# Patient Record
Sex: MALE | Race: WHITE | NOT HISPANIC OR LATINO | Employment: UNEMPLOYED | URBAN - METROPOLITAN AREA
[De-identification: names, ages, dates, MRNs, and addresses within clinical notes are randomized per-mention and may not be internally consistent; named-entity substitution may affect disease eponyms.]

---

## 2017-01-09 DIAGNOSIS — F10.220 ACUTE ALCOHOLIC INTOXICATION IN ALCOHOLISM, UNCOMPLICATED (H): Primary | ICD-10-CM

## 2017-01-09 NOTE — TELEPHONE ENCOUNTER
wellbutrin       Last Written Prescription Date: 11/29/16  Last Fill Quantity: 30; # refills: 1  Last Office Visit with [facility], [facility] or [facility] prescribing provider:  12/2/16        Last PHQ-9 score on record=   PHQ-9 SCORE 10/18/2016   Total Score 12   Some encounter information is confidential and restricted. Go to Review Flowsheets activity to see all data.       AST       41   11/30/2016  ALT       58   11/30/2016    Labs showing if normal/abnormal  Lab Results   Component Value Date    AST 41 11/30/2016    ALT 58 11/30/2016

## 2017-01-11 NOTE — TELEPHONE ENCOUNTER
Routing refill request to provider for review/approval because:  Most recent phq9 outside SO parameters.    Please advise, thanks.

## 2017-01-12 NOTE — TELEPHONE ENCOUNTER
Notes from Dr Maher visit says he is seeing psych on the 16 th of this month and they will do prescribing of psych medication   He was given prescription 11/29 with one refill so this should get him to end of January and psych can refill medication

## 2017-01-14 NOTE — TELEPHONE ENCOUNTER
Pt called and reached, the pt stated he had failed to show up a different time so the appt on the 16th was cancelled as the pt was dropped    He is asking for a referral and asking if you can refill again giving him time to find a new MD.    Moisés ANDRE RN

## 2017-01-17 RX ORDER — BUPROPION HYDROCHLORIDE 150 MG/1
150 TABLET ORAL EVERY MORNING
Qty: 30 TABLET | Refills: 1 | Status: SHIPPED | OUTPATIENT
Start: 2017-01-17 | End: 2017-03-09

## 2017-01-17 NOTE — TELEPHONE ENCOUNTER
Referral to ?  This is a list of mental health options   I like the highlighted one     Refer to resource list of out-patient mental health clinics.      [facility], [address], [place]. The phone number is [phone].    Other [facility] locations:     [place], [place], [facility], [place], [place], [place], [place], [place], [place], [place], [place], and [facility].      [facility]), [address]. The phone number is [phone].      [facility], [address] [place] [address]. The phone number is [phone].     Other [facility] locations:     [facility], [facility], [place], and [place].       [facility], [address]. The phone number is [phone].       [facility], [facility], [address], [facility] [address]. The phone number is [phone].    Other [facility] locations:     [place], [place], and [facility].

## 2017-01-25 ENCOUNTER — TELEPHONE (OUTPATIENT)
Dept: INTERNAL MEDICINE | Facility: CLINIC | Age: 27
End: 2017-01-25

## 2017-01-25 DIAGNOSIS — G47.00 INSOMNIA, UNSPECIFIED TYPE: ICD-10-CM

## 2017-01-25 DIAGNOSIS — F10.14 ALCOHOL ABUSE WITH ALCOHOL-INDUCED MOOD DISORDER (H): ICD-10-CM

## 2017-01-25 DIAGNOSIS — F90.9 ATTENTION DEFICIT HYPERACTIVITY DISORDER (ADHD), UNSPECIFIED ADHD TYPE: Primary | ICD-10-CM

## 2017-01-25 DIAGNOSIS — F10.21 ALCOHOL DEPENDENCE IN REMISSION (H): ICD-10-CM

## 2017-01-25 DIAGNOSIS — F41.9 ANXIETY: ICD-10-CM

## 2017-01-25 DIAGNOSIS — F43.23 ADJUSTMENT DISORDER WITH MIXED ANXIETY AND DEPRESSED MOOD: ICD-10-CM

## 2017-01-25 RX ORDER — LISDEXAMFETAMINE DIMESYLATE 70 MG/1
70 CAPSULE ORAL EVERY MORNING
Qty: 30 CAPSULE | Refills: 0 | Status: SHIPPED | OUTPATIENT
Start: 2017-01-26 | End: 2017-02-20

## 2017-01-25 NOTE — TELEPHONE ENCOUNTER
Per pt note below pt requesting psych referral. Discussed with Nicolette Frye NP. Ordered Mental Health referral per Nicolette verbal orders.    Please share referral contact info with pt when notifying of rx status.

## 2017-01-25 NOTE — TELEPHONE ENCOUNTER
Reason for Call:  Medication or medication refill:    Do you use a [facility]?  Name of the pharmacy and phone number for the current request:  [facility]     Name of the medication requested: vyvanse     Other request: needs referrals for psych     Can we leave a detailed message on this number? YES    Phone number patient can be reached at: Home number on file [phone] (home)    Best Time: asap    Call taken on 1/25/2017 at 3:26 PM by Monika Sullivan

## 2017-01-25 NOTE — TELEPHONE ENCOUNTER
Vyvance       Last Written Prescription Date:  12/29/16  Last Fill Quantity:30 ,   # refills: 0  Last Office Visit with [facility] or [facility] prescribing provider: 12/29/16  Future Office visit:       Routing refill request to provider for review/approval because:  Drug not on the [facility], [facility] or [facility] refill protocol or controlled substance    Will need to US Mail to pharmacy  Med pended  Pharmacy listed  Please advise  Moisés ANDRE RN

## 2017-01-25 NOTE — TELEPHONE ENCOUNTER
RX will be mailed to pharmacy  Called pt and left message stating RX will be mail. And the the Mental health referral ws placed, to call back for details.    Moisés ANDRE RN

## 2017-01-27 NOTE — TELEPHONE ENCOUNTER
Pt calls, asking where rx was mailed. [facility] was attached to rx. Spoke with DORI Curiel who states she spoke to pt on 01/25 and was instructed to mail to [facility] and did so. Relay this information to pt. Instruct him to f/u with pharm to check on when rx received.

## 2017-02-07 DIAGNOSIS — F43.23 ADJUSTMENT DISORDER WITH MIXED ANXIETY AND DEPRESSED MOOD: Primary | ICD-10-CM

## 2017-02-07 DIAGNOSIS — F10.220 ACUTE ALCOHOLIC INTOXICATION IN ALCOHOLISM, UNCOMPLICATED (H): ICD-10-CM

## 2017-02-07 DIAGNOSIS — F10.21 ALCOHOL DEPENDENCE IN REMISSION (H): ICD-10-CM

## 2017-02-07 DIAGNOSIS — F41.9 ANXIETY: ICD-10-CM

## 2017-02-07 DIAGNOSIS — E66.01 MORBID OBESITY DUE TO EXCESS CALORIES (H): ICD-10-CM

## 2017-02-07 RX ORDER — OMEPRAZOLE 40 MG/1
40 CAPSULE, DELAYED RELEASE ORAL DAILY
Qty: 30 CAPSULE | Refills: 10 | Status: ON HOLD | OUTPATIENT
Start: 2017-02-07 | End: 2017-07-03

## 2017-02-07 NOTE — TELEPHONE ENCOUNTER
omeprazole      Last Written Prescription Date: 12/29/16  Last Fill Quantity: 30,  # refills: 0   [facility] Visit with [facility] prescribing provider: 12/29/16  Prescription approved per [facility] Refill Protocol.                                                 neurontin      Last Written Prescription Date: 12/29/16  Last Fill Quantity: 240,   # refills: 0  Last Office Visit with [facility] prescribing provider: 12/29/16  Future Office visit:       Routing refill request to provider for review/approval because:  Drug not on the [facility] or  [facility] refill protocol or controlled substance  Marimar Grayson RN

## 2017-02-08 RX ORDER — GABAPENTIN 400 MG/1
800 CAPSULE ORAL 4 TIMES DAILY
Qty: 240 CAPSULE | Refills: 0 | Status: SHIPPED | OUTPATIENT
Start: 2017-02-08 | End: 2017-02-21

## 2017-02-20 DIAGNOSIS — F41.9 ANXIETY: Primary | ICD-10-CM

## 2017-02-20 DIAGNOSIS — F90.9 ATTENTION DEFICIT HYPERACTIVITY DISORDER (ADHD), UNSPECIFIED ADHD TYPE: ICD-10-CM

## 2017-02-20 DIAGNOSIS — F19.20 CHEMICAL DEPENDENCY (H): ICD-10-CM

## 2017-02-20 DIAGNOSIS — F10.14 ALCOHOL ABUSE WITH ALCOHOL-INDUCED MOOD DISORDER (H): ICD-10-CM

## 2017-02-20 DIAGNOSIS — F43.23 ADJUSTMENT DISORDER WITH MIXED ANXIETY AND DEPRESSED MOOD: ICD-10-CM

## 2017-02-20 NOTE — TELEPHONE ENCOUNTER
Pt calls to request dosing change for gabapentin from 800mg QID to 600mg QID. He states that he has discussed this with Nicolette and in the past and does want try to wean down a little.     Pt also requesting refill for Vyvanse.   He would like this filled at [facility].   He is going to be seeing psychiatry soon and they will take over Vyvanse prescription-his therapist told him last week someone would be calling him soon to get appt with psychiatry.    Vyvanse 70mg      Last Written Prescription Date:  1/26/17  Last Fill Quantity: 30,   # refills: 0  Last Office Visit with [facility] or [facility] prescribing provider: 12/29/16  Future Office visit:    Next 5 appointments (look out 90 days)     Mar 14, 2017  9:20 AM CDT   SHORT with ROXI Santiago [facility] ([facility])    [address]  [facility] [address]   [phone]                 Does not have CSA on file-pt pt Nicolette is ordering this until he gets established with a new psychiatrist.     Routing refill request to provider for review/approval because:  Drug not on the [facility] or [facility] refill protocol or controlled substance

## 2017-02-21 DIAGNOSIS — F43.23 ADJUSTMENT DISORDER WITH MIXED ANXIETY AND DEPRESSED MOOD: ICD-10-CM

## 2017-02-21 DIAGNOSIS — F19.20 CHEMICAL DEPENDENCY (H): ICD-10-CM

## 2017-02-21 DIAGNOSIS — F10.14 ALCOHOL ABUSE WITH ALCOHOL-INDUCED MOOD DISORDER (H): ICD-10-CM

## 2017-02-21 DIAGNOSIS — F41.9 ANXIETY: ICD-10-CM

## 2017-02-21 RX ORDER — GABAPENTIN 600 MG/1
600 TABLET ORAL 4 TIMES DAILY
Qty: 120 TABLET | Refills: 0 | Status: SHIPPED | OUTPATIENT
Start: 2017-02-21 | End: 2017-02-21

## 2017-02-21 RX ORDER — LISDEXAMFETAMINE DIMESYLATE 70 MG/1
70 CAPSULE ORAL EVERY MORNING
Qty: 30 CAPSULE | Refills: 0 | Status: SHIPPED | OUTPATIENT
Start: 2017-02-21 | End: 2017-06-28

## 2017-02-21 NOTE — TELEPHONE ENCOUNTER
Called pt, relay provider message below. States he started seeing Dr. Moscoso at [facility]. Then when she left the clinic saw Dr. Walton who pt reports concerns about being consistently late to appts 20-30 minutes and then spending about 5 minutes with him.    Indicates he has been seeing a therapist t/ [facility] [place] [facility]. Pt states therapist indicated he was not able to get psychiatrist appt until working with her for 3 weeks. It has now been 4 weeks and so pt is in the process of getting a new psychiatrist.    [facility]. [facility] to [facility].     Routed to provider as update.

## 2017-02-21 NOTE — TELEPHONE ENCOUNTER
My last note said he was seeing psych at [facility] January 6 th   Why are they not prescribing these medications?    I will fill for a month

## 2017-02-21 NOTE — TELEPHONE ENCOUNTER
This medication needs prior authorization. Please call [phone]    Thank you,  [facility]  [phone]

## 2017-02-23 RX ORDER — GABAPENTIN 600 MG/1
600 TABLET ORAL 4 TIMES DAILY
Qty: 120 TABLET | Refills: 0 | Status: SHIPPED | OUTPATIENT
Start: 2017-02-23 | End: 2017-03-09

## 2017-02-24 ENCOUNTER — HOSPITAL ENCOUNTER (EMERGENCY)
Facility: CLINIC | Age: 27
Discharge: HOME | End: 2017-02-25
Attending: FAMILY MEDICINE | Admitting: FAMILY MEDICINE
Payer: MEDICAID

## 2017-02-24 DIAGNOSIS — F10.229 ALCOHOL DEPENDENCE WITH INTOXICATION WITH COMPLICATION (H): ICD-10-CM

## 2017-02-24 LAB — ALCOHOL BREATH TEST: 0.35 (ref 0–0.01)

## 2017-02-24 PROCEDURE — 96372 THER/PROPH/DIAG INJ SC/IM: CPT

## 2017-02-24 PROCEDURE — 99285 EMERGENCY DEPT VISIT HI MDM: CPT | Mod: 25

## 2017-02-24 PROCEDURE — 99284 EMERGENCY DEPT VISIT MOD MDM: CPT | Mod: Z6 | Performed by: FAMILY MEDICINE

## 2017-02-24 PROCEDURE — 25000128 H RX IP 250 OP 636: Performed by: FAMILY MEDICINE

## 2017-02-24 PROCEDURE — 82075 ASSAY OF BREATH ETHANOL: CPT

## 2017-02-24 RX ORDER — TRAZODONE HYDROCHLORIDE 100 MG/1
200 TABLET ORAL
Qty: 8 TABLET | Refills: 0 | Status: SHIPPED | OUTPATIENT
Start: 2017-02-24 | End: 2017-06-28

## 2017-02-24 RX ORDER — CLONIDINE HYDROCHLORIDE 0.1 MG/1
0.1 TABLET ORAL 2 TIMES DAILY
Qty: 8 TABLET | Refills: 0 | Status: SHIPPED | OUTPATIENT
Start: 2017-02-24 | End: 2017-06-28

## 2017-02-24 RX ORDER — AMLODIPINE BESYLATE 10 MG/1
10 TABLET ORAL DAILY
Qty: 4 TABLET | Refills: 0 | Status: SHIPPED | OUTPATIENT
Start: 2017-02-24 | End: 2017-06-28

## 2017-02-24 RX ORDER — KETOROLAC TROMETHAMINE 30 MG/ML
30 INJECTION, SOLUTION INTRAMUSCULAR; INTRAVENOUS ONCE
Status: COMPLETED | OUTPATIENT
Start: 2017-02-24 | End: 2017-02-24

## 2017-02-24 RX ORDER — SERTRALINE HYDROCHLORIDE 100 MG/1
100 TABLET, FILM COATED ORAL DAILY
Qty: 4 TABLET | Refills: 0 | Status: SHIPPED | OUTPATIENT
Start: 2017-02-24 | End: 2017-06-28

## 2017-02-24 RX ORDER — BUPROPION HYDROCHLORIDE 150 MG/1
TABLET, EXTENDED RELEASE ORAL
Qty: 4 TABLET | Refills: 0 | Status: SHIPPED | OUTPATIENT
Start: 2017-02-24 | End: 2017-06-28

## 2017-02-24 RX ORDER — LISDEXAMFETAMINE DIMESYLATE 70 MG/1
70 CAPSULE ORAL DAILY
Qty: 4 CAPSULE | Refills: 0 | Status: SHIPPED | OUTPATIENT
Start: 2017-02-24 | End: 2017-02-28

## 2017-02-24 RX ADMIN — KETOROLAC TROMETHAMINE 30 MG: 30 INJECTION, SOLUTION INTRAMUSCULAR at 22:36

## 2017-02-24 NOTE — ED NOTES
Bed: ED12  Expected date: 2/24/17  Expected time: 4:55 PM  Means of arrival:   Comments:  [facility] 25 yo anxiety etoh

## 2017-02-24 NOTE — SUMMARY OF CARE
Patient search completed by ; pt wearing scrub top, pt wanded with metal detector and belongings given to security to be stored. Explained to patient that they would be evaluated by a nurse here in the ER and then would go over to the [facility] when a bed is available where they will meet with a doctor and an accessor; plan will be determined from there.

## 2017-02-25 VITALS
SYSTOLIC BLOOD PRESSURE: 148 MMHG | HEART RATE: 110 BPM | RESPIRATION RATE: 16 BRPM | DIASTOLIC BLOOD PRESSURE: 91 MMHG | OXYGEN SATURATION: 96 % | TEMPERATURE: 98 F

## 2017-02-25 NOTE — DISCHARGE INSTRUCTIONS
Discharge to [address] [place] [address] for detox and follow-up for treatment.  Patient will be on a transport hold.

## 2017-03-07 ASSESSMENT — ENCOUNTER SYMPTOMS
SHORTNESS OF BREATH: 0
AGITATION: 1
ABDOMINAL PAIN: 0
FEVER: 0

## 2017-03-07 NOTE — ED PROVIDER NOTES
History     Chief Complaint   Patient presents with     Alcohol Intoxication     HPI  Nickolas Lang is a 26 year old male who presents to emergency room acutely intoxicated at this time at risk for withdrawal and requiring detox.  Patient denies any suicidal ideation denies any intent to harm himself is open to the idea of treatment but will require detox.  Patient has been drinking close to a liter a day and is acutely intoxicated on arrival was brought here by EMS.    I have reviewed the Medications, Allergies, Past Medical and Surgical History, and Social History in the Epic system.    Review of Systems   Constitutional: Negative for fever.   Respiratory: Negative for shortness of breath.    Cardiovascular: Negative for chest pain.   Gastrointestinal: Negative for abdominal pain.   Psychiatric/Behavioral: Positive for agitation. Negative for suicidal ideas.   All other systems reviewed and are negative.      Physical Exam   BP: (!) 129/93  Pulse: 110  Heart Rate: 96  Temp: 98  F (36.7  C)  Resp: 16  SpO2: 98 %  Physical Exam   Constitutional: He is oriented to person, place, and time. No distress.   HENT:   Head: Atraumatic.   Mouth/Throat: Oropharynx is clear and moist. No oropharyngeal exudate.   Eyes: Pupils are equal, round, and reactive to light. No scleral icterus.   Cardiovascular: Normal heart sounds and intact distal pulses.    Pulmonary/Chest: Breath sounds normal. No respiratory distress.   Abdominal: Soft. Bowel sounds are normal. There is no tenderness.   Musculoskeletal: He exhibits no edema or tenderness.   Neurological: He is alert and oriented to person, place, and time. No cranial nerve deficit. Coordination normal.   Skin: Skin is warm. No rash noted. He is not diaphoretic.   Psychiatric: His mood appears anxious. He is agitated. He expresses impulsivity. He expresses no suicidal ideation.       ED Course     ED Course     Procedures    Critical Care time:  none    Labs Ordered and  Resulted from Time of ED Arrival Up to the Time of Departure from the ED   ALCOHOL BREATH TEST POCT - Abnormal; Notable for the following:        Result Value    Alcohol Breath Test 0.352 (*)     All other components within normal limits       Assessments & Plan (with Medical Decision Making)       I have reviewed the nursing notes.    I have reviewed the findings, diagnosis, plan and need for follow up with the patient.  Patient with acute alcohol intoxication at this time there are no detox beds available here he will require supervised detox and arrangements were made for patient to go to 1800, though he will be on a transport hold I also wrote prescriptions for the next 3-4 days of his baseline medications as well.    Discharge Medication List as of 2/25/2017  1:00 AM      START taking these medications    Details   !! amLODIPine (NORVASC) 10 MG tablet Take 1 tablet (10 mg) by mouth daily, Disp-4 tablet, R-0, Local Print      buPROPion (WELLBUTRIN SR) 150 MG 12 hr tablet Take 1 tablet once daily and increase to 1 tablet twice daily after 4 to 7 days, Disp-4 tablet, R-0, Local Print      !! cloNIDine (CATAPRES) 0.1 MG tablet Take 1 tablet (0.1 mg) by mouth 2 times daily, Disp-8 tablet, R-0, Local Print      !! lisdexamfetamine (VYVANSE) 70 MG capsule Take 1 capsule (70 mg) by mouth daily for 4 days, Disp-4 capsule, R-0, Local Print      !! sertraline (ZOLOFT) 100 MG tablet Take 1 tablet (100 mg) by mouth daily, Disp-4 tablet, R-0, Local Print      !! traZODone (DESYREL) 100 MG tablet Take 2 tablets (200 mg) by mouth nightly as needed for sleep, Disp-8 tablet, R-0, Local Print       !! - Potential duplicate medications found. Please discuss with provider.          Final diagnoses:   Alcohol dependence with intoxication with complication (H)       2/24/2017   [facility], [place], EMERGENCY DEPARTMENT     Jorge Baker MD  03/07/17 4308

## 2017-03-09 ENCOUNTER — TELEPHONE (OUTPATIENT)
Dept: INTERNAL MEDICINE | Facility: CLINIC | Age: 27
End: 2017-03-09

## 2017-03-09 DIAGNOSIS — F43.23 ADJUSTMENT DISORDER WITH MIXED ANXIETY AND DEPRESSED MOOD: ICD-10-CM

## 2017-03-09 DIAGNOSIS — F19.20 CHEMICAL DEPENDENCY (H): ICD-10-CM

## 2017-03-09 DIAGNOSIS — F10.14 ALCOHOL ABUSE WITH ALCOHOL-INDUCED MOOD DISORDER (H): ICD-10-CM

## 2017-03-09 DIAGNOSIS — F41.9 ANXIETY: Primary | ICD-10-CM

## 2017-03-09 DIAGNOSIS — F10.220 ACUTE ALCOHOLIC INTOXICATION IN ALCOHOLISM, UNCOMPLICATED (H): ICD-10-CM

## 2017-03-09 DIAGNOSIS — F41.9 ANXIETY: ICD-10-CM

## 2017-03-09 RX ORDER — GABAPENTIN 600 MG/1
600 TABLET ORAL 4 TIMES DAILY
Qty: 120 TABLET | Refills: 0 | Status: SHIPPED | OUTPATIENT
Start: 2017-03-09 | End: 2017-03-16

## 2017-03-09 NOTE — TELEPHONE ENCOUNTER
Letter/fax recieved from [employer] (in formulary folder/basket) states gabapentin is not covered under patient's insurance.      Can medication be changed or should a PA be initiated?    (Provider may request we ask patient to check their formulary book or call their insurance company to find out what medications are on their formulary. Ask patient to call back within 2-3 days.  If they are not able to call back in this time frame this encounter will be closed.  Open new encounter when/if patient calls back.)    IF PA call [facility] at [phone]  No pt id provided

## 2017-03-09 NOTE — TELEPHONE ENCOUNTER
Bupropion       Last Written Prescription Date: 2/24/17  Last Fill Quantity: 4; # refills: 0  Last Office Visit with [facility], [facility] or  [facility] prescribing provider:  12/2/16   Next 5 appointments (look out 90 days)     Mar 14, 2017  9:20 AM CDT   SHORT with ROXI Santiago CNP   [facility]    [address]  [facility] [address]   [phone]                   Last PHQ-9 score on record=   PHQ-9 SCORE 10/18/2016   Total Score 12   Some encounter information is confidential and restricted. Go to Review Flowsheets activity to see all data.       Lab Results   Component Value Date    AST 41 11/30/2016     Lab Results   Component Value Date    ALT 58 11/30/2016       Labs showing if normal/abnormal  Lab Results   Component Value Date    AST 41 11/30/2016    ALT 58 11/30/2016

## 2017-03-09 NOTE — TELEPHONE ENCOUNTER
Gabapentin 400mg--epic has 600mg on file      Last Written Prescription Date:  2/23/17  Last Fill Quantity: 120,   # refills: 0  Last Office Visit with [facility], [facility] or [facility] prescribing provider: 12/29/16  Future Office visit:    Next 5 appointments (look out 90 days)     Mar 14, 2017  9:20 AM CDT   SHORT with ROXI Santiago CNP   [facility]    [address]  [facility] [id number] [phone]                   Routing refill request to provider for review/approval because:  Drug not on the [facility], [facility] or [facility] refill protocol or controlled substance

## 2017-03-10 NOTE — TELEPHONE ENCOUNTER
Representative from [employer] calls for [facility], left vm stating PA could be completed on [employer]/PAportal. If questions for representative call: [phone].

## 2017-03-13 RX ORDER — BUPROPION HYDROCHLORIDE 150 MG/1
150 TABLET ORAL EVERY MORNING
Qty: 30 TABLET | Refills: 0 | Status: ON HOLD | OUTPATIENT
Start: 2017-03-13 | End: 2017-07-03

## 2017-03-13 NOTE — TELEPHONE ENCOUNTER
Per 12/2 dictation-Follow up in 4-6 weeks for fasting labs and exam    Patient scheduled for appt tomorrow, but has not had labs done

## 2017-03-16 RX ORDER — GABAPENTIN 300 MG/1
600 CAPSULE ORAL 4 TIMES DAILY
Qty: 240 CAPSULE | Refills: 3 | Status: SHIPPED | OUTPATIENT
Start: 2017-03-16 | End: 2017-06-28

## 2017-03-16 NOTE — TELEPHONE ENCOUNTER
[facility] call from [facility] [address] in [place], only the 600 mg strength of Gabapentin is not covered.  Asking if PCP would order 300 mg caps taking 2 of them four times daily.  FREDERICK Beneidct R.N.

## 2017-03-20 NOTE — TELEPHONE ENCOUNTER
Pt had transfer prescriptions to [facility]- called and confirmed that prescription with new dosage went through just fine however pt cannot yoanna until after tomorrow.

## 2017-05-17 ENCOUNTER — HOSPITAL ENCOUNTER (EMERGENCY)
Facility: CLINIC | Age: 27
Discharge: HOME | End: 2017-05-17
Attending: EMERGENCY MEDICINE | Admitting: EMERGENCY MEDICINE
Payer: MEDICAID

## 2017-05-17 VITALS
BODY MASS INDEX: 40.48 KG/M2 | OXYGEN SATURATION: 98 % | SYSTOLIC BLOOD PRESSURE: 125 MMHG | TEMPERATURE: 98 F | HEIGHT: 72 IN | HEART RATE: 118 BPM | DIASTOLIC BLOOD PRESSURE: 98 MMHG | RESPIRATION RATE: 18 BRPM | WEIGHT: 298.9 LBS

## 2017-05-17 DIAGNOSIS — F10.220 ALCOHOL DEPENDENCE WITH UNCOMPLICATED INTOXICATION (H): ICD-10-CM

## 2017-05-17 LAB — ALCOHOL BREATH TEST: 0.27 (ref 0–0.01)

## 2017-05-17 PROCEDURE — 99283 EMERGENCY DEPT VISIT LOW MDM: CPT | Performed by: EMERGENCY MEDICINE

## 2017-05-17 PROCEDURE — 99284 EMERGENCY DEPT VISIT MOD MDM: CPT | Mod: Z6 | Performed by: EMERGENCY MEDICINE

## 2017-05-17 PROCEDURE — 82075 ASSAY OF BREATH ETHANOL: CPT | Performed by: EMERGENCY MEDICINE

## 2017-05-17 ASSESSMENT — ENCOUNTER SYMPTOMS
SHORTNESS OF BREATH: 0
ARTHRALGIAS: 0
FEVER: 0
EYE REDNESS: 0
CONFUSION: 0
COLOR CHANGE: 0
HEADACHES: 0
ABDOMINAL PAIN: 0
NAUSEA: 1
DIFFICULTY URINATING: 0
NECK STIFFNESS: 0

## 2017-05-17 NOTE — ED AVS SNAPSHOT
[facility] [place], Emergency Department    [address]    [facility] [address]    Phone:  [phone]    Fax:  [phone]                                       Nickolas Lang   MRN: [id number]    Department:  [facility], Emergency Department   Date of Visit:  5/17/2017           After Visit Summary Signature Page     I have received my discharge instructions, and my questions have been answered. I have discussed any challenges I see with this plan with the nurse or doctor.    ..........................................................................................................................................  Patient/Patient Representative Signature      ..........................................................................................................................................  Patient Representative Print Name and Relationship to Patient    ..................................................               ................................................  Date                                            Time    ..........................................................................................................................................  Reviewed by Signature/Title    ...................................................              ..............................................  Date                                                            Time

## 2017-05-17 NOTE — ED AVS SNAPSHOT
[facility], Emergency Department    [address]    [place] [address]    Phone:  [phone]    Fax:  [phone]                                       Nickolas Lang   MRN: [id number]    Department:  [facility], Emergency Department   Date of Visit:  5/17/2017           Patient Information     Date Of Birth          1990        Your diagnoses for this visit were:     Alcohol dependence with uncomplicated intoxication (H)        You were seen by Nj Navarro MD.        Discharge Instructions       Call Mental Health Intake in the morning to check on Detox bed availability:  [phone].        Discharge References/Attachments     ALCOHOL ABUSE (ENGLISH)      24 Hour Appointment Hotline       To make an appointment at any [facility] clinic, call [phone] ([phone]). If you don't have a family doctor or clinic, we will help you find one. [facility] clinics are conveniently located to serve the needs of you and your family.             Review of your medicines      Our records show that you are taking the medicines listed below. If these are incorrect, please call your family doctor or clinic.        Dose / Directions Last dose taken    * amLODIPine 10 MG tablet   Commonly known as:  NORVASC   Dose:  10 mg   Quantity:  30 tablet        Take 1 tablet (10 mg) by mouth daily   Refills:  1        * amLODIPine 10 MG tablet   Commonly known as:  NORVASC   Dose:  10 mg   Quantity:  4 tablet        Take 1 tablet (10 mg) by mouth daily   Refills:  0        * buPROPion 150 MG 12 hr tablet   Commonly known as:  WELLBUTRIN SR   Quantity:  4 tablet        Take 1 tablet once daily and increase to 1 tablet twice daily after 4 to 7 days   Refills:  0        * buPROPion 150 MG 24 hr tablet   Commonly known as:  WELLBUTRIN XL   Dose:  150 mg   Quantity:  30 tablet        Take 1 tablet (150 mg) by mouth every morning   Refills:  0        busPIRone 10 MG tablet   Commonly known as:  BUSPAR   Dose:  20  mg   Quantity:  120 tablet        Take 2 tablets (20 mg) by mouth 3 times daily   Refills:  1        * cloNIDine 0.1 MG tablet   Commonly known as:  CATAPRES   Dose:  0.1 mg   Quantity:  60 tablet        Take 1 tablet (0.1 mg) by mouth 2 times daily   Refills:  1        * cloNIDine 0.1 MG tablet   Commonly known as:  CATAPRES   Dose:  0.1 mg   Quantity:  8 tablet        Take 1 tablet (0.1 mg) by mouth 2 times daily   Refills:  0        disulfiram 250 MG tablet   Commonly known as:  ANTABUSE   Dose:  250 mg   Quantity:  30 tablet        Take 1 tablet (250 mg) by mouth daily   Refills:  1        gabapentin 300 MG capsule   Commonly known as:  NEURONTIN   Dose:  600 mg   Quantity:  240 capsule        Take 2 capsules (600 mg) by mouth 4 times daily   Refills:  3        hydrOXYzine 25 MG tablet   Commonly known as:  ATARAX   Dose:  50 mg   Quantity:  120 tablet        Take 2 tablets (50 mg) by mouth every 6 hours as needed for anxiety   Refills:  1        lisdexamfetamine 70 MG capsule   Commonly known as:  VYVANSE   Dose:  70 mg   Quantity:  30 capsule        Take 1 capsule (70 mg) by mouth every morning   Refills:  0        methocarbamol 500 MG tablet   Commonly known as:  ROBAXIN   Dose:  500 mg   Quantity:  90 tablet        Take 1 tablet (500 mg) by mouth 4 times daily as needed for muscle spasms   Refills:  1        multivitamin, therapeutic with minerals Tabs tablet   Dose:  1 tablet   Quantity:  30 each        Take 1 tablet by mouth daily   Refills:  3        omeprazole 40 MG capsule   Commonly known as:  priLOSEC   Dose:  40 mg   Quantity:  30 capsule        Take 1 capsule (40 mg) by mouth daily   Refills:  10        * sertraline 100 MG tablet   Commonly known as:  ZOLOFT   Dose:  200 mg   Quantity:  60 tablet        Take 2 tablets (200 mg) by mouth daily   Refills:  1        * sertraline 100 MG tablet   Commonly known as:  ZOLOFT   Dose:  100 mg   Quantity:  4 tablet        Take 1 tablet (100 mg) by mouth daily    Refills:  0        thiamine 100 MG tablet   Dose:  100 mg   Quantity:  30 tablet        Take 1 tablet (100 mg) by mouth daily   Refills:  0        * traZODone 100 MG tablet   Commonly known as:  DESYREL   Dose:  200 mg   Quantity:  60 tablet        Take 2 tablets (200 mg) by mouth nightly as needed for sleep   Refills:  0        * traZODone 100 MG tablet   Commonly known as:  DESYREL   Dose:  200 mg   Quantity:  8 tablet        Take 2 tablets (200 mg) by mouth nightly as needed for sleep   Refills:  0        * Notice:  This list has 10 medication(s) that are the same as other medications prescribed for you. Read the directions carefully, and ask your doctor or other care provider to review them with you.            Procedures and tests performed during your visit     Alcohol breath test POCT      Orders Needing Specimen Collection     Ordered          05/17/17 6712  Drug abuse screen 6 urine (tox) - STAT, [facility]: STAT, Needs to be Collected     Scheduled Task Status   05/17/17 1659 Collect Drug abuse screen 6 urine (tox) Open   Order Class:  PCU Collect                  Pending Results     No orders found from 5/15/2017 to 5/18/2017.            Pending Culture Results     No orders found from 5/15/2017 to 5/18/2017.            Pending Results Instructions     If you had any lab results that were not finalized at the time of your Discharge, you can call the ED Lab Result RN at [phone]. You will be contacted by this team for any positive Lab results or changes in treatment. The nurses are available 7 days a week from 10A to 6:30P.  You can leave a message 24 hours per day and they will return your call.        Thank you for choosing [facility]       Thank you for choosing [facility] for your care. Our goal is always to provide you with excellent care. Hearing back from our patients is one way we can continue to improve our services. Please take a few minutes to complete the written survey that you may receive in the  "mail after you visit with us. Thank you!        [employer] Information     [facility] [employer] [facility] lets you send messages to your doctor, view your test results, renew your prescriptions, schedule appointments and more. To sign up, go to [email or web address] . Click on \"Log in\" on the left side of the screen, which will take you to the Welcome page. Then click on \"Sign up Now\" on the right side of the page.     You will be asked to enter the access code listed below, as well as some personal information. Please follow the directions to create your username and password.     Your access code is: [email or web address]  Expires: 8/15/2017  5:29 PM     Your access code will  in 90 days. If you need help or a new code, please call your [facility] clinic or [phone].        Care EveryWhere ID     This is your Care EveryWhere ID. This could be used by other organizations to access your [facility] medical records  [id number]        After Visit Summary       This is your record. Keep this with you and show to your community pharmacist(s) and doctor(s) at your next visit.                  "

## 2017-05-17 NOTE — ED PROVIDER NOTES
History     Chief Complaint   Patient presents with     Addiction Problem     alcohol     HPI  Nickolas Lang is a 26 year old male who presents to the emergency Department seeking detox from alcohol.  Patient reports daily alcohol consumption for the last week.  He states that he is trying to stop drinking in the past week but developed tremulousness noticed with attempt at alcohol cessation.  Patient reports a history of a cultural the past.  He denies a history of alcohol withdrawal seizures or DTs.  Patient reports a history of depression for which she takes Zoloft.  He denies any suicide ideation.  Patient denies any recent fall or injuries.  He denies any recent illness.  He does report some mild nausea but denies vomiting.  He denies any dark or black stools.  He denies any chest pain or dyspnea.    I have reviewed the Medications, Allergies, Past Medical and Surgical History, and Social History in the Epic system.    Review of Systems   Constitutional: Negative for fever.   HENT: Negative for congestion.    Eyes: Negative for redness.   Respiratory: Negative for shortness of breath.    Cardiovascular: Negative for chest pain.   Gastrointestinal: Positive for nausea. Negative for abdominal pain.   Genitourinary: Negative for difficulty urinating.   Musculoskeletal: Negative for arthralgias and neck stiffness.   Skin: Negative for color change.   Neurological: Negative for headaches.   Psychiatric/Behavioral: Negative for confusion.   All other systems reviewed and are negative.      Physical Exam   BP: (!) 164/113  Pulse: 118  Temp: 98.1  F (36.7  C)  Resp: 20  Height: 182.9 cm (6')  Weight: 135.6 kg (298 lb 14.4 oz)  SpO2: 93 %  Physical Exam   Constitutional: He appears well-developed and well-nourished. No distress.   HENT:   Head: Normocephalic and atraumatic.   Mouth/Throat: Oropharynx is clear and moist. No oropharyngeal exudate.   Eyes: Pupils are equal, round, and reactive to light. No scleral  icterus.   Neck: Normal range of motion.   Cardiovascular: Normal heart sounds and intact distal pulses.  Tachycardia present.    Pulmonary/Chest: Effort normal and breath sounds normal. No respiratory distress.   Abdominal: Soft. Bowel sounds are normal. There is no tenderness.   Musculoskeletal: Normal range of motion. He exhibits no edema or tenderness.   Neurological: He is alert. He has normal strength. No cranial nerve deficit. Coordination and gait normal.   Skin: Skin is warm and dry. No rash noted. He is not diaphoretic.   Psychiatric: He has a normal mood and affect. His behavior is normal.   Nursing note and vitals reviewed.      ED Course     ED Course     Procedures            Critical Care time:    Results for orders placed or performed during the hospital encounter of 05/17/17 (from the past 24 hour(s))   Alcohol breath test POCT   Result Value Ref Range    Alcohol Breath Test 0.272 (A) 0.00 - 0.01        Here with sober girlfriend.  No detox beds available.  Declines IV fluids and lab evaluation.           Assessments & Plan (with Medical Decision Making)   26 year old male with history of alcohol abuse and dependence here seeking detox.  There are no detox beds available.  The patient declines detox at alternative setting such as [facility].  The patient has had some nausea but denies any vomiting or melena.  He denies any other medical concerns.  He is mildly tachycardic with a heart rate of 104 on exam.  He declines IV fluids and laboratory evaluation at this time.  He is here with his sober girlfriend.  The patient is ambulatory with a normal gait pattern.  He will be discharged home.  He, hopefully, will call in the morning to check on detox bed availability.  He was provided the number for mental health intake.    I have reviewed the nursing notes.    I have reviewed the findings, diagnosis, plan and need for follow up with the patient.    New Prescriptions    No medications on file        Final diagnoses:   Alcohol dependence with uncomplicated intoxication (H)       5/17/2017   [facility], [place], EMERGENCY DEPARTMENT     Nj Navarro MD  05/17/17 4250

## 2017-06-03 ENCOUNTER — HOSPITAL ENCOUNTER (EMERGENCY)
Facility: CLINIC | Age: 27
Discharge: HOME | End: 2017-06-03
Attending: EMERGENCY MEDICINE | Admitting: EMERGENCY MEDICINE
Payer: MEDICAID

## 2017-06-03 VITALS
HEART RATE: 127 BPM | RESPIRATION RATE: 20 BRPM | TEMPERATURE: 98 F | DIASTOLIC BLOOD PRESSURE: 103 MMHG | SYSTOLIC BLOOD PRESSURE: 168 MMHG | OXYGEN SATURATION: 96 %

## 2017-06-03 DIAGNOSIS — F10.10 ALCOHOL ABUSE: ICD-10-CM

## 2017-06-03 LAB
ALBUMIN UR-MCNC: ABNORMAL MG/DL
APPEARANCE UR: ABNORMAL
BACTERIA #/AREA URNS HPF: ABNORMAL /HPF
BILIRUB UR QL STRIP: NEGATIVE
COLOR UR AUTO: YELLOW
GLUCOSE BLDC GLUCOMTR-MCNC: 116 MG/DL (ref 70–99)
GLUCOSE UR STRIP-MCNC: NEGATIVE MG/DL
HGB UR QL STRIP: NEGATIVE
HYALINE CASTS #/AREA URNS LPF: 7 /LPF (ref 0–2)
KETONES UR STRIP-MCNC: NEGATIVE MG/DL
LEUKOCYTE ESTERASE UR QL STRIP: NEGATIVE
MUCOUS THREADS #/AREA URNS LPF: PRESENT /LPF
NITRATE UR QL: NEGATIVE
PH UR STRIP: 5 PH (ref 5–7)
RBC #/AREA URNS AUTO: 2 /HPF (ref 0–2)
SP GR UR STRIP: 1.02 (ref 1–1.03)
SQUAMOUS #/AREA URNS AUTO: 1 /HPF (ref 0–1)
URN SPEC COLLECT METH UR: ABNORMAL
UROBILINOGEN UR STRIP-MCNC: 0 MG/DL (ref 0–2)
WBC #/AREA URNS AUTO: 7 /HPF (ref 0–2)

## 2017-06-03 PROCEDURE — 00000146 ZZHCL STATISTIC GLUCOSE BY METER IP

## 2017-06-03 PROCEDURE — 25000128 H RX IP 250 OP 636: Performed by: EMERGENCY MEDICINE

## 2017-06-03 PROCEDURE — 96361 HYDRATE IV INFUSION ADD-ON: CPT

## 2017-06-03 PROCEDURE — 25000132 ZZH RX MED GY IP 250 OP 250 PS 637: Performed by: EMERGENCY MEDICINE

## 2017-06-03 PROCEDURE — 81001 URINALYSIS AUTO W/SCOPE: CPT | Performed by: EMERGENCY MEDICINE

## 2017-06-03 PROCEDURE — 99285 EMERGENCY DEPT VISIT HI MDM: CPT | Mod: 25

## 2017-06-03 PROCEDURE — 96374 THER/PROPH/DIAG INJ IV PUSH: CPT

## 2017-06-03 RX ORDER — CHLORDIAZEPOXIDE HYDROCHLORIDE 25 MG/1
25 CAPSULE, GELATIN COATED ORAL 4 TIMES DAILY PRN
Qty: 20 CAPSULE | Refills: 0 | Status: SHIPPED | OUTPATIENT
Start: 2017-06-03 | End: 2017-06-08

## 2017-06-03 RX ORDER — CHLORDIAZEPOXIDE HYDROCHLORIDE 5 MG/1
25 CAPSULE, GELATIN COATED ORAL ONCE
Status: COMPLETED | OUTPATIENT
Start: 2017-06-03 | End: 2017-06-03

## 2017-06-03 RX ORDER — LORAZEPAM 2 MG/ML
1 INJECTION INTRAMUSCULAR ONCE
Status: COMPLETED | OUTPATIENT
Start: 2017-06-03 | End: 2017-06-03

## 2017-06-03 RX ORDER — ONDANSETRON 4 MG/1
8 TABLET, ORALLY DISINTEGRATING ORAL ONCE
Status: DISCONTINUED | OUTPATIENT
Start: 2017-06-03 | End: 2017-06-03 | Stop reason: HOSPADM

## 2017-06-03 RX ORDER — LORAZEPAM 1 MG/1
1 TABLET ORAL ONCE
Status: DISCONTINUED | OUTPATIENT
Start: 2017-06-03 | End: 2017-06-03 | Stop reason: HOSPADM

## 2017-06-03 RX ADMIN — SODIUM CHLORIDE 1000 ML: 9 INJECTION, SOLUTION INTRAVENOUS at 12:56

## 2017-06-03 RX ADMIN — LORAZEPAM 1 MG: 2 INJECTION INTRAMUSCULAR; INTRAVENOUS at 13:02

## 2017-06-03 RX ADMIN — CHLORDIAZEPOXIDE HYDROCHLORIDE 25 MG: 5 CAPSULE ORAL at 15:23

## 2017-06-03 ASSESSMENT — ENCOUNTER SYMPTOMS
SHORTNESS OF BREATH: 0
DIARRHEA: 0
VOMITING: 0
NAUSEA: 0
FEVER: 0
CHILLS: 0

## 2017-06-03 NOTE — ED PROVIDER NOTES
History   Chief Complaint:  Alcohol Intoxication    HPI     HPI limited due to alcohol intoxication.   Nickolas Lang is a 26 year old male with a history of alcohol abuse and hypertension who presents via ambulance due to alcohol intoxication. The EMS states that a neighbor was cutting the lawn when he found the patient passed out in the bushes with a few bottles of vodka around him.    Allergies:  No known drug allergies    Medications:    gabapentin (NEURONTIN) 300 MG capsule  buPROPion (WELLBUTRIN XL) 150 MG 24 hr tablet  amLODIPine (NORVASC) 10 MG tablet  buPROPion (WELLBUTRIN SR) 150 MG 12 hr tablet  cloNIDine (CATAPRES) 0.1 MG tablet  sertraline (ZOLOFT) 100 MG tablet  traZODone (DESYREL) 100 MG tablet  lisdexamfetamine (VYVANSE) 70 MG capsule  omeprazole (PRILOSEC) 40 MG capsule  traZODone (DESYREL) 100 MG tablet  thiamine 100 MG tablet  busPIRone (BUSPAR) 10 MG tablet  hydrOXYzine (ATARAX) 25 MG tablet  sertraline (ZOLOFT) 100 MG tablet  cloNIDine (CATAPRES) 0.1 MG tablet  amLODIPine (NORVASC) 10 MG tablet  methocarbamol (ROBAXIN) 500 MG tablet  multivitamin, therapeutic with minerals (THERA-VIT-M) TABS  disulfiram (ANTABUSE) 250 MG tablet    Past Medical History:    ADD  Alcohol abuse  Anxiety  GERD  Hepatic steatosis  Hypertension  Obesity  Pyloric stenosis    Past Surgical History:    Deviated septum repair  Pyloromyotomy surgery  Shoulder surgery    Family History:    Neurologic Disorder  Depression  anxiety disorder  Alcohol/drug  Substance abuse    Social History:  Marital Status:  Single [1]  Smoking status: former - quit 11/1/2015  Alcohol use: yes    Review of Systems   Constitutional: Negative for chills and fever.   Respiratory: Negative for shortness of breath.    Gastrointestinal: Negative for diarrhea, nausea and vomiting.   Neurological: Positive for syncope.   All other systems reviewed and are negative.  ROS limited due to alcohol intoxication.    Physical Exam   Patient Vitals for  the past 24 hrs:   BP Temp Temp src Pulse Heart Rate Resp SpO2   06/03/17 1249 (!) 180/94 98  F (36.7  C) Oral 127 127 20 98 %     Physical Exam  Constitutional: Patient is well appearing. Emotional and crying.  Head: Atraumatic.  Mouth/Throat: Oropharynx is clear and moist. No oropharyngeal exudate.  Eyes: Conjunctivae and EOM are normal. No scleral icterus.  Neck: Normal range of motion. Neck supple.   Cardiovascular: Normal rate, regular rhythm, normal heart sounds and intact distal pulses.   Pulmonary/Chest: Breath sounds normal. No respiratory distress.  Abdominal: Soft. Bowel sounds are normal. No distension. No tenderness. No rebound or guarding.   Musculoskeletal: Normal range of motion. No edema or tenderness.   Neurological: Alert and orientated to person, place, and time. No observable focal neuro deficit  Skin: Warm and dry. No rash noted. Not diaphoretic.     Emergency Department Course   Laboratory:  Glucose meter: 116 (H)  UA: protein albumin >499 no fat seen (A), bacteria few(A), mucous present(A), hyaline casts 7(H) o/w WNL    Interventions:  1256 NS 1L IV Bolus  1302 Ativan 1 mg IV    Emergency Department Course:  Past medical records, nursing notes, and vitals reviewed.  I performed an exam of the patient and obtained history, as documented above.  1420: I rechecked the patient.  Findings and plan explained to the Patient. Patient discharged home with instructions regarding supportive care, medications, and reasons to return. The importance of close follow-up was reviewed.     Impression & Plan      Medical Decision Making:  Hx and recurrent alcohol abuse  Found passed out.  Fed and ambulatory and clinically sober.  No signs of rhabdo on urine and IVF hydrated until urinating well.    No signs of trauma.      All questions and concerns were answered. The patient was discharged home and recommended to follow up with his primary physician and return with any new or worsening symptoms.        Diagnosis:    ICD-10-CM   1. Alcohol abuse F10.10     Disposition:  Discharged to home    Shelley Rivero  6/3/2017   [facility] EMERGENCY DEPARTMENT    I, Shelley Rivero, am serving as a scribe at 12:47 PM on 6/3/2017 to document services personally performed by Juju Andersen MD based on my observations and the provider's statements to me.        Andrew Van MD  06/03/17 1427

## 2017-06-03 NOTE — ED NOTES
Bed: ED28  Expected date: 6/3/17  Expected time: 12:31 PM  Means of arrival: Ambulance  Comments:  BV 3 - 27 yo male

## 2017-06-03 NOTE — DISCHARGE INSTRUCTIONS
"  Alcohol Abuse  Alcoholic drinks are harmful when you have too many of them. There is no set number of drinks that defines too much. Drinking that disrupts your life or your health is called alcohol abuse. Alcohol abuse can hurt your relationships with others. You may lose friends, a spouse, or even your job. You may be abusing alcohol if any of the following are true for you:    Duties at home or with  suffer because of drinking.    Duties at work or in school suffer because of drinking.    You have missed work or school because of drinking.    You use alcohol while driving or operating machinery.    You have legal problems such as arrests due to drinking.    You keep drinking even though it causes serious problems in your life.  Health effects  Alcohol abuse causes health problems. Sometimes this can happen after only drinking a  little.\" There is no set number of drinks or amount of alcohol that defines too much. The more you drink at one time, and the more often you drink determine both the short-term and long-term health effects. It affects all parts of your body and your health, including your:    Brain. Alcohol is a central nervous system depressant. It can damage parts of the brain that affect your balance, memory, thinking, and emotions. It can cause memory loss, blackouts, depression, agitation, sleep cycle changes, and seizures. These changes may or may not be reversible.    Heart and vascular system. Alcohol affects multiple areas. It can damage heart muscle causing cardiomyopathy, which is a weakening and stretching of the heart muscle. This can lead to trouble breathing, an irregular heartbeat, atrial fibrillation, leg swelling, and heart failure. Alcohol use makes the blood vessels stiffen causing hypertension (high blood pressure). All of these problems increase your risk of having heart attacks or strokes.    Liver. Alcohol causes fat to build up in the liver, affecting its normal " function. This increases the risk for hepatitis, leading to abdominal pain, appetite loss, jaundice, bleeding problems, liver fibrosis, and cirrhosis. This, in turn, can affect your ability to fight off infections, and can cause diabetes. The liver changes prevent it from removing toxins in your blood that can cause encephalopathy which may show with confusion, altered level of consciousness, personality changes, memory loss, seizures, coma, and death.    Pancreas. Alcohol can cause inflammation of the pancreas, or pancreatitis. This can cause abdominal pain, fever, and diabetes.    Immune system. Alcohol weakens your immune system in a number of ways. It suppresses your immune system making it harder to fight infections and colds. It also increases the chance of getting pneumonia and tuberculosis.    Cancer. Alcohol is a risk factor for developing cancer of the mouth, esophagus, pharynx, larynx, liver, and breast.    Sexual function. Alcohol can lead to sexual problems.  Home care  The following guidelines will help you deal with alcohol abuse:    Admit you have a problem with alcohol.    Ask for help from your health care provider and trusted family members or close friends.    Get help from people trained in dealing with alcohol abuse. This may be individual counseling or group therapy, or it may be a supervised alcohol treatment program.    Join a self-help group for alcohol abuse such as Alcoholics Anonymous (AA).    Avoid people who abuse alcohol or tempt you to drink.  Follow-up care  Follow up as advised by the doctor or our staff. Contact these groups to get help:    Alcoholics Anonymous (AA): Go to www.aa.org or check the phone book for meetings near you.    National Alcohol and Substance Abuse Information Center (NASAIC): 430.383.1776 www.addictioncareoptions.com    National [place] on Alcoholism and Drug Dependence (NCADD): 877-WUX-NUOX (258-1391) www.ncadd.org    Al-Anon: 671-3TG-LEQJ (560-5433)  www.al-anon.org  Call 911  Call 911 if any of these occur:    Trouble breathing or slow irregular breathing    Chest pain    Sudden weakness on one side of your body or sudden trouble speaking    Heavy bleeding or vomiting blood    Very drowsy or trouble awakening    Fainting or loss of consciousness    Rapid heart rate    Seizure  When to seek medical care  Get prompt medical attention if you have:    Confusion    Hallucinations (seeing, hearing, or feeling things that aren t there)    Pain in your upper abdomen that gets worse    Repeated vomiting or black or tarry stools    Severe shakiness    [id number] [employer]. [address] [place] [address]. All rights reserved. This information is not intended as a substitute for professional medical care. Always follow your healthcare professional's instructions.

## 2017-06-03 NOTE — ED AVS SNAPSHOT
" [facility]    [address]    [facility] [address]    Phone:  [phone]    Fax:  [phone]                                       Nickolas Lang   MRN: [id number]    Department:  [facility]   Date of Visit:  6/3/2017           Patient Information     Date Of Birth          1990        Your diagnoses for this visit were:     Alcohol abuse        You were seen by Juju Andersen MD and Andrew Van MD.      Follow-up Information     Follow up with Nicolette Frye APRN CNP. Schedule an appointment as soon as possible for a visit in 1 day.    Specialty:  Nurse Practitioner - Family    Contact information:    [facility]  [address]  [facility] [address]  [phone]          Discharge Instructions         Alcohol Abuse  Alcoholic drinks are harmful when you have too many of them. There is no set number of drinks that defines too much. Drinking that disrupts your life or your health is called alcohol abuse. Alcohol abuse can hurt your relationships with others. You may lose friends, a spouse, or even your job. You may be abusing alcohol if any of the following are true for you:    Duties at home or with  suffer because of drinking.    Duties at work or in school suffer because of drinking.    You have missed work or school because of drinking.    You use alcohol while driving or operating machinery.    You have legal problems such as arrests due to drinking.    You keep drinking even though it causes serious problems in your life.  Health effects  Alcohol abuse causes health problems. Sometimes this can happen after only drinking a  little.\" There is no set number of drinks or amount of alcohol that defines too much. The more you drink at one time, and the more often you drink determine both the short-term and long-term health effects. It affects all parts of your body and your " health, including your:    Brain. Alcohol is a central nervous system depressant. It can damage parts of the brain that affect your balance, memory, thinking, and emotions. It can cause memory loss, blackouts, depression, agitation, sleep cycle changes, and seizures. These changes may or may not be reversible.    Heart and vascular system. Alcohol affects multiple areas. It can damage heart muscle causing cardiomyopathy, which is a weakening and stretching of the heart muscle. This can lead to trouble breathing, an irregular heartbeat, atrial fibrillation, leg swelling, and heart failure. Alcohol use makes the blood vessels stiffen causing hypertension (high blood pressure). All of these problems increase your risk of having heart attacks or strokes.    Liver. Alcohol causes fat to build up in the liver, affecting its normal function. This increases the risk for hepatitis, leading to abdominal pain, appetite loss, jaundice, bleeding problems, liver fibrosis, and cirrhosis. This, in turn, can affect your ability to fight off infections, and can cause diabetes. The liver changes prevent it from removing toxins in your blood that can cause encephalopathy which may show with confusion, altered level of consciousness, personality changes, memory loss, seizures, coma, and death.    Pancreas. Alcohol can cause inflammation of the pancreas, or pancreatitis. This can cause abdominal pain, fever, and diabetes.    Immune system. Alcohol weakens your immune system in a number of ways. It suppresses your immune system making it harder to fight infections and colds. It also increases the chance of getting pneumonia and tuberculosis.    Cancer. Alcohol is a risk factor for developing cancer of the mouth, esophagus, pharynx, larynx, liver, and breast.    Sexual function. Alcohol can lead to sexual problems.  Home care  The following guidelines will help you deal with alcohol abuse:    Admit you have a problem with alcohol.    Ask  for help from your health care provider and trusted family members or close friends.    Get help from people trained in dealing with alcohol abuse. This may be individual counseling or group therapy, or it may be a supervised alcohol treatment program.    Join a self-help group for alcohol abuse such as Alcoholics Anonymous (AA).    Avoid people who abuse alcohol or tempt you to drink.  Follow-up care  Follow up as advised by the doctor or our staff. Contact these groups to get help:    Alcoholics Anonymous (AA): Go to www.aa.org or check the phone book for meetings near you.    National Alcohol and Substance Abuse Information Center (NASAI): 907.220.4304 www.Solaiemes    National [place] on Alcoholism and Drug Dependence (NCADD): 667-XCC-ZORJ (653-8791) www.ncadd.org    Al-Anon: 157-6ZX-ZZPS (112-3873) www.al-anon.org  Call 911  Call 911 if any of these occur:    Trouble breathing or slow irregular breathing    Chest pain    Sudden weakness on one side of your body or sudden trouble speaking    Heavy bleeding or vomiting blood    Very drowsy or trouble awakening    Fainting or loss of consciousness    Rapid heart rate    Seizure  When to seek medical care  Get prompt medical attention if you have:    Confusion    Hallucinations (seeing, hearing, or feeling things that aren t there)    Pain in your upper abdomen that gets worse    Repeated vomiting or black or tarry stools    Severe shakiness    8408-6912 [employer]. [address] [place] [address]. All rights reserved. This information is not intended as a substitute for professional medical care. Always follow your healthcare professional's instructions.          24 Hour Appointment Hotline       To make an appointment at any [facility], call [phone] ([phone]). If you don't have a family doctor or clinic, we will help you find one. [facility] are conveniently located to serve the needs of you and  your family.             Review of your medicines      Our records show that you are taking the medicines listed below. If these are incorrect, please call your family doctor or clinic.        Dose / Directions Last dose taken    * amLODIPine 10 MG tablet   Commonly known as:  NORVASC   Dose:  10 mg   Quantity:  30 tablet        Take 1 tablet (10 mg) by mouth daily   Refills:  1        * amLODIPine 10 MG tablet   Commonly known as:  NORVASC   Dose:  10 mg   Quantity:  4 tablet        Take 1 tablet (10 mg) by mouth daily   Refills:  0        * buPROPion 150 MG 12 hr tablet   Commonly known as:  WELLBUTRIN SR   Quantity:  4 tablet        Take 1 tablet once daily and increase to 1 tablet twice daily after 4 to 7 days   Refills:  0        * buPROPion 150 MG 24 hr tablet   Commonly known as:  WELLBUTRIN XL   Dose:  150 mg   Quantity:  30 tablet        Take 1 tablet (150 mg) by mouth every morning   Refills:  0        busPIRone 10 MG tablet   Commonly known as:  BUSPAR   Dose:  20 mg   Quantity:  120 tablet        Take 2 tablets (20 mg) by mouth 3 times daily   Refills:  1        * cloNIDine 0.1 MG tablet   Commonly known as:  CATAPRES   Dose:  0.1 mg   Quantity:  60 tablet        Take 1 tablet (0.1 mg) by mouth 2 times daily   Refills:  1        * cloNIDine 0.1 MG tablet   Commonly known as:  CATAPRES   Dose:  0.1 mg   Quantity:  8 tablet        Take 1 tablet (0.1 mg) by mouth 2 times daily   Refills:  0        disulfiram 250 MG tablet   Commonly known as:  ANTABUSE   Dose:  250 mg   Quantity:  30 tablet        Take 1 tablet (250 mg) by mouth daily   Refills:  1        gabapentin 300 MG capsule   Commonly known as:  NEURONTIN   Dose:  600 mg   Quantity:  240 capsule        Take 2 capsules (600 mg) by mouth 4 times daily   Refills:  3        hydrOXYzine 25 MG tablet   Commonly known as:  ATARAX   Dose:  50 mg   Quantity:  120 tablet        Take 2 tablets (50 mg) by mouth every 6 hours as needed for anxiety   Refills:  1         lisdexamfetamine 70 MG capsule   Commonly known as:  VYVANSE   Dose:  70 mg   Quantity:  30 capsule        Take 1 capsule (70 mg) by mouth every morning   Refills:  0        methocarbamol 500 MG tablet   Commonly known as:  ROBAXIN   Dose:  500 mg   Quantity:  90 tablet        Take 1 tablet (500 mg) by mouth 4 times daily as needed for muscle spasms   Refills:  1        multivitamin, therapeutic with minerals Tabs tablet   Dose:  1 tablet   Quantity:  30 each        Take 1 tablet by mouth daily   Refills:  3        omeprazole 40 MG capsule   Commonly known as:  priLOSEC   Dose:  40 mg   Quantity:  30 capsule        Take 1 capsule (40 mg) by mouth daily   Refills:  10        * sertraline 100 MG tablet   Commonly known as:  ZOLOFT   Dose:  200 mg   Quantity:  60 tablet        Take 2 tablets (200 mg) by mouth daily   Refills:  1        * sertraline 100 MG tablet   Commonly known as:  ZOLOFT   Dose:  100 mg   Quantity:  4 tablet        Take 1 tablet (100 mg) by mouth daily   Refills:  0        thiamine 100 MG tablet   Dose:  100 mg   Quantity:  30 tablet        Take 1 tablet (100 mg) by mouth daily   Refills:  0        * traZODone 100 MG tablet   Commonly known as:  DESYREL   Dose:  200 mg   Quantity:  60 tablet        Take 2 tablets (200 mg) by mouth nightly as needed for sleep   Refills:  0        * traZODone 100 MG tablet   Commonly known as:  DESYREL   Dose:  200 mg   Quantity:  8 tablet        Take 2 tablets (200 mg) by mouth nightly as needed for sleep   Refills:  0        * Notice:  This list has 10 medication(s) that are the same as other medications prescribed for you. Read the directions carefully, and ask your doctor or other care provider to review them with you.            Procedures and tests performed during your visit     Glucose by meter    UA with Microscopic      Orders Needing Specimen Collection     None      Pending Results     No orders found from 6/1/2017 to 6/4/2017.            Pending  Culture Results     No orders found from 6/1/2017 to 6/4/2017.            Pending Results Instructions     If you had any lab results that were not finalized at the time of your Discharge, you can call the ED Lab Result RN at [phone]. You will be contacted by this team for any positive Lab results or changes in treatment. The nurses are available 7 days a week from 10A to 6:30P.  You can leave a message 24 hours per day and they will return your call.        Test Results From Your Hospital Stay        6/3/2017  2:09 PM      Component Results     Component Value Ref Range & Units Status    Color Urine Yellow  Final    Appearance Urine Slightly Cloudy  Final    Glucose Urine Negative NEG mg/dL Final    Bilirubin Urine Negative NEG Final    Ketones Urine Negative NEG mg/dL Final    Specific Gravity Urine 1.025 1.003 - 1.035 Final    Blood Urine Negative NEG Final    pH Urine 5.0 5.0 - 7.0 pH Final    Protein Albumin Urine >499  NO FAT SEEN   (A) NEG mg/dL Final    Urobilinogen mg/dL 0.0 0.0 - 2.0 mg/dL Final    Nitrite Urine Negative NEG Final    Leukocyte Esterase Urine Negative NEG Final    Source Midstream Urine  Final    WBC Urine 7 (H) 0 - 2 /HPF Final    RBC Urine 2 0 - 2 /HPF Final    Bacteria Urine Few (A) NEG /HPF Final    Squamous Epithelial /HPF Urine 1 0 - 1 /HPF Final    Mucous Urine Present (A) NEG /LPF Final    Hyaline Casts 7 (H) 0 - 2 /LPF Final         6/3/2017  1:00 PM      Component Results     Component Value Ref Range & Units Status    Glucose 116 (H) 70 - 99 mg/dL Final                Clinical Quality Measure: Blood Pressure Screening     Your blood pressure was checked while you were in the emergency department today. The last reading we obtained was  BP: 151/77 . Please read the guidelines below about what these numbers mean and what you should do about them.  If your systolic blood pressure (the top number) is less than 120 and your diastolic blood pressure (the bottom number) is less  "than 80, then your blood pressure is normal. There is nothing more that you need to do about it.  If your systolic blood pressure (the top number) is 120-139 or your diastolic blood pressure (the bottom number) is 80-89, your blood pressure may be higher than it should be. You should have your blood pressure rechecked within a year by a primary care provider.  If your systolic blood pressure (the top number) is 140 or greater or your diastolic blood pressure (the bottom number) is 90 or greater, you may have high blood pressure. High blood pressure is treatable, but if left untreated over time it can put you at risk for heart attack, stroke, or kidney failure. You should have your blood pressure rechecked by a primary care provider within the next 4 weeks.  If your provider in the emergency department today gave you specific instructions to follow-up with your doctor or provider even sooner than that, you should follow that instruction and not wait for up to 4 weeks for your follow-up visit.        Thank you for choosing [facility]       Thank you for choosing [facility] for your care. Our goal is always to provide you with excellent care. Hearing back from our patients is one way we can continue to improve our services. Please take a few minutes to complete the written survey that you may receive in the mail after you visit with us. Thank you!        Jellynote Information     Jellynote lets you send messages to your doctor, view your test results, renew your prescriptions, schedule appointments and more. To sign up, go to [email or web address] . Click on \"Log in\" on the left side of the screen, which will take you to the Welcome page. Then click on \"Sign up Now\" on the right side of the page.     You will be asked to enter the access code listed below, as well as some personal information. Please follow the directions to create your username and password.     Your access code is: [email or web address]  Expires: 8/15/2017  5:29 " PM     Your access code will  in 90 days. If you need help or a new code, please call your [facility] clinic or [phone].        Care EveryWhere ID     This is your Care EveryWhere ID. This could be used by other organizations to access your [facility] medical records  [id number]        After Visit Summary       This is your record. Keep this with you and show to your community pharmacist(s) and doctor(s) at your next visit.

## 2017-06-03 NOTE — ED AVS SNAPSHOT
[facility]    [address]    [facility] [address]    Phone:  [phone]    Fax:  [phone]                                       Nickolas Lang   MRN: [id number]    Department:  [facility]   Date of Visit:  6/3/2017           After Visit Summary Signature Page     I have received my discharge instructions, and my questions have been answered. I have discussed any challenges I see with this plan with the nurse or doctor.    ..........................................................................................................................................  Patient/Patient Representative Signature      ..........................................................................................................................................  Patient Representative Print Name and Relationship to Patient    ..................................................               ................................................  Date                                            Time    ..........................................................................................................................................  Reviewed by Signature/Title    ...................................................              ..............................................  Date                                                            Time

## 2017-06-03 NOTE — ED NOTES
pt found laying down on ground by some bushes intoxicated, pt had 1 empty liter of vodka next to him and 1 bottle of vodka 1/2 empty. pt unable to stand or walk. 18 rt ac, NS running.

## 2017-06-12 ENCOUNTER — HOSPITAL ENCOUNTER (EMERGENCY)
Facility: CLINIC | Age: 27
Discharge: HOME | End: 2017-06-12
Admitting: FAMILY MEDICINE
Payer: MEDICAID

## 2017-06-12 DIAGNOSIS — F10.14 ALCOHOL ABUSE WITH ALCOHOL-INDUCED MOOD DISORDER (H): ICD-10-CM

## 2017-06-12 PROCEDURE — 80307 DRUG TEST PRSMV CHEM ANLYZR: CPT | Performed by: FAMILY MEDICINE

## 2017-06-12 PROCEDURE — 80307 DRUG TEST PRSMV CHEM ANLYZR: CPT | Mod: 59 | Performed by: FAMILY MEDICINE

## 2017-06-12 PROCEDURE — 99284 EMERGENCY DEPT VISIT MOD MDM: CPT | Mod: Z6 | Performed by: FAMILY MEDICINE

## 2017-06-12 PROCEDURE — 80320 DRUG SCREEN QUANTALCOHOLS: CPT | Mod: 59 | Performed by: FAMILY MEDICINE

## 2017-06-12 PROCEDURE — 99285 EMERGENCY DEPT VISIT HI MDM: CPT | Performed by: FAMILY MEDICINE

## 2017-06-12 NOTE — ED PROVIDER NOTES
History   No chief complaint on file.    BEAR Lang is a Patient with a history of alcoholism, depression and anxiety.  He arrives via EMS after he sent some phone messages to his mother suggesting that he was considering harming himself.  The patient admits to a long history of alcoholism.  He states he has been through detox and treatment.  He recently had 88 days sobriety but relapsed around 2 weeks ago.  He states he is drinking intermittently for 2 weeks, and when he does drink, he drinks to the point of significant intoxication.  He occasionally uses marijuana.  He was found passed out in the bushes and was seen at a hospital on 06/03/2017.  He states after that he started working towards getting into a relapse program.  He is interested only in a program which meets on the weekends because he recently got a new job.  He does not want to lose his employment.  He has set up treatment on the weekends at  [facility] .  States he had a rule 25, but this was done at [facility], and now has to go through his  [facility] to enter the treatment program with funding.  This caused him a lot of frustration.  Today, he was drinking, feeling frustrated and hopeless.  He states he made comments that he knows  were inappropriate .  He states even though he made comments which suggested he was considering suicide, he would never do that.  He states he fears the physical consequences of suicide, and also understands that were he to do something like that he would only hurt the people that he loves more so than himself.  He admits to one episode one to 2 years ago when he had been drinking to the point of blackout where he allegedly tried to take some pills as an overdose.  Denies any other suicide attempts.  He is meeting with a therapist and a psychiatrist and is taking sertraline.  He feels he has a good relationship with his therapist.  He does have a history of physically symptomatic  alcohol withdrawal, primarily tremor, has never had a withdrawal seizure or delirium tremens.  He is adamant that he is not suicidal.  He has no psychotic symptoms.  He states that he has sober friends here who are supportive, and plans to go to AA meetings to maintain sobriety.      I have reviewed the Medications, Allergies, Past Medical and Surgical History, and Social History in the Epic system.    Review of Systems  All other systems reviewed and were negative    Physical Exam      Physical Exam   Constitutional: He is oriented to person, place, and time. He appears well-developed and well-nourished.   HENT:   Head: Normocephalic and atraumatic.   Mouth/Throat: Oropharynx is clear and moist.   Eyes: EOM are normal. Pupils are equal, round, and reactive to light.   Neck: Normal range of motion. Neck supple. No tracheal deviation present. No thyromegaly present.   Cardiovascular: Normal rate, regular rhythm, normal heart sounds and intact distal pulses.  Exam reveals no gallop and no friction rub.    No murmur heard.  Pulmonary/Chest: Effort normal and breath sounds normal. He exhibits no tenderness.   Abdominal: Soft. Bowel sounds are normal. He exhibits no distension and no mass. There is no tenderness.   Musculoskeletal: He exhibits no edema or tenderness.   Neurological: He is alert and oriented to person, place, and time. No cranial nerve deficit. Coordination normal.   Skin: Skin is warm and dry. No rash noted.   Psychiatric: He has a normal mood and affect. His speech is normal and behavior is normal. Thought content is not paranoid and not delusional. Cognition and memory are normal. He expresses no homicidal and no suicidal ideation.   Nursing note and vitals reviewed.      ED Course     ED Course     Procedures             Critical Care time:  none               Labs Ordered and Resulted from Time of ED Arrival Up to the Time of Departure from the ED - No data to display         Assessments & Plan (with  Medical Decision Making)   Patient with a history of chronic alcohol dependence, depression, anxiety.  He recently relapsed using alcohol, and today while intoxicated made some statements threatening himself to his mother.  He is now remorseful, states he would never commit suicide, states he is easily able to contract for safety.  He outlines a plan for safety and sobriety to me.  His blood alcohol level is elevated but he is very clinically sober.  He is prescribed Vyvanse, which explains the urine tox screen positive for amphetamine.  He has sober friends and family here, and is requesting to be discharged into their care.  He already has outpatient psychiatric services, therapy, and a plan to enter an outpatient relapse program.  It does not appear the patient would benefit from admission, he declines detox services, he declines any new mental health referrals.  He does promise to return if there is any worsening of his symptoms or if he ever feels he is at risk to harm himself or others.    I have reviewed the nursing notes.    I have reviewed the findings, diagnosis, plan and need for follow up with the patient.    Discharge Medication List as of 6/12/2017  3:39 PM          Final diagnoses:   Alcohol abuse with alcohol-induced mood disorder (H)       6/12/2017   [facility], [place], EMERGENCY DEPARTMENT     Miquel Medina MD  06/12/17 [id number]

## 2017-06-13 LAB
AMPHETAMINES UR QL SCN: ABNORMAL
BARBITURATES UR QL: ABNORMAL
BENZODIAZ UR QL: ABNORMAL
CANNABINOIDS UR QL SCN: ABNORMAL
COCAINE UR QL: ABNORMAL
ETHANOL UR QL SCN: ABNORMAL
OPIATES UR QL SCN: ABNORMAL

## 2017-06-13 NOTE — DOWNTIME EVENT NOTE
The EMR was down for 7 hours on 6/13/2017.    KRAIG Akers was responsible for completing the paper charting during this time period.     The following information was re-entered into the system by Sonja Akers: Allergies, Home meds and MAR    The following information will remain in the paper chart: Remainder of chart    Sonja Akers  6/13/2017

## 2017-06-21 ENCOUNTER — HOSPITAL ENCOUNTER (EMERGENCY)
Facility: CLINIC | Age: 27
Discharge: OTHER INSTITUTION | End: 2017-06-21
Attending: EMERGENCY MEDICINE | Admitting: EMERGENCY MEDICINE
Payer: MEDICAID

## 2017-06-21 VITALS
WEIGHT: 290 LBS | HEART RATE: 98 BPM | RESPIRATION RATE: 18 BRPM | DIASTOLIC BLOOD PRESSURE: 99 MMHG | BODY MASS INDEX: 38.43 KG/M2 | TEMPERATURE: 98.1 F | OXYGEN SATURATION: 96 % | SYSTOLIC BLOOD PRESSURE: 153 MMHG | HEIGHT: 73 IN

## 2017-06-21 DIAGNOSIS — K70.10 ALCOHOLIC HEPATITIS WITHOUT ASCITES (H): ICD-10-CM

## 2017-06-21 DIAGNOSIS — F10.920 ALCOHOL INTOXICATION, UNCOMPLICATED (H): ICD-10-CM

## 2017-06-21 LAB
ALBUMIN SERPL-MCNC: 3.6 G/DL (ref 3.4–5)
ALP SERPL-CCNC: 98 U/L (ref 40–150)
ALT SERPL W P-5'-P-CCNC: 137 U/L (ref 0–70)
ANION GAP SERPL CALCULATED.3IONS-SCNC: 9 MMOL/L (ref 3–14)
AST SERPL W P-5'-P-CCNC: 110 U/L (ref 0–45)
BILIRUB SERPL-MCNC: 0.5 MG/DL (ref 0.2–1.3)
BUN SERPL-MCNC: 6 MG/DL (ref 7–30)
CALCIUM SERPL-MCNC: 8.6 MG/DL (ref 8.5–10.1)
CHLORIDE SERPL-SCNC: 104 MMOL/L (ref 94–109)
CO2 SERPL-SCNC: 31 MMOL/L (ref 20–32)
CREAT SERPL-MCNC: 0.53 MG/DL (ref 0.66–1.25)
ETHANOL SERPL-MCNC: 0.31 G/DL
GFR SERPL CREATININE-BSD FRML MDRD: ABNORMAL ML/MIN/1.7M2
GLUCOSE SERPL-MCNC: 81 MG/DL (ref 70–99)
KETONES BLD-SCNC: 0.1 MMOL/L (ref 0–0.6)
LIPASE SERPL-CCNC: 182 U/L (ref 73–393)
POTASSIUM SERPL-SCNC: 3.5 MMOL/L (ref 3.4–5.3)
PROT SERPL-MCNC: 7.1 G/DL (ref 6.8–8.8)
SODIUM SERPL-SCNC: 144 MMOL/L (ref 133–144)

## 2017-06-21 PROCEDURE — 80320 DRUG SCREEN QUANTALCOHOLS: CPT | Performed by: EMERGENCY MEDICINE

## 2017-06-21 PROCEDURE — 96361 HYDRATE IV INFUSION ADD-ON: CPT

## 2017-06-21 PROCEDURE — 82010 KETONE BODYS QUAN: CPT | Performed by: EMERGENCY MEDICINE

## 2017-06-21 PROCEDURE — 25000128 H RX IP 250 OP 636: Performed by: EMERGENCY MEDICINE

## 2017-06-21 PROCEDURE — 80053 COMPREHEN METABOLIC PANEL: CPT | Performed by: EMERGENCY MEDICINE

## 2017-06-21 PROCEDURE — 96360 HYDRATION IV INFUSION INIT: CPT

## 2017-06-21 PROCEDURE — 25000132 ZZH RX MED GY IP 250 OP 250 PS 637: Performed by: EMERGENCY MEDICINE

## 2017-06-21 PROCEDURE — 99283 EMERGENCY DEPT VISIT LOW MDM: CPT | Mod: 25

## 2017-06-21 PROCEDURE — 83690 ASSAY OF LIPASE: CPT | Performed by: EMERGENCY MEDICINE

## 2017-06-21 RX ORDER — FOLIC ACID 1 MG/1
1 TABLET ORAL ONCE
Status: COMPLETED | OUTPATIENT
Start: 2017-06-21 | End: 2017-06-21

## 2017-06-21 RX ORDER — ONDANSETRON 4 MG/1
4 TABLET, ORALLY DISINTEGRATING ORAL EVERY 8 HOURS PRN
Qty: 10 TABLET | Refills: 0 | Status: SHIPPED | OUTPATIENT
Start: 2017-06-21 | End: 2017-06-24

## 2017-06-21 RX ORDER — CHLORDIAZEPOXIDE HYDROCHLORIDE 25 MG/1
25 CAPSULE, GELATIN COATED ORAL 4 TIMES DAILY PRN
Qty: 12 CAPSULE | Refills: 0 | Status: SHIPPED | OUTPATIENT
Start: 2017-06-21 | End: 2017-06-24

## 2017-06-21 RX ORDER — LANOLIN ALCOHOL/MO/W.PET/CERES
100 CREAM (GRAM) TOPICAL ONCE
Status: COMPLETED | OUTPATIENT
Start: 2017-06-21 | End: 2017-06-21

## 2017-06-21 RX ADMIN — SODIUM CHLORIDE, POTASSIUM CHLORIDE, SODIUM LACTATE AND CALCIUM CHLORIDE 1000 ML: 600; 310; 30; 20 INJECTION, SOLUTION INTRAVENOUS at 17:24

## 2017-06-21 RX ADMIN — Medication 100 MG: at 18:43

## 2017-06-21 RX ADMIN — FOLIC ACID 1 MG: 1 TABLET ORAL at 17:07

## 2017-06-21 ASSESSMENT — ENCOUNTER SYMPTOMS
DIZZINESS: 1
ABDOMINAL PAIN: 0
LIGHT-HEADEDNESS: 1

## 2017-06-21 NOTE — ED NOTES
Pt educated that he is on an BRI, pt being changed into behavioral scrubs. Pts family left. Per Dr. Diaz if pts girlfriend comes after 7pm and can take pt home and stay with him he can go home, but if not will be transferred to detox. Pt pulled out a 1.75 liter of apple vodka that was empty and a small bottle of fireball that was also empty, both bottles threw away.

## 2017-06-21 NOTE — ED NOTES
"Assuming care of pt, pt educated on LR. Pt reported \"can I AMA?\" This nurse informed MD of pts wishes, pts aunt and niece at bedside.   "

## 2017-06-21 NOTE — DISCHARGE INSTRUCTIONS
"Please make an appointment to follow up with your primary care provider in 4-5 days if not improving.    Continue to try and get into [facility] and check yourself in.    Return to the emergency room if you feel worsening anxiety and tremors sweating hallucinations or any other concerns    Using the Librium as prescribed to help with withdrawal        Alcohol Abuse  Alcoholic drinks are harmful when you have too many of them. There is no set number of drinks that defines too much. Drinking that disrupts your life or your health is called alcohol abuse. Alcohol abuse can hurt your relationships with others. You may lose friends, a spouse, or even your job. You may be abusing alcohol if any of the following are true for you:    Duties at home or with  suffer because of drinking.    Duties at work or in school suffer because of drinking.    You have missed work or school because of drinking.    You use alcohol while driving or operating machinery.    You have legal problems such as arrests due to drinking.    You keep drinking even though it causes serious problems in your life.  Health effects  Alcohol abuse causes health problems. Sometimes this can happen after only drinking a  little.\" There is no set number of drinks or amount of alcohol that defines too much. The more you drink at one time, and the more often you drink determine both the short-term and long-term health effects. It affects all parts of your body and your health, including your:    Brain. Alcohol is a central nervous system depressant. It can damage parts of the brain that affect your balance, memory, thinking, and emotions. It can cause memory loss, blackouts, depression, agitation, sleep cycle changes, and seizures. These changes may or may not be reversible.    Heart and vascular system. Alcohol affects multiple areas. It can damage heart muscle causing cardiomyopathy, which is a weakening and stretching of the heart " muscle. This can lead to trouble breathing, an irregular heartbeat, atrial fibrillation, leg swelling, and heart failure. Alcohol use makes the blood vessels stiffen causing high blood pressure. All of these problems increase your risk of having heart attacks or strokes.    Liver. Alcohol causes fat to build up in the liver, affecting its normal function. This increases the risk for hepatitis, leading to abdominal pain, appetite loss, jaundice, bleeding problems, liver fibrosis, and cirrhosis. This, in turn, can affect your ability to fight off infections, and can cause diabetes. The liver changes prevent it from removing toxins in your blood that can cause encephalopathy, which may show with confusion, altered level of consciousness, personality changes, memory loss, seizures, coma, and death.    Pancreas. Alcohol can cause inflammation of the pancreas, or pancreatitis. This can cause abdominal pain, fever, and diabetes.    Immune system. Alcohol weakens your immune system in a number of ways. It suppresses your immune system making it harder to fight infections and colds. It also increases the chance of getting pneumonia and tuberculosis.    Cancer. Alcohol is a risk factor for developing cancer of the mouth, esophagus, pharynx, larynx, liver, and breast.    Sexual function. Alcohol can lead to sexual problems.  Home care  The following guidelines will help you deal with alcohol abuse:    Admit you have a problem with alcohol.    Ask for help from your healthcare provider and trusted family members or close friends.    Get help from people trained in dealing with alcohol abuse. This may be individual counseling or group therapy, or it may be a supervised alcohol treatment program.    Join a self-help group for alcohol abuse such as Alcoholics Anonymous (AA).    Avoid people who abuse alcohol or tempt you to drink.  Follow-up care  Follow up as advised by the healthcare provider, or as advised. Contact these groups  to get help:    Alcoholics Anonymous (AA): Go to www.aa.org or check the phone book for meetings near you.    National Alcohol and Substance Abuse Information Center (NASAIC): 817.740.6734 www.addictioncareAceris [employer].[place]    National [place] on Alcoholism and Drug Dependence (NCADD): 595-WKM-IYTG (884-1468) www.ncadd.org  Call 911  Call 911 if any of these occur:    Trouble breathing or slow irregular breathing    Chest pain    Sudden weakness on one side of your body or sudden trouble speaking    Heavy bleeding or vomiting blood    Very drowsy or trouble awakening    Fainting or loss of consciousness    Rapid heart rate    Seizure  When to seek medical care  Call your healthcare provider right away if any of these occur:     Confusion    Hallucinations (seeing, hearing, or feeling things that aren t there)    Pain in your upper abdomen that gets worse    Repeated vomiting or black or tarry stools    Severe shakiness  Date Last Reviewed: 6/1/2016 2000-2017 [employer]. [address] [place] [address], [place] [address]. All rights reserved. This information is not intended as a substitute for professional medical care. Always follow your healthcare professional's instructions.

## 2017-06-21 NOTE — ED PROVIDER NOTES
"  History     Chief Complaint:  Alcohol problem    HPI   Nickolas Lang is a 26 year old male who presents via EMS with an alcohol problem. The patient states that he is having withdrawal symptoms and he has had them in the past. Over the course of the weekend, he states that he drank four 1.75 L bottles of vodka. This morning, he took 3 of his grandmother's Ativan. He states that these were \"wearing off\" and began drinking again at 1000. He admits to drinking 1.75 L of vodka today. His last drink was 2 hours prior to arrival. Upon presentation, the patient states he is feeling shaky, dizzy, and light-headed. He denies thoughts of suicide and does not have any abdominal pain.     Allergies:  No known drug allergies.      Medications:    Gabapentin  Wellbutrin XL  Amlodipine  Clonidine  Zoloft  Trazodone  Vyvanse  Prilosec  Librium     Past Medical History:    ADD  ADHD  Alcohol Abuse  Anxiety  GERD  Hepatic steatosis  Hypertension  Obesity  Pyloric stenosis  Suicidal ideation while intoxicated    Past Surgical History:    Deviated septum repair  Pyloromyotomy  Shoulder surgery, 2016    Family History:    MS--Mother  Depression--Mother  Anxiety disorder--Mother  Alcohol/drug abuse--Father    Social History:  Marital Status: Single  Presents to the ED via EMS  Tobacco Use: Current daily smoker, for 4 years  Alcohol Use: Yes  PCP: Nicolette Frye      Review of Systems   Gastrointestinal: Negative for abdominal pain.   Neurological: Positive for dizziness and light-headedness.   Psychiatric/Behavioral: Negative for suicidal ideas.   All other systems reviewed and are negative.    Physical Exam   First Vitals:  BP: (!) 155/113  Heart Rate: 105  Resp: 18  Height: 185.4 cm (6' 1\")  Weight: 131.5 kg (290 lb)  SpO2: 95 %    Physical Exam   HENT:   Right Ear: External ear normal.   Left Ear: External ear normal.   Nose: Nose normal.   Eyes: Conjunctivae and lids are normal.   Neck: Neck supple. No tracheal deviation " present.   Cardiovascular: Regular rhythm and intact distal pulses.    tachycardic   Pulmonary/Chest: Breath sounds normal. No respiratory distress.   Abdominal: Soft. There is no tenderness. There is no rebound and no guarding.   Musculoskeletal:   No peripheral edema   Neurological:   Intoxicated but alert oriented and able to converse , able to voice concerns state historical facts plan for the future MAEE, no gross focal motor or sensory deficit   Skin: Skin is warm and dry. He is not diaphoretic.   Psychiatric: He has a normal mood and affect.   Not suicidal or homicidal no active hallucinations   Nursing note and vitals reviewed.        Emergency Department Course     Laboratory:  Ethanol: 0.31 (HH)  Qualitative Ketone: 0.1  Lipase: 182  CMP: BUN 6 (L), Creatinine 0.53 (L),  (H),  (H), otherwise WNL     Interventions:  (1707) Folic acid, 1 mg, PO  (1724) Lactated ringers, 1 liter, IV bolus   (1843) Thiamine, 100 mg, PO     Emergency Department Course:  Nursing notes and vitals reviewed.  I performed an exam of the patient as documented above.  A peripheral IV was established. Blood was drawn from the patient. This was sent for laboratory testing, findings above.      Disposition pending contact of his girlfriend for sober ride or detox -- girlfriend and unable to come pick him up and sent to detox      Impression & Plan      Medical Decision Making:  Nickolas Lang is a 26 year old male intoxicated in the setting of chronic alcoholism. Labs showing significant elevated alcohol level, although he is able to converse and has insight into his abuse. Has transaminase elevation consistent with alcoholic hepatitis. He will attempt to call his girlfriend who is a sober ride. If she is able to chaperone him and keep an eye on him this evening, we will send him home. The other alternative plan will be if she cannot do this that he will go to detox on a BRI hold.       Girlfriend unable to come pick  up the patient we will send him to detox on an BRI hold    Diagnosis:    ICD-10-CM    1. Alcohol intoxication, uncomplicated (H) F10.120    2. Alcoholism /alcohol abuse (H) F10.20    3. Alcoholic hepatitis without ascites K70.10        Disposition:  Detox      Discharge Medications:  New Prescriptions    CHLORDIAZEPOXIDE (LIBRIUM) 25 MG CAPSULE    Take 1 capsule (25 mg) by mouth 4 times daily as needed for anxiety or withdrawal    ONDANSETRON (ZOFRAN ODT) 4 MG ODT TAB    Take 1 tablet (4 mg) by mouth every 8 hours as needed       Valerio VELAZQUEZ, gaudencio serving as a scribe on 6/21/2017 at 4:20 PM to personally document services performed by Dr. Diaz based on my observations and the provider's statements to me.    6/21/2017   [facility] EMERGENCY DEPARTMENT       Claus Diaz MD  06/21/17 1908       Claus Diaz MD  06/21/17 0705

## 2017-06-21 NOTE — ED NOTES
Bed: ED18  Expected date: 6/21/17  Expected time: 2:56 PM  Means of arrival: Ambulance  Comments:  [facility] [place]

## 2017-06-21 NOTE — ED NOTES
"Arrives via EMS from work.  States current alcoholic, today at work states was feeling like having withdrawal symptoms.  Had drank 4 bottles of 1.75L vodka this weekend. Took 3 ativan of grandma's this morning, felt was \"wearing off\" around 1000, when started to drink again today.  Admits to 1.75L vodka today. Was feeling shaky, dizzy, light-headed.  Diaphoretic upon EMS arrival, not currently. Last drink 2 hours ago. Denies thoughts of suicide at this time, last year had thoughts, but no plan.  States would never do that to loved ones.  Rcvd 4mg zofran en route relieving nausea. JW=600.  No inj or trauma today. ABCD's intact; A/o x 4.   "

## 2017-06-22 NOTE — ED NOTES
"Pts noted to have a water bottle while EMS was here to transport, water bottle inspected and noted to smell heavily of apple vodka. Bottle was confiscated by security and disposed. Pt did not want to be transported as EMS arrived stating \"I don't want to go to [facility] have you ever been there they don't help you.\" This nurse informed pt he does not have that right to make that decision. Pt willingly got up to EMS cart.   "

## 2017-06-22 NOTE — ED PROVIDER NOTES
Sign Out Note    Transfer of Care From: Dr. Diaz. For full H&P, please refer to separate provider note. In brief, the patient presented with acute alcohol intoxication.  Alcohol level 0.31.  The patient's family is present with the patient.  The patient appears clinically sober.  Awaiting sober ride from the patient's girlfriend who will take the patient home and care for him this evening.  If she does not arrive in the emergency department, the patient will be transferred to detox.    ED Course Under My Care: The patient's girlfriend did not come to the emergency department to take the patient home.  Therefore, the patient was discharged to detox.  He was stable/improved at the time of discharge to detox.    MD Ady Mustafa Amanda K, MD  06/21/17 3567

## 2017-06-22 NOTE — ED NOTES
"Pt talking on phone in room, pt seemed worked up, this nurse went and spoke with pt who states \"I am do ok.\" Pt denies needs.   "

## 2017-06-28 ENCOUNTER — APPOINTMENT (OUTPATIENT)
Dept: GENERAL RADIOLOGY | Facility: CLINIC | Age: 27
DRG: 897 | End: 2017-06-28
Attending: FAMILY MEDICINE
Payer: MEDICAID

## 2017-06-28 ENCOUNTER — HOSPITAL ENCOUNTER (INPATIENT)
Facility: CLINIC | Age: 27
LOS: 7 days | Discharge: HOME | DRG: 897 | End: 2017-07-05
Attending: FAMILY MEDICINE | Admitting: PSYCHIATRY & NEUROLOGY
Payer: MEDICAID

## 2017-06-28 DIAGNOSIS — I10 ESSENTIAL HYPERTENSION: ICD-10-CM

## 2017-06-28 DIAGNOSIS — F10.239 ALCOHOL DEPENDENCE WITH WITHDRAWAL WITH COMPLICATION (H): ICD-10-CM

## 2017-06-28 DIAGNOSIS — F41.9 ANXIETY: Chronic | ICD-10-CM

## 2017-06-28 DIAGNOSIS — F10.14 ALCOHOL ABUSE WITH ALCOHOL-INDUCED MOOD DISORDER (H): ICD-10-CM

## 2017-06-28 DIAGNOSIS — I10 ESSENTIAL HYPERTENSION WITH GOAL BLOOD PRESSURE LESS THAN 140/90: ICD-10-CM

## 2017-06-28 DIAGNOSIS — F10.229 ALCOHOL DEPENDENCE WITH INTOXICATION WITH COMPLICATION (H): ICD-10-CM

## 2017-06-28 DIAGNOSIS — F10.20 ALCOHOL USE DISORDER, SEVERE, DEPENDENCE (H): Primary | Chronic | ICD-10-CM

## 2017-06-28 DIAGNOSIS — F43.23 ADJUSTMENT DISORDER WITH MIXED ANXIETY AND DEPRESSED MOOD: ICD-10-CM

## 2017-06-28 DIAGNOSIS — F10.220 ALCOHOL DEPENDENCE WITH UNCOMPLICATED INTOXICATION (H): ICD-10-CM

## 2017-06-28 DIAGNOSIS — F10.220 ACUTE ALCOHOLIC INTOXICATION IN ALCOHOLISM, UNCOMPLICATED (H): ICD-10-CM

## 2017-06-28 LAB
ALBUMIN SERPL-MCNC: 3.9 G/DL (ref 3.4–5)
ALCOHOL BREATH TEST: 0.13 (ref 0–0.01)
ALP SERPL-CCNC: 138 U/L (ref 40–150)
ALT SERPL W P-5'-P-CCNC: 151 U/L (ref 0–70)
AMPHETAMINES UR QL SCN: ABNORMAL
ANION GAP SERPL CALCULATED.3IONS-SCNC: 24 MMOL/L (ref 3–14)
AST SERPL W P-5'-P-CCNC: ABNORMAL U/L (ref 0–45)
BARBITURATES UR QL: ABNORMAL
BASOPHILS # BLD AUTO: 0 10E9/L (ref 0–0.2)
BASOPHILS NFR BLD AUTO: 0.3 %
BENZODIAZ UR QL: ABNORMAL
BILIRUB SERPL-MCNC: 0.8 MG/DL (ref 0.2–1.3)
BUN SERPL-MCNC: 16 MG/DL (ref 7–30)
CALCIUM SERPL-MCNC: 8.2 MG/DL (ref 8.5–10.1)
CANNABINOIDS UR QL SCN: ABNORMAL
CHLORIDE SERPL-SCNC: 97 MMOL/L (ref 94–109)
CO2 SERPL-SCNC: 12 MMOL/L (ref 20–32)
COCAINE UR QL: ABNORMAL
CREAT SERPL-MCNC: 0.64 MG/DL (ref 0.66–1.25)
DIFFERENTIAL METHOD BLD: NORMAL
EOSINOPHIL # BLD AUTO: 0 10E9/L (ref 0–0.7)
EOSINOPHIL NFR BLD AUTO: 0.1 %
ERYTHROCYTE [DISTWIDTH] IN BLOOD BY AUTOMATED COUNT: 14.8 % (ref 10–15)
ETHANOL SERPL-MCNC: 0.19 G/DL
ETHANOL UR QL SCN: ABNORMAL
GFR SERPL CREATININE-BSD FRML MDRD: ABNORMAL ML/MIN/1.7M2
GGT SERPL-CCNC: NORMAL U/L (ref 0–75)
GLUCOSE SERPL-MCNC: 118 MG/DL (ref 70–99)
HCT VFR BLD AUTO: 49.7 % (ref 40–53)
HGB BLD-MCNC: 17.1 G/DL (ref 13.3–17.7)
IMM GRANULOCYTES # BLD: 0 10E9/L (ref 0–0.4)
IMM GRANULOCYTES NFR BLD: 0.3 %
LIPASE SERPL-CCNC: 168 U/L (ref 73–393)
LYMPHOCYTES # BLD AUTO: 1.9 10E9/L (ref 0.8–5.3)
LYMPHOCYTES NFR BLD AUTO: 27.4 %
MCH RBC QN AUTO: 30.3 PG (ref 26.5–33)
MCHC RBC AUTO-ENTMCNC: 34.4 G/DL (ref 31.5–36.5)
MCV RBC AUTO: 88 FL (ref 78–100)
MONOCYTES # BLD AUTO: 0.7 10E9/L (ref 0–1.3)
MONOCYTES NFR BLD AUTO: 9.7 %
NEUTROPHILS # BLD AUTO: 4.3 10E9/L (ref 1.6–8.3)
NEUTROPHILS NFR BLD AUTO: 62.2 %
NRBC # BLD AUTO: 0 10*3/UL
NRBC BLD AUTO-RTO: 0 /100
OPIATES UR QL SCN: ABNORMAL
PLATELET # BLD AUTO: 192 10E9/L (ref 150–450)
POTASSIUM SERPL-SCNC: 4.7 MMOL/L (ref 3.4–5.3)
PROT SERPL-MCNC: 8 G/DL (ref 6.8–8.8)
RBC # BLD AUTO: 5.64 10E12/L (ref 4.4–5.9)
SODIUM SERPL-SCNC: 133 MMOL/L (ref 133–144)
TROPONIN I SERPL-MCNC: NORMAL UG/L (ref 0–0.04)
WBC # BLD AUTO: 6.9 10E9/L (ref 4–11)

## 2017-06-28 PROCEDURE — 25000125 ZZHC RX 250: Performed by: PSYCHIATRY & NEUROLOGY

## 2017-06-28 PROCEDURE — 83690 ASSAY OF LIPASE: CPT | Performed by: FAMILY MEDICINE

## 2017-06-28 PROCEDURE — 99285 EMERGENCY DEPT VISIT HI MDM: CPT | Mod: 25 | Performed by: FAMILY MEDICINE

## 2017-06-28 PROCEDURE — 96376 TX/PRO/DX INJ SAME DRUG ADON: CPT | Performed by: FAMILY MEDICINE

## 2017-06-28 PROCEDURE — 12800008 ZZH R&B CD ADULT

## 2017-06-28 PROCEDURE — 25000132 ZZH RX MED GY IP 250 OP 250 PS 637: Performed by: PSYCHIATRY & NEUROLOGY

## 2017-06-28 PROCEDURE — 93010 ELECTROCARDIOGRAM REPORT: CPT | Mod: Z6 | Performed by: FAMILY MEDICINE

## 2017-06-28 PROCEDURE — 80320 DRUG SCREEN QUANTALCOHOLS: CPT | Performed by: FAMILY MEDICINE

## 2017-06-28 PROCEDURE — 25000125 ZZHC RX 250: Performed by: FAMILY MEDICINE

## 2017-06-28 PROCEDURE — 25800025 ZZH RX 258: Performed by: FAMILY MEDICINE

## 2017-06-28 PROCEDURE — 25000128 H RX IP 250 OP 636: Performed by: FAMILY MEDICINE

## 2017-06-28 PROCEDURE — 25000132 ZZH RX MED GY IP 250 OP 250 PS 637: Performed by: FAMILY MEDICINE

## 2017-06-28 PROCEDURE — 96374 THER/PROPH/DIAG INJ IV PUSH: CPT | Performed by: FAMILY MEDICINE

## 2017-06-28 PROCEDURE — 96375 TX/PRO/DX INJ NEW DRUG ADDON: CPT | Performed by: FAMILY MEDICINE

## 2017-06-28 PROCEDURE — 84484 ASSAY OF TROPONIN QUANT: CPT | Performed by: FAMILY MEDICINE

## 2017-06-28 PROCEDURE — 80053 COMPREHEN METABOLIC PANEL: CPT | Performed by: FAMILY MEDICINE

## 2017-06-28 PROCEDURE — 93005 ELECTROCARDIOGRAM TRACING: CPT | Performed by: FAMILY MEDICINE

## 2017-06-28 PROCEDURE — S5010 5% DEXTROSE AND 0.45% SALINE: HCPCS | Performed by: FAMILY MEDICINE

## 2017-06-28 PROCEDURE — HZ2ZZZZ DETOXIFICATION SERVICES FOR SUBSTANCE ABUSE TREATMENT: ICD-10-PCS | Performed by: PSYCHIATRY & NEUROLOGY

## 2017-06-28 PROCEDURE — 80307 DRUG TEST PRSMV CHEM ANLYZR: CPT | Performed by: FAMILY MEDICINE

## 2017-06-28 PROCEDURE — 85025 COMPLETE CBC W/AUTO DIFF WBC: CPT | Performed by: FAMILY MEDICINE

## 2017-06-28 PROCEDURE — 71020 XR CHEST 2 VW: CPT

## 2017-06-28 PROCEDURE — 96361 HYDRATE IV INFUSION ADD-ON: CPT | Performed by: FAMILY MEDICINE

## 2017-06-28 RX ORDER — LANOLIN ALCOHOL/MO/W.PET/CERES
100 CREAM (GRAM) TOPICAL DAILY
Status: COMPLETED | OUTPATIENT
Start: 2017-06-29 | End: 2017-07-01

## 2017-06-28 RX ORDER — DIAZEPAM 10 MG/2ML
5 INJECTION, SOLUTION INTRAMUSCULAR; INTRAVENOUS ONCE
Status: DISCONTINUED | OUTPATIENT
Start: 2017-06-28 | End: 2017-06-28

## 2017-06-28 RX ORDER — DIAZEPAM 5 MG
5-20 TABLET ORAL EVERY 30 MIN PRN
Status: DISCONTINUED | OUTPATIENT
Start: 2017-06-28 | End: 2017-06-29 | Stop reason: ALTCHOICE

## 2017-06-28 RX ORDER — DIAZEPAM 10 MG/2ML
10 INJECTION, SOLUTION INTRAMUSCULAR; INTRAVENOUS ONCE
Status: COMPLETED | OUTPATIENT
Start: 2017-06-28 | End: 2017-06-28

## 2017-06-28 RX ORDER — LORAZEPAM 2 MG/ML
1 INJECTION INTRAMUSCULAR
Status: COMPLETED | OUTPATIENT
Start: 2017-06-28 | End: 2017-06-28

## 2017-06-28 RX ORDER — ONDANSETRON 4 MG/1
4 TABLET, ORALLY DISINTEGRATING ORAL EVERY 6 HOURS PRN
Status: DISCONTINUED | OUTPATIENT
Start: 2017-06-28 | End: 2017-07-05 | Stop reason: HOSPADM

## 2017-06-28 RX ORDER — LOPERAMIDE HCL 2 MG
4 CAPSULE ORAL ONCE
Status: COMPLETED | OUTPATIENT
Start: 2017-06-28 | End: 2017-06-28

## 2017-06-28 RX ORDER — ALUMINA, MAGNESIA, AND SIMETHICONE 2400; 2400; 240 MG/30ML; MG/30ML; MG/30ML
30 SUSPENSION ORAL EVERY 4 HOURS PRN
Status: DISCONTINUED | OUTPATIENT
Start: 2017-06-28 | End: 2017-07-05 | Stop reason: HOSPADM

## 2017-06-28 RX ORDER — LOPERAMIDE HCL 2 MG
2 CAPSULE ORAL 4 TIMES DAILY PRN
Status: DISCONTINUED | OUTPATIENT
Start: 2017-06-28 | End: 2017-07-05 | Stop reason: HOSPADM

## 2017-06-28 RX ORDER — LORAZEPAM 2 MG/ML
1 INJECTION INTRAMUSCULAR ONCE
Status: COMPLETED | OUTPATIENT
Start: 2017-06-28 | End: 2017-06-28

## 2017-06-28 RX ORDER — CLONIDINE HYDROCHLORIDE 0.1 MG/1
0.1 TABLET ORAL ONCE
Status: COMPLETED | OUTPATIENT
Start: 2017-06-28 | End: 2017-06-28

## 2017-06-28 RX ORDER — HYDROXYZINE HYDROCHLORIDE 25 MG/1
25-50 TABLET, FILM COATED ORAL EVERY 4 HOURS PRN
Status: DISCONTINUED | OUTPATIENT
Start: 2017-06-28 | End: 2017-06-29

## 2017-06-28 RX ORDER — DIAZEPAM 10 MG
10 TABLET ORAL ONCE
Status: COMPLETED | OUTPATIENT
Start: 2017-06-28 | End: 2017-06-28

## 2017-06-28 RX ORDER — TRAZODONE HYDROCHLORIDE 50 MG/1
50 TABLET, FILM COATED ORAL
Status: DISCONTINUED | OUTPATIENT
Start: 2017-06-28 | End: 2017-06-29

## 2017-06-28 RX ORDER — LORAZEPAM 1 MG/1
1-4 TABLET ORAL EVERY 30 MIN PRN
Status: DISCONTINUED | OUTPATIENT
Start: 2017-06-28 | End: 2017-06-29

## 2017-06-28 RX ORDER — DIAZEPAM 10 MG/2ML
5 INJECTION, SOLUTION INTRAMUSCULAR; INTRAVENOUS ONCE
Status: COMPLETED | OUTPATIENT
Start: 2017-06-28 | End: 2017-06-28

## 2017-06-28 RX ORDER — FOLIC ACID 1 MG/1
1 TABLET ORAL DAILY
Status: DISCONTINUED | OUTPATIENT
Start: 2017-06-29 | End: 2017-07-05 | Stop reason: HOSPADM

## 2017-06-28 RX ORDER — ACETAMINOPHEN 325 MG/1
650 TABLET ORAL EVERY 4 HOURS PRN
Status: DISCONTINUED | OUTPATIENT
Start: 2017-06-28 | End: 2017-07-05 | Stop reason: HOSPADM

## 2017-06-28 RX ORDER — MULTIPLE VITAMINS W/ MINERALS TAB 9MG-400MCG
1 TAB ORAL DAILY
Status: DISCONTINUED | OUTPATIENT
Start: 2017-06-29 | End: 2017-07-05 | Stop reason: HOSPADM

## 2017-06-28 RX ORDER — LORAZEPAM 2 MG/ML
2 INJECTION INTRAMUSCULAR ONCE
Status: DISCONTINUED | OUTPATIENT
Start: 2017-06-28 | End: 2017-06-28

## 2017-06-28 RX ORDER — ATENOLOL 50 MG/1
50 TABLET ORAL ONCE
Status: COMPLETED | OUTPATIENT
Start: 2017-06-28 | End: 2017-06-28

## 2017-06-28 RX ORDER — AMLODIPINE BESYLATE 10 MG/1
10 TABLET ORAL DAILY
Status: DISCONTINUED | OUTPATIENT
Start: 2017-06-28 | End: 2017-07-05 | Stop reason: HOSPADM

## 2017-06-28 RX ADMIN — SODIUM CHLORIDE 1000 ML: 9 INJECTION, SOLUTION INTRAVENOUS at 19:17

## 2017-06-28 RX ADMIN — DIAZEPAM 10 MG: 5 INJECTION, SOLUTION INTRAMUSCULAR; INTRAVENOUS at 16:39

## 2017-06-28 RX ADMIN — AMLODIPINE BESYLATE 10 MG: 10 TABLET ORAL at 18:22

## 2017-06-28 RX ADMIN — TRAZODONE HYDROCHLORIDE 50 MG: 50 TABLET ORAL at 22:23

## 2017-06-28 RX ADMIN — TRAZODONE HYDROCHLORIDE 50 MG: 50 TABLET ORAL at 22:21

## 2017-06-28 RX ADMIN — DIAZEPAM 5 MG: 5 INJECTION, SOLUTION INTRAMUSCULAR; INTRAVENOUS at 16:15

## 2017-06-28 RX ADMIN — ACETAMINOPHEN 650 MG: 325 TABLET, FILM COATED ORAL at 22:17

## 2017-06-28 RX ADMIN — FOLIC ACID: 5 INJECTION, SOLUTION INTRAMUSCULAR; INTRAVENOUS; SUBCUTANEOUS at 18:11

## 2017-06-28 RX ADMIN — LORAZEPAM 1 MG: 2 INJECTION INTRAMUSCULAR; INTRAVENOUS at 19:45

## 2017-06-28 RX ADMIN — DIAZEPAM 10 MG: 5 TABLET ORAL at 18:22

## 2017-06-28 RX ADMIN — DIAZEPAM 10 MG: 10 TABLET ORAL at 17:35

## 2017-06-28 RX ADMIN — CLONIDINE HYDROCHLORIDE 0.1 MG: 0.1 TABLET ORAL at 16:23

## 2017-06-28 RX ADMIN — LORAZEPAM 1 MG: 2 INJECTION INTRAMUSCULAR; INTRAVENOUS at 20:31

## 2017-06-28 RX ADMIN — LORAZEPAM 2 MG: 1 TABLET ORAL at 22:19

## 2017-06-28 RX ADMIN — ONDANSETRON 4 MG: 4 TABLET, ORALLY DISINTEGRATING ORAL at 22:17

## 2017-06-28 RX ADMIN — LORAZEPAM 1 MG: 2 INJECTION INTRAMUSCULAR; INTRAVENOUS at 19:17

## 2017-06-28 RX ADMIN — LIDOCAINE HYDROCHLORIDE 30 ML: 20 SOLUTION ORAL; TOPICAL at 17:37

## 2017-06-28 RX ADMIN — OMEPRAZOLE 20 MG: 20 CAPSULE, DELAYED RELEASE ORAL at 22:23

## 2017-06-28 RX ADMIN — DIAZEPAM 5 MG: 5 INJECTION, SOLUTION INTRAMUSCULAR; INTRAVENOUS at 15:47

## 2017-06-28 RX ADMIN — LOPERAMIDE HYDROCHLORIDE 4 MG: 2 CAPSULE ORAL at 22:19

## 2017-06-28 RX ADMIN — ATENOLOL 50 MG: 50 TABLET ORAL at 16:39

## 2017-06-28 RX ADMIN — SODIUM CHLORIDE 1000 ML: 9 INJECTION, SOLUTION INTRAVENOUS at 16:23

## 2017-06-28 ASSESSMENT — ACTIVITIES OF DAILY LIVING (ADL)
DRESS: INDEPENDENT
ORAL_HYGIENE: INDEPENDENT
GROOMING: INDEPENDENT

## 2017-06-28 ASSESSMENT — ENCOUNTER SYMPTOMS
ABDOMINAL PAIN: 1
FEVER: 0
SHORTNESS OF BREATH: 0

## 2017-06-28 NOTE — PHARMACY-ADMISSION MEDICATION HISTORY
Admission medication history interview status for the 6/28/2017 admission is complete. See Epic admission navigator for allergy information, pharmacy, prior to admission medications and immunization status.     Medication history interview sources:  patient, [facility] in [place]    Changes made to PTA medication list (reason)  Added: Adderall XR (verified with pt and [facility])  Deleted: Vyvanse  Changed: gabapentin dose (verified with pt and [facility])    Additional medication history information (including reliability of information, actions taken by pharmacist):  -Patient was a decent historian. He has not taken some of his medications for more than a month (amlodipine, Wellbutrin XL, Antabuse, Robaxin PRN). Otherwise it has been roughly a week since he last took his other medications.  -I verified the patient's medications with [facility]. They had record of filling all of them except for the multivitamin and the thiamine.     -Last fill dates:  February 2017: omeprazole  March 2017: amlodipine, Robaxin prn, trazodone prn  April 2017: buspirone (90 days supply)  May 2017: clonidine, Antabuse, hydroxyzine prn  June 2017: Wellbutrin XL, gabapentin, Zoloft, Adderall XR--->June 1st    Prior to Admission medications    Medication Sig Last Dose Taking? Auth Provider   Amphetamine-Dextroamphetamine (ADDERALL XR PO) Take 30 mg by mouth 2 times daily Past Week at Unknown time Yes Unknown, Entered By History   GABAPENTIN PO Take 800 mg by mouth 4 times daily Past Week at Unknown time Yes Unknown, Entered By History   omeprazole (PRILOSEC) 40 MG capsule Take 1 capsule (40 mg) by mouth daily Past Week at Unknown time Yes Nicolette Frye APRN CNP   traZODone (DESYREL) 100 MG tablet Take 2 tablets (200 mg) by mouth nightly as needed for sleep Past Week at Unknown time Yes Vazquez Maher MD   thiamine 100 MG tablet Take 1 tablet (100 mg) by mouth daily Past Week at Unknown time Yes Vazquez Maher MD   busPIRone  (BUSPAR) 10 MG tablet Take 2 tablets (20 mg) by mouth 3 times daily Past Week at Unknown time Yes Filippo Brasher MD   hydrOXYzine (ATARAX) 25 MG tablet Take 2 tablets (50 mg) by mouth every 6 hours as needed for anxiety Past Week at Unknown time Yes Filippo Brasher MD   sertraline (ZOLOFT) 100 MG tablet Take 2 tablets (200 mg) by mouth daily Past Week at Unknown time Yes Filippo Brasher MD   cloNIDine (CATAPRES) 0.1 MG tablet Take 1 tablet (0.1 mg) by mouth 2 times daily Past Week at Unknown time Yes Filippo Brasher MD   multivitamin, therapeutic with minerals (THERA-VIT-M) TABS Take 1 tablet by mouth daily Past Week at Unknown time Yes Filippo Brasher MD   buPROPion (WELLBUTRIN XL) 150 MG 24 hr tablet Take 1 tablet (150 mg) by mouth every morning More than a month at Unknown time  Nicolette Frye APRN CNP   amLODIPine (NORVASC) 10 MG tablet Take 1 tablet (10 mg) by mouth daily More than a month at Unknown time  Filippo Brasher MD   methocarbamol (ROBAXIN) 500 MG tablet Take 1 tablet (500 mg) by mouth 4 times daily as needed for muscle spasms More than a month at Unknown time  Filippo Brasher MD   disulfiram (ANTABUSE) 250 MG tablet Take 1 tablet (250 mg) by mouth daily More than a month at Unknown time  Filippo Brasher MD     Medication history completed by:   Abby Montano, Pharm.D.

## 2017-06-28 NOTE — ED PROVIDER NOTES
History     Chief Complaint   Patient presents with     Addiction Problem     States he is seeking detox for alcohol use.  has been binge drinking for the past week.  Drinks a .175 liter of vodka par day.  Last drink 6 hours ago.  Inake holding a bed.      Chest Pain     Pain in left upper chest.  Unsure when this began.  States he normally gets this when he withdraws.      BEAR Lang is a 26 year old male with a history of alcohol abuse and substance dependency who presents to the Emergency Department today for an addiction problem. The patient arrived with a AMANDA of .13 and his last drink was 7-8 hours ago. Patient states that his last period of sobriety was a month and a half ago when he went through treatment but relapsed a week after. He has been currently binging for the last month and a half and states that he has been drinking a 1.75 liter of vodka a day for that past 2 weeks. The problem first presented itself about 2 years ago when the patient was 24 and has been in and out of treatment since. The patient has seeked treatment at [facility] 3 separate times and a few other locations. Patient also describes having chest pain and a tender abdomen which is common when he goes through withdrawal. It has been about 6 days since he last ate. The patient has been sleeping in the woods as his drinking has impacted his relationship with his mother and no longer has a place to live.    Past Medical History:   Diagnosis Date     ADD (attention deficit disorder)      Alcohol abuse      Anxiety     gabapentin helps the most      GERD (gastroesophageal reflux disease)      Hepatic steatosis      Hypertension      Obesity      Pyloric stenosis     s/p pyloromyotomy as an infant       Past Surgical History:   Procedure Laterality Date     Deviated septum repair       PYLOROMYOTOMY SURGERY  as an infant      SHOULDER SURGERY Left 07/29/2016    Removal of cartilage       Family History   Problem Relation  Age of Onset     Neurologic Disorder Mother      Multiple Sclerosis     Depression Mother      Anxiety Disorder Mother      Alcohol/Drug Father      Substance Abuse Father      Alcohol/Drug Paternal Grandfather      Depression Maternal Grandmother      Anxiety Disorder Maternal Grandmother      Substance Abuse Maternal Grandfather      Hypertension Maternal Grandmother        Social History   Substance Use Topics     Smoking status: Current Some Day Smoker     Packs/day: 0.00     Years: 4.00     Types: Cigarettes     Last attempt to quit: 11/1/2015     Smokeless tobacco: Never Used     Alcohol use 0.0 oz/week     0 Standard drinks or equivalent per week      Comment: 1.75L vodka daily as of 6/21/2017       Current Facility-Administered Medications   Medication     0.9% sodium chloride BOLUS     diazepam (VALIUM) tablet 5-20 mg     amLODIPine (NORVASC) tablet 10 mg     Current Outpatient Prescriptions   Medication     gabapentin (NEURONTIN) 300 MG capsule     buPROPion (WELLBUTRIN XL) 150 MG 24 hr tablet     amLODIPine (NORVASC) 10 MG tablet     buPROPion (WELLBUTRIN SR) 150 MG 12 hr tablet     cloNIDine (CATAPRES) 0.1 MG tablet     sertraline (ZOLOFT) 100 MG tablet     traZODone (DESYREL) 100 MG tablet     lisdexamfetamine (VYVANSE) 70 MG capsule     omeprazole (PRILOSEC) 40 MG capsule     traZODone (DESYREL) 100 MG tablet     thiamine 100 MG tablet     busPIRone (BUSPAR) 10 MG tablet     hydrOXYzine (ATARAX) 25 MG tablet     sertraline (ZOLOFT) 100 MG tablet     cloNIDine (CATAPRES) 0.1 MG tablet     amLODIPine (NORVASC) 10 MG tablet     methocarbamol (ROBAXIN) 500 MG tablet     multivitamin, therapeutic with minerals (THERA-VIT-M) TABS     disulfiram (ANTABUSE) 250 MG tablet      No Known Allergies    I have reviewed the Medications, Allergies, Past Medical and Surgical History, and Social History in the Epic system.    Review of Systems   Constitutional: Negative for fever.   Respiratory: Negative for shortness  of breath.    Cardiovascular: Positive for chest pain.   Gastrointestinal: Positive for abdominal pain.   All other systems reviewed and are negative.      Physical Exam   BP: (!) 122/97  Pulse: 122  Temp: 96.6  F (35.9  C)  Resp: 20  Weight: 134.9 kg (297 lb 5 oz)  SpO2: 94 %  Physical Exam   Constitutional: He is oriented to person, place, and time. No distress.   HENT:   Head: Atraumatic.   Mouth/Throat: Oropharynx is clear and moist. No oropharyngeal exudate.   Eyes: Pupils are equal, round, and reactive to light. No scleral icterus.   Cardiovascular: Normal heart sounds and intact distal pulses.    Pulmonary/Chest: Breath sounds normal. No respiratory distress.   Abdominal: Soft. Bowel sounds are normal. There is no tenderness.   Musculoskeletal: He exhibits no edema or tenderness.   Neurological: He is alert and oriented to person, place, and time. He has normal reflexes. No cranial nerve deficit. He exhibits normal muscle tone. Coordination normal.   Skin: Skin is warm. No rash noted. He is not diaphoretic.   Psychiatric: His mood appears anxious. He exhibits a depressed mood. He expresses no suicidal ideation.       ED Course   2:55 PM  The patient was seen and examined by Dr. Baker in Room 9.     ED Course     Procedures         EKG Interpretation:      Interpreted by Jorge Baker  Time reviewed: 1453  Symptoms at time of EKG: Alcohol withdrawalsinus tachycardia   Rhythm: normal sinus   Rate: 110-120  Axis: Normal  Ectopy: none  Conduction: normal  ST Segments/ T Waves: No ST-T wave changes  Q Waves: none  Comparison to prior: No old EKG available    Clinical Impression: sinus tachycardia                Critical Care time:  none      Labs Ordered and Resulted from Time of ED Arrival Up to the Time of Departure from the ED   COMPREHENSIVE METABOLIC PANEL - Abnormal; Notable for the following:        Result Value    Carbon Dioxide 12 (*)     Anion Gap 24 (*)     Glucose 118 (*)      Creatinine 0.64 (*)     Calcium 8.2 (*)      (*)     All other components within normal limits   ALCOHOL ETHYL - Abnormal; Notable for the following:     Ethanol g/dL 0.19 (*)     All other components within normal limits   ALCOHOL BREATH TEST POCT - Abnormal; Notable for the following:     Alcohol Breath Test 0.133 (*)     All other components within normal limits   CBC WITH PLATELETS DIFFERENTIAL   TROPONIN I   LIPASE   DRUG ABUSE SCREEN 6 CHEM DEP URINE ([facility])   MSSA SCORE AND VS            Assessments & Plan (with Medical Decision Making)       I have reviewed the nursing notes.    I have reviewed the findings, diagnosis, plan and need for follow up with the patient.  Patient with alcohol abuse and alcohol dependence intoxication as well as essential hypertension at this time patient receiving IV fluids and IV Valium and oral Valium and antihypertensive medications prior to detox on station 3A the patient will require medically supervised detox on station 3A.    New Prescriptions    No medications on file       Final diagnoses:   Alcohol abuse with alcohol-induced mood disorder (H)   Alcohol dependence with intoxication with complication (H)   Essential hypertension   I, Chidi Mcgovern, am serving as a trained medical scribe to document services personally performed by Jorge Baker MD, based on the provider's statements to me.   Jorge VELAZQUEZ MD, was physically present and have reviewed and verified the accuracy of this note documented by Chidi Mcgovern.      6/28/2017   [facility], EMERGENCY DEPARTMENT     Jorge Baker MD  06/28/17 9316

## 2017-06-28 NOTE — IP AVS SNAPSHOT
[facility]    [address] [place] [address]    Phone:  [phone]                                       After Visit Summary   6/28/2017    Nickolas Lang    MRN: [id number]           After Visit Summary Signature Page     I have received my discharge instructions, and my questions have been answered. I have discussed any challenges I see with this plan with the nurse or doctor.    ..........................................................................................................................................  Patient/Patient Representative Signature      ..........................................................................................................................................  Patient Representative Print Name and Relationship to Patient    ..................................................               ................................................  Date                                            Time    ..........................................................................................................................................  Reviewed by Signature/Title    ...................................................              ..............................................  Date                                                            Time

## 2017-06-28 NOTE — IP AVS SNAPSHOT
MRN:[id number]                      After Visit Summary   6/28/2017    Nickolas Lang    MRN: [id number]           Thank you!     Thank you for choosing [facility] for your care. Our goal is always to provide you with excellent care.        Patient Information     Date Of Birth          1990        Designated Caregiver       Most Recent Value    Caregiver    Will someone help with your care after discharge? no      About your hospital stay     You were admitted on:  June 28, 2017 You last received care in the:  [facility]    You were discharged on:  July 5, 2017       Who to Call     For medical emergencies, please call 911.  For non-urgent questions about your medical care, please call your primary care provider or clinic, [phone]          Attending Provider     Provider Specialty    oJrge Baker MD Emergency Medicine    Johann, Mindi Beckford MD Pediatrics       Primary Care Provider Office Phone # Fax #    Nicolette Mallory Melva APRFLEX [facility] [phone]      Your next 10 appointments already scheduled     Jul 05, 2017  2:00 PM CDT   Treatment with LP/DOP ADMISSIONS   [facility] ([facility])    [address] [place] [id number] [phone]              Further instructions from your care team       Behavioral Discharge Planning and Instructions  THANK YOU FOR CHOOSING THE [address]  [phone]    Summary: You were admitted to [facility] on 6/28/2017 for detoxification from Alcohol.  A medical exam was performed that included lab work. You have met with a  and opted to continue your treatment in [facility].  Your funding is through [facility] Rule 25. Please take care and make your recovery a daily priority, Nickolas!     Main Diagnosis:  Per Dr. Brasher:  1. Alcohol Dependence  2. Major Depressive  Disorder    Major Treatments, Procedures and Findings:  You were treated for Alcohol detoxification using Valium administered based on the [facility] protocol. You have had a chemical dependency assessment.  You have had labs drawn and those results have been reviewed with you. Please take a copy of your lab work with you to your next primary care physician appointment.    Symptoms to Report:  If you experience more anxiety, confusion, sleeplessness, deep sadness or thoughts of suicide, notify your treatment team or notify your primary care physician. IF ANY OF THE SYMPTOMS YOU ARE EXPERIENCING ARE A MEDICAL EMERGENCY CALL 911 IMMEDIATELY.     Lifestyle Adjustment: Adjust your lifestyle to get enough sleep, relaxation, exercise and excellent nutrition. Continue to develop healthy coping skills to decrease stress and promote a sober living environment. Do not use alcohol, illegal drugs or addictive medications other than what is currently prescribed. AAGUNNER, and a sponsor are excellent resources for support.     Primary Provider:  Nicolette Frye  [facility]  [address]  [facility] [address]  [phone]   Fax: [phone]  July 18th 2017 at 11:30am    Psychiatry Follow-up:    Dr. Lovell  [facility]  7/21/2017 1:30pm  [phone]     Therapist Follow-up:  Amanda Ness  [address] [address]  7/7/17 at 11:00am  [phone]    Resources:   http://www.aastpaul.org/?topic=8  http://a[place].org/meetings/  http://www.nam[place].org/index.php/meeting-list-pdf  http://www.harmreduction.org  [facility] 329-811-2002  Support Group:  AA/NA and Sponsor/support  Crisis Intervention: 969.424.9179 or 568-402-2127 (TTY: 652.523.5731).  Call anytime for help.  National [place] on Mental Illness (www.[place].chaya.org): 340.484.8744 or 328-953-2481.  Alcoholics Anonymous  (www.alcoholics-anonymous.org): Check your phone book for your local chapter.  Suicide Awareness Voices of Education (SAVE) (www.save.org): 323-742-AXVH (5647)  National Suicide Prevention Line (www.mentalhealthmn.org): 839-611-ULKI (1539)  Mental Health Consumer/Survivor Network of [place] (www.mhcsn.net): 670.893.5173 or 819-545-1178  Mental Health Association of [place] (www.mentalhealth.org): 730.337.9552 or 804-808-9555   Substance Abuse and Mental Health Services (www.samhsa.gov)    [place] Recovery Connection ([facility])  [facility] connects people seeking recovery to resources that help foster and sustain long-term recovery.  Whether you are seeking resources for treatment, transportation, housing, job training, education, health care or other pathways to recovery, [facility] is a great place to start.  797.123.5348.  www.[facility].org    General Medication Instructions:   See your medication sheet(s) for instructions.   Take all medicines as directed.  Make no changes unless your doctor suggests them.   Go to all your doctor visits.  Be sure to have all your required lab tests. This way, your medicines can be refilled on time.  AA/NA and Sponsors are excellent resources for support and you can find one at any support group meeting.  Do not use any forms of alcohol.    Please Note:  If you have any questions at anytime after you are discharged please call the [facility] [place] detox unit 3AW unit at [phone].  [facility], Behavioral Intake [phone]  Please take this discharge folder with you to all your follow up appointments, it contains your lab results, diagnosis, medication list and discharge recommendations.      THANK YOU FOR CHOOSING THE [facility]               Pending Results     No orders found from 6/26/2017 to 6/29/2017.            Admission Information     Date & Time Provider Department Dept. Phone    6/28/2017 Mindi Wills  "MD [facility] [phone]      Your Vitals Were     Blood Pressure Pulse Temperature Respirations Height Weight    127/81 77 96.7  F (35.9  C) (Oral) 16 1.854 m (6' 1\") 134.9 kg (297 lb 5 oz)    Pulse Oximetry BMI (Body Mass Index)                94% 39.23 kg/m2          [employer] Information     [employer] lets you send messages to your doctor, view your test results, renew your prescriptions, schedule appointments and more. To sign up, go to [email or web address] . Click on \"Log in\" on the left side of the screen, which will take you to the Welcome page. Then click on \"Sign up Now\" on the right side of the page.     You will be asked to enter the access code listed below, as well as some personal information. Please follow the directions to create your username and password.     Your access code is: [email or web address]  Expires: 8/15/2017  5:29 PM     Your access code will  in 90 days. If you need help or a new code, please call your [facility] clinic or [phone].        Care EveryWhere ID     This is your Care EveryWhere ID. This could be used by other organizations to access your [facility] medical records  [id number]        Equal Access to Services     ERMELINDA LOZADA AH: Hadii breezy grimeso Soblanca, waaxda luqadaha, qaybta kaalmada adeegyada, shawna espinoza . So [facility] [phone].    ATENCIÓN: Si habla español, tiene a miller disposición servicios gratuitos de asistencia lingüística. Llame al [phone].    We comply with applicable federal civil rights laws and [place] laws. We do not discriminate on the basis of race, color, national origin, age, disability sex, sexual orientation or gender identity.               Review of your medicines      START taking        Dose / Directions    acetaminophen 325 MG tablet   Commonly known as:  TYLENOL        Dose:  650 mg   Take 2 tablets (650 mg) by mouth every 6 hours as needed for mild pain   Quantity:  100 " tablet   Refills:  0       gabapentin 800 MG tablet   Commonly known as:  NEURONTIN   Used for:  Anxiety   Replaces:  GABAPENTIN PO        Dose:  800 mg   Take 1 tablet (800 mg) by mouth 4 times daily   Quantity:  120 tablet   Refills:  0         CONTINUE these medicines which may have CHANGED, or have new prescriptions. If we are uncertain of the size of tablets/capsules you have at home, strength may be listed as something that might have changed.        Dose / Directions    traZODone 100 MG tablet   Commonly known as:  DESYREL   This may have changed:  how much to take   Used for:  Anxiety        Dose:  100-200 mg   Take 1-2 tablets (100-200 mg) by mouth nightly as needed for sleep   Quantity:  60 tablet   Refills:  0         CONTINUE these medicines which have NOT CHANGED        Dose / Directions    amLODIPine 10 MG tablet   Commonly known as:  NORVASC   Used for:  Essential hypertension with goal blood pressure less than 140/90        Dose:  10 mg   Take 1 tablet (10 mg) by mouth daily   Quantity:  30 tablet   Refills:  0       busPIRone 10 MG tablet   Commonly known as:  BUSPAR   Used for:  Anxiety        Dose:  20 mg   Take 2 tablets (20 mg) by mouth 3 times daily   Quantity:  180 tablet   Refills:  0       cloNIDine 0.1 MG tablet   Commonly known as:  CATAPRES   Used for:  Essential hypertension with goal blood pressure less than 140/90        Dose:  0.1 mg   Take 1 tablet (0.1 mg) by mouth 2 times daily   Quantity:  60 tablet   Refills:  0       hydrOXYzine 25 MG tablet   Commonly known as:  ATARAX   Used for:  Alcohol dependence with uncomplicated intoxication (H), Anxiety        Dose:  50 mg   Take 2 tablets (50 mg) by mouth every 6 hours as needed for anxiety   Quantity:  120 tablet   Refills:  0       multivitamin, therapeutic with minerals Tabs tablet   Used for:  Essential hypertension        Dose:  1 tablet   Take 1 tablet by mouth daily   Quantity:  30 each   Refills:  3       omeprazole 40 MG  capsule   Commonly known as:  priLOSEC   Used for:  Acute alcoholic intoxication in alcoholism, uncomplicated (H)        Dose:  40 mg   Take 1 capsule (40 mg) by mouth daily   Quantity:  30 capsule   Refills:  0       sertraline 100 MG tablet   Commonly known as:  ZOLOFT   Used for:  Anxiety        Dose:  200 mg   Take 2 tablets (200 mg) by mouth daily   Quantity:  60 tablet   Refills:  0         STOP taking     ADDERALL XR PO           buPROPion 150 MG 24 hr tablet   Commonly known as:  WELLBUTRIN XL           disulfiram 250 MG tablet   Commonly known as:  ANTABUSE           GABAPENTIN PO   Replaced by:  gabapentin 800 MG tablet           methocarbamol 500 MG tablet   Commonly known as:  ROBAXIN           thiamine 100 MG tablet                Where to get your medicines      These medications were sent to [facility] [place], [place] - [address]  [address] [address]     Phone:  [phone]     acetaminophen 325 MG tablet    amLODIPine 10 MG tablet    busPIRone 10 MG tablet    cloNIDine 0.1 MG tablet    gabapentin 800 MG tablet    hydrOXYzine 25 MG tablet    multivitamin, therapeutic with minerals Tabs tablet    omeprazole 40 MG capsule    sertraline 100 MG tablet    traZODone 100 MG tablet                Protect others around you: Learn how to safely use, store and throw away your medicines at www.disposemymeds.org.             Medication List: This is a list of all your medications and when to take them. Check marks below indicate your daily home schedule. Keep this list as a reference.      Medications           Morning Afternoon Evening Bedtime As Needed    acetaminophen 325 MG tablet   Commonly known as:  TYLENOL   Take 2 tablets (650 mg) by mouth every 6 hours as needed for mild pain   Last time this was given:  650 mg on 7/5/2017  8:45 AM                                amLODIPine 10 MG tablet   Commonly known as:  NORVASC   Take 1 tablet (10 mg) by mouth daily    Last time this was given:  10 mg on 7/5/2017  8:35 AM                                busPIRone 10 MG tablet   Commonly known as:  BUSPAR   Take 2 tablets (20 mg) by mouth 3 times daily   Last time this was given:  20 mg on 7/5/2017  8:34 AM                                cloNIDine 0.1 MG tablet   Commonly known as:  CATAPRES   Take 1 tablet (0.1 mg) by mouth 2 times daily   Last time this was given:  0.2 mg on 7/5/2017  8:35 AM                                gabapentin 800 MG tablet   Commonly known as:  NEURONTIN   Take 1 tablet (800 mg) by mouth 4 times daily   Last time this was given:  800 mg on 7/5/2017  8:34 AM                                hydrOXYzine 25 MG tablet   Commonly known as:  ATARAX   Take 2 tablets (50 mg) by mouth every 6 hours as needed for anxiety   Last time this was given:  50 mg on 7/5/2017 11:10 AM                                multivitamin, therapeutic with minerals Tabs tablet   Take 1 tablet by mouth daily   Last time this was given:  1 tablet on 7/5/2017  8:35 AM                                omeprazole 40 MG capsule   Commonly known as:  priLOSEC   Take 1 capsule (40 mg) by mouth daily   Last time this was given:  40 mg on 7/5/2017  8:35 AM                                sertraline 100 MG tablet   Commonly known as:  ZOLOFT   Take 2 tablets (200 mg) by mouth daily   Last time this was given:  200 mg on 7/5/2017  8:35 AM                                traZODone 100 MG tablet   Commonly known as:  DESYREL   Take 1-2 tablets (100-200 mg) by mouth nightly as needed for sleep   Last time this was given:  200 mg on 7/4/2017 11:05 PM

## 2017-06-28 NOTE — IP AVS SNAPSHOT
"    [facility]: [phone]                                              INTERAGENCY TRANSFER FORM - LAB / IMAGING / EKG / EMG RESULTS   2017                    Hospital Admission Date: 2017  PARTH HARPER   : 1990  Sex: Male        Attending Provider: Mindi Wills MD     Allergies:  No Known Allergies    Infection:  None   Service:  CHEMICAL DEP    Ht:  1.854 m (6' 1\")   Wt:  134.9 kg (297 lb 5 oz)   Admission Wt:  134.9 kg (297 lb 5 oz)    BMI:  39.23 kg/m 2   BSA:  2.64 m 2            Patient PCP Information     Provider PCP Type    ROXI Santiago CNP General      Unresulted Labs     None      Encounter-Level Documents:     There are no encounter-level documents.      Order-Level Documents:     There are no order-level documents.      "

## 2017-06-29 LAB
ALBUMIN SERPL-MCNC: 3.2 G/DL (ref 3.4–5)
ALP SERPL-CCNC: 104 U/L (ref 40–150)
ALT SERPL W P-5'-P-CCNC: 100 U/L (ref 0–70)
ANION GAP SERPL CALCULATED.3IONS-SCNC: 14 MMOL/L (ref 3–14)
AST SERPL W P-5'-P-CCNC: 86 U/L (ref 0–45)
BILIRUB SERPL-MCNC: 1.5 MG/DL (ref 0.2–1.3)
BUN SERPL-MCNC: 10 MG/DL (ref 7–30)
CALCIUM SERPL-MCNC: 7.9 MG/DL (ref 8.5–10.1)
CHLORIDE SERPL-SCNC: 100 MMOL/L (ref 94–109)
CO2 SERPL-SCNC: 24 MMOL/L (ref 20–32)
CREAT SERPL-MCNC: 0.58 MG/DL (ref 0.66–1.25)
GFR SERPL CREATININE-BSD FRML MDRD: ABNORMAL ML/MIN/1.7M2
GGT SERPL-CCNC: 1467 U/L (ref 0–75)
GLUCOSE SERPL-MCNC: 110 MG/DL (ref 70–99)
POTASSIUM SERPL-SCNC: 3.4 MMOL/L (ref 3.4–5.3)
PROT SERPL-MCNC: 6.2 G/DL (ref 6.8–8.8)
SODIUM SERPL-SCNC: 138 MMOL/L (ref 133–144)

## 2017-06-29 PROCEDURE — 99207 ZZC DOWN CODE DUE TO INITIAL EXAM: CPT | Performed by: PSYCHIATRY & NEUROLOGY

## 2017-06-29 PROCEDURE — 99207 ZZC CONSULT E&M CHANGED TO INITIAL LEVEL: CPT | Performed by: PHYSICIAN ASSISTANT

## 2017-06-29 PROCEDURE — 99222 1ST HOSP IP/OBS MODERATE 55: CPT | Performed by: PHYSICIAN ASSISTANT

## 2017-06-29 PROCEDURE — 80053 COMPREHEN METABOLIC PANEL: CPT | Performed by: PSYCHIATRY & NEUROLOGY

## 2017-06-29 PROCEDURE — 25000132 ZZH RX MED GY IP 250 OP 250 PS 637: Performed by: PSYCHIATRY & NEUROLOGY

## 2017-06-29 PROCEDURE — 12800008 ZZH R&B CD ADULT

## 2017-06-29 PROCEDURE — 36415 COLL VENOUS BLD VENIPUNCTURE: CPT | Performed by: PSYCHIATRY & NEUROLOGY

## 2017-06-29 PROCEDURE — 25000132 ZZH RX MED GY IP 250 OP 250 PS 637: Performed by: FAMILY MEDICINE

## 2017-06-29 PROCEDURE — 82977 ASSAY OF GGT: CPT | Performed by: PSYCHIATRY & NEUROLOGY

## 2017-06-29 PROCEDURE — 99221 1ST HOSP IP/OBS SF/LOW 40: CPT | Mod: AI | Performed by: PSYCHIATRY & NEUROLOGY

## 2017-06-29 RX ORDER — MULTIPLE VITAMINS W/ MINERALS TAB 9MG-400MCG
1 TAB ORAL DAILY
Status: DISCONTINUED | OUTPATIENT
Start: 2017-06-29 | End: 2017-06-29

## 2017-06-29 RX ORDER — METHOCARBAMOL 500 MG/1
500 TABLET, FILM COATED ORAL 4 TIMES DAILY PRN
Status: DISCONTINUED | OUTPATIENT
Start: 2017-06-29 | End: 2017-07-05 | Stop reason: HOSPADM

## 2017-06-29 RX ORDER — CLONIDINE HYDROCHLORIDE 0.1 MG/1
0.1 TABLET ORAL 2 TIMES DAILY PRN
Status: DISCONTINUED | OUTPATIENT
Start: 2017-06-29 | End: 2017-07-05 | Stop reason: HOSPADM

## 2017-06-29 RX ORDER — CLONIDINE HYDROCHLORIDE 0.1 MG/1
0.1 TABLET ORAL 2 TIMES DAILY
Status: DISCONTINUED | OUTPATIENT
Start: 2017-06-29 | End: 2017-07-01

## 2017-06-29 RX ORDER — LANOLIN ALCOHOL/MO/W.PET/CERES
100 CREAM (GRAM) TOPICAL DAILY
Status: DISCONTINUED | OUTPATIENT
Start: 2017-06-29 | End: 2017-06-29

## 2017-06-29 RX ORDER — FOLIC ACID 1 MG/1
1 TABLET ORAL DAILY
Status: DISCONTINUED | OUTPATIENT
Start: 2017-06-29 | End: 2017-06-29

## 2017-06-29 RX ORDER — HYDROXYZINE HYDROCHLORIDE 50 MG/1
50 TABLET, FILM COATED ORAL EVERY 6 HOURS PRN
Status: DISCONTINUED | OUTPATIENT
Start: 2017-06-29 | End: 2017-07-05 | Stop reason: HOSPADM

## 2017-06-29 RX ORDER — AMLODIPINE BESYLATE 10 MG/1
10 TABLET ORAL DAILY
Status: DISCONTINUED | OUTPATIENT
Start: 2017-06-29 | End: 2017-06-29

## 2017-06-29 RX ORDER — GABAPENTIN 800 MG/1
800 TABLET ORAL 4 TIMES DAILY
Status: DISCONTINUED | OUTPATIENT
Start: 2017-06-29 | End: 2017-07-05 | Stop reason: HOSPADM

## 2017-06-29 RX ORDER — SERTRALINE HYDROCHLORIDE 100 MG/1
200 TABLET, FILM COATED ORAL DAILY
Status: DISCONTINUED | OUTPATIENT
Start: 2017-06-29 | End: 2017-07-05 | Stop reason: HOSPADM

## 2017-06-29 RX ORDER — DIAZEPAM 5 MG
5-20 TABLET ORAL EVERY 30 MIN PRN
Status: DISCONTINUED | OUTPATIENT
Start: 2017-06-29 | End: 2017-07-03

## 2017-06-29 RX ORDER — TRAZODONE HYDROCHLORIDE 100 MG/1
200 TABLET ORAL
Status: DISCONTINUED | OUTPATIENT
Start: 2017-06-29 | End: 2017-07-05 | Stop reason: HOSPADM

## 2017-06-29 RX ORDER — GABAPENTIN 800 MG/1
800 TABLET ORAL 4 TIMES DAILY
Status: DISCONTINUED | OUTPATIENT
Start: 2017-06-29 | End: 2017-06-29

## 2017-06-29 RX ADMIN — DIAZEPAM 10 MG: 5 TABLET ORAL at 16:18

## 2017-06-29 RX ADMIN — SERTRALINE HYDROCHLORIDE 200 MG: 100 TABLET ORAL at 12:11

## 2017-06-29 RX ADMIN — LORAZEPAM 2 MG: 1 TABLET ORAL at 04:49

## 2017-06-29 RX ADMIN — HYDROXYZINE HYDROCHLORIDE 50 MG: 25 TABLET ORAL at 04:49

## 2017-06-29 RX ADMIN — GABAPENTIN 800 MG: 800 TABLET, FILM COATED ORAL at 12:14

## 2017-06-29 RX ADMIN — OMEPRAZOLE 20 MG: 20 CAPSULE, DELAYED RELEASE ORAL at 08:14

## 2017-06-29 RX ADMIN — ACETAMINOPHEN 650 MG: 325 TABLET, FILM COATED ORAL at 18:47

## 2017-06-29 RX ADMIN — CLONIDINE HYDROCHLORIDE 0.1 MG: 0.1 TABLET ORAL at 20:06

## 2017-06-29 RX ADMIN — LORAZEPAM 2 MG: 1 TABLET ORAL at 08:14

## 2017-06-29 RX ADMIN — LOPERAMIDE HYDROCHLORIDE 2 MG: 2 CAPSULE ORAL at 08:14

## 2017-06-29 RX ADMIN — BUSPIRONE HYDROCHLORIDE 20 MG: 5 TABLET ORAL at 20:22

## 2017-06-29 RX ADMIN — FOLIC ACID 1 MG: 1 TABLET ORAL at 08:14

## 2017-06-29 RX ADMIN — Medication 100 MG: at 08:14

## 2017-06-29 RX ADMIN — GABAPENTIN 800 MG: 800 TABLET, FILM COATED ORAL at 16:18

## 2017-06-29 RX ADMIN — DIAZEPAM 10 MG: 5 TABLET ORAL at 12:11

## 2017-06-29 RX ADMIN — LORAZEPAM 2 MG: 1 TABLET ORAL at 00:38

## 2017-06-29 RX ADMIN — CLONIDINE HYDROCHLORIDE 0.1 MG: 0.1 TABLET ORAL at 12:11

## 2017-06-29 RX ADMIN — GABAPENTIN 800 MG: 800 TABLET, FILM COATED ORAL at 20:07

## 2017-06-29 RX ADMIN — TRAZODONE HYDROCHLORIDE 200 MG: 100 TABLET ORAL at 22:41

## 2017-06-29 RX ADMIN — HYDROXYZINE HYDROCHLORIDE 50 MG: 50 TABLET, FILM COATED ORAL at 18:47

## 2017-06-29 RX ADMIN — AMLODIPINE BESYLATE 10 MG: 10 TABLET ORAL at 08:14

## 2017-06-29 RX ADMIN — BUSPIRONE HYDROCHLORIDE 20 MG: 5 TABLET ORAL at 12:14

## 2017-06-29 ASSESSMENT — ACTIVITIES OF DAILY LIVING (ADL)
DRESS: INDEPENDENT
ORAL_HYGIENE: INDEPENDENT
GROOMING: INDEPENDENT
GROOMING: INDEPENDENT
ORAL_HYGIENE: INDEPENDENT
ORAL_HYGIENE: INDEPENDENT
GROOMING: INDEPENDENT

## 2017-06-29 NOTE — PROGRESS NOTES
06/28/17 2124   Patient Belongings   Did you bring any home meds/supplements to the hospital?  No   Patient Belongings other (see comments)   Disposition of Belongings storage   Belongings Search Yes   Clothing Search Yes   Second Staff Epdro Pablo       STORAGE BIN:   duffle bag, toiletries, shoes, restricted clothes, forrest, clippers, keys, belt, cologne, padlock   Pack of camel cigarettes and a lighter brought in on 7/2/17 by visitor    SECURITY:   [place], 2 student id, ss, EBT, visa, MC, AmEx, walmrt, bus, ins.  $40 brought in by visitor on 7/2/17 (envelope #[phone])      ADMISSION:  I am responsible for any personal items that are not sent to the safe or pharmacy. [facility] is not responsible for loss, theft or damage of any property in my possession.  Patient Signature _____________________ Date/Time _____________________  Staff Signature _______________________ Date/Time _____________________  2nd Staff person, if patient is unable/unwilling to sign  ___________________________________ Date/Time _____________________  DISCHARGE:  All personal items have been returned to me.  Patient Signature _____________________ Date/Time _____________________  Staff Signature _______________________ Date/Time _____________________

## 2017-06-29 NOTE — PLAN OF CARE
"Problem: General Plan of Care (Inpatient Behavioral)  Goal: Individualization/Patient Specific Goal (IP Behavioral)  The patient and/or their representative will achieve their patient-specific goals related to the plan of care.    The patient-specific goals include:     Patient will identify reason(s) for hospitalization from their perspective.  1. Alcohol Detoc  2. Withdrawal  3. Rule 25 update  4. Hypertension     Patient will identify a goal for discharge.  1. Upstairs for inpatient treatment   2. Develop strong support community  3. Regain a sense of myself.     Patient will identify triggers that may increase their risk for relapse.  Patient will identify coping skills they can do to stay well.   Patient will identify their support system to demonstrate readiness for discharge.    Illnesses Management & Recovery  Patient identified \"Finances, work, family, boredom, depression\" as trigger for relapse.  Patient identified \"Taking medications daily, eating healthy and regular meals, going to all of my health care appointments, practice relaxing, getting 8 hours of sleep each night, see supportive friends or family one or more times a week, going for a walk at least 3 times a week, going to support group meetings, talking to my , staying busy, continuing work\" as a general wellness strategy.  Patient identified \"Feeling tense or nervous, eating less or more, sleeping too much or too little, feeling depressed or sad, feeling like not being around other people, feeling cranky, stopping treatment/meetings, trouble focusing, thinking that people are against you\" as a warning sign that they are in danger of relapse.  Patient identified \"Bear M, Frederick S, Josias , Mom, grandma, AA treatment, Leighann M\" as someone they cont on to get feedback from.  Patient identified \"Reaching out to others\" as a way to take action when in danger of relapse.  Patient identified \"looking for new work, talking to my therapist, going on " "walks, therapy and psychiatry\" as a way to cope with stress or other symptoms.       "

## 2017-06-29 NOTE — CONSULTS
Internal Medicine Initial Visit    Nickolas Lang MRN# [id number]   Age: 26 year old YOB: 1990   Date of Admission: 6/28/2017     Reason for consult: Transaminitis, HTN, GERD       Requesting physician Dr. Darnell Jensen      SUBJECTIVE   HPI:   Nickolas Lang is a 26 year old male with history of HTN, hepatic steatosis, GERD, ADD, and alcohol abuse admitted to station 3A for acute alcohol withdrawal. Medicine was consulted to co-manage patient's transaminitis, HTN, and GERD.    Patient presented to the ED 6/28 voluntarily seeking detoxification from alcohol. Reports drinking a 1.75 liter of vodka daily over the past month. Last hospitalization for detox 11/2016. Denies history of withdrawal seizures. Believes he had one episode of withdrawal hallucinations in the past, although it lasted very briefly and has not recurred.    Currently, patient is feeling fatigued. Reports withdrawal symptoms including shaking, intermittent chills/sweats, anxiety, and nausea. Had not eaten anything for ~5 days leading up to admission, although tolerated small amounts of food today. Had one episode of diarrhea last night, no episodes today. Complains of some generalized abdominal tenderness. No HA, dizziness, chest pain, SOB, cough, vomiting, dysuria, urinary frequency, constipation, or peripheral edema.     Past Medical History:     Past Medical History:   Diagnosis Date     ADD (attention deficit disorder)      Alcohol abuse      Anxiety     gabapentin helps the most      GERD (gastroesophageal reflux disease)      Hepatic steatosis      Hypertension      Obesity      Pyloric stenosis     s/p pyloromyotomy as an infant      Reviewed & updated in [employer].     Past Surgical History:      Past Surgical History:   Procedure Laterality Date     Deviated septum repair       PYLOROMYOTOMY SURGERY  as an infant      SHOULDER SURGERY Left 07/29/2016    Removal of cartilage      Reviewed & updated in Epic.      Medications prior to admission:     Prior to Admission Medications   Prescriptions Last Dose Informant Patient Reported? Taking?   Amphetamine-Dextroamphetamine (ADDERALL XR PO) Past Week at Unknown time  Yes Yes   Sig: Take 30 mg by mouth 2 times daily   GABAPENTIN PO Past Week at Unknown time  Yes Yes   Sig: Take 800 mg by mouth 4 times daily   amLODIPine (NORVASC) 10 MG tablet More than a month at Unknown time  No No   Sig: Take 1 tablet (10 mg) by mouth daily   buPROPion (WELLBUTRIN XL) 150 MG 24 hr tablet More than a month at Unknown time  No No   Sig: Take 1 tablet (150 mg) by mouth every morning   busPIRone (BUSPAR) 10 MG tablet Past Week at Unknown time  No Yes   Sig: Take 2 tablets (20 mg) by mouth 3 times daily   cloNIDine (CATAPRES) 0.1 MG tablet Past Week at Unknown time  No Yes   Sig: Take 1 tablet (0.1 mg) by mouth 2 times daily   disulfiram (ANTABUSE) 250 MG tablet More than a month at Unknown time  No No   Sig: Take 1 tablet (250 mg) by mouth daily   hydrOXYzine (ATARAX) 25 MG tablet Past Week at Unknown time  No Yes   Sig: Take 2 tablets (50 mg) by mouth every 6 hours as needed for anxiety   methocarbamol (ROBAXIN) 500 MG tablet More than a month at Unknown time  No No   Sig: Take 1 tablet (500 mg) by mouth 4 times daily as needed for muscle spasms   multivitamin, therapeutic with minerals (THERA-VIT-M) TABS Past Week at Unknown time  No Yes   Sig: Take 1 tablet by mouth daily   omeprazole (PRILOSEC) 40 MG capsule Past Week at Unknown time  No Yes   Sig: Take 1 capsule (40 mg) by mouth daily   sertraline (ZOLOFT) 100 MG tablet Past Week at Unknown time  No Yes   Sig: Take 2 tablets (200 mg) by mouth daily   thiamine 100 MG tablet Past Week at Unknown time  No Yes   Sig: Take 1 tablet (100 mg) by mouth daily   traZODone (DESYREL) 100 MG tablet Past Week at Unknown time  No Yes   Sig: Take 2 tablets (200 mg) by mouth nightly as needed for sleep      Facility-Administered Medications: None          "Allergies:   No Known Allergies      Social History:   Tobacco Use: Never smoker  Alcohol Use: Reports drinking 1.75 liter of hard alcohol daily over the past month  Illicit Drug Use: Smoked marijuana once while \"blackout\", otherwise no illicit drug use. Denies IVDU.  STI Testing: Declines at this time     Family History:     Family History   Problem Relation Age of Onset     Neurologic Disorder Mother      Multiple Sclerosis     Depression Mother      Anxiety Disorder Mother      Alcohol/Drug Father      Substance Abuse Father      Alcohol/Drug Paternal Grandfather      Depression Maternal Grandmother      Anxiety Disorder Maternal Grandmother      Substance Abuse Maternal Grandfather      Hypertension Maternal Grandmother         Reviewed & updated in Epic.     Review of Systems:   Ten point review of systems negative except as stated above in History of Present Illness.    OBJECTIVE   Physical Exam:   Blood pressure (!) 166/117, pulse 91, temperature 97.5  F (36.4  C), temperature source Oral, resp. rate 16, height 1.854 m (6' 1\"), weight 134.9 kg (297 lb 5 oz), SpO2 98 %.  General: Well-appearing  male laying comfortably in bed, NAD.  HEENT: NC/AT. Anicteric sclera. Mucous membranes moist.  Neck: Supple  Cardiovascular: RRR. S1,S2. No murmurs appreciated.  Lungs: Normal respiratory effort on RA. Lungs CTAB without wheezes, rales, or rhonchi.  Abdomen: Soft, non-distended. Diffuse generalized tenderness, L>R. Bowel sounds normoactive.  Extremities: Warm & well perfused. No peripheral edema.  Skin: No rashes, jaundice, or lesions on exposed areas of skin.  Neurologic: A&O X 3. Answers questions appropriately. Moves all extremities symmetrically.     Laboratory Data:   CMP    Recent Labs  Lab 06/29/17  0748 06/28/17  1526    133   POTASSIUM 3.4 4.7   CHLORIDE 100 97   CO2 24 12*   ANIONGAP 14 24*   * 118*   BUN 10 16   CR 0.58* 0.64*   GFRESTIMATED >90Non  GFR Calc >90Non "  GFR Calc   GFRESTBLACK >90African American GFR Calc >90African American GFR Calc   AZAM 7.9* 8.2*   PROTTOTAL 6.2* 8.0   ALBUMIN 3.2* 3.9   BILITOTAL 1.5* 0.8   ALKPHOS 104 138   AST 86* Unsatisfactory specimen - hemolyzedNOTIFIED LAURA ZHONG RN @ 0091 6.28.17 MG   * 151*       CBC  Recent Labs  Lab 06/28/17  1526   WBC 6.9   RBC 5.64   HGB 17.1   HCT 49.7   MCV 88   MCH 30.3   MCHC 34.4   RDW 14.8           Assessment and Plan/Recommendations:   Nickolas Lang is a 26 year old male with history of HTN, hepatic steatosis, GERD, ADD, and alcohol abuse admitted to station 3A for acute alcohol withdrawal. Medicine was consulted to co-manage patient's transaminitis, HTN, and GERD.    Alcohol Abuse with Acute Withdrawal. Reports drinking 1.75 liter of alcohol daily over the past month. AMANDA 0.133 in ED. U tox + for ethanol and benzodiazepines. On MSSA protocol.  - Management per Psychiatry    Transaminitis, Hx of Hepatic Steatosis. Liver enzymes on admission revealed AST 86 (110),  (151), T bili 1.5 (0.8), GGT 1467; alk phos WNL. Hx of intermittent transaminitis in past. Abdominal US 10/2015 with mild hepatomegaly and diffuse hepatic steatosis. Hepatitis B/C serologies negative 5/2016. Likely related to acute on chronic alcohol abuse. Mild generalized tenderness on exam.  - Trend CMP, GGT in AM    HTN. Maintained on amlodipine and clonidine PTA. BP's have remained volatile since admission, ranging 120-160's/'s. Suspect significant contribution from acute alcohol withdrawal, although possible element of baseline uncontrolled HTN. Current /118. Patient denies headache, chest pain.  - Continue PTA clonidine 0.1 mg BID, amlodipine 10 mg QD  - Will add clonidine 0.1 mg BID PRN for SBP >180, DBP >110    GERD. Maintained on omeprazole PTA. Appears to have been started on 20 mg QD on admission, although home records report dosing of 40 mg daily. No acute concerns.  -  Increase omeprazole to 40 mg QD per home dosing    Medicine will follow for repeat labs and elevated BP. Please page the Internal Medicine job code pager for any intercurrent medical issues which arise. Thank you for the opportunity to be a part of this patient's care.    Pushpa Kuhn PA-C  Hospitalist Service  [phone]

## 2017-06-29 NOTE — PROGRESS NOTES
Met with Pt to initiate discharge planning.  Pt is requesting LP.  He has MA and will need [facility] funding for treatment.  Coached Pt to complete paperwork for assessment.  Pt acknowledged.

## 2017-06-29 NOTE — H&P
"    Id ;  25 y/o wm , works as a due diligence officer  Single no kids, live with mom    Ksenia spangler  This is one of several detox for aleena who is known to me from previous admits. Chronic alcoholism .  He has been currently binging for the last month and a half and states that he has been drinking a 1.75 liter of vodka a day for that past 2 weeks. The problem first presented itself about 2 years ago when the patient was 24 and has been in and out of treatment since.  He has been drinking alcohol.  He has tolerance, withdrawal, progressive use with loss of control, used despite having negative consequences, health, relationships with family.    He does have depression and takes Zoloft, but he feels even when he is sober his biggest problem is that he does have no motivation and interest.      Alcohol is his drug of choice.  He does have chronic depression even when he is sober.  When he gets depressed, he is sad.  He struggles to do simple things and sleeps and his mood goes down, energy goes down, motivation goes down.  He does not have any suicidal or homicidal ideation, denied auditory or visual hallucinations, denied any PTSD.       PAST PSYCHIATRIC HISTORY:  Never psychiatrically hospitalized.  He was in7 previous cd trts including  [facility] ,[facility].       REVIEW OF SYSTEMS:  A 10-point review of systems reviewed and is negative.       PAST MEDICAL HISTORY:  He has pyloric stenosis, deviated nasal septum and hypertension. Blood pressure (!) 166/117, pulse 91, temperature 97.5  F (36.4  C), temperature source Oral, resp. rate 16, height 1.854 m (6' 1\"), weight 134.9 kg (297 lb 5 oz), SpO2 98 %.      No current facility-administered medications on file prior to encounter.   Current Outpatient Prescriptions on File Prior to Encounter:  omeprazole (PRILOSEC) 40 MG capsule Take 1 capsule (40 mg) by mouth daily   traZODone (DESYREL) 100 MG tablet Take 2 tablets (200 mg) by mouth nightly as needed " for sleep   thiamine 100 MG tablet Take 1 tablet (100 mg) by mouth daily   busPIRone (BUSPAR) 10 MG tablet Take 2 tablets (20 mg) by mouth 3 times daily   hydrOXYzine (ATARAX) 25 MG tablet Take 2 tablets (50 mg) by mouth every 6 hours as needed for anxiety   sertraline (ZOLOFT) 100 MG tablet Take 2 tablets (200 mg) by mouth daily   cloNIDine (CATAPRES) 0.1 MG tablet Take 1 tablet (0.1 mg) by mouth 2 times daily   multivitamin, therapeutic with minerals (THERA-VIT-M) TABS Take 1 tablet by mouth daily   buPROPion (WELLBUTRIN XL) 150 MG 24 hr tablet Take 1 tablet (150 mg) by mouth every morning   amLODIPine (NORVASC) 10 MG tablet Take 1 tablet (10 mg) by mouth daily   methocarbamol (ROBAXIN) 500 MG tablet Take 1 tablet (500 mg) by mouth 4 times daily as needed for muscle spasms   disulfiram (ANTABUSE) 250 MG tablet Take 1 tablet (250 mg) by mouth daily   FAMILY HISTORY:  Maternal grandmother has depression.  Mother has depression.  Father has alcoholism.  Parents are .  Good childhood, no abuse.       VITAL SIGNS:  Temperature of 96.8, pulse of 96, heart rate of 97, respiratory rate 16, blood pressure 134/100.       MENTAL STATUS EXAMINATION:  The patient appears stated age.  She has adequate grooming, adequate hygiene, maintains good eye contact, cooperative.  No psychomotor abnormalities.  No gait problems.  Mood is euthymic.  Affect is congruent.  Speech is spontaneous, normal rate.  Does not have any suicidal or homicidal ideation, plan or intent.  Insight and judgment are fair.  Alert and oriented x3.  Recent and remote memory, language, fund of knowledge are all adequate.       DIAGNOSES:   Axis I:  Alcohol dependence. Alcohol use dx severe  Major depressive disorder:         PLAN:  The patient will be detoxed off alcohol using [facility] protocol on Valium.  The patient will be started on Zoloft and BuSpar.  The patient will be seen by case management and Internal Medicine.  The patient will start on  Wellbutrin once detox is completed

## 2017-06-30 ENCOUNTER — TELEPHONE (OUTPATIENT)
Dept: BEHAVIORAL HEALTH | Facility: CLINIC | Age: 27
End: 2017-06-30

## 2017-06-30 LAB
ALBUMIN SERPL-MCNC: 2.9 G/DL (ref 3.4–5)
ALP SERPL-CCNC: 109 U/L (ref 40–150)
ALT SERPL W P-5'-P-CCNC: 138 U/L (ref 0–70)
ANION GAP SERPL CALCULATED.3IONS-SCNC: 11 MMOL/L (ref 3–14)
AST SERPL W P-5'-P-CCNC: 183 U/L (ref 0–45)
BILIRUB SERPL-MCNC: 1 MG/DL (ref 0.2–1.3)
BUN SERPL-MCNC: 13 MG/DL (ref 7–30)
CALCIUM SERPL-MCNC: 8.3 MG/DL (ref 8.5–10.1)
CHLORIDE SERPL-SCNC: 105 MMOL/L (ref 94–109)
CO2 SERPL-SCNC: 26 MMOL/L (ref 20–32)
CREAT SERPL-MCNC: 0.61 MG/DL (ref 0.66–1.25)
GFR SERPL CREATININE-BSD FRML MDRD: ABNORMAL ML/MIN/1.7M2
GGT SERPL-CCNC: 1655 U/L (ref 0–75)
GLUCOSE SERPL-MCNC: 100 MG/DL (ref 70–99)
INTERPRETATION ECG - MUSE: NORMAL
POTASSIUM SERPL-SCNC: 3.5 MMOL/L (ref 3.4–5.3)
PROT SERPL-MCNC: 6 G/DL (ref 6.8–8.8)
SODIUM SERPL-SCNC: 142 MMOL/L (ref 133–144)

## 2017-06-30 PROCEDURE — 12800008 ZZH R&B CD ADULT

## 2017-06-30 PROCEDURE — 82977 ASSAY OF GGT: CPT | Performed by: PHYSICIAN ASSISTANT

## 2017-06-30 PROCEDURE — 25000132 ZZH RX MED GY IP 250 OP 250 PS 637: Performed by: PHYSICIAN ASSISTANT

## 2017-06-30 PROCEDURE — 25000132 ZZH RX MED GY IP 250 OP 250 PS 637: Performed by: FAMILY MEDICINE

## 2017-06-30 PROCEDURE — 36415 COLL VENOUS BLD VENIPUNCTURE: CPT | Performed by: PHYSICIAN ASSISTANT

## 2017-06-30 PROCEDURE — 25000132 ZZH RX MED GY IP 250 OP 250 PS 637: Performed by: PSYCHIATRY & NEUROLOGY

## 2017-06-30 PROCEDURE — H0001 ALCOHOL AND/OR DRUG ASSESS: HCPCS

## 2017-06-30 PROCEDURE — 99232 SBSQ HOSP IP/OBS MODERATE 35: CPT | Performed by: PSYCHIATRY & NEUROLOGY

## 2017-06-30 PROCEDURE — 80053 COMPREHEN METABOLIC PANEL: CPT | Performed by: PHYSICIAN ASSISTANT

## 2017-06-30 PROCEDURE — 99207 ZZC CDG-MDM COMPONENT: MEETS MODERATE - UP CODED: CPT | Performed by: PSYCHIATRY & NEUROLOGY

## 2017-06-30 RX ORDER — DIAPER,BRIEF,INFANT-TODD,DISP
EACH MISCELLANEOUS 2 TIMES DAILY PRN
Status: DISCONTINUED | OUTPATIENT
Start: 2017-06-30 | End: 2017-07-05 | Stop reason: HOSPADM

## 2017-06-30 RX ADMIN — ACETAMINOPHEN 650 MG: 325 TABLET, FILM COATED ORAL at 00:40

## 2017-06-30 RX ADMIN — TRAZODONE HYDROCHLORIDE 200 MG: 100 TABLET ORAL at 21:51

## 2017-06-30 RX ADMIN — MULTIPLE VITAMINS W/ MINERALS TAB 1 TABLET: TAB at 08:16

## 2017-06-30 RX ADMIN — HYDROXYZINE HYDROCHLORIDE 50 MG: 50 TABLET, FILM COATED ORAL at 11:24

## 2017-06-30 RX ADMIN — HYDROXYZINE HYDROCHLORIDE 50 MG: 50 TABLET, FILM COATED ORAL at 21:51

## 2017-06-30 RX ADMIN — GABAPENTIN 800 MG: 800 TABLET, FILM COATED ORAL at 11:24

## 2017-06-30 RX ADMIN — GABAPENTIN 800 MG: 800 TABLET, FILM COATED ORAL at 16:31

## 2017-06-30 RX ADMIN — BUSPIRONE HYDROCHLORIDE 20 MG: 5 TABLET ORAL at 13:43

## 2017-06-30 RX ADMIN — ALUMINUM HYDROXIDE, MAGNESIUM HYDROXIDE, AND DIMETHICONE 30 ML: 400; 400; 40 SUSPENSION ORAL at 19:42

## 2017-06-30 RX ADMIN — GABAPENTIN 800 MG: 800 TABLET, FILM COATED ORAL at 08:17

## 2017-06-30 RX ADMIN — OMEPRAZOLE 40 MG: 20 CAPSULE, DELAYED RELEASE ORAL at 08:17

## 2017-06-30 RX ADMIN — CLONIDINE HYDROCHLORIDE 0.1 MG: 0.1 TABLET ORAL at 08:17

## 2017-06-30 RX ADMIN — AMLODIPINE BESYLATE 10 MG: 10 TABLET ORAL at 08:16

## 2017-06-30 RX ADMIN — DIAZEPAM 10 MG: 5 TABLET ORAL at 20:16

## 2017-06-30 RX ADMIN — HYDROXYZINE HYDROCHLORIDE 50 MG: 50 TABLET, FILM COATED ORAL at 16:31

## 2017-06-30 RX ADMIN — HYDROCORTISONE: 5 CREAM TOPICAL at 19:42

## 2017-06-30 RX ADMIN — SERTRALINE HYDROCHLORIDE 200 MG: 100 TABLET ORAL at 08:22

## 2017-06-30 RX ADMIN — BUSPIRONE HYDROCHLORIDE 20 MG: 5 TABLET ORAL at 08:17

## 2017-06-30 RX ADMIN — CLONIDINE HYDROCHLORIDE 0.1 MG: 0.1 TABLET ORAL at 19:42

## 2017-06-30 RX ADMIN — BUSPIRONE HYDROCHLORIDE 20 MG: 5 TABLET ORAL at 19:42

## 2017-06-30 RX ADMIN — Medication 100 MG: at 08:17

## 2017-06-30 RX ADMIN — GABAPENTIN 800 MG: 800 TABLET, FILM COATED ORAL at 19:42

## 2017-06-30 RX ADMIN — FOLIC ACID 1 MG: 1 TABLET ORAL at 08:16

## 2017-06-30 RX ADMIN — DIAZEPAM 10 MG: 5 TABLET ORAL at 00:40

## 2017-06-30 RX ADMIN — HYDROXYZINE HYDROCHLORIDE 50 MG: 50 TABLET, FILM COATED ORAL at 00:40

## 2017-06-30 RX ADMIN — ACETAMINOPHEN 650 MG: 325 TABLET, FILM COATED ORAL at 09:58

## 2017-06-30 RX ADMIN — ACETAMINOPHEN 650 MG: 325 TABLET, FILM COATED ORAL at 21:51

## 2017-06-30 RX ADMIN — ACETAMINOPHEN 650 MG: 325 TABLET, FILM COATED ORAL at 16:31

## 2017-06-30 RX ADMIN — DIAZEPAM 10 MG: 5 TABLET ORAL at 11:24

## 2017-06-30 ASSESSMENT — ACTIVITIES OF DAILY LIVING (ADL)
GROOMING: INDEPENDENT
GROOMING: INDEPENDENT
ORAL_HYGIENE: INDEPENDENT
DRESS: INDEPENDENT
LAUNDRY: WITH SUPERVISION

## 2017-06-30 NOTE — PROGRESS NOTES
Brief Medicine Note    Medicine following for elevated BP's and liver enzymes.    BP's have remained intermittently elevated over past 24 hours. Has ranged 120-150's/'s today. Patient is on clonidine 0.1 mg BID per PTA regimen as well as PRN clonidine for elevated BP's. Plan to continue with this.    Repeat CMP today with slightly increased liver enzymes,  (86),  (100), T bili resolved at 1.0 (1.5) and alk phos WNL. GGT also remains elevated at 1655 (1467). Elevation is consistent with acute alcohol abuse as AST:ALT ratio ~2:1. Would recommend patient follows up with PCP within 1 week of discharge for repeat CMP.    Medicine will continue to follow for elevated BP's. Please contact the internal medicine job code pager with any intercurrent medical concerns that arise.    Pushpa Kuhn PA-C  Hospitalist Service  [phone]

## 2017-06-30 NOTE — TELEPHONE ENCOUNTER
"SBAR    Name:   Nickolas Lang YOB: 1990 Age:  26 year old Gender:  male   Insurance:   [employer] Rule 25/ limited MA   Precipitating Event:   Treatment due to own awareness of need for help, Treatment due to pressure from family members to get help, Treatment to avoid loss of relationship(s) and Treatment to avoid loss of living situation   DOC:   Alcohol  Additional abused substances:   Nicotine   Medical:   Hypertension   Mental Health:   Depression, Anxiety and ADHD   Previous Treatments:  7 and 10 prior IP detoxification admission(s).  4 prior CD treatment(s).   Psychosocial:  Single, in a serious relationship  No children  Stable housing and no concerns and Homeless  Minimal support network, Tendency to isolate from others, Relationship conflict with family members or friends due to substance abuse, Current legal issues, Current court probation or parole, Unemployed, Decreased performance at work or school in part due to substance abuse and Significant debt   Suicide Risk Status:    Emergent? No  Urgent / Non-Emergent?  No  Present / Non- Urgent? No  No Current Risk? Yes, Evaluation Counselors - Document in Epic / SBAR to counselor \"No identified risk\" and Treatment Counselors - Assess weekly in progress notes under Dim[facility] and summarize in Discharge / Treatment summary under Dim[facility].     Additional Info as needed: NA         "

## 2017-06-30 NOTE — PROGRESS NOTES
"[facility], [place]   Psychiatric Progress Note    Interim history   This is a 26 year old male with alcohol use dx , MDD.Pt seen in rounds. Patient's mood is hopeful  Energy Level:low  Sleep:off and on  Appetite:poor   motivation interest low  deneid any Suicidal/homicidal ideation/plan intent  deneid any .psychosis  One  prior suicde attempts--overdose in a blackout one year ago   No access to gun  Pt is in detox  Pt mssa score are monitered  Tolerating meds and has no side effects.              Medications:     Current Facility-Administered Medications   Medication     diazepam (VALIUM) tablet 5-20 mg     cloNIDine (CATAPRES) tablet 0.1 mg     busPIRone (BUSPAR) tablet 20 mg     gabapentin (NEURONTIN) tablet 800 mg     hydrOXYzine (ATARAX) tablet 50 mg     methocarbamol (ROBAXIN) tablet 500 mg     sertraline (ZOLOFT) tablet 200 mg     traZODone (DESYREL) tablet 200 mg     omeprazole (priLOSEC) CR capsule 40 mg     cloNIDine (CATAPRES) tablet 0.1 mg     amLODIPine (NORVASC) tablet 10 mg     thiamine tablet 100 mg     folic acid (FOLVITE) tablet 1 mg     multivitamin, therapeutic with minerals (THERA-VIT-M) tablet 1 tablet     acetaminophen (TYLENOL) tablet 650 mg     alum & mag hydroxide-simethicone (MYLANTA ES/MAALOX  ES) suspension 30 mL     magnesium hydroxide (MILK OF MAGNESIA) suspension 30 mL     loperamide (IMODIUM) capsule 2 mg     ondansetron (ZOFRAN-ODT) ODT tab 4 mg     naphazoline-pheniramine (NAPHCON-A, VISINE-A) ophthalmic solution 1 drop             Allergies:   No Known Allergies         Psychiatric Examination:   Blood pressure (!) 146/109, pulse 78, temperature 97.5  F (36.4  C), temperature source Tympanic, resp. rate 16, height 1.854 m (6' 1\"), weight 134.9 kg (297 lb 5 oz), SpO2 98 %.  Weight is 297 lbs 5 oz  Body mass index is 39.23 kg/(m^2).    Appearance:  awake, alert and adequately groomed  Attitude:  cooperative  Eye Contact:  good  Mood:  hopeful  Affect:  " "appropriate and in normal range and mood congruent  Speech:  clear, coherent rate /rhythm are good  Psychomotor Behavior:  no evidence of tardive dyskinesia, dystonia, or tics and intact station, gait and muscle tone  Throught Process:  logical  Associations:  no loose associations  Thought Content:  no evidence of suicidal ideation or homicidal ideation, no evidence of psychotic thought, no auditory hallucinations present and no visual hallucinations present  Insight:  good  Judgement:  intact  Oriented to:  time, person, and place  Attention Span and Concentration:  intact  Recent and Remote Memory:  intact  Language fund of knowledge are adequate         Labs:     Lab Results   Component Value Date    NTBNPI 151 11/29/2015     Lab Results   Component Value Date    WBC 6.9 06/28/2017    HGB 17.1 06/28/2017    HCT 49.7 06/28/2017    MCV 88 06/28/2017     06/28/2017     Lab Results   Component Value Date    TSH 6.12 (H) 11/28/2016          Dx alcohol use dx , MDD.  Plan   will detox off alcohol on mssa on valium  Continue zoloft 200 mg po qd, buspar 20 mg po tid   Laboratory/Imaging: reviewed with patient   Consults: internal medicine consult reviewed  Patient will be treated in therapeutic milieu with appropriate individual and group therapies as described.      Medical diagnoses to be addressed this admission:    Plan:  \"Transaminitis, Hx of Hepatic Steatosis. Liver enzymes on admission revealed AST 86 (110),  (151), T bili 1.5 (0.8), GGT 1467; alk phos WNL. Hx of intermittent transaminitis in past. Abdominal US 10/2015 with mild hepatomegaly and diffuse hepatic steatosis. Hepatitis B/C serologies negative 5/2016. Likely related to acute on chronic alcohol abuse. Mild generalized tenderness on exam.  - Trend CMP, GGT in AM     HTN. Maintained on amlodipine and clonidine PTA. BP's have remained volatile since admission, ranging 120-160's/'s. Suspect significant contribution from acute alcohol " "withdrawal, although possible element of baseline uncontrolled HTN. Current /118. Patient denies headache, chest pain.  - Continue PTA clonidine 0.1 mg BID, amlodipine 10 mg QD  - Will add clonidine 0.1 mg BID PRN for SBP >180, DBP >110     GERD. Maintained on omeprazole PTA. Appears to have been started on 20 mg QD on admission, although home records report dosing of 40 mg daily. No acute concerns.  - Increase omeprazole to 40 mg QD per home dosing\"  Relevant psychosocial stressors: job, housing     Legal Status: voluntary    Safety Assessment:   Checks:  15 min  Precautions: withdrawal precautions  Pt has not required locked seclusion or restraints in the past 24 hours to maintain safety, please refer to RN documentation for further details.    Able to give informed consent:  YES   Discussed Risks/Benefits/Side Effects/Alternatives: YES    After discussion of the indications, risks, benefits, alternatives and consequences of no treatment, the patient elects to complete detox and do trt.    Pt will be transferred to DR Reid TODAY AT 5pm   "
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
## 2017-06-30 NOTE — PROGRESS NOTES
Mssa's 10 and 9 and given valium x 2. Did eat all of supper after saying he did not eat lunch. BP improved by 2000. Made several phone calls and did rest in bed at times. Affect anxious. Worked with patient on relaxation techniques.

## 2017-06-30 NOTE — PROGRESS NOTES
Met with Pt and completed assessment.  Faxed [facility] for funding.  Pt may transfer when funding is approved and medically stable.

## 2017-06-30 NOTE — DISCHARGE INSTRUCTIONS
Behavioral Discharge Planning and Instructions  THANK YOU FOR CHOOSING THE [address]  [phone]    Summary: You were admitted to [facility] on 6/28/2017 for detoxification from Alcohol.  A medical exam was performed that included lab work. You have met with a  and opted to continue your treatment in [facility].  Your funding is through [facility] Rule 25. Please take care and make your recovery a daily priority, Nickolas!     Main Diagnosis:  Per Dr. Brasher:  1. Alcohol Dependence  2. Major Depressive Disorder    Major Treatments, Procedures and Findings:  You were treated for Alcohol detoxification using Valium administered based on the [facility] protocol. You have had a chemical dependency assessment.  You have had labs drawn and those results have been reviewed with you. Please take a copy of your lab work with you to your next primary care physician appointment.    Symptoms to Report:  If you experience more anxiety, confusion, sleeplessness, deep sadness or thoughts of suicide, notify your treatment team or notify your primary care physician. IF ANY OF THE SYMPTOMS YOU ARE EXPERIENCING ARE A MEDICAL EMERGENCY CALL 911 IMMEDIATELY.     Lifestyle Adjustment: Adjust your lifestyle to get enough sleep, relaxation, exercise and excellent nutrition. Continue to develop healthy coping skills to decrease stress and promote a sober living environment. Do not use alcohol, illegal drugs or addictive medications other than what is currently prescribed. AA, NA, and a sponsor are excellent resources for support.     Primary Provider:  Nicolette Frye  [place] [address] [address] [facility].  [facility] [address]  [phone]   Fax: [phone]  July 18th 2017 at 11:30am    Psychiatry Follow-up:    Dr. Lovell  [facility]  7/21/2017 1:30pm  [phone]     Therapist Follow-up:  Amanda Ness  [facility]  [address] [address]  7/7/17 at 11:00am  [phone]    Resources:   http://www.aastpaul.org/?topic=8  http://a[place].org/meetings/  http://www.nam[place].org/index.php/meeting-list-pdf  http://www.harmreduction.org  [facility] [phone]  Support Group:  AA/NA and Sponsor/support  Crisis Intervention: 122.260.6022 or 002-367-1044 (TTY: 218.561.1989).  Call anytime for help.  [place] on Mental Illness (www.[place].chaya.org): 445.819.6421 or 044-576-8106.  Alcoholics Anonymous (www.alcoholics-anonymous.org): Check your phone book for your local chapter.  Suicide Awareness Voices of Education (SAVE) (www.save.org): 419-002-SKII (9563)  National Suicide Prevention Line (www.mentalhealthmn.org): 900-059-AEMW (7285)  Mental Health Consumer/Survivor Network of [place] (www.mhcsn.net): 586.812.8982 or 092-574-1652  Mental Health Association of [place] (www.mentalhealth.org): 807.702.6599 or 587-450-9020   Substance Abuse and Mental Health Services (www.samhsa.gov)    [facility] ([facility])  [facility] connects people seeking recovery to resources that help foster and sustain long-term recovery.  Whether you are seeking resources for treatment, transportation, housing, job training, education, health care or other pathways to recovery, [facility] is a great place to start.  719.382.6359.  www.[place].org    General Medication Instructions:   See your medication sheet(s) for instructions.   Take all medicines as directed.  Make no changes unless your doctor suggests them.   Go to all your doctor visits.  Be sure to have all your required lab tests. This way, your medicines can be refilled on time.  AA/NA and Sponsors are excellent resources for support and you can find one at any support group meeting.  Do not use any forms of alcohol.    Please Note:  If you have any questions at anytime after you are discharged please call the [facility]  [facility] detox unit 3AW unit at [phone].  [facility], Behavioral Intake [phone]  Please take this discharge folder with you to all your follow up appointments, it contains your lab results, diagnosis, medication list and discharge recommendations.      THANK YOU FOR CHOOSING THE [facility]

## 2017-06-30 NOTE — PROGRESS NOTES
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 is your current living situation?     At home with mother and grandmother.  Recently kicked out due to use.    8B. What is your long term plan for where you will be living?     Return there when treatment is completed    8C. Tell me about your living environment/neighborhood? Ask enough follow up questions to determine safety, criminal activity, availability of alcohol and drugs, supportive or antagonistic to the person making changes.      No concerns    9. Criminal justice history: Gather current/recent history and any significant history related to substance use--Arrests? Convictions? Circumstances? Alcohol or drug involvement? Sentences? Still on probation or parole? Expectations of the court? Current court order? Any sex offenses - lifetime? What level? (DSM)    DUI, currently on probation in [facility].  PO is Yoana Quiles.    10. What obstacles exist to participating in treatment? (Time off work, childcare, funding, transportation, pending [facility] time, living situation)     None    Dimension VI Ratings   Recovery environment - The placing authority must use the criteria in Dimension VI to determine a client s recovery environment.   RISK DESCRIPTIONS -  Severity ratin Client has (A) Chronically antagonistic significant other, living environment, family, peer group or long-term criminal justice involvement that is harmful to recovery or treatment progress, or (B) Client has an actively antagonistic significant other, family, work, or living environment with immediate threat to the client s safety and well-being.    REASONS SEVERITY WAS ASSIGNED - (What support does the person have for making changes? What structure/stability does the person have in his or her daily life that will increase the likelihood that changes can be sustained? What problems exist in the person s environment that will jeopardize getting/staying clean and sober?)     Patient reports that his current living situation is unsupportive towards his recovery. Pt reports that he is currently living with his mother and grandmother but they kicked him out due to his use and he cannot return until he completes treatment.  Pt reports that he quit his new job in order to enter treatment. Pt lacks a sober support network. Pt reports that he has some legal involvement for DUI and is on probation for it.           Client Choice/Exceptions   Would you like services specific to language, age, gender, culture, Sikh preference, race, ethnicity, sexual orientation or disability?  No    What particular treatment choices and options would you like to have? Residential or IOP with B&L    Do you have a preference for a particular treatment program? [facility]    Criteria for Diagnosis     Criteria for Diagnosis  DSM-5 Criteria for Substance Use Disorder  Instructions: Determine whether the client currently meets the criteria for Substance Use Disorder using the diagnostic criteria in the DSM-V [id number].[phone]. Current means during the most recent 12 months outside a facility that controls access to substances    Category of Substance Severity (ICD-10 Code / DSM 5 Code)     Alcohol Use Disorder Severe   (10.20) (099.90)   Cannabis Use Disorder NA   Hallucinogen Use Disorder NA   Inhalant Use Disorder NA   Opioid Use Disorder NA   Sedative, Hypnotic, or Anxiolytic Use Disorder NA   Stimulant Related Disorder NA   Tobacco Use Disorder NA   Other (or unknown) Substance Use Disorder NA       Collateral Contact Summary   Number of contacts made: 1    Contact with referring person:  NA.    If court related records were reviewed, summarize here: NA    Information from collateral contacts supported/largely agreed with information from the client and associated risk ratings.      Rule 25 Assessment Summary and Plan   's Recommendation    Complete [facility]  Follow the recommendations of your program  Support group participation with a male sponsor  Keep appointments with providers  Take medications as prescribed  Individual therapy  Comply with probation requirements      Collateral Contacts     Name:    [facility]   Relationship:       Phone Number:     Releases:         Filippo Brasher MD Physician Signed Mental Health H&P   Date of Service: 6/29/2017  9:00 AM Creation Time: 6/29/2017 10:26 AM                 Id ;27 y/o wm , works as a due diligence officer  Single no kids     Cc relapse  [place]  This is pne of several detox for aleena who is known to me from previous admits. Chronic alcoholism .  He has been currently binging for the last month and a half and states that he has been drinking a 1.75 liter of vodka a day for that past 2 weeks. The problem first presented itself about 2 years ago when the patient was 24 and has been in and out of treatment since.  He has been drinking alcohol.  He has tolerance, withdrawal, progressive use with loss of control, used despite having negative consequences, health, relationships with family.  He does have depression and takes Zoloft, but he feels even when he is sober his biggest problem is that he does have no motivation and interest.  Alcohol is his drug of choice.  " He does have chronic depression even when he is sober.  When he gets depressed, he is sad.  He struggles to do simple things and sleeps and his mood goes down, energy goes down, motivation goes down.  He does not have any suicidal or homicidal ideation, denied auditory or visual hallucinations, denied any PTSD.       PAST PSYCHIATRIC HISTORY:  Never psychiatrically hospitalized.  He was in [facility].       REVIEW OF SYSTEMS:  A 10-point review of systems reviewed and is negative.       PAST MEDICAL HISTORY:  He has pyloric stenosis, deviated nasal septum and hypertension. Blood pressure (!) 166/117, pulse 91, temperature 97.5  F (36.4  C), temperature source Oral, resp. rate 16, height 1.854 m (6' 1\"), weight 134.9 kg (297 lb 5 oz), SpO2 98 %.        No current facility-administered medications on file prior to encounter.   Current Outpatient Prescriptions on File Prior to Encounter:  omeprazole (PRILOSEC) 40 MG capsule Take 1 capsule (40 mg) by mouth daily   traZODone (DESYREL) 100 MG tablet Take 2 tablets (200 mg) by mouth nightly as needed for sleep   thiamine 100 MG tablet Take 1 tablet (100 mg) by mouth daily   busPIRone (BUSPAR) 10 MG tablet Take 2 tablets (20 mg) by mouth 3 times daily   hydrOXYzine (ATARAX) 25 MG tablet Take 2 tablets (50 mg) by mouth every 6 hours as needed for anxiety   sertraline (ZOLOFT) 100 MG tablet Take 2 tablets (200 mg) by mouth daily   cloNIDine (CATAPRES) 0.1 MG tablet Take 1 tablet (0.1 mg) by mouth 2 times daily   multivitamin, therapeutic with minerals (THERA-VIT-M) TABS Take 1 tablet by mouth daily   buPROPion (WELLBUTRIN XL) 150 MG 24 hr tablet Take 1 tablet (150 mg) by mouth every morning   amLODIPine (NORVASC) 10 MG tablet Take 1 tablet (10 mg) by mouth daily   methocarbamol (ROBAXIN) 500 MG tablet Take 1 tablet (500 mg) by mouth 4 times daily as needed for muscle spasms   disulfiram (ANTABUSE) 250 MG tablet Take 1 tablet (250 mg) by mouth daily   FAMILY " HISTORY:  Maternal grandmother has depression.  Mother has depression.  Father has alcoholism.  Parents are .  Good childhood, no abuse.       VITAL SIGNS:  Temperature of 96.8, pulse of 96, heart rate of 97, respiratory rate 16, blood pressure 134/100.       MENTAL STATUS EXAMINATION:  The patient appears stated age.  She has adequate grooming, adequate hygiene, maintains good eye contact, cooperative.  No psychomotor abnormalities.  No gait problems.  Mood is euthymic.  Affect is congruent.  Speech is spontaneous, normal rate.  Does not have any suicidal or homicidal ideation, plan or intent.  Insight and judgment are fair.  Alert and oriented x3.  Recent and remote memory, language, fund of knowledge are all adequate.       DIAGNOSES:   Axis I:  Alcohol dependence. Alcohol use dx severe  Major depressive disorder:         PLAN:  The patient will be detoxed off alcohol using MSSA protocol on Valium.  The patient will be started on Zoloft and BuSpar.  The patient will be seen by case management and Internal Medicine.  The patient will start on Wellbutrin once detox is completed                                                  Collateral Contacts     Name:       Relationship:       Phone Number:       Releases:             krystyna Contacts      A problematic pattern of alcohol/drug use leading to clinically significant impairment or distress, as manifested by at least two of the following, occurring within a 12-month period:    Alcohol/drug is often taken in larger amounts or over a longer period than was intended.  There is a persistent desire or unsuccessful efforts to cut down or control alcohol/drug use  A great deal of time is spent in activities necessary to obtain alcohol, use alcohol, or recover from its effects.  Craving, or a strong desire or urge to use alcohol/drug  Recurrent alcohol/drug use resulting in a failure to fulfill major role obligations at work, school or home.  Continued alcohol  use despite having persistent or recurrent social or interpersonal problems caused or exacerbated by the effects of alcohol/drug.  Important social, occupational, or recreational activities are given up or reduced because of alcohol/drug use.  Recurrent alcohol/drug use in situations in which it is physically hazardous.  Alcohol/drug use is continued despite knowledge of having a persistent or recurrent physical or psychological problem that is likely to have been caused or exacerbated by alcohol.  Tolerance, as defined by either of the following: A need for markedly increased amounts of alcohol/drug to achieve intoxication or desired effect. and A markedly diminished effect with continued use of the same amount of alcohol/drug.  Withdrawal, as manifested by either of the following: The characteristic withdrawal syndrome for alcohol/drug (refer to Criteria A and B of the criteria set for alcohol/drug withdrawal). and Alcohol/drug (or a closely related substance, such as a benzodiazepine) is taken to relieve or avoid withdrawal symptoms.      Specify if: In early remission:  After full criteria for alcohol/drug use disorder were previously met, none of the criteria for alcohol/drug use disorder have been met for at least 3 months but for less than 12 months (with the exception that Criterion A4,  Craving or a strong desire or urge to use alcohol/drug  may be met).     In sustained remission:   After full criteria for alcohol use disorder were previously met, non of the criteria for alcohol/drug use disorder have been met at any time during a period of 12 months or longer (with the exception that Criterion A4,  Craving or strong desire or urge to use alcohol/drug  may be met).   Specify if:   This additional specifier is used if the individual is in an environment where access to alcohol is restricted.    Mild: Presence of 2-3 symptoms    Moderate: Presence of 4-5 symptoms    Severe: Presence of 6 or more symptoms

## 2017-07-01 PROCEDURE — 25000132 ZZH RX MED GY IP 250 OP 250 PS 637: Performed by: PSYCHIATRY & NEUROLOGY

## 2017-07-01 PROCEDURE — 25000125 ZZHC RX 250: Performed by: PSYCHIATRY & NEUROLOGY

## 2017-07-01 PROCEDURE — 25000132 ZZH RX MED GY IP 250 OP 250 PS 637: Performed by: PHYSICIAN ASSISTANT

## 2017-07-01 PROCEDURE — 12800012 ZZH R&B CD MH INTERMEDIATE ADULT

## 2017-07-01 PROCEDURE — 25000132 ZZH RX MED GY IP 250 OP 250 PS 637: Performed by: FAMILY MEDICINE

## 2017-07-01 RX ORDER — CLONIDINE HYDROCHLORIDE 0.2 MG/1
0.2 TABLET ORAL 2 TIMES DAILY
Status: DISCONTINUED | OUTPATIENT
Start: 2017-07-01 | End: 2017-07-05 | Stop reason: HOSPADM

## 2017-07-01 RX ORDER — NICOTINE 21 MG/24HR
1 PATCH, TRANSDERMAL 24 HOURS TRANSDERMAL DAILY
Status: DISCONTINUED | OUTPATIENT
Start: 2017-07-01 | End: 2017-07-03 | Stop reason: DRUGHIGH

## 2017-07-01 RX ADMIN — BUSPIRONE HYDROCHLORIDE 20 MG: 5 TABLET ORAL at 19:58

## 2017-07-01 RX ADMIN — HYDROCORTISONE: 5 CREAM TOPICAL at 22:50

## 2017-07-01 RX ADMIN — ONDANSETRON 4 MG: 4 TABLET, ORALLY DISINTEGRATING ORAL at 12:08

## 2017-07-01 RX ADMIN — TRAZODONE HYDROCHLORIDE 200 MG: 100 TABLET ORAL at 22:49

## 2017-07-01 RX ADMIN — HYDROXYZINE HYDROCHLORIDE 50 MG: 50 TABLET, FILM COATED ORAL at 22:49

## 2017-07-01 RX ADMIN — MULTIPLE VITAMINS W/ MINERALS TAB 1 TABLET: TAB at 08:36

## 2017-07-01 RX ADMIN — FOLIC ACID 1 MG: 1 TABLET ORAL at 08:36

## 2017-07-01 RX ADMIN — DIAZEPAM 10 MG: 5 TABLET ORAL at 16:06

## 2017-07-01 RX ADMIN — ACETAMINOPHEN 650 MG: 325 TABLET, FILM COATED ORAL at 19:59

## 2017-07-01 RX ADMIN — GABAPENTIN 800 MG: 800 TABLET, FILM COATED ORAL at 19:58

## 2017-07-01 RX ADMIN — NICOTINE 1 PATCH: 21 PATCH, EXTENDED RELEASE TRANSDERMAL at 09:21

## 2017-07-01 RX ADMIN — GABAPENTIN 800 MG: 800 TABLET, FILM COATED ORAL at 08:36

## 2017-07-01 RX ADMIN — BUSPIRONE HYDROCHLORIDE 20 MG: 5 TABLET ORAL at 08:36

## 2017-07-01 RX ADMIN — GABAPENTIN 800 MG: 800 TABLET, FILM COATED ORAL at 16:06

## 2017-07-01 RX ADMIN — CLONIDINE HYDROCHLORIDE 0.2 MG: 0.2 TABLET ORAL at 19:58

## 2017-07-01 RX ADMIN — HYDROCORTISONE: 5 CREAM TOPICAL at 12:09

## 2017-07-01 RX ADMIN — GABAPENTIN 800 MG: 800 TABLET, FILM COATED ORAL at 12:08

## 2017-07-01 RX ADMIN — SERTRALINE HYDROCHLORIDE 200 MG: 100 TABLET ORAL at 08:36

## 2017-07-01 RX ADMIN — DIAZEPAM 10 MG: 5 TABLET ORAL at 12:08

## 2017-07-01 RX ADMIN — CLONIDINE HYDROCHLORIDE 0.1 MG: 0.1 TABLET ORAL at 08:39

## 2017-07-01 RX ADMIN — AMLODIPINE BESYLATE 10 MG: 10 TABLET ORAL at 08:36

## 2017-07-01 RX ADMIN — OMEPRAZOLE 40 MG: 20 CAPSULE, DELAYED RELEASE ORAL at 08:34

## 2017-07-01 RX ADMIN — Medication 100 MG: at 08:36

## 2017-07-01 RX ADMIN — HYDROXYZINE HYDROCHLORIDE 50 MG: 50 TABLET, FILM COATED ORAL at 09:58

## 2017-07-01 RX ADMIN — DIAZEPAM 10 MG: 5 TABLET ORAL at 19:58

## 2017-07-01 RX ADMIN — CLONIDINE HYDROCHLORIDE 0.1 MG: 0.1 TABLET ORAL at 08:35

## 2017-07-01 RX ADMIN — DIAZEPAM 10 MG: 5 TABLET ORAL at 08:36

## 2017-07-01 RX ADMIN — BUSPIRONE HYDROCHLORIDE 20 MG: 5 TABLET ORAL at 14:10

## 2017-07-01 ASSESSMENT — ACTIVITIES OF DAILY LIVING (ADL)
DRESS: INDEPENDENT
LAUNDRY: WITH SUPERVISION
GROOMING: INDEPENDENT
ORAL_HYGIENE: INDEPENDENT

## 2017-07-01 NOTE — PROGRESS NOTES
Brief Medicine Note    Medicine following for elevated BP's.    BP's have remained volatile over past 24 hours, ranging 130-190's/'s. BP was significantly elevated at 195/114 this AM and patient received scheduled amlodipine, clonidine, and PRN dosing of clonidine. Subsequent BP improved to 137/92. Will increase scheduled clonidine to 0.2 mg BID as patient's BP's have remained poorly controlled on current regimen.    Medicine will continue to follow for BP management.    Pushpa Kuhn PA-C  Hospitalist Service  [phone]

## 2017-07-01 NOTE — PLAN OF CARE
Problem: Substance Withdrawal  Goal: Substance Withdrawal  Signs and symptoms of listed problems will be absent or manageable.     1) Patient will achieve medical stabilization of acute withdrawal sx.  2) Patient will remain safe and free from injury  3) Patient will demonstrate improvement of ADLs (appetite, hygiene)  4) Verbalize reduction of fear or anxiety to a manageable level.  5) Verbalize knowledge of substance abuse as a disease  6) Verbalize risks and negative effects related to drug ingestion  7) Demonstrate participation in unit programming and attends specific substance use group therapy (i.e AA meetings)  8) Accept referral to substance abuse treatment  9) Express sense of regaining some control of situation/life (possible by verbalizing alternative coping mechanisms as alternatives to substance use in response to stress)  Outcome: No Change     Patient up and visible in the milieu. Pt is attending/participating in all unit programming. Pt pleasant and cooperative. Pt endorses anxiety 7/10. Pt requested/recieved hydroxyzine 50mg x 1 (See MAR). Pt reports this is minimally helpful in alleviating anxiety. Pt given other tips and deep breathing exercises. Pt denies SI/SIB/HI. Pt denies auditory/visual hallucinations. Pt denies any current medication side effects. Pt remains hopeful for [facility] after detox.     Update:    Pt MSSA at noon assessment = 8, patient medicated with 10mg valium x 1 per unit protocol. Pt reported nausea, and was given zofran 4mg x 1 (see MAR).

## 2017-07-01 NOTE — PROVIDER NOTIFICATION
07/01/17 0936   Vital Signs   Pulse 108   Pulse/Heart Rate Source Monitor   BP (!) 137/92   BP Location Right arm     Patient's vital signs re-checked, Internal medicine paged and notified of results.

## 2017-07-01 NOTE — PROVIDER NOTIFICATION
07/01/17 0814   Vital Signs   Temp 96.5  F (35.8  C)   Temp src Oral   Resp 16   Pulse 76   Pulse/Heart Rate Source Monitor   BP (!) 195/114     Patient hypertensive this am. Pt medicated with clonidine 0.1mg PRN x 1 per order (see MAR). Pt took scheduled AM medications willingly. Dr. Jacques, on call, paged and notified. Internal medicine paged and notified. Will continue to monitor and treat as indicated. Patient MSSA = 8, patient medicated with 10mg valium per unit protocol.

## 2017-07-02 PROCEDURE — 25000132 ZZH RX MED GY IP 250 OP 250 PS 637: Performed by: PSYCHIATRY & NEUROLOGY

## 2017-07-02 PROCEDURE — 25000132 ZZH RX MED GY IP 250 OP 250 PS 637: Performed by: PHYSICIAN ASSISTANT

## 2017-07-02 PROCEDURE — 25000125 ZZHC RX 250: Performed by: PSYCHIATRY & NEUROLOGY

## 2017-07-02 PROCEDURE — 25000132 ZZH RX MED GY IP 250 OP 250 PS 637: Performed by: FAMILY MEDICINE

## 2017-07-02 PROCEDURE — 12800012 ZZH R&B CD MH INTERMEDIATE ADULT

## 2017-07-02 RX ADMIN — DIAZEPAM 10 MG: 5 TABLET ORAL at 11:41

## 2017-07-02 RX ADMIN — ACETAMINOPHEN 650 MG: 325 TABLET, FILM COATED ORAL at 13:30

## 2017-07-02 RX ADMIN — NICOTINE 1 PATCH: 21 PATCH, EXTENDED RELEASE TRANSDERMAL at 16:15

## 2017-07-02 RX ADMIN — SERTRALINE HYDROCHLORIDE 200 MG: 100 TABLET ORAL at 08:51

## 2017-07-02 RX ADMIN — DIAZEPAM 10 MG: 5 TABLET ORAL at 16:13

## 2017-07-02 RX ADMIN — ONDANSETRON 4 MG: 4 TABLET, ORALLY DISINTEGRATING ORAL at 20:27

## 2017-07-02 RX ADMIN — GABAPENTIN 800 MG: 800 TABLET, FILM COATED ORAL at 16:13

## 2017-07-02 RX ADMIN — GABAPENTIN 800 MG: 800 TABLET, FILM COATED ORAL at 08:51

## 2017-07-02 RX ADMIN — CLONIDINE HYDROCHLORIDE 0.2 MG: 0.2 TABLET ORAL at 08:51

## 2017-07-02 RX ADMIN — MULTIPLE VITAMINS W/ MINERALS TAB 1 TABLET: TAB at 08:50

## 2017-07-02 RX ADMIN — DIAZEPAM 10 MG: 5 TABLET ORAL at 08:51

## 2017-07-02 RX ADMIN — TRAZODONE HYDROCHLORIDE 200 MG: 100 TABLET ORAL at 22:58

## 2017-07-02 RX ADMIN — HYDROXYZINE HYDROCHLORIDE 50 MG: 50 TABLET, FILM COATED ORAL at 08:51

## 2017-07-02 RX ADMIN — GABAPENTIN 800 MG: 800 TABLET, FILM COATED ORAL at 20:25

## 2017-07-02 RX ADMIN — BUSPIRONE HYDROCHLORIDE 20 MG: 5 TABLET ORAL at 14:00

## 2017-07-02 RX ADMIN — AMLODIPINE BESYLATE 10 MG: 10 TABLET ORAL at 08:51

## 2017-07-02 RX ADMIN — FOLIC ACID 1 MG: 1 TABLET ORAL at 08:51

## 2017-07-02 RX ADMIN — ACETAMINOPHEN 650 MG: 325 TABLET, FILM COATED ORAL at 20:26

## 2017-07-02 RX ADMIN — HYDROXYZINE HYDROCHLORIDE 50 MG: 50 TABLET, FILM COATED ORAL at 22:59

## 2017-07-02 RX ADMIN — HYDROXYZINE HYDROCHLORIDE 50 MG: 50 TABLET, FILM COATED ORAL at 14:57

## 2017-07-02 RX ADMIN — BUSPIRONE HYDROCHLORIDE 20 MG: 5 TABLET ORAL at 08:51

## 2017-07-02 RX ADMIN — GABAPENTIN 800 MG: 800 TABLET, FILM COATED ORAL at 11:41

## 2017-07-02 RX ADMIN — BUSPIRONE HYDROCHLORIDE 20 MG: 5 TABLET ORAL at 20:26

## 2017-07-02 RX ADMIN — OMEPRAZOLE 40 MG: 20 CAPSULE, DELAYED RELEASE ORAL at 08:51

## 2017-07-02 RX ADMIN — HYDROCORTISONE: 5 CREAM TOPICAL at 11:42

## 2017-07-02 RX ADMIN — NICOTINE 1 PATCH: 21 PATCH, EXTENDED RELEASE TRANSDERMAL at 08:49

## 2017-07-02 RX ADMIN — CLONIDINE HYDROCHLORIDE 0.2 MG: 0.2 TABLET ORAL at 20:25

## 2017-07-02 RX ADMIN — ONDANSETRON 4 MG: 4 TABLET, ORALLY DISINTEGRATING ORAL at 08:50

## 2017-07-02 ASSESSMENT — ACTIVITIES OF DAILY LIVING (ADL)
GROOMING: INDEPENDENT
ORAL_HYGIENE: INDEPENDENT
DRESS: INDEPENDENT
LAUNDRY: WITH SUPERVISION

## 2017-07-02 NOTE — PLAN OF CARE
"Problem: Substance Withdrawal  Goal: Substance Withdrawal  Signs and symptoms of listed problems will be absent or manageable.     1) Patient will achieve medical stabilization of acute withdrawal sx.  2) Patient will remain safe and free from injury  3) Patient will demonstrate improvement of ADLs (appetite, hygiene)  4) Verbalize reduction of fear or anxiety to a manageable level.  5) Verbalize knowledge of substance abuse as a disease  6) Verbalize risks and negative effects related to drug ingestion  7) Demonstrate participation in unit programming and attends specific substance use group therapy (i.e AA meetings)  8) Accept referral to substance abuse treatment  9) Express sense of regaining some control of situation/life (possible by verbalizing alternative coping mechanisms as alternatives to substance use in response to stress)   Outcome: No Change     Pt reports this am he feels \"awful.\" MSSA = 8, patient medicated with 10mg valium per unit protocol. Pt rates anxiety high. Pt requested/recieved hydroxyzine 50mg x 1 (see MAR). This RN also administered 4mg Zofran for pt's complaint of nausea this AM (see MAR). Pt is up and visible in the milieu at times. Patient encouraged to attend/participate in unit programming. Pt denies SI/SIB/HI. Pt denies auditory/visual hallucinations. Pt denies any current medication side effects.      Blood pressure (!) 149/104, pulse 77, temperature 97.2  F (36.2  C), resp. rate 16, height 1.854 m (6' 1\"), weight 134.9 kg (297 lb 5 oz), SpO2 94 %.            "

## 2017-07-03 PROBLEM — F10.20 ALCOHOL USE DISORDER, SEVERE, DEPENDENCE (H): Chronic | Status: ACTIVE | Noted: 2017-06-28

## 2017-07-03 PROCEDURE — 25000132 ZZH RX MED GY IP 250 OP 250 PS 637: Performed by: FAMILY MEDICINE

## 2017-07-03 PROCEDURE — 25000132 ZZH RX MED GY IP 250 OP 250 PS 637: Performed by: PSYCHIATRY & NEUROLOGY

## 2017-07-03 PROCEDURE — 99232 SBSQ HOSP IP/OBS MODERATE 35: CPT | Performed by: PSYCHIATRY & NEUROLOGY

## 2017-07-03 PROCEDURE — 12800012 ZZH R&B CD MH INTERMEDIATE ADULT

## 2017-07-03 PROCEDURE — 25000132 ZZH RX MED GY IP 250 OP 250 PS 637: Performed by: PHYSICIAN ASSISTANT

## 2017-07-03 RX ORDER — CLONIDINE HYDROCHLORIDE 0.1 MG/1
0.1 TABLET ORAL 2 TIMES DAILY
Qty: 60 TABLET | Refills: 0 | Status: SHIPPED | OUTPATIENT
Start: 2017-07-03 | End: 2017-08-01

## 2017-07-03 RX ORDER — BUSPIRONE HYDROCHLORIDE 10 MG/1
20 TABLET ORAL 3 TIMES DAILY
Qty: 180 TABLET | Refills: 0 | Status: SHIPPED | OUTPATIENT
Start: 2017-07-03 | End: 2017-08-01

## 2017-07-03 RX ORDER — GABAPENTIN 800 MG/1
800 TABLET ORAL 4 TIMES DAILY
Qty: 120 TABLET | Refills: 0 | Status: SHIPPED | OUTPATIENT
Start: 2017-07-03 | End: 2017-08-01

## 2017-07-03 RX ORDER — ACETAMINOPHEN 325 MG/1
650 TABLET ORAL EVERY 6 HOURS PRN
Qty: 100 TABLET | Refills: 0 | Status: SHIPPED | OUTPATIENT
Start: 2017-07-03 | End: 2017-10-11

## 2017-07-03 RX ORDER — TRAZODONE HYDROCHLORIDE 100 MG/1
100-200 TABLET ORAL
Qty: 60 TABLET | Refills: 0 | Status: SHIPPED | OUTPATIENT
Start: 2017-07-03 | End: 2017-08-01

## 2017-07-03 RX ORDER — NICOTINE 21 MG/24HR
1 PATCH, TRANSDERMAL 24 HOURS TRANSDERMAL DAILY
Status: DISCONTINUED | OUTPATIENT
Start: 2017-07-03 | End: 2017-07-05 | Stop reason: HOSPADM

## 2017-07-03 RX ORDER — MULTIPLE VITAMINS W/ MINERALS TAB 9MG-400MCG
1 TAB ORAL DAILY
Qty: 30 EACH | Refills: 3 | Status: ON HOLD | OUTPATIENT
Start: 2017-07-03 | End: 2017-11-24

## 2017-07-03 RX ORDER — HYDROXYZINE HYDROCHLORIDE 25 MG/1
50 TABLET, FILM COATED ORAL EVERY 6 HOURS PRN
Qty: 120 TABLET | Refills: 0 | Status: SHIPPED | OUTPATIENT
Start: 2017-07-03 | End: 2017-07-24

## 2017-07-03 RX ORDER — OMEPRAZOLE 40 MG/1
40 CAPSULE, DELAYED RELEASE ORAL DAILY
Qty: 30 CAPSULE | Refills: 0 | Status: SHIPPED | OUTPATIENT
Start: 2017-07-03 | End: 2017-08-01

## 2017-07-03 RX ORDER — AMLODIPINE BESYLATE 10 MG/1
10 TABLET ORAL DAILY
Qty: 30 TABLET | Refills: 0 | Status: SHIPPED | OUTPATIENT
Start: 2017-07-03 | End: 2017-08-01

## 2017-07-03 RX ORDER — SERTRALINE HYDROCHLORIDE 100 MG/1
200 TABLET, FILM COATED ORAL DAILY
Qty: 60 TABLET | Refills: 0 | Status: SHIPPED | OUTPATIENT
Start: 2017-07-03 | End: 2017-08-01

## 2017-07-03 RX ADMIN — ACETAMINOPHEN 650 MG: 325 TABLET, FILM COATED ORAL at 14:09

## 2017-07-03 RX ADMIN — SERTRALINE HYDROCHLORIDE 200 MG: 100 TABLET ORAL at 08:31

## 2017-07-03 RX ADMIN — HYDROXYZINE HYDROCHLORIDE 50 MG: 50 TABLET, FILM COATED ORAL at 22:47

## 2017-07-03 RX ADMIN — TRAZODONE HYDROCHLORIDE 200 MG: 100 TABLET ORAL at 22:47

## 2017-07-03 RX ADMIN — GABAPENTIN 800 MG: 800 TABLET, FILM COATED ORAL at 19:37

## 2017-07-03 RX ADMIN — HYDROCORTISONE: 5 CREAM TOPICAL at 10:42

## 2017-07-03 RX ADMIN — GABAPENTIN 800 MG: 800 TABLET, FILM COATED ORAL at 12:38

## 2017-07-03 RX ADMIN — GABAPENTIN 800 MG: 800 TABLET, FILM COATED ORAL at 16:17

## 2017-07-03 RX ADMIN — GABAPENTIN 800 MG: 800 TABLET, FILM COATED ORAL at 08:31

## 2017-07-03 RX ADMIN — HYDROXYZINE HYDROCHLORIDE 50 MG: 50 TABLET, FILM COATED ORAL at 08:35

## 2017-07-03 RX ADMIN — BUSPIRONE HYDROCHLORIDE 20 MG: 5 TABLET ORAL at 19:35

## 2017-07-03 RX ADMIN — OMEPRAZOLE 40 MG: 20 CAPSULE, DELAYED RELEASE ORAL at 08:32

## 2017-07-03 RX ADMIN — ACETAMINOPHEN 650 MG: 325 TABLET, FILM COATED ORAL at 18:16

## 2017-07-03 RX ADMIN — AMLODIPINE BESYLATE 10 MG: 10 TABLET ORAL at 08:31

## 2017-07-03 RX ADMIN — CLONIDINE HYDROCHLORIDE 0.2 MG: 0.2 TABLET ORAL at 08:31

## 2017-07-03 RX ADMIN — FOLIC ACID 1 MG: 1 TABLET ORAL at 08:31

## 2017-07-03 RX ADMIN — HYDROCORTISONE: 5 CREAM TOPICAL at 12:39

## 2017-07-03 RX ADMIN — MULTIPLE VITAMINS W/ MINERALS TAB 1 TABLET: TAB at 08:31

## 2017-07-03 RX ADMIN — BUSPIRONE HYDROCHLORIDE 20 MG: 5 TABLET ORAL at 14:09

## 2017-07-03 RX ADMIN — HYDROXYZINE HYDROCHLORIDE 50 MG: 50 TABLET, FILM COATED ORAL at 14:09

## 2017-07-03 RX ADMIN — CLONIDINE HYDROCHLORIDE 0.2 MG: 0.2 TABLET ORAL at 19:37

## 2017-07-03 RX ADMIN — BUSPIRONE HYDROCHLORIDE 20 MG: 5 TABLET ORAL at 08:31

## 2017-07-03 RX ADMIN — NICOTINE 1 PATCH: 14 PATCH, EXTENDED RELEASE TRANSDERMAL at 12:37

## 2017-07-03 RX ADMIN — ACETAMINOPHEN 650 MG: 325 TABLET, FILM COATED ORAL at 09:38

## 2017-07-03 RX ADMIN — NICOTINE 1 PATCH: 21 PATCH, EXTENDED RELEASE TRANSDERMAL at 08:31

## 2017-07-03 ASSESSMENT — ACTIVITIES OF DAILY LIVING (ADL)
ORAL_HYGIENE: INDEPENDENT
GROOMING: INDEPENDENT
DRESS: INDEPENDENT
LAUNDRY: WITH SUPERVISION

## 2017-07-03 NOTE — PROGRESS NOTES
Pt MSSA score at 1600 check = 3. Pt VS: T: 97.5, RR:16, P: 72 BP: 139/98. Pt was taken out of detox.

## 2017-07-03 NOTE — PROGRESS NOTES
"Pt MSSA at noon check = 5. Pt exhibiting/reporting minimal to no withdrawal symptoms at this time. Valium order has been discontinued per Dr. Wills. Patient will be able to come out of detox at 1600. Blood pressure 140/88, pulse 83, temperature 98.2  F (36.8  C), resp. rate 16, height 1.854 m (6' 1\"), weight 134.9 kg (297 lb 5 oz), SpO2 94 %.      "

## 2017-07-03 NOTE — PROGRESS NOTES
Funding for [facility] was approved by [facility].  Pt is awaiting placement in [facility].

## 2017-07-03 NOTE — PLAN OF CARE
Problem: Substance Withdrawal  Goal: Substance Withdrawal  Signs and symptoms of listed problems will be absent or manageable.     1) Patient will achieve medical stabilization of acute withdrawal sx.  2) Patient will remain safe and free from injury  3) Patient will demonstrate improvement of ADLs (appetite, hygiene)  4) Verbalize reduction of fear or anxiety to a manageable level.  5) Verbalize knowledge of substance abuse as a disease  6) Verbalize risks and negative effects related to drug ingestion  7) Demonstrate participation in unit programming and attends specific substance use group therapy (i.e AA meetings)  8) Accept referral to substance abuse treatment  9) Express sense of regaining some control of situation/life (possible by verbalizing alternative coping mechanisms as alternatives to substance use in response to stress)   Outcome: Improving     Pt reports an improvement in withdrawal symptoms. MSSA = 4 upon first morning check. Pt continues to report anxiety, relieved by hydroxyzine 50mg x 1 this am (see MAR). Pt reported a mild headache this am, patient requested/received Tylenol 650mg x 1 (see MAR). Patient progressing towards goals. Pt denies SI/SIB/HI. Pt denies auditory/visual hallucinations. Pt hopeful for the future and looking forward to [facility] when a bed is available.

## 2017-07-03 NOTE — PROGRESS NOTES
"Addiction Medicine Progress Note          Assessment and Plan:   Assessment:   Principal Problem:    Alcohol dependence with withdrawal (H), expect to come OOD today  Active Problems:    Essential hypertension, with asymptomatic elevations here    Anxiety    Alcohol use disorder, severe, dependence (H)        Plan:   Continue MSSA this morning but anticipate D/C MSSA and diazepam this afternoon, with OOD some time today as deemed appropriate by RN assessment  IM consult in place prior to patient transfer of care from Dr. Brasher; will leave in place and defer to them for BP management until he discharges to [facility]  RN (Shayan) obtaining manual BP today, and am reducing nicotine patch to 14 mg  Dispo planning ongoing; see CM notes for details, but patient cleared to discharge to [facility] when bed available  I will be on unit tomorrow morning to finalize Med Rec before discharge/transfer         Interval History:   No acute events. By how own report, as well as staff, had been doing really well, then had bout of anxiety, got scored enough on MSSA to receive diazepam. \"it helped, but I don;t think I needed it. I don't want any more if that stuff. I want to go upstairs [[facility]] and move on. No current withdrawal complaints, just anxious tension. No end organ symptoms of elevated BP.            Review of Systems:   Complete ROS performed and is negative except as noted in interval history.           Medications:     Current Facility-Administered Medications   Medication     nicotine Patch in Place     nicotine patch REMOVAL     nicotine (NICODERM CQ) 21 MG/24HR 24 hr patch 1 patch     cloNIDine (CATAPRES) tablet 0.2 mg     hydrocortisone 0.5 % cream     diazepam (VALIUM) tablet 5-20 mg     busPIRone (BUSPAR) tablet 20 mg     gabapentin (NEURONTIN) tablet 800 mg     hydrOXYzine (ATARAX) tablet 50 mg     methocarbamol (ROBAXIN) tablet 500 mg     sertraline (ZOLOFT) tablet 200 mg     traZODone (DESYREL) tablet 200 mg     " omeprazole (priLOSEC) CR capsule 40 mg     cloNIDine (CATAPRES) tablet 0.1 mg     amLODIPine (NORVASC) tablet 10 mg     folic acid (FOLVITE) tablet 1 mg     multivitamin, therapeutic with minerals (THERA-VIT-M) tablet 1 tablet     acetaminophen (TYLENOL) tablet 650 mg     alum & mag hydroxide-simethicone (MYLANTA ES/MAALOX  ES) suspension 30 mL     magnesium hydroxide (MILK OF MAGNESIA) suspension 30 mL     loperamide (IMODIUM) capsule 2 mg     ondansetron (ZOFRAN-ODT) ODT tab 4 mg     naphazoline-pheniramine (NAPHCON-A, VISINE-A) ophthalmic solution 1 drop          Physical Exam:     Vitals were reviewed  Patient Vitals for the past 12 hrs:   BP Temp Temp src Pulse Resp   07/03/17 0400 99/67 96.2  F (35.7  C) Tympanic 64 16   07/03/17 0000 114/72 96.6  F (35.9  C) Tympanic 77 16   07/02/17 2011 (!) 163/105 99.5  F (37.5  C) Tympanic 93 16     Constitutional:   Alert, non-toxic, no physical distress     Eyes:   Anicteric, no injection, PERRL, no nystagmus     ENT:   No mucosal bleeding, mmm     Lungs:   no increased work of breathing and clear to auscultation     Cardiovascular:   Well-perfused, no murmur, no edema     Neurologic:   No tremor, normal tone, gait and coordination intact     Skin:   Clear, no flushing, no jaundice     MENTAL STATUS EXAM  Appearance: shirtless, in scrubs, ungroomed  Attitude: engageable, cooperative  Behavior: no agitation or slowing  Eye Contact: normal  Speech: coherent, no pressuring  Orientation: oriented to person , place, time and situation  Mood:  admits sadness and anxiety most of time  Affect: mildly anxious, otherwise bright, full range  Thought Process: linear, goal-directed, no FOI or DIANNA  Suicidal Ideation: denies thought/intent/plan  Hallucination: denies  Insight: partial  Judgment: adequate for safety          Data:   All laboratory data reviewed, no new interval data.    Attestation:  I have reviewed today's vital signs, notes, medications, and labs/studies pertinent to  this encounter.     Dick Wills MD  Pediatrics/Addiction Medicine

## 2017-07-04 PROCEDURE — 25000132 ZZH RX MED GY IP 250 OP 250 PS 637: Performed by: FAMILY MEDICINE

## 2017-07-04 PROCEDURE — 99231 SBSQ HOSP IP/OBS SF/LOW 25: CPT | Performed by: PSYCHIATRY & NEUROLOGY

## 2017-07-04 PROCEDURE — 25000132 ZZH RX MED GY IP 250 OP 250 PS 637: Performed by: PSYCHIATRY & NEUROLOGY

## 2017-07-04 PROCEDURE — 25000132 ZZH RX MED GY IP 250 OP 250 PS 637: Performed by: PHYSICIAN ASSISTANT

## 2017-07-04 PROCEDURE — 12800012 ZZH R&B CD MH INTERMEDIATE ADULT

## 2017-07-04 RX ADMIN — AMLODIPINE BESYLATE 10 MG: 10 TABLET ORAL at 08:04

## 2017-07-04 RX ADMIN — GABAPENTIN 800 MG: 800 TABLET, FILM COATED ORAL at 16:27

## 2017-07-04 RX ADMIN — NICOTINE 1 PATCH: 14 PATCH, EXTENDED RELEASE TRANSDERMAL at 08:05

## 2017-07-04 RX ADMIN — CLONIDINE HYDROCHLORIDE 0.2 MG: 0.2 TABLET ORAL at 08:04

## 2017-07-04 RX ADMIN — GABAPENTIN 800 MG: 800 TABLET, FILM COATED ORAL at 20:43

## 2017-07-04 RX ADMIN — HYDROCORTISONE: 5 CREAM TOPICAL at 18:16

## 2017-07-04 RX ADMIN — HYDROXYZINE HYDROCHLORIDE 50 MG: 50 TABLET, FILM COATED ORAL at 18:17

## 2017-07-04 RX ADMIN — BUSPIRONE HYDROCHLORIDE 20 MG: 5 TABLET ORAL at 20:43

## 2017-07-04 RX ADMIN — FOLIC ACID 1 MG: 1 TABLET ORAL at 08:04

## 2017-07-04 RX ADMIN — BUSPIRONE HYDROCHLORIDE 20 MG: 5 TABLET ORAL at 08:04

## 2017-07-04 RX ADMIN — CLONIDINE HYDROCHLORIDE 0.2 MG: 0.2 TABLET ORAL at 20:44

## 2017-07-04 RX ADMIN — ACETAMINOPHEN 650 MG: 325 TABLET, FILM COATED ORAL at 06:00

## 2017-07-04 RX ADMIN — HYDROXYZINE HYDROCHLORIDE 50 MG: 50 TABLET, FILM COATED ORAL at 12:13

## 2017-07-04 RX ADMIN — ACETAMINOPHEN 650 MG: 325 TABLET, FILM COATED ORAL at 17:06

## 2017-07-04 RX ADMIN — MULTIPLE VITAMINS W/ MINERALS TAB 1 TABLET: TAB at 08:04

## 2017-07-04 RX ADMIN — OMEPRAZOLE 40 MG: 20 CAPSULE, DELAYED RELEASE ORAL at 08:04

## 2017-07-04 RX ADMIN — HYDROXYZINE HYDROCHLORIDE 50 MG: 50 TABLET, FILM COATED ORAL at 06:00

## 2017-07-04 RX ADMIN — METHOCARBAMOL 500 MG: 500 TABLET ORAL at 23:05

## 2017-07-04 RX ADMIN — METHOCARBAMOL 500 MG: 500 TABLET ORAL at 08:05

## 2017-07-04 RX ADMIN — BUSPIRONE HYDROCHLORIDE 20 MG: 5 TABLET ORAL at 14:33

## 2017-07-04 RX ADMIN — SERTRALINE HYDROCHLORIDE 200 MG: 100 TABLET ORAL at 08:05

## 2017-07-04 RX ADMIN — HYDROXYZINE HYDROCHLORIDE 50 MG: 50 TABLET, FILM COATED ORAL at 10:59

## 2017-07-04 RX ADMIN — GABAPENTIN 800 MG: 800 TABLET, FILM COATED ORAL at 11:09

## 2017-07-04 RX ADMIN — TRAZODONE HYDROCHLORIDE 200 MG: 100 TABLET ORAL at 23:05

## 2017-07-04 RX ADMIN — GABAPENTIN 800 MG: 800 TABLET, FILM COATED ORAL at 08:04

## 2017-07-04 ASSESSMENT — ACTIVITIES OF DAILY LIVING (ADL)
DRESS: STREET CLOTHES
GROOMING: INDEPENDENT
ORAL_HYGIENE: INDEPENDENT
LAUNDRY: WITH SUPERVISION

## 2017-07-04 NOTE — DISCHARGE SUMMARY
Addiction Medicine Discharge Summary    Nickolas Lang MRN# [id number]   Age: 26 year old YOB: 1990     Date of Admission:  6/28/2017  Date of Discharge::  7/5/2017  2:04 PM  Admitting Physician:  Filippo Brasher MD  Discharge Physician:  Dick Wills MD ([place])     Home clinic: Nicolette Frye MD; [facility] [place]          Admission Diagnoses:   Principal Problem:    Alcohol dependence with withdrawal (H)  Active Problems:    Essential hypertension    Anxiety    Alcohol use disorder, severe, dependence (H)          Discharge Diagnosis:   Principal Problem:    Alcohol dependence with withdrawal (H), resolved without complication  Active Problems:    Essential hypertension, stabilized    Anxiety,some improvement, needs continued Psychiatry follow-up as outpatient    Alcohol use disorder, severe, dependence (H)         Procedures:   All laboratory data reviewed  Results for orders placed or performed during the hospital encounter of 06/28/17   Chest XR,  PA & LAT    Narrative    CHEST TWO VIEWS  6/28/2017 5:27 PM     HISTORY: Chest pain and shortness of breath.    COMPARISON: 8/26/2016      Impression    IMPRESSION: Normal. No change.    ALEXIS METZ MD   Drug abuse screen 6 urine (tox)   Result Value Ref Range    Amphetamine Qual Urine  NEG     Negative   Cutoff for a negative amphetamine is 500 ng/mL or less.      Barbiturates Qual Urine  NEG     Negative   Cutoff for a negative barbiturate is 200 ng/mL or less.      Benzodiazepine Qual Urine (A) NEG     Positive   Cutoff for a positive benzodiazepine is greater than 200 ng/mL. This is an   unconfirmed screening result to be used for medical purposes only.      Cannabinoids Qual Urine  NEG     Negative   Cutoff for a negative cannabinoid is 50 ng/mL or less.      Cocaine Qual Urine  NEG     Negative   Cutoff for a negative cocaine is 300 ng/mL or less.      Ethanol Qual Urine (A) NEG     Positive   Cutoff for a positive urine ethanol is  greater than 0.05 g/dL.  This is an   unconfirmed screening result to be used for medical purposes only.      Opiates Qualitative Urine  NEG     Negative   Cutoff for a negative opiate is 300 ng/mL or less.     CBC with platelets differential   Result Value Ref Range    WBC 6.9 4.0 - 11.0 10e9/L    RBC Count 5.64 4.4 - 5.9 10e12/L    Hemoglobin 17.1 13.3 - 17.7 g/dL    Hematocrit 49.7 40.0 - 53.0 %    MCV 88 78 - 100 fl    MCH 30.3 26.5 - 33.0 pg    MCHC 34.4 31.5 - 36.5 g/dL    RDW 14.8 10.0 - 15.0 %    Platelet Count 192 150 - 450 10e9/L    Diff Method Automated Method     % Neutrophils 62.2 %    % Lymphocytes 27.4 %    % Monocytes 9.7 %    % Eosinophils 0.1 %    % Basophils 0.3 %    % Immature Granulocytes 0.3 %    Nucleated RBCs 0 0 /100    Absolute Neutrophil 4.3 1.6 - 8.3 10e9/L    Absolute Lymphocytes 1.9 0.8 - 5.3 10e9/L    Absolute Monocytes 0.7 0.0 - 1.3 10e9/L    Absolute Eosinophils 0.0 0.0 - 0.7 10e9/L    Absolute Basophils 0.0 0.0 - 0.2 10e9/L    Abs Immature Granulocytes 0.0 0 - 0.4 10e9/L    Absolute Nucleated RBC 0.0    Comprehensive metabolic panel   Result Value Ref Range    Sodium 133 133 - 144 mmol/L    Potassium 4.7 3.4 - 5.3 mmol/L    Chloride 97 94 - 109 mmol/L    Carbon Dioxide 12 (L) 20 - 32 mmol/L    Anion Gap 24 (H) 3 - 14 mmol/L    Glucose 118 (H) 70 - 99 mg/dL    Urea Nitrogen 16 7 - 30 mg/dL    Creatinine 0.64 (L) 0.66 - 1.25 mg/dL    GFR Estimate >90  Non  GFR Calc   >60 mL/min/1.7m2    GFR Estimate If Black >90   GFR Calc   >60 mL/min/1.7m2    Calcium 8.2 (L) 8.5 - 10.1 mg/dL    Bilirubin Total 0.8 0.2 - 1.3 mg/dL    Albumin 3.9 3.4 - 5.0 g/dL    Protein Total 8.0 6.8 - 8.8 g/dL    Alkaline Phosphatase 138 40 - 150 U/L     (H) 0 - 70 U/L    AST  0 - 45 U/L     Unsatisfactory specimen - hemolyzed  NOTIFIED LAURA ZHONG RN @ 3727 6.28.17 MG     Troponin I   Result Value Ref Range    Troponin I ES  0.000 - 0.045 ug/L     <0.015  The 99th  percentile for upper reference range is 0.045 ug/L.  Troponin values in   the range of 0.045 - 0.120 ug/L may be associated with risks of adverse   clinical events.     Lipase   Result Value Ref Range    Lipase 168 73 - 393 U/L   Alcohol level blood   Result Value Ref Range    Ethanol g/dL 0.19 (H) <0.01 g/dL   GGT   Result Value Ref Range    GGT  0 - 75 U/L     Quantity not sufficient  CALLED TO MAGDA GARZA [id number], LY     GGT   Result Value Ref Range    GGT 1467 (H) 0 - 75 U/L   Comprehensive metabolic panel   Result Value Ref Range    Sodium 138 133 - 144 mmol/L    Potassium 3.4 3.4 - 5.3 mmol/L    Chloride 100 94 - 109 mmol/L    Carbon Dioxide 24 20 - 32 mmol/L    Anion Gap 14 3 - 14 mmol/L    Glucose 110 (H) 70 - 99 mg/dL    Urea Nitrogen 10 7 - 30 mg/dL    Creatinine 0.58 (L) 0.66 - 1.25 mg/dL    GFR Estimate >90  Non  GFR Calc   >60 mL/min/1.7m2    GFR Estimate If Black >90   GFR Calc   >60 mL/min/1.7m2    Calcium 7.9 (L) 8.5 - 10.1 mg/dL    Bilirubin Total 1.5 (H) 0.2 - 1.3 mg/dL    Albumin 3.2 (L) 3.4 - 5.0 g/dL    Protein Total 6.2 (L) 6.8 - 8.8 g/dL    Alkaline Phosphatase 104 40 - 150 U/L     (H) 0 - 70 U/L    AST 86 (H) 0 - 45 U/L   Comprehensive metabolic panel   Result Value Ref Range    Sodium 142 133 - 144 mmol/L    Potassium 3.5 3.4 - 5.3 mmol/L    Chloride 105 94 - 109 mmol/L    Carbon Dioxide 26 20 - 32 mmol/L    Anion Gap 11 3 - 14 mmol/L    Glucose 100 (H) 70 - 99 mg/dL    Urea Nitrogen 13 7 - 30 mg/dL    Creatinine 0.61 (L) 0.66 - 1.25 mg/dL    GFR Estimate >90  Non  GFR Calc   >60 mL/min/1.7m2    GFR Estimate If Black >90   GFR Calc   >60 mL/min/1.7m2    Calcium 8.3 (L) 8.5 - 10.1 mg/dL    Bilirubin Total 1.0 0.2 - 1.3 mg/dL    Albumin 2.9 (L) 3.4 - 5.0 g/dL    Protein Total 6.0 (L) 6.8 - 8.8 g/dL    Alkaline Phosphatase 109 40 - 150 U/L     (H) 0 - 70 U/L     (H) 0 - 45 U/L   GGT   Result Value Ref  Range    GGT 1655 (H) 0 - 75 U/L   EKG 12 lead   Result Value Ref Range    Interpretation ECG Click View Image link to view waveform and result    Internal Medicine Detox Adult IP Consult - [facility] - [place]: Patient to be seen: Routine within 24 hrs; Call back #: [phone]; chronic alcohol abue and dependence; Consultant may enter orders: Yes    Narrative    Pushpa Galicia PA-C     6/29/2017  1:24 PM    Internal Medicine Initial Visit    Nickolas Lang MRN# [id number]   Age: 26 year old YOB: 1990   Date of Admission: 6/28/2017     Reason for consult: Transaminitis, HTN, GERD       Requesting physician Dr. Darnell Jensen      SUBJECTIVE   HPI:   Nickolas Lang is a 26 year old male with history of HTN,   hepatic steatosis, GERD, ADD, and alcohol abuse admitted to   [facility] for acute alcohol withdrawal. Medicine was consulted   to co-manage patient's transaminitis, HTN, and GERD.    Patient presented to the ED 6/28 voluntarily seeking   detoxification from alcohol. Reports drinking a 1.75 liter of   vodka daily over the past month. Last hospitalization for detox   11/2016. Denies history of withdrawal seizures. Believes he had   one episode of withdrawal hallucinations in the past, although it   lasted very briefly and has not recurred.    Currently, patient is feeling fatigued. Reports withdrawal   symptoms including shaking, intermittent chills/sweats, anxiety,   and nausea. Had not eaten anything for ~5 days leading up to   admission, although tolerated small amounts of food today. Had   one episode of diarrhea last night, no episodes today. Complains   of some generalized abdominal tenderness. No HA, dizziness, chest   pain, SOB, cough, vomiting, dysuria, urinary frequency,   constipation, or peripheral edema.     Past Medical History:     Past Medical History:   Diagnosis Date     ADD (attention deficit disorder)      Alcohol abuse      Anxiety     gabapentin helps the most       GERD (gastroesophageal reflux disease)      Hepatic steatosis      Hypertension      Obesity      Pyloric stenosis     s/p pyloromyotomy as an infant      Reviewed & updated in [place].     Past Surgical History:      Past Surgical History:   Procedure Laterality Date     Deviated septum repair       PYLOROMYOTOMY SURGERY  as an infant      SHOULDER SURGERY Left 07/29/2016    Removal of cartilage      Reviewed & updated in Epic.     Medications prior to admission:     Prior to Admission Medications   Prescriptions Last Dose Informant Patient Reported? Taking?   Amphetamine-Dextroamphetamine (ADDERALL XR PO) Past Week at   Unknown time  Yes Yes   Sig: Take 30 mg by mouth 2 times daily   GABAPENTIN PO Past Week at Unknown time  Yes Yes   Sig: Take 800 mg by mouth 4 times daily   amLODIPine (NORVASC) 10 MG tablet More than a month at Unknown   time  No No   Sig: Take 1 tablet (10 mg) by mouth daily   buPROPion (WELLBUTRIN XL) 150 MG 24 hr tablet More than a month   at Unknown time  No No   Sig: Take 1 tablet (150 mg) by mouth every morning   busPIRone (BUSPAR) 10 MG tablet Past Week at Unknown time  No Yes     Sig: Take 2 tablets (20 mg) by mouth 3 times daily   cloNIDine (CATAPRES) 0.1 MG tablet Past Week at Unknown time  No   Yes   Sig: Take 1 tablet (0.1 mg) by mouth 2 times daily   disulfiram (ANTABUSE) 250 MG tablet More than a month at Unknown   time  No No   Sig: Take 1 tablet (250 mg) by mouth daily   hydrOXYzine (ATARAX) 25 MG tablet Past Week at Unknown time  No   Yes   Sig: Take 2 tablets (50 mg) by mouth every 6 hours as needed for   anxiety   methocarbamol (ROBAXIN) 500 MG tablet More than a month at   Unknown time  No No   Sig: Take 1 tablet (500 mg) by mouth 4 times daily as needed for   muscle spasms   multivitamin, therapeutic with minerals (THERA-VIT-M) TABS Past   Week at Unknown time  No Yes   Sig: Take 1 tablet by mouth daily   omeprazole (PRILOSEC) 40 MG capsule Past Week at Unknown time  No  "  Yes   Sig: Take 1 capsule (40 mg) by mouth daily   sertraline (ZOLOFT) 100 MG tablet Past Week at Unknown time  No   Yes   Sig: Take 2 tablets (200 mg) by mouth daily   thiamine 100 MG tablet Past Week at Unknown time  No Yes   Sig: Take 1 tablet (100 mg) by mouth daily   traZODone (DESYREL) 100 MG tablet Past Week at Unknown time  No   Yes   Sig: Take 2 tablets (200 mg) by mouth nightly as needed for sleep        Facility-Administered Medications: None         Allergies:   No Known Allergies      Social History:   Tobacco Use: Never smoker  Alcohol Use: Reports drinking 1.75 liter of hard alcohol daily   over the past month  Illicit Drug Use: Smoked marijuana once while \"blackout\",   otherwise no illicit drug use. Denies IVDU.  STI Testing: Declines at this time     Family History:     Family History   Problem Relation Age of Onset     Neurologic Disorder Mother      Multiple Sclerosis     Depression Mother      Anxiety Disorder Mother      Alcohol/Drug Father      Substance Abuse Father      Alcohol/Drug Paternal Grandfather      Depression Maternal Grandmother      Anxiety Disorder Maternal Grandmother      Substance Abuse Maternal Grandfather      Hypertension Maternal Grandmother         Reviewed & updated in Epic.     Review of Systems:   Ten point review of systems negative except as stated above in   History of Present Illness.    OBJECTIVE   Physical Exam:   Blood pressure (!) 166/117, pulse 91, temperature 97.5  F (36.4    C), temperature source Oral, resp. rate 16, height 1.854 m (6'   1\"), weight 134.9 kg (297 lb 5 oz), SpO2 98 %.  General: Well-appearing  male laying comfortably in bed,   NAD.  HEENT: NC/AT. Anicteric sclera. Mucous membranes moist.  Neck: Supple  Cardiovascular: RRR. S1,S2. No murmurs appreciated.  Lungs: Normal respiratory effort on RA. Lungs CTAB without   wheezes, rales, or rhonchi.  Abdomen: Soft, non-distended. Diffuse generalized tenderness,   L>R. Bowel sounds " normoactive.  Extremities: Warm & well perfused. No peripheral edema.  Skin: No rashes, jaundice, or lesions on exposed areas of skin.  Neurologic: A&O X 3. Answers questions appropriately. Moves all   extremities symmetrically.     Laboratory Data:   CMP    Recent Labs  Lab 06/29/17  0748 06/28/17  1526    133   POTASSIUM 3.4 4.7   CHLORIDE 100 97   CO2 24 12*   ANIONGAP 14 24*   * 118*   BUN 10 16   CR 0.58* 0.64*   GFRESTIMATED >90Non  GFR Calc >90Non    American GFR Calc   GFRESTBLACK >90African American GFR Calc >90African American GFR   Calc   AZAM 7.9* 8.2*   PROTTOTAL 6.2* 8.0   ALBUMIN 3.2* 3.9   BILITOTAL 1.5* 0.8   ALKPHOS 104 138   AST 86* Unsatisfactory specimen - hemolyzedNOTIFIED LAURA ZHONG   RN @ 1619 6.28.17 MG   * 151*       CBC  Recent Labs  Lab 06/28/17  1526   WBC 6.9   RBC 5.64   HGB 17.1   HCT 49.7   MCV 88   MCH 30.3   MCHC 34.4   RDW 14.8           Assessment and Plan/Recommendations:   Nickolas Lang is a 26 year old male with history of HTN,   hepatic steatosis, GERD, ADD, and alcohol abuse admitted to   [facility] for acute alcohol withdrawal. Medicine was consulted   to co-manage patient's transaminitis, HTN, and GERD.    Alcohol Abuse with Acute Withdrawal. Reports drinking 1.75 liter   of alcohol daily over the past month. AMANDA 0.133 in ED. U tox +   for ethanol and benzodiazepines. On MSSA protocol.  - Management per Psychiatry    Transaminitis, Hx of Hepatic Steatosis. Liver enzymes on   admission revealed AST 86 (110),  (151), T bili 1.5 (0.8),   GGT 1467; alk phos WNL. Hx of intermittent transaminitis in past.   Abdominal US 10/2015 with mild hepatomegaly and diffuse hepatic   steatosis. Hepatitis B/C serologies negative 5/2016. Likely   related to acute on chronic alcohol abuse. Mild generalized   tenderness on exam.  - Trend CMP, GGT in AM    HTN. Maintained on amlodipine and clonidine PTA. BP's have   remained  volatile since admission, ranging 120-160's/'s.   Suspect significant contribution from acute alcohol withdrawal,   although possible element of baseline uncontrolled HTN. Current   /118. Patient denies headache, chest pain.  - Continue PTA clonidine 0.1 mg BID, amlodipine 10 mg QD  - Will add clonidine 0.1 mg BID PRN for SBP >180, DBP >110    GERD. Maintained on omeprazole PTA. Appears to have been started   on 20 mg QD on admission, although home records report dosing of   40 mg daily. No acute concerns.  - Increase omeprazole to 40 mg QD per home dosing    Medicine will follow for repeat labs and elevated BP. Please page   the Internal Medicine job code pager for any intercurrent medical   issues which arise. Thank you for the opportunity to be a part of   this patient's care.    Pushpa Kuhn PA-C  Hospitalist Service  [phone]           Alcohol breath test POCT   Result Value Ref Range    Alcohol Breath Test 0.133 (A) 0.00 - 0.01          Medications Prior to Admission:   Prior to Admission Medications   Prescriptions Last Dose Informant Patient Reported? Taking?   Amphetamine-Dextroamphetamine (ADDERALL XR PO) Past Week at   Unknown time  Yes Yes   Sig: Take 30 mg by mouth 2 times daily   GABAPENTIN PO Past Week at Unknown time  Yes Yes   Sig: Take 800 mg by mouth 4 times daily   amLODIPine (NORVASC) 10 MG tablet More than a month at Unknown   time  No No   Sig: Take 1 tablet (10 mg) by mouth daily   buPROPion (WELLBUTRIN XL) 150 MG 24 hr tablet More than a month   at Unknown time  No No   Sig: Take 1 tablet (150 mg) by mouth every morning   busPIRone (BUSPAR) 10 MG tablet Past Week at Unknown time  No Yes     Sig: Take 2 tablets (20 mg) by mouth 3 times daily   cloNIDine (CATAPRES) 0.1 MG tablet Past Week at Unknown time  No   Yes   Sig: Take 1 tablet (0.1 mg) by mouth 2 times daily   disulfiram (ANTABUSE) 250 MG tablet More than a month at Unknown   time  No No   Sig: Take 1 tablet  (250 mg) by mouth daily   hydrOXYzine (ATARAX) 25 MG tablet Past Week at Unknown time  No   Yes   Sig: Take 2 tablets (50 mg) by mouth every 6 hours as needed for   anxiety   methocarbamol (ROBAXIN) 500 MG tablet More than a month at   Unknown time  No No   Sig: Take 1 tablet (500 mg) by mouth 4 times daily as needed for   muscle spasms   multivitamin, therapeutic with minerals (THERA-VIT-M) TABS Past   Week at Unknown time  No Yes   Sig: Take 1 tablet by mouth daily   omeprazole (PRILOSEC) 40 MG capsule Past Week at Unknown time  No   Yes   Sig: Take 1 capsule (40 mg) by mouth daily   sertraline (ZOLOFT) 100 MG tablet Past Week at Unknown time  No   Yes   Sig: Take 2 tablets (200 mg) by mouth daily   thiamine 100 MG tablet Past Week at Unknown time  No Yes   Sig: Take 1 tablet (100 mg) by mouth daily   traZODone (DESYREL) 100 MG tablet Past Week at Unknown time  No   Yes   Sig: Take 2 tablets (200 mg) by mouth nightly as needed for sleep           Discharge Medications:     Discharge Medication List as of 7/5/2017 12:11 PM      START taking these medications    Details   acetaminophen (TYLENOL) 325 MG tablet Take 2 tablets (650 mg) by mouth every 6 hours as needed for mild pain, Disp-100 tablet, R-0, E-Prescribe         CONTINUE these medications which have CHANGED    Details   busPIRone (BUSPAR) 10 MG tablet Take 2 tablets (20 mg) by mouth 3 times daily, Disp-180 tablet, R-0, E-Prescribe      hydrOXYzine (ATARAX) 25 MG tablet Take 2 tablets (50 mg) by mouth every 6 hours as needed for anxiety, Disp-120 tablet, R-0, E-Prescribe      gabapentin (NEURONTIN) 800 MG tablet Take 1 tablet (800 mg) by mouth 4 times daily, Disp-120 tablet, R-0, E-Prescribe      sertraline (ZOLOFT) 100 MG tablet Take 2 tablets (200 mg) by mouth daily, Disp-60 tablet, R-0, E-Prescribe      traZODone (DESYREL) 100 MG tablet Take 1-2 tablets (100-200 mg) by mouth nightly as needed for sleep, Disp-60 tablet, R-0, E-Prescribe      cloNIDine  "(CATAPRES) 0.1 MG tablet Take 1 tablet (0.1 mg) by mouth 2 times daily, Disp-60 tablet, R-0, E-Prescribe      amLODIPine (NORVASC) 10 MG tablet Take 1 tablet (10 mg) by mouth daily, Disp-30 tablet, R-0, E-Prescribe      multivitamin, therapeutic with minerals (THERA-VIT-M) TABS tablet Take 1 tablet by mouth daily, Disp-30 each, R-3, E-Prescribe      omeprazole (PRILOSEC) 40 MG capsule Take 1 capsule (40 mg) by mouth daily, Disp-30 capsule, R-0, E-Prescribe         STOP taking these medications       Amphetamine-Dextroamphetamine (ADDERALL XR PO) Comments:   Reason for Stopping:         buPROPion (WELLBUTRIN XL) 150 MG 24 hr tablet Comments:   Reason for Stopping:         thiamine 100 MG tablet Comments:   Reason for Stopping:         methocarbamol (ROBAXIN) 500 MG tablet Comments:   Reason for Stopping:         disulfiram (ANTABUSE) 250 MG tablet Comments:   Reason for Stopping:                     Consultations:   Consultation during this admission received from Internal Medicine, as Dr. Brasher was the admitting physician          Brief History of Illness:   Per H&P by Dr. Brasher on 6/29/2017:  \" This is one of several detox for Nickolas who is known to me from previous admits. Chronic alcoholism .  He has been currently binging for the last month and a half and states that he has been drinking a 1.75 liter of vodka a day for that past 2 weeks. The problem first presented itself about 2 years ago when the patient was 24 and has been in and out of treatment since.  He has been drinking alcohol.  He has tolerance, withdrawal, progressive use with loss of control, used despite having negative consequences, health, relationships with family.    He does have depression and takes Zoloft, but he feels even when he is sober his biggest problem is that he does have no motivation and interest.    Alcohol is his drug of choice.  He does have chronic depression even when he is sober.  When he gets depressed, he is sad.  He " "struggles to do simple things and sleeps and his mood goes down, energy goes down, motivation goes down.  He does not have any suicidal or homicidal ideation, denied auditory or visual hallucinations, denied any PTSD.\"          Hospital Course:   Alcohol withdrawal was monitored and managed via MSSA with diazepam. Detoxification course was completed without complication. Had nonmalignant, asymptomatic BP elevations, for which IM consultant made medication adjustments. We further increased gabapentin and decreased nicotine patch dose. BPs reduced, and clonidine dose reduced with discharge medications. He continued to struggle with anxiety, and is very susceptible to reinforcing effects of pill-taking. He wanted to continue taking psychostimulant while on LP, but patiently went through proper channels with Dr. Crews's final review. Dr. Crews spoke with community psychiatrist who did not think stimulant was essential medication, and so it was not approved for use while on LP. Nickolas was okay with this.          Discharge Instructions and Follow-Up:   Discharge diet: Regular   Discharge activity: Activity as tolerated   Discharge follow-up: See AVS           Discharge Disposition:   Discharged, with direct escort to [facility]      Attestation:  I have reviewed today's vital signs, notes, medications, and labs/studies pertinent to this admission.    Dick Wills MD  Pediatrics/Addiction Medicine     "

## 2017-07-04 NOTE — PROGRESS NOTES
"Pt reports the rash he got to his legs/arms from \"sleeping in the woods in the dirt\" for 2 days prior to admission is improving with the use of hydrocortisone cream, states will apply some later today after he showers.  "

## 2017-07-04 NOTE — PROGRESS NOTES
"Addiction Medicine Progress Note          Assessment and Plan:   Assessment:   Principal Problem:    Alcohol dependence with withdrawal (H), out of detoxification status  Active Problems:    Essential hypertension, better controlled with recent changes    Anxiety    Alcohol use disorder, severe, dependence (H)        Plan:   Continue current care  Per discussion with Dr. Crews, he was in contact with patient's psychiatrist and at this time stimulant medication not medically necessary and not approved for time on [facility]  Dispo planning ongoing; see CM notes for details but at this time awaiting bed on [facility]         Interval History:   No acute events. \"I don't know what it is, but now I feel great\". Still with anxiety, but manageable. He appropriately sought out review of stimulant use on [facility], and was fine with the answer he received. \"I just want to make sure I'm doing everything I'm supposed to in taking care of myself.\" No withdrawal concerns. He tolerated reduction of nicotine patch dose without change in mood or cravings. He patiently awaits treatment.            Review of Systems:   Complete ROS performed and is negative except as noted in interval history, and elsewher in this note.           Medications:     Current Facility-Administered Medications   Medication     nicotine Patch in Place     nicotine patch REMOVAL     nicotine (NICODERM CQ) 14 MG/24HR 24 hr patch 1 patch     cloNIDine (CATAPRES) tablet 0.2 mg     hydrocortisone 0.5 % cream     busPIRone (BUSPAR) tablet 20 mg     gabapentin (NEURONTIN) tablet 800 mg     hydrOXYzine (ATARAX) tablet 50 mg     methocarbamol (ROBAXIN) tablet 500 mg     sertraline (ZOLOFT) tablet 200 mg     traZODone (DESYREL) tablet 200 mg     omeprazole (priLOSEC) CR capsule 40 mg     cloNIDine (CATAPRES) tablet 0.1 mg     amLODIPine (NORVASC) tablet 10 mg     folic acid (FOLVITE) tablet 1 mg     multivitamin, therapeutic with minerals (THERA-VIT-M) tablet 1 tablet     " "acetaminophen (TYLENOL) tablet 650 mg     alum & mag hydroxide-simethicone (MYLANTA ES/MAALOX  ES) suspension 30 mL     magnesium hydroxide (MILK OF MAGNESIA) suspension 30 mL     loperamide (IMODIUM) capsule 2 mg     ondansetron (ZOFRAN-ODT) ODT tab 4 mg     naphazoline-pheniramine (NAPHCON-A, VISINE-A) ophthalmic solution 1 drop             Physical Exam:     Vitals were reviewed  Patient Vitals for the past 12 hrs:   BP Temp Temp src Pulse Resp   07/04/17 0737 123/86 97.3  F (36.3  C) Oral 73 16     Constitutional:   Alert, no distress     Eyes:   Anicteric, no injection     ENT:   mmm     Lungs:   no increased work of breathing and clear to auscultation     Cardiovascular:   Well-perfused, no murmur     Neurologic:   No tremor, normal tone, gait and coordination intact     Skin:   No flushing, no jaundice, multiple healing papules on erythematous base, some excoriated, no exudate or other signs superimposed infection     MENTAL STATUS EXAM  Appearance: casual appropriate attire, fair grooming  Attitude: engageable, cooperative  Behavior: no agitation or slowing  Eye Contact: focused on examiner  Speech: coherent, no pressuring  Orientation: oriented to person , place, time and situation  Mood:  \"good\"  Affect: anxious, otherwise bright, full range  Thought Process: goal-directed, no FOI or DIANNA  Suicidal Ideation: denies thought/intent/plan  Hallucination: denies  Insight: partial, capable of continued improvement  Judgment: adequate for safety          Data:   All laboratory data reviewed, no ne interval data    Attestation:  I have reviewed today's vital signs, notes, medications, and labs/studies pertinent to this encounter.    Dick Wills MD  Pediatrics/Addiction Medicine  "

## 2017-07-05 ENCOUNTER — HOSPITAL ENCOUNTER (OUTPATIENT)
Dept: BEHAVIORAL HEALTH | Facility: CLINIC | Age: 27
End: 2017-07-05
Attending: SOCIAL WORKER
Payer: MEDICAID

## 2017-07-05 VITALS
DIASTOLIC BLOOD PRESSURE: 81 MMHG | SYSTOLIC BLOOD PRESSURE: 127 MMHG | HEIGHT: 73 IN | TEMPERATURE: 96.7 F | WEIGHT: 297.31 LBS | HEART RATE: 77 BPM | BODY MASS INDEX: 39.4 KG/M2 | OXYGEN SATURATION: 94 % | RESPIRATION RATE: 16 BRPM

## 2017-07-05 PROCEDURE — 25000132 ZZH RX MED GY IP 250 OP 250 PS 637: Performed by: PHYSICIAN ASSISTANT

## 2017-07-05 PROCEDURE — 25000132 ZZH RX MED GY IP 250 OP 250 PS 637: Performed by: PSYCHIATRY & NEUROLOGY

## 2017-07-05 PROCEDURE — 25000132 ZZH RX MED GY IP 250 OP 250 PS 637: Performed by: FAMILY MEDICINE

## 2017-07-05 PROCEDURE — H2035 A/D TX PROGRAM, PER HOUR: HCPCS

## 2017-07-05 PROCEDURE — 10020000 ZZH LODGING PLUS FACILITY CHARGE ADULT

## 2017-07-05 RX ORDER — LORATADINE 10 MG/1
10 TABLET ORAL DAILY PRN
COMMUNITY
End: 2017-07-29

## 2017-07-05 RX ORDER — AMOXICILLIN 250 MG
2 CAPSULE ORAL DAILY PRN
COMMUNITY
End: 2017-07-29

## 2017-07-05 RX ORDER — MAGNESIUM HYDROXIDE/ALUMINUM HYDROXICE/SIMETHICONE 120; 1200; 1200 MG/30ML; MG/30ML; MG/30ML
30 SUSPENSION ORAL EVERY 6 HOURS PRN
COMMUNITY
End: 2017-07-29

## 2017-07-05 RX ADMIN — MULTIPLE VITAMINS W/ MINERALS TAB 1 TABLET: TAB at 08:35

## 2017-07-05 RX ADMIN — GABAPENTIN 800 MG: 800 TABLET, FILM COATED ORAL at 08:34

## 2017-07-05 RX ADMIN — AMLODIPINE BESYLATE 10 MG: 10 TABLET ORAL at 08:35

## 2017-07-05 RX ADMIN — SERTRALINE HYDROCHLORIDE 200 MG: 100 TABLET ORAL at 08:35

## 2017-07-05 RX ADMIN — BUSPIRONE HYDROCHLORIDE 20 MG: 5 TABLET ORAL at 08:34

## 2017-07-05 RX ADMIN — FOLIC ACID 1 MG: 1 TABLET ORAL at 08:36

## 2017-07-05 RX ADMIN — GABAPENTIN 800 MG: 800 TABLET, FILM COATED ORAL at 12:39

## 2017-07-05 RX ADMIN — METHOCARBAMOL 500 MG: 500 TABLET ORAL at 08:45

## 2017-07-05 RX ADMIN — CLONIDINE HYDROCHLORIDE 0.2 MG: 0.2 TABLET ORAL at 08:35

## 2017-07-05 RX ADMIN — HYDROXYZINE HYDROCHLORIDE 50 MG: 50 TABLET, FILM COATED ORAL at 11:10

## 2017-07-05 RX ADMIN — BUSPIRONE HYDROCHLORIDE 20 MG: 5 TABLET ORAL at 13:47

## 2017-07-05 RX ADMIN — METHOCARBAMOL 500 MG: 500 TABLET ORAL at 12:39

## 2017-07-05 RX ADMIN — OMEPRAZOLE 40 MG: 20 CAPSULE, DELAYED RELEASE ORAL at 08:35

## 2017-07-05 RX ADMIN — NICOTINE 1 PATCH: 14 PATCH, EXTENDED RELEASE TRANSDERMAL at 08:35

## 2017-07-05 RX ADMIN — ACETAMINOPHEN 650 MG: 325 TABLET, FILM COATED ORAL at 08:45

## 2017-07-05 ASSESSMENT — ACTIVITIES OF DAILY LIVING (ADL)
DRESS: SCRUBS (BEHAVIORAL HEALTH)
GROOMING: INDEPENDENT
ORAL_HYGIENE: INDEPENDENT
LAUNDRY: WITH SUPERVISION

## 2017-07-05 NOTE — PROGRESS NOTES
Initial Services Plan        Before your first treatment group, please do the following    Immediate health & safety concerns: Look for sober housing and a supportive social network.  Obtain personal items (glasses, hearing aides, medicines, diabetes supplies, clothing, etc.).  Look for a support network (such as AA, NA, DBT group, a Congregational group, etc.)    Suggestions for client during the time between intake & completion of treatment plan:  Tour your treatment center (unit or outpatient clinic).  Introduce yourself to the treatment group.  Spend time getting to know your peers.  Complete your psycho-social paperwork.  Complete the problem list for your treatment plan.  Start drug and alcohol use history.  Review your patient or client handbook.  Identify concerns about whom to ask for family week    Client issues to be addressed in the first treatment sessions:  Identify motivations(s) for coming to treatment, i.e. legal, family, job, self  Identify concerns about coming into treatment, i.e. fear of failing again, sharing a room in treatment      Abby Soriano RN  7/5/2017  1:21 PM

## 2017-07-05 NOTE — PROGRESS NOTES
Pt given copy of his discharge instructions and medication administration instructions. All discharge plans and labs were discussed with patient. Pt reports no questions at this time regarding discharge plans, labs or medications. Pt denies any suicidal ideation, plans or intent at this time. Patient is slated for a 1400 arrival time in [facility].

## 2017-07-05 NOTE — PROGRESS NOTES
[facility] Nursing Health Assessment    Patient Name:  Nickolas Lang  Date of review:  7/5/2017  Vital signs: Per [facility]    Transfer from [facility]    Counselor: Siobhan  Drug of Choice: Alcohol  Last use: 6/28/2017  [facility] clinic/MD: [facility]   Psychiatrist/therapist: Therapist, Amanda Ness at [facility] in [place], Psychiatrist; Leopoldo Doshi    Medical history/current conditions: Hypertension, GERD; shoulder surgery on L side in August 2016 w/ associated pain    Mental Health diagnosis: ADD, anxiety, & depression  Medication compliant?: Yes  Recent sucidal thoughts? None    When? N/A  Current thought of self-harm? None    Plan? N/A  Pt. Self rating of impulsiveness? (1-10 scale): 8    Pain assessment:   Pt. Experiencing pain at this time? Yes  Rating on 0-10 scale: (1-10 scale): 3  Location:  Chronic  Result of: old injury/surgery  L P pain management strategy: OTC medication  On-going nursing intervention required?   No

## 2017-07-05 NOTE — PROGRESS NOTES
Diet & BMI Assessment     Are you on a special diet?    No   Do you have any concerns regarding your nutritional status?   No   Have you had any appetite changes in the last 3 months?       Yes, If yes explain: no appetite while drinking   Have you had any weight loss or weight gain in the last 3 months?    If yes, how much weight gain or loss: lost 10 pounds    If weight patient gains or loses is more than 10 pounds, refer to primary care provider for assessment.        Yes, If yes explain: not eating while drinking   Was the patient informed of BMI?    Above,  General nutrition education   Yes

## 2017-07-05 NOTE — PROGRESS NOTES
This [facility] patient, or other Residential/[facility] Treatment patient is a categorical Vulnerable Adult according to [place] Statute 626.5572 subdivision 21.    Susceptibility to abuse by others     1.  Have you ever been emotionally abused by anyone?          No    2.  Have you ever been bullied, or physically assaulted by anyone?        No    3.  Have you ever been sexually taken advantage of or sexually assaulted?        No    4.  Have you ever been financially taken advantage of?        No    5.  Have you ever hurt yourself intentionally such as burns or cuts?       No    Risk of abusing other vulnerable adults     1.  Have you ever bullied, berated or emotionally degraded someone else?       No    2.  Have you ever financially taken advantage of someone else?       No    3.  Have you ever sexually exploited or assaulted another person?       No    4.  Have you ever gotten into fights, verbal arguments or physically assaulted someone?          No    Based on the above information:    This [facility] patient, or other Residential/[facility]odging CD Treatment patient is a categorical Vulnerable Adult according to [facility] Statue 626.5572 subdivision 21.

## 2017-07-05 NOTE — PROGRESS NOTES
Patient discharge assisted via staff member Omero AGUILAR directly to [facility]. Patient is discharged at this time.

## 2017-07-06 ENCOUNTER — HOSPITAL ENCOUNTER (OUTPATIENT)
Dept: BEHAVIORAL HEALTH | Facility: CLINIC | Age: 27
End: 2017-07-06
Attending: PSYCHIATRY & NEUROLOGY
Payer: MEDICAID

## 2017-07-06 PROCEDURE — 10020000 ZZH LODGING PLUS FACILITY CHARGE ADULT

## 2017-07-06 PROCEDURE — H2035 A/D TX PROGRAM, PER HOUR: HCPCS | Mod: HQ

## 2017-07-06 RX ORDER — DISULFIRAM 250 MG/1
250 TABLET ORAL DAILY
Status: ON HOLD | COMMUNITY
End: 2017-10-13

## 2017-07-06 NOTE — PROGRESS NOTES
Name: Nickolas Lang  Date: 7/5/2017  Medical Record: [id number]    Envelope Number: [id number]    List of Contents (List each item separately in new row):   1 bottle of eye drops    Admission:  I am responsible for any personal items that are not sent to the safe or pharmacy.  [facility] is not responsible for loss, theft or damage of any property in my possession.      Patient Signature:  ___________________________________________       Date/Time:__________________________    Staff Signature: __________________________________       Date/Time:__________________________    2nd Staff person, if patient is unable/unwilling to sign:      __________________________________________________________       Date/Time: __________________________      Discharge:  [facility] has returned all of my personal belongings:    Patient Signature: ________________________________________     Date/Time: ____________________________________    Staff Signature: ______________________________________     Date/Time:_____________________________________

## 2017-07-07 ENCOUNTER — HOSPITAL ENCOUNTER (OUTPATIENT)
Dept: BEHAVIORAL HEALTH | Facility: CLINIC | Age: 27
End: 2017-07-07
Attending: SOCIAL WORKER
Payer: MEDICAID

## 2017-07-07 PROCEDURE — H2035 A/D TX PROGRAM, PER HOUR: HCPCS | Mod: HQ

## 2017-07-07 PROCEDURE — 10020000 ZZH LODGING PLUS FACILITY CHARGE ADULT

## 2017-07-07 NOTE — PROGRESS NOTES
Name: Nickolas Lang  Date: 7/7/2017  Medical Record: [id number]    Envelope Number: [id number]    List of Contents (List each item separately in new row):   Calcium Carbonate Tums Antacid(Assorted Berries)  10 Capsules of Loperamide HCL 2 MG.    Admission:  I am responsible for any personal items that are not sent to the safe or pharmacy.  [facility] is not responsible for loss, theft or damage of any property in my possession.      Patient Signature:  ___________________________________________       Date/Time:__________________________    Staff Signature: __________________________________       Date/Time:__________________________    [facility] Staff person, if patient is unable/unwilling to sign:      __________________________________________________________       Date/Time: __________________________      Discharge:  [facility] has returned all of my personal belongings:    Patient Signature: ________________________________________     Date/Time: ____________________________________    Staff Signature: ______________________________________     Date/Time:_____________________________________

## 2017-07-07 NOTE — PROGRESS NOTES
Comprehensive Assessment Summary     Based on client interview, review of previous assessments and   comprehensive assessment interview the following diagnosis and recommendations are:     Patient: Nickolas Lang  MRN; [id number]   : 1990  Age: 26 year old Sex: male       Client meets criteria for:  303.90/F10.20 Alcohol Use Disorder, Severe;  305.10/F17.20 Tobacco Use Disorder, Severe.    Dimension One: Acute Intoxication/Withdrawal Potential     Ratin  (Consider the client's ability to cope with withdrawal symptoms and current state of intoxication)     Pt's JONY date of alcohol: 17.    Dimension Two: Biomedical Condition and Complications    Ratin  (Consider the degree to which any physical disorder would interfere with treatment for substance abuse, and the client's ability to tolerate any related discomfort; determine the impact of continued chemical use on the unborn child if the client is pregnant)     Pt has a medical diagnosis of hypertension, GERD, and ongoing pain related to a shoulder surgery in .     Dimension Three: Emotional/Behavioral/Cognitive Conditions & Complications  Ratin  (Determine the degree to which any condition or complications are likely to interfere with treatment for substance abuse or with functioning in significant life areas and the likelihood of risk of harm to self or others)     Pt reports a diagnosis of depression, anxiety, and ADHD. Pt reports he is on medications for his mental health and sees a therapist, Amanda Ness and a psychiatrist, Leopoldo Lovell, both through [facility]. Pt participated in a suicide risk screening during his CD assessment and his risk rating is low/no current risk. Pt denies any SI/SA/HI/HA. Pt will continue to by monitored throughout his treatment for changes in this risk level. Pt will also complete a Safety Plan Template and return it to staff. Pt reports that he was raised by his mother and  was an only child. Pt denies a hx of abuse. Pt reports that his father has substance use problems. Pt lacks impulse control.     Dimension Four: Treatment Acceptance/Resistance     Ratin  (Consider the amount of support and encouragement necessary to keep the client involved in treatment)     Pt reports that he wants to be in treatment and requested to come back to the LP program, but he also has external motivators such as legal issues and being homeless. Pt has a hx of not following through on his tx recommendations and has not remained sober. Pt reports that people in his life are concerned about him and have verbalized this to him. Pt reports that he believes his depression, anxiety, and ADHD are the problem for him and lacks insight into his addiction and the effects of alcohol on his mental health. Pt reports that he hasn't always worked a recovery program and that this has contributed to not being successful in remaining sober.     Dimension Five: Continued Use/Relaspe Prevention     Ratin  (Consider the degree to which the client's recognizes relapse issues and has the skills to prevent relapse of either substance use or mental health problems)     Pt reports he has a hx of being in treatment eight previous times, but that he has only completed four of those programs. Pt reports he has been in detox seventeen previous times. Pt reports that he has had some attendance at AA, but has not been attending recently. Pt reports that he is willing to attend AA again and that he has found meetings to be helpful in the past. Pt reports having minimal sober time and has tried to quit drinking in the past, but relapsed. Pt lacks insight into the effects his use has had on his physical and mental health. Pt lacks insight into his personal relapse process along with his relapse warning signs and triggers.     Dimension Six: Recovery Environment     Ratin  (Consider the degree to which key areas of the  client's life are supportive of or antagonistic to treatment participation and recovery)     Pt reports that he is recently unemployed. Pt reported that he was working at [employer], and that he lost his job/had to resign because of his alcoholism and health issues related to his alcoholism. Pt reports that when working he would work, but still drink during the day while working and would drink after work. Now that pt is unemployed, he lacks meaningful activity and structure in his day. Pt reports that he is in a significant/romantic relationship and has been with her for the last five months. Pt reports that he had been living with his mother and grandmother, and that he is not sure if he should return there following tx. Pt denies having any children. Pt is currently on probation with [facility] for a DUI. Pt lacks a sober support network.     I have reviewed the information on the assessment, psychosocial and medical history and checklist and the following changes have been made: The diagnosis of 305.10/F17.20 Tobacco Use Disorder, Severe, has been added to pt's diagnoses.

## 2017-07-08 ENCOUNTER — HOSPITAL ENCOUNTER (OUTPATIENT)
Dept: BEHAVIORAL HEALTH | Facility: CLINIC | Age: 27
End: 2017-07-08
Attending: SOCIAL WORKER
Payer: MEDICAID

## 2017-07-08 PROCEDURE — 10020000 ZZH LODGING PLUS FACILITY CHARGE ADULT

## 2017-07-08 PROCEDURE — H2035 A/D TX PROGRAM, PER HOUR: HCPCS | Mod: HQ

## 2017-07-08 PROCEDURE — H2035 A/D TX PROGRAM, PER HOUR: HCPCS

## 2017-07-08 NOTE — PROGRESS NOTES
Acknowledgement of Current Treatment Plan       I have reviewed my treatment plan with my therapist / counselor on 7/8/17. I agree with the plan as it is written in the electronic health record.    Name Signature   Nickolas Lang    Name of Therapist / Counselor    ARLENE Green

## 2017-07-09 ENCOUNTER — HOSPITAL ENCOUNTER (OUTPATIENT)
Dept: BEHAVIORAL HEALTH | Facility: CLINIC | Age: 27
End: 2017-07-09
Attending: SOCIAL WORKER
Payer: MEDICAID

## 2017-07-09 PROCEDURE — H2035 A/D TX PROGRAM, PER HOUR: HCPCS | Mod: HQ

## 2017-07-09 PROCEDURE — 10020000 ZZH LODGING PLUS FACILITY CHARGE ADULT

## 2017-07-09 NOTE — PROGRESS NOTES
7/9/2017  Patient attended and actively participated in Addiction in Pregnancy/ STDs lecture/workshop today facilitated by Stephania Swenson RN. Lecture/ workshop supervised by this writer.  ARLENE Love

## 2017-07-09 NOTE — PROGRESS NOTES
Name: Nickolas Lang  Date: 7/9/2017  Medical Record: [id number]    Envelope Number: [id number]    List of Contents (List each item separately in new row):   1 bottle of Sertraline HCL 100MG Tabs, 1 bottle of Gapabentin 800MG Tabs,  1 bottle of Trazodone HCL 100MG Tabs, 1 bottle of Omeprazole 40MG CPDR.    Admission:  I am responsible for any personal items that are not sent to the safe or pharmacy.  [facility] is not responsible for loss, theft or damage of any property in my possession.      Patient Signature:  ___________________________________________       Date/Time:__________________________    Staff Signature: __________________________________       Date/Time:__________________________    [facility] Staff person, if patient is unable/unwilling to sign:      __________________________________________________________       Date/Time: __________________________      Discharge:  [facility] has returned all of my personal belongings:    Patient Signature: ________________________________________     Date/Time: ____________________________________    Staff Signature: ______________________________________     Date/Time:_____________________________________

## 2017-07-10 ENCOUNTER — HOSPITAL ENCOUNTER (OUTPATIENT)
Dept: BEHAVIORAL HEALTH | Facility: CLINIC | Age: 27
End: 2017-07-10
Attending: SOCIAL WORKER
Payer: MEDICAID

## 2017-07-10 PROCEDURE — 10020000 ZZH LODGING PLUS FACILITY CHARGE ADULT

## 2017-07-10 PROCEDURE — H2035 A/D TX PROGRAM, PER HOUR: HCPCS | Mod: HQ

## 2017-07-11 ENCOUNTER — HOSPITAL ENCOUNTER (OUTPATIENT)
Dept: BEHAVIORAL HEALTH | Facility: CLINIC | Age: 27
End: 2017-07-11
Attending: SOCIAL WORKER
Payer: MEDICAID

## 2017-07-11 PROCEDURE — H2035 A/D TX PROGRAM, PER HOUR: HCPCS | Mod: HQ

## 2017-07-11 PROCEDURE — 10020000 ZZH LODGING PLUS FACILITY CHARGE ADULT

## 2017-07-12 ENCOUNTER — OFFICE VISIT (OUTPATIENT)
Dept: ADDICTION MEDICINE | Facility: CLINIC | Age: 27
End: 2017-07-12
Payer: MEDICAID

## 2017-07-12 ENCOUNTER — HOSPITAL ENCOUNTER (OUTPATIENT)
Dept: BEHAVIORAL HEALTH | Facility: CLINIC | Age: 27
End: 2017-07-12
Attending: SOCIAL WORKER
Payer: MEDICAID

## 2017-07-12 VITALS
DIASTOLIC BLOOD PRESSURE: 96 MMHG | SYSTOLIC BLOOD PRESSURE: 138 MMHG | RESPIRATION RATE: 14 BRPM | TEMPERATURE: 97.2 F | WEIGHT: 315 LBS | OXYGEN SATURATION: 96 % | HEART RATE: 75 BPM | BODY MASS INDEX: 42.09 KG/M2

## 2017-07-12 DIAGNOSIS — R74.8 ELEVATED LIVER ENZYMES: ICD-10-CM

## 2017-07-12 DIAGNOSIS — F41.9 ANXIETY: Chronic | ICD-10-CM

## 2017-07-12 DIAGNOSIS — F10.20 ALCOHOL USE DISORDER, SEVERE, DEPENDENCE (H): Primary | Chronic | ICD-10-CM

## 2017-07-12 DIAGNOSIS — F17.200 NICOTINE USE DISORDER: ICD-10-CM

## 2017-07-12 LAB
AMPHETAMINES UR QL: ABNORMAL NG/ML
BARBITURATES UR QL SCN: ABNORMAL NG/ML
BENZODIAZ UR QL SCN: ABNORMAL NG/ML
BUPRENORPHINE UR QL: ABNORMAL NG/ML
CANNABINOIDS UR QL: ABNORMAL NG/ML
COCAINE UR QL SCN: ABNORMAL NG/ML
D-METHAMPHET UR QL: ABNORMAL NG/ML
METHADONE UR QL SCN: ABNORMAL NG/ML
OPIATES UR QL SCN: ABNORMAL NG/ML
OXYCODONE UR QL SCN: ABNORMAL NG/ML
PCP UR QL SCN: ABNORMAL NG/ML
PROPOXYPH UR QL: ABNORMAL NG/ML
TRICYCLICS UR QL SCN: ABNORMAL NG/ML

## 2017-07-12 PROCEDURE — 10020000 ZZH LODGING PLUS FACILITY CHARGE ADULT

## 2017-07-12 PROCEDURE — H2035 A/D TX PROGRAM, PER HOUR: HCPCS | Mod: HQ

## 2017-07-12 PROCEDURE — 99205 OFFICE O/P NEW HI 60 MIN: CPT | Performed by: PEDIATRICS

## 2017-07-12 PROCEDURE — 80306 DRUG TEST PRSMV INSTRMNT: CPT | Performed by: PEDIATRICS

## 2017-07-12 NOTE — PROGRESS NOTES
"        Adult CD Progress Note and Treatment Plan Review     Attendance  Please refer to [facility] CD Adult Attendance Record Documentation Flowsheet    Support group attended this week: yes    Reporting sobriety:  yes    Treatment Plan     Treatment Plan Review competed on: 7/12/2017      Client preferred learning style: Hands on  Verbal    Staff Members contributing: Ezekiel Evans, Lead Counselor, [facility]; Amish Murillo, [facility] Kacey Haynes [facility]                        Received Supervision: No    Client: contributed to goals and plan.    Client received copy of plan/revised plan: N/A    Client agrees with plan/revised plan: Yes    Changes to Treatment Plan: No    New Goals added since last review: None      Goals worked on since last review: See below Dimensions/risk ratings.    Strategies effective: yes    Strategies need these changes: None    ASAM Risk Rating:    Dimension 1 0 No changes    Dimension 2 1 No changes    Dimension 3 1 Pt.is staying med compliant.Pt.reported no suicidal ideation this week.Pt.completed and shared in group his \"Anxiety and Recovery From Chemical Dependency assignment.Pt.continues to work on \"Forgiveness\"assignment.    Dimension 4 2 Pt.needs to increase his internal motivation to change his life style to maintain sobriety.Pt.completed his first step assignment and shared it in group.    Dimension 5 4 Pt.attended the relapse prevention workshop.He was asked to identify his relapse warning signs and triggers in the areas of people,places,activities and feelings.Pt.will continue to work in this area.Pt.lacks insight into his high risk using situations.Pt.continues to work on \"Quitting Alcohol\"workbook.      Dimension 6 4 Pt.is attending 5 12 step meetings per week while in treatment.Pt.needs to obtain a sponsor.Pt.invited his family to participate in the family program.    Any changes in Vulnerable Adult Status?  No  If yes, add to treatment plan and individual abuse prevention " plan.    Family Involvement:   ALFONSO signed    Data:   offered feedback, good insight, client did actively participate  Patient continues to be active in group therapy.      Intervention:   Counselor feedback  Group feedback    Assessment:   Stages of Change Model  Action    Appears/Sounds:  Cooperative  Motivated  Engaged      Plan:  Continue group therapy.

## 2017-07-12 NOTE — PROGRESS NOTES
SUBJECTIVE:                                                        Nickolas Lang is a 26 year old male who presents for  initial visit for addiction consultation and management referred by [facility].     HPI:  Patient is here today to consider Vivitrol.   He has been struggling with heavy alcohol use over past 2 yr.    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  Sober around 90 days.  Celebrated having new job which quickly led to  2 mo of heaving drinking up to current detox.  Kicked out of mother's house due to drinking , slept outside for several days still drinking and finally walked home.    3 A 6/28 to 7/5 Treated with Valium.  No hx of seizures.     Currently on Lodging plus.  Still having alcohol craving.       CD History:     DRUG OF CHOICE - Alcohol       Started age 16.  Some binge during college.  Use escalated over period of about 2 mo.  Lost a friend in car accident, mother dx with MS, broke up with girlfriend and lost job.  Then use intensified to point of 1.75 L daily.     Last 2 yr use increased to point of unmanageable and trying to get help.  Withdrawal if not drinking.   Detox 18 + times and some ED visits.       LONGEST PERIOD OF SOBRIETY-90 days.     PREVIOUS DETOX/TREATMENT PROGRAMS-Four at [facility].  [facility]. [facility] and [facility]     HISTORY OF OVERDOSE-None    PREVIOUS MEDICATION ASSISTED TREATMENT:  rx antabuse in past but didn't take.       Other Substances:    ALCOHOL-see above  MARIJUANA-used and sold early 20's -recent started in [place]  COCAINE/CRACK-none  rx Adderall-no hx of misuse.    METH/AMPHETAMINES-none  OPIATES-none  BENZODIAZEPINES-only for withdrawal treatment    NICOTINE-smoke in treatment    Desire to quit wants to        Previous attempts to quit   Will try.          Hx of medication for smoking cessation   none      PAST PSYCHIATRIC HISTORY   Will be talking psychiatry about ADHD.         Patient Active Problem List    Diagnosis  "Date Noted     Alcohol use disorder, severe, dependence (H) 06/28/2017     Priority: Medium     Attention deficit hyperactivity disorder (ADHD), unspecified ADHD type 12/29/2016     Priority: Medium     Alcohol abuse with alcohol-induced mood disorder (H) 08/19/2016     Priority: Medium     Insomnia, unspecified type 08/05/2016     Priority: Medium     Anxiety 08/05/2016     Priority: Medium     Gastroesophageal reflux disease without esophagitis 08/05/2016     Priority: Medium     Obesity      Priority: Medium     Alcohol abuse 02/11/2016     Priority: Medium     Hepatic steatosis 10/15/2015     Priority: Medium     Elevated liver enzymes 10/07/2015     Priority: Medium     Essential hypertension 09/23/2015     Priority: Medium     Suicidal ideation 09/23/2015     Priority: Medium     This occurred while intoxicated.  Pt has had Psych eval and has been \"cleared\" for discharge.  He contracts for safety.       Chemical dependency (H) 05/10/2015     Priority: Medium     Alcohol dependence with withdrawal (H) 04/14/2015     Priority: Medium     Morbid obesity (H) 07/24/2012     Priority: Medium       Problem list and histories reviewed & adjusted, as indicated.  Additional history: as documented     Past Medical History:   Diagnosis Date     ADD (attention deficit disorder)      Alcohol abuse      Anxiety     gabapentin helps the most      GERD (gastroesophageal reflux disease)      Hepatic steatosis      Hypertension      Obesity      Pyloric stenosis     s/p pyloromyotomy as an infant       Past Surgical History:   Procedure Laterality Date     Deviated septum repair       PYLOROMYOTOMY SURGERY  as an infant      SHOULDER SURGERY Left 07/29/2016    Removal of cartilage         Family History   Problem Relation Age of Onset     Neurologic Disorder Mother      Multiple Sclerosis     Depression Mother      Anxiety Disorder Mother      Alcohol/Drug Father      Substance Abuse Father      Alcohol/Drug Paternal Grandfather "      Depression Maternal Grandmother      Anxiety Disorder Maternal Grandmother      Substance Abuse Maternal Grandfather      Hypertension Maternal Grandmother          Social History     Social History Narrative    Lives at home until kicked out.  Family supportive.           Current Outpatient Prescriptions   Medication Sig Dispense Refill     disulfiram (ANTABUSE) 250 MG tablet Take 250 mg by mouth daily       IBUPROFEN PO Take 200-400 mg by mouth every 6 hours as needed for moderate pain       alum & mag hydroxide-simethicone (MYLANTA/MAALOX) 200-200-20 MG/5ML SUSP suspension Take 30 mLs by mouth every 6 hours as needed for indigestion       guaiFENesin (ROBITUSSIN) 20 mg/mL SOLN solution Take 10 mLs by mouth every 4 hours as needed for cough       phenol-menthol (CEPASTAT) 14.5 MG lozenge Place 1 lozenge inside cheek every 2 hours as needed for moderate pain       loratadine (CLARITIN) 10 MG tablet Take 10 mg by mouth daily as needed for allergies       senna-docusate (SENOKOT-S;PERICOLACE) 8.6-50 MG per tablet Take 2 tablets by mouth daily as needed for constipation       MELATONIN PO Take 3 mg by mouth nightly as needed       acetaminophen (TYLENOL) 325 MG tablet Take 2 tablets (650 mg) by mouth every 6 hours as needed for mild pain 100 tablet 0     busPIRone (BUSPAR) 10 MG tablet Take 2 tablets (20 mg) by mouth 3 times daily 180 tablet 0     hydrOXYzine (ATARAX) 25 MG tablet Take 2 tablets (50 mg) by mouth every 6 hours as needed for anxiety 120 tablet 0     gabapentin (NEURONTIN) 800 MG tablet Take 1 tablet (800 mg) by mouth 4 times daily 120 tablet 0     sertraline (ZOLOFT) 100 MG tablet Take 2 tablets (200 mg) by mouth daily 60 tablet 0     traZODone (DESYREL) 100 MG tablet Take 1-2 tablets (100-200 mg) by mouth nightly as needed for sleep 60 tablet 0     cloNIDine (CATAPRES) 0.1 MG tablet Take 1 tablet (0.1 mg) by mouth 2 times daily 60 tablet 0     amLODIPine (NORVASC) 10 MG tablet Take 1 tablet (10  mg) by mouth daily 30 tablet 0     multivitamin, therapeutic with minerals (THERA-VIT-M) TABS tablet Take 1 tablet by mouth daily 30 each 3     omeprazole (PRILOSEC) 40 MG capsule Take 1 capsule (40 mg) by mouth daily 30 capsule 0         No Known Allergies      [place] Board of Pharmacy Data Base Reviewed:    YES; no concerns.       REVIEW OF SYSTEMS:    General: No acute withdrawal symptoms.  No recent infections or fever  Eyes: Negative for vision changes or eye problems  ENT: No problems with ears, nose or throat.  No difficulty swallowing.  Resp: No coughing, wheezing or shortness of breath  CV: No chest pains or palpitations  GI: No nausea, vomiting, abdominal pain, diarrhea, constipation  : No urinary frequency or dysuria,     Musculoskeletal: No significant muscle or joint pains, No edema  Neurologic: No numbness, tingling, weakness, problems with balance or coordination  Psychiatric: some trouble with focus/inattention.   Skin: No rashes        OBJECTIVE:                                                      EXAM    Blood pressure (!) 138/96, pulse 75, temperature 97.2  F (36.2  C), temperature source Oral, resp. rate 14, weight (!) 319 lb (144.7 kg), SpO2 96 %.    GENERAL APPEARANCE: healthy, alert and no distress  EYES: Eyes grossly normal to inspection, PERRL and no nystagmus   HENT: ear canals and TM's normal, nose and mouth without ulcers or lesions and normal cephalic/atraumatic  NECK: no adenopathy, thyromegaly or masses  RESP: lungs clear to auscultation - no rales, rhonchi or wheezes and no resp distress  CV: regular rates and rhythm, normal S1 S2, no S3 or S4 and no murmur, click or rub  ABDOMEN: soft, nontender, without hepatosplenomegaly or masses  MS: extremities normal- no gross deformities noted, gait normal, peripheral pulses normal and no edema  SKIN: no rashes, no jaundice, no obvious masses.   NEURO: Normal strength and tone, sensory exam grossly normal, no tremor  MENTAL STATUS  EXAM:  Appearance/Behavior: No apparent distress  Speech: Normal  Mood/Affect: anxiety  Insight: Adequate                   Liver Function Studies -   Recent Labs   Lab Test  06/30/17   0734   PROTTOTAL  6.0*   ALBUMIN  2.9*   BILITOTAL  1.0   ALKPHOS  109   AST  183*   ALT  138*         ASSESSMENT/PLAN:  (F10.20) Alcohol use disorder, severe, dependence (H)  (primary encounter diagnosis)  Comment: Interested in vivitrol.  Plan: Urine Drugs of Abuse Screen Panel 13        Medications for alcohol dependence reviewed.  Disulfiram, Acamprosate and Naltrexone/Vivitrol all reviewed.  Risk, benefit, side effects and intended purposes of each reviewed.   Effect of Naltrexont/Vivitrol with opioid use reviewed.   Patient is interested in vivitrol.  Will need to monitor LFT with recent elevation (< 5 x nl)     Encourage continued treatment /sober living.   Encourage meeting attendance and sponsorship      (R74.8) Elevated liver enzymes  Plan: will need follow up with starting Vivitrol.  C/w Etoh use.     (F41.9) Anxiety/HX of ADHD  Plan: follow up with psychiatry Lodging plus    (F17.200) Nicotine use disorder  Plan: Encouraged Abstinence.  Increase risk of relapse with other substances with return to nicotine use discussed.  Risk of Ecig/Vaping also reviewed.    Chantix offered and discussed/ patient will continue.               ENCOUNTER FOR LONG TERM USE OF HIGH RISK MEDICATION   High Risk Drug Monitoring?  YES   Drug being monitored: vivitrol   Reason for drug: Alcohol Use Disorder   What is being monitored?: Dosage, Cravings, Triggers and side effects         Delilah Luz MD  [facility]

## 2017-07-12 NOTE — MR AVS SNAPSHOT
After Visit Summary   7/12/2017    Nickolas Lang    MRN: [id number]           Patient Information     Date Of Birth          1990        Visit Information        Provider Department      7/12/2017 3:00 PM Delilah Luz MD [facility]        Today's Diagnoses     Alcohol use disorder, severe, dependence (H)    -  1    Elevated liver enzymes        Anxiety        Nicotine use disorder           Follow-ups after your visit        Your next 10 appointments already scheduled     Jul 14, 2017  7:15 AM CDT   Treatment with ADULT LODGING PLUS B   [facility] ([facility])    [address] [place] [address]   [phone]            Jul 15, 2017  7:15 AM CDT   Treatment with ADULT LODGING PLUS B   [facility] ([facility])    [address] [place] [address]   [phone]            Jul 16, 2017  7:15 AM CDT   Treatment with ADULT LODGING PLUS B   [facility] ([facility])    [address] [place] [address]   [phone]            Jul 17, 2017  7:15 AM CDT   Treatment with ADULT LODGING PLUS B   [facility] ([facility])    [address] [place] [address]   [phone]            Jul 17, 2017  8:30 AM CDT   Level 1 with UR CH 04   [facility] ([facility])    [address] [place] [address] S.  [address]  [facility] [address]   [phone]            Jul 18, 2017  7:15 AM CDT   Treatment with ADULT LODGING PLUS B   [facility] ([facility])    [address] [place] [address]   [phone]            Jul 19, 2017  7:15 AM  CDT   Treatment with ADULT LODGING PLUS B   [facility] ([facility])    [facility] [address] [address]   [phone]            Jul 20, 2017  7:15 AM CDT   Treatment with ADULT LODGING PLUS B   [facility] ([facility])    [address] [address]   [phone]            Jul 21, 2017  7:15 AM CDT   Treatment with ADULT LODGING PLUS B   [facility] ([facility])    [facility] [address] [address]   [phone]            Jul 22, 2017  7:15 AM CDT   Treatment with ADULT LODGING PLUS B   [facility] ([facility])    [address] [place] [address]   [phone]              Future tests that were ordered for you today     Open Standing Orders        Priority Remaining Interval Expires Ordered    Urine Drugs of Abuse Screen Panel 13 Routine 11/12 7/12/2018 7/12/2017            Who to contact     If you have questions or need follow up information about today's clinic visit or your schedule please contact [facility] directly at [phone].  Normal or non-critical lab and imaging results will be communicated to you by Wanovahart, letter or phone within 4 business days after the clinic has received the results. If you do not hear from us within 7 days, please contact the clinic through Wanovahart or phone. If you have a critical or abnormal lab result, we will notify you by phone as soon as possible.  Submit refill requests through [employer]uFocus or call your pharmacy and they will forward the refill request to us. Please allow 3 business days for your refill to be completed.          Additional Information About Your Visit        AcuFocus Information     [employer]Focus lets you send  "messages to your doctor, view your test results, renew your prescriptions, schedule appointments and more. To sign up, go to [email or web address] . Click on \"Log in\" on the left side of the screen, which will take you to the Welcome page. Then click on \"Sign up Now\" on the right side of the page.     You will be asked to enter the access code listed below, as well as some personal information. Please follow the directions to create your username and password.     Your access code is: [email or web address]  Expires: 8/15/2017  5:29 PM     Your access code will  in 90 days. If you need help or a new code, please call your [facility] clinic or [phone].        Care EveryWhere ID     This is your [facility] EveryWhere ID. This could be used by other organizations to access your [facility] medical records  [id number]        Your Vitals Were     Pulse Temperature Respirations Pulse Oximetry BMI (Body Mass Index)       75 97.2  F (36.2  C) (Oral) 14 96% 42.09 kg/m2        Blood Pressure from Last 3 Encounters:   17 (!) 138/96   17 127/81   17 (!) 153/99    Weight from Last 3 Encounters:   17 (!) 319 lb (144.7 kg)   17 297 lb 5 oz (134.9 kg)   17 290 lb (131.5 kg)              We Performed the Following     Urine Drugs of Abuse Screen Panel 13        Primary Care Provider Office Phone # Fax #    ROXI Santiago [phone]       [facility] [address] [place] [address]        Equal Access to Services     CATHY LOZADA AH: Hadii breezy Petersen, waaxda luqadaha, qaybta kaalmada sukhwinderyada, shawna barfield. So [facility] [phone].    ATENCIÓN: Si habla español, tiene a miller disposición servicios gratuitos de asistencia lingüística. Llame al [phone].    We comply with applicable federal civil rights laws and [place] laws. We do not discriminate on the basis of race, color, national origin, age, disability sex, " sexual orientation or gender identity.            Thank you!     Thank you for choosing [facility]  for your care. Our goal is always to provide you with excellent care. Hearing back from our patients is one way we can continue to improve our services. Please take a few minutes to complete the written survey that you may receive in the mail after your visit with us. Thank you!             Your Updated Medication List - Protect others around you: Learn how to safely use, store and throw away your medicines at www.disposemymeds.org.          This list is accurate as of: 7/12/17 11:59 PM.  Always use your most recent med list.                   Brand Name Dispense Instructions for use Diagnosis    acetaminophen 325 MG tablet    TYLENOL    100 tablet    Take 2 tablets (650 mg) by mouth every 6 hours as needed for mild pain    Alcohol use disorder, severe, dependence (H)       alum & mag hydroxide-simethicone 200-200-20 MG/5ML Susp suspension    MYLANTA/MAALOX     Take 30 mLs by mouth every 6 hours as needed for indigestion        amLODIPine 10 MG tablet    NORVASC    30 tablet    Take 1 tablet (10 mg) by mouth daily    Essential hypertension with goal blood pressure less than 140/90       busPIRone 10 MG tablet    BUSPAR    180 tablet    Take 2 tablets (20 mg) by mouth 3 times daily    Anxiety       cloNIDine 0.1 MG tablet    CATAPRES    60 tablet    Take 1 tablet (0.1 mg) by mouth 2 times daily    Essential hypertension with goal blood pressure less than 140/90       disulfiram 250 MG tablet    ANTABUSE     Take 250 mg by mouth daily        gabapentin 800 MG tablet    NEURONTIN    120 tablet    Take 1 tablet (800 mg) by mouth 4 times daily    Anxiety, Alcohol use disorder, severe, dependence (H)       guaiFENesin 20 mg/mL Soln solution    ROBITUSSIN     Take 10 mLs by mouth every 4 hours as needed for cough        hydrOXYzine 25 MG tablet    ATARAX    120 tablet    Take 2 tablets (50 mg)  by mouth every 6 hours as needed for anxiety    Alcohol dependence with uncomplicated intoxication (H), Anxiety       IBUPROFEN PO      Take 200-400 mg by mouth every 6 hours as needed for moderate pain        loratadine 10 MG tablet    CLARITIN     Take 10 mg by mouth daily as needed for allergies        MELATONIN PO      Take 3 mg by mouth nightly as needed        multivitamin, therapeutic with minerals Tabs tablet     30 each    Take 1 tablet by mouth daily    Essential hypertension, Alcohol use disorder, severe, dependence (H)       omeprazole 40 MG capsule    priLOSEC    30 capsule    Take 1 capsule (40 mg) by mouth daily    Acute alcoholic intoxication in alcoholism, uncomplicated (H), Alcohol use disorder, severe, dependence (H)       phenol-menthol 14.5 MG lozenge      Place 1 lozenge inside cheek every 2 hours as needed for moderate pain        senna-docusate 8.6-50 MG per tablet    SENOKOT-S;PERICOLACE     Take 2 tablets by mouth daily as needed for constipation        sertraline 100 MG tablet    ZOLOFT    60 tablet    Take 2 tablets (200 mg) by mouth daily    Anxiety       traZODone 100 MG tablet    DESYREL    60 tablet    Take 1-2 tablets (100-200 mg) by mouth nightly as needed for sleep    Alcohol use disorder, severe, dependence (H), Anxiety

## 2017-07-13 ENCOUNTER — HOSPITAL ENCOUNTER (OUTPATIENT)
Dept: BEHAVIORAL HEALTH | Facility: CLINIC | Age: 27
End: 2017-07-13
Attending: SOCIAL WORKER
Payer: MEDICAID

## 2017-07-13 PROCEDURE — H2035 A/D TX PROGRAM, PER HOUR: HCPCS | Mod: HQ

## 2017-07-13 PROCEDURE — 10020000 ZZH LODGING PLUS FACILITY CHARGE ADULT

## 2017-07-14 ENCOUNTER — HOSPITAL ENCOUNTER (OUTPATIENT)
Dept: BEHAVIORAL HEALTH | Facility: CLINIC | Age: 27
End: 2017-07-14
Attending: SOCIAL WORKER
Payer: MEDICAID

## 2017-07-14 PROCEDURE — H2035 A/D TX PROGRAM, PER HOUR: HCPCS | Mod: HQ

## 2017-07-14 PROCEDURE — 10020000 ZZH LODGING PLUS FACILITY CHARGE ADULT

## 2017-07-15 ENCOUNTER — HOSPITAL ENCOUNTER (OUTPATIENT)
Dept: BEHAVIORAL HEALTH | Facility: CLINIC | Age: 27
End: 2017-07-15
Attending: SOCIAL WORKER
Payer: MEDICAID

## 2017-07-15 PROCEDURE — H2035 A/D TX PROGRAM, PER HOUR: HCPCS | Mod: HQ

## 2017-07-15 PROCEDURE — 10020000 ZZH LODGING PLUS FACILITY CHARGE ADULT

## 2017-07-16 ENCOUNTER — HOSPITAL ENCOUNTER (OUTPATIENT)
Dept: BEHAVIORAL HEALTH | Facility: CLINIC | Age: 27
End: 2017-07-16
Attending: SOCIAL WORKER
Payer: MEDICAID

## 2017-07-16 PROCEDURE — 10020000 ZZH LODGING PLUS FACILITY CHARGE ADULT

## 2017-07-16 PROCEDURE — H2035 A/D TX PROGRAM, PER HOUR: HCPCS | Mod: HQ

## 2017-07-17 ENCOUNTER — INFUSION THERAPY VISIT (OUTPATIENT)
Dept: INFUSION THERAPY | Facility: CLINIC | Age: 27
End: 2017-07-17
Attending: PEDIATRICS
Payer: MEDICAID

## 2017-07-17 ENCOUNTER — HOSPITAL ENCOUNTER (OUTPATIENT)
Dept: BEHAVIORAL HEALTH | Facility: CLINIC | Age: 27
End: 2017-07-17
Attending: SOCIAL WORKER
Payer: MEDICAID

## 2017-07-17 ENCOUNTER — TELEPHONE (OUTPATIENT)
Dept: INFUSION THERAPY | Facility: CLINIC | Age: 27
End: 2017-07-17

## 2017-07-17 VITALS
DIASTOLIC BLOOD PRESSURE: 78 MMHG | HEART RATE: 86 BPM | OXYGEN SATURATION: 98 % | TEMPERATURE: 97.9 F | RESPIRATION RATE: 20 BRPM | SYSTOLIC BLOOD PRESSURE: 113 MMHG

## 2017-07-17 DIAGNOSIS — F10.20 ALCOHOL USE DISORDER, SEVERE, DEPENDENCE (H): Primary | ICD-10-CM

## 2017-07-17 PROCEDURE — 10020000 ZZH LODGING PLUS FACILITY CHARGE ADULT

## 2017-07-17 PROCEDURE — 25000128 H RX IP 250 OP 636: Performed by: PEDIATRICS

## 2017-07-17 PROCEDURE — H2035 A/D TX PROGRAM, PER HOUR: HCPCS | Mod: HQ

## 2017-07-17 PROCEDURE — 96372 THER/PROPH/DIAG INJ SC/IM: CPT

## 2017-07-17 RX ADMIN — NALTREXONE 380 MG: KIT at 09:39

## 2017-07-17 ASSESSMENT — PAIN SCALES - GENERAL: PAINLEVEL: SEVERE PAIN (6)

## 2017-07-17 NOTE — MR AVS SNAPSHOT
After Visit Summary   7/17/2017    Nickolas Lang    MRN: [id number]           Patient Information     Date Of Birth          1990        Visit Information        Provider Department      7/17/2017 8:30 AM UR  [facility], [place], Infusion Services        Today's Diagnoses     Alcohol use disorder, severe, dependence (H)    -  1       Follow-ups after your visit        Your next 10 appointments already scheduled     Jul 18, 2017  7:15 AM CDT   Treatment with ADULT LODGING PLUS B   [facility] ([facility])    [address] [place] [address]   [phone]            Jul 19, 2017  7:15 AM CDT   Treatment with ADULT LODGING PLUS B   [facility] ([facility])    [address] [place] [address]   [phone]            Jul 20, 2017  7:15 AM CDT   Treatment with ADULT LODGING PLUS B   [facility] ([facility])    [address] [place] [address]   [phone]            Jul 21, 2017  7:15 AM CDT   Treatment with ADULT LODGING PLUS B   [facility] ([facility])    [address] [place] [address]   [phone]            Jul 22, 2017  7:15 AM CDT   Treatment with ADULT LODGING PLUS B   [facility] ([facility])    [address] [place] [address]   [phone]            Jul 23, 2017  7:15 AM CDT   Treatment with ADULT LODGING PLUS B   [facility] ([facility])    [address] [place] [phone]   [phone]            Jul 24, 2017  7:15 AM CDT   Treatment with ADULT LODGING PLUS B   [facility] ([place]  "[facility])    [address] [address]   [phone]            Jul 25, 2017  7:15 AM CDT   Treatment with ADULT LODGING PLUS B   [facility] ([facility])    [facility] [address] [address]   [phone]            Jul 26, 2017  7:15 AM CDT   Treatment with ADULT LODGING PLUS B   [facility] ([facility])    [facility] [address] [address]   [phone]            Jul 27, 2017  7:15 AM CDT   Treatment with ADULT LODGING PLUS B   [facility] ([facility])    [facility] [address] [address]   [phone]              Who to contact     If you have questions or need follow up information about today's clinic visit or your schedule please contact [facility] SERVICES directly at [phone].  Normal or non-critical lab and imaging results will be communicated to you by Cigitalhart, letter or phone within 4 business days after the clinic has received the results. If you do not hear from us within 7 days, please contact the clinic through [employer] or phone. If you have a critical or abnormal lab result, we will notify you by phone as soon as possible.  Submit refill requests through Dayforce or call your pharmacy and they will forward the refill request to us. Please allow 3 business days for your refill to be completed.          Additional Information About Your Visit        CigitalharJirafe Information     Dayforce lets you send messages to your doctor, view your test results, renew your prescriptions, schedule appointments and more. To sign up, go to [email or web address] . Click on \"Log in\" on the left side of the screen, which will take you to the Welcome page. Then click on \"Sign up Now\" on the right side of the page.     You " will be asked to enter the access code listed below, as well as some personal information. Please follow the directions to create your username and password.     Your access code is: [email or web address]  Expires: 8/15/2017  5:29 PM     Your access code will  in 90 days. If you need help or a new code, please call your [facility] or [phone].        Care EveryWhere ID     This is your Care EveryWhere ID. This could be used by other organizations to access your [facility] medical records  [id number]        Your Vitals Were     Pulse Temperature Respirations Pulse Oximetry          86 97.9  F (36.6  C) 20 98%         Blood Pressure from Last 3 Encounters:   17 113/78   17 (!) 138/96   17 127/81    Weight from Last 3 Encounters:   17 (!) 144.7 kg (319 lb)   17 134.9 kg (297 lb 5 oz)   17 131.5 kg (290 lb)              Today, you had the following     No orders found for display       Primary Care Provider Office Phone # Fax #    ROXI [facility] [phone]       [facility] [address] [place] [address]        Equal Access to Services     ERMELINDA LOZADA : Hadii aad ku hadasho Soomaali, waaxda luqadaha, qaybta kaalmada adeegyada, waxay idiin haynoni espinoza . So [facility] [phone].    ATENCIÓN: Si habla español, tiene a miller disposición servicios gratuitos de asistencia lingüística. Llame al [phone].    We comply with applicable federal civil rights laws and [place] laws. We do not discriminate on the basis of race, color, national origin, age, disability sex, sexual orientation or gender identity.            Thank you!     Thank you for choosing [facility]  for your care. Our goal is always to provide you with excellent care. Hearing back from our patients is one way we can continue to improve our services. Please take a few minutes to complete the written survey that you may receive in the  mail after your visit with us. Thank you!             Your Updated Medication List - Protect others around you: Learn how to safely use, store and throw away your medicines at www.disposemymeds.org.          This list is accurate as of: 7/17/17 11:34 AM.  Always use your most recent med list.                   Brand Name Dispense Instructions for use Diagnosis    acetaminophen 325 MG tablet    TYLENOL    100 tablet    Take 2 tablets (650 mg) by mouth every 6 hours as needed for mild pain    Alcohol use disorder, severe, dependence (H)       alum & mag hydroxide-simethicone 200-200-20 MG/5ML Susp suspension    MYLANTA/MAALOX     Take 30 mLs by mouth every 6 hours as needed for indigestion        amLODIPine 10 MG tablet    NORVASC    30 tablet    Take 1 tablet (10 mg) by mouth daily    Essential hypertension with goal blood pressure less than 140/90       busPIRone 10 MG tablet    BUSPAR    180 tablet    Take 2 tablets (20 mg) by mouth 3 times daily    Anxiety       cloNIDine 0.1 MG tablet    CATAPRES    60 tablet    Take 1 tablet (0.1 mg) by mouth 2 times daily    Essential hypertension with goal blood pressure less than 140/90       disulfiram 250 MG tablet    ANTABUSE     Take 250 mg by mouth daily        gabapentin 800 MG tablet    NEURONTIN    120 tablet    Take 1 tablet (800 mg) by mouth 4 times daily    Anxiety, Alcohol use disorder, severe, dependence (H)       guaiFENesin 20 mg/mL Soln solution    ROBITUSSIN     Take 10 mLs by mouth every 4 hours as needed for cough        hydrOXYzine 25 MG tablet    ATARAX    120 tablet    Take 2 tablets (50 mg) by mouth every 6 hours as needed for anxiety    Alcohol dependence with uncomplicated intoxication (H), Anxiety       IBUPROFEN PO      Take 200-400 mg by mouth every 6 hours as needed for moderate pain        loratadine 10 MG tablet    CLARITIN     Take 10 mg by mouth daily as needed for allergies        MELATONIN PO      Take 3 mg by mouth nightly as needed         multivitamin, therapeutic with minerals Tabs tablet     30 each    Take 1 tablet by mouth daily    Essential hypertension, Alcohol use disorder, severe, dependence (H)       omeprazole 40 MG capsule    priLOSEC    30 capsule    Take 1 capsule (40 mg) by mouth daily    Acute alcoholic intoxication in alcoholism, uncomplicated (H), Alcohol use disorder, severe, dependence (H)       phenol-menthol 14.5 MG lozenge      Place 1 lozenge inside cheek every 2 hours as needed for moderate pain        senna-docusate 8.6-50 MG per tablet    SENOKOT-S;PERICOLACE     Take 2 tablets by mouth daily as needed for constipation        sertraline 100 MG tablet    ZOLOFT    60 tablet    Take 2 tablets (200 mg) by mouth daily    Anxiety       traZODone 100 MG tablet    DESYREL    60 tablet    Take 1-2 tablets (100-200 mg) by mouth nightly as needed for sleep    Alcohol use disorder, severe, dependence (H), Anxiety

## 2017-07-17 NOTE — PROGRESS NOTES
"        Adult CD Progress Note and Treatment Plan Review     Attendance  Please refer to OP BEH CD Adult Attendance Record Documentation Flowsheet    Support group attended this week: yes    Reporting sobriety:  yes    Treatment Plan     Treatment Plan Review competed on: 7/17/2017      Client preferred learning style: Hands on  Verbal    Staff Members contributing: Ezekiel Evans, Lead Counselor, [facility]; Amish Murillo, [facility]; Kacey Herndon [facility]                        Received Supervision: No    Client: contributed to goals and plan.    Client received copy of plan/revised plan: N/A    Client agrees with plan/revised plan: Yes    Changes to Treatment Plan: No    New Goals added since last review: None      Goals worked on since last review: See below Dimensions/risk ratings.    Strategies effective: yes    Strategies need these changes: None    ASAM Risk Rating:    Dimension 1 0 No changes    Dimension 2 1 No changes    Dimension 3 1 Pt.reported no suicidal ideation this week.Pt.is staying med compliant.Pt.is working on gaining insight into the compounding affects his addiction has on his mental health.Pt.will present His \"Anxiety and Chemical Dependency\"assignment this week.    Dimension 4 2 Pt.has been active participant of the group process.Pt.completed his first assignment and shared in group.Pt.recieved positive feedback from his group members.Pt.is reading \"Surrender To Win\"assignment.    Dimension 5 4 Pt.continues to work on identifying his relapse warning signs and triggers.Pt.needs to gain insight into his relapse process.Pt.is working on \"Putting It All Together\"assignment.Pt.nees to work on identifying his \"Feelings Na Defenses\"assignment.      Dimension 6 4 Pt.is attending 5 12 step meetings per week while in treatment.Pt.needs to obtain a sponsor.Pt.invited his family to the family program.Pt.id looking into sober housing.    Any changes in Vulnerable Adult Status?  No  If yes, add to treatment plan " and individual abuse prevention plan.    Family Involvement:   ALFONSO signed    Data:   offered feedback, good insight, client did actively participate  Patient continues to be active in group therapy.      Intervention:   Counselor feedback  Group feedback    Assessment:   Stages of Change Model  Action    Appears/Sounds:  Cooperative  Motivated  Engaged      Plan:  Continue group therapy.

## 2017-07-17 NOTE — PROGRESS NOTES
"Infusion Nursing Note:  Nickolas Lang presents today for vivitrol.    Patient seen by provider today: No   present during visit today: Not Applicable.    Note: Patient currently in [facility].  Reviewed vivitrol with patient including side effects.  Dog tag and wrist band given to patient.    Intravenous Access:  No Intravenous access/labs at this visit.    Post Infusion Assessment:  Patient tolerated injection without incident.  Vivitrol injection given in left glut with 2\" needle without difficulty.    Discharge Plan:   Patient discharged in stable condition accompanied by: self.  Departure Mode: Ambulatory.  Patient will return to clinic in 4 weeks.  Radha Sinha RN                        "

## 2017-07-18 ENCOUNTER — HOSPITAL ENCOUNTER (OUTPATIENT)
Dept: BEHAVIORAL HEALTH | Facility: CLINIC | Age: 27
End: 2017-07-18
Attending: SOCIAL WORKER
Payer: MEDICAID

## 2017-07-18 ENCOUNTER — TELEPHONE (OUTPATIENT)
Dept: BEHAVIORAL HEALTH | Facility: CLINIC | Age: 27
End: 2017-07-18

## 2017-07-18 PROCEDURE — H2035 A/D TX PROGRAM, PER HOUR: HCPCS | Mod: HQ

## 2017-07-18 PROCEDURE — 10020000 ZZH LODGING PLUS FACILITY CHARGE ADULT

## 2017-07-18 NOTE — TELEPHONE ENCOUNTER
A phone call was made as follow-up to the Family Program mailing for the week of 7/24/17. Message left or spoke in person to individuals on ALFONSO.

## 2017-07-19 ENCOUNTER — HOSPITAL ENCOUNTER (OUTPATIENT)
Dept: BEHAVIORAL HEALTH | Facility: CLINIC | Age: 27
End: 2017-07-19
Attending: SOCIAL WORKER
Payer: MEDICAID

## 2017-07-19 PROCEDURE — H2035 A/D TX PROGRAM, PER HOUR: HCPCS | Mod: HQ

## 2017-07-19 PROCEDURE — 10020000 ZZH LODGING PLUS FACILITY CHARGE ADULT

## 2017-07-20 ENCOUNTER — HOSPITAL ENCOUNTER (OUTPATIENT)
Dept: BEHAVIORAL HEALTH | Facility: CLINIC | Age: 27
End: 2017-07-20
Attending: SOCIAL WORKER
Payer: MEDICAID

## 2017-07-20 PROCEDURE — H2035 A/D TX PROGRAM, PER HOUR: HCPCS | Mod: HQ

## 2017-07-20 PROCEDURE — 99213 OFFICE O/P EST LOW 20 MIN: CPT | Performed by: PSYCHIATRY & NEUROLOGY

## 2017-07-20 PROCEDURE — 10020000 ZZH LODGING PLUS FACILITY CHARGE ADULT

## 2017-07-20 NOTE — PROGRESS NOTES
Interim history   This is a 26 year old male with  mdd  recurent without psychsois alcohol dependence.Pt seen . Patient's mood is hopeful   Energy Level:LOW  Sleep:No concerns, sleeps well through night  Appetite:normal motivation interest are good  Denied any Suicidal/homicidal ideation/plan intent. Denied psychosis    Tolerating meds and has no side effects.         No prior suicide attempts      Past Medical History:   Diagnosis Date     ADD (attention deficit disorder)      Alcohol abuse      Anxiety     gabapentin helps the most      GERD (gastroesophageal reflux disease)      Hepatic steatosis      Hypertension      Obesity      Pyloric stenosis     s/p pyloromyotomy as an infant       10 point ROS is negative   constitutional    HEENT - no symptoms   RESPIRATORY-no symptoms   CVS-no symptoms   GI-no symptoms  MUSCKULOSKELETAL -no symptoms  NEUROLOGICAL-no symptoms  ENDOCRINE-no symptoms  HEMATAOLOGY-no symptoms  SKIN-no symptoms  Family History   Problem Relation Age of Onset     Neurologic Disorder Mother      Multiple Sclerosis     Depression Mother      Anxiety Disorder Mother      Alcohol/Drug Father      Substance Abuse Father      Alcohol/Drug Paternal Grandfather      Depression Maternal Grandmother      Anxiety Disorder Maternal Grandmother      Substance Abuse Maternal Grandfather      Hypertension Maternal Grandmother      Social History     Social History     Marital status: Single     Spouse name: N/A     Number of children: N/A     Years of education: N/A     Occupational History     Not on file.     Social History Main Topics     Smoking status: Current Some Day Smoker     Packs/day: 0.00     Years: 4.00     Types: Cigarettes     Last attempt to quit: 11/1/2015     Smokeless tobacco: Never Used     Alcohol use 0.0 oz/week     0 Standard drinks or equivalent per week      Comment: 1.75L vodka daily as of 6/21/2017     Drug use: Yes     Special: Marijuana      Comment: usually  does not use, but marijuana 3 wks ago     Sexual activity: Not Currently     Other Topics Concern     Not on file     Social History Narrative    Lives at home until kicked out.  Family supportive.          FAMILY HISTORY:  Maternal grandmother has depression.  Mother has depression.  Father has alcoholism.  Parents are .  Good childhood, no abuse.            Medications:     Current Outpatient Prescriptions   Medication Sig     disulfiram (ANTABUSE) 250 MG tablet Take 250 mg by mouth daily     IBUPROFEN PO Take 200-400 mg by mouth every 6 hours as needed for moderate pain     alum & mag hydroxide-simethicone (MYLANTA/MAALOX) 200-200-20 MG/5ML SUSP suspension Take 30 mLs by mouth every 6 hours as needed for indigestion     guaiFENesin (ROBITUSSIN) 20 mg/mL SOLN solution Take 10 mLs by mouth every 4 hours as needed for cough     phenol-menthol (CEPASTAT) 14.5 MG lozenge Place 1 lozenge inside cheek every 2 hours as needed for moderate pain     loratadine (CLARITIN) 10 MG tablet Take 10 mg by mouth daily as needed for allergies     senna-docusate (SENOKOT-S;PERICOLACE) 8.6-50 MG per tablet Take 2 tablets by mouth daily as needed for constipation     MELATONIN PO Take 3 mg by mouth nightly as needed     acetaminophen (TYLENOL) 325 MG tablet Take 2 tablets (650 mg) by mouth every 6 hours as needed for mild pain     busPIRone (BUSPAR) 10 MG tablet Take 2 tablets (20 mg) by mouth 3 times daily     hydrOXYzine (ATARAX) 25 MG tablet Take 2 tablets (50 mg) by mouth every 6 hours as needed for anxiety     gabapentin (NEURONTIN) 800 MG tablet Take 1 tablet (800 mg) by mouth 4 times daily     sertraline (ZOLOFT) 100 MG tablet Take 2 tablets (200 mg) by mouth daily     traZODone (DESYREL) 100 MG tablet Take 1-2 tablets (100-200 mg) by mouth nightly as needed for sleep     cloNIDine (CATAPRES) 0.1 MG tablet Take 1 tablet (0.1 mg) by mouth 2 times daily     amLODIPine (NORVASC) 10 MG tablet Take 1 tablet (10 mg) by mouth  daily     multivitamin, therapeutic with minerals (THERA-VIT-M) TABS tablet Take 1 tablet by mouth daily     omeprazole (PRILOSEC) 40 MG capsule Take 1 capsule (40 mg) by mouth daily     No current facility-administered medications for this encounter.      Facility-Administered Medications Ordered in Other Encounters   Medication     Self Administer Medications: Behavioral Services        Current Outpatient Prescriptions on File Prior to Encounter:  disulfiram (ANTABUSE) 250 MG tablet Take 250 mg by mouth daily   IBUPROFEN PO Take 200-400 mg by mouth every 6 hours as needed for moderate pain   alum & mag hydroxide-simethicone (MYLANTA/MAALOX) 200-200-20 MG/5ML SUSP suspension Take 30 mLs by mouth every 6 hours as needed for indigestion   guaiFENesin (ROBITUSSIN) 20 mg/mL SOLN solution Take 10 mLs by mouth every 4 hours as needed for cough   phenol-menthol (CEPASTAT) 14.5 MG lozenge Place 1 lozenge inside cheek every 2 hours as needed for moderate pain   loratadine (CLARITIN) 10 MG tablet Take 10 mg by mouth daily as needed for allergies   senna-docusate (SENOKOT-S;PERICOLACE) 8.6-50 MG per tablet Take 2 tablets by mouth daily as needed for constipation   MELATONIN PO Take 3 mg by mouth nightly as needed   acetaminophen (TYLENOL) 325 MG tablet Take 2 tablets (650 mg) by mouth every 6 hours as needed for mild pain   busPIRone (BUSPAR) 10 MG tablet Take 2 tablets (20 mg) by mouth 3 times daily   hydrOXYzine (ATARAX) 25 MG tablet Take 2 tablets (50 mg) by mouth every 6 hours as needed for anxiety   gabapentin (NEURONTIN) 800 MG tablet Take 1 tablet (800 mg) by mouth 4 times daily   sertraline (ZOLOFT) 100 MG tablet Take 2 tablets (200 mg) by mouth daily   traZODone (DESYREL) 100 MG tablet Take 1-2 tablets (100-200 mg) by mouth nightly as needed for sleep   cloNIDine (CATAPRES) 0.1 MG tablet Take 1 tablet (0.1 mg) by mouth 2 times daily   amLODIPine (NORVASC) 10 MG tablet Take 1 tablet (10 mg) by mouth daily    multivitamin, therapeutic with minerals (THERA-VIT-M) TABS tablet Take 1 tablet by mouth daily   omeprazole (PRILOSEC) 40 MG capsule Take 1 capsule (40 mg) by mouth daily     Current Facility-Administered Medications on File Prior to Encounter:  Self Administer Medications: Behavioral Services          Allergies:   No Known Allergies         Psychiatric Examination:     Weight is 0 lbs 0 oz  There is no height or weight on file to calculate BMI.    Appearance:  awake, alert and adequately groomed  Attitude:  cooperative  Eye Contact:  good  Mood:  better  Affect:  appropriate and in normal range and mood congruent  Speech:  clear, coherent rate /rhythm are good  Psychomotor Behavior:  no evidence of tardive dyskinesia, dystonia, or tics and intact station, gait and muscle tone  Throught Process:  logical  Associations:  no loose associations  Thought Content:  no evidence of suicidal ideation or homicidal ideation, no evidence of psychotic thought, no auditory hallucinations present and no visual hallucinations present  Insight:  good  Judgement:  intact  Oriented to:  time, person, and place  Attention Span and Concentration:  intact  Recent and Remote Memory:  intact  Language fund of knowledge are adequate               Labs:     Lab Results   Component Value Date    NTBNPI 151 11/29/2015     Lab Results   Component Value Date    WBC 6.9 06/28/2017    HGB 17.1 06/28/2017    HCT 49.7 06/28/2017    MCV 88 06/28/2017     06/28/2017     Lab Results   Component Value Date    TSH 6.12 (H) 11/28/2016           DIagnoses:   mdd  recurent without psychsois   alcohol dependence  nicho       Plan:   Continue present meds   phq 9 17   Will add abilify 2mg po qd   F/u as per counselours    Able to give informed consent:  YES       Discussed Risks/Benefits/Side Effects/Alternatives: YES      Discussed with patient many issues of addiction,triggers/ relapse, and establishing a solid recovery  program.

## 2017-07-21 ENCOUNTER — HOSPITAL ENCOUNTER (OUTPATIENT)
Dept: BEHAVIORAL HEALTH | Facility: CLINIC | Age: 27
End: 2017-07-21
Attending: SOCIAL WORKER
Payer: MEDICAID

## 2017-07-21 PROCEDURE — 10020000 ZZH LODGING PLUS FACILITY CHARGE ADULT

## 2017-07-21 PROCEDURE — H2035 A/D TX PROGRAM, PER HOUR: HCPCS | Mod: HQ

## 2017-07-22 ENCOUNTER — HOSPITAL ENCOUNTER (OUTPATIENT)
Dept: BEHAVIORAL HEALTH | Facility: CLINIC | Age: 27
End: 2017-07-22
Attending: SOCIAL WORKER
Payer: MEDICAID

## 2017-07-22 PROCEDURE — 10020000 ZZH LODGING PLUS FACILITY CHARGE ADULT

## 2017-07-22 PROCEDURE — H2035 A/D TX PROGRAM, PER HOUR: HCPCS | Mod: HQ

## 2017-07-23 ENCOUNTER — HOSPITAL ENCOUNTER (OUTPATIENT)
Dept: BEHAVIORAL HEALTH | Facility: CLINIC | Age: 27
End: 2017-07-23
Attending: SOCIAL WORKER
Payer: MEDICAID

## 2017-07-23 PROCEDURE — H2035 A/D TX PROGRAM, PER HOUR: HCPCS | Mod: HQ

## 2017-07-23 PROCEDURE — 10020000 ZZH LODGING PLUS FACILITY CHARGE ADULT

## 2017-07-23 NOTE — PROGRESS NOTES
"        Adult CD Progress Note and Treatment Plan Review     Attendance  Please refer to OP BEH CD Adult Attendance Record Documentation Flowsheet    Support group attended this week: yes    Reporting sobriety:  yes    Treatment Plan     Treatment Plan Review competed on: 7/23/17      Client preferred learning style: Hands on  Verbal    Staff Members contributing: Ezekiel Evans, Lead Counselor, [facility]; Amish Murillo [facility]; Kacey Herndon [facility]                        Received Supervision: No    Client: contributed to goals and plan.    Client received copy of plan/revised plan: N/A    Client agrees with plan/revised plan: Yes    Changes to Treatment Plan: No    New Goals added since last review: None      Goals worked on since last review: See below Dimensions/risk ratings.    Strategies effective: yes    Strategies need these changes: None    ASAM Risk Rating:    Dimension 1 0 Patient reports that there are no withdrawal symptomology at this time.    Dimension 2 1 Patient reports feeling chronic pain in his shoulders as there is no cartilage left and is bone on bone as revealed by an MRI. Patient reports that he needs surgery and this is the result of football. Patient reports that his pain rates a continuous 7, 1-(low)-10-(high) and that it affects his ability to sleep at night.    Dimension 3 1 Patient reports that he feels depressed when he thinks of the lost opportunities caused by his abuse. Patient reports suffering from guilt due to what he put his mother through during his addiction. Patient reports that he is staying med compliant. Patient completed his \"Anxiety and Chemical Dependency\"assignment and presented in group. Patient is working on his forgiveness assignment and will present it group when completed. Patient reports no suicidal ideation at this time.    Dimension 4 2 Patient reports that his motivation for change rates a 10, 1-(low)-10-(high). Patient states, \"Being an alcoholic is not my aries " "and I'm sick of existing instead of living.\" \"My birthday is coming up soon, I don't want to turn 28 and still be doing this.\" Patient completed his Drug Use History, and Consequences assignment and presented in group. Patient also completed his First Step assignment and presented in group.    Dimension 5 4 Patient reports that his cravings rate a 3,1-(low)-10-(high).  Patient reports that he's nervous about leaving treatment and being out of a controlled environment. Patient completed his \"Quitting Alcohol\" assignment and presented in group. Patient completed his Feelings and Defenses assignment and presented in group. Patient is working on his \"Emotional Maturity\" and \"Aaron Baby\" assignments and will present in group when complete. Patient continues to work on identifying his relapse warning signs and triggers and his relapse problem potential.    Dimension 6 4 Patient reports that he attended his  5 12 step meetings per his treatment plan. Patient reports that after treatment he plans to go home to live with his mother and grandmother, go back to work, and attend the Phase 2 program at [facility]. Patient reports that he and his mother will be doing a one on one conference with [facility] psychotherapist Elizabeth Vega. Patient lacks a sober support network and should work to obtain a sponsor and develop a meetings list prior to completing treatment at .    Any changes in Vulnerable Adult Status?  No  If yes, add to treatment plan and individual abuse prevention plan.    Family Involvement:   ALFONSO signed    Data:   offered feedback, good insight, client did actively participate  Patient continues to be active in group therapy.      Intervention:   Counselor feedback  Group feedback    Assessment:   Stages of Change Model  Action    Appears/Sounds:  Cooperative  Motivated  Engaged      Plan:  Continue group therapy.   Continue with treatment and treatment plan objectives.     ARLENE Gr         "

## 2017-07-24 ENCOUNTER — HOSPITAL ENCOUNTER (OUTPATIENT)
Dept: BEHAVIORAL HEALTH | Facility: CLINIC | Age: 27
End: 2017-07-24
Attending: SOCIAL WORKER
Payer: MEDICAID

## 2017-07-24 PROCEDURE — H2035 A/D TX PROGRAM, PER HOUR: HCPCS | Mod: HQ

## 2017-07-24 PROCEDURE — 10020000 ZZH LODGING PLUS FACILITY CHARGE ADULT

## 2017-07-25 ENCOUNTER — HOSPITAL ENCOUNTER (OUTPATIENT)
Dept: BEHAVIORAL HEALTH | Facility: CLINIC | Age: 27
End: 2017-07-25
Attending: SOCIAL WORKER
Payer: MEDICAID

## 2017-07-25 ENCOUNTER — TELEPHONE (OUTPATIENT)
Dept: BEHAVIORAL HEALTH | Facility: CLINIC | Age: 27
End: 2017-07-25

## 2017-07-25 PROCEDURE — 10020000 ZZH LODGING PLUS FACILITY CHARGE ADULT

## 2017-07-25 PROCEDURE — H2035 A/D TX PROGRAM, PER HOUR: HCPCS | Mod: HQ

## 2017-07-26 ENCOUNTER — HOSPITAL ENCOUNTER (OUTPATIENT)
Dept: BEHAVIORAL HEALTH | Facility: CLINIC | Age: 27
End: 2017-07-26
Attending: SOCIAL WORKER
Payer: MEDICAID

## 2017-07-26 PROCEDURE — 10020000 ZZH LODGING PLUS FACILITY CHARGE ADULT

## 2017-07-26 PROCEDURE — H2035 A/D TX PROGRAM, PER HOUR: HCPCS | Mod: HQ

## 2017-07-27 ENCOUNTER — HOSPITAL ENCOUNTER (OUTPATIENT)
Dept: BEHAVIORAL HEALTH | Facility: CLINIC | Age: 27
End: 2017-07-27
Attending: SOCIAL WORKER
Payer: MEDICAID

## 2017-07-27 ENCOUNTER — HOSPITAL ENCOUNTER (EMERGENCY)
Facility: CLINIC | Age: 27
Discharge: HOME | End: 2017-07-27
Attending: EMERGENCY MEDICINE | Admitting: EMERGENCY MEDICINE
Payer: MEDICAID

## 2017-07-27 VITALS
HEART RATE: 88 BPM | OXYGEN SATURATION: 96 % | TEMPERATURE: 97.9 F | WEIGHT: 315 LBS | RESPIRATION RATE: 16 BRPM | BODY MASS INDEX: 41.6 KG/M2 | DIASTOLIC BLOOD PRESSURE: 87 MMHG | SYSTOLIC BLOOD PRESSURE: 128 MMHG

## 2017-07-27 DIAGNOSIS — G89.29 OTHER CHRONIC BACK PAIN: ICD-10-CM

## 2017-07-27 DIAGNOSIS — R51.9 SINUS HEADACHE: ICD-10-CM

## 2017-07-27 DIAGNOSIS — M54.89 OTHER CHRONIC BACK PAIN: ICD-10-CM

## 2017-07-27 PROCEDURE — H2035 A/D TX PROGRAM, PER HOUR: HCPCS | Mod: HQ

## 2017-07-27 PROCEDURE — 99284 EMERGENCY DEPT VISIT MOD MDM: CPT | Mod: Z6 | Performed by: EMERGENCY MEDICINE

## 2017-07-27 PROCEDURE — 10020000 ZZH LODGING PLUS FACILITY CHARGE ADULT

## 2017-07-27 PROCEDURE — 99282 EMERGENCY DEPT VISIT SF MDM: CPT

## 2017-07-27 RX ORDER — CYCLOBENZAPRINE HCL 5 MG
5 TABLET ORAL 3 TIMES DAILY PRN
Qty: 10 TABLET | Refills: 0 | Status: SHIPPED | OUTPATIENT
Start: 2017-07-27 | End: 2017-08-10

## 2017-07-27 RX ORDER — AZITHROMYCIN 250 MG/1
TABLET, FILM COATED ORAL
Qty: 6 TABLET | Refills: 0 | Status: SHIPPED | OUTPATIENT
Start: 2017-07-27 | End: 2017-08-01

## 2017-07-27 ASSESSMENT — ENCOUNTER SYMPTOMS
HEADACHES: 1
VOMITING: 0
SHORTNESS OF BREATH: 0
BACK PAIN: 1
FEVER: 0
SINUS PRESSURE: 1
ABDOMINAL PAIN: 0

## 2017-07-27 NOTE — PROGRESS NOTES
Pt requesting to go to Urgent Care for ongoing sinus issues.  (pt has hx of sinusitis) Urgen Care is not open now.  [facility] has no openings today.  Pt to ED.  Counselors notified

## 2017-07-27 NOTE — ED AVS SNAPSHOT
[facility], [place], Emergency Department    [address]    [facility] [address]    Phone:  [phone]    Fax:  [phone]                                       Nickolas Lang   MRN: [id number]    Department:  [facility], Emergency Department   Date of Visit:  7/27/2017           After Visit Summary Signature Page     I have received my discharge instructions, and my questions have been answered. I have discussed any challenges I see with this plan with the nurse or doctor.    ..........................................................................................................................................  Patient/Patient Representative Signature      ..........................................................................................................................................  Patient Representative Print Name and Relationship to Patient    ..................................................               ................................................  Date                                            Time    ..........................................................................................................................................  Reviewed by Signature/Title    ...................................................              ..............................................  Date                                                            Time

## 2017-07-27 NOTE — ED PROVIDER NOTES
"  History     Chief Complaint   Patient presents with     Nasal Congestion     pt has had nasal congestion, and \"sinus pressure\" and a \"sinus headache\" x1 week, pt states he has had sinus issues in the past      HPI  Nickolas Lang is a 26 year old male with a history of alcohol dependence, HTN, anxiety, GERD, and morbid obesity who presents to the Emergency Department for evaluation of nasal congestion. The patient reports he has experienced nasal congestion, sinus pressure, and sinus headaches for the past two weeks. He states he has a history of sinus infections and his last episode was about a year ago. He denies any vomiting. He states he currently lives in [facility].   In addition, the patient reports he has gained 90 pounds since he started drinking heavily and is now experiencing back pain. He says he was prescribed muscle relaxants by PCP in the past, but is interested in starting again. He also states concern about a rash on his right lower extremity he noticed after walking in the woods. He has been applying topical cream and taking Benadryl    Past Medical History:   Diagnosis Date     ADD (attention deficit disorder)      Alcohol abuse      Anxiety     gabapentin helps the most      GERD (gastroesophageal reflux disease)      Hepatic steatosis      Hypertension      Obesity      Pyloric stenosis     s/p pyloromyotomy as an infant       Past Surgical History:   Procedure Laterality Date     Deviated septum repair       PYLOROMYOTOMY SURGERY  as an infant      SHOULDER SURGERY Left 07/29/2016    Removal of cartilage       Family History   Problem Relation Age of Onset     Neurologic Disorder Mother      Multiple Sclerosis     Depression Mother      Anxiety Disorder Mother      Alcohol/Drug Father      Substance Abuse Father      Depression Maternal Grandmother      Anxiety Disorder Maternal Grandmother      Hypertension Maternal Grandmother      Substance Abuse Maternal Grandfather      " Alcohol/Drug Paternal Grandfather        Social History   Substance Use Topics     Smoking status: Current Some Day Smoker     Packs/day: 0.00     Years: 4.00     Types: Cigarettes     Last attempt to quit: 11/1/2015     Smokeless tobacco: Never Used     Alcohol use No      Comment: sober since 6/28/17       No current facility-administered medications for this encounter.      Current Outpatient Prescriptions   Medication     cyclobenzaprine (FLEXERIL) 5 MG tablet     ARIPiprazole (ABILIFY) 2 MG tablet     busPIRone (BUSPAR) 10 MG tablet     sertraline (ZOLOFT) 100 MG tablet     traZODone (DESYREL) 100 MG tablet     cloNIDine (CATAPRES) 0.1 MG tablet     amLODIPine (NORVASC) 10 MG tablet     omeprazole (PRILOSEC) 40 MG capsule     gabapentin (NEURONTIN) 800 MG tablet     hydrOXYzine (ATARAX) 25 MG tablet     disulfiram (ANTABUSE) 250 MG tablet     acetaminophen (TYLENOL) 325 MG tablet     multivitamin, therapeutic with minerals (THERA-VIT-M) TABS tablet     Facility-Administered Medications Ordered in Other Encounters   Medication     Self Administer Medications: Behavioral Services      No Known Allergies      I have reviewed the Medications, Allergies, Past Medical and Surgical History, and Social History in the Epic system.    Review of Systems   Constitutional: Negative for fever.   HENT: Positive for congestion and sinus pressure.    Respiratory: Negative for shortness of breath.    Cardiovascular: Negative for chest pain.   Gastrointestinal: Negative for abdominal pain and vomiting.   Musculoskeletal: Positive for back pain.   Skin: Positive for rash (right lower extremity).   Neurological: Positive for headaches.   All other systems reviewed and are negative.      Physical Exam   BP: 137/90  Pulse: 88  Temp: 97.9  F (36.6  C)  Resp: 16  Weight: (!) 143 kg (315 lb 4.8 oz)  SpO2: 96 %  Physical Exam  Exam:  Constitutional: healthy, alert and no distress  Head: Normocephalic. No masses, lesions, tenderness or  abnormalities  Neck: Neck supple. No adenopathy. Thyroid symmetric, normal size,, Carotids without bruits.  ENT: ENT exam normal x facial tenderness, no neck nodes or sinus tenderness  Cardiovascular: negative, PMI normal. No lifts, heaves, or thrills. RRR. No murmurs, clicks gallops or rub  Respiratory: negative, Percussion normal. Good diaphragmatic excursion. Lungs clear  Gastrointestinal: Abdomen soft, non-tender. BS normal. No masses, organomegaly  : Deferred  Musculoskeletal: extremities normal- no gross deformities noted, gait normal and normal muscle tone  Skin: no suspicious lesions or rashes  Neurologic: Gait normal. Reflexes normal and symmetric. Sensation grossly WNL.  Psychiatric: mentation appears normal and affect normal/bright  Hematologic/Lymphatic/Immunologic: Normal cervical lymph nodes    ED Course     ED Course     Procedures                 Labs Ordered and Resulted from Time of ED Arrival Up to the Time of Departure from the ED - No data to display         Assessments & Plan (with Medical Decision Making)   This is a 26-year-old male who is here with congestion type of symptoms for the last few days.  Patient actually states that he has had these symptoms for the last 2 weeks.  He does not feeling any better.  Facial pain has increased but no fevers or chills or nausea or vomiting.  Physical exam does reveal a percussive tenderness over his face bilaterally.  Patient will be treated with azithromycin for congestion and possible infection.  Patient to return if any worsening symptoms.    Evaluation of the back did not reveal any evidence of any acute fracture or dislocation.  The patient will be given Flexeril to take for pain control and relief and to return if any worsening symptoms.    I have reviewed the nursing notes.    I have reviewed the findings, diagnosis, plan and need for follow up with the patient.    Discharge Medication List as of 7/27/2017  1:54 PM      START taking these  medications    Details   azithromycin (ZITHROMAX Z-MAHAMED) 250 MG tablet Two tablets on the first day, then one tablet daily for the next 4 days, Disp-6 tablet, R-0, Local Print      cyclobenzaprine (FLEXERIL) 5 MG tablet Take 1 tablet (5 mg) by mouth 3 times daily as needed for muscle spasms, Disp-10 tablet, R-0, Local Print             Final diagnoses:   Sinus headache   Other chronic back pain   I, Osito Medina, am serving as a trained medical scribe to document services personally performed by Nolberto Rowe MD, based on the provider's statements to me.      INolberto MD, was physically present and have reviewed and verified the accuracy of this note documented by Osito Medina.       7/27/2017   [facility], [place], EMERGENCY DEPARTMENT     Nolberto Rowe MD  08/06/17 2019

## 2017-07-27 NOTE — PROGRESS NOTES
FAMILY SESSION SUMMARY    D) Met with client, mother and grandmother on 7/27/17 from 10 AM-11AM.     Client informed family of his recovery/ aftercare plan. Client noted that he would like to go back to his previous job and needs to call them to see if it is possible. Client and mother agreed upon him moving back home as long as he follows through with treatment recommendations. Clinician brought up discussion on helping behaviors versus enabling behaviors. Mother spoke about concerns of how she may have enabled client in the past but 'can't do it anymore' as she is physically and mentally tired'.  Client spoke about his concerns with his families health and financial situation, specifically noting that he wants to 'provide' for his mother and grandmother. Family spent time discussing previous communication and ineffectiveness. Clinician suggested various healthy communication strategies and encouraged family to focus on the feelings in addition to being honest.       I) Family session with client. Provided client and family with verbal interventions including: validation, nurturing, support, redirection, and healthy boundary setting. Spent time in session discussing healthy communication strategies and using feelings.   A) Client appears to have experienced early attachment disruption, resulting in lack of trust and loss of safety Client appears emotionally constricted and to lack skills for emotional regulation and stress management.  Client appears to have internal motivation but needs continuing reinforcement. Client tends towards self-defeating, self-sabotaging patterns.     P) Next session is scheduled for 8/1/17 at 8:30 AM  Mireya Bliss, Student Intern  7/27/2017

## 2017-07-27 NOTE — ED AVS SNAPSHOT
[facility], Emergency Department    [address]    [facility] [place] [address]    Phone:  [phone]    Fax:  [phone]                                       Nickolas Lang   MRN: [id number]    Department:  [facility], Emergency Department   Date of Visit:  7/27/2017           Patient Information     Date Of Birth          1990        Your diagnoses for this visit were:     Sinus headache     Other chronic back pain        You were seen by Nolberto Rowe MD.        Discharge Instructions           Please make an appointment to follow up with Orthopedics (phone: [phone]) or  Sports Medicine (phone: [phone])     Back Care Tips    Caring for your back  These are things you can do to prevent a recurrence of acute back pain and to reduce symptoms from chronic back pain:    Maintain a healthy weight. If you are overweight, losing weight will help most types of back pain.    Exercise is an important part of recovery from most types of back pain. The muscles behind and in front of the spine support the back. This means strengthening both the back muscles and the abdominal muscles will provide better support for your spine.     Swimming and brisk walking are good overall exercises to improve your fitness level.    Practice safe lifting methods (below).    Practice good posture when sitting, standing and walking. Avoid prolonged sitting. This puts more stress on the lower back than standing or walking.    Wear quality shoes with sufficient arch support. Foot and ankle alignment can affect back symptoms. Women should avoid wearing high heels.    Therapeutic massage can help relax the back muscles without stretching them.    During the first 24 to 72 hours after an acute injury or flare-up of chronic back pain, apply an ice pack to the painful area for 20 minutes and then remove it for 20 minutes, over a period of 60 to 90 minutes, or several times a day. As a safety precaution, do not use  a heating pad at bedtime. Sleeping on a heating pad can lead to skin burns or tissue damage.    You can alternate ice and heat therapies.  Medications  Talk to your healthcare provider before using medicines, especially if you have other medical problems or are taking other medicines.    You may use acetaminophen or ibuprofen to control pain, unless your healthcare provider prescribed other pain medicine. If you have chronic conditions like diabetes, liver or kidney disease, stomach ulcers, or gastrointestinal bleeding, or are taking blood thinners, talk with your healthcare provider before taking any medicines.    Be careful if you are given prescription pain medicines, narcotics, or medicine for muscle spasm. They can cause drowsiness, affect your coordination, reflexes, and judgment. Do not drive or operate heavy machinery while taking these types of medicines. Take prescription pain medicine only as prescribed by your healthcare provider.  Lumbar stretch  Here is a simple stretching exercise that will help relax muscle spasm and keep your back more limber. If exercise makes your back pain worse, don t do it.    Lie on your back with your knees bent and both feet on the ground.    Slowly raise your left knee to your chest as you flatten your lower back against the floor. Hold for 5 seconds.    Relax and repeat the exercise with your right knee.    Do 10 of these exercises for each leg.  Safe lifting method    Don t bend over at the waist to lift an object off the floor.  Instead, bend your knees and hips in a squat.     Keep your back and head upright    Hold the object close to your body, directly in front of you.    Straighten your legs to lift the object.     Lower the object to the floor in the reverse fashion.    If you must slide something across the floor, push it.  Posture tips  Sitting  Sit in chairs with straight backs or low-back support. Keep your knees lower than your hips, with your feet flat on the  floor.  When driving, sit up straight. Adjust the seat forward so you are not leaning toward the steering wheel.  A small pillow or rolled towel behind your lower back may help if you are driving long distances.   Standing  When standing for long periods, shift most of your weight to one leg at a time. Alternate legs every few minutes.   Sleeping  The best way to sleep is on your side with your knees bent. Put a low pillow under your head to support your neck in a neutral spine position. Avoid thick pillows that bend your neck to one side. Put a pillow between your legs to further relax your lower back. If you sleep on your back, put pillows under your knees to support your legs in a slightly flexed position. Use a firm mattress. If your mattress sags, replace it, or use a 1/2-inch plywood board under the mattress to add support.  Follow-up care  Follow up with your healthcare provider, or as advised.  If X-rays, a CT scan or an MRI scan were taken, they will be reviewed by a radiologist. You will be notified of any new findings that may affect your care.  Call 911  Seek emergency medical care if any of the following occur:    Trouble breathing    Confusion    Very drowsy    Fainting or loss of consciousness    Rapid or very slow heart rate    Loss of  bowel or bladder control  When to seek medical care  Call your healthcare provider if any of the following occur:    Pain becomes worse or spreads to your arms or legs    Weakness or numbness in one or both arms or legs    Numbness in the groin area  Date Last Reviewed: 6/1/2016 2000-2017 The [employer]. [address] [place], [place] [address]. All rights reserved. This information is not intended as a substitute for professional medical care. Always follow your healthcare professional's instructions.          Sinus Headaches     When using nasal spray, keep your chin down and angle the spray away from center.     Sinus headaches can cause a gnawing  pain behind the nose and eyes. The pain most often gets worse in the afternoon and evening. You may also run a fever. Sinus headaches are caused by colds or allergies that make the nasal passages inflamed or infected.  To help prevent sinus headaches:    Treat colds promptly to keep mucus from backing up.    Avoid things that trigger sinus problems, such as pollens, dust, smoke, fumes, and strong odors.    Take allergy medicines as directed by your healthcare provider.  To relieve the pain:    Keep your sinuses open and free of mucus. Try over-the-counter sinus rinse products.    Use a nasal decongestant as directed to reduce the inflammation.    Drink fluids to keep the mucus thinner. This helps it drain more easily. You can also use a humidifier.    Apply hot packs to the area around your sinuses. Use a hot water bottle.    See your healthcare provider if your sinus headache lasts more than 2 weeks. You may need medicine for a sinus infection or an exam to check for other headache conditions, like migraines.  Date Last Reviewed: 10/1/2016    0192-4692 The "[employer]". [address] [place] [address]. All rights reserved. This information is not intended as a substitute for professional medical care. Always follow your healthcare professional's instructions.          Future Appointments        Provider Department Dept Phone Center    7/28/2017 7:15 AM [facility]ULT [facility] [phone] [place]    7/29/2017 7:15 AM [facility] [phone] [place]    7/30/2017 7:15 AM [facility] [phone] [place]    7/31/2017 7:15 AM [facility] [phone] [place]    8/1/2017 7:15 AM [facility]ULT [facility] [phone] [place]    8/9/2017 4:00 PM Delilah Luz MD [facility]  Primary Care [phone] [facility]    8/15/2017 12:00 PM UR CHAIR 02 [facility] [place] In[facility] [phone] [place]      24 Hour Appointment Hotline       To make an appointment at any [facility], call [phone] ([phone]). If you don't have a family doctor or clinic, we will help you find one. [place] clinics are conveniently located to serve the needs of you and your family.             Review of your medicines      START taking        Dose / Directions Last dose taken    azithromycin 250 MG tablet   Commonly known as:  ZITHROMAX Z-MAHAMED   Quantity:  6 tablet        Two tablets on the first day, then one tablet daily for the next 4 days   Refills:  0        cyclobenzaprine 5 MG tablet   Commonly known as:  FLEXERIL   Dose:  5 mg   Quantity:  10 tablet        Take 1 tablet (5 mg) by mouth 3 times daily as needed for muscle spasms   Refills:  0          Our records show that you are taking the medicines listed below. If these are incorrect, please call your family doctor or clinic.        Dose / Directions Last dose taken    acetaminophen 325 MG tablet   Commonly known as:  TYLENOL   Dose:  650 mg   Quantity:  100 tablet        Take 2 tablets (650 mg) by mouth every 6 hours as needed for mild pain   Refills:  0        alum & mag hydroxide-simethicone 200-200-20 MG/5ML Susp suspension   Commonly known as:  MYLANTA/MAALOX   Dose:  30 mL        Take 30 mLs by mouth every 6 hours as needed for indigestion   Refills:  0        amLODIPine 10 MG tablet   Commonly known as:  NORVASC   Dose:  10 mg   Quantity:  30 tablet        Take 1 tablet (10 mg) by mouth daily   Refills:  0        ARIPiprazole 2 MG tablet   Commonly known as:  ABILIFY   Dose:  2 mg   Quantity:  30 tablet        Take 1 tablet (2 mg) by mouth At Bedtime   Refills:  0        busPIRone 10 MG tablet   Commonly known as:  BUSPAR   Dose:  20 mg   Quantity:  180 tablet        Take 2 tablets (20 mg) by mouth 3 times daily   Refills:  0         cloNIDine 0.1 MG tablet   Commonly known as:  CATAPRES   Dose:  0.1 mg   Quantity:  60 tablet        Take 1 tablet (0.1 mg) by mouth 2 times daily   Refills:  0        disulfiram 250 MG tablet   Commonly known as:  ANTABUSE   Dose:  250 mg   Indication:  Take 1 Tablet  Daily as Directed        Take 250 mg by mouth daily   Refills:  0        gabapentin 800 MG tablet   Commonly known as:  NEURONTIN   Dose:  800 mg   Quantity:  120 tablet        Take 1 tablet (800 mg) by mouth 4 times daily   Refills:  0        guaiFENesin 20 mg/mL Soln solution   Commonly known as:  ROBITUSSIN   Dose:  10 mL        Take 10 mLs by mouth every 4 hours as needed for cough   Refills:  0        hydrOXYzine 25 MG tablet   Commonly known as:  ATARAX   Dose:  50 mg   Quantity:  120 tablet        Take 2 tablets (50 mg) by mouth every 6 hours as needed for anxiety   Refills:  1        IBUPROFEN PO   Dose:  200-400 mg        Take 200-400 mg by mouth every 6 hours as needed for moderate pain   Refills:  0        loratadine 10 MG tablet   Commonly known as:  CLARITIN   Dose:  10 mg        Take 10 mg by mouth daily as needed for allergies   Refills:  0        MELATONIN PO   Dose:  3 mg        Take 3 mg by mouth nightly as needed   Refills:  0        multivitamin, therapeutic with minerals Tabs tablet   Dose:  1 tablet   Quantity:  30 each        Take 1 tablet by mouth daily   Refills:  3        omeprazole 40 MG capsule   Commonly known as:  priLOSEC   Dose:  40 mg   Quantity:  30 capsule        Take 1 capsule (40 mg) by mouth daily   Refills:  0        phenol-menthol 14.5 MG lozenge   Dose:  1 lozenge        Place 1 lozenge inside cheek every 2 hours as needed for moderate pain   Refills:  0        senna-docusate 8.6-50 MG per tablet   Commonly known as:  SENOKOT-S;PERICOLACE   Dose:  2 tablet        Take 2 tablets by mouth daily as needed for constipation   Refills:  0        sertraline 100 MG tablet   Commonly known as:  ZOLOFT   Dose:  200 mg  "  Quantity:  60 tablet        Take 2 tablets (200 mg) by mouth daily   Refills:  0        traZODone 100 MG tablet   Commonly known as:  DESYREL   Dose:  100-200 mg   Quantity:  60 tablet        Take 1-2 tablets (100-200 mg) by mouth nightly as needed for sleep   Refills:  0                Prescriptions were sent or printed at these locations (2 Prescriptions)                   Other Prescriptions                Printed at Department/Unit printer (2 of 2)         azithromycin (ZITHROMAX Z-MAHAMED) 250 MG tablet               cyclobenzaprine (FLEXERIL) 5 MG tablet                Orders Needing Specimen Collection     None      Pending Results     No orders found from 7/25/2017 to 7/28/2017.            Pending Culture Results     No orders found from 7/25/2017 to 7/28/2017.            Pending Results Instructions     If you had any lab results that were not finalized at the time of your Discharge, you can call the ED Lab Result RN at [phone]. You will be contacted by this team for any positive Lab results or changes in treatment. The nurses are available 7 days a week from 10A to 6:30P.  You can leave a message 24 hours per day and they will return your call.        Thank you for choosing [facility]       Thank you for choosing [facility] for your care. Our goal is always to provide you with excellent care. Hearing back from our patients is one way we can continue to improve our services. Please take a few minutes to complete the written survey that you may receive in the mail after you visit with us. Thank you!        [employer] Information     [employer] lets you send messages to your doctor, view your test results, renew your prescriptions, schedule appointments and more. To sign up, go to [email or web address] . Click on \"Log in\" on the left side of the screen, which will take you to the Welcome page. Then click on \"Sign up Now\" on the right side of the page.     You will be asked to enter the access code listed below, " as well as some personal information. Please follow the directions to create your username and password.     Your access code is: [email or web address]  Expires: 8/15/2017  5:29 PM     Your access code will  in 90 days. If you need help or a new code, please call your [facility] clinic or [phone].        Care EveryWhere ID     This is your Care EveryWhere ID. This could be used by other organizations to access your [facility] medical records  [id number]        Equal Access to Services     ERMELINDA LOZADA : Rishabh Petersen, wadebby patterson, juliana kaaldeana concepcion, shawna espinoza . So [facility] [phone].    ATENCIÓN: Si habla español, tiene a miller disposición servicios gratuitos de asistencia lingüística. Llame al [phone].    We comply with applicable federal civil rights laws and [place] laws. We do not discriminate on the basis of race, color, national origin, age, disability sex, sexual orientation or gender identity.            After Visit Summary       This is your record. Keep this with you and show to your community pharmacist(s) and doctor(s) at your next visit.

## 2017-07-27 NOTE — DISCHARGE INSTRUCTIONS
Please make an appointment to follow up with Orthopedics (phone: [phone]) or  Sports Medicine (phone: [phone])     Back Care Tips    Caring for your back  These are things you can do to prevent a recurrence of acute back pain and to reduce symptoms from chronic back pain:    Maintain a healthy weight. If you are overweight, losing weight will help most types of back pain.    Exercise is an important part of recovery from most types of back pain. The muscles behind and in front of the spine support the back. This means strengthening both the back muscles and the abdominal muscles will provide better support for your spine.     Swimming and brisk walking are good overall exercises to improve your fitness level.    Practice safe lifting methods (below).    Practice good posture when sitting, standing and walking. Avoid prolonged sitting. This puts more stress on the lower back than standing or walking.    Wear quality shoes with sufficient arch support. Foot and ankle alignment can affect back symptoms. Women should avoid wearing high heels.    Therapeutic massage can help relax the back muscles without stretching them.    During the first 24 to 72 hours after an acute injury or flare-up of chronic back pain, apply an ice pack to the painful area for 20 minutes and then remove it for 20 minutes, over a period of 60 to 90 minutes, or several times a day. As a safety precaution, do not use a heating pad at bedtime. Sleeping on a heating pad can lead to skin burns or tissue damage.    You can alternate ice and heat therapies.  Medications  Talk to your healthcare provider before using medicines, especially if you have other medical problems or are taking other medicines.    You may use acetaminophen or ibuprofen to control pain, unless your healthcare provider prescribed other pain medicine. If you have chronic conditions like diabetes, liver or kidney disease, stomach ulcers, or gastrointestinal bleeding,  or are taking blood thinners, talk with your healthcare provider before taking any medicines.    Be careful if you are given prescription pain medicines, narcotics, or medicine for muscle spasm. They can cause drowsiness, affect your coordination, reflexes, and judgment. Do not drive or operate heavy machinery while taking these types of medicines. Take prescription pain medicine only as prescribed by your healthcare provider.  Lumbar stretch  Here is a simple stretching exercise that will help relax muscle spasm and keep your back more limber. If exercise makes your back pain worse, don t do it.    Lie on your back with your knees bent and both feet on the ground.    Slowly raise your left knee to your chest as you flatten your lower back against the floor. Hold for 5 seconds.    Relax and repeat the exercise with your right knee.    Do 10 of these exercises for each leg.  Safe lifting method    Don t bend over at the waist to lift an object off the floor.  Instead, bend your knees and hips in a squat.     Keep your back and head upright    Hold the object close to your body, directly in front of you.    Straighten your legs to lift the object.     Lower the object to the floor in the reverse fashion.    If you must slide something across the floor, push it.  Posture tips  Sitting  Sit in chairs with straight backs or low-back support. Keep your knees lower than your hips, with your feet flat on the floor.  When driving, sit up straight. Adjust the seat forward so you are not leaning toward the steering wheel.  A small pillow or rolled towel behind your lower back may help if you are driving long distances.   Standing  When standing for long periods, shift most of your weight to one leg at a time. Alternate legs every few minutes.   Sleeping  The best way to sleep is on your side with your knees bent. Put a low pillow under your head to support your neck in a neutral spine position. Avoid thick pillows that bend  your neck to one side. Put a pillow between your legs to further relax your lower back. If you sleep on your back, put pillows under your knees to support your legs in a slightly flexed position. Use a firm mattress. If your mattress sags, replace it, or use a 1/2-inch plywood board under the mattress to add support.  Follow-up care  Follow up with your healthcare provider, or as advised.  If X-rays, a CT scan or an MRI scan were taken, they will be reviewed by a radiologist. You will be notified of any new findings that may affect your care.  Call 911  Seek emergency medical care if any of the following occur:    Trouble breathing    Confusion    Very drowsy    Fainting or loss of consciousness    Rapid or very slow heart rate    Loss of  bowel or bladder control  When to seek medical care  Call your healthcare provider if any of the following occur:    Pain becomes worse or spreads to your arms or legs    Weakness or numbness in one or both arms or legs    Numbness in the groin area  Date Last Reviewed: 6/1/2016 2000-2017 [employer]. [address] [place] [address]. All rights reserved. This information is not intended as a substitute for professional medical care. Always follow your healthcare professional's instructions.          Sinus Headaches     When using nasal spray, keep your chin down and angle the spray away from center.     Sinus headaches can cause a gnawing pain behind the nose and eyes. The pain most often gets worse in the afternoon and evening. You may also run a fever. Sinus headaches are caused by colds or allergies that make the nasal passages inflamed or infected.  To help prevent sinus headaches:    Treat colds promptly to keep mucus from backing up.    Avoid things that trigger sinus problems, such as pollens, dust, smoke, fumes, and strong odors.    Take allergy medicines as directed by your healthcare provider.  To relieve the pain:    Keep your sinuses open and  free of mucus. Try over-the-counter sinus rinse products.    Use a nasal decongestant as directed to reduce the inflammation.    Drink fluids to keep the mucus thinner. This helps it drain more easily. You can also use a humidifier.    Apply hot packs to the area around your sinuses. Use a hot water bottle.    See your healthcare provider if your sinus headache lasts more than 2 weeks. You may need medicine for a sinus infection or an exam to check for other headache conditions, like migraines.  Date Last Reviewed: 10/1/2016    8313-2696 [employer]. [address] [place] [address]. All rights reserved. This information is not intended as a substitute for professional medical care. Always follow your healthcare professional's instructions.

## 2017-07-28 ENCOUNTER — HOSPITAL ENCOUNTER (OUTPATIENT)
Dept: BEHAVIORAL HEALTH | Facility: CLINIC | Age: 27
End: 2017-07-28
Attending: SOCIAL WORKER
Payer: MEDICAID

## 2017-07-28 PROCEDURE — H2035 A/D TX PROGRAM, PER HOUR: HCPCS | Mod: HQ

## 2017-07-28 PROCEDURE — 10020000 ZZH LODGING PLUS FACILITY CHARGE ADULT

## 2017-07-29 ENCOUNTER — HOSPITAL ENCOUNTER (OUTPATIENT)
Dept: BEHAVIORAL HEALTH | Facility: CLINIC | Age: 27
End: 2017-07-29
Attending: SOCIAL WORKER
Payer: MEDICAID

## 2017-07-29 PROCEDURE — H2035 A/D TX PROGRAM, PER HOUR: HCPCS | Mod: HQ

## 2017-07-29 PROCEDURE — 10020000 ZZH LODGING PLUS FACILITY CHARGE ADULT

## 2017-07-29 NOTE — PROGRESS NOTES
Nursing Discharge Planning Meeting    Writer completed discharge planning meeting with patient. Discharge is planned for August 1st.    Discussed appropriate follow up care to manage hypertension, depression, pain, skin abrasions and to obtain medication refills. Patient given a copy of their current medications for reference. Questions answered and patient verbalized understanding of post-discharge follow up plan. He is currently on a waitlist to see his psychiatrist as his current appointment is not until October.   Patient to schedule an appointment with his primary care provider for medication refills. Patient will be requesting medication refills from Dr. Brasher prior to discharge. Patient aware of process for continuing vivitrol injections after discharge.    Continue to support patient in discharge planning as needed to assure appropriate continuity of care.     Essential Oil Therapy  Patient used essential oil therapy during treatment program: No

## 2017-07-30 ENCOUNTER — HOSPITAL ENCOUNTER (OUTPATIENT)
Dept: BEHAVIORAL HEALTH | Facility: CLINIC | Age: 27
End: 2017-07-30
Attending: SOCIAL WORKER
Payer: MEDICAID

## 2017-07-30 PROCEDURE — H2035 A/D TX PROGRAM, PER HOUR: HCPCS | Mod: HQ

## 2017-07-30 PROCEDURE — 10020000 ZZH LODGING PLUS FACILITY CHARGE ADULT

## 2017-07-31 ENCOUNTER — HOSPITAL ENCOUNTER (OUTPATIENT)
Dept: BEHAVIORAL HEALTH | Facility: CLINIC | Age: 27
End: 2017-07-31
Attending: SOCIAL WORKER
Payer: MEDICAID

## 2017-07-31 PROCEDURE — 10020000 ZZH LODGING PLUS FACILITY CHARGE ADULT

## 2017-07-31 PROCEDURE — H2035 A/D TX PROGRAM, PER HOUR: HCPCS | Mod: HQ

## 2017-07-31 NOTE — PROGRESS NOTES
Adult CD Progress Note and Treatment Plan Review     Attendance  Please refer to OP BEH CD Adult Attendance Record Documentation Flowsheet    Support group attended this week: yes    Reporting sobriety:  yes    Treatment Plan     Treatment Plan Review competed on: 7/31/17      Client preferred learning style: Hands on  Verbal    Staff Members contributing: Ezekiel Evans, Lead Counselor, [facility]; Amish Murillo [facility]; Kacey Herndon [facility]                        Received Supervision: No    Client: contributed to goals and plan.    Client received copy of plan/revised plan: N/A    Client agrees with plan/revised plan: Yes    Changes to Treatment Plan: No    New Goals added since last review: None      Goals worked on since last review: Change, Motivation, Mental and Physical Health, Relapse Prevention, Recovery Environment    Strategies effective: yes     Strategies need these changes: Non    ASAM Risk Rating:    Dimension 1 0 Patient reports that he is taking antabuse and it is making him feel more confident about staying sober. Patient also reports that he is  receiving Vivitrol shots and that is helping him as well. Patient reports that his next shot is scheduled for 8/15/17. Patient reports no withdrawal symptomology at this time.      Dimension 2 1 Patient reports that he is continuing to experience pain in his shoulders, however, this week it's been better. Patient reports that taking Tylenol and Ibuprofen has helped him deal with the discomfort.      Dimension 3 1 Patient reports that his depression is better and that being prescribed Abilify on 7/20/17 has really helped with his mood. Patient reports that he is feeling anxiety because he knows that his discharge date is coming this week on 8/2/17 and signifies the end of the first phase of his treatment at [facility]. Patient also reported that his consumption of coffee is causing him to feel anxiety as well. Patient reports no suicidal ideation at  "this time.      Dimension 4 2 Patient reports that his motivation for change rates a 9, 1-(low)-10-(high). Patient states, \"I'm inspired this time, and doing things differently.\" \"Last time, I just went through the motions like I did in my previous treatment experiences.\" \"I'm sick of the life of misery that my addiction caused.\" Patient completed his \"W[place] Way\" and \"Letting Go\" assignments and presented them in group.      Dimension 5 4 Patient reports that his cravings rate a 2,1-(low)-10-(high).  Patient reports that he had an experience last Saturday, 8/29/17 when he saw a glass of soda pop out in the smoking area. Patient reports that it reminded him of a rum and coke, and talking about it with his peers helped him to get over his feelings of euphoric recall. Patient states, \" I feel that going through Phase 2, attending AA meetings, and meeting with my sponsor will all help me to stay sober once I'm discharged from [facility] on 8/2/17. Patient completed his \"Putting It All Together\" and \"12 Things I Did\" assignments and presented them in group.                                                                                                                                                                                                                                                              Dimension 6 4 Patient reports that he attended his  5 12 step meetings per his treatment plan. Patient reports that family week was a very good experience for he and his mother and grandmother. Patient continues to report that after treatment he plans to go home to live with his mother and grandmother, go back to work, and attend the Phase 2 program at [facility]. Patient reports that he has a sponsor with short term sobriety. It was suggested by writer that patient should also obtain a sponsor with longer term sobriety to better help with his recovery efforts    Any changes in Vulnerable Adult Status?  No  If " yes, add to treatment plan and individual abuse prevention plan.    Family Involvement:   ALFONSO signed    Data:   offered feedback, good insight, client did actively participate  Patient continues to be active in group therapy.      Intervention:   Counselor feedback  Group feedback    Assessment:   Stages of Change Model  Action    Appears/Sounds:  Cooperative  Motivated  Engaged      Plan:  Continue group therapy.   Continue with treatment and treatment plan objectives.     ARLENE Gr

## 2017-08-01 ENCOUNTER — HOSPITAL ENCOUNTER (OUTPATIENT)
Dept: BEHAVIORAL HEALTH | Facility: CLINIC | Age: 27
End: 2017-08-01
Attending: SOCIAL WORKER
Payer: MEDICAID

## 2017-08-01 PROCEDURE — 99214 OFFICE O/P EST MOD 30 MIN: CPT | Performed by: PSYCHIATRY & NEUROLOGY

## 2017-08-01 PROCEDURE — H2035 A/D TX PROGRAM, PER HOUR: HCPCS | Mod: HQ

## 2017-08-01 PROCEDURE — 10020000 ZZH LODGING PLUS FACILITY CHARGE ADULT

## 2017-08-01 NOTE — PROGRESS NOTES
FAMILY SESSION SUMMARY    D) Met with client and clients mother on 8/1/17 from 8:30-10:30 am.     Client and mother took  time in session to discuss boundaries in regards to client moving back home. Clinician encouraged client and mother to work on open, direct and honest communication with one another in order to support boundaries for both the client and mother in addition to repairing their relationship. Clinician lead discussion on appreciation exercises and self-care skills for client and mother. Client used time explaining recovery goals and future aspirations outside of treatment. Mother of client discussed medical diagnosis and the role it plays in her and clients relationship and potential effects on clients recovery.     I) Family session with client and mother. Provided family with verbal interventions including: validation, nurturing, support, redirection, and healthy boundary setting. Spent time in session practicing appreciation exercises.   A)Client appears emotionally constricted and to lack skills for emotional regulation and stress management. Client appears to lack meaningful activities for free time. Client appears to lack a daily routine and a sense of purpose. Client appears to have internal motivation but needs continuing reinforcement. Client tends towards self-defeating, self-sabotaging patterns.     P) No future sessions  Mireya Bliss, Student Intern  8/1/2017

## 2017-08-01 NOTE — PROGRESS NOTES
MICD Discharge Summary/Instructions    Patient:  Nickolas Lang    MRN: [id number]  : 1990 Age: 26 year old Sex: male   -   Focus of Treatment / Discharge Recommendations   Personal Safety/ Management of Symptoms   * Follow your safety plan. Report increased symptoms to your care team and /or go to the nearest Emergency Department.   * Call crisis lines as needed   [facility] [place] [phone] [facility] [phone]   [facility] [phone] Crisis Connection [phone]   [facility] [phone] [facility] [phone]   [facility] [phone] National Suicide Prevention [phone]   [facility] [phone] Suicide Prevention [phone]   [facility] [phone]   Abstinence/Relapse Prevention   * Take all medicines as directed. Carry a current list of medicines with you.   * Use coping skills: Use peer support group,continue to attend 12 step meetings.Obtain and work with sponsor.   * Do not use illicit (street) drugs, controlled substances (narcotics) or alcohol.   Develop/Improve Independent Living/Socialization Skills: Continue to build relationship with family and sober support network.  Community Resources/Supports and Discharge Planning: Attend three 12 step meetings per week.Obtain sponsor.Attend Phase 2 on Thurs at 5:30pm   Follow up with psychiatrist / main caregiver: LAWNADA Next visit: TBD   Follow up with your therapist: N/A Next visit: N/A   Go to group therapy and / or support groups at: Phase 2 and 12 step meetings by your home.   See your medical doctor about: N/A   Other:   Client Signature:_______________________ Date / Time:___________   Staff Signature:________________________ Date / Time:___________

## 2017-08-01 NOTE — PROGRESS NOTES
Interim history   This is a 26 year old male with  mdd  recurent without psychsois alcohol dependence.Pt seen . Patient's mood is up  Has a sinus infection recently   Energy Level:LOW-worried about sleep apnea  Sleep:No concerns, sleeps well through night  Appetite:normal motivation interest are good  Denied any Suicidal/homicidal ideation/plan intent. Denied psychosis    Tolerating meds and has no side effects.         No prior suicide attempts      Past Medical History:   Diagnosis Date     ADD (attention deficit disorder)      Alcohol abuse      Anxiety     gabapentin helps the most      GERD (gastroesophageal reflux disease)      Hepatic steatosis      Hypertension      Obesity      Pyloric stenosis     s/p pyloromyotomy as an infant       10 point ROS is negative   constitutional    HEENT - no symptoms   RESPIRATORY-no symptoms   CVS-no symptoms   GI-no symptoms  MUSCKULOSKELETAL -no symptoms  NEUROLOGICAL-no symptoms  ENDOCRINE-no symptoms  HEMATAOLOGY-no symptoms  SKIN-no symptoms  Family History   Problem Relation Age of Onset     Neurologic Disorder Mother      Multiple Sclerosis     Depression Mother      Anxiety Disorder Mother      Alcohol/Drug Father      Substance Abuse Father      Depression Maternal Grandmother      Anxiety Disorder Maternal Grandmother      Hypertension Maternal Grandmother      Substance Abuse Maternal Grandfather      Alcohol/Drug Paternal Grandfather      Social History     Social History     Marital status: Single     Spouse name: N/A     Number of children: N/A     Years of education: N/A     Occupational History     Not on file.     Social History Main Topics     Smoking status: Current Some Day Smoker     Packs/day: 0.00     Years: 4.00     Types: Cigarettes     Last attempt to quit: 11/1/2015     Smokeless tobacco: Never Used     Alcohol use No      Comment: sober since 6/28/17     Drug use: No      Comment: usually does not use, but marijuana 3 wks ago      Sexual activity: Not Currently     Other Topics Concern     Not on file     Social History Narrative    Lives at home until kicked out.  Family supportive.          FAMILY HISTORY:  Maternal grandmother has depression.  Mother has depression.  Father has alcoholism.  Parents are .  Good childhood, no abuse.            Medications:     Current Outpatient Prescriptions   Medication Sig     azithromycin (ZITHROMAX Z-MAHAMED) 250 MG tablet Two tablets on the first day, then one tablet daily for the next 4 days     cyclobenzaprine (FLEXERIL) 5 MG tablet Take 1 tablet (5 mg) by mouth 3 times daily as needed for muscle spasms     hydrOXYzine (ATARAX) 25 MG tablet Take 2 tablets (50 mg) by mouth every 6 hours as needed for anxiety     ARIPiprazole (ABILIFY) 2 MG tablet Take 1 tablet (2 mg) by mouth At Bedtime     disulfiram (ANTABUSE) 250 MG tablet Take 250 mg by mouth daily     acetaminophen (TYLENOL) 325 MG tablet Take 2 tablets (650 mg) by mouth every 6 hours as needed for mild pain     busPIRone (BUSPAR) 10 MG tablet Take 2 tablets (20 mg) by mouth 3 times daily     gabapentin (NEURONTIN) 800 MG tablet Take 1 tablet (800 mg) by mouth 4 times daily     sertraline (ZOLOFT) 100 MG tablet Take 2 tablets (200 mg) by mouth daily     traZODone (DESYREL) 100 MG tablet Take 1-2 tablets (100-200 mg) by mouth nightly as needed for sleep     cloNIDine (CATAPRES) 0.1 MG tablet Take 1 tablet (0.1 mg) by mouth 2 times daily     amLODIPine (NORVASC) 10 MG tablet Take 1 tablet (10 mg) by mouth daily     multivitamin, therapeutic with minerals (THERA-VIT-M) TABS tablet Take 1 tablet by mouth daily     omeprazole (PRILOSEC) 40 MG capsule Take 1 capsule (40 mg) by mouth daily     No current facility-administered medications for this encounter.      Facility-Administered Medications Ordered in Other Encounters   Medication     Self Administer Medications: Behavioral Services          Current Outpatient Prescriptions on File Prior to  Encounter:  azithromycin (ZITHROMAX Z-MAHAMED) 250 MG tablet Two tablets on the first day, then one tablet daily for the next 4 days   cyclobenzaprine (FLEXERIL) 5 MG tablet Take 1 tablet (5 mg) by mouth 3 times daily as needed for muscle spasms   hydrOXYzine (ATARAX) 25 MG tablet Take 2 tablets (50 mg) by mouth every 6 hours as needed for anxiety   ARIPiprazole (ABILIFY) 2 MG tablet Take 1 tablet (2 mg) by mouth At Bedtime   disulfiram (ANTABUSE) 250 MG tablet Take 250 mg by mouth daily   acetaminophen (TYLENOL) 325 MG tablet Take 2 tablets (650 mg) by mouth every 6 hours as needed for mild pain   busPIRone (BUSPAR) 10 MG tablet Take 2 tablets (20 mg) by mouth 3 times daily   gabapentin (NEURONTIN) 800 MG tablet Take 1 tablet (800 mg) by mouth 4 times daily   sertraline (ZOLOFT) 100 MG tablet Take 2 tablets (200 mg) by mouth daily   traZODone (DESYREL) 100 MG tablet Take 1-2 tablets (100-200 mg) by mouth nightly as needed for sleep   cloNIDine (CATAPRES) 0.1 MG tablet Take 1 tablet (0.1 mg) by mouth 2 times daily   amLODIPine (NORVASC) 10 MG tablet Take 1 tablet (10 mg) by mouth daily   multivitamin, therapeutic with minerals (THERA-VIT-M) TABS tablet Take 1 tablet by mouth daily   omeprazole (PRILOSEC) 40 MG capsule Take 1 capsule (40 mg) by mouth daily     Current Facility-Administered Medications on File Prior to Encounter:  Self Administer Medications: Behavioral Services          Allergies:   No Known Allergies         Psychiatric Examination:     Weight is 0 lbs 0 oz  There is no height or weight on file to calculate BMI.    Appearance:  awake, alert and adequately groomed  Attitude:  cooperative  Eye Contact:  good  Mood:  better  Affect:  appropriate and in normal range and mood congruent  Speech:  clear, coherent rate /rhythm are good  Psychomotor Behavior:  no evidence of tardive dyskinesia, dystonia, or tics and intact station, gait and muscle tone  Throught Process:  logical  Associations:  no loose  associations  Thought Content:  no evidence of suicidal ideation or homicidal ideation, no evidence of psychotic thought, no auditory hallucinations present and no visual hallucinations present  Insight:  good  Judgement:  intact  Oriented to:  time, person, and place  Attention Span and Concentration:  intact  Recent and Remote Memory:  intact  Language fund of knowledge are adequate               Labs:     Lab Results   Component Value Date    NTBNPI 151 11/29/2015     Lab Results   Component Value Date    WBC 6.9 06/28/2017    HGB 17.1 06/28/2017    HCT 49.7 06/28/2017    MCV 88 06/28/2017     06/28/2017     Lab Results   Component Value Date    TSH 6.12 (H) 11/28/2016           DIagnoses:   mdd  recurent without psychsois   alcohol dependence  nicho       Plan:   Continue present meds   phq 9 ;14   Will increase abilify 4mg po qd    10/11 with DR BASILIO  F/U DR COTTRELL 8/15/17  PCP IN 3 WEEKS  F/u as per counselours    Able to give informed consent:  YES       Discussed Risks/Benefits/Side Effects/Alternatives: YES      Discussed with patient many issues of addiction,triggers/ relapse, and establishing a solid recovery program.

## 2017-08-01 NOTE — PROGRESS NOTES
TREATMENT COMPLETION/AFTERCARE PLANNING:    D) Patient and counselor met to review treatment achievements and finish planning aftercare. Client has completed all treatment plan interventions and reached goals necessary for approved discharge.  I) Counselor and patient. 1:1  A) Patient seems to accept the challenge of recovery and has insight and skills to be successful.  P) Patient successfully completed [facility]. Pt will discharge [facility] on 8/2/2017. Client will transition to community and Phase II aftercare.       ARLENE Castorena

## 2017-08-04 NOTE — PROGRESS NOTES
EVALUATION COUNSELOR:  KYUNG Ha.  TREATMENT COUNSELORS:  KYUNG Duff, [facility], and KYUNG Green, [facility].  REFERRAL SOURCE:  Self.    PROGRAM:  [facility].     ADMISSION DIAGNOSIS:  Alcohol use disorder, severe, F10.20  DISCHARGE DIAGNOSIS:  Alcohol use disorder, severe, F10.20.  DISCHARGE STATUS:  The patient successfully completed program with staff approval.    LAST USE DATE: as client reported June 28, 2017.  DAYS OF TREATMENT COMPLETED:  [facility] 28 days.      PRESENTING INFORMATION:  The patient was admitted to [address] [place] [address] at [facility].  The patient is a 26-year-old male with a history of alcohol abuse and substance dependency.  The patient presented himself to the [facility] Emergency Department seeking treatment for his alcoholism.  The patient stated that his last period of sobriety was about a month and a half ago when he went through treatment but relapsed a week after completion.  The patient stated that he is currently binging and has been for the last month and a half and states that he has been drinking a 1.75 liter of vodka a day for the last 2 weeks.  Chemical dependency evaluation and assessment was completed.  Patient met the criteria for the [facility] Dependency Treatment Program and once medically stable he was transferred to that program.  The patient was admitted on 07/05/2017.    SERVICES PROVIDED:  Services include assessment, treatment planning, education regarding chemical dependency, individual, group and family therapies, spiritual care counseling, and workshops dealing with the issues of depression, anxiety, relapse prevention and relationships.      ISSUES ADDRESSED IN TREATMENT:  Dimension 1:  Acute intoxication and withdrawal.  Admission risk factor 0.  Discharge risk factor 0.   Patient had no issues in this area.    Dimension  2:  Biomedical.  Admission risk factor 1.  Discharge risk factor 1.  The patient has a history of high blood pressure.  The patient needs to stay med compliant.  The patient needs to follow through on all recommendations by his medical doctor.      Dimension 3:  Emotional behavioral.  Admission risk factor 1.  Discharge risk factor 0.  Upon entering treatment the patient reported diagnoses of depression and anxiety.  The patient saw the staff psychiatrist.  The patient stayed med compliant throughout the treatment program.  The patient was given the assignment to read Anxiety and Recovery From Chemical Dependency.  The patient wrote a 2 page reflection paper on how this related to him.  The patient shared this in group and received positive feedback from his peers.  Upon entering treatment the patient reported that he had done things in his addictive lifestyle that he struggles to forgive himself for.  The patient was given the assignment to read Forgiveness.  The patient wrote 2 pages on the things that he needed to forgive himself for and shared these in group.  This allowed the patient to start the healing process.  As part of the chemical dependency evaluation and assessment, the patient participated in a suicide risk screening and was rated as being a low or no risk for suicide.  At no time during the treatment program did the patient have any suicidal ideation.    Dimension 4:  Readiness to change.  Admission risk factor 2.  Discharge risk factor 0.  Upon entering treatment the patient lacked internal motivation to change his lifestyle.  The patient was asked to prepare and present his drug use history, consequences of his use, and the values that he has violated.  This assignment allowed the patient to increase his internal motivation to change his lifestyle to maintain long-term sobriety.  Upon entering treatment the patient had a history of relapse which indicated a lack of awareness and insight into the  first step.  The patient was given the assignment to read pages 58 and 59 in the Big Book and chapter relating to the step 1.  The patient was able to accomplish this assignment and presented his first step assignment in group.  The patient received very positive feedback from his peers.  The patient admits that he struggles with surrender to his addiction which would lead him into long-term recovery.  The patient was given the assignment to read Surrender To Win and The [place] Way.  The patient completed these assignments and presented them in group.    Dimension 5:  Relapse, continued use potential.  Admission risk factor 4.  Discharge risk factor 3.   Upon entering treatment the patient lacked knowledge of his main triggers and warning signs.  The patient was given the assignment of putting it all together.  The patient shared this in group.  Patient was able to identify his main triggers and warning signs and coping skills he needed to implement to combat these triggers.  The patient has a history of completing 4 previous treatments and not remaining sober long-term.  The patient was given the book 12 Stupid Things That Mess Up My Recovery.  Patient shared a 2 page reflection paper.  This allowed the patient to gain knowledge and awareness of his negative self talk and the pitfalls that he has in the past that allow him to resume drinking.  The patient has a history of relapse and lacks relapse prevention skills.  The patient was given the work booklet Quitting Alcohol.  The patient completed this assignment.  The patient reported that he lacks insight into his relapse process.  The patient was given the assignment to complete the packet of feelings and defenses, emotional maturity and tiffanie baby.  The patient completed these assignments.  The patient gained valuable awareness of the insight into his personal relapse process.     Dimension 6:  Recovery environment.  Admission risk factor 4.  Discharge risk  factor 2.  Upon entering treatment the patient lacked a sober support network.  Patient attended five 12-step meetings per week while in treatment.  The patient obtained a temporary sponsor through the [facility] alumni office.  The patient read The Big Book on a daily basis while in treatment.  The patient was kicked out of his mother's and grandmother's house prior to treatment.  The patient's mother and grandmother participated in the family week program.  The patient will be returning home upon the completion of treatment.  The patient is on probation in [facility] for a DUI.  Patient contacted his .  His  was very thankful that he was in treatment.  The patient was in contact and will be in contact the day after he graduates.  He had set up a meeting with his .      STRENGTHS:  The patient exhibited a positive and open attitude throughout the treatment process.  Patient demonstrated consistent support toward his peers.  The patient gained valuable insight into chemical dependency.    PROGNOSIS:  Favorable.    LIVING ARRANGEMENTS AT DISCHARGE:  Patient returning home to live with his mother and grandmother.    CONTINUING CARE RECOMMENDATIONS AND REFERRALS:    1.  Patient needs to abstain from all mood altering chemicals.    2.  Patient needs to attend a minimum of three 12-step meetings per week.    3.  The patient needs to build an open honest relationship with his sponsor.   4.  Patient must complete phase 2 of the aftercare component here at [facility].          KYUNG Echeverria, [facility]    D:  08/02/2017 08:26 T:  08/02/2017 19:36  Document:  [id number]

## 2017-08-07 ENCOUNTER — TELEPHONE (OUTPATIENT)
Dept: ADDICTION MEDICINE | Facility: CLINIC | Age: 27
End: 2017-08-07

## 2017-08-07 DIAGNOSIS — F10.20 ALCOHOL USE DISORDER, SEVERE, DEPENDENCE (H): Primary | Chronic | ICD-10-CM

## 2017-08-07 NOTE — TELEPHONE ENCOUNTER
Pt cancel his appt for  8/9, he's requestign a call back from Dr. Luz to discuss Naltrexone tablets.  Pt contact  Info:  [phone]        Sandro Sanford

## 2017-08-10 ENCOUNTER — HOSPITAL ENCOUNTER (OUTPATIENT)
Dept: BEHAVIORAL HEALTH | Facility: CLINIC | Age: 27
End: 2017-08-10
Attending: SOCIAL WORKER
Payer: MEDICAID

## 2017-08-10 ENCOUNTER — OFFICE VISIT (OUTPATIENT)
Dept: INTERNAL MEDICINE | Facility: CLINIC | Age: 27
End: 2017-08-10
Payer: MEDICAID

## 2017-08-10 VITALS
WEIGHT: 315 LBS | SYSTOLIC BLOOD PRESSURE: 108 MMHG | DIASTOLIC BLOOD PRESSURE: 76 MMHG | OXYGEN SATURATION: 96 % | BODY MASS INDEX: 41.75 KG/M2 | TEMPERATURE: 98.3 F | HEART RATE: 95 BPM | HEIGHT: 73 IN

## 2017-08-10 DIAGNOSIS — Z13.6 CARDIOVASCULAR SCREENING; LDL GOAL LESS THAN 130: ICD-10-CM

## 2017-08-10 DIAGNOSIS — M54.5 CHRONIC LOW BACK PAIN, UNSPECIFIED BACK PAIN LATERALITY, WITH SCIATICA PRESENCE UNSPECIFIED: ICD-10-CM

## 2017-08-10 DIAGNOSIS — E66.9 OBESITY, UNSPECIFIED OBESITY SEVERITY, UNSPECIFIED OBESITY TYPE: ICD-10-CM

## 2017-08-10 DIAGNOSIS — F10.14 ALCOHOL ABUSE WITH ALCOHOL-INDUCED MOOD DISORDER (H): ICD-10-CM

## 2017-08-10 DIAGNOSIS — F33.1 MAJOR DEPRESSIVE DISORDER, RECURRENT EPISODE, MODERATE (H): ICD-10-CM

## 2017-08-10 DIAGNOSIS — F10.20 ALCOHOL USE DISORDER, SEVERE, DEPENDENCE (H): Primary | Chronic | ICD-10-CM

## 2017-08-10 DIAGNOSIS — F10.10 ALCOHOL ABUSE: ICD-10-CM

## 2017-08-10 DIAGNOSIS — F19.20 CHEMICAL DEPENDENCY (H): ICD-10-CM

## 2017-08-10 DIAGNOSIS — F41.9 ANXIETY: Chronic | ICD-10-CM

## 2017-08-10 DIAGNOSIS — R74.8 ELEVATED LIVER ENZYMES: ICD-10-CM

## 2017-08-10 DIAGNOSIS — F10.220 ACUTE ALCOHOLIC INTOXICATION IN ALCOHOLISM, UNCOMPLICATED (H): ICD-10-CM

## 2017-08-10 DIAGNOSIS — F98.8 ATTENTION DEFICIT DISORDER, UNSPECIFIED HYPERACTIVITY PRESENCE: ICD-10-CM

## 2017-08-10 DIAGNOSIS — I10 ESSENTIAL HYPERTENSION WITH GOAL BLOOD PRESSURE LESS THAN 140/90: ICD-10-CM

## 2017-08-10 DIAGNOSIS — F90.9 ATTENTION DEFICIT HYPERACTIVITY DISORDER (ADHD), UNSPECIFIED ADHD TYPE: ICD-10-CM

## 2017-08-10 DIAGNOSIS — G89.29 CHRONIC LOW BACK PAIN, UNSPECIFIED BACK PAIN LATERALITY, WITH SCIATICA PRESENCE UNSPECIFIED: ICD-10-CM

## 2017-08-10 DIAGNOSIS — K21.9 GASTROESOPHAGEAL REFLUX DISEASE WITHOUT ESOPHAGITIS: ICD-10-CM

## 2017-08-10 PROBLEM — Z79.899 CONTROLLED SUBSTANCE AGREEMENT SIGNED: Status: ACTIVE | Noted: 2017-08-10

## 2017-08-10 PROCEDURE — H2035 A/D TX PROGRAM, PER HOUR: HCPCS | Mod: HQ

## 2017-08-10 PROCEDURE — 99215 OFFICE O/P EST HI 40 MIN: CPT | Performed by: NURSE PRACTITIONER

## 2017-08-10 RX ORDER — DEXTROAMPHETAMINE SACCHARATE, AMPHETAMINE ASPARTATE MONOHYDRATE, DEXTROAMPHETAMINE SULFATE AND AMPHETAMINE SULFATE 7.5; 7.5; 7.5; 7.5 MG/1; MG/1; MG/1; MG/1
30 CAPSULE, EXTENDED RELEASE ORAL 2 TIMES DAILY
Qty: 60 CAPSULE | Refills: 0 | Status: SHIPPED | OUTPATIENT
Start: 2017-08-10 | End: 2017-09-06

## 2017-08-10 RX ORDER — BUSPIRONE HYDROCHLORIDE 10 MG/1
20 TABLET ORAL 3 TIMES DAILY
Qty: 180 TABLET | Refills: 1 | Status: SHIPPED | OUTPATIENT
Start: 2017-08-10 | End: 2017-09-06

## 2017-08-10 RX ORDER — OMEPRAZOLE 40 MG/1
40 CAPSULE, DELAYED RELEASE ORAL DAILY
Qty: 30 CAPSULE | Refills: 1 | Status: SHIPPED | OUTPATIENT
Start: 2017-08-10 | End: 2017-09-06

## 2017-08-10 RX ORDER — SERTRALINE HYDROCHLORIDE 100 MG/1
200 TABLET, FILM COATED ORAL DAILY
Qty: 60 TABLET | Refills: 1 | Status: SHIPPED | OUTPATIENT
Start: 2017-08-10 | End: 2017-09-06

## 2017-08-10 RX ORDER — GABAPENTIN 800 MG/1
800 TABLET ORAL 4 TIMES DAILY
Qty: 120 TABLET | Refills: 1 | Status: SHIPPED | OUTPATIENT
Start: 2017-08-10 | End: 2017-09-06

## 2017-08-10 RX ORDER — TRAZODONE HYDROCHLORIDE 100 MG/1
100-200 TABLET ORAL
Qty: 60 TABLET | Refills: 1 | Status: SHIPPED | OUTPATIENT
Start: 2017-08-10 | End: 2017-09-06

## 2017-08-10 RX ORDER — METHOCARBAMOL 750 MG/1
750 TABLET, FILM COATED ORAL 3 TIMES DAILY PRN
Qty: 90 TABLET | Refills: 1 | Status: SHIPPED | OUTPATIENT
Start: 2017-08-10 | End: 2017-09-06

## 2017-08-10 RX ORDER — NALTREXONE HYDROCHLORIDE 50 MG/1
50 TABLET, FILM COATED ORAL DAILY
Qty: 30 TABLET | Refills: 1 | Status: SHIPPED | OUTPATIENT
Start: 2017-08-10 | End: 2017-09-06

## 2017-08-10 RX ORDER — AMLODIPINE BESYLATE 10 MG/1
10 TABLET ORAL DAILY
Qty: 30 TABLET | Refills: 3 | Status: SHIPPED | OUTPATIENT
Start: 2017-08-10 | End: 2017-10-11

## 2017-08-10 RX ORDER — CLONIDINE HYDROCHLORIDE 0.1 MG/1
0.1 TABLET ORAL 2 TIMES DAILY
Qty: 60 TABLET | Refills: 3 | Status: SHIPPED | OUTPATIENT
Start: 2017-08-10 | End: 2017-10-11

## 2017-08-10 RX ORDER — ARIPIPRAZOLE 2 MG/1
4 TABLET ORAL AT BEDTIME
Qty: 60 TABLET | Refills: 1 | Status: SHIPPED | OUTPATIENT
Start: 2017-08-10 | End: 2017-09-06

## 2017-08-10 NOTE — NURSING NOTE
"Chief Complaint   Patient presents with     Recheck Medication     also needs refills on his meds pt just got out of treatment x 2 weeks ago       Initial /76 (BP Location: Left arm, Patient Position: Sitting, Cuff Size: Adult Large)  Pulse 95  Temp 98.3  F (36.8  C) (Oral)  Ht 6' 1\" (1.854 m)  Wt (!) 325 lb (147.4 kg)  SpO2 96%  BMI 42.88 kg/m2 Estimated body mass index is 42.88 kg/(m^2) as calculated from the following:    Height as of this encounter: 6' 1\" (1.854 m).    Weight as of this encounter: 325 lb (147.4 kg).  Medication Reconciliation: complete    "

## 2017-08-10 NOTE — PROGRESS NOTES
.  SUBJECTIVE:                                                    Nickolas Lang is a 26 year old male who presents to clinic today for the following health issues:    Chief Complaint   Patient presents with     Recheck Medication     also needs refills on his meds pt just got out of treatment x 2 weeks ago    [facility] in patient and now doing phase 2 - weekly Thursday meeting and AA meeting   Vicitrol shot in hospital   antibuse currently     Psych 11 th October   [facility]   adderall XR 30 mg twice daily     Was on Vyvanse and did not tolerate this medication - wearing off to fast and not working well     Robaxin for back pain                  Problem list and histories reviewed & adjusted, as indicated.  Additional history: as documented    Patient Active Problem List   Diagnosis     Morbid obesity (H)     Alcohol dependence with withdrawal (H)     Chemical dependency (H)     Essential hypertension     Suicidal ideation     Elevated liver enzymes     Hepatic steatosis     Alcohol abuse     Obesity     Insomnia, unspecified type     Anxiety     Gastroesophageal reflux disease without esophagitis     Alcohol abuse with alcohol-induced mood disorder (H)     Attention deficit hyperactivity disorder (ADHD), unspecified ADHD type     Alcohol use disorder, severe, dependence (H)     Past Surgical History:   Procedure Laterality Date     Deviated septum repair       PYLOROMYOTOMY SURGERY  as an infant      SHOULDER SURGERY Left 07/29/2016    Removal of cartilage       Social History   Substance Use Topics     Smoking status: Current Some Day Smoker     Packs/day: 0.00     Years: 4.00     Types: Cigarettes     Last attempt to quit: 11/1/2015     Smokeless tobacco: Never Used     Alcohol use No      Comment: sober since 6/28/17     Family History   Problem Relation Age of Onset     Neurologic Disorder Mother      Multiple Sclerosis     Depression Mother      Anxiety Disorder Mother       "Alcohol/Drug Father      Substance Abuse Father      Depression Maternal Grandmother      Anxiety Disorder Maternal Grandmother      Hypertension Maternal Grandmother      Substance Abuse Maternal Grandfather      Alcohol/Drug Paternal Grandfather              Reviewed and updated as needed this visit by clinical staffTobacco  Allergies  Meds  Med Hx  Surg Hx  Fam Hx  Soc Hx      Reviewed and updated as needed this visit by Provider         ROS:  Constitutional, HEENT, cardiovascular, pulmonary, GI, , musculoskeletal, neuro, skin, endocrine and psych systems are negative, except as otherwise noted.      OBJECTIVE:   /76 (BP Location: Left arm, Patient Position: Sitting, Cuff Size: Adult Large)  Pulse 95  Temp 98.3  F (36.8  C) (Oral)  Ht 6' 1\" (1.854 m)  Wt (!) 325 lb (147.4 kg)  SpO2 96%  BMI 42.88 kg/m2  Body mass index is 42.88 kg/(m^2).  GENERAL: alert and no distress  RESP: lungs clear to auscultation - no rales, rhonchi or wheezes  CV: regular rate and rhythm  MS: no gross musculoskeletal defects noted, no edema  NEURO: Normal strength and tone, mentation intact and speech normal  PSYCH: mentation appears normal, affect normal/bright    Diagnostic Test Results:  Future labs     ASSESSMENT/PLAN:             1. Essential hypertension with goal blood pressure less than 140/90  In good control  - amLODIPine (NORVASC) 10 MG tablet; Take 1 tablet (10 mg) by mouth daily  Dispense: 30 tablet; Refill: 3  - cloNIDine (CATAPRES) 0.1 MG tablet; Take 1 tablet (0.1 mg) by mouth 2 times daily  Dispense: 60 tablet; Refill: 3  - Comprehensive metabolic panel; Future  - Lipid panel reflex to direct LDL; Future  - TSH with free T4 reflex; Future  - CBC with platelets differential; Future    2. Alcohol use disorder, severe, dependence (H)  Currently in remission  - Comprehensive metabolic panel; Future  - gabapentin (NEURONTIN) 800 MG tablet; Take 1 tablet (800 mg) by mouth 4 times daily  Dispense: 120 tablet; " Refill: 1  - omeprazole (PRILOSEC) 40 MG capsule; Take 1 capsule (40 mg) by mouth daily  Dispense: 30 capsule; Refill: 1  - traZODone (DESYREL) 100 MG tablet; Take 1-2 tablets (100-200 mg) by mouth nightly as needed for sleep  Dispense: 60 tablet; Refill: 1    3. Alcohol abuse    - Comprehensive metabolic panel; Future    4. Attention deficit hyperactivity disorder (ADHD), unspecified ADHD type  Did not tolerate vyvanse - did not work well  adderall worked well     5. Alcohol abuse with alcohol-induced mood disorder (H)    - Comprehensive metabolic panel; Future    6. Gastroesophageal reflux disease without esophagitis  Needs refill on medication     7. Obesity, unspecified obesity severity, unspecified obesity type      8. Elevated liver enzymes    - Comprehensive metabolic panel; Future    9. Chemical dependency (H)  In treatment     10. Anxiety    - gabapentin (NEURONTIN) 800 MG tablet; Take 1 tablet (800 mg) by mouth 4 times daily  Dispense: 120 tablet; Refill: 1  - traZODone (DESYREL) 100 MG tablet; Take 1-2 tablets (100-200 mg) by mouth nightly as needed for sleep  Dispense: 60 tablet; Refill: 1  - sertraline (ZOLOFT) 100 MG tablet; Take 2 tablets (200 mg) by mouth daily  Dispense: 60 tablet; Refill: 1  - busPIRone (BUSPAR) 10 MG tablet; Take 2 tablets (20 mg) by mouth 3 times daily  Dispense: 180 tablet; Refill: 1    11. Chronic low back pain, unspecified back pain laterality, with sciatica presence unspecified    - methocarbamol (ROBAXIN) 750 MG tablet; Take 1 tablet (750 mg) by mouth 3 times daily as needed for muscle spasms  Dispense: 90 tablet; Refill: 1    12. Acute alcoholic intoxication in alcoholism, uncomplicated (H)    - omeprazole (PRILOSEC) 40 MG capsule; Take 1 capsule (40 mg) by mouth daily  Dispense: 30 capsule; Refill: 1    13. Major depressive disorder, recurrent episode, moderate (H)  Plan for psych to take over prescription of psych medication in October  - ARIPiprazole (ABILIFY) 2 MG  tablet; Take 2 tablets (4 mg) by mouth At Bedtime  Dispense: 60 tablet; Refill: 1    14. Attention deficit disorder, unspecified hyperactivity presence    - amphetamine-dextroamphetamine (ADDERALL XR) 30 MG per 24 hr capsule; Take 1 capsule (30 mg) by mouth 2 times daily  Dispense: 60 capsule; Refill: 0    15. CARDIOVASCULAR SCREENING; LDL GOAL LESS THAN 130    - Comprehensive metabolic panel; Future  - Lipid panel reflex to direct LDL; Future    Patient Instructions   Refill on medication     Lab fasting in future           ROXI Santiago [facility]

## 2017-08-10 NOTE — LETTER
[place] [address]  [facility] [address]  [phone]        January 2, 2018    Nickolas Lang  [address]  [facility] [address]              Dear Nickolas Lang    This is to remind you that your fasting lab is due.    You may call our office at [phone] to schedule an appointment.    Please disregard this notice if you have already had your labs drawn or made an appointment.        Sincerely,        Nicolette Frye CNP

## 2017-08-10 NOTE — TELEPHONE ENCOUNTER
Patient reports increase craving last week of month of Vivitrol injection.  Wonders about oral for a few days until next appt.  Also discussed seeing window available for insurance coverage for giving less than q 30day.   rx for oral naltrexone written.  Patient will follow up.

## 2017-08-10 NOTE — MR AVS SNAPSHOT
After Visit Summary   8/10/2017    Nickolas Lang    MRN: [id number]           Patient Information     Date Of Birth          1990        Visit Information        Provider Department      8/10/2017 10:00 AM Nicolette Frye APRN [facility]        Today's Diagnoses     Alcohol use disorder, severe, dependence (H)    -  1    Essential hypertension with goal blood pressure less than 140/90        Alcohol abuse        Attention deficit hyperactivity disorder (ADHD), unspecified ADHD type        Alcohol abuse with alcohol-induced mood disorder (H)        Gastroesophageal reflux disease without esophagitis        Obesity, unspecified obesity severity, unspecified obesity type        Elevated liver enzymes        Chemical dependency (H)        Anxiety        Chronic low back pain, unspecified back pain laterality, with sciatica presence unspecified        Acute alcoholic intoxication in alcoholism, uncomplicated (H)        Major depressive disorder, recurrent episode, moderate (H)        Attention deficit disorder, unspecified hyperactivity presence        CARDIOVASCULAR SCREENING; LDL GOAL LESS THAN 130          Care Instructions    Refill on medication     Lab fasting in future               Follow-ups after your visit        Your next 10 appointments already scheduled     Aug 15, 2017 12:00 PM CDT   Level O with UR CH 02   [facility] Infusion [facility] ([facility])    [address] [place] [address]   [phone]              Future tests that were ordered for you today     Open Future Orders        Priority Expected Expires Ordered    Comprehensive metabolic panel Routine  12/28/2017 8/10/2017    Lipid panel reflex to direct LDL Routine  12/28/2017 8/10/2017    TSH with free T4 reflex Routine  12/28/2017 8/10/2017    CBC with platelets differential Routine  12/28/2017 8/10/2017            Who to  "contact     If you have questions or need follow up information about today's clinic visit or your schedule please contact [facility] directly at [phone].  Normal or non-critical lab and imaging results will be communicated to you by MyChart, letter or phone within 4 business days after the clinic has received the results. If you do not hear from us within 7 days, please contact the clinic through MyChart or phone. If you have a critical or abnormal lab result, we will notify you by phone as soon as possible.  Submit refill requests through [employer] or call your pharmacy and they will forward the refill request to us. Please allow 3 business days for your refill to be completed.          Additional Information About Your Visit        [employer] Information     [employer] lets you send messages to your doctor, view your test results, renew your prescriptions, schedule appointments and more. To sign up, go to [email or web address] . Click on \"Log in\" on the left side of the screen, which will take you to the Welcome page. Then click on \"Sign up Now\" on the right side of the page.     You will be asked to enter the access code listed below, as well as some personal information. Please follow the directions to create your username and password.     Your access code is: [email or web address]  Expires: 8/15/2017  5:29 PM     Your access code will  in 90 days. If you need help or a new code, please call your [facility] clinic or [phone].        Care EveryWhere ID     This is your Care EveryWhere ID. This could be used by other organizations to access your [facility] medical records  [id number]        Your Vitals Were     Pulse Temperature Height Pulse Oximetry BMI (Body Mass Index)       95 98.3  F (36.8  C) (Oral) 6' 1\" (1.854 m) 96% 42.88 kg/m2        Blood Pressure from Last 3 Encounters:   08/10/17 108/76   17 128/87   17 113/78    Weight from Last 3 Encounters:   08/10/17 (!) 325 lb (147.4 " kg)   07/27/17 (!) 315 lb 4.8 oz (143 kg)   07/12/17 (!) 319 lb (144.7 kg)                 Today's Medication Changes          These changes are accurate as of: 8/10/17 11:11 AM.  If you have any questions, ask your nurse or doctor.               These medicines have changed or have updated prescriptions.        Dose/Directions    amphetamine-dextroamphetamine 30 MG per 24 hr capsule   Commonly known as:  ADDERALL XR   This may have changed:  medication strength   Used for:  Attention deficit disorder, unspecified hyperactivity presence   Changed by:  Nicolette Frye APRN CNP        Dose:  30 mg   Take 1 capsule (30 mg) by mouth 2 times daily   Quantity:  60 capsule   Refills:  0       * ROBAXIN PO   This may have changed:  Another medication with the same name was added. Make sure you understand how and when to take each.   Changed by:  Nicolette Frye APRN CNP        Dose:  750 mg   Take 750 mg by mouth 3 times daily   Refills:  0       * methocarbamol 750 MG tablet   Commonly known as:  ROBAXIN   This may have changed:  You were already taking a medication with the same name, and this prescription was added. Make sure you understand how and when to take each.   Used for:  Chronic low back pain, unspecified back pain laterality, with sciatica presence unspecified   Changed by:  Nicolette Frye APRN CNP        Dose:  750 mg   Take 1 tablet (750 mg) by mouth 3 times daily as needed for muscle spasms   Quantity:  90 tablet   Refills:  1       * Notice:  This list has 2 medication(s) that are the same as other medications prescribed for you. Read the directions carefully, and ask your doctor or other care provider to review them with you.         Where to get your medicines      These medications were sent to [facility]/pharmacy #[id number] [place], [place] - [address], [place] [address]     Phone:  [phone]     amLODIPine 10 MG tablet    ARIPiprazole 2 MG tablet    busPIRone  10 MG tablet    cloNIDine 0.1 MG tablet    gabapentin 800 MG tablet    methocarbamol 750 MG tablet    omeprazole 40 MG capsule    sertraline 100 MG tablet    traZODone 100 MG tablet         Some of these will need a paper prescription and others can be bought over the counter.  Ask your nurse if you have questions.     Bring a paper prescription for each of these medications     amphetamine-dextroamphetamine 30 MG per 24 hr capsule                Primary Care Provider Office Phone # Fax #    Nicolette Mallory Melva, ROXI [facility] [phone]       [address] [facility] [address]        Equal Access to Services     [facility]: Hadii aad ku hadasho [place], waaxda luqadaha, qaybta kaalmada adetashia, shawna espinoza . So [facility] [phone].    ATENCIÓN: Si habla español, tiene a miller disposición servicios gratuitos de asistencia lingüística. [facility] [phone].    We comply with applicable federal civil rights laws and [place] laws. We do not discriminate on the basis of race, color, national origin, age, disability sex, sexual orientation or gender identity.            Thank you!     Thank you for choosing [facility]  for your care. Our goal is always to provide you with excellent care. Hearing back from our patients is one way we can continue to improve our services. Please take a few minutes to complete the written survey that you may receive in the mail after your visit with us. Thank you!             Your Updated Medication List - Protect others around you: Learn how to safely use, store and throw away your medicines at www.disposemymeds.org.          This list is accurate as of: 8/10/17 11:11 AM.  Always use your most recent med list.                   Brand Name Dispense Instructions for use Diagnosis    acetaminophen 325 MG tablet    TYLENOL    100 tablet    Take 2 tablets (650 mg) by mouth every 6 hours as needed for mild pain    Alcohol use  disorder, severe, dependence (H)       amLODIPine 10 MG tablet    NORVASC    30 tablet    Take 1 tablet (10 mg) by mouth daily    Essential hypertension with goal blood pressure less than 140/90       amphetamine-dextroamphetamine 30 MG per 24 hr capsule    ADDERALL XR    60 capsule    Take 1 capsule (30 mg) by mouth 2 times daily    Attention deficit disorder, unspecified hyperactivity presence       ARIPiprazole 2 MG tablet    ABILIFY    60 tablet    Take 2 tablets (4 mg) by mouth At Bedtime    Major depressive disorder, recurrent episode, moderate (H)       busPIRone 10 MG tablet    BUSPAR    180 tablet    Take 2 tablets (20 mg) by mouth 3 times daily    Anxiety       cloNIDine 0.1 MG tablet    CATAPRES    60 tablet    Take 1 tablet (0.1 mg) by mouth 2 times daily    Essential hypertension with goal blood pressure less than 140/90       disulfiram 250 MG tablet    ANTABUSE     Take 250 mg by mouth daily        gabapentin 800 MG tablet    NEURONTIN    120 tablet    Take 1 tablet (800 mg) by mouth 4 times daily    Anxiety, Alcohol use disorder, severe, dependence (H)       hydrOXYzine 25 MG tablet    ATARAX    120 tablet    Take 2 tablets (50 mg) by mouth every 6 hours as needed for anxiety    Alcohol dependence with uncomplicated intoxication (H), Anxiety       multivitamin, therapeutic with minerals Tabs tablet     30 each    Take 1 tablet by mouth daily    Essential hypertension, Alcohol use disorder, severe, dependence (H)       omeprazole 40 MG capsule    priLOSEC    30 capsule    Take 1 capsule (40 mg) by mouth daily    Acute alcoholic intoxication in alcoholism, uncomplicated (H), Alcohol use disorder, severe, dependence (H)       * ROBAXIN PO      Take 750 mg by mouth 3 times daily        * methocarbamol 750 MG tablet    ROBAXIN    90 tablet    Take 1 tablet (750 mg) by mouth 3 times daily as needed for muscle spasms    Chronic low back pain, unspecified back pain laterality, with sciatica presence  unspecified       sertraline 100 MG tablet    ZOLOFT    60 tablet    Take 2 tablets (200 mg) by mouth daily    Anxiety       traZODone 100 MG tablet    DESYREL    60 tablet    Take 1-2 tablets (100-200 mg) by mouth nightly as needed for sleep    Alcohol use disorder, severe, dependence (H), Anxiety       * Notice:  This list has 2 medication(s) that are the same as other medications prescribed for you. Read the directions carefully, and ask your doctor or other care provider to review them with you.

## 2017-08-10 NOTE — LETTER
[facility]    08/10/17    Patient: Nickolas Lang  YOB: 1990  Medical Record Number: [id number]                                                                  Controlled Substance Agreement  I understand that my care provider has prescribed controlled substances (narcotics, tranquilizers, and/or stimulants) to help manage my condition(s).  I am taking this medicine to help me function or work.  I know that this is strong medicine.  It could have serious side effects and even cause a dependency on the drug.  If I stop these medicines suddenly, I could have severe withdrawal symptoms.    The risks, benefits, and side effects of these medication(s) were explained to me.  I agree that:  1. I will take part in other treatments as advised by my provider.  This may be psychiatry or counseling, physical therapy, behavioral therapy, group treatment, or a referral to a pain clinic.  I will reduce or stop my medicine when my provider tells me to do so.   2. I will take my medicines as prescribed.  I will not change the dose or schedule unless my provider tells me to.  There will be no refills if I  run out early.   I may be contacted at any time without warning and asked to complete a drug test or pill count.   3. I will keep all my appointments at the clinic.  If I miss appointments or fail to follow instructions, my provider may stop my medicine.  4. I will not ask other providers to prescribe controlled substances. And I will not accept controlled substances from other people. If I need another prescribed controlled substance for a new reason, I will notify my provider within one business day.  5. If I enroll in the [place] Medical Marijuana program, I will tell my provider.  I will also sign an agreement to share my medical records with my provider.  6. I will use one pharmacy to fill all of my controlled substance prescriptions.  If my prescription is mailed to my pharmacy, it may  take 5 to 7 days for my medicine to be ready.  7. I understand that my provider, clinic care team, and pharmacy can track controlled substance prescriptions from other providers through a central database (prescription monitoring program).  8. I will bring in my list of medications (or my medicine bottles) each time I come to the clinic.  REV- 04/2016                                                                                                                                            Page 1 of 2      [facility]    08/10/17    Patient: Nickolas Lang  YOB: 1990  Medical Record Number: [id number]    9. Refills of controlled substances will be made only during office hours.  It is up to me to make sure that I do not run out of my medicines on weekends or holidays.    10. I am responsible for my prescriptions.  If the medicine is lost or stolen, it will not be replaced.   I also agree not to share these medicines with anyone.  11. I agree to not use ANY illegal or recreational drugs.  This includes marijuana, cocaine, bath salts or other drugs.  I agree not to use alcohol unless my provider says I may.  I agree to give urine samples whenever asked.  If I fail to give a urine sample, the provider may stop my medicine.     12. I will tell my nurse or provider right away if I become pregnant or have a new medical problem treated outside of [facility].  13. I understand that this medicine can affect my thinking and judgment.  It may be unsafe for me to drive, use machinery and do dangerous tasks.  I will not do any of these things until I know how the medicine affects me.  If my dose changes, I will wait to see how it affects me.  I will contact my provider if I have concerns about medicine side effects.  I understand that if I do not follow any of the conditions above, my prescriptions or treatment may be stopped.    I agree that my provider, clinic care team, and pharmacy may  work with any city, state or federal law enforcement agency that investigates the misuse, sale, or other diversion of my controlled medicine. I will allow my provider to discuss my care with or share a copy of this agreement with any other treating provider, pharmacy or emergency room where I receive care.  I agree to give up (waive) any right of privacy or confidentiality with respect to these authorizations.   I have read this agreement and have asked questions about anything I did not understand.   ___________________________________    ___________________________  Patient Signature                                                           Date and Time  ___________________________________     ____________________________  Witness                                                                            Date and Time  ___________________________________  ROXI Santiago CNP  REV-  04/2016                                                                                                                                                                 Page 2 of 2

## 2017-08-15 ENCOUNTER — INFUSION THERAPY VISIT (OUTPATIENT)
Dept: INFUSION THERAPY | Facility: CLINIC | Age: 27
End: 2017-08-15
Attending: PEDIATRICS
Payer: MEDICAID

## 2017-08-15 VITALS
OXYGEN SATURATION: 97 % | SYSTOLIC BLOOD PRESSURE: 142 MMHG | DIASTOLIC BLOOD PRESSURE: 96 MMHG | TEMPERATURE: 97.8 F | RESPIRATION RATE: 18 BRPM

## 2017-08-15 DIAGNOSIS — F10.20 ALCOHOL USE DISORDER, SEVERE, DEPENDENCE (H): Primary | ICD-10-CM

## 2017-08-15 PROCEDURE — 96372 THER/PROPH/DIAG INJ SC/IM: CPT

## 2017-08-15 PROCEDURE — 25000128 H RX IP 250 OP 636: Performed by: PEDIATRICS

## 2017-08-15 RX ADMIN — NALTREXONE 380 MG: KIT at 12:39

## 2017-08-15 ASSESSMENT — PAIN SCALES - GENERAL: PAINLEVEL: MODERATE PAIN (5)

## 2017-08-15 NOTE — PROGRESS NOTES
"Infusion Nursing Note:  Nickolsa Lang presents today for vivitrol.    Patient seen by provider today: No   present during visit today: Not Applicable.    Note: Patient states he is doing well.  He did have some cravings and was given a prescription for oral naltrexone and did take a couple of doses.    Intravenous Access:  No Intravenous access/labs at this visit.    Post Infusion Assessment:  Patient tolerated injection without incident.  Vivitrol injection given in right glut without difficulty using a  2\" needle    Discharge Plan:   Patient discharged in stable condition accompanied by: self.  Departure Mode: Ambulatory.  Will return to clinic in 4 weeks.  Radha Sinha RN                        "

## 2017-08-15 NOTE — MR AVS SNAPSHOT
After Visit Summary   8/15/2017    Nickolas Lang    MRN: [id number]           Patient Information     Date Of Birth          1990        Visit Information        Provider Department      8/15/2017 12:00 PM UR [facility] 02 [facility] [place]        Today's Diagnoses     Alcohol use disorder, severe, dependence (H)    -  1       Follow-ups after your visit        Your next 10 appointments already scheduled     Aug 17, 2017  5:30 PM CDT   Treatment with ADULT LODGING PLUS B2   [facility] ([facility])    [address] [place] [address]   [phone]            Aug 24, 2017  5:30 PM CDT   Treatment with ADULT LODGING PLUS B2   [facility] ([facility])    [address] [place] [address]   [phone]            Aug 31, 2017  5:30 PM CDT   Treatment with ADULT LODGING PLUS B2   [facility] ([facility])    [address] [place] [address]   [phone]            Sep 07, 2017  5:30 PM CDT   Treatment with ADULT LODGING PLUS B2   [facility] ([facility])    [address] [place] [address]   [phone]            Sep 12, 2017  2:30 PM CDT   Level O with UR BD 01   [facility]    [address] [place] [address]   [phone]            Sep 14, 2017  5:30 PM CDT   Treatment with ADULT LODGING PLUS B2   [facility] ([facility])    [address] [place] [address]   [phone]            Sep 21, 2017  5:30 PM CDT   Treatment with ADULT LODGING PLUS B2   [facility]  "([facility])    [address] [address]   [phone]            Sep 28, 2017  5:30 PM CDT   Treatment with ADULT LODGING PLUS B2   [facility] ([facility])    [place] [address] [address]   [phone]            Oct 05, 2017  5:30 PM CDT   Treatment with ADULT LODGING PLUS B2   [facility] ([facility])    [place] [address] [address]   [phone]            Oct 12, 2017  5:30 PM CDT   Treatment with ADULT LODGING PLUS B2   [facility] ([facility])    [place] [address] [address]   [phone]              Who to contact     If you have questions or need follow up information about today's clinic visit or your schedule please contact [facility] directly at [phone].  Normal or non-critical lab and imaging results will be communicated to you by [employer], letter or phone within 4 business days after the clinic has received the results. If you do not hear from us within 7 days, please contact the clinic through [employer] or phone. If you have a critical or abnormal lab result, we will notify you by phone as soon as possible.  Submit refill requests through [employer] or call your pharmacy and they will forward the refill request to us. Please allow 3 business days for your refill to be completed.          Additional Information About Your Visit        [employer] Information     [employer] lets you send messages to your doctor, view your test results, renew your prescriptions, schedule appointments and more. To sign up, go to [email or web address] . Click on \"Log in\" on the left side of the screen, which will take you to the Welcome page. Then click on \"Sign up Now\" on the right side of " the page.     You will be asked to enter the access code listed below, as well as some personal information. Please follow the directions to create your username and password.     Your access code is: [email or web address]  Expires: 8/15/2017  5:29 PM     Your access code will  in 90 days. If you need help or a new code, please call your [facility] clinic or [phone].        Care EveryWhere ID     This is your Care EveryWhere ID. This could be used by other organizations to access your [facility] medical records  [id number]        Your Vitals Were     Temperature Respirations Pulse Oximetry             97.8  F (36.6  C) 18 97%          Blood Pressure from Last 3 Encounters:   08/15/17 (!) 142/96   08/10/17 108/76   17 128/87    Weight from Last 3 Encounters:   08/10/17 (!) 147.4 kg (325 lb)   17 (!) 143 kg (315 lb 4.8 oz)   17 (!) 144.7 kg (319 lb)              Today, you had the following     No orders found for display       Primary Care Provider Office Phone # Fax #    ROXI Santiago [facility] [phone]       [address] [facility] [address]        Equal Access to Services     ERMELINDA LOZADA : Hadii aad ku hadasho Soomaali, waaxda luqadaha, qaybta kaalmada adeegyada, shawna ledesma haynoni espinoza . So [facility] [phone].    ATENCIÓN: Si habla español, tiene a miller disposición servicios gratuitos de asistencia lingüística. Llame al [phone].    We comply with applicable federal civil rights laws and [place] laws. We do not discriminate on the basis of race, color, national origin, age, disability sex, sexual orientation or gender identity.            Thank you!     Thank you for choosing [facility]  for your care. Our goal is always to provide you with excellent care. Hearing back from our patients is one way we can continue to improve our services. Please take a few minutes to complete the written survey that you may receive in the  mail after your visit with us. Thank you!             Your Updated Medication List - Protect others around you: Learn how to safely use, store and throw away your medicines at www.disposemymeds.org.          This list is accurate as of: 8/15/17  2:11 PM.  Always use your most recent med list.                   Brand Name Dispense Instructions for use Diagnosis    acetaminophen 325 MG tablet    TYLENOL    100 tablet    Take 2 tablets (650 mg) by mouth every 6 hours as needed for mild pain    Alcohol use disorder, severe, dependence (H)       amLODIPine 10 MG tablet    NORVASC    30 tablet    Take 1 tablet (10 mg) by mouth daily    Essential hypertension with goal blood pressure less than 140/90       amphetamine-dextroamphetamine 30 MG per 24 hr capsule    ADDERALL XR    60 capsule    Take 1 capsule (30 mg) by mouth 2 times daily    Attention deficit disorder, unspecified hyperactivity presence       ARIPiprazole 2 MG tablet    ABILIFY    60 tablet    Take 2 tablets (4 mg) by mouth At Bedtime    Major depressive disorder, recurrent episode, moderate (H)       busPIRone 10 MG tablet    BUSPAR    180 tablet    Take 2 tablets (20 mg) by mouth 3 times daily    Anxiety       cloNIDine 0.1 MG tablet    CATAPRES    60 tablet    Take 1 tablet (0.1 mg) by mouth 2 times daily    Essential hypertension with goal blood pressure less than 140/90       disulfiram 250 MG tablet    ANTABUSE     Take 250 mg by mouth daily        gabapentin 800 MG tablet    NEURONTIN    120 tablet    Take 1 tablet (800 mg) by mouth 4 times daily    Anxiety, Alcohol use disorder, severe, dependence (H)       hydrOXYzine 25 MG tablet    ATARAX    120 tablet    Take 2 tablets (50 mg) by mouth every 6 hours as needed for anxiety    Alcohol dependence with uncomplicated intoxication (H), Anxiety       multivitamin, therapeutic with minerals Tabs tablet     30 each    Take 1 tablet by mouth daily    Essential hypertension, Alcohol use disorder, severe,  dependence (H)       naltrexone 50 MG tablet    DEPADE;REVIA    30 tablet    Take 1 tablet (50 mg) by mouth daily    Alcohol use disorder, severe, dependence (H)       omeprazole 40 MG capsule    priLOSEC    30 capsule    Take 1 capsule (40 mg) by mouth daily    Acute alcoholic intoxication in alcoholism, uncomplicated (H), Alcohol use disorder, severe, dependence (H)       * ROBAXIN PO      Take 750 mg by mouth 3 times daily        * methocarbamol 750 MG tablet    ROBAXIN    90 tablet    Take 1 tablet (750 mg) by mouth 3 times daily as needed for muscle spasms    Chronic low back pain, unspecified back pain laterality, with sciatica presence unspecified       sertraline 100 MG tablet    ZOLOFT    60 tablet    Take 2 tablets (200 mg) by mouth daily    Anxiety       traZODone 100 MG tablet    DESYREL    60 tablet    Take 1-2 tablets (100-200 mg) by mouth nightly as needed for sleep    Alcohol use disorder, severe, dependence (H), Anxiety       * Notice:  This list has 2 medication(s) that are the same as other medications prescribed for you. Read the directions carefully, and ask your doctor or other care provider to review them with you.

## 2017-08-17 ENCOUNTER — HOSPITAL ENCOUNTER (OUTPATIENT)
Dept: BEHAVIORAL HEALTH | Facility: CLINIC | Age: 27
End: 2017-08-17
Attending: SOCIAL WORKER
Payer: MEDICAID

## 2017-08-17 PROCEDURE — H2035 A/D TX PROGRAM, PER HOUR: HCPCS | Mod: HQ

## 2017-08-23 NOTE — PROGRESS NOTES
Patient:  Nickolas Lang            Adult CD Progress Note and Treatment Plan Review     Attendance  Please refer to OP BEH CD Adult Attendance Record Documentation Flowsheet    Support group attended this week: yes    Reporting sobriety:  yes    Treatment Plan     Treatment Plan Review competed on:    8/17/17    Client preferred learning style: Visual; Hands on; Verbal; Demonstration     Staff Members contributing: ARLENE Green                    Received Supervision: No    Client: contributed to goals and plan.    Client received copy of plan/revised plan: Yes    Client agrees with plan/revised plan: Yes    Changes to Treatment Plan: No    New Goals added since last review: None    Goals worked on since last review: building sober support, managing mental health, looking for employment    Strategies effective: yes    Strategies need these changes: Continue working on above goals.     ASAM Risk Rating:    Dimension 1 0 No problems. Pt's JONY: 6/28/17.    Dimension 2 0 No problems.    Dimension 3 1 Pt has a diagnosis of depression and anxiety and has been following recommendations of his physician and remaining medication compliant. Pt denied any suicidal ideations this week and his mood appears hopeful.     Dimension 4 0 Pt continues to identify the negative consequences of his chemical use. Pt denies any major cravings this week.     Dimension 5 3  Pt continues to build and is beginning to utilize coping skills to combat relapse triggers and cues and avoid relapse.     Dimension 6 2 Pt reported that he attended three AA meetings this week and has a sponsor whom he is in regular contact with. Pt reported that he is living at home with his mother and grandmother and that he is looking for employment.     Any changes in Vulnerable Adult Status?  No  If yes, add to treatment plan and individual abuse prevention plan.    Family Involvement:   N/A--Pt participated in family sessions while in [place].     Data:    Pt stated that he is feeling proud, peaceful, and powerful. Pt shared that he has several new job prospects and that he is really excited about the fact that he is still hirable and that he hasn't lost everything.     Intervention:   Staff facilitated group and pt actively participated and offered fellow peers feedback.    Assessment:   Pt appears to be adjusting well to being home following tx.     Plan:  Focus on recovery environment  Monitor emotional/physical health      ARLENE Howard

## 2017-08-23 NOTE — ADDENDUM NOTE
Encounter addended by: Amish Murillo LADC on: 8/23/2017  5:45 PM<BR>     Actions taken: Sign clinical note

## 2017-08-24 ENCOUNTER — HOSPITAL ENCOUNTER (OUTPATIENT)
Dept: BEHAVIORAL HEALTH | Facility: CLINIC | Age: 27
End: 2017-08-24
Attending: SOCIAL WORKER
Payer: MEDICAID

## 2017-08-24 PROCEDURE — H2035 A/D TX PROGRAM, PER HOUR: HCPCS | Mod: HQ

## 2017-08-29 ENCOUNTER — TELEPHONE (OUTPATIENT)
Dept: INTERNAL MEDICINE | Facility: CLINIC | Age: 27
End: 2017-08-29

## 2017-08-29 DIAGNOSIS — F98.8 ATTENTION DEFICIT DISORDER, UNSPECIFIED HYPERACTIVITY PRESENCE: ICD-10-CM

## 2017-08-29 RX ORDER — DEXTROAMPHETAMINE SACCHARATE, AMPHETAMINE ASPARTATE MONOHYDRATE, DEXTROAMPHETAMINE SULFATE AND AMPHETAMINE SULFATE 7.5; 7.5; 7.5; 7.5 MG/1; MG/1; MG/1; MG/1
30 CAPSULE, EXTENDED RELEASE ORAL 2 TIMES DAILY
Qty: 60 CAPSULE | Refills: 0 | Status: CANCELLED | OUTPATIENT
Start: 2017-08-29

## 2017-08-29 NOTE — TELEPHONE ENCOUNTER
LM on patient's voicemail that he has refill available at [facility] for Buspar and Gabapentin from earlier this month and that he can have those transferred to another pharmacy if he chooses.  Also advised via VM message that we cannot have him  the written rx for Adderall and choice would be to have it filled at [facility] or we could mail it to pharmacy of his choosing.  FREDERICK Benedict R.N.

## 2017-08-29 NOTE — TELEPHONE ENCOUNTER
Pt states he only needs the Adderall as the others do still have a refill left.  He wants rx taken downstairs to [facility] and would then like a call.  FREDERICK Benedict R.N.

## 2017-08-29 NOTE — TELEPHONE ENCOUNTER
I am trying to get my  back up working   Can someone look up  and make sure he has not gotten this month

## 2017-08-29 NOTE — TELEPHONE ENCOUNTER
RNs do not yet have clearance to pull the  up yet.      Pt knows the Adderall is early, just planning ahead he said.  ,cl

## 2017-08-29 NOTE — TELEPHONE ENCOUNTER
Reason for Call:  Medication or medication refill:    Do you use a [facility]?  Name of the pharmacy and phone number for the current request:  P/U    Name of the medication requested: adderall, gabapentin, buspar    Other request: Pt is not able to get in with his psychiatrist until 09/20 and pt is wanting [facility] to fill these meds.  He said he did talk to [facility] about this.    Can we leave a detailed message on this number? YES    Phone number patient can be reached at: Home number on file [phone] (home)    Best Time: any    Call taken on 8/29/2017 at 2:34 PM by Danielle Helton

## 2017-08-29 NOTE — TELEPHONE ENCOUNTER
Adderall XR 30 mg   Last Written Prescription Date:  8/10/17  Last Fill Quantity: 60,   # refills: 0    Gabapentin 800 This rx has 1 additional refill at [facility]  Buspar 10mg  This rx has 1 additional refill at [facility]    Last Office Visit with [facility], [facility] or [facility] prescribing provider: 8/10/17  Future Office visit:    Next 5 appointments (look out 90 days)     Sep 06, 2017  9:20 AM CDT   SHORT with ROXI Santiago CNP   [facility] ([facility])    [address]  [facility] [address]   [phone]                   Routing refill request to provider for review/approval because:  Drug not on the [facility], [facility] or [facility] refill protocol or controlled substance  Pt is not able to get in with his psychiatrist until 09/20 and pt is wanting [facility] to fill these meds.  He said he did talk to [facility] about this.

## 2017-09-06 ENCOUNTER — OFFICE VISIT (OUTPATIENT)
Dept: INTERNAL MEDICINE | Facility: CLINIC | Age: 27
End: 2017-09-06
Payer: MEDICAID

## 2017-09-06 VITALS
BODY MASS INDEX: 41.75 KG/M2 | OXYGEN SATURATION: 98 % | RESPIRATION RATE: 12 BRPM | SYSTOLIC BLOOD PRESSURE: 138 MMHG | DIASTOLIC BLOOD PRESSURE: 88 MMHG | HEIGHT: 73 IN | HEART RATE: 68 BPM | TEMPERATURE: 98.3 F | WEIGHT: 315 LBS

## 2017-09-06 DIAGNOSIS — F41.9 ANXIETY: Chronic | ICD-10-CM

## 2017-09-06 DIAGNOSIS — F33.1 MAJOR DEPRESSIVE DISORDER, RECURRENT EPISODE, MODERATE (H): ICD-10-CM

## 2017-09-06 DIAGNOSIS — G89.29 CHRONIC LOW BACK PAIN, UNSPECIFIED BACK PAIN LATERALITY, WITH SCIATICA PRESENCE UNSPECIFIED: ICD-10-CM

## 2017-09-06 DIAGNOSIS — F10.20 ALCOHOL USE DISORDER, SEVERE, DEPENDENCE (H): Chronic | ICD-10-CM

## 2017-09-06 DIAGNOSIS — Z71.6 ENCOUNTER FOR SMOKING CESSATION COUNSELING: Primary | ICD-10-CM

## 2017-09-06 DIAGNOSIS — F98.8 ATTENTION DEFICIT DISORDER, UNSPECIFIED HYPERACTIVITY PRESENCE: ICD-10-CM

## 2017-09-06 DIAGNOSIS — I10 ESSENTIAL HYPERTENSION: Chronic | ICD-10-CM

## 2017-09-06 DIAGNOSIS — F10.220 ACUTE ALCOHOLIC INTOXICATION IN ALCOHOLISM, UNCOMPLICATED (H): ICD-10-CM

## 2017-09-06 DIAGNOSIS — E66.01 MORBID OBESITY DUE TO EXCESS CALORIES (H): ICD-10-CM

## 2017-09-06 DIAGNOSIS — M54.5 CHRONIC LOW BACK PAIN, UNSPECIFIED BACK PAIN LATERALITY, WITH SCIATICA PRESENCE UNSPECIFIED: ICD-10-CM

## 2017-09-06 PROCEDURE — 99215 OFFICE O/P EST HI 40 MIN: CPT | Performed by: NURSE PRACTITIONER

## 2017-09-06 RX ORDER — ARIPIPRAZOLE 2 MG/1
4 TABLET ORAL AT BEDTIME
Qty: 60 TABLET | Refills: 1 | Status: ON HOLD | OUTPATIENT
Start: 2017-09-06 | End: 2017-10-13

## 2017-09-06 RX ORDER — BUSPIRONE HYDROCHLORIDE 10 MG/1
20 TABLET ORAL 3 TIMES DAILY
Qty: 180 TABLET | Refills: 1 | Status: ON HOLD | OUTPATIENT
Start: 2017-09-06 | End: 2017-11-24

## 2017-09-06 RX ORDER — METHOCARBAMOL 500 MG/1
1000 TABLET, FILM COATED ORAL 3 TIMES DAILY PRN
Qty: 30 TABLET | Refills: 1 | Status: ON HOLD | OUTPATIENT
Start: 2017-09-06 | End: 2018-06-29

## 2017-09-06 RX ORDER — OMEPRAZOLE 40 MG/1
40 CAPSULE, DELAYED RELEASE ORAL DAILY
Qty: 30 CAPSULE | Refills: 1 | Status: ON HOLD | OUTPATIENT
Start: 2017-09-06 | End: 2017-11-24

## 2017-09-06 RX ORDER — NALTREXONE HYDROCHLORIDE 50 MG/1
50 TABLET, FILM COATED ORAL DAILY
Qty: 30 TABLET | Refills: 1 | Status: ON HOLD | OUTPATIENT
Start: 2017-09-06 | End: 2017-10-13

## 2017-09-06 RX ORDER — TRAZODONE HYDROCHLORIDE 100 MG/1
100-200 TABLET ORAL
Qty: 60 TABLET | Refills: 1 | Status: ON HOLD | OUTPATIENT
Start: 2017-09-06 | End: 2017-11-24

## 2017-09-06 RX ORDER — NICOTINE 21 MG/24HR
1 PATCH, TRANSDERMAL 24 HOURS TRANSDERMAL EVERY 24 HOURS
Qty: 30 PATCH | Refills: 0 | Status: ON HOLD | OUTPATIENT
Start: 2017-09-06 | End: 2017-11-24

## 2017-09-06 RX ORDER — SERTRALINE HYDROCHLORIDE 100 MG/1
200 TABLET, FILM COATED ORAL DAILY
Qty: 60 TABLET | Refills: 1 | Status: ON HOLD | OUTPATIENT
Start: 2017-09-06 | End: 2017-11-24

## 2017-09-06 RX ORDER — GABAPENTIN 800 MG/1
800 TABLET ORAL 4 TIMES DAILY
Qty: 120 TABLET | Refills: 1 | Status: ON HOLD | OUTPATIENT
Start: 2017-09-06 | End: 2017-11-24

## 2017-09-06 RX ORDER — NICOTINE 21 MG/24HR
1 PATCH, TRANSDERMAL 24 HOURS TRANSDERMAL EVERY 24 HOURS
Qty: 30 PATCH | Refills: 0 | Status: SHIPPED | OUTPATIENT
Start: 2017-09-27 | End: 2017-10-11

## 2017-09-06 RX ORDER — DEXTROAMPHETAMINE SACCHARATE, AMPHETAMINE ASPARTATE MONOHYDRATE, DEXTROAMPHETAMINE SULFATE AND AMPHETAMINE SULFATE 7.5; 7.5; 7.5; 7.5 MG/1; MG/1; MG/1; MG/1
30 CAPSULE, EXTENDED RELEASE ORAL 2 TIMES DAILY
Qty: 60 CAPSULE | Refills: 0 | Status: ON HOLD | OUTPATIENT
Start: 2017-09-06 | End: 2017-11-24

## 2017-09-06 ASSESSMENT — ANXIETY QUESTIONNAIRES
3. WORRYING TOO MUCH ABOUT DIFFERENT THINGS: SEVERAL DAYS
5. BEING SO RESTLESS THAT IT IS HARD TO SIT STILL: SEVERAL DAYS
1. FEELING NERVOUS, ANXIOUS, OR ON EDGE: SEVERAL DAYS
6. BECOMING EASILY ANNOYED OR IRRITABLE: NOT AT ALL
IF YOU CHECKED OFF ANY PROBLEMS ON THIS QUESTIONNAIRE, HOW DIFFICULT HAVE THESE PROBLEMS MADE IT FOR YOU TO DO YOUR WORK, TAKE CARE OF THINGS AT HOME, OR GET ALONG WITH OTHER PEOPLE: SOMEWHAT DIFFICULT
7. FEELING AFRAID AS IF SOMETHING AWFUL MIGHT HAPPEN: NOT AT ALL
2. NOT BEING ABLE TO STOP OR CONTROL WORRYING: SEVERAL DAYS
GAD7 TOTAL SCORE: 4

## 2017-09-06 ASSESSMENT — PATIENT HEALTH QUESTIONNAIRE - PHQ9
5. POOR APPETITE OR OVEREATING: NOT AT ALL
SUM OF ALL RESPONSES TO PHQ QUESTIONS 1-9: 11

## 2017-09-06 NOTE — MR AVS SNAPSHOT
After Visit Summary   9/6/2017    Nickolas Lang    MRN: [id number]           Patient Information     Date Of Birth          1990        Visit Information        Provider Department      9/6/2017 9:20 AM Nicolette Frye APRN [facility]        Today's Diagnoses     Encounter for smoking cessation counseling    -  1    Alcohol use disorder, severe, dependence (H)        Anxiety        Essential hypertension        Morbid obesity due to excess calories (H)        Chronic low back pain, unspecified back pain laterality, with sciatica presence unspecified        Acute alcoholic intoxication in alcoholism, uncomplicated (H)        Major depressive disorder, recurrent episode, moderate (H)        Attention deficit disorder, unspecified hyperactivity presence          Care Instructions    Lab fasting when you can     Refill  on medication           Follow-ups after your visit        Your next 10 appointments already scheduled     Sep 07, 2017  5:30 PM CDT   Treatment with ADULT LODGING PLUS B2   [facility] ([facility])    [address] [place] [address]   [phone]            Sep 12, 2017  2:30 PM CDT   Level O with UR BD 01   [facility] ([facility])    [address] [place] [address]   [phone]            Sep 14, 2017  5:30 PM CDT   Treatment with ADULT LODGING PLUS B2   [facility] ([facility])    [address] [place] [address]   [phone]            Sep 21, 2017  5:30 PM CDT   Treatment with ADULT LODGING PLUS B2   [facility] ([facility])    [address] [place] [address]   [phone]            Sep 28, 2017  5:30 PM CDT   Treatment  with ADULT LODGING PLUS B2   [facility] ([facility])    [facility] [address] [address]   [phone]            Oct 05, 2017  5:30 PM CDT   Treatment with ADULT LODGING PLUS B2   [facility] ([facility])    [address] [place] [address]   [phone]            Oct 12, 2017  5:30 PM CDT   Treatment with ADULT LODGING PLUS B2   [facility] ([facility])    [address] [place] [address]   [phone]            Oct 19, 2017  5:30 PM CDT   Treatment with ADULT LODGING PLUS B2   [facility] ([facility])    [address] [place] [address]   [phone]            Oct 26, 2017  5:30 PM CDT   Treatment with ADULT LODGING PLUS B2   [facility] ([facility])    [address] [place] [address]   [phone]              Who to contact     If you have questions or need follow up information about today's clinic visit or your schedule please contact [facility] directly at [phone].  Normal or non-critical lab and imaging results will be communicated to you by MyChart, letter or phone within 4 business days after the clinic has received the results. If you do not hear from us within 7 days, please contact the clinic through MyChart or phone. If you have a critical or abnormal lab result, we will notify you by phone as soon as possible.  Submit refill requests through [employer] or call your pharmacy and they will forward the refill request to us. Please allow 3 business days for your refill to be completed.          Additional Information About Your Visit        Asana Information     Asana lets you send  "messages to your doctor, view your test results, renew your prescriptions, schedule appointments and more. To sign up, go to [email or web address] . Click on \"Log in\" on the left side of the screen, which will take you to the Welcome page. Then click on \"Sign up Now\" on the right side of the page.     You will be asked to enter the access code listed below, as well as some personal information. Please follow the directions to create your username and password.     Your access code is: [email or web address]  Expires: 2017 10:07 AM     Your access code will  in 90 days. If you need help or a new code, please call your [facility] clinic or [phone].        Care EveryWhere ID     This is your Care EveryWhere ID. This could be used by other organizations to access your [facility] medical records  [id number]        Your Vitals Were     Pulse Temperature Respirations Height Pulse Oximetry BMI (Body Mass Index)    68 98.3  F (36.8  C) (Oral) 12 6' 1\" (1.854 m) 98% 42.48 kg/m2       Blood Pressure from Last 3 Encounters:   17 138/88   08/15/17 (!) 142/96   08/10/17 108/76    Weight from Last 3 Encounters:   17 (!) 322 lb (146.1 kg)   08/10/17 (!) 325 lb (147.4 kg)   17 (!) 315 lb 4.8 oz (143 kg)              Today, you had the following     No orders found for display         Today's Medication Changes          These changes are accurate as of: 17 10:07 AM.  If you have any questions, ask your nurse or doctor.               Start taking these medicines.        Dose/Directions    * nicotine 21 MG/24HR 24 hr patch   Commonly known as:  NICODERM CQ   Used for:  Encounter for smoking cessation counseling   Started by:  Nicolette Frye APRN CNP        Dose:  1 patch   Place 1 patch onto the skin every 24 hours   Quantity:  30 patch   Refills:  0       * nicotine 14 MG/24HR 24 hr patch   Commonly known as:  NICODERM CQ   Used for:  Encounter for smoking cessation counseling   Started by:  Melva" ROXI Gillespie CNP        Dose:  1 patch   Start taking on:  9/27/2017   Place 1 patch onto the skin every 24 hours   Quantity:  30 patch   Refills:  0       * nicotine 7 MG/24HR 24 hr patch   Commonly known as:  NICODERM CQ   Used for:  Encounter for smoking cessation counseling   Started by:  Nicolette Frye APRN CNP        Dose:  1 patch   Start taking on:  11/1/2017   Place 1 patch onto the skin every 24 hours   Quantity:  30 patch   Refills:  1       * Notice:  This list has 3 medication(s) that are the same as other medications prescribed for you. Read the directions carefully, and ask your doctor or other care provider to review them with you.      These medicines have changed or have updated prescriptions.        Dose/Directions    methocarbamol 500 MG tablet   Commonly known as:  ROBAXIN   This may have changed:    - medication strength  - how much to take   Used for:  Chronic low back pain, unspecified back pain laterality, with sciatica presence unspecified   Changed by:  Nicolette Frye APRN CNP        Dose:  1000 mg   Take 2 tablets (1,000 mg) by mouth 3 times daily as needed for muscle spasms   Quantity:  30 tablet   Refills:  1            Where to get your medicines      These medications were sent to [facility]/pharmacy #[id number] [facility] [address], [place] [address]     Phone:  [phone]     ARIPiprazole 2 MG tablet    busPIRone 10 MG tablet    gabapentin 800 MG tablet    methocarbamol 500 MG tablet    naltrexone 50 MG tablet    nicotine 14 MG/24HR 24 hr patch    nicotine 21 MG/24HR 24 hr patch    nicotine 7 MG/24HR 24 hr patch    omeprazole 40 MG capsule    sertraline 100 MG tablet    traZODone 100 MG tablet         Some of these will need a paper prescription and others can be bought over the counter.  Ask your nurse if you have questions.     Bring a paper prescription for each of these medications     amphetamine-dextroamphetamine 30 MG per 24  hr capsule                Primary Care Provider Office Phone # Fax #    Nicolette Frye, APRN [facility] [phone]       [address] [facility] [address]        Equal Access to Services     ERMELINDA LOZADA : Hadii aad ku hadkostao Soomaali, waaxda luqadaha, qaybta kaalmada adeegyada, waxhaylee gonzalezjennifer dingingrid vasquez alexis barfield. So [facility] [phone].    ATENCIÓN: Si habla español, tiene a miller disposición servicios gratuitos de asistencia lingüística. Llame al [phone].    We comply with applicable federal civil rights laws and [place] laws. We do not discriminate on the basis of race, color, national origin, age, disability sex, sexual orientation or gender identity.            Thank you!     Thank you for choosing [facility]  for your care. Our goal is always to provide you with excellent care. Hearing back from our patients is one way we can continue to improve our services. Please take a few minutes to complete the written survey that you may receive in the mail after your visit with us. Thank you!             Your Updated Medication List - Protect others around you: Learn how to safely use, store and throw away your medicines at www.disposemymeds.org.          This list is accurate as of: 9/6/17 10:07 AM.  Always use your most recent med list.                   Brand Name Dispense Instructions for use Diagnosis    acetaminophen 325 MG tablet    TYLENOL    100 tablet    Take 2 tablets (650 mg) by mouth every 6 hours as needed for mild pain    Alcohol use disorder, severe, dependence (H)       amLODIPine 10 MG tablet    NORVASC    30 tablet    Take 1 tablet (10 mg) by mouth daily    Essential hypertension with goal blood pressure less than 140/90       amphetamine-dextroamphetamine 30 MG per 24 hr capsule    ADDERALL XR    60 capsule    Take 1 capsule (30 mg) by mouth 2 times daily    Attention deficit disorder, unspecified hyperactivity presence       ARIPiprazole 2 MG tablet     ABILIFY    60 tablet    Take 2 tablets (4 mg) by mouth At Bedtime    Major depressive disorder, recurrent episode, moderate (H)       busPIRone 10 MG tablet    BUSPAR    180 tablet    Take 2 tablets (20 mg) by mouth 3 times daily    Anxiety       cloNIDine 0.1 MG tablet    CATAPRES    60 tablet    Take 1 tablet (0.1 mg) by mouth 2 times daily    Essential hypertension with goal blood pressure less than 140/90       disulfiram 250 MG tablet    ANTABUSE     Take 250 mg by mouth daily        gabapentin 800 MG tablet    NEURONTIN    120 tablet    Take 1 tablet (800 mg) by mouth 4 times daily    Anxiety, Alcohol use disorder, severe, dependence (H)       hydrOXYzine 25 MG tablet    ATARAX    120 tablet    Take 2 tablets (50 mg) by mouth every 6 hours as needed for anxiety    Alcohol dependence with uncomplicated intoxication (H), Anxiety       methocarbamol 500 MG tablet    ROBAXIN    30 tablet    Take 2 tablets (1,000 mg) by mouth 3 times daily as needed for muscle spasms    Chronic low back pain, unspecified back pain laterality, with sciatica presence unspecified       multivitamin, therapeutic with minerals Tabs tablet     30 each    Take 1 tablet by mouth daily    Essential hypertension, Alcohol use disorder, severe, dependence (H)       naltrexone 50 MG tablet    DEPADE;REVIA    30 tablet    Take 1 tablet (50 mg) by mouth daily    Alcohol use disorder, severe, dependence (H)       * nicotine 21 MG/24HR 24 hr patch    NICODERM CQ    30 patch    Place 1 patch onto the skin every 24 hours    Encounter for smoking cessation counseling       * nicotine 14 MG/24HR 24 hr patch   Start taking on:  9/27/2017    NICODERM CQ    30 patch    Place 1 patch onto the skin every 24 hours    Encounter for smoking cessation counseling       * nicotine 7 MG/24HR 24 hr patch   Start taking on:  11/1/2017    NICODERM CQ    30 patch    Place 1 patch onto the skin every 24 hours    Encounter for smoking cessation counseling        omeprazole 40 MG capsule    priLOSEC    30 capsule    Take 1 capsule (40 mg) by mouth daily    Acute alcoholic intoxication in alcoholism, uncomplicated (H), Alcohol use disorder, severe, dependence (H)       sertraline 100 MG tablet    ZOLOFT    60 tablet    Take 2 tablets (200 mg) by mouth daily    Anxiety       traZODone 100 MG tablet    DESYREL    60 tablet    Take 1-2 tablets (100-200 mg) by mouth nightly as needed for sleep    Alcohol use disorder, severe, dependence (H), Anxiety       * Notice:  This list has 3 medication(s) that are the same as other medications prescribed for you. Read the directions carefully, and ask your doctor or other care provider to review them with you.

## 2017-09-06 NOTE — PROGRESS NOTES
SUBJECTIVE:   Nickolas Lang is a 27 year old male who presents to clinic today for the following health issues:    Appointment with [facility]   End of September appointment      Medication Followup of Adderall    Taking Medication as prescribed: yes    Side Effects:  None    Medication Helping Symptoms:  yes     Also, would like to discuss smoking cessation.   Wants to do patches    Increase Robaxin?  Trying to lose weight but muscle relaxer helps         Problem list and histories reviewed & adjusted, as indicated.  Additional history: as documented    Patient Active Problem List   Diagnosis     Morbid obesity (H)     Alcohol dependence with withdrawal (H)     Chemical dependency (H)     Essential hypertension     Suicidal ideation     Elevated liver enzymes     Hepatic steatosis     Alcohol abuse     Obesity     Insomnia, unspecified type     Anxiety     Gastroesophageal reflux disease without esophagitis     Alcohol abuse with alcohol-induced mood disorder (H)     Attention deficit hyperactivity disorder (ADHD), unspecified ADHD type     Alcohol use disorder, severe, dependence (H)     Controlled substance agreement signed     Past Surgical History:   Procedure Laterality Date     Deviated septum repair       PYLOROMYOTOMY SURGERY  as an infant      SHOULDER SURGERY Left 07/29/2016    Removal of cartilage       Social History   Substance Use Topics     Smoking status: Current Every Day Smoker     Packs/day: 0.50     Years: 4.00     Types: Cigarettes     Smokeless tobacco: Never Used     Alcohol use No      Comment: sober since 6/28/17     Family History   Problem Relation Age of Onset     Neurologic Disorder Mother      Multiple Sclerosis     Depression Mother      Anxiety Disorder Mother      Alcohol/Drug Father      Substance Abuse Father      Depression Maternal Grandmother      Anxiety Disorder Maternal Grandmother      Hypertension Maternal Grandmother      Substance  "Abuse Maternal Grandfather      Alcohol/Drug Paternal Grandfather              Reviewed and updated as needed this visit by clinical staffTobacco  Allergies  Meds  Soc Hx      Reviewed and updated as needed this visit by Provider  Allergies         ROS:  Constitutional, HEENT, cardiovascular, pulmonary, GI, , musculoskeletal, neuro, skin, endocrine and psych systems are negative, except as otherwise noted.      OBJECTIVE:   /88 (BP Location: Left arm, Patient Position: Chair, Cuff Size: Adult Large)  Pulse 68  Temp 98.3  F (36.8  C) (Oral)  Resp 12  Ht 6' 1\" (1.854 m)  Wt (!) 322 lb (146.1 kg)  SpO2 98%  BMI 42.48 kg/m2  Body mass index is 42.48 kg/(m^2).  GENERAL:  alert and no distress  RESP: lungs clear to auscultation - no rales, rhonchi or wheezes  CV: regular rate and rhythm  ABDOMEN: soft, nontender, and bowel sounds normal  MS: no gross musculoskeletal defects noted, no edema  SKIN: no suspicious lesions or rashes  NEURO: Normal strength and tone, mentation intact and speech normal  PSYCH: mentation appears normal, affect normal/bright    Diagnostic Test Results:  none     ASSESSMENT/PLAN:             1. Encounter for smoking cessation counseling    - nicotine (NICODERM CQ) 21 MG/24HR 24 hr patch; Place 1 patch onto the skin every 24 hours  Dispense: 30 patch; Refill: 0  - nicotine (NICODERM CQ) 14 MG/24HR 24 hr patch; Place 1 patch onto the skin every 24 hours  Dispense: 30 patch; Refill: 0  - nicotine (NICODERM CQ) 7 MG/24HR 24 hr patch; Place 1 patch onto the skin every 24 hours  Dispense: 30 patch; Refill: 1    2. Alcohol use disorder, severe, dependence (H)  Sober 70 days!!!!  - gabapentin (NEURONTIN) 800 MG tablet; Take 1 tablet (800 mg) by mouth 4 times daily  Dispense: 120 tablet; Refill: 1  - omeprazole (PRILOSEC) 40 MG capsule; Take 1 capsule (40 mg) by mouth daily  Dispense: 30 capsule; Refill: 1  - traZODone (DESYREL) 100 MG tablet; Take 1-2 tablets (100-200 mg) by mouth " nightly as needed for sleep  Dispense: 60 tablet; Refill: 1  - naltrexone (DEPADE;REVIA) 50 MG tablet; Take 1 tablet (50 mg) by mouth daily  Dispense: 30 tablet; Refill: 1    3. Anxiety  Seeing psych this month   - gabapentin (NEURONTIN) 800 MG tablet; Take 1 tablet (800 mg) by mouth 4 times daily  Dispense: 120 tablet; Refill: 1  - traZODone (DESYREL) 100 MG tablet; Take 1-2 tablets (100-200 mg) by mouth nightly as needed for sleep  Dispense: 60 tablet; Refill: 1  - sertraline (ZOLOFT) 100 MG tablet; Take 2 tablets (200 mg) by mouth daily  Dispense: 60 tablet; Refill: 1  - busPIRone (BUSPAR) 10 MG tablet; Take 2 tablets (20 mg) by mouth 3 times daily  Dispense: 180 tablet; Refill: 1    4. Essential hypertension  In good range  No change     5. Morbid obesity due to excess calories (H)  Working on weight loss    6. Chronic low back pain, unspecified back pain laterality, with sciatica presence unspecified    - methocarbamol (ROBAXIN) 500 MG tablet; Take 2 tablets (1,000 mg) by mouth 3 times daily as needed for muscle spasms  Dispense: 30 tablet; Refill: 1    7. Acute alcoholic intoxication in alcoholism, uncomplicated (H)  Sober 70 days!!!  - omeprazole (PRILOSEC) 40 MG capsule; Take 1 capsule (40 mg) by mouth daily  Dispense: 30 capsule; Refill: 1    8. Major depressive disorder, recurrent episode, moderate (H)  Seeing psych this month    - ARIPiprazole (ABILIFY) 2 MG tablet; Take 2 tablets (4 mg) by mouth At Bedtime  Dispense: 60 tablet; Refill: 1    9. Attention deficit disorder, unspecified hyperactivity presence    - amphetamine-dextroamphetamine (ADDERALL XR) 30 MG per 24 hr capsule; Take 1 capsule (30 mg) by mouth 2 times daily  Dispense: 60 capsule; Refill: 0    Patient Instructions   Lab fasting when you can     Refill  on medication       ROXI Santiago [facility]

## 2017-09-06 NOTE — NURSING NOTE
"Chief Complaint   Patient presents with     Recheck Medication     Med refills.       Initial /88 (BP Location: Left arm, Patient Position: Chair, Cuff Size: Adult Large)  Pulse 68  Temp 98.3  F (36.8  C) (Oral)  Resp 12  Ht 6' 1\" (1.854 m)  Wt (!) 322 lb (146.1 kg)  SpO2 98%  BMI 42.48 kg/m2 Estimated body mass index is 42.48 kg/(m^2) as calculated from the following:    Height as of this encounter: 6' 1\" (1.854 m).    Weight as of this encounter: 322 lb (146.1 kg).  Medication Reconciliation: complete     ROEL Rosario      "

## 2017-09-07 ENCOUNTER — HOSPITAL ENCOUNTER (OUTPATIENT)
Dept: BEHAVIORAL HEALTH | Facility: CLINIC | Age: 27
End: 2017-09-07
Attending: SOCIAL WORKER
Payer: MEDICAID

## 2017-09-07 PROCEDURE — H2035 A/D TX PROGRAM, PER HOUR: HCPCS | Mod: HQ

## 2017-09-07 ASSESSMENT — ANXIETY QUESTIONNAIRES: GAD7 TOTAL SCORE: 4

## 2017-09-13 PROBLEM — F33.0 MILD EPISODE OF RECURRENT MAJOR DEPRESSIVE DISORDER (H): Status: ACTIVE | Noted: 2017-09-13

## 2017-09-13 NOTE — ADDENDUM NOTE
Encounter addended by: Amish Murillo LADC on: 9/13/2017 11:28 AM<BR>     Actions taken: Sign clinical note

## 2017-09-13 NOTE — PROGRESS NOTES
Patient:  Nickolas Lang            Adult CD Progress Note and Treatment Plan Review     Attendance  Please refer to OP BEH CD Adult Attendance Record Documentation Flowsheet    Support group attended this week: yes    Reporting sobriety:  yes    Treatment Plan     Treatment Plan Review competed on:    9/7/17    Client preferred learning style: Visual; Hands on; Verbal; Demonstration     Staff Members contributing: ARLENE Green                    Received Supervision: No    Client: contributed to goals and plan.    Client received copy of plan/revised plan: Yes    Client agrees with plan/revised plan: Yes    Changes to Treatment Plan: No    New Goals added since last review: None    Goals worked on since last review: building sober support, managing mental health, looking for employment, relapse prevention, sober fun    Strategies effective: yes    Strategies need these changes: Continue working on above goals.     ASAM Risk Rating:    Dimension 1 0 No problems. Pt's JONY: 6/28/17.    Dimension 2 0 No problems.    Dimension 3 1 Pt has a diagnosis of depression and anxiety and has been following recommendations of his physician and remaining medication compliant. Pt denied any suicidal ideations this week and his mood appears hopeful.     Dimension 4 0 Pt continues to identify the negative consequences of his chemical use. Pt denies any major cravings this week. Pt appears to be continuing to increase his internal motivation for long-term recovery.     Dimension 5 3  Pt continues to build and is beginning to utilize coping skills to combat relapse triggers and cues and avoid relapse. Pt appears to be implementing his relapse prevention plan.     Dimension 6 2 Pt reported that he attended ten AA meetings in the past two weeks (since he was not in group last week) and has a sponsor whom he is in regular contact with. Pt reported that he is living at home with his mother and grandmother and that he is  continuing to look for employment.     Any changes in Vulnerable Adult Status?  No  If yes, add to treatment plan and individual abuse prevention plan.    Family Involvement:   N/A--Pt participated in family sessions while in [place].     Data:   Pt stated that he is feeling peaceful, powerful, and proud. Pt shared that going to the [employer] last week was the high of his week and that he is still working on finding a job. Pt reported that he turned down a job for [employer], but has some prospects through [employer] and a couple other places.     Intervention:   Staff facilitated group and pt actively participated and offered fellow peers feedback.    Assessment:   Pt appears to have had a good week.     Plan:  Focus on recovery environment  Monitor emotional/physical health      ARLENE Howard

## 2017-09-13 NOTE — PROGRESS NOTES
Patient:  Nickolas Lang            Adult CD Progress Note and Treatment Plan Review     Attendance  Please refer to OP BEH CD Adult Attendance Record Documentation Flowsheet    Support group attended this week: yes    Reporting sobriety:  yes    Treatment Plan     Treatment Plan Review competed on:    8/24/17    Client preferred learning style: Visual; Hands on; Verbal; Demonstration     Staff Members contributing: ARLENE Green                    Received Supervision: No    Client: contributed to goals and plan.    Client received copy of plan/revised plan: Yes    Client agrees with plan/revised plan: Yes    Changes to Treatment Plan: No    New Goals added since last review: None    Goals worked on since last review: building sober support, managing mental health, looking for employment, relapse prevention    Strategies effective: yes    Strategies need these changes: Continue working on above goals.     ASAM Risk Rating:    Dimension 1 0 No problems. Pt's JONY: 6/28/17.    Dimension 2 0 No problems.    Dimension 3 1 Pt has a diagnosis of depression and anxiety and has been following recommendations of his physician and remaining medication compliant. Pt denied any suicidal ideations this week and his mood appears hopeful.     Dimension 4 0 Pt continues to identify the negative consequences of his chemical use. Pt denies any major cravings this week.     Dimension 5 3  Pt continues to build and is beginning to utilize coping skills to combat relapse triggers and cues and avoid relapse. Pt appears to be implementing his relapse prevention plan.     Dimension 6 2 Pt reported that he attended two AA meetings this week and has a sponsor whom he is in regular contact with. Pt reported that he is living at home with his mother and grandmother and that he is looking for employment.     Any changes in Vulnerable Adult Status?  No  If yes, add to treatment plan and individual abuse prevention plan.    Family  Involvement:   N/A--Pt participated in family sessions while in [place].     Data:   Pt stated that he is feeling proud, peaceful, and powerful. Pt shared that his job searching is still going well and he has many prospects. Pt reported that he was sick this past week, but that he still had a good week.     Intervention:   Staff facilitated group and pt actively participated and offered fellow peers feedback.    Assessment:   Pt appears to be feeling confident about finding a job.     Plan:  Focus on recovery environment  Monitor emotional/physical health      ARLENE Howard

## 2017-09-13 NOTE — ADDENDUM NOTE
Encounter addended by: Amish Murillo LADC on: 9/13/2017 12:56 PM<BR>     Actions taken: Sign clinical note

## 2017-09-13 NOTE — PROGRESS NOTES
Patient:  Nickoals Lang            Adult CD Progress Note and Treatment Plan Review     Attendance  Please refer to [facility] CD Adult Attendance Record Documentation Flowsheet    Support group attended this week: yes    Reporting sobriety:  yes    Treatment Plan     Treatment Plan Review competed on:    8/10/17    Client preferred learning style: Visual; Hands on; Verbal; Demonstration     Staff Members contributing: ARLENE Green                    Received Supervision: No    Client: contributed to goals and plan.    Client received copy of plan/revised plan: Yes    Client agrees with plan/revised plan: Yes    Changes to Treatment Plan: No    New Goals added since last review: None    Goals worked on since last review: completing LP primary tx, adjusting to being home following tx, building sober support, managing mental health, looking for employment    Strategies effective: yes    Strategies need these changes: Continue working on above goals.     ASAM Risk Rating:    Dimension 1 0 No problems. Pt's JONY: 6/28/17.    Dimension 2 0 No problems.    Dimension 3 1 Pt has a diagnosis of depression and anxiety and has been following recommendations of his physician and remaining medication compliant. Pt denied any suicidal ideations this week and his mood appears hopeful.     Dimension 4 0 Pt continues to identify the negative consequences of his chemical use. Pt denies any major cravings this week.     Dimension 5 3  Pt continues to build and is beginning to utilize coping skills to combat relapse triggers and cues and avoid relapse.     Dimension 6 2 Pt reported that he attended three AA meetings this week and has a sponsor whom he is in regular contact with. Pt reported that he is living at home with his mother and grandmother and that he is looking for employment.     Any changes in Vulnerable Adult Status?  No  If yes, add to treatment plan and individual abuse prevention plan.    Family Involvement:    N/A--Pt participated in family sessions while in [place].     Data:   This is the pt's first Phase II session. Pt stated that he is feeling proud, peaceful, and powerful. Pt shared that he hasn't had any cravings this week and that he feels that the Vivitrol is working. Pt stated that he feels like he is rejoining society and his life.     Intervention:   Staff facilitated group and pt actively participated and offered fellow peers feedback.    Assessment:   Pt appears to be adjusting well to being home following tx.     Plan:  Focus on recovery environment  Monitor emotional/physical health      ARLENE Howard

## 2017-09-13 NOTE — ADDENDUM NOTE
Encounter addended by: Amish Murillo LADC on: 9/13/2017  4:24 PM<BR>     Actions taken: Sign clinical note

## 2017-09-13 NOTE — ADDENDUM NOTE
Encounter addended by: Amish Murillo LADC on: 9/13/2017  4:22 PM<BR>     Actions taken: Sign clinical note

## 2017-09-14 ENCOUNTER — HOSPITAL ENCOUNTER (OUTPATIENT)
Dept: BEHAVIORAL HEALTH | Facility: CLINIC | Age: 27
End: 2017-09-14
Attending: SOCIAL WORKER
Payer: MEDICAID

## 2017-09-14 PROCEDURE — H2035 A/D TX PROGRAM, PER HOUR: HCPCS | Mod: HQ

## 2017-09-18 NOTE — ADDENDUM NOTE
Encounter addended by: Amish Murillo LADC on: 9/18/2017 12:49 PM<BR>     Actions taken: Sign clinical note

## 2017-09-18 NOTE — PROGRESS NOTES
Patient:  Nickolas Lang            Adult CD Progress Note and Treatment Plan Review     Attendance  Please refer to [facility] CD Adult Attendance Record Documentation Flowsheet    Support group attended this week: yes    Reporting sobriety:  no    Treatment Plan     Treatment Plan Review competed on:    9/14/17    Client preferred learning style: Visual; Hands on; Verbal; Demonstration     Staff Members contributing: ARLENE Green                    Received Supervision: No    Client: contributed to goals and plan.    Client received copy of plan/revised plan: Yes    Client agrees with plan/revised plan: Yes    Changes to Treatment Plan: No    New Goals added since last review: None    Goals worked on since last review: building sober support, managing mental health, looking for employment, relapse prevention, sober fun, remaining abstinent, reaching out and making sober connections     Strategies effective: yes    Strategies need these changes: Continue working on above goals.     ASAM Risk Rating:    Dimension 1 0 No problems. Pt's JONY: 6/28/17.    Dimension 2 0 No problems.    Dimension 3 1 Pt has a diagnosis of depression and anxiety and has been following recommendations of his physician and remaining medication compliant. Pt denied any suicidal ideations this week.    Dimension 4 1 Pt continues to identify the negative consequences of his chemical use. Pt reports that he did not stay sober this week and he drank one day. Pt reports he is working on trying to get back into his recovery.     Dimension 5 3  Pt continues to build and is beginning to utilize coping skills to combat relapse triggers and cues and avoid relapse. Pt appears to be attempting to implement his relapse prevention plan.     Dimension 6 2 Pt reported that he attended six AA meetings this week and has a sponsor whom he is in regular contact with. Pt reported that he is living at home with his mother and grandmother and that he is  continuing to look for employment. Pt reported he reached out and connected with sober people multiple times this week.     Any changes in Vulnerable Adult Status?  No  If yes, add to treatment plan and individual abuse prevention plan.    Family Involvement:   N/A--Pt participated in family sessions while in [facility].     Data:   Pt stated that he is feeling frustrated, ashamed, and anxious this evening. Pt reported that he did not stay sober this past week and that he drank one day. Pt talked about that he feels like what he has worked for is gone and pt received encouraging feedback from fellow group members. Pt also shared that one of his Phase II members was very supportive and compassionate toward him after he drank.     Intervention:   Staff facilitated group and pt actively participated and offered fellow peers feedback.    Assessment:   Pt appears to be feeling down and discouraged from drinking earlier this week.     Plan:  Focus on recovery environment  Monitor emotional/physical health      ARLENE Howard

## 2017-09-21 ENCOUNTER — HOSPITAL ENCOUNTER (OUTPATIENT)
Dept: BEHAVIORAL HEALTH | Facility: CLINIC | Age: 27
End: 2017-09-21
Attending: SOCIAL WORKER
Payer: MEDICAID

## 2017-09-21 PROCEDURE — H2035 A/D TX PROGRAM, PER HOUR: HCPCS | Mod: HQ

## 2017-10-05 NOTE — PROGRESS NOTES
Patient:  Nickolas Lang            Adult CD Progress Note and Treatment Plan Review     Attendance  Please refer to [facility] CD Adult Attendance Record Documentation Flowsheet    Support group attended this week: yes    Reporting sobriety:  yes    Treatment Plan     Treatment Plan Review competed on:    9/21/17    Client preferred learning style: Visual; Hands on; Verbal; Demonstration     Staff Members contributing: ARLENE Green                    Received Supervision: No    Client: contributed to goals and plan.    Client received copy of plan/revised plan: Yes    Client agrees with plan/revised plan: Yes    Changes to Treatment Plan: No    New Goals added since last review: None    Goals worked on since last review: building sober support, managing mental health, getting a job, relapse prevention, sober fun, reaching out and making sober connections, changing his thinking and stopping self-pity    Strategies effective: yes    Strategies need these changes: Continue working on above goals.     ASAM Risk Rating:    Dimension 1 0 No problems. Pt's JONY: 9/10/17.    Dimension 2 0 No problems.    Dimension 3 1 Pt has a diagnosis of depression and anxiety and has been following recommendations of his physician and remaining medication compliant. Pt denied any suicidal ideations this week.    Dimension 4 1 Pt continues to identify the negative consequences of his chemical use. Pt reported that he remained sober this week and that he is continuing to get back on track with his recovery.     Dimension 5 3  Pt continues to build and to utilize coping skills to combat relapse triggers and cues and avoid relapse. Pt appears to be attempting to implement his relapse prevention plan and to be making positive changes in his thoughts, attitudes, and behaviors to support recovery.     Dimension 6 2 Pt reported that he attended six AA meetings this week and has a sponsor whom he is in regular contact with. Pt reported  that he is living at home with his mother and grandmother and that he got a job this week. Pt reported he reached out and connected with sober people multiple times this week.     Any changes in Vulnerable Adult Status?  No  If yes, add to treatment plan and individual abuse prevention plan.    Family Involvement:   N/A--Pt participated in family sessions while in [facility].     Data:   Pt stated that he is feeling peaceful and powerful this evening. Pt shared that he got a job this week and that he is pretty excited to be employed again. Pt talked about that he had been dwelling on his slip for like five or six days and was feeling self-pity, but that had relapsed, so he had to change his thinking or else he was going to end up in that same place.     Intervention:   Staff facilitated group and pt actively participated and offered fellow peers feedback.    Assessment:   Pt appears to be hopeful about his new job and seems to be exercising good behaviors, like changing his thinking.     Plan:  Focus on recovery environment  Monitor emotional/physical health      ARLENE Howard

## 2017-10-05 NOTE — ADDENDUM NOTE
Encounter addended by: Amish Murillo LADC on: 10/5/2017  3:36 PM<BR>     Actions taken: Sign clinical note

## 2017-10-11 ENCOUNTER — HOSPITAL ENCOUNTER (INPATIENT)
Facility: CLINIC | Age: 27
LOS: 3 days | Discharge: HOME | End: 2017-10-14
Attending: FAMILY MEDICINE | Admitting: FAMILY MEDICINE
Payer: MEDICAID

## 2017-10-11 DIAGNOSIS — F10.230 ALCOHOL DEPENDENCE WITH UNCOMPLICATED WITHDRAWAL (H): ICD-10-CM

## 2017-10-11 PROBLEM — F10.939 ALCOHOL WITHDRAWAL (H): Status: ACTIVE | Noted: 2017-10-11

## 2017-10-11 LAB
ALBUMIN SERPL-MCNC: 4.1 G/DL (ref 3.4–5)
ALCOHOL BREATH TEST: 0.03 (ref 0–0.01)
ALP SERPL-CCNC: 88 U/L (ref 40–150)
ALT SERPL W P-5'-P-CCNC: 54 U/L (ref 0–70)
AMPHETAMINES UR QL SCN: NEGATIVE
ANION GAP SERPL CALCULATED.3IONS-SCNC: 14 MMOL/L (ref 3–14)
AST SERPL W P-5'-P-CCNC: 27 U/L (ref 0–45)
BARBITURATES UR QL: NEGATIVE
BASOPHILS # BLD AUTO: 0 10E9/L (ref 0–0.2)
BASOPHILS NFR BLD AUTO: 0.2 %
BENZODIAZ UR QL: NEGATIVE
BILIRUB SERPL-MCNC: 0.6 MG/DL (ref 0.2–1.3)
BUN SERPL-MCNC: 13 MG/DL (ref 7–30)
CALCIUM SERPL-MCNC: 8.8 MG/DL (ref 8.5–10.1)
CANNABINOIDS UR QL SCN: NEGATIVE
CHLORIDE SERPL-SCNC: 97 MMOL/L (ref 94–109)
CO2 SERPL-SCNC: 27 MMOL/L (ref 20–32)
COCAINE UR QL: NEGATIVE
CREAT SERPL-MCNC: 0.48 MG/DL (ref 0.66–1.25)
DIFFERENTIAL METHOD BLD: NORMAL
EOSINOPHIL # BLD AUTO: 0 10E9/L (ref 0–0.7)
EOSINOPHIL NFR BLD AUTO: 0.4 %
ERYTHROCYTE [DISTWIDTH] IN BLOOD BY AUTOMATED COUNT: 12.8 % (ref 10–15)
ETHANOL SERPL-MCNC: 0.02 G/DL
ETHANOL UR QL SCN: NEGATIVE
GFR SERPL CREATININE-BSD FRML MDRD: >90 ML/MIN/1.7M2
GLUCOSE SERPL-MCNC: 104 MG/DL (ref 70–99)
HCT VFR BLD AUTO: 47.9 % (ref 40–53)
HGB BLD-MCNC: 16.9 G/DL (ref 13.3–17.7)
IMM GRANULOCYTES # BLD: 0 10E9/L (ref 0–0.4)
IMM GRANULOCYTES NFR BLD: 0.4 %
LYMPHOCYTES # BLD AUTO: 2.5 10E9/L (ref 0.8–5.3)
LYMPHOCYTES NFR BLD AUTO: 31.2 %
MAGNESIUM SERPL-MCNC: 1.5 MG/DL (ref 1.6–2.3)
MCH RBC QN AUTO: 30.7 PG (ref 26.5–33)
MCHC RBC AUTO-ENTMCNC: 35.3 G/DL (ref 31.5–36.5)
MCV RBC AUTO: 87 FL (ref 78–100)
MONOCYTES # BLD AUTO: 0.6 10E9/L (ref 0–1.3)
MONOCYTES NFR BLD AUTO: 7 %
NEUTROPHILS # BLD AUTO: 5 10E9/L (ref 1.6–8.3)
NEUTROPHILS NFR BLD AUTO: 60.8 %
NRBC # BLD AUTO: 0 10*3/UL
NRBC BLD AUTO-RTO: 0 /100
OPIATES UR QL SCN: NEGATIVE
PLATELET # BLD AUTO: 242 10E9/L (ref 150–450)
POTASSIUM SERPL-SCNC: 3.9 MMOL/L (ref 3.4–5.3)
PROT SERPL-MCNC: 8.2 G/DL (ref 6.8–8.8)
RBC # BLD AUTO: 5.5 10E12/L (ref 4.4–5.9)
SODIUM SERPL-SCNC: 138 MMOL/L (ref 133–144)
WBC # BLD AUTO: 8.1 10E9/L (ref 4–11)

## 2017-10-11 PROCEDURE — 96366 THER/PROPH/DIAG IV INF ADDON: CPT

## 2017-10-11 PROCEDURE — 96365 THER/PROPH/DIAG IV INF INIT: CPT

## 2017-10-11 PROCEDURE — S5010 5% DEXTROSE AND 0.45% SALINE: HCPCS | Performed by: FAMILY MEDICINE

## 2017-10-11 PROCEDURE — 12800012 ZZH R&B CD MH INTERMEDIATE ADULT

## 2017-10-11 PROCEDURE — 99285 EMERGENCY DEPT VISIT HI MDM: CPT | Mod: Z6 | Performed by: FAMILY MEDICINE

## 2017-10-11 PROCEDURE — 80307 DRUG TEST PRSMV CHEM ANLYZR: CPT | Performed by: FAMILY MEDICINE

## 2017-10-11 PROCEDURE — 25000132 ZZH RX MED GY IP 250 OP 250 PS 637: Performed by: FAMILY MEDICINE

## 2017-10-11 PROCEDURE — 25000125 ZZHC RX 250: Performed by: FAMILY MEDICINE

## 2017-10-11 PROCEDURE — 96367 TX/PROPH/DG ADDL SEQ IV INF: CPT

## 2017-10-11 PROCEDURE — 99285 EMERGENCY DEPT VISIT HI MDM: CPT | Mod: 25

## 2017-10-11 PROCEDURE — 25000128 H RX IP 250 OP 636: Performed by: FAMILY MEDICINE

## 2017-10-11 PROCEDURE — 96375 TX/PRO/DX INJ NEW DRUG ADDON: CPT

## 2017-10-11 PROCEDURE — 25800025 ZZH RX 258: Performed by: FAMILY MEDICINE

## 2017-10-11 PROCEDURE — 83735 ASSAY OF MAGNESIUM: CPT | Performed by: FAMILY MEDICINE

## 2017-10-11 PROCEDURE — 82977 ASSAY OF GGT: CPT | Performed by: FAMILY MEDICINE

## 2017-10-11 PROCEDURE — 85025 COMPLETE CBC W/AUTO DIFF WBC: CPT | Performed by: FAMILY MEDICINE

## 2017-10-11 PROCEDURE — 80320 DRUG SCREEN QUANTALCOHOLS: CPT | Performed by: FAMILY MEDICINE

## 2017-10-11 PROCEDURE — HZ2ZZZZ DETOXIFICATION SERVICES FOR SUBSTANCE ABUSE TREATMENT: ICD-10-PCS | Performed by: FAMILY MEDICINE

## 2017-10-11 PROCEDURE — 80053 COMPREHEN METABOLIC PANEL: CPT | Performed by: FAMILY MEDICINE

## 2017-10-11 RX ORDER — CLONIDINE HYDROCHLORIDE 0.1 MG/1
0.1 TABLET ORAL 2 TIMES DAILY
Status: ON HOLD | COMMUNITY
End: 2017-11-24

## 2017-10-11 RX ORDER — AMLODIPINE BESYLATE 10 MG/1
10 TABLET ORAL DAILY
Status: DISCONTINUED | OUTPATIENT
Start: 2017-10-11 | End: 2017-10-14 | Stop reason: HOSPADM

## 2017-10-11 RX ORDER — MULTIPLE VITAMINS W/ MINERALS TAB 9MG-400MCG
1 TAB ORAL DAILY
Status: DISCONTINUED | OUTPATIENT
Start: 2017-10-11 | End: 2017-10-14 | Stop reason: HOSPADM

## 2017-10-11 RX ORDER — AMLODIPINE BESYLATE 10 MG/1
10 TABLET ORAL DAILY
Status: ON HOLD | COMMUNITY
End: 2017-11-24

## 2017-10-11 RX ORDER — DIAZEPAM 5 MG
5 TABLET ORAL ONCE
Status: COMPLETED | OUTPATIENT
Start: 2017-10-11 | End: 2017-10-11

## 2017-10-11 RX ORDER — ALUMINA, MAGNESIA, AND SIMETHICONE 2400; 2400; 240 MG/30ML; MG/30ML; MG/30ML
30 SUSPENSION ORAL EVERY 4 HOURS PRN
Status: DISCONTINUED | OUTPATIENT
Start: 2017-10-11 | End: 2017-10-14 | Stop reason: HOSPADM

## 2017-10-11 RX ORDER — ONDANSETRON 2 MG/ML
8 INJECTION INTRAMUSCULAR; INTRAVENOUS ONCE
Status: DISCONTINUED | OUTPATIENT
Start: 2017-10-11 | End: 2017-10-11 | Stop reason: CLARIF

## 2017-10-11 RX ORDER — CLONIDINE HYDROCHLORIDE 0.1 MG/1
0.1 TABLET ORAL 2 TIMES DAILY
Status: DISCONTINUED | OUTPATIENT
Start: 2017-10-11 | End: 2017-10-14 | Stop reason: HOSPADM

## 2017-10-11 RX ORDER — SERTRALINE HYDROCHLORIDE 100 MG/1
200 TABLET, FILM COATED ORAL DAILY
Status: DISCONTINUED | OUTPATIENT
Start: 2017-10-11 | End: 2017-10-14 | Stop reason: HOSPADM

## 2017-10-11 RX ORDER — GABAPENTIN 800 MG/1
800 TABLET ORAL 4 TIMES DAILY
Status: DISCONTINUED | OUTPATIENT
Start: 2017-10-11 | End: 2017-10-14 | Stop reason: HOSPADM

## 2017-10-11 RX ORDER — ONDANSETRON 4 MG/1
4 TABLET, ORALLY DISINTEGRATING ORAL EVERY 6 HOURS PRN
Status: DISCONTINUED | OUTPATIENT
Start: 2017-10-11 | End: 2017-10-14 | Stop reason: HOSPADM

## 2017-10-11 RX ORDER — LORAZEPAM 2 MG/ML
1 INJECTION INTRAMUSCULAR ONCE
Status: COMPLETED | OUTPATIENT
Start: 2017-10-11 | End: 2017-10-11

## 2017-10-11 RX ORDER — LANOLIN ALCOHOL/MO/W.PET/CERES
100 CREAM (GRAM) TOPICAL DAILY
Status: COMPLETED | OUTPATIENT
Start: 2017-10-12 | End: 2017-10-14

## 2017-10-11 RX ORDER — FOLIC ACID 1 MG/1
1 TABLET ORAL DAILY
Status: DISCONTINUED | OUTPATIENT
Start: 2017-10-12 | End: 2017-10-14 | Stop reason: HOSPADM

## 2017-10-11 RX ORDER — MAGNESIUM SULFATE 1 G/100ML
1 INJECTION INTRAVENOUS ONCE
Status: COMPLETED | OUTPATIENT
Start: 2017-10-11 | End: 2017-10-11

## 2017-10-11 RX ORDER — ARIPIPRAZOLE 2 MG/1
4 TABLET ORAL AT BEDTIME
Status: DISCONTINUED | OUTPATIENT
Start: 2017-10-11 | End: 2017-10-12

## 2017-10-11 RX ORDER — HYDROXYZINE HYDROCHLORIDE 50 MG/1
50 TABLET, FILM COATED ORAL EVERY 6 HOURS PRN
Status: DISCONTINUED | OUTPATIENT
Start: 2017-10-11 | End: 2017-10-14 | Stop reason: HOSPADM

## 2017-10-11 RX ORDER — LOPERAMIDE HCL 2 MG
2 CAPSULE ORAL 4 TIMES DAILY PRN
Status: DISCONTINUED | OUTPATIENT
Start: 2017-10-11 | End: 2017-10-14 | Stop reason: HOSPADM

## 2017-10-11 RX ORDER — NICOTINE 21 MG/24HR
1 PATCH, TRANSDERMAL 24 HOURS TRANSDERMAL EVERY 24 HOURS
Status: DISCONTINUED | OUTPATIENT
Start: 2017-10-11 | End: 2017-10-14 | Stop reason: HOSPADM

## 2017-10-11 RX ORDER — ACETAMINOPHEN 325 MG/1
650 TABLET ORAL EVERY 4 HOURS PRN
Status: DISCONTINUED | OUTPATIENT
Start: 2017-10-11 | End: 2017-10-14 | Stop reason: HOSPADM

## 2017-10-11 RX ORDER — ONDANSETRON 2 MG/ML
8 INJECTION INTRAMUSCULAR; INTRAVENOUS ONCE
Status: COMPLETED | OUTPATIENT
Start: 2017-10-11 | End: 2017-10-11

## 2017-10-11 RX ORDER — DIAZEPAM 5 MG
5-20 TABLET ORAL EVERY 30 MIN PRN
Status: DISCONTINUED | OUTPATIENT
Start: 2017-10-11 | End: 2017-10-14 | Stop reason: HOSPADM

## 2017-10-11 RX ORDER — BISACODYL 10 MG
10 SUPPOSITORY, RECTAL RECTAL DAILY PRN
Status: DISCONTINUED | OUTPATIENT
Start: 2017-10-11 | End: 2017-10-14 | Stop reason: HOSPADM

## 2017-10-11 RX ORDER — BUPROPION HYDROCHLORIDE 150 MG/1
150 TABLET ORAL EVERY MORNING
Status: ON HOLD | COMMUNITY
End: 2017-11-24

## 2017-10-11 RX ORDER — TRAZODONE HYDROCHLORIDE 100 MG/1
100-200 TABLET ORAL
Status: DISCONTINUED | OUTPATIENT
Start: 2017-10-11 | End: 2017-10-14 | Stop reason: HOSPADM

## 2017-10-11 RX ADMIN — GABAPENTIN 800 MG: 800 TABLET, FILM COATED ORAL at 16:41

## 2017-10-11 RX ADMIN — DIAZEPAM 10 MG: 5 TABLET ORAL at 20:49

## 2017-10-11 RX ADMIN — MULTIPLE VITAMINS W/ MINERALS TAB 1 TABLET: TAB at 16:41

## 2017-10-11 RX ADMIN — BUSPIRONE HYDROCHLORIDE 20 MG: 5 TABLET ORAL at 18:59

## 2017-10-11 RX ADMIN — SERTRALINE HYDROCHLORIDE 200 MG: 100 TABLET ORAL at 16:41

## 2017-10-11 RX ADMIN — ARIPIPRAZOLE 4 MG: 2 TABLET ORAL at 20:48

## 2017-10-11 RX ADMIN — HYDROXYZINE HYDROCHLORIDE 50 MG: 50 TABLET, FILM COATED ORAL at 22:15

## 2017-10-11 RX ADMIN — TRAZODONE HYDROCHLORIDE 200 MG: 100 TABLET ORAL at 22:15

## 2017-10-11 RX ADMIN — DIAZEPAM 10 MG: 5 TABLET ORAL at 18:58

## 2017-10-11 RX ADMIN — FOLIC ACID: 5 INJECTION, SOLUTION INTRAMUSCULAR; INTRAVENOUS; SUBCUTANEOUS at 12:20

## 2017-10-11 RX ADMIN — OMEPRAZOLE 40 MG: 20 CAPSULE, DELAYED RELEASE ORAL at 16:41

## 2017-10-11 RX ADMIN — DIAZEPAM 10 MG: 5 TABLET ORAL at 16:41

## 2017-10-11 RX ADMIN — MAGNESIUM SULFATE IN DEXTROSE 1 G: 10 INJECTION, SOLUTION INTRAVENOUS at 14:05

## 2017-10-11 RX ADMIN — LORAZEPAM 1 MG: 2 INJECTION INTRAMUSCULAR; INTRAVENOUS at 12:20

## 2017-10-11 RX ADMIN — DIAZEPAM 5 MG: 5 TABLET ORAL at 15:01

## 2017-10-11 RX ADMIN — CLONIDINE HYDROCHLORIDE 0.1 MG: 0.1 TABLET ORAL at 20:55

## 2017-10-11 RX ADMIN — ONDANSETRON 8 MG: 2 INJECTION INTRAMUSCULAR; INTRAVENOUS at 12:40

## 2017-10-11 RX ADMIN — AMLODIPINE BESYLATE 10 MG: 10 TABLET ORAL at 16:41

## 2017-10-11 RX ADMIN — DIAZEPAM 5 MG: 5 TABLET ORAL at 12:55

## 2017-10-11 RX ADMIN — DIAZEPAM 5 MG: 5 TABLET ORAL at 14:05

## 2017-10-11 RX ADMIN — GABAPENTIN 800 MG: 800 TABLET, FILM COATED ORAL at 20:49

## 2017-10-11 ASSESSMENT — ENCOUNTER SYMPTOMS
NERVOUS/ANXIOUS: 1
TREMORS: 1
DIAPHORESIS: 1
NAUSEA: 1

## 2017-10-11 ASSESSMENT — ACTIVITIES OF DAILY LIVING (ADL)
ORAL_HYGIENE: INDEPENDENT
GROOMING: INDEPENDENT
DRESS: INDEPENDENT

## 2017-10-11 NOTE — IP AVS SNAPSHOT
MRN:[id number]                      After Visit Summary   10/11/2017    Nickolas Lang    MRN: [id number]           Thank you!     Thank you for choosing [facility] for your care. Our goal is always to provide you with excellent care.        Patient Information     Date Of Birth          1990        Designated Caregiver       Most Recent Value    Caregiver    Will someone help with your care after discharge? yes    Name of designated caregiver angel Amin    Phone number of caregiver [phone]    Caregiver address na      About your hospital stay     You were admitted on:  October 11, 2017 You last received care in the:  [facility]    You were discharged on:  October 14, 2017       Who to Call     For medical emergencies, please call 911.  For non-urgent questions about your medical care, please call your primary care provider or clinic, [phone]          Attending Provider     Provider Specialty    Miquel Medina MD [facility]    Amer, Tima Solis MD Family [facility]       Primary Care Provider Office Phone # Fax #    Nicolette ChristensenROXI bonilla [facility] [phone]      Your next 10 appointments already scheduled     Oct 16, 2017  1:30 PM CDT   Level O with UR CH 02   [facility] ([facility])    [address] [place] [address] S.  [address]  [facility] [id number] [phone]            Nov 21, 2017  9:00 AM CST   New Visit with Tima Crews MD   [facility] ([facility])    [address] [place] [address]  [address]  [facility] [id number] [phone]              Further instructions from your care team       Behavioral Discharge Planning and Instructions  THANK YOU FOR CHOOSING THE [facility]  3A  [phone]    Summary: You were admitted to Station 3A on 10/11/17 for detoxification from alcohol.  A medical exam  was performed that included lab work. You have met with a  and been approved to return to [facility]'s Phase II Treatment Program (Group B).  Please take care and make your recovery a priority Nickolas! It was a pleasure working with you and the treatment team wishes you the very best in your recovery!  ~Scott    Recommendation:  Phase II Treatment, psychotherapy, sober support group engagement and active work with a sponsor or  through [facility].    Main Diagnosis:  Per  Dr. Crews  Alcohol dependence   Alcohol withdrawal   Hypertension   Anxiety disorder     Major Treatments, Procedures and Findings:  You were detoxed from alcohol. You have met with a  to develop a treatment plan for discharge.  You have had labs drawn and a copy of those labs will be sent home with you. Your alcohol withdrawal is complete and you are being discharged today.  Please bring your lab results with to your follow up doctor appointment.  Make your recovery a priority!                        Symptoms to Report:  If you experience more anxiety, confusion, sleeplessness, deep sadness or thoughts of suicide, notify your treatment team or notify your primary care physician. IF ANY OF THE SYMPTOMS YOU ARE EXPERIENCING ARE A MEDICAL EMERGENCY CALL 911 IMMEDIATELY.     Lifestyle Adjustment: Adjust your lifestyle to get enough sleep, relaxation, exercise and  good nutrition. Continue to develop healthy coping skills to decrease stress and promote a sober living environment. Do not use alcohol, illegal drugs or addictive medications other than what is currently prescribed. AA, NA, and  Sponsor are excellent resources for support.     Primary Provider:  Nicolette France  [phone]    Appointment: 10/26/17 at 11:00am      Psychiatric Doctor:  Dr. Jean  [facility]    Phone: [phone]    Appointment:  12/5/2017 at 11:00am    Resources:     [facility] [phone]    Support Group:  AA/NA and Sponsor/support    Crisis Intervention: 218.961.7891 or 572-853-1314 (TTY: 920.608.9907).  Call anytime for help.  [place] on [place]ness (www.mn.chaya.org): 404.297.1998 or 544-204-4162.  Alcoholics Anonymous (www.alcoholics-anonymous.org): Check your phone book for your local chapter.  Suicide Awareness Voices of Education (SAVE) (www.save.org): 433-426-YENR (8386)  National Suicide Prevention Line (www.mentalLelongmn.org): 735-172-DAIH (1881)  Mental Health Consumer/Survivor Network of [place] (www.mhcsn.net): 288.633.8673 or 929-140-6037  Mental Health Association of [place] (www.mentalhealth.org): 393.626.8760 or 793-435-4326   Substance Abuse and Mental Health Services (www.samhsa.gov)    [facility] ([facility])  [facility] connects people seeking recovery to resources that help foster and sustain long-term recovery.  Whether you are seeking resources for treatment, transportation, housing, job training, education, health care or other pathways to recovery, [facility] is a great place to start.  235.423.5033.  Www.[facility].org    General Medication Instructions:   See your medication sheet(s) for instructions.   Take all medicines as directed.  Make no changes unless your doctor suggests them.   Go to all your doctor visits.  Be sure to have all your required lab tests. This way, your medicines can be refilled on time.  Do not use any drugs not prescribed by your provider.  AA/NA and Sponsors are excellent resources for support  Avoid alcohol.    Please Note:  If you have any questions at anytime after you are discharged please call the [facility] [place] detox unit 3AW unit at [phone].    [facility] , Health, Behavioral Intake [phone]    Please take this discharge folder with you to all your follow up appointments, it contains your lab results, diagnosis, medication list and discharge  "recommendations.      THANK YOU FOR CHOOSING THE [facility]     Pending Results     No orders found from 10/9/2017 to 10/12/2017.            Statement of Approval     Ordered          10/13/17 0855  I have reviewed and agree with all the recommendations and orders detailed in this document.  EFFECTIVE NOW     Approved and electronically signed by:  Tima Crews MD             Admission Information     Date & Time Provider Department Dept. Phone    10/11/2017 Tima Crews MD [facility] [phone]      Your Vitals Were     Blood Pressure Pulse Temperature Respirations Weight Pulse Oximetry    134/92 91 96.9  F (36.1  C) (Oral) 16 141.3 kg (311 lb 7 oz) 100%    BMI (Body Mass Index)                   41.09 kg/m2           MyChart Information     [facility] lets you send messages to your doctor, view your test results, renew your prescriptions, schedule appointments and more. To sign up, go to [email or web address] . Click on \"Log in\" on the left side of the screen, which will take you to the Welcome page. Then click on \"Sign up Now\" on the right side of the page.     You will be asked to enter the access code listed below, as well as some personal information. Please follow the directions to create your username and password.     Your access code is: [email or web address]  Expires: 2017 10:07 AM     Your access code will  in 90 days. If you need help or a new code, please call your [place] clinic or [phone].        Care EveryWhere ID     This is your Care EveryWhere ID. This could be used by other organizations to access your [place] medical records  [id number]        Equal Access to Services     [facility] : Hadii breezy Petersen, wajose rafaelda luwalker, qaybta carlaalshawna singh. So [facility] [phone].    ATENCIÓN: Si habla español, tiene a miller disposición servicios gratuitos de asistencia lingüística. " Stan vargas [phone].    We comply with applicable federal civil rights laws and [place] laws. We do not discriminate on the basis of race, color, national origin, age, disability, sex, sexual orientation, or gender identity.               Review of your medicines      CONTINUE these medicines which may have CHANGED, or have new prescriptions. If we are uncertain of the size of tablets/capsules you have at home, strength may be listed as something that might have changed.        Dose / Directions    amphetamine-dextroamphetamine 30 MG per 24 hr capsule   Commonly known as:  ADDERALL XR   This may have changed:  additional instructions   Used for:  Attention deficit disorder, unspecified hyperactivity presence        Dose:  30 mg   Take 1 capsule (30 mg) by mouth 2 times daily   Quantity:  60 capsule   Refills:  0         CONTINUE these medicines which have NOT CHANGED        Dose / Directions    aspirin-acetaminophen-caffeine 250-250-65 MG per tablet   Commonly known as:  EXCEDRIN MIGRAINE        Dose:  2 tablet   Take 2 tablets by mouth every 6 hours as needed for headaches   Refills:  0       busPIRone 10 MG tablet   Commonly known as:  BUSPAR   Used for:  Anxiety        Dose:  20 mg   Take 2 tablets (20 mg) by mouth 3 times daily   Quantity:  180 tablet   Refills:  1       cloNIDine 0.1 MG tablet   Commonly known as:  CATAPRES        Dose:  0.1 mg   Take 0.1 mg by mouth 2 times daily   Refills:  0       disulfiram 250 MG tablet   Commonly known as:  ANTABUSE   Indication:  Take 1 Tablet  Daily as Directed   Used for:  Alcohol dependence with uncomplicated withdrawal (H)        Dose:  250 mg   Take 1 tablet (250 mg) by mouth daily   Quantity:  30 tablet   Refills:  1       gabapentin 800 MG tablet   Commonly known as:  NEURONTIN   Used for:  Anxiety, Alcohol use disorder, severe, dependence (H)        Dose:  800 mg   Take 1 tablet (800 mg) by mouth 4 times daily   Quantity:  120 tablet   Refills:  1        hydrOXYzine 25 MG tablet   Commonly known as:  ATARAX   Used for:  Alcohol dependence with uncomplicated intoxication (H), Anxiety        Dose:  50 mg   Take 2 tablets (50 mg) by mouth every 6 hours as needed for anxiety   Quantity:  120 tablet   Refills:  1       methocarbamol 500 MG tablet   Commonly known as:  ROBAXIN   Used for:  Chronic low back pain, unspecified back pain laterality, with sciatica presence unspecified        Dose:  1000 mg   Take 2 tablets (1,000 mg) by mouth 3 times daily as needed for muscle spasms   Quantity:  30 tablet   Refills:  1       multivitamin, therapeutic with minerals Tabs tablet   Used for:  Essential hypertension, Alcohol use disorder, severe, dependence (H)        Dose:  1 tablet   Take 1 tablet by mouth daily   Quantity:  30 each   Refills:  3       nicotine 21 MG/24HR 24 hr patch   Commonly known as:  NICODERM CQ   Used for:  Encounter for smoking cessation counseling        Dose:  1 patch   Place 1 patch onto the skin every 24 hours   Quantity:  30 patch   Refills:  0       NORVASC 10 MG tablet   Generic drug:  amLODIPine        Dose:  10 mg   Take 10 mg by mouth daily   Refills:  0       omeprazole 40 MG capsule   Commonly known as:  priLOSEC   Used for:  Acute alcoholic intoxication in alcoholism, uncomplicated (H), Alcohol use disorder, severe, dependence (H)        Dose:  40 mg   Take 1 capsule (40 mg) by mouth daily   Quantity:  30 capsule   Refills:  1       sertraline 100 MG tablet   Commonly known as:  ZOLOFT   Used for:  Anxiety        Dose:  200 mg   Take 2 tablets (200 mg) by mouth daily   Quantity:  60 tablet   Refills:  1       traZODone 100 MG tablet   Commonly known as:  DESYREL   Used for:  Alcohol use disorder, severe, dependence (H), Anxiety        Dose:  100-200 mg   Take 1-2 tablets (100-200 mg) by mouth nightly as needed for sleep   Quantity:  60 tablet   Refills:  1       WELLBUTRIN  MG 24 hr tablet   Generic drug:  buPROPion        Dose:  150 mg    Take 150 mg by mouth every morning   Refills:  0         STOP taking     ARIPiprazole 2 MG tablet   Commonly known as:  ABILIFY           naltrexone 50 MG tablet   Commonly known as:  DEPADE;REVIA                Where to get your medicines      These medications were sent to [facility] [place], [place] - [address]  [address], [facility] [address]     Phone:  [phone]     disulfiram 250 MG tablet                Protect others around you: Learn how to safely use, store and throw away your medicines at www.disposemymeds.org.             Medication List: This is a list of all your medications and when to take them. Check marks below indicate your daily home schedule. Keep this list as a reference.      Medications           Morning Afternoon Evening Bedtime As Needed    amphetamine-dextroamphetamine 30 MG per 24 hr capsule   Commonly known as:  ADDERALL XR   Take 1 capsule (30 mg) by mouth 2 times daily                                aspirin-acetaminophen-caffeine 250-250-65 MG per tablet   Commonly known as:  EXCEDRIN MIGRAINE   Take 2 tablets by mouth every 6 hours as needed for headaches                                busPIRone 10 MG tablet   Commonly known as:  BUSPAR   Take 2 tablets (20 mg) by mouth 3 times daily   Last time this was given:  20 mg on 10/14/2017  8:18 AM                                cloNIDine 0.1 MG tablet   Commonly known as:  CATAPRES   Take 0.1 mg by mouth 2 times daily   Last time this was given:  0.1 mg on 10/14/2017  8:19 AM                                disulfiram 250 MG tablet   Commonly known as:  ANTABUSE   Take 1 tablet (250 mg) by mouth daily                                gabapentin 800 MG tablet   Commonly known as:  NEURONTIN   Take 1 tablet (800 mg) by mouth 4 times daily   Last time this was given:  800 mg on 10/14/2017  8:18 AM                                hydrOXYzine 25 MG tablet   Commonly known as:  ATARAX   Take 2 tablets (50 mg)  by mouth every 6 hours as needed for anxiety   Last time this was given:  50 mg on 10/13/2017 10:36 PM                                methocarbamol 500 MG tablet   Commonly known as:  ROBAXIN   Take 2 tablets (1,000 mg) by mouth 3 times daily as needed for muscle spasms                                multivitamin, therapeutic with minerals Tabs tablet   Take 1 tablet by mouth daily   Last time this was given:  1 tablet on 10/14/2017  8:19 AM                                nicotine 21 MG/24HR 24 hr patch   Commonly known as:  NICODERM CQ   Place 1 patch onto the skin every 24 hours   Last time this was given:  1 patch on 10/13/2017  8:40 AM                                NORVASC 10 MG tablet   Take 10 mg by mouth daily   Last time this was given:  10 mg on 10/14/2017  8:18 AM   Generic drug:  amLODIPine                                omeprazole 40 MG capsule   Commonly known as:  priLOSEC   Take 1 capsule (40 mg) by mouth daily   Last time this was given:  40 mg on 10/14/2017  8:18 AM                                sertraline 100 MG tablet   Commonly known as:  ZOLOFT   Take 2 tablets (200 mg) by mouth daily   Last time this was given:  200 mg on 10/14/2017  8:19 AM                                traZODone 100 MG tablet   Commonly known as:  DESYREL   Take 1-2 tablets (100-200 mg) by mouth nightly as needed for sleep   Last time this was given:  200 mg on 10/13/2017 10:36 PM                                WELLBUTRIN  MG 24 hr tablet   Take 150 mg by mouth every morning   Generic drug:  buPROPion

## 2017-10-11 NOTE — IP AVS SNAPSHOT
[facility]    [address] [place] [id number]    Phone:  [phone]                                       After Visit Summary   10/11/2017    Nickolas Lang    MRN: [id number]           After Visit Summary Signature Page     I have received my discharge instructions, and my questions have been answered. I have discussed any challenges I see with this plan with the nurse or doctor.    ..........................................................................................................................................  Patient/Patient Representative Signature      ..........................................................................................................................................  Patient Representative Print Name and Relationship to Patient    ..................................................               ................................................  Date                                            Time    ..........................................................................................................................................  Reviewed by Signature/Title    ...................................................              ..............................................  Date                                                            Time

## 2017-10-11 NOTE — PROGRESS NOTES
10/11/17 1551   Patient Belongings   Did you bring any home meds/supplements to the hospital?  No   Disposition of Belongings Other (see comment)   Belongings Search Yes   Clothing Search Yes   Second Staff keyla   General Info Comment see notes     NO CELL  NO VALUABLES    Sweat shirt was given to patient.      A               Admission:  I am responsible for any personal items that are not sent to the safe or pharmacy.  [facility] is not responsible for loss, theft or damage of any property in my possession.    Signature:  _________________________________ Date: _______  Time: _____                                              Staff Signature:  ____________________________ Date: ________  Time: _____      2nd Staff person, if patient is unable/unwilling to sign:    Signature: ________________________________ Date: ________  Time: _____     Discharge:  [facility] has returned all of my personal belongings:    Signature: _________________________________ Date: ________  Time: _____                                          Staff Signature:  ____________________________ Date: ________  Time: _____

## 2017-10-11 NOTE — ED PROVIDER NOTES
History     Chief Complaint   Patient presents with     Alcohol Problem     Last drink yesterday at 7-8 last night.  Drinks 175 of vodka per day.  pt currently in alcohol withdrawl.     HPI  Nickolas Lang is a 27 year old male with a history of alcohol abuse, anxiety, HTN, hepatic steatosis, GERD, and pyloric stenosis (pyloromyotomy as infant) who presents to the ED seeking detox from alcohol. Patient states he was sober for 75 days and relapsed last week with vodka. He states his last drink was yesterday at 7 PM. He also has complaints of tremulousness, diaphoresis, nausea, and anxiety. He states he hasn't been taking his anxiety medications or other medications besides his clonidine. Patient states he  hasn't had anything to eat or drink besides water. His mother states he has been here for treatment in the past. He otherwise currently denies any other substance use.     .  PAST MEDICAL HISTORY  Past Medical History:   Diagnosis Date     ADD (attention deficit disorder)      Alcohol abuse      Anxiety     gabapentin helps the most      GERD (gastroesophageal reflux disease)      Hepatic steatosis      Hypertension      Obesity      Pyloric stenosis     s/p pyloromyotomy as an infant     PAST SURGICAL HISTORY  Past Surgical History:   Procedure Laterality Date     Deviated septum repair       PYLOROMYOTOMY SURGERY  as an infant      SHOULDER SURGERY Left 07/29/2016    Removal of cartilage     FAMILY HISTORY  Family History   Problem Relation Age of Onset     Neurologic Disorder Mother      Multiple Sclerosis     Depression Mother      Anxiety Disorder Mother      Alcohol/Drug Father      Substance Abuse Father      Depression Maternal Grandmother      Anxiety Disorder Maternal Grandmother      Hypertension Maternal Grandmother      Substance Abuse Maternal Grandfather      Alcohol/Drug Paternal Grandfather      SOCIAL HISTORY  Social History   Substance Use Topics     Smoking status: Current Every Day  Smoker     Packs/day: 0.50     Years: 4.00     Types: Cigarettes     Smokeless tobacco: Never Used     Alcohol use 0.0 oz/week     0 Standard drinks or equivalent per week     MEDICATIONS  Current Facility-Administered Medications   Medication     ondansetron (ZOFRAN) injection 8 mg     magnesium sulfate 1 gm in 100mL D5W intermittent infusion     diazepam (VALIUM) tablet 5 mg     Current Outpatient Prescriptions   Medication     methocarbamol (ROBAXIN) 500 MG tablet     gabapentin (NEURONTIN) 800 MG tablet     traZODone (DESYREL) 100 MG tablet     sertraline (ZOLOFT) 100 MG tablet     busPIRone (BUSPAR) 10 MG tablet     ARIPiprazole (ABILIFY) 2 MG tablet     naltrexone (DEPADE;REVIA) 50 MG tablet     cloNIDine (CATAPRES) 0.1 MG tablet     hydrOXYzine (ATARAX) 25 MG tablet     disulfiram (ANTABUSE) 250 MG tablet     acetaminophen (TYLENOL) 325 MG tablet     multivitamin, therapeutic with minerals (THERA-VIT-M) TABS tablet     nicotine (NICODERM CQ) 21 MG/24HR 24 hr patch     nicotine (NICODERM CQ) 14 MG/24HR 24 hr patch     [START ON 11/1/2017] nicotine (NICODERM CQ) 7 MG/24HR 24 hr patch     omeprazole (PRILOSEC) 40 MG capsule     amphetamine-dextroamphetamine (ADDERALL XR) 30 MG per 24 hr capsule     amLODIPine (NORVASC) 10 MG tablet     Facility-Administered Medications Ordered in Other Encounters   Medication     Self Administer Medications: Behavioral Services     ALLERGIES  No Known Allergies      I have reviewed the Medications, Allergies, Past Medical and Surgical History, and Social History in the Epic system.    Review of Systems   Constitutional: Positive for diaphoresis.   Gastrointestinal: Positive for nausea.   Neurological: Positive for tremors (tremulousness).   Psychiatric/Behavioral: The patient is nervous/anxious.    All other systems reviewed and are negative.      Physical Exam   BP: (!) 144/101  Pulse: 85  Temp: 96.6  F (35.9  C)  Resp: 18  Weight: (!) 141.3 kg (311 lb 7 oz)  SpO2: 98 %        Physical Exam   Constitutional: He is oriented to person, place, and time. He appears well-developed and well-nourished. He appears distressed (anxious and diaphoretic).   HENT:   Head: Normocephalic and atraumatic.   Mouth/Throat: Oropharynx is clear and moist.   Eyes: EOM are normal. Pupils are equal, round, and reactive to light.   Neck: Normal range of motion. Neck supple. No tracheal deviation present. No thyromegaly present.   Cardiovascular: Normal rate, regular rhythm, normal heart sounds and intact distal pulses.  Exam reveals no gallop and no friction rub.    No murmur heard.  Pulmonary/Chest: Effort normal and breath sounds normal. He exhibits no tenderness.   Abdominal: Soft. Bowel sounds are normal. He exhibits no distension and no mass. There is no tenderness.   Musculoskeletal: He exhibits no edema or tenderness.   Neurological: He is alert and oriented to person, place, and time. He has normal strength. He displays tremor. No cranial nerve deficit or sensory deficit. Coordination and gait normal.   Skin: Skin is warm and dry. No rash noted.   Psychiatric: His speech is normal and behavior is normal. Judgment and thought content normal. His mood appears anxious.   Nursing note and vitals reviewed.      ED Course     ED Course     Procedures   11:29 AM  The patient was seen and examined by Dr. Medina in Room 19.                Critical Care time:  none             Labs Ordered and Resulted from Time of ED Arrival Up to the Time of Departure from the ED   COMPREHENSIVE METABOLIC PANEL - Abnormal; Notable for the following:        Result Value    Glucose 104 (*)     Creatinine 0.48 (*)     All other components within normal limits   MAGNESIUM - Abnormal; Notable for the following:     Magnesium 1.5 (*)     All other components within normal limits   ALCOHOL ETHYL - Abnormal; Notable for the following:     Ethanol g/dL 0.02 (*)     All other components within normal limits   ALCOHOL BREATH TEST POCT  - Abnormal; Notable for the following:     Alcohol Breath Test 0.027 (*)     All other components within normal limits   CBC WITH PLATELETS DIFFERENTIAL            Assessments & Plan (with Medical Decision Making)   Patient with history of alcohol dependence, anxiety, and depression.  He presents after an alcohol drinking binge and appears to be in acute alcohol withdrawal.  He does not have any other acute medical complaints.  He is depressed and anxious but is not suicidal.  He would like to enter a detox program.  He was treated for acute withdrawal and dehydration emergency department and is now somewhat improved.  Plan at this time would be voluntary detox admission.    I have reviewed the nursing notes.    I have reviewed the findings, diagnosis, plan and need for follow up with the patient.    New Prescriptions    No medications on file       Final diagnoses:   Alcohol dependence with uncomplicated withdrawal (H)     I, Aidan Ragsdale, am serving as a trained medical scribe to document services personally performed by Miquel Medina MD, based on the provider's statements to me.      IMiquel MD, was physically present and have reviewed and verified the accuracy of this note documented by Aidan Ragsdale.     10/11/2017   [facility], EMERGENCY DEPARTMENT     Miquel Medina MD  10/11/17 [id number]

## 2017-10-11 NOTE — PHARMACY-ADMISSION MEDICATION HISTORY
Admission medication history interview status for the 10/11/2017 admission is complete. See Epic admission navigator for allergy information, pharmacy, prior to admission medications and immunization status.     Medication history interview sources:  Patient, Patient's mother, Epic electronic medical record (clinic records and [facility] prescription filling records)    Changes made to PTA medication list (reason)  Added: Excedrin, Wellbutrin  Deleted:  Acetaminophen, nicotine 14 mg and 7 mg (patient sometimes using 21 mg patch but sometimes smoking)  Changed: Adderall XR (added comments takes in the morning and at noon)    Additional medication history information (including reliability of information, actions taken by pharmacist): The patient and his mother were reliable historians and was able to discuss his medications without difficulty. He reported not taking most of his medications since last Friday.     Adderall XR 30 mg last filled 10/02/17 for quantity 60  Gabapentin 800 mg last filled 09/23/17 for quantity 120      Prior to Admission medications    Medication Sig Last Dose Taking? Auth Provider   aspirin-acetaminophen-caffeine (EXCEDRIN MIGRAINE) 250-250-65 MG per tablet Take 2 tablets by mouth every 6 hours as needed for headaches  Yes Unknown, Entered By History   amLODIPine (NORVASC) 10 MG tablet Take 10 mg by mouth daily 10/6/2017 Yes Unknown, Entered By History   cloNIDine (CATAPRES) 0.1 MG tablet Take 0.1 mg by mouth 2 times daily 10/11/2017 at AM Yes Unknown, Entered By History   buPROPion (WELLBUTRIN XL) 150 MG 24 hr tablet Take 150 mg by mouth every morning 10/6/2017 Yes Unknown, Entered By History   nicotine (NICODERM CQ) 21 MG/24HR 24 hr patch Place 1 patch onto the skin every 24 hours Unknown Yes Nicolette Frye APRN CNP   methocarbamol (ROBAXIN) 500 MG tablet Take 2 tablets (1,000 mg) by mouth 3 times daily as needed for muscle spasms  Yes Nicolette Frye APRN CNP   gabapentin  (NEURONTIN) 800 MG tablet Take 1 tablet (800 mg) by mouth 4 times daily 10/6/2017 Yes Nicolette Frye APRN CNP   omeprazole (PRILOSEC) 40 MG capsule Take 1 capsule (40 mg) by mouth daily 10/6/2017 Yes Nicolette Frye APRN CNP   traZODone (DESYREL) 100 MG tablet Take 1-2 tablets (100-200 mg) by mouth nightly as needed for sleep  Yes Nicolette Frye APRN CNP   sertraline (ZOLOFT) 100 MG tablet Take 2 tablets (200 mg) by mouth daily 10/6/2017 Yes Nicolette Frye APRN CNP   busPIRone (BUSPAR) 10 MG tablet Take 2 tablets (20 mg) by mouth 3 times daily 10/6/2017 Yes Nicolette Frye APRN CNP   ARIPiprazole (ABILIFY) 2 MG tablet Take 2 tablets (4 mg) by mouth At Bedtime 10/6/2017 Yes Nicolette Frye APRN CNP   naltrexone (DEPADE;REVIA) 50 MG tablet Take 1 tablet (50 mg) by mouth daily 10/6/2017 Yes Nicolette Frye APRN CNP   amphetamine-dextroamphetamine (ADDERALL XR) 30 MG per 24 hr capsule Take 1 capsule (30 mg) by mouth 2 times daily  Takes in the morning and at noon 10/6/2017 Yes Nicolette Frye APRN CNP   hydrOXYzine (ATARAX) 25 MG tablet Take 2 tablets (50 mg) by mouth every 6 hours as needed for anxiety  Yes Filippo Brasher MD   disulfiram (ANTABUSE) 250 MG tablet Take 250 mg by mouth daily 10/6/2017 Yes Reported, Patient   multivitamin, therapeutic with minerals (THERA-VIT-M) TABS tablet Take 1 tablet by mouth daily 10/6/2017 Yes Mindi Wills MD       Medication history completed by: Tasneem Herman, PharmD, BCPP

## 2017-10-12 LAB
GGT SERPL-CCNC: 354 U/L (ref 0–75)
GGT SERPL-CCNC: 462 U/L (ref 0–75)

## 2017-10-12 PROCEDURE — 12800012 ZZH R&B CD MH INTERMEDIATE ADULT

## 2017-10-12 PROCEDURE — 25000132 ZZH RX MED GY IP 250 OP 250 PS 637: Performed by: FAMILY MEDICINE

## 2017-10-12 PROCEDURE — 99222 1ST HOSP IP/OBS MODERATE 55: CPT | Mod: AI | Performed by: FAMILY MEDICINE

## 2017-10-12 PROCEDURE — 36415 COLL VENOUS BLD VENIPUNCTURE: CPT | Performed by: FAMILY MEDICINE

## 2017-10-12 PROCEDURE — 25000125 ZZHC RX 250: Performed by: FAMILY MEDICINE

## 2017-10-12 PROCEDURE — 82977 ASSAY OF GGT: CPT | Performed by: FAMILY MEDICINE

## 2017-10-12 RX ADMIN — GABAPENTIN 800 MG: 800 TABLET, FILM COATED ORAL at 08:12

## 2017-10-12 RX ADMIN — BUSPIRONE HYDROCHLORIDE 20 MG: 5 TABLET ORAL at 20:14

## 2017-10-12 RX ADMIN — MULTIPLE VITAMINS W/ MINERALS TAB 1 TABLET: TAB at 08:12

## 2017-10-12 RX ADMIN — Medication 100 MG: at 08:12

## 2017-10-12 RX ADMIN — AMLODIPINE BESYLATE 10 MG: 10 TABLET ORAL at 08:12

## 2017-10-12 RX ADMIN — DIAZEPAM 10 MG: 5 TABLET ORAL at 16:05

## 2017-10-12 RX ADMIN — ONDANSETRON 4 MG: 4 TABLET, ORALLY DISINTEGRATING ORAL at 11:53

## 2017-10-12 RX ADMIN — DIAZEPAM 10 MG: 5 TABLET ORAL at 04:18

## 2017-10-12 RX ADMIN — DIAZEPAM 10 MG: 5 TABLET ORAL at 11:53

## 2017-10-12 RX ADMIN — DIAZEPAM 10 MG: 5 TABLET ORAL at 20:14

## 2017-10-12 RX ADMIN — BUSPIRONE HYDROCHLORIDE 20 MG: 5 TABLET ORAL at 13:37

## 2017-10-12 RX ADMIN — CLONIDINE HYDROCHLORIDE 0.1 MG: 0.1 TABLET ORAL at 08:12

## 2017-10-12 RX ADMIN — GABAPENTIN 800 MG: 800 TABLET, FILM COATED ORAL at 11:53

## 2017-10-12 RX ADMIN — DIAZEPAM 10 MG: 5 TABLET ORAL at 18:26

## 2017-10-12 RX ADMIN — NICOTINE 1 PATCH: 21 PATCH, EXTENDED RELEASE TRANSDERMAL at 16:06

## 2017-10-12 RX ADMIN — OMEPRAZOLE 40 MG: 20 CAPSULE, DELAYED RELEASE ORAL at 08:12

## 2017-10-12 RX ADMIN — DIAZEPAM 10 MG: 5 TABLET ORAL at 00:31

## 2017-10-12 RX ADMIN — BUSPIRONE HYDROCHLORIDE 20 MG: 5 TABLET ORAL at 08:12

## 2017-10-12 RX ADMIN — GABAPENTIN 800 MG: 800 TABLET, FILM COATED ORAL at 16:05

## 2017-10-12 RX ADMIN — FOLIC ACID 1 MG: 1 TABLET ORAL at 08:12

## 2017-10-12 RX ADMIN — SERTRALINE HYDROCHLORIDE 200 MG: 100 TABLET ORAL at 08:12

## 2017-10-12 RX ADMIN — TRAZODONE HYDROCHLORIDE 200 MG: 100 TABLET ORAL at 22:04

## 2017-10-12 RX ADMIN — HYDROXYZINE HYDROCHLORIDE 50 MG: 50 TABLET, FILM COATED ORAL at 22:04

## 2017-10-12 RX ADMIN — DIAZEPAM 10 MG: 5 TABLET ORAL at 08:12

## 2017-10-12 RX ADMIN — ACETAMINOPHEN 650 MG: 325 TABLET, FILM COATED ORAL at 09:01

## 2017-10-12 RX ADMIN — ACETAMINOPHEN 650 MG: 325 TABLET, FILM COATED ORAL at 18:35

## 2017-10-12 RX ADMIN — CLONIDINE HYDROCHLORIDE 0.1 MG: 0.1 TABLET ORAL at 20:14

## 2017-10-12 RX ADMIN — HYDROXYZINE HYDROCHLORIDE 50 MG: 50 TABLET, FILM COATED ORAL at 13:37

## 2017-10-12 RX ADMIN — GABAPENTIN 800 MG: 800 TABLET, FILM COATED ORAL at 20:14

## 2017-10-12 RX ADMIN — ACETAMINOPHEN 650 MG: 325 TABLET, FILM COATED ORAL at 13:38

## 2017-10-12 ASSESSMENT — ACTIVITIES OF DAILY LIVING (ADL)
GROOMING: INDEPENDENT
ORAL_HYGIENE: INDEPENDENT
GROOMING: INDEPENDENT
DRESS: INDEPENDENT

## 2017-10-12 NOTE — H&P
DATE OF ADMISSION:  10/11/2017.       CHIEF COMPLAINT:  Alcohol dependence.      PRESENT ILLNESS:  This is one of several [facility] [place], admissions for Nickolas Lang who is a 27-year-old male.  The patient is single.  He lives with his mother and grandmother.  He was working for [employer], but this was not a satisfactory job for him.  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      The patient enters [facility] after relapse.  He went through [facility] this summer and was sober for nearly 3 months but relapsed a couple weeks ago.  He says that he learned that a couple of the people that he went through treatment with relapsed and he thought that his own relapse was inevitable.  He has a history of alcohol dependence dating back a few years and has been through treatment several times.  He has been relapse-prone.  He has been so relapse prone, that he becomes pessimistic regarding his own ability to stay sober.  On the other hand, he has been motivated for recovery.  He has taken Vivitrol for recovery.  He has been active in [facility] and has a sponsor.  He says he simply stopped talking to his sponsor and stopped going to meetings and then relapsed.  He now enters for detoxification.  He has an interview soon with [employer].  He is not interested in going through treatment again and he has been through treatment so many times in the past, this is likely not indicated.  He needs to complete detox and get back involved with his recovery program.  He needs to go to meetings, work with his sponsor and take medication assisted treatment.  He now enters for detox purposes.  He denies drug abuse.      PAST MEDICAL HISTORY:   1.  Hypertension.   2.  Depression.   3.  Alcoholic liver disease.    4.  Anxiety disorder.      PAST SURGICAL HISTORY:     1.  Pyloromyotomy as a child.     2.  Deviated septum repair.    3.  Shoulder surgery.       MEDICATIONS PRIOR TO ADMISSION:   1.  Amlodipine 10 mg daily.   2.  Clonidine 0.1 mg b.i.d.   3.  Wellbutrin- mg daily.   4.  Gabapentin 800 mg q.i.d.   5.  Omeprazole 40 mg daily.   6.  Trazodone 100-200 mg at bedtime.   7.  Zoloft 100 mg daily.     8.  BuSpar 20 mg t.i.d.   9.  Abilify 4 mg at bedtime.     10.  The patient has been on Adderall for ADD.     11.  He has been on Vivitrol injection.     12.  He has taken Antabuse in the past.   13.  He is on Abilify for anxiety but found it ineffective.  He says the gabapentin is effective.      REVIEW OF SYSTEMS:   SKIN:  No rashes.   HEAD:  No headaches.   EYES:  No visual disturbance.   EARS:  No hearing loss.   MOUTH AND THROAT:  No difficulty swallowing.   PULMONARY:  No cough or shortness of breath.   CARDIOVASCULAR:  No chest pain.   GASTROINTESTINAL:  No nausea, vomiting, diarrhea or constipation.   GENITOURINARY:  Negative.   MUSCULOSKELETAL:  The patient has left shoulder limited range of motion and says he is in need of a complete shoulder replacement surgery.   NEUROLOGIC:  Tremulous with withdrawal.      PHYSICAL EXAMINATION:   VITAL SIGNS:  Temperature 96.9, pulse 80, respirations 16, blood pressure is 139/109.   GENERAL:  This is a well-developed, well-nourished 27-year-old male in slight distress with alcohol withdrawal.  He is diaphoretic.   SKIN:  Flushed and moist.   HEAD:  Normal.   EYES:  Pupils equal, round, regular and reactive to light.  Conjunctivae normal.  Sclerae normal.   EARS:  Normal.   NOSE:  Normal.   MOUTH AND THROAT:  Lips normal.  Tongue normal.  Pharynx normal.   NECK:  Supple.  No masses, no thyroid enlargement.  Lymph nodes normal anterior and posterior cervical region.   PULMONARY:  Lungs  clear to auscultation, good inspiration and expiration, no wheezes, no rhonchi, no rales.   CARDIOVASCULAR:  Heart  regular rate and rhythm, no murmurs.   EXTREMITIES:  Good peripheral pulses, no edema.  There is puffiness to the lower  extremities from weight.   NEUROLOGIC:  The patient is tremulous.  Motor normal.  Sensory normal.  Coordination normal.   MENTAL STATUS:  Oriented to time, place, person and situation.  Attitude is cooperative.  Insight fair, judgment fair.      ASSESSMENT:   1.  Alcohol dependence.   2.  Alcohol withdrawal.   3.  Hypertension.   4.  Anxiety disorder.      PLAN:  Inpatient detoxification.  Does not seem patient is in need of further inpatient treatment.  Recommend strong attachment to his AA meeting and working with a sponsor.  Recommend resuming Vivitrol and Antabuse.         JORDAN DENT MD             D: 10/12/2017 10:07   T: 10/12/2017 11:09   MT: DELORIS      Name:     PARTH HARPER   MRN:      [id number]        Account:      [id number]   :      1990           Admitted:     [id number]      Document: [id number]

## 2017-10-12 NOTE — PROGRESS NOTES
Case Management Note  10/12/2017    Writer met with pt to initiate discharge planning. Pt reports he recently completed [facility] and was attending Phase II with Amish Murillo. Pt reports he would like to return to Phase II and possibly look at moving into sober housing. Writer suggested pt contact Amish Chidi to see if he could return to Phase II. Writer gave pt Amish Murillo's contact information and a list of [facility]. Writer also left a voicemail message with Amish Murillo to discuss pt returning to Phase II.    Writer received a phone call from Leighton Evans, [facility], Lead Counselor. He informed writer, pt returning to Phase II would not be an appropriate referral. The pt requires a higher level of care. Pt notified.    Scott Aquino MA, [facility]

## 2017-10-13 DIAGNOSIS — F10.20 ALCOHOL USE DISORDER, SEVERE, DEPENDENCE (H): Primary | Chronic | ICD-10-CM

## 2017-10-13 PROCEDURE — 25000125 ZZHC RX 250: Performed by: FAMILY MEDICINE

## 2017-10-13 PROCEDURE — 25000132 ZZH RX MED GY IP 250 OP 250 PS 637: Performed by: FAMILY MEDICINE

## 2017-10-13 PROCEDURE — 99233 SBSQ HOSP IP/OBS HIGH 50: CPT | Performed by: FAMILY MEDICINE

## 2017-10-13 PROCEDURE — 12800012 ZZH R&B CD MH INTERMEDIATE ADULT

## 2017-10-13 RX ORDER — DISULFIRAM 250 MG/1
250 TABLET ORAL DAILY
Qty: 30 TABLET | Refills: 1 | Status: SHIPPED | OUTPATIENT
Start: 2017-10-13 | End: 2018-02-27

## 2017-10-13 RX ADMIN — DIAZEPAM 10 MG: 5 TABLET ORAL at 16:15

## 2017-10-13 RX ADMIN — GABAPENTIN 800 MG: 800 TABLET, FILM COATED ORAL at 16:15

## 2017-10-13 RX ADMIN — ACETAMINOPHEN 650 MG: 325 TABLET, FILM COATED ORAL at 13:51

## 2017-10-13 RX ADMIN — CLONIDINE HYDROCHLORIDE 0.1 MG: 0.1 TABLET ORAL at 20:03

## 2017-10-13 RX ADMIN — MULTIPLE VITAMINS W/ MINERALS TAB 1 TABLET: TAB at 08:43

## 2017-10-13 RX ADMIN — ONDANSETRON 4 MG: 4 TABLET, ORALLY DISINTEGRATING ORAL at 12:00

## 2017-10-13 RX ADMIN — FOLIC ACID 1 MG: 1 TABLET ORAL at 08:43

## 2017-10-13 RX ADMIN — BUSPIRONE HYDROCHLORIDE 20 MG: 5 TABLET ORAL at 08:43

## 2017-10-13 RX ADMIN — DIAZEPAM 10 MG: 5 TABLET ORAL at 20:02

## 2017-10-13 RX ADMIN — AMLODIPINE BESYLATE 10 MG: 10 TABLET ORAL at 08:43

## 2017-10-13 RX ADMIN — ACETAMINOPHEN 650 MG: 325 TABLET, FILM COATED ORAL at 08:46

## 2017-10-13 RX ADMIN — OMEPRAZOLE 40 MG: 20 CAPSULE, DELAYED RELEASE ORAL at 08:43

## 2017-10-13 RX ADMIN — BUSPIRONE HYDROCHLORIDE 20 MG: 5 TABLET ORAL at 13:51

## 2017-10-13 RX ADMIN — SERTRALINE HYDROCHLORIDE 200 MG: 100 TABLET ORAL at 08:42

## 2017-10-13 RX ADMIN — NICOTINE 1 PATCH: 21 PATCH, EXTENDED RELEASE TRANSDERMAL at 08:40

## 2017-10-13 RX ADMIN — GABAPENTIN 800 MG: 800 TABLET, FILM COATED ORAL at 08:43

## 2017-10-13 RX ADMIN — Medication 100 MG: at 08:43

## 2017-10-13 RX ADMIN — HYDROXYZINE HYDROCHLORIDE 50 MG: 50 TABLET, FILM COATED ORAL at 22:36

## 2017-10-13 RX ADMIN — CLONIDINE HYDROCHLORIDE 0.1 MG: 0.1 TABLET ORAL at 08:43

## 2017-10-13 RX ADMIN — HYDROXYZINE HYDROCHLORIDE 50 MG: 50 TABLET, FILM COATED ORAL at 16:15

## 2017-10-13 RX ADMIN — GABAPENTIN 800 MG: 800 TABLET, FILM COATED ORAL at 12:00

## 2017-10-13 RX ADMIN — DIAZEPAM 10 MG: 5 TABLET ORAL at 08:43

## 2017-10-13 RX ADMIN — DIAZEPAM 10 MG: 5 TABLET ORAL at 12:03

## 2017-10-13 RX ADMIN — TRAZODONE HYDROCHLORIDE 200 MG: 100 TABLET ORAL at 22:36

## 2017-10-13 RX ADMIN — BUSPIRONE HYDROCHLORIDE 20 MG: 5 TABLET ORAL at 20:02

## 2017-10-13 RX ADMIN — GABAPENTIN 800 MG: 800 TABLET, FILM COATED ORAL at 20:03

## 2017-10-13 ASSESSMENT — ACTIVITIES OF DAILY LIVING (ADL)
GROOMING: INDEPENDENT
DRESS: INDEPENDENT
GROOMING: INDEPENDENT
ORAL_HYGIENE: INDEPENDENT

## 2017-10-13 ASSESSMENT — ENCOUNTER SYMPTOMS
DIETARY ISSUES: ADEQUATE INTAKE
ACTIVITY IMPAIRMENT: NORMAL
SHORTNESS OF BREATH: 0
COUGH: 0
DIARRHEA: 0
NO PATIENT REPORTED PAIN: 1
ANOREXIA: 0
CHEST PRESSURE: 0
WEAKNESS: 0

## 2017-10-13 NOTE — PROGRESS NOTES
Case Management Note  10/13/2017    Pt met with Amish Murillo ([facility], Group B Phase II, Group Facilitator). Pt has been approved to return to the [facility] Phase II Program upon discharge from detox. Case management complete.    Scott Aquino MA, [facility]   5

## 2017-10-13 NOTE — DISCHARGE INSTRUCTIONS
Behavioral Discharge Planning and Instructions  THANK YOU FOR CHOOSING THE [facility]  3A  [phone]    Summary: You were admitted to Station 3A on 10/11/17 for detoxification from alcohol.  A medical exam was performed that included lab work. You have met with a  and been approved to return to [facility]'s Phase II Treatment Program (Group B).  Please take care and make your recovery a priority Nickolas! It was a pleasure working with you and the treatment team wishes you the very best in your recovery!  ~Scott    Recommendation:  Phase II Treatment, psychotherapy, sober support group engagement and active work with a sponsor or  through [facility].    Main Diagnosis:  Per  Dr. Crews  Alcohol dependence   Alcohol withdrawal   Hypertension   Anxiety disorder     Major Treatments, Procedures and Findings:  You were detoxed from alcohol. You have met with a  to develop a treatment plan for discharge.  You have had labs drawn and a copy of those labs will be sent home with you. Your alcohol withdrawal is complete and you are being discharged today.  Please bring your lab results with to your follow up doctor appointment.  Make your recovery a priority!                        Symptoms to Report:  If you experience more anxiety, confusion, sleeplessness, deep sadness or thoughts of suicide, notify your treatment team or notify your primary care physician. IF ANY OF THE SYMPTOMS YOU ARE EXPERIENCING ARE A MEDICAL EMERGENCY CALL 911 IMMEDIATELY.     Lifestyle Adjustment: Adjust your lifestyle to get enough sleep, relaxation, exercise and  good nutrition. Continue to develop healthy coping skills to decrease stress and promote a sober living environment. Do not use alcohol, illegal drugs or addictive medications other than what is currently prescribed. AA, NA, and  Sponsor are excellent resources for support.     Primary Provider:  Nicolette Rae  [place]  [phone]    Appointment: 10/26/17 at 11:00am      Psychiatric Doctor:  Dr. Jean  [facility]    Phone: [phone]    Appointment:  12/5/2017 at 11:00am    Resources:     [facility] [phone]    Support Group:  AA/NA and Sponsor/support    Crisis Intervention: 272.791.5446 or 653-782-0676 (TTY: 433.942.7510).  Call anytime for help.  National [place] on Mental Illness (www.mn.chaya.org): 700.887.1384 or 257-455-9099.  Alcoholics Anonymous (www.alcoholics-anonymous.org): Check your phone book for your local chapter.  Suicide Awareness Voices of Education (SAVE) (www.save.org): 167-365-JMRP (0382)  National Suicide Prevention Line (www.mentalFloorball Gearmn.org): 599-996-GSUP (4165)  Mental Health Consumer/Survivor Network of [place] (www.mhcsn.net): 435.427.8329 or 396-146-5998  Mental Health Association of [place] (www.mentalhealth.org): 548.455.6687 or 796-664-9317   Substance Abuse and Mental Health Services (www.samhsa.gov)    [place] [facility] ([facility])  [facility] connects people seeking recovery to resources that help foster and sustain long-term recovery.  Whether you are seeking resources for treatment, transportation, housing, job training, education, health care or other pathways to recovery, [facility] is a great place to start.  [phone].  Www.[place]nes[place]arecovery.org    General Medication Instructions:   See your medication sheet(s) for instructions.   Take all medicines as directed.  Make no changes unless your doctor suggests them.   Go to all your doctor visits.  Be sure to have all your required lab tests. This way, your medicines can be refilled on time.  Do not use any drugs not prescribed by your provider.  AA/NA and Sponsors are excellent resources for support  Avoid alcohol.    Please Note:  If you have any questions at anytime after you are discharged please call the [facility] [place] detox unit 3AW unit at  [phone].    [facility], Behavioral Intake [phone]    Please take this discharge folder with you to all your follow up appointments, it contains your lab results, diagnosis, medication list and discharge recommendations.      THANK YOU FOR CHOOSING THE [facility]

## 2017-10-13 NOTE — PROGRESS NOTES
Nickolas Lang is a 27 year old male patient.  1. Alcohol dependence with uncomplicated withdrawal (H)      Past Medical History:   Diagnosis Date     ADD (attention deficit disorder)      Alcohol abuse      Anxiety     gabapentin helps the most      GERD (gastroesophageal reflux disease)      Hepatic steatosis      Hypertension      Obesity      Pyloric stenosis     s/p pyloromyotomy as an infant     Current Outpatient Prescriptions   Medication Sig Dispense Refill     disulfiram (ANTABUSE) 250 MG tablet Take 1 tablet (250 mg) by mouth daily 30 tablet 1     No Known Allergies  Active Problems:    Alcohol withdrawal (H)    Blood pressure (!) 130/99, pulse 85, temperature 97.2  F (36.2  C), temperature source Oral, resp. rate 16, weight (!) 311 lb 7 oz (141.3 kg), SpO2 100 %.    Subjective:  Symptoms:  Improved.  No shortness of breath, malaise, cough, chest pain, weakness, headache, chest pressure, anorexia, diarrhea or anxiety.    Diet:  Adequate intake.    Activity level: Normal.    Pain:  He reports no pain.      Objective:  General Appearance:  Comfortable, well-appearing and in no acute distress.    Vital signs: (most recent): Blood pressure (!) 130/99, pulse 85, temperature 97.2  F (36.2  C), temperature source Oral, resp. rate 16, weight (!) 311 lb 7 oz (141.3 kg), SpO2 100 %.  Vital signs are normal.    Lungs:  Normal respiratory rate and normal effort.  Breath sounds clear to auscultation.    Heart: Normal rate.  Regular rhythm.    Neurological: Patient is alert and oriented to person, place and time.  Normal strength.    Skin:  Warm.      Assessment:    Condition: In serious condition.  Improving.   (Alcohol Dependence and Withdrawal   ).     Plan:   (Continue detox  Discharge tomorrow  AA  Antabuse  [facility]  Addiction medicine follow up    40 minutes were spent with patient with more than 50% of time spent in counseling and coordination of care ).       Tima Crews  10/13/2017

## 2017-10-14 VITALS
HEART RATE: 91 BPM | OXYGEN SATURATION: 100 % | WEIGHT: 311.44 LBS | TEMPERATURE: 96.9 F | DIASTOLIC BLOOD PRESSURE: 92 MMHG | RESPIRATION RATE: 16 BRPM | SYSTOLIC BLOOD PRESSURE: 134 MMHG | BODY MASS INDEX: 41.09 KG/M2

## 2017-10-14 PROCEDURE — 25000132 ZZH RX MED GY IP 250 OP 250 PS 637: Performed by: FAMILY MEDICINE

## 2017-10-14 RX ADMIN — MULTIPLE VITAMINS W/ MINERALS TAB 1 TABLET: TAB at 08:19

## 2017-10-14 RX ADMIN — CLONIDINE HYDROCHLORIDE 0.1 MG: 0.1 TABLET ORAL at 08:19

## 2017-10-14 RX ADMIN — AMLODIPINE BESYLATE 10 MG: 10 TABLET ORAL at 08:18

## 2017-10-14 RX ADMIN — GABAPENTIN 800 MG: 800 TABLET, FILM COATED ORAL at 08:18

## 2017-10-14 RX ADMIN — OMEPRAZOLE 40 MG: 20 CAPSULE, DELAYED RELEASE ORAL at 08:18

## 2017-10-14 RX ADMIN — FOLIC ACID 1 MG: 1 TABLET ORAL at 08:18

## 2017-10-14 RX ADMIN — ACETAMINOPHEN 650 MG: 325 TABLET, FILM COATED ORAL at 09:15

## 2017-10-14 RX ADMIN — Medication 100 MG: at 08:19

## 2017-10-14 RX ADMIN — BUSPIRONE HYDROCHLORIDE 20 MG: 5 TABLET ORAL at 08:18

## 2017-10-14 RX ADMIN — SERTRALINE HYDROCHLORIDE 200 MG: 100 TABLET ORAL at 08:19

## 2017-10-14 ASSESSMENT — ACTIVITIES OF DAILY LIVING (ADL)
DRESS: INDEPENDENT
ORAL_HYGIENE: INDEPENDENT
GROOMING: INDEPENDENT

## 2017-10-14 NOTE — PLAN OF CARE
Problem: Substance Withdrawal  Goal: Substance Withdrawal  Signs and symptoms of listed problems will be absent or manageable.   Outcome: Adequate for Discharge Date Met:  10/14/17  MSSA 6 this AM. Denies all symptoms of alcohol withdrawal. Appetite good. Denies suicide ideation or thoughts of self harm. Patient will be discharged to home and return to Phase 11 Treatment [facility].

## 2017-10-16 ENCOUNTER — INFUSION THERAPY VISIT (OUTPATIENT)
Dept: INFUSION THERAPY | Facility: CLINIC | Age: 27
End: 2017-10-16
Attending: FAMILY MEDICINE
Payer: MEDICAID

## 2017-10-16 VITALS
DIASTOLIC BLOOD PRESSURE: 110 MMHG | SYSTOLIC BLOOD PRESSURE: 158 MMHG | TEMPERATURE: 98.6 F | RESPIRATION RATE: 18 BRPM | HEART RATE: 106 BPM | OXYGEN SATURATION: 97 %

## 2017-10-16 DIAGNOSIS — F10.20 ALCOHOL USE DISORDER, SEVERE, DEPENDENCE (H): Primary | ICD-10-CM

## 2017-10-16 PROCEDURE — 25000128 H RX IP 250 OP 636: Performed by: PEDIATRICS

## 2017-10-16 PROCEDURE — 96372 THER/PROPH/DIAG INJ SC/IM: CPT

## 2017-10-16 RX ADMIN — NALTREXONE 380 MG: KIT at 14:28

## 2017-10-16 ASSESSMENT — PAIN SCALES - GENERAL: PAINLEVEL: SEVERE PAIN (7)

## 2017-10-16 NOTE — PROGRESS NOTES
"Infusion Nursing Note:  Nickolas Lang presents today for vivitrol.    Patient seen by provider today: No   present during visit today: Not Applicable.    Note: Patient had relapsed.  He had been hospitalized in detox.  Glad to be starting back on vivitrol.    Intravenous Access:  No Intravenous access/labs at this visit.    Post Infusion Assessment:  Patient tolerated injection without incident.  Vivitrol injection given without difficulty in left glut using 2\" needle.    Discharge Plan:   Patient discharged in stable condition accompanied by: self.  Departure Mode: Ambulatory.  Will return to clinic in 4 weeks.  Radha Sinha RN                        "

## 2017-10-16 NOTE — MR AVS SNAPSHOT
"              After Visit Summary   10/16/2017    Nickolas Lang    MRN: [id number]           Patient Information     Date Of Birth          1990        Visit Information        Provider Department      10/16/2017 1:30 PM [facility]        Today's Diagnoses     Alcohol use disorder, severe, dependence (H)    -  1       Follow-ups after your visit        Your next 10 appointments already scheduled     Nov 21, 2017  9:00 AM CST   New Visit with Tima Crews MD   [facility] ([facility])    [address] [place] [address]  [address]  [facility] [id number] [phone]            Nov 21, 2017 10:30 AM CST   Level O with UR CH 01   [facility] ([facility])    [address] [place] [address]  [address]  [facility] [address] [phone]              Who to contact     If you have questions or need follow up information about today's clinic visit or your schedule please contact [facility] directly at [phone].  Normal or non-critical lab and imaging results will be communicated to you by vChatterhart, letter or phone within 4 business days after the clinic has received the results. If you do not hear from us within 7 days, please contact the clinic through vChatterhart or phone. If you have a critical or abnormal lab result, we will notify you by phone as soon as possible.  Submit refill requests through Whereoscope or call your pharmacy and they will forward the refill request to us. Please allow 3 business days for your refill to be completed.          Additional Information About Your Visit        MyChart Information     Whereoscope lets you send messages to your doctor, view your test results, renew your prescriptions, schedule appointments and more. To sign up, go to [email or web address] . Click on \"Log in\" on the left side of the screen, which will take " "you to the Welcome page. Then click on \"Sign up Now\" on the right side of the page.     You will be asked to enter the access code listed below, as well as some personal information. Please follow the directions to create your username and password.     Your access code is: [email or web address]  Expires: 2017 10:07 AM     Your access code will  in 90 days. If you need help or a new code, please call your [place] clinic or [phone].        Care EveryWhere ID     This is your Care EveryWhere ID. This could be used by other organizations to access your [place] medical records  [id number]        Your Vitals Were     Pulse Temperature Respirations Pulse Oximetry          106 98.6  F (37  C) (Oral) 18 97%         Blood Pressure from Last 3 Encounters:   10/16/17 (!) 158/110   17 138/88   08/15/17 (!) 142/96    Weight from Last 3 Encounters:   17 (!) 146.1 kg (322 lb)   08/10/17 (!) 147.4 kg (325 lb)   17 (!) 143 kg (315 lb 4.8 oz)              Today, you had the following     No orders found for display         Today's Medication Changes          These changes are accurate as of: 10/16/17  3:39 PM.  If you have any questions, ask your nurse or doctor.               These medicines have changed or have updated prescriptions.        Dose/Directions    amphetamine-dextroamphetamine 30 MG per 24 hr capsule   Commonly known as:  ADDERALL XR   This may have changed:  additional instructions   Used for:  Attention deficit disorder, unspecified hyperactivity presence        Dose:  30 mg   Take 1 capsule (30 mg) by mouth 2 times daily   Quantity:  60 capsule   Refills:  0                Primary Care Provider Office Phone # Fax #    ROXI Santiago [phone]       [address] [facility] [address]        Equal Access to Services     ERMELINDA LOZADA AH: Rishabh grimeso Soomaali, waaxda luqadaha, qaybta kaalmashawna price. So " [facility] [phone].    ATENCIÓN: Si karine murrell, tiene a miller disposición servicios gratuitos de asistencia lingüística. Stan vargas [phone].    We comply with applicable federal civil rights laws and [place] laws. We do not discriminate on the basis of race, color, national origin, age, disability, sex, sexual orientation, or gender identity.            Thank you!     Thank you for choosing [facility]  for your care. Our goal is always to provide you with excellent care. Hearing back from our patients is one way we can continue to improve our services. Please take a few minutes to complete the written survey that you may receive in the mail after your visit with us. Thank you!             Your Updated Medication List - Protect others around you: Learn how to safely use, store and throw away your medicines at www.disposemymeds.org.          This list is accurate as of: 10/16/17  3:39 PM.  Always use your most recent med list.                   Brand Name Dispense Instructions for use Diagnosis    amphetamine-dextroamphetamine 30 MG per 24 hr capsule    ADDERALL XR    60 capsule    Take 1 capsule (30 mg) by mouth 2 times daily    Attention deficit disorder, unspecified hyperactivity presence       aspirin-acetaminophen-caffeine 250-250-65 MG per tablet    EXCEDRIN MIGRAINE     Take 2 tablets by mouth every 6 hours as needed for headaches        busPIRone 10 MG tablet    BUSPAR    180 tablet    Take 2 tablets (20 mg) by mouth 3 times daily    Anxiety       cloNIDine 0.1 MG tablet    CATAPRES     Take 0.1 mg by mouth 2 times daily        disulfiram 250 MG tablet    ANTABUSE    30 tablet    Take 1 tablet (250 mg) by mouth daily    Alcohol dependence with uncomplicated withdrawal (H)       gabapentin 800 MG tablet    NEURONTIN    120 tablet    Take 1 tablet (800 mg) by mouth 4 times daily    Anxiety, Alcohol use disorder, severe, dependence (H)       hydrOXYzine 25 MG tablet    ATARAX    120  tablet    Take 2 tablets (50 mg) by mouth every 6 hours as needed for anxiety    Alcohol dependence with uncomplicated intoxication (H), Anxiety       methocarbamol 500 MG tablet    ROBAXIN    30 tablet    Take 2 tablets (1,000 mg) by mouth 3 times daily as needed for muscle spasms    Chronic low back pain, unspecified back pain laterality, with sciatica presence unspecified       multivitamin, therapeutic with minerals Tabs tablet     30 each    Take 1 tablet by mouth daily    Essential hypertension, Alcohol use disorder, severe, dependence (H)       nicotine 21 MG/24HR 24 hr patch    NICODERM CQ    30 patch    Place 1 patch onto the skin every 24 hours    Encounter for smoking cessation counseling       NORVASC 10 MG tablet   Generic drug:  amLODIPine      Take 10 mg by mouth daily        omeprazole 40 MG capsule    priLOSEC    30 capsule    Take 1 capsule (40 mg) by mouth daily    Acute alcoholic intoxication in alcoholism, uncomplicated (H), Alcohol use disorder, severe, dependence (H)       sertraline 100 MG tablet    ZOLOFT    60 tablet    Take 2 tablets (200 mg) by mouth daily    Anxiety       traZODone 100 MG tablet    DESYREL    60 tablet    Take 1-2 tablets (100-200 mg) by mouth nightly as needed for sleep    Alcohol use disorder, severe, dependence (H), Anxiety       WELLBUTRIN  MG 24 hr tablet   Generic drug:  buPROPion      Take 150 mg by mouth every morning

## 2017-10-18 DIAGNOSIS — G89.29 CHRONIC LOW BACK PAIN, UNSPECIFIED BACK PAIN LATERALITY, WITH SCIATICA PRESENCE UNSPECIFIED: ICD-10-CM

## 2017-10-18 DIAGNOSIS — M54.5 CHRONIC LOW BACK PAIN, UNSPECIFIED BACK PAIN LATERALITY, WITH SCIATICA PRESENCE UNSPECIFIED: ICD-10-CM

## 2017-10-18 NOTE — TELEPHONE ENCOUNTER
Methocarbamol 500mg      Last Written Prescription Date:  9/6/17  Last Fill Quantity: 30,   # refills: 1  Future Office visit:       Routing refill request to provider for review/approval because:  Drug not on the FM, P or [facility] refill protocol or controlled substance

## 2017-10-19 ENCOUNTER — HOSPITAL ENCOUNTER (OUTPATIENT)
Dept: BEHAVIORAL HEALTH | Facility: CLINIC | Age: 27
End: 2017-10-19
Attending: SOCIAL WORKER
Payer: MEDICAID

## 2017-10-19 PROCEDURE — H2035 A/D TX PROGRAM, PER HOUR: HCPCS | Mod: HQ

## 2017-10-20 NOTE — TELEPHONE ENCOUNTER
Mackenzie from [facility] calling, checking on status of this request. Mackenzie states this is the second attempt for this medication.  Provider please review and advise. Thank you.

## 2017-10-26 ENCOUNTER — HOSPITAL ENCOUNTER (OUTPATIENT)
Dept: BEHAVIORAL HEALTH | Facility: CLINIC | Age: 27
End: 2017-10-26
Attending: SOCIAL WORKER
Payer: MEDICAID

## 2017-10-26 PROCEDURE — H2035 A/D TX PROGRAM, PER HOUR: HCPCS | Mod: HQ

## 2017-10-27 NOTE — PROGRESS NOTES
Patient:  Nickolas Lang            Adult CD Progress Note and Treatment Plan Review     Attendance  Please refer to OP BEH CD Adult Attendance Record Documentation Flowsheet    Support group attended this week: Yes    Reporting sobriety:  Yes    Treatment Plan     Treatment Plan Review competed on:    10/26/17    Client preferred learning style:     Visual  Hands on   Verbal   Demonstration     Staff Members contributing: ARLENE Gr                        Received Supervision: No    Client: contributed to goals and plan.    Client received copy of plan/revised plan: Yes    Client agrees with plan/revised plan: Yes    Changes to Treatment Plan: No    New Goals added since last review: None    Goals worked on since last review: Motivation, Change, Relapse Prevention, Confidence, Patience, Recovery Behavior    Strategies effective: yes    Strategies need these changes: Continue working on above goals.     ASAM Risk Rating:    Dimension 1 0 Patient reports his last use date as 9/10/17. Patient reports that he continues to take his antabuse medication as prescribed. No other issues at this time.    Dimension 2 0 Patient reports no biomedical concerns at this time.      Dimension 3 1 Patient reports that he is using coping skills learned in treatment to manage his depression and anxiety. Patient reports that arguments with his mother is creating additional stress in his life. Patient reports no suicidal ideation at this time.      Dimension 4 1 Patient reports that he is motivated and excited about starting his new job at [employer] on Monday, 10/30/17. Patient continues to work on increasing his motivation to change by taking positive action toward his goals.      Dimension 5 3  Patient reports that he experienced cravings this week while working on his car. Patient reports that he called some of his peers and this helped him stay the course and not succumb to his urges to use.      Dimension 6 2 Patient   reported that he attended 3 AA meetings this week and contacted his sponsor 4 times. Patient also reported going to family therapy meetings as well. Patient reports that working on his girlfriends car has also provided a distraction from using.    Any changes in Vulnerable Adult Status?  No  If yes, add to treatment plan and individual abuse prevention plan.    Family Involvement:   N/A     Data:   Patient reported frustration about living with his mother and dealing with the arguments, but feels empowered about going back to work.    Intervention:   Staff facilitated group and pt actively participated and offered fellow peers feedback.    Assessment:   Pt appears to be hopeful about his new job and seems to be exercising good behaviors, like changing his thinking.     Plan:  Focus on recovery environment  Monitor emotional/physical health      ARLENE Gr

## 2017-10-31 NOTE — DISCHARGE SUMMARY
DATE OF SERVICE:  10/14/2017      Parth Lang is a 27-year-old male with alcohol dependence.  He has been admitted to [facility] several times.  He lives with his mother and grandmother.  He works for [employer].  He is single.  He entered [facility] after relapse.  He went through [facility] in the summer of  and was sober for nearly 3 months but relapsed a couple weeks prior to this admission.  He has a history of alcohol dependence dating back a few years and has been through treatment several times.  He has been relapse prone.  He has taken Vivitrol for recovery.  He has been active in  and had a sponsor.  He said he simply stopped talking to his sponsor and stopped going to meetings and then relapsed.  He had an interview soon with a new employer at [employer].  He was not interested in going through treatment again.      The patient met with case management.  The plan was for him to return to  and return to Phase II through his previous counselor.  He will pursue a sober house.  He was stable medically and ready for discharge by 10/14/2017.      LABORATORY DATA:  During the course of the hospital stay lab work showed a GGT of 462, on repeat was 354.  His AST and ALT were normal.  His chemistry profile was normal.  His CBC was normal.      DISCHARGE MEDICATIONS:   1.  Antabuse 250 mg daily.   2.  Amlodipine 10 mg daily.   3.  Clonidine 0.1 mg b.i.d.   4.  Wellbutrin- mg daily.        He will follow up with his primary care provider.      DISCHARGE DIAGNOSES:   1.  Alcohol dependence.   2.  Depression.   3.  Hypertension.      Thirty minutes were spent with the patient and record in completion of this discharge summary with over 50% of time spent in counseling and coordination of care.         JORDAN DENT MD             D: 10/31/2017 10:02   T: 10/31/2017 10:31   MT: [id number]      Name:     PARTH LANG   MRN:      [id number]        Account:        [id number]   :       1990           Admit Date:     [id number]                                  Discharge Date: 10/14/2017      Document: [id number]

## 2017-11-09 ENCOUNTER — HOSPITAL ENCOUNTER (OUTPATIENT)
Dept: BEHAVIORAL HEALTH | Facility: CLINIC | Age: 27
End: 2017-11-09
Attending: SOCIAL WORKER
Payer: MEDICAID

## 2017-11-09 PROCEDURE — H2035 A/D TX PROGRAM, PER HOUR: HCPCS | Mod: HQ

## 2017-11-15 ENCOUNTER — HOSPITAL ENCOUNTER (EMERGENCY)
Facility: CLINIC | Age: 27
Discharge: HOME | End: 2017-11-15
Attending: EMERGENCY MEDICINE | Admitting: EMERGENCY MEDICINE
Payer: MEDICAID

## 2017-11-15 VITALS
RESPIRATION RATE: 16 BRPM | BODY MASS INDEX: 41.03 KG/M2 | DIASTOLIC BLOOD PRESSURE: 95 MMHG | HEART RATE: 124 BPM | OXYGEN SATURATION: 96 % | WEIGHT: 311 LBS | TEMPERATURE: 97.1 F | SYSTOLIC BLOOD PRESSURE: 146 MMHG

## 2017-11-15 DIAGNOSIS — F10.920 ALCOHOLIC INTOXICATION WITHOUT COMPLICATION (H): ICD-10-CM

## 2017-11-15 LAB — ALCOHOL BREATH TEST: 0.21 (ref 0–0.01)

## 2017-11-15 PROCEDURE — 82075 ASSAY OF BREATH ETHANOL: CPT | Performed by: EMERGENCY MEDICINE

## 2017-11-15 PROCEDURE — 99282 EMERGENCY DEPT VISIT SF MDM: CPT | Mod: Z6 | Performed by: EMERGENCY MEDICINE

## 2017-11-15 PROCEDURE — 99285 EMERGENCY DEPT VISIT HI MDM: CPT | Mod: 25 | Performed by: EMERGENCY MEDICINE

## 2017-11-15 NOTE — ED AVS SNAPSHOT
[facility], [place], Emergency Department    [address]    [facility] [address]    Phone:  [phone]    Fax:  [phone]                                       Nickolas Lang   MRN: [id number]    Department:  [facility], Emergency Department   Date of Visit:  11/15/2017           After Visit Summary Signature Page     I have received my discharge instructions, and my questions have been answered. I have discussed any challenges I see with this plan with the nurse or doctor.    ..........................................................................................................................................  Patient/Patient Representative Signature      ..........................................................................................................................................  Patient Representative Print Name and Relationship to Patient    ..................................................               ................................................  Date                                            Time    ..........................................................................................................................................  Reviewed by Signature/Title    ...................................................              ..............................................  Date                                                            Time

## 2017-11-15 NOTE — ED AVS SNAPSHOT
[facility], Emergency Department    [address]    [place] [address]    Phone:  [phone]    Fax:  [phone]                                       Nickolas Lang   MRN: [id number]    Department:  [facility], Emergency Department   Date of Visit:  11/15/2017           Patient Information     Date Of Birth          1990        Your diagnoses for this visit were:     Alcoholic intoxication without complication (H)        You were seen by Sudhir Galvez MD.        Discharge Instructions       Keep following up with your PCP and Dr. Crews    Future Appointments        Provider Department Dept Phone Center    11/16/2017 5:30 PM ADULT [facility] [phone] [place]    11/21/2017 9:00 AM Tima Crews MD [facility] [phone] [facility]    11/21/2017 10:30 AM  CHAIR 01 [facility] [phone] [place]    11/23/2017 5:30 PM ADULT [facility] [phone] [place]    11/30/2017 5:30 PM ADULT [facility] [phone] [place]      24 Hour Appointment Hotline       To make an appointment at any [facility], call [phone] ([phone]). If you don't have a family doctor or clinic, we will help you find one. [facility] are conveniently located to serve the needs of you and your family.             Review of your medicines      Our records show that you are taking the medicines listed below. If these are incorrect, please call your family doctor or clinic.        Dose / Directions Last dose taken    amphetamine-dextroamphetamine 30 MG per 24 hr capsule   Commonly known as:  ADDERALL XR   Dose:  30 mg   Quantity:  60 capsule        Take 1 capsule (30 mg) by mouth 2 times daily   Refills:  0        aspirin-acetaminophen-caffeine 250-250-65 MG per tablet   Commonly known as:  EXCEDRIN MIGRAINE    Dose:  2 tablet        Take 2 tablets by mouth every 6 hours as needed for headaches   Refills:  0        busPIRone 10 MG tablet   Commonly known as:  BUSPAR   Dose:  20 mg   Quantity:  180 tablet        Take 2 tablets (20 mg) by mouth 3 times daily   Refills:  1        cloNIDine 0.1 MG tablet   Commonly known as:  CATAPRES   Dose:  0.1 mg        Take 0.1 mg by mouth 2 times daily   Refills:  0        disulfiram 250 MG tablet   Commonly known as:  ANTABUSE   Dose:  250 mg   Indication:  Take 1 Tablet  Daily as Directed   Quantity:  30 tablet        Take 1 tablet (250 mg) by mouth daily   Refills:  1        gabapentin 800 MG tablet   Commonly known as:  NEURONTIN   Dose:  800 mg   Quantity:  120 tablet        Take 1 tablet (800 mg) by mouth 4 times daily   Refills:  1        hydrOXYzine 25 MG tablet   Commonly known as:  ATARAX   Dose:  50 mg   Quantity:  120 tablet        Take 2 tablets (50 mg) by mouth every 6 hours as needed for anxiety   Refills:  1        methocarbamol 500 MG tablet   Commonly known as:  ROBAXIN   Dose:  1000 mg   Quantity:  30 tablet        Take 2 tablets (1,000 mg) by mouth 3 times daily as needed for muscle spasms   Refills:  1        multivitamin, therapeutic with minerals Tabs tablet   Dose:  1 tablet   Quantity:  30 each        Take 1 tablet by mouth daily   Refills:  3        nicotine 21 MG/24HR 24 hr patch   Commonly known as:  NICODERM CQ   Dose:  1 patch   Quantity:  30 patch        Place 1 patch onto the skin every 24 hours   Refills:  0        NORVASC 10 MG tablet   Dose:  10 mg   Generic drug:  amLODIPine        Take 10 mg by mouth daily   Refills:  0        omeprazole 40 MG capsule   Commonly known as:  priLOSEC   Dose:  40 mg   Quantity:  30 capsule        Take 1 capsule (40 mg) by mouth daily   Refills:  1        sertraline 100 MG tablet   Commonly known as:  ZOLOFT   Dose:  200 mg   Quantity:  60 tablet        Take 2 tablets (200 mg) by mouth daily   Refills:  1         "traZODone 100 MG tablet   Commonly known as:  DESYREL   Dose:  100-200 mg   Quantity:  60 tablet        Take 1-2 tablets (100-200 mg) by mouth nightly as needed for sleep   Refills:  1        WELLBUTRIN  MG 24 hr tablet   Dose:  150 mg   Generic drug:  buPROPion        Take 150 mg by mouth every morning   Refills:  0                Procedures and tests performed during your visit     Alcohol breath test POCT      Orders Needing Specimen Collection     None      Pending Results     No orders found from 11/13/2017 to 11/16/2017.            Pending Culture Results     No orders found from 11/13/2017 to 11/16/2017.            Pending Results Instructions     If you had any lab results that were not finalized at the time of your Discharge, you can call the ED Lab Result RN at [phone]. You will be contacted by this team for any positive Lab results or changes in treatment. The nurses are available 7 days a week from 10A to 6:30P.  You can leave a message 24 hours per day and they will return your call.        Thank you for choosing [facility]       Thank you for choosing [facility] for your care. Our goal is always to provide you with excellent care. Hearing back from our patients is one way we can continue to improve our services. Please take a few minutes to complete the written survey that you may receive in the mail after you visit with us. Thank you!        Open Labs Information     [facility] lets you send messages to your doctor, view your test results, renew your prescriptions, schedule appointments and more. To sign up, go to [email or web address] . Click on \"Log in\" on the left side of the screen, which will take you to the Welcome page. Then click on \"Sign up Now\" on the right side of the page.     You will be asked to enter the access code listed below, as well as some personal information. Please follow the directions to create your username and password.     Your access code is: [email or web address]  Expires: " 2017  9:07 AM     Your access code will  in 90 days. If you need help or a new code, please call your [place] clinic or [phone].        Care EveryWhere ID     This is your Care EveryWhere ID. This could be used by other organizations to access your [place] medical records  [id number]        Equal Access to Services     ERMELINDA LOZADA : Rishabh Petersen, cherry patterson, juliana concepcion, shawna espinoza . So [facility] [phone].    ATENCIÓN: Si habla español, tiene a miller disposición servicios gratuitos de asistencia lingüística. Llame al [phone].    We comply with applicable federal civil rights laws and [place] laws. We do not discriminate on the basis of race, color, national origin, age, disability, sex, sexual orientation, or gender identity.            After Visit Summary       This is your record. Keep this with you and show to your community pharmacist(s) and doctor(s) at your next visit.

## 2017-11-16 ENCOUNTER — HOSPITAL ENCOUNTER (EMERGENCY)
Facility: CLINIC | Age: 27
Discharge: HOME | End: 2017-11-16
Attending: EMERGENCY MEDICINE | Admitting: EMERGENCY MEDICINE
Payer: MEDICAID

## 2017-11-16 VITALS
OXYGEN SATURATION: 96 % | SYSTOLIC BLOOD PRESSURE: 142 MMHG | BODY MASS INDEX: 40.71 KG/M2 | DIASTOLIC BLOOD PRESSURE: 97 MMHG | RESPIRATION RATE: 18 BRPM | TEMPERATURE: 96.5 F | WEIGHT: 308.6 LBS | HEART RATE: 101 BPM

## 2017-11-16 DIAGNOSIS — F10.220 ACUTE ALCOHOLIC INTOXICATION IN ALCOHOLISM WITHOUT COMPLICATION (H): ICD-10-CM

## 2017-11-16 LAB — ALCOHOL BREATH TEST: 0.28 (ref 0–0.01)

## 2017-11-16 PROCEDURE — 99284 EMERGENCY DEPT VISIT MOD MDM: CPT | Mod: Z6 | Performed by: EMERGENCY MEDICINE

## 2017-11-16 PROCEDURE — 99285 EMERGENCY DEPT VISIT HI MDM: CPT | Performed by: EMERGENCY MEDICINE

## 2017-11-16 PROCEDURE — 82075 ASSAY OF BREATH ETHANOL: CPT | Performed by: EMERGENCY MEDICINE

## 2017-11-16 NOTE — ED AVS SNAPSHOT
[facility], [place], Emergency Department    [address]    [facility] [address]    Phone:  [phone]    Fax:  [phone]                                       Nickolas Lang   MRN: [id number]    Department:  [facility], Emergency Department   Date of Visit:  11/16/2017           After Visit Summary Signature Page     I have received my discharge instructions, and my questions have been answered. I have discussed any challenges I see with this plan with the nurse or doctor.    ..........................................................................................................................................  Patient/Patient Representative Signature      ..........................................................................................................................................  Patient Representative Print Name and Relationship to Patient    ..................................................               ................................................  Date                                            Time    ..........................................................................................................................................  Reviewed by Signature/Title    ...................................................              ..............................................  Date                                                            Time

## 2017-11-16 NOTE — ED NOTES
Patient seeking detox. Pt does not remember how he got to the ER, denies any falls. Reports history of seizures during withdrawal.

## 2017-11-16 NOTE — ADDENDUM NOTE
Encounter addended by: Amish Murillo LADC on: 11/16/2017  2:24 PM<BR>     Actions taken: Episode edited

## 2017-11-16 NOTE — ED AVS SNAPSHOT
[facility], Emergency Department    [address]    [place] [address]    Phone:  [phone]    Fax:  [phone]                                       Nickolas Lang   MRN: [id number]    Department:  [facility], Emergency Department   Date of Visit:  11/16/2017           Patient Information     Date Of Birth          1990        Your diagnoses for this visit were:     None       You were seen by Karina Gil MD.      Future Appointments        Provider Department Dept Phone Center    11/21/2017 9:00 AM Tima Crews MD [facility] Primary Care [phone] [facility]    11/21/2017 10:30 AM UR CHAIR 01 [facility] Infusion Services [phone] [place]    11/23/2017 5:30 PM ADULT [facility] B2 [facility] [phone] [place]    11/30/2017 5:30 PM ADULT [facility]ODG[facility] B2 [facility] [phone] [place]      24 Hour Appointment Hotline       To make an appointment at any [facility], call [phone] ([phone]). If you don't have a family doctor or clinic, we will help you find one. [facility] are conveniently located to serve the needs of you and your family.             Review of your medicines      Our records show that you are taking the medicines listed below. If these are incorrect, please call your family doctor or clinic.        Dose / Directions Last dose taken    amphetamine-dextroamphetamine 30 MG per 24 hr capsule   Commonly known as:  ADDERALL XR   Dose:  30 mg   Quantity:  60 capsule        Take 1 capsule (30 mg) by mouth 2 times daily   Refills:  0        aspirin-acetaminophen-caffeine 250-250-65 MG per tablet   Commonly known as:  EXCEDRIN MIGRAINE   Dose:  2 tablet        Take 2 tablets by mouth every 6 hours as needed for headaches   Refills:  0        busPIRone 10 MG tablet   Commonly known as:  BUSPAR   Dose:  20 mg   Quantity:  180 tablet        Take 2 tablets (20 mg) by  mouth 3 times daily   Refills:  1        cloNIDine 0.1 MG tablet   Commonly known as:  CATAPRES   Dose:  0.1 mg        Take 0.1 mg by mouth 2 times daily   Refills:  0        disulfiram 250 MG tablet   Commonly known as:  ANTABUSE   Dose:  250 mg   Indication:  Take 1 Tablet  Daily as Directed   Quantity:  30 tablet        Take 1 tablet (250 mg) by mouth daily   Refills:  1        gabapentin 800 MG tablet   Commonly known as:  NEURONTIN   Dose:  800 mg   Quantity:  120 tablet        Take 1 tablet (800 mg) by mouth 4 times daily   Refills:  1        hydrOXYzine 25 MG tablet   Commonly known as:  ATARAX   Dose:  50 mg   Quantity:  120 tablet        Take 2 tablets (50 mg) by mouth every 6 hours as needed for anxiety   Refills:  1        methocarbamol 500 MG tablet   Commonly known as:  ROBAXIN   Dose:  1000 mg   Quantity:  30 tablet        Take 2 tablets (1,000 mg) by mouth 3 times daily as needed for muscle spasms   Refills:  1        multivitamin, therapeutic with minerals Tabs tablet   Dose:  1 tablet   Quantity:  30 each        Take 1 tablet by mouth daily   Refills:  3        nicotine 21 MG/24HR 24 hr patch   Commonly known as:  NICODERM CQ   Dose:  1 patch   Quantity:  30 patch        Place 1 patch onto the skin every 24 hours   Refills:  0        NORVASC 10 MG tablet   Dose:  10 mg   Generic drug:  amLODIPine        Take 10 mg by mouth daily   Refills:  0        omeprazole 40 MG capsule   Commonly known as:  priLOSEC   Dose:  40 mg   Quantity:  30 capsule        Take 1 capsule (40 mg) by mouth daily   Refills:  1        sertraline 100 MG tablet   Commonly known as:  ZOLOFT   Dose:  200 mg   Quantity:  60 tablet        Take 2 tablets (200 mg) by mouth daily   Refills:  1        traZODone 100 MG tablet   Commonly known as:  DESYREL   Dose:  100-200 mg   Quantity:  60 tablet        Take 1-2 tablets (100-200 mg) by mouth nightly as needed for sleep   Refills:  1        WELLBUTRIN  MG 24 hr tablet   Dose:  150  "mg   Generic drug:  buPROPion        Take 150 mg by mouth every morning   Refills:  0                Procedures and tests performed during your visit     Alcohol breath test POCT      Orders Needing Specimen Collection     Ordered          11/16/17 1707  Drug abuse screen 6 urine (tox) - STAT, [facility]: STAT, Needs to be Collected     Scheduled Task Status   11/16/17 1705 Collect Drug abuse screen 6 urine (tox) Open   Order Class:  PCU Collect                  Pending Results     No orders found from 11/14/2017 to 11/17/2017.            Pending Culture Results     No orders found from 11/14/2017 to 11/17/2017.            Pending Results Instructions     If you had any lab results that were not finalized at the time of your Discharge, you can call the ED Lab Result RN at [phone]. You will be contacted by this team for any positive Lab results or changes in treatment. The nurses are available 7 days a week from 10A to 6:30P.  You can leave a message 24 hours per day and they will return your call.        Thank you for choosing [facility]       Thank you for choosing [facility] for your care. Our goal is always to provide you with excellent care. Hearing back from our patients is one way we can continue to improve our services. Please take a few minutes to complete the written survey that you may receive in the mail after you visit with us. Thank you!        [email or web address] Information     [email or web address] lets you send messages to your doctor, view your test results, renew your prescriptions, schedule appointments and more. To sign up, go to [email or web address] . Click on \"Log in\" on the left side of the screen, which will take you to the Welcome page. Then click on \"Sign up Now\" on the right side of the page.     You will be asked to enter the access code listed below, as well as some personal information. Please follow the directions to create your username and password.     Your access code is: [email or web address]  Expires: 12/5/2017  " 9:07 AM     Your access code will  in 90 days. If you need help or a new code, please call your [facility] or [phone].        Care EveryWhere ID     This is your Care EveryWhere ID. This could be used by other organizations to access your [facility] medical records  [id number]        Equal Access to Services     ERMELINDA LOZADA : Rishabh Petersen, wadebby patterson, juliana aguirrealdeana concepcion, shawna espinoza . So [facility] [phone].    ATENCIÓN: Si habla español, tiene a miller disposición servicios gratuitos de asistencia lingüística. Llame al [phone].    We comply with applicable federal civil rights laws and [place] laws. We do not discriminate on the basis of race, color, national origin, age, disability, sex, sexual orientation, or gender identity.            After Visit Summary       This is your record. Keep this with you and show to your community pharmacist(s) and doctor(s) at your next visit.

## 2017-11-16 NOTE — ED PROVIDER NOTES
History     Chief Complaint   Patient presents with     Alcohol Problem     seeking detox, drinks a 1.75L vodka daily     HPI  Nickolas Lang is a 27 year old male who presents to emergency department intoxicated.  Patient states that he has been drinking approximately 1.75 L of vodka for the past day and half.  He admits to having a long history of alcohol abuse and is currently in follow up with AA.  He is currently taking Antabuse.  He does follow up with Dr. Crews.  Today he was sent into the emergency room by his mother because he was drinking again today.  He denies any other drug abuse at this time.  He does not have any withdrawal symptoms.    I have reviewed the Medications, Allergies, Past Medical and Surgical History, and Social History in the Epic system.    Review of Systems   All other systems reviewed and are negative.      Physical Exam   BP: (!) 139/93  Pulse: 124  Heart Rate: 95  Temp: 97.6  F (36.4  C)  Resp: 16  Weight: (!) 141.1 kg (311 lb)  SpO2: 94 %      Physical Exam   Constitutional: He is oriented to person, place, and time. He appears well-developed and well-nourished. No distress.   HENT:   Head: Normocephalic and atraumatic.   Eyes: No scleral icterus.   Neck: Normal range of motion. Neck supple.   Cardiovascular: Normal rate.    Pulmonary/Chest: Effort normal.   Neurological: He is alert and oriented to person, place, and time.   Skin: Skin is warm and dry. No rash noted. He is not diaphoretic. No erythema. No pallor.       ED Course     ED Course     Procedures             Critical Care time:  none             Labs Ordered and Resulted from Time of ED Arrival Up to the Time of Departure from the ED   ALCOHOL BREATH TEST POCT - Abnormal; Notable for the following:        Result Value    Alcohol Breath Test 0.208 (*)     All other components within normal limits            Assessments & Plan (with Medical Decision Making)   This is a 27-year-old male coming into the emergency  room intoxicated.  He is currently being followed by Dr. Crews at this time he does not require medical detox.  His only been drinking for a day and a half.  His mother sent him into the emergency room because of his drinking.  I do recommend that he continue to follow up with Dr. Crews as well as his sponsors from .  At this time he'll be discharged and can follow up.    I have reviewed the nursing notes.    I have reviewed the findings, diagnosis, plan and need for follow up with the patient.    Discharge Medication List as of 11/15/2017  3:59 PM          Final diagnoses:   Alcoholic intoxication without complication (H)       11/15/2017   [facility], [place], EMERGENCY DEPARTMENT     Sudhir Galvez MD  11/15/17 1055

## 2017-11-19 ASSESSMENT — ENCOUNTER SYMPTOMS
CHILLS: 0
HEADACHES: 0
SHORTNESS OF BREATH: 0
ABDOMINAL PAIN: 0
SEIZURES: 0
DIZZINESS: 0
FEVER: 0

## 2017-11-19 NOTE — ED PROVIDER NOTES
History     Chief Complaint   Patient presents with     Alcohol Intoxication     no bed reserved. Blew .285. Drinks daily, last drink was 12 N today, a liter of Vodka. Per pt he has seizure withdrawals from ETOH. Denies any illicit drug use     HPI  Nickolas Lang is a 27 year old male who reports alcohol intoxication today, he denies any head trauma nor LOC. He has been engaging in his outpatient AA and declines detox upon my evaluation. He has had withdrawal seizures in the past. States he has been drinking again for past 3 days.       I have reviewed the Medications, Allergies, Past Medical and Surgical History, and Social History in the Epic system.    Review of Systems   Constitutional: Negative for chills and fever.   Respiratory: Negative for shortness of breath.    Cardiovascular: Negative for chest pain.   Gastrointestinal: Negative for abdominal pain.   Neurological: Negative for dizziness, seizures and headaches.       Physical Exam   BP: (!) 172/111  Pulse: 99  Heart Rate: 128  Temp: 96.1  F (35.6  C)  Resp: 18  Weight: (!) 140 kg (308 lb 9.6 oz)  SpO2: 93 %      Physical Exam   Constitutional: He is oriented to person, place, and time. He appears well-developed and well-nourished. No distress.   HENT:   Head: Atraumatic.   Mouth/Throat: Oropharynx is clear and moist. No oropharyngeal exudate.   Eyes: Pupils are equal, round, and reactive to light. No scleral icterus.   Cardiovascular: Normal rate, regular rhythm, normal heart sounds and intact distal pulses.    Pulmonary/Chest: Breath sounds normal. No respiratory distress.   Abdominal: Soft. Bowel sounds are normal. There is no tenderness.   Musculoskeletal: He exhibits no edema or tenderness.   Neurological: He is alert and oriented to person, place, and time.   Skin: Skin is warm. No rash noted. He is not diaphoretic.   Psychiatric: He has a normal mood and affect.   Nursing note and vitals reviewed.      ED Course     ED Course      Procedures             Critical Care time:  none             Labs Ordered and Resulted from Time of ED Arrival Up to the Time of Departure from the ED   ALCOHOL BREATH TEST POCT - Abnormal; Notable for the following:        Result Value    Alcohol Breath Test 0.285 (*)     All other components within normal limits            Assessments & Plan (with Medical Decision Making)     27 year old male who reports alcohol intoxication today, he denies any head trauma nor LOC. Declining detox this encounter and called in a family member for sober ride home. Patient ambulating without difficulty in ED and VS normalized prior to discharge. Plan for continuation with outpatient recovery resources.     I have reviewed the nursing notes.    I have reviewed the findings, diagnosis, plan and need for follow up with the patient.    Discharge Medication List as of 11/16/2017 10:22 PM          Final diagnoses:   Acute alcoholic intoxication in alcoholism without complication (H)       11/16/2017   [facility], [place], EMERGENCY DEPARTMENT     Karina Gil MD  11/19/17 6586

## 2017-11-20 ENCOUNTER — HOSPITAL ENCOUNTER (INPATIENT)
Facility: CLINIC | Age: 27
LOS: 4 days | Discharge: HOME | End: 2017-11-24
Attending: EMERGENCY MEDICINE | Admitting: PSYCHIATRY & NEUROLOGY
Payer: MEDICAID

## 2017-11-20 ENCOUNTER — APPOINTMENT (OUTPATIENT)
Dept: GENERAL RADIOLOGY | Facility: CLINIC | Age: 27
End: 2017-11-20
Attending: EMERGENCY MEDICINE
Payer: MEDICAID

## 2017-11-20 DIAGNOSIS — F10.230 ALCOHOL DEPENDENCE WITH UNCOMPLICATED WITHDRAWAL (H): ICD-10-CM

## 2017-11-20 DIAGNOSIS — F10.229 ALCOHOL DEPENDENCE WITH INTOXICATION WITH COMPLICATION (H): ICD-10-CM

## 2017-11-20 DIAGNOSIS — I10 ESSENTIAL HYPERTENSION: ICD-10-CM

## 2017-11-20 LAB
ALBUMIN SERPL-MCNC: 3.7 G/DL (ref 3.4–5)
ALBUMIN UR-MCNC: 300 MG/DL
ALCOHOL BREATH TEST: 0.22 (ref 0–0.01)
ALP SERPL-CCNC: 92 U/L (ref 40–150)
ALT SERPL W P-5'-P-CCNC: 139 U/L (ref 0–70)
AMPHETAMINES UR QL SCN: NEGATIVE
ANION GAP SERPL CALCULATED.3IONS-SCNC: 16 MMOL/L (ref 3–14)
APPEARANCE UR: CLEAR
AST SERPL W P-5'-P-CCNC: 75 U/L (ref 0–45)
BARBITURATES UR QL: NEGATIVE
BASOPHILS # BLD AUTO: 0.1 10E9/L (ref 0–0.2)
BASOPHILS NFR BLD AUTO: 0.8 %
BENZODIAZ UR QL: POSITIVE
BILIRUB SERPL-MCNC: 0.5 MG/DL (ref 0.2–1.3)
BILIRUB UR QL STRIP: NEGATIVE
BUN SERPL-MCNC: 13 MG/DL (ref 7–30)
CALCIUM SERPL-MCNC: 8.2 MG/DL (ref 8.5–10.1)
CANNABINOIDS UR QL SCN: POSITIVE
CAOX CRY #/AREA URNS HPF: ABNORMAL /HPF
CHLORIDE SERPL-SCNC: 100 MMOL/L (ref 94–109)
CO2 SERPL-SCNC: 24 MMOL/L (ref 20–32)
COCAINE UR QL: NEGATIVE
COLOR UR AUTO: YELLOW
CREAT SERPL-MCNC: 0.57 MG/DL (ref 0.66–1.25)
D DIMER PPP FEU-MCNC: 0.3 UG/ML FEU (ref 0–0.5)
DIFFERENTIAL METHOD BLD: NORMAL
EOSINOPHIL # BLD AUTO: 0.1 10E9/L (ref 0–0.7)
EOSINOPHIL NFR BLD AUTO: 0.8 %
ERYTHROCYTE [DISTWIDTH] IN BLOOD BY AUTOMATED COUNT: 13.6 % (ref 10–15)
ETHANOL SERPL-MCNC: 0.27 G/DL
ETHANOL UR QL SCN: POSITIVE
GFR SERPL CREATININE-BSD FRML MDRD: >90 ML/MIN/1.7M2
GGT SERPL-CCNC: 575 U/L (ref 0–75)
GLUCOSE SERPL-MCNC: 102 MG/DL (ref 70–99)
GLUCOSE UR STRIP-MCNC: NEGATIVE MG/DL
GRAN CASTS #/AREA URNS LPF: 19 /LPF
HCT VFR BLD AUTO: 48.1 % (ref 40–53)
HGB BLD-MCNC: 16.5 G/DL (ref 13.3–17.7)
HGB UR QL STRIP: ABNORMAL
HYALINE CASTS #/AREA URNS LPF: 13 /LPF (ref 0–2)
IMM GRANULOCYTES # BLD: 0 10E9/L (ref 0–0.4)
IMM GRANULOCYTES NFR BLD: 0.2 %
INR PPP: 0.99 (ref 0.86–1.14)
KETONES UR STRIP-MCNC: 5 MG/DL
LEUKOCYTE ESTERASE UR QL STRIP: NEGATIVE
LYMPHOCYTES # BLD AUTO: 3 10E9/L (ref 0.8–5.3)
LYMPHOCYTES NFR BLD AUTO: 48.3 %
MCH RBC QN AUTO: 30 PG (ref 26.5–33)
MCHC RBC AUTO-ENTMCNC: 34.3 G/DL (ref 31.5–36.5)
MCV RBC AUTO: 88 FL (ref 78–100)
MIXED CELL CASTS #/AREA URNS LPF: 1 /LPF
MONOCYTES # BLD AUTO: 0.5 10E9/L (ref 0–1.3)
MONOCYTES NFR BLD AUTO: 8.5 %
MUCOUS THREADS #/AREA URNS LPF: PRESENT /LPF
NEUTROPHILS # BLD AUTO: 2.5 10E9/L (ref 1.6–8.3)
NEUTROPHILS NFR BLD AUTO: 41.4 %
NITRATE UR QL: NEGATIVE
NRBC # BLD AUTO: 0 10*3/UL
NRBC BLD AUTO-RTO: 0 /100
NT-PROBNP SERPL-MCNC: <5 PG/ML (ref 0–450)
OPIATES UR QL SCN: NEGATIVE
PH UR STRIP: 6 PH (ref 5–7)
PLATELET # BLD AUTO: 271 10E9/L (ref 150–450)
POTASSIUM SERPL-SCNC: 3.8 MMOL/L (ref 3.4–5.3)
PROT SERPL-MCNC: 7.5 G/DL (ref 6.8–8.8)
RBC # BLD AUTO: 5.5 10E12/L (ref 4.4–5.9)
RBC #/AREA URNS AUTO: 1 /HPF (ref 0–2)
SODIUM SERPL-SCNC: 140 MMOL/L (ref 133–144)
SOURCE: ABNORMAL
SP GR UR STRIP: 1.03 (ref 1–1.03)
SQUAMOUS #/AREA URNS AUTO: <1 /HPF (ref 0–1)
TROPONIN I BLD-MCNC: 0 UG/L (ref 0–0.1)
TROPONIN I SERPL-MCNC: <0.015 UG/L (ref 0–0.04)
TROPONIN I SERPL-MCNC: <0.015 UG/L (ref 0–0.04)
UROBILINOGEN UR STRIP-MCNC: NORMAL MG/DL (ref 0–2)
WBC # BLD AUTO: 6.1 10E9/L (ref 4–11)
WBC #/AREA URNS AUTO: 1 /HPF (ref 0–2)

## 2017-11-20 PROCEDURE — 25000128 H RX IP 250 OP 636: Performed by: EMERGENCY MEDICINE

## 2017-11-20 PROCEDURE — 80320 DRUG SCREEN QUANTALCOHOLS: CPT | Performed by: EMERGENCY MEDICINE

## 2017-11-20 PROCEDURE — S0028 INJECTION, FAMOTIDINE, 20 MG: HCPCS | Performed by: EMERGENCY MEDICINE

## 2017-11-20 PROCEDURE — 93005 ELECTROCARDIOGRAM TRACING: CPT

## 2017-11-20 PROCEDURE — 84484 ASSAY OF TROPONIN QUANT: CPT

## 2017-11-20 PROCEDURE — 85610 PROTHROMBIN TIME: CPT | Performed by: EMERGENCY MEDICINE

## 2017-11-20 PROCEDURE — 96376 TX/PRO/DX INJ SAME DRUG ADON: CPT

## 2017-11-20 PROCEDURE — 93010 ELECTROCARDIOGRAM REPORT: CPT | Mod: Z6 | Performed by: EMERGENCY MEDICINE

## 2017-11-20 PROCEDURE — HZ2ZZZZ DETOXIFICATION SERVICES FOR SUBSTANCE ABUSE TREATMENT: ICD-10-PCS | Performed by: PSYCHIATRY & NEUROLOGY

## 2017-11-20 PROCEDURE — 99285 EMERGENCY DEPT VISIT HI MDM: CPT | Mod: 25

## 2017-11-20 PROCEDURE — 25000125 ZZHC RX 250: Performed by: EMERGENCY MEDICINE

## 2017-11-20 PROCEDURE — 84484 ASSAY OF TROPONIN QUANT: CPT | Performed by: EMERGENCY MEDICINE

## 2017-11-20 PROCEDURE — 96365 THER/PROPH/DIAG IV INF INIT: CPT

## 2017-11-20 PROCEDURE — 25000132 ZZH RX MED GY IP 250 OP 250 PS 637: Performed by: EMERGENCY MEDICINE

## 2017-11-20 PROCEDURE — 85025 COMPLETE CBC W/AUTO DIFF WBC: CPT | Performed by: EMERGENCY MEDICINE

## 2017-11-20 PROCEDURE — 80053 COMPREHEN METABOLIC PANEL: CPT | Performed by: EMERGENCY MEDICINE

## 2017-11-20 PROCEDURE — 99285 EMERGENCY DEPT VISIT HI MDM: CPT | Mod: 25 | Performed by: EMERGENCY MEDICINE

## 2017-11-20 PROCEDURE — 96366 THER/PROPH/DIAG IV INF ADDON: CPT

## 2017-11-20 PROCEDURE — 71010 XR CHEST PORT 1 VW: CPT

## 2017-11-20 PROCEDURE — 96375 TX/PRO/DX INJ NEW DRUG ADDON: CPT

## 2017-11-20 PROCEDURE — 12800012 ZZH R&B CD MH INTERMEDIATE ADULT

## 2017-11-20 PROCEDURE — 25000132 ZZH RX MED GY IP 250 OP 250 PS 637: Performed by: CLINICAL NURSE SPECIALIST

## 2017-11-20 PROCEDURE — 85379 FIBRIN DEGRADATION QUANT: CPT | Performed by: EMERGENCY MEDICINE

## 2017-11-20 PROCEDURE — 80307 DRUG TEST PRSMV CHEM ANLYZR: CPT | Performed by: EMERGENCY MEDICINE

## 2017-11-20 PROCEDURE — 83880 ASSAY OF NATRIURETIC PEPTIDE: CPT | Performed by: EMERGENCY MEDICINE

## 2017-11-20 PROCEDURE — 81001 URINALYSIS AUTO W/SCOPE: CPT | Performed by: EMERGENCY MEDICINE

## 2017-11-20 PROCEDURE — 82977 ASSAY OF GGT: CPT | Performed by: EMERGENCY MEDICINE

## 2017-11-20 RX ORDER — HALOPERIDOL 5 MG/ML
2 INJECTION INTRAMUSCULAR ONCE
Status: COMPLETED | OUTPATIENT
Start: 2017-11-20 | End: 2017-11-20

## 2017-11-20 RX ORDER — BUSPIRONE HYDROCHLORIDE 10 MG/1
20 TABLET ORAL 3 TIMES DAILY
Status: DISCONTINUED | OUTPATIENT
Start: 2017-11-20 | End: 2017-11-20

## 2017-11-20 RX ORDER — ASPIRIN 81 MG/1
324 TABLET, CHEWABLE ORAL ONCE
Status: COMPLETED | OUTPATIENT
Start: 2017-11-20 | End: 2017-11-20

## 2017-11-20 RX ORDER — LORAZEPAM 2 MG/ML
1 INJECTION INTRAMUSCULAR ONCE
Status: COMPLETED | OUTPATIENT
Start: 2017-11-20 | End: 2017-11-20

## 2017-11-20 RX ORDER — AMLODIPINE BESYLATE 10 MG/1
10 TABLET ORAL DAILY
Status: DISCONTINUED | OUTPATIENT
Start: 2017-11-20 | End: 2017-11-24 | Stop reason: HOSPADM

## 2017-11-20 RX ORDER — HYDROXYZINE HYDROCHLORIDE 50 MG/1
50 TABLET, FILM COATED ORAL EVERY 6 HOURS PRN
Status: DISCONTINUED | OUTPATIENT
Start: 2017-11-20 | End: 2017-11-20

## 2017-11-20 RX ORDER — TRAZODONE HYDROCHLORIDE 100 MG/1
100 TABLET ORAL
Status: DISCONTINUED | OUTPATIENT
Start: 2017-11-20 | End: 2017-11-21

## 2017-11-20 RX ORDER — CLONIDINE HYDROCHLORIDE 0.1 MG/1
0.1 TABLET ORAL 2 TIMES DAILY
Status: DISCONTINUED | OUTPATIENT
Start: 2017-11-20 | End: 2017-11-24 | Stop reason: HOSPADM

## 2017-11-20 RX ORDER — MULTIPLE VITAMINS W/ MINERALS TAB 9MG-400MCG
1 TAB ORAL DAILY
Status: DISCONTINUED | OUTPATIENT
Start: 2017-11-21 | End: 2017-11-24 | Stop reason: HOSPADM

## 2017-11-20 RX ORDER — ATENOLOL 50 MG/1
50 TABLET ORAL ONCE
Status: COMPLETED | OUTPATIENT
Start: 2017-11-20 | End: 2017-11-20

## 2017-11-20 RX ORDER — LANOLIN ALCOHOL/MO/W.PET/CERES
100 CREAM (GRAM) TOPICAL DAILY
Status: COMPLETED | OUTPATIENT
Start: 2017-11-21 | End: 2017-11-23

## 2017-11-20 RX ORDER — SERTRALINE HYDROCHLORIDE 100 MG/1
200 TABLET, FILM COATED ORAL DAILY
Status: DISCONTINUED | OUTPATIENT
Start: 2017-11-21 | End: 2017-11-20 | Stop reason: DRUGHIGH

## 2017-11-20 RX ORDER — AMLODIPINE BESYLATE 10 MG/1
10 TABLET ORAL DAILY
Status: DISCONTINUED | OUTPATIENT
Start: 2017-11-21 | End: 2017-11-20

## 2017-11-20 RX ORDER — ALUMINA, MAGNESIA, AND SIMETHICONE 2400; 2400; 240 MG/30ML; MG/30ML; MG/30ML
30 SUSPENSION ORAL EVERY 4 HOURS PRN
Status: DISCONTINUED | OUTPATIENT
Start: 2017-11-20 | End: 2017-11-24 | Stop reason: HOSPADM

## 2017-11-20 RX ORDER — DIAZEPAM 5 MG
5-20 TABLET ORAL EVERY 30 MIN PRN
Status: DISCONTINUED | OUTPATIENT
Start: 2017-11-20 | End: 2017-11-20

## 2017-11-20 RX ORDER — SERTRALINE HYDROCHLORIDE 100 MG/1
200 TABLET, FILM COATED ORAL DAILY
Status: DISCONTINUED | OUTPATIENT
Start: 2017-11-20 | End: 2017-11-20

## 2017-11-20 RX ORDER — NICOTINE 21 MG/24HR
1 PATCH, TRANSDERMAL 24 HOURS TRANSDERMAL ONCE
Status: DISCONTINUED | OUTPATIENT
Start: 2017-11-20 | End: 2017-11-24 | Stop reason: HOSPADM

## 2017-11-20 RX ORDER — LORAZEPAM 2 MG/ML
2 INJECTION INTRAMUSCULAR ONCE
Status: COMPLETED | OUTPATIENT
Start: 2017-11-20 | End: 2017-11-20

## 2017-11-20 RX ORDER — BUPROPION HYDROCHLORIDE 150 MG/1
150 TABLET ORAL DAILY
Status: DISCONTINUED | OUTPATIENT
Start: 2017-11-21 | End: 2017-11-20 | Stop reason: DRUGHIGH

## 2017-11-20 RX ORDER — BUSPIRONE HYDROCHLORIDE 5 MG/1
10 TABLET ORAL 3 TIMES DAILY
Status: DISCONTINUED | OUTPATIENT
Start: 2017-11-20 | End: 2017-11-21

## 2017-11-20 RX ORDER — GABAPENTIN 800 MG/1
800 TABLET ORAL 4 TIMES DAILY
Status: DISCONTINUED | OUTPATIENT
Start: 2017-11-20 | End: 2017-11-20 | Stop reason: DRUGHIGH

## 2017-11-20 RX ORDER — MULTIPLE VITAMINS W/ MINERALS TAB 9MG-400MCG
1 TAB ORAL DAILY
Status: DISCONTINUED | OUTPATIENT
Start: 2017-11-21 | End: 2017-11-20

## 2017-11-20 RX ORDER — BISACODYL 10 MG
10 SUPPOSITORY, RECTAL RECTAL DAILY PRN
Status: DISCONTINUED | OUTPATIENT
Start: 2017-11-20 | End: 2017-11-24 | Stop reason: HOSPADM

## 2017-11-20 RX ORDER — METHOCARBAMOL 500 MG/1
1000 TABLET, FILM COATED ORAL 3 TIMES DAILY PRN
Status: DISCONTINUED | OUTPATIENT
Start: 2017-11-20 | End: 2017-11-24 | Stop reason: HOSPADM

## 2017-11-20 RX ORDER — DIAZEPAM 5 MG
5-20 TABLET ORAL EVERY 30 MIN PRN
Status: DISCONTINUED | OUTPATIENT
Start: 2017-11-20 | End: 2017-11-24

## 2017-11-20 RX ORDER — CLONIDINE HYDROCHLORIDE 0.1 MG/1
0.1 TABLET ORAL 2 TIMES DAILY
Status: DISCONTINUED | OUTPATIENT
Start: 2017-11-20 | End: 2017-11-20

## 2017-11-20 RX ORDER — HYDROXYZINE HYDROCHLORIDE 25 MG/1
25-50 TABLET, FILM COATED ORAL EVERY 4 HOURS PRN
Status: DISCONTINUED | OUTPATIENT
Start: 2017-11-20 | End: 2017-11-24 | Stop reason: HOSPADM

## 2017-11-20 RX ORDER — TRAZODONE HYDROCHLORIDE 100 MG/1
100-200 TABLET ORAL
Status: DISCONTINUED | OUTPATIENT
Start: 2017-11-20 | End: 2017-11-20 | Stop reason: DRUGHIGH

## 2017-11-20 RX ORDER — GABAPENTIN 300 MG/1
300 CAPSULE ORAL 3 TIMES DAILY
Status: DISCONTINUED | OUTPATIENT
Start: 2017-11-20 | End: 2017-11-21

## 2017-11-20 RX ORDER — FOLIC ACID 1 MG/1
1 TABLET ORAL DAILY
Status: DISCONTINUED | OUTPATIENT
Start: 2017-11-21 | End: 2017-11-24 | Stop reason: HOSPADM

## 2017-11-20 RX ORDER — ACETAMINOPHEN 325 MG/1
650 TABLET ORAL EVERY 4 HOURS PRN
Status: DISCONTINUED | OUTPATIENT
Start: 2017-11-20 | End: 2017-11-24 | Stop reason: HOSPADM

## 2017-11-20 RX ADMIN — DIAZEPAM 10 MG: 5 TABLET ORAL at 17:03

## 2017-11-20 RX ADMIN — DIAZEPAM 10 MG: 5 TABLET ORAL at 20:38

## 2017-11-20 RX ADMIN — SERTRALINE HYDROCHLORIDE 50 MG: 50 TABLET ORAL at 20:38

## 2017-11-20 RX ADMIN — CLONIDINE HYDROCHLORIDE 0.1 MG: 0.1 TABLET ORAL at 09:53

## 2017-11-20 RX ADMIN — FAMOTIDINE 20 MG: 10 INJECTION, SOLUTION INTRAVENOUS at 11:51

## 2017-11-20 RX ADMIN — BUSPIRONE HYDROCHLORIDE 10 MG: 5 TABLET ORAL at 20:38

## 2017-11-20 RX ADMIN — GABAPENTIN 300 MG: 300 CAPSULE ORAL at 20:39

## 2017-11-20 RX ADMIN — OMEPRAZOLE 40 MG: 20 CAPSULE, DELAYED RELEASE ORAL at 17:59

## 2017-11-20 RX ADMIN — HYDROXYZINE HYDROCHLORIDE 50 MG: 25 TABLET ORAL at 22:20

## 2017-11-20 RX ADMIN — LORAZEPAM 2 MG: 2 INJECTION INTRAMUSCULAR at 14:56

## 2017-11-20 RX ADMIN — ASPIRIN 81 MG CHEWABLE TABLET 324 MG: 81 TABLET CHEWABLE at 09:53

## 2017-11-20 RX ADMIN — ATENOLOL 50 MG: 50 TABLET ORAL at 09:53

## 2017-11-20 RX ADMIN — CLONIDINE HYDROCHLORIDE 0.1 MG: 0.1 TABLET ORAL at 20:38

## 2017-11-20 RX ADMIN — LORAZEPAM 1 MG: 2 INJECTION INTRAMUSCULAR; INTRAVENOUS at 09:56

## 2017-11-20 RX ADMIN — TRAZODONE HYDROCHLORIDE 100 MG: 100 TABLET ORAL at 22:19

## 2017-11-20 RX ADMIN — AMLODIPINE BESYLATE 10 MG: 10 TABLET ORAL at 09:53

## 2017-11-20 RX ADMIN — DIAZEPAM 5 MG: 5 TABLET ORAL at 21:44

## 2017-11-20 RX ADMIN — FOLIC ACID: 5 INJECTION, SOLUTION INTRAMUSCULAR; INTRAVENOUS; SUBCUTANEOUS at 10:04

## 2017-11-20 RX ADMIN — SERTRALINE HYDROCHLORIDE 50 MG: 50 TABLET ORAL at 17:59

## 2017-11-20 RX ADMIN — HALOPERIDOL LACTATE 2 MG: 5 INJECTION, SOLUTION INTRAMUSCULAR at 11:56

## 2017-11-20 RX ADMIN — LORAZEPAM 2 MG: 2 INJECTION INTRAMUSCULAR at 12:52

## 2017-11-20 ASSESSMENT — ENCOUNTER SYMPTOMS
DIAPHORESIS: 1
CHEST TIGHTNESS: 1
SHORTNESS OF BREATH: 1

## 2017-11-20 ASSESSMENT — ACTIVITIES OF DAILY LIVING (ADL)
BATHING: 0 - INDEPENDENT
DRESS: INDEPENDENT
CURRENT_FUNCTIONAL_LEVEL_COMMENT: WNL
TOILETING: 0 - INDEPENDENT
AMBULATION: 0 - INDEPENDENT
DRESS: 0 - INDEPENDENT
EATING: 0 - INDEPENDENT
COMMUNICATION: 0 - UNDERSTANDS/COMMUNICATES WITHOUT DIFFICULTY
CHANGE_IN_FUNCTIONAL_STATUS_SINCE_ONSET_OF_CURRENT_ILLNESS/INJURY: NO
GROOMING: INDEPENDENT
SWALLOWING: 0 - SWALLOWS FOODS/LIQUIDS WITHOUT DIFFICULTY
ORAL_HYGIENE: INDEPENDENT
TRANSFERRING: 0 - INDEPENDENT

## 2017-11-20 NOTE — ED NOTES
"  Report received from MAYTE Rodriguez RN.  Patient reports he feels \"tired\" since receiving Haldol.  Appears comfortable.  Lab drawn.   "

## 2017-11-20 NOTE — IP AVS SNAPSHOT
MRN:[id number]                      After Visit Summary   11/20/2017    Nickolas Lang    MRN: [id number]           Thank you!     Thank you for choosing [facility] for your care. Our goal is always to provide you with excellent care.        Patient Information     Date Of Birth          1990        Designated Caregiver       Most Recent Value    Caregiver    Will someone help with your care after discharge? yes    Name of designated caregiver mother    Phone number of caregiver Eloisa [phone]    Caregiver address shares with patient      About your hospital stay     You were admitted on:  November 20, 2017 You last received care in the:  [facility]    You were discharged on:  November 24, 2017       Who to Call     For medical emergencies, please call 911.  For non-urgent questions about your medical care, please call your primary care provider or clinic, [phone]          Attending Provider     Provider Specialty    Kranthi Lu MD Emergency Medicine    Lachelle Peterson MD Psychiatry       Primary Care Provider Office Phone # Fax #    Nicolette Mallory ROXI Frye [facility] [phone]      Your next 10 appointments already scheduled     Nov 30, 2017  5:30 PM CST   Treatment with ADULT LODGING PLUS B2   [facility] ([facility])    [address] [address] [phone]              Further instructions from your care team       Behavioral Discharge Planning and Instructions  THANK YOU FOR CHOOSING THE [address]  [phone]    Summary: You were admitted to [facility] on 11/20/17 for detoxification from Alcohol.  A medical exam was performed that included lab work. You have met with a  and opted to enter a sober house and pursue IOP at [facility].  Your funding is through [facility].  Sangita can be reached at  [phone].  Please take care and make your recovery a priority!    [facility]  [address]  [address] [place] [address]  Phone: [phone]  Fax: [phone]    [place] [place]  [address] [place] [address]  Phone: [phone]  Fax: [phone]      Main Diagnosis:  Per  Dr. Brasher/Kristen     Alcohol use disorder, severe.     Active alcohol withdrawal.   Major depressive disorder, recurrent, without psychotic features (rule out substance-induced mood disorder).     Major Treatments, Procedures and Findings:  You were detoxed from alcohol. You have met with a  to develop a treatment plan for discharge.  You have had labs drawn and a copy of those labs will be sent home with you. Your alcohol withdrawal is complete and you are being discharged today.  Please bring your lab results with to your follow up doctor appointment.  Make your recovery a priority!                        Symptoms to Report:  If you experience more anxiety, confusion, sleeplessness, deep sadness or thoughts of suicide, notify your treatment team or notify your primary care physician. IF ANY OF THE SYMPTOMS YOU ARE EXPERIENCING ARE A MEDICAL EMERGENCY CALL 911 IMMEDIATELY.     Lifestyle Adjustment: Adjust your lifestyle to get enough sleep, relaxation, exercise and  good nutrition. Continue to develop healthy coping skills to decrease stress and promote a sober living environment. Do not use alcohol, illegal drugs or addictive medications other than what is currently prescribed. AA, NA, and  Sponsor are excellent resources for support.     Primary Provider:  Nicolette Frye RN, FNP, NP-C  [facility]  Phone: [phone])    Appointment: 12/10/17 at 2:00pm    Psychiatric appointment:  Dr. Lovell   [place] [facility]    Address: [address] [place] [address]  phone: [phone]    Appointment:  12/5/17   1:30pm    Resources:     [facility] [phone]    Support Group:  AA/NA and Sponsor/support    Crisis Intervention: 438.716.7765 or 140-309-7878 (TTY: 910.550.9443).  Call anytime for help.  National [place] on Mental Illness (www.[place].chaya.org): 965.364.4577 or 943-110-6053.  Alcoholics Anonymous (www.alcoholics-anonymous.org): Check your phone book for your local chapter.  Suicide Awareness Voices of Education (SAVE) (www.[employer]): 869-519-DYNQ (3297)  National Suicide Prevention Line (www.mentalHycretemn.org): 558-316-KEJF (3487)  Mental Health Consumer/Survivor Network of [place] (www.mhcsn.net): 311.336.2819 or 996-395-1695  Mental Health Association of [place] (www.mentalhealth.org): 678.725.5097 or 672-119-8814   Substance Abuse and Mental Health Services (www.samhsa.gov)    [facility] ([facility])  [facility] connects people seeking recovery to resources that help foster and sustain long-term recovery.  Whether you are seeking resources for treatment, transportation, housing, job training, education, health care or other pathways to recovery, [facility] is a great place to start.  [phone].  Www.[facility].org    General Medication Instructions:   See your medication sheet(s) for instructions.   Take all medicines as directed.  Make no changes unless your doctor suggests them.   Go to all your doctor visits.  Be sure to have all your required lab tests. This way, your medicines can be refilled on time.  Do not use any drugs not prescribed by your provider.  AA/NA and Sponsors are excellent resources for support  Avoid alcohol.    Please Note:  If you have any questions at anytime after you are discharged please call the [facility] [place] detox unit 3AW unit at [phone].    [facility] , Health, Behavioral Intake [phone]    Please take this discharge folder with you to all your follow up appointments, it contains your lab results, diagnosis,  "medication list and discharge recommendations.      THANK YOU FOR CHOOSING THE [facility]     Pending Results     No orders found from 2017 to 2017.            Admission Information     Date & Time Provider Department Dept. Phone    2017 Lachelle Peterson MD [facility] [phone]      Your Vitals Were     Blood Pressure Pulse Temperature Respirations Height Weight    133/91 90 96.9  F (36.1  C) (Oral) 16 1.854 m (6' 1\") 139.7 kg (308 lb)    Pulse Oximetry BMI (Body Mass Index)                95% 40.64 kg/m2          MyChart Information     [employer] lets you send messages to your doctor, view your test results, renew your prescriptions, schedule appointments and more. To sign up, go to [email or web address] . Click on \"Log in\" on the left side of the screen, which will take you to the Welcome page. Then click on \"Sign up Now\" on the right side of the page.     You will be asked to enter the access code listed below, as well as some personal information. Please follow the directions to create your username and password.     Your access code is: [email or web address]  Expires: 2017  9:07 AM     Your access code will  in 90 days. If you need help or a new code, please call your [place] clinic or [phone].        Care EveryWhere ID     This is your Care EveryWhere ID. This could be used by other organizations to access your [place] medical records  [id number]        Equal Access to Services     [facility] KIERRA AH: Hadii aad ku hadasho Soomaali, waaxda luqadaha, qaybta kaalmada adeegyada, shawna espinoza . So [facility] [phone].    ATENCIÓN: Si habla español, tiene a miller disposición servicios gratuitos de asistencia lingüística. Llame al [phone].    We comply with applicable federal civil rights laws and [place] laws. We do not discriminate on the basis of race, color, national origin, age, disability, sex, sexual " orientation, or gender identity.               Review of your medicines      START taking        Dose / Directions    folic acid 1 MG tablet   Commonly known as:  FOLVITE        Dose:  1 mg   Start taking on:  11/25/2017   Take 1 tablet (1 mg) by mouth daily   Quantity:  30 tablet   Refills:  0         CONTINUE these medicines which may have CHANGED, or have new prescriptions. If we are uncertain of the size of tablets/capsules you have at home, strength may be listed as something that might have changed.        Dose / Directions    gabapentin 800 MG tablet   Commonly known as:  NEURONTIN   This may have changed:  when to take this        Dose:  800 mg   Take 1 tablet (800 mg) by mouth 3 times daily   Quantity:  90 tablet   Refills:  0       omeprazole 40 MG capsule   Commonly known as:  priLOSEC   This may have changed:  when to take this        Dose:  40 mg   Start taking on:  11/25/2017   Take 1 capsule (40 mg) by mouth every morning (before breakfast)   Quantity:  30 capsule   Refills:  0       traZODone 100 MG tablet   Commonly known as:  DESYREL   This may have changed:  how much to take        Dose:  200 mg   Take 2 tablets (200 mg) by mouth nightly as needed for sleep   Quantity:  30 tablet   Refills:  0         CONTINUE these medicines which have NOT CHANGED        Dose / Directions    amLODIPine 10 MG tablet   Commonly known as:  NORVASC        Dose:  10 mg   Start taking on:  11/25/2017   Take 1 tablet (10 mg) by mouth daily   Quantity:  30 tablet   Refills:  0       aspirin-acetaminophen-caffeine 250-250-65 MG per tablet   Commonly known as:  EXCEDRIN MIGRAINE        Dose:  2 tablet   Take 2 tablets by mouth every 6 hours as needed for headaches   Refills:  0       busPIRone 10 MG tablet   Commonly known as:  BUSPAR        Dose:  20 mg   Take 2 tablets (20 mg) by mouth 3 times daily   Quantity:  180 tablet   Refills:  0       cloNIDine 0.1 MG tablet   Commonly known as:  CATAPRES        Dose:  0.1 mg    Take 1 tablet (0.1 mg) by mouth 2 times daily   Quantity:  60 tablet   Refills:  0       disulfiram 250 MG tablet   Commonly known as:  ANTABUSE   Indication:  Take 1 Tablet  Daily as Directed   Used for:  Alcohol dependence with uncomplicated withdrawal (H)        Dose:  250 mg   Take 1 tablet (250 mg) by mouth daily   Quantity:  30 tablet   Refills:  1       hydrOXYzine 25 MG tablet   Commonly known as:  ATARAX   Used for:  Alcohol dependence with uncomplicated intoxication (H), Anxiety        Dose:  50 mg   Take 2 tablets (50 mg) by mouth every 6 hours as needed for anxiety   Quantity:  120 tablet   Refills:  1       methocarbamol 500 MG tablet   Commonly known as:  ROBAXIN   Used for:  Chronic low back pain, unspecified back pain laterality, with sciatica presence unspecified        Dose:  1000 mg   Take 2 tablets (1,000 mg) by mouth 3 times daily as needed for muscle spasms   Quantity:  30 tablet   Refills:  1       multivitamin, therapeutic with minerals Tabs tablet        Dose:  1 tablet   Start taking on:  11/25/2017   Take 1 tablet by mouth daily   Quantity:  30 each   Refills:  0       sertraline 100 MG tablet   Commonly known as:  ZOLOFT        Dose:  200 mg   Start taking on:  11/25/2017   Take 2 tablets (200 mg) by mouth daily   Quantity:  60 tablet   Refills:  0         STOP taking     amphetamine-dextroamphetamine 30 MG per 24 hr capsule   Commonly known as:  ADDERALL XR           nicotine 21 MG/24HR 24 hr patch   Commonly known as:  NICODERM CQ           WELLBUTRIN  MG 24 hr tablet   Generic drug:  buPROPion                Where to get your medicines      These medications were sent to [facility] [place], [place] - [address]  [address] [address]     Phone:  [phone]     amLODIPine 10 MG tablet    busPIRone 10 MG tablet    cloNIDine 0.1 MG tablet    folic acid 1 MG tablet    gabapentin 800 MG tablet    multivitamin, therapeutic with minerals  Tabs tablet    omeprazole 40 MG capsule    sertraline 100 MG tablet    traZODone 100 MG tablet                Protect others around you: Learn how to safely use, store and throw away your medicines at www.disposemymeds.org.             Medication List: This is a list of all your medications and when to take them. Check marks below indicate your daily home schedule. Keep this list as a reference.      Medications           Morning Afternoon Evening Bedtime As Needed    amLODIPine 10 MG tablet   Commonly known as:  NORVASC   Take 1 tablet (10 mg) by mouth daily   Start taking on:  11/25/2017   Last time this was given:  10 mg on 11/24/2017  8:38 AM                                   aspirin-acetaminophen-caffeine 250-250-65 MG per tablet   Commonly known as:  EXCEDRIN MIGRAINE   Take 2 tablets by mouth every 6 hours as needed for headaches   Last time this was given:  2 tablets on 11/24/2017  8:38 AM                                   busPIRone 10 MG tablet   Commonly known as:  BUSPAR   Take 2 tablets (20 mg) by mouth 3 times daily   Last time this was given:  20 mg on 11/24/2017  8:37 AM                                         cloNIDine 0.1 MG tablet   Commonly known as:  CATAPRES   Take 1 tablet (0.1 mg) by mouth 2 times daily   Last time this was given:  0.1 mg on 11/24/2017  8:38 AM                                   disulfiram 250 MG tablet   Commonly known as:  ANTABUSE   Take 1 tablet (250 mg) by mouth daily                                   folic acid 1 MG tablet   Commonly known as:  FOLVITE   Take 1 tablet (1 mg) by mouth daily   Start taking on:  11/25/2017   Last time this was given:  1 mg on 11/24/2017  8:38 AM                                   gabapentin 800 MG tablet   Commonly known as:  NEURONTIN   Take 1 tablet (800 mg) by mouth 3 times daily                                         hydrOXYzine 25 MG tablet   Commonly known as:  ATARAX   Take 2 tablets (50 mg) by mouth every 6 hours as needed for  anxiety   Last time this was given:  50 mg on 11/24/2017  8:38 AM                                   methocarbamol 500 MG tablet   Commonly known as:  ROBAXIN   Take 2 tablets (1,000 mg) by mouth 3 times daily as needed for muscle spasms                                   multivitamin, therapeutic with minerals Tabs tablet   Take 1 tablet by mouth daily   Start taking on:  11/25/2017   Last time this was given:  1 tablet on 11/24/2017  8:39 AM                                   omeprazole 40 MG capsule   Commonly known as:  priLOSEC   Take 1 capsule (40 mg) by mouth every morning (before breakfast)   Start taking on:  11/25/2017   Last time this was given:  40 mg on 11/24/2017  8:38 AM                                   sertraline 100 MG tablet   Commonly known as:  ZOLOFT   Take 2 tablets (200 mg) by mouth daily   Start taking on:  11/25/2017   Last time this was given:  150 mg on 11/24/2017  8:38 AM                                   traZODone 100 MG tablet   Commonly known as:  DESYREL   Take 2 tablets (200 mg) by mouth nightly as needed for sleep   Last time this was given:  150 mg on 11/23/2017 10:51 PM

## 2017-11-20 NOTE — IP AVS SNAPSHOT
[facility]    [address] [place] [address]    Phone:  [phone]                                       After Visit Summary   11/20/2017    Nickolas Lang    MRN: [id number]           After Visit Summary Signature Page     I have received my discharge instructions, and my questions have been answered. I have discussed any challenges I see with this plan with the nurse or doctor.    ..........................................................................................................................................  Patient/Patient Representative Signature      ..........................................................................................................................................  Patient Representative Print Name and Relationship to Patient    ..................................................               ................................................  Date                                            Time    ..........................................................................................................................................  Reviewed by Signature/Title    ...................................................              ..............................................  Date                                                            Time

## 2017-11-20 NOTE — PHARMACY-ADMISSION MEDICATION HISTORY
Admission medication history interview status for the 11/20/2017 admission is complete. See Epic admission navigator for allergy information, pharmacy, prior to admission medications and immunization status.     Medication history interview sources:  Patient, Epic electronic medical record (discharged from [facility] on 10/14/17)    Changes made to PTA medication list (reason)  Added: none  Deleted: none  Changed: none    Additional medication history information (including reliability of information, actions taken by pharmacist): The patient was a reliable historian and able to confirm his medication from his discharge from [facility] on 10/14/17. He reported he has not taken any of this medications for about one week.    Gabapentin 800 mg last filled 10/18/17 for quantity 120  Adderall XR 30 mg last filled 10/29/17 for quantity 60      Prior to Admission medications    Medication Sig Last Dose Taking? Auth Provider   disulfiram (ANTABUSE) 250 MG tablet Take 1 tablet (250 mg) by mouth daily About 1 week ago at Unknown time Yes Tima Crews MD   aspirin-acetaminophen-caffeine (EXCEDRIN MIGRAINE) 250-250-65 MG per tablet Take 2 tablets by mouth every 6 hours as needed for headaches  Yes Unknown, Entered By History   amLODIPine (NORVASC) 10 MG tablet Take 10 mg by mouth daily About 1 week ago at Unknown time Yes Unknown, Entered By History   cloNIDine (CATAPRES) 0.1 MG tablet Take 0.1 mg by mouth 2 times daily About 1 week ago at Unknown time Yes Unknown, Entered By History   buPROPion (WELLBUTRIN XL) 150 MG 24 hr tablet Take 150 mg by mouth every morning About 1 week ago at Unknown time Yes Unknown, Entered By History   nicotine (NICODERM CQ) 21 MG/24HR 24 hr patch Place 1 patch onto the skin every 24 hours  Yes Nicolette Frye APRN CNP   methocarbamol (ROBAXIN) 500 MG tablet Take 2 tablets (1,000 mg) by mouth 3 times daily as needed for muscle spasms  Yes Nicolette Frye APRN CNP   gabapentin (NEURONTIN) 800 MG  tablet Take 1 tablet (800 mg) by mouth 4 times daily About 1 week ago at Unknown time Yes Nicolette Frye APRN CNP   omeprazole (PRILOSEC) 40 MG capsule Take 1 capsule (40 mg) by mouth daily About 1 week ago at Unknown time Yes Nicolette Frye APRN CNP   traZODone (DESYREL) 100 MG tablet Take 1-2 tablets (100-200 mg) by mouth nightly as needed for sleep  Yes Nicolette Frye APRN CNP   sertraline (ZOLOFT) 100 MG tablet Take 2 tablets (200 mg) by mouth daily About 1 week ago at Unknown time Yes Nicolette Frye APRN CNP   busPIRone (BUSPAR) 10 MG tablet Take 2 tablets (20 mg) by mouth 3 times daily About 1 week ago at Unknown time Yes Nicolette Frye APRN CNP   amphetamine-dextroamphetamine (ADDERALL XR) 30 MG per 24 hr capsule Take 30 mg by mouth 2 times daily Takes in the morning and at noon About 1 week ago at Unknown time Yes Nicolette Frye APRN CNP   hydrOXYzine (ATARAX) 25 MG tablet Take 2 tablets (50 mg) by mouth every 6 hours as needed for anxiety  Yes Filippo Brasher MD   multivitamin, therapeutic with minerals (THERA-VIT-M) TABS tablet Take 1 tablet by mouth daily About 1 week ago at Unknown time Yes Mindi Wills MD         Medication history completed by: Tasneem Herman, PharmD, BCPP

## 2017-11-20 NOTE — ED PROVIDER NOTES
History     Chief Complaint   Patient presents with     Alcohol Intoxication     Pt drinking 175 liter of vodka per day.  Last drink at 0430 today.  No other drugs.     HPI  Nickolas Lang is a 27-year-old male who has been binge drinking for the last week and a half.  Patient states he s been in a phase II of his alcohol treatment program and states that he fell off the wagon a week and a half ago when he started drinking 1.75 L of alcohol per day again.  Patient states that he is here seeking detox, but states that he stop drinking this morning at 4 AM when he developed chest tightness in his left chest. Patient states that he has had tightness like that before and has never had any MI or cardiac evaluation.  Patient states his cardiac risk factors include his hypertension, elevated cholesterol and a smoking history.  Patient has no definite early family history of heart disease and no diabetes. Patient states the chest tightness does make him short of breath and the patient is obviously diaphoretic. Patient presents here stating that he did call intake to see if a detox bed was opened, but states that they did not have any open at that time.  He states that was 8 AM this morning.  Patient denies any radiation of the tightness into his shoulders or jaw.    This part of the document was transcribed by Osito Medina, Medical Scribe.    Past Medical History:   Diagnosis Date     ADD (attention deficit disorder)      Alcohol abuse      Anxiety     gabapentin helps the most      GERD (gastroesophageal reflux disease)      Hepatic steatosis      Hypertension      Obesity      Pyloric stenosis     s/p pyloromyotomy as an infant       Past Surgical History:   Procedure Laterality Date     Deviated septum repair       PYLOROMYOTOMY SURGERY  as an infant      SHOULDER SURGERY Left 07/29/2016    Removal of cartilage       Family History   Problem Relation Age of Onset     Neurologic Disorder Mother      Multiple  Sclerosis     Depression Mother      Anxiety Disorder Mother      Alcohol/Drug Father      Substance Abuse Father      Depression Maternal Grandmother      Anxiety Disorder Maternal Grandmother      Hypertension Maternal Grandmother      Substance Abuse Maternal Grandfather      Alcohol/Drug Paternal Grandfather        Social History   Substance Use Topics     Smoking status: Current Every Day Smoker     Packs/day: 0.50     Years: 4.00     Types: Cigarettes     Smokeless tobacco: Never Used     Alcohol use 0.0 oz/week     0 Standard drinks or equivalent per week      Comment: 1.75L vodka daily       Previous Medications    AMLODIPINE (NORVASC) 10 MG TABLET    Take 10 mg by mouth daily    AMPHETAMINE-DEXTROAMPHETAMINE (ADDERALL XR) 30 MG PER 24 HR CAPSULE    Take 1 capsule (30 mg) by mouth 2 times daily    ASPIRIN-ACETAMINOPHEN-CAFFEINE (EXCEDRIN MIGRAINE) 250-250-65 MG PER TABLET    Take 2 tablets by mouth every 6 hours as needed for headaches    BUPROPION (WELLBUTRIN XL) 150 MG 24 HR TABLET    Take 150 mg by mouth every morning    BUSPIRONE (BUSPAR) 10 MG TABLET    Take 2 tablets (20 mg) by mouth 3 times daily    CLONIDINE (CATAPRES) 0.1 MG TABLET    Take 0.1 mg by mouth 2 times daily    DISULFIRAM (ANTABUSE) 250 MG TABLET    Take 1 tablet (250 mg) by mouth daily    GABAPENTIN (NEURONTIN) 800 MG TABLET    Take 1 tablet (800 mg) by mouth 4 times daily    HYDROXYZINE (ATARAX) 25 MG TABLET    Take 2 tablets (50 mg) by mouth every 6 hours as needed for anxiety    METHOCARBAMOL (ROBAXIN) 500 MG TABLET    Take 2 tablets (1,000 mg) by mouth 3 times daily as needed for muscle spasms    MULTIVITAMIN, THERAPEUTIC WITH MINERALS (THERA-VIT-M) TABS TABLET    Take 1 tablet by mouth daily    NICOTINE (NICODERM CQ) 21 MG/24HR 24 HR PATCH    Place 1 patch onto the skin every 24 hours    OMEPRAZOLE (PRILOSEC) 40 MG CAPSULE    Take 1 capsule (40 mg) by mouth daily    SERTRALINE (ZOLOFT) 100 MG TABLET    Take 2 tablets (200 mg) by  mouth daily    TRAZODONE (DESYREL) 100 MG TABLET    Take 1-2 tablets (100-200 mg) by mouth nightly as needed for sleep      No Known Allergies      I have reviewed the Medications, Allergies, Past Medical and Surgical History, and Social History in the Epic system.    Review of Systems   Constitutional: Positive for diaphoresis.   Respiratory: Positive for chest tightness and shortness of breath.    All other systems reviewed and are negative.      Physical Exam   BP: (!) 155/109  Pulse: 107  Heart Rate: 89  Temp: 97.6  F (36.4  C)  Resp: 16  Weight: (!) 139.7 kg (308 lb)  SpO2: 94 %      Physical Exam   Constitutional: He is oriented to person, place, and time. He appears distressed.   Alert and conversant   HENT:   Head: Atraumatic.   Eyes: EOM are normal. Pupils are equal, round, and reactive to light.   Neck: Neck supple. No JVD present.   Cardiovascular: Regular rhythm.    Pulmonary/Chest: He has no wheezes. He has no rales. He exhibits no tenderness.   Abdominal: Soft. There is no tenderness.   Musculoskeletal: He exhibits no edema, tenderness or deformity.   Neurological: He is alert and oriented to person, place, and time. No cranial nerve deficit.   Grossly intact and symmetric   Skin: Skin is warm. No rash noted. He is diaphoretic.   Psychiatric:   Anxious       ED Course     ED Course     Procedures        Patient was placed on cardiac monitor and oximetry.  An IV was established for blood draw.  Patient was given an aspirin.    EKG revealed a normal sinus rhythm at a rate of 81 with a CA interval 0.140 and a QRS duration of 0.090.  The patient had a normal axis with no acute ST or T-wave changes for ischemia.  This is read by me personally.    Results for orders placed or performed during the hospital encounter of 11/20/17   XR Chest Port 1 View    Narrative    CHEST ONE VIEW PORTABLE  11/20/2017 9:30 AM    HISTORY:  Chest pain.    COMPARISON:  6/28/2017      Impression    IMPRESSION:  Shallow  inspiration. Otherwise negative.     JAMES FIGUEROA MD   Drug abuse screen 6 urine (tox)   Result Value Ref Range    Amphetamine Qual Urine Negative NEG^Negative    Barbiturates Qual Urine Negative NEG^Negative    Benzodiazepine Qual Urine Positive (A) NEG^Negative    Cannabinoids Qual Urine Positive (A) NEG^Negative    Cocaine Qual Urine Negative NEG^Negative    Ethanol Qual Urine Positive (A) NEG^Negative    Opiates Qualitative Urine Negative NEG^Negative   CBC with platelets differential   Result Value Ref Range    WBC 6.1 4.0 - 11.0 10e9/L    RBC Count 5.50 4.4 - 5.9 10e12/L    Hemoglobin 16.5 13.3 - 17.7 g/dL    Hematocrit 48.1 40.0 - 53.0 %    MCV 88 78 - 100 fl    MCH 30.0 26.5 - 33.0 pg    MCHC 34.3 31.5 - 36.5 g/dL    RDW 13.6 10.0 - 15.0 %    Platelet Count 271 150 - 450 10e9/L    Diff Method Automated Method     % Neutrophils 41.4 %    % Lymphocytes 48.3 %    % Monocytes 8.5 %    % Eosinophils 0.8 %    % Basophils 0.8 %    % Immature Granulocytes 0.2 %    Nucleated RBCs 0 0 /100    Absolute Neutrophil 2.5 1.6 - 8.3 10e9/L    Absolute Lymphocytes 3.0 0.8 - 5.3 10e9/L    Absolute Monocytes 0.5 0.0 - 1.3 10e9/L    Absolute Eosinophils 0.1 0.0 - 0.7 10e9/L    Absolute Basophils 0.1 0.0 - 0.2 10e9/L    Abs Immature Granulocytes 0.0 0 - 0.4 10e9/L    Absolute Nucleated RBC 0.0    Troponin I   Result Value Ref Range    Troponin I ES <0.015 0.000 - 0.045 ug/L   Comprehensive metabolic panel   Result Value Ref Range    Sodium 140 133 - 144 mmol/L    Potassium 3.8 3.4 - 5.3 mmol/L    Chloride 100 94 - 109 mmol/L    Carbon Dioxide 24 20 - 32 mmol/L    Anion Gap 16 (H) 3 - 14 mmol/L    Glucose 102 (H) 70 - 99 mg/dL    Urea Nitrogen 13 7 - 30 mg/dL    Creatinine 0.57 (L) 0.66 - 1.25 mg/dL    GFR Estimate >90 >60 mL/min/1.7m2    GFR Estimate If Black >90 >60 mL/min/1.7m2    Calcium 8.2 (L) 8.5 - 10.1 mg/dL    Bilirubin Total 0.5 0.2 - 1.3 mg/dL    Albumin 3.7 3.4 - 5.0 g/dL    Protein Total 7.5 6.8 - 8.8 g/dL     Alkaline Phosphatase 92 40 - 150 U/L     (H) 0 - 70 U/L    AST 75 (H) 0 - 45 U/L   INR   Result Value Ref Range    INR 0.99 0.86 - 1.14   D dimer quantitative   Result Value Ref Range    D Dimer 0.3 0.0 - 0.50 ug/ml FEU   Nt probnp inpatient (BNP)   Result Value Ref Range    N-Terminal Pro BNP Inpatient <5 0 - 450 pg/mL   Alcohol level blood   Result Value Ref Range    Ethanol g/dL 0.27 (H) <0.01 g/dL   UA reflex to Microscopic and Culture   Result Value Ref Range    Color Urine Yellow     Appearance Urine Clear     Glucose Urine Negative NEG^Negative mg/dL    Bilirubin Urine Negative NEG^Negative    Ketones Urine 5 (A) NEG^Negative mg/dL    Specific Gravity Urine 1.027 1.003 - 1.035    Blood Urine Small (A) NEG^Negative    pH Urine 6.0 5.0 - 7.0 pH    Protein Albumin Urine 300 (A) NEG^Negative mg/dL    Urobilinogen mg/dL Normal 0.0 - 2.0 mg/dL    Nitrite Urine Negative NEG^Negative    Leukocyte Esterase Urine Negative NEG^Negative    Source Midstream Urine     RBC Urine 1 0 - 2 /HPF    WBC Urine 1 0 - 2 /HPF    Squamous Epithelial /HPF Urine <1 0 - 1 /HPF    Mucous Urine Present (A) NEG^Negative /LPF    Hyaline Casts 13 (H) 0 - 2 /LPF    Granular Casts 19 (A) NEG^Negative /LPF    Cellular Cast 1 (A) NEG^Negative /LPF    Calcium Oxalate Few (A) NEG^Negative /HPF   Troponin I   Result Value Ref Range    Troponin I ES <0.015 0.000 - 0.045 ug/L   Alcohol breath test POCT   Result Value Ref Range    Alcohol Breath Test 0.220 (A) 0.00 - 0.01   Troponin POCT   Result Value Ref Range    Troponin I 0.00 0.00 - 0.10 ug/L         Labs Ordered and Resulted from Time of ED Arrival Up to the Time of Departure from the ED   DRUG ABUSE SCREEN 6 CHEM DEP URINE ([facility]) - Abnormal; Notable for the following:        Result Value    Benzodiazepine Qual Urine Positive (*)     Cannabinoids Qual Urine Positive (*)     Ethanol Qual Urine Positive (*)     All other components within normal limits   COMPREHENSIVE METABOLIC PANEL -  Abnormal; Notable for the following:     Anion Gap 16 (*)     Glucose 102 (*)     Creatinine 0.57 (*)     Calcium 8.2 (*)      (*)     AST 75 (*)     All other components within normal limits   ALCOHOL ETHYL - Abnormal; Notable for the following:     Ethanol g/dL 0.27 (*)     All other components within normal limits   UA MACROSCOPIC WITH REFLEX TO MICRO AND CULTURE - Abnormal; Notable for the following:     Ketones Urine 5 (*)     Blood Urine Small (*)     Protein Albumin Urine 300 (*)     Mucous Urine Present (*)     Hyaline Casts 13 (*)     Granular Casts 19 (*)     Cellular Cast 1 (*)     Calcium Oxalate Few (*)     All other components within normal limits   ALCOHOL BREATH TEST POCT - Abnormal; Notable for the following:     Alcohol Breath Test 0.220 (*)     All other components within normal limits   CBC WITH PLATELETS DIFFERENTIAL   TROPONIN I   INR   D DIMER QUANTITATIVE   NT PROBNP INPATIENT   TROPONIN I   PULSE OXIMETRY NURSING   CARDIAC CONTINUOUS MONITORING   PERIPHERAL IV CATHETER   ISTAT TROPONIN NURSING POCT   MSSA SCORE AND VS   TROPONIN POCT       Assessments & Plan (with Medical Decision Making)     I have reviewed the nursing notes.    Patient was worked up for cardiac disease, his uncontrolled hypertension was treated and he was also treated for alcohol withdrawal.    Medications   amLODIPine (NORVASC) tablet 10 mg (10 mg Oral Given 11/20/17 0953)   cloNIDine (CATAPRES) tablet 0.1 mg (0.1 mg Oral Given 11/20/17 0953)   diazepam (VALIUM) tablet 5-20 mg (not administered)   aspirin chewable tablet 324 mg (324 mg Oral Given 11/20/17 0953)   LORazepam (ATIVAN) injection 1 mg (1 mg Intravenous Given 11/20/17 0956)   famotidine (PEPCID) injection 20 mg (20 mg Intravenous Given 11/20/17 1151)   dextrose 5% and 0.9% NaCl 1,000 mL with INFUVITE 10 mL, thiamine 100 mg, folic acid 1 mg infusion ( Intravenous New Bag 11/20/17 1004)   atenolol (TENORMIN) tablet 50 mg (50 mg Oral Given 11/20/17 0953)    haloperidol lactate (HALDOL) injection 2 mg (2 mg Intravenous Given 11/20/17 1156)   LORazepam (ATIVAN) injection 2 mg (2 mg Intravenous Given 11/20/17 1252)   LORazepam (ATIVAN) injection 2 mg (2 mg Intravenous Given 11/20/17 1456)     With this the patient's vital signs stabilized and his cardiac evaluation was negative troponins ×2.  At this time I think his initial chest tightness was secondary to his hypertension and alcohol withdrawal.    /83  Pulse 99  Temp 97.6  F (36.4  C) (Oral)  Resp 16  Wt (!) 139.7 kg (308 lb)  SpO2 94%  BMI 40.64 kg/m2    I have reviewed the findings, diagnosis, and plan with the patient.    Case was discussed with intake and a bed will be arranged upstairs on [facility].  Patient will be maintained on MSSA protocol while in the ER waiting for bed placement.    Final diagnoses:   Alcohol dependence with intoxication with complication (H) - in withdrawal   Essential hypertension     Kranthi Lu MD    11/20/2017   [facility], EMERGENCY DEPARTMENT     Kranthi Lu MD  11/20/17 2622

## 2017-11-20 NOTE — ED NOTES
Attempted to call [facility] to find out admit time, they state they have no beds, the patient is not in their que, and we need to call intake because they have no beds.

## 2017-11-21 LAB — INTERPRETATION ECG - MUSE: NORMAL

## 2017-11-21 PROCEDURE — 99222 1ST HOSP IP/OBS MODERATE 55: CPT | Mod: AI | Performed by: PSYCHIATRY & NEUROLOGY

## 2017-11-21 PROCEDURE — 25000125 ZZHC RX 250: Performed by: PSYCHIATRY & NEUROLOGY

## 2017-11-21 PROCEDURE — 12800012 ZZH R&B CD MH INTERMEDIATE ADULT

## 2017-11-21 PROCEDURE — 25000132 ZZH RX MED GY IP 250 OP 250 PS 637: Performed by: EMERGENCY MEDICINE

## 2017-11-21 PROCEDURE — 25000132 ZZH RX MED GY IP 250 OP 250 PS 637: Performed by: PSYCHIATRY & NEUROLOGY

## 2017-11-21 PROCEDURE — 25000132 ZZH RX MED GY IP 250 OP 250 PS 637: Performed by: CLINICAL NURSE SPECIALIST

## 2017-11-21 PROCEDURE — 99222 1ST HOSP IP/OBS MODERATE 55: CPT | Performed by: PHYSICIAN ASSISTANT

## 2017-11-21 PROCEDURE — H0001 ALCOHOL AND/OR DRUG ASSESS: HCPCS

## 2017-11-21 PROCEDURE — 99207 ZZC CONSULT E&M CHANGED TO INITIAL LEVEL: CPT | Performed by: PHYSICIAN ASSISTANT

## 2017-11-21 RX ORDER — ONDANSETRON 4 MG/1
4 TABLET, ORALLY DISINTEGRATING ORAL EVERY 6 HOURS PRN
Status: DISCONTINUED | OUTPATIENT
Start: 2017-11-21 | End: 2017-11-24 | Stop reason: HOSPADM

## 2017-11-21 RX ORDER — CLONIDINE HYDROCHLORIDE 0.1 MG/1
0.1 TABLET ORAL 3 TIMES DAILY PRN
Status: DISCONTINUED | OUTPATIENT
Start: 2017-11-21 | End: 2017-11-24 | Stop reason: HOSPADM

## 2017-11-21 RX ORDER — TRAZODONE HYDROCHLORIDE 150 MG/1
150 TABLET ORAL
Status: DISCONTINUED | OUTPATIENT
Start: 2017-11-21 | End: 2017-11-24

## 2017-11-21 RX ORDER — SIMETHICONE 80 MG
80 TABLET,CHEWABLE ORAL EVERY 6 HOURS PRN
Status: DISCONTINUED | OUTPATIENT
Start: 2017-11-21 | End: 2017-11-24 | Stop reason: HOSPADM

## 2017-11-21 RX ORDER — SERTRALINE HYDROCHLORIDE 100 MG/1
100 TABLET, FILM COATED ORAL DAILY
Status: DISCONTINUED | OUTPATIENT
Start: 2017-11-22 | End: 2017-11-22

## 2017-11-21 RX ADMIN — TRAZODONE HYDROCHLORIDE 150 MG: 150 TABLET ORAL at 21:57

## 2017-11-21 RX ADMIN — ACETAMINOPHEN, ASPIRIN AND CAFFEINE 2 TABLET: 250; 250; 65 TABLET, FILM COATED ORAL at 10:49

## 2017-11-21 RX ADMIN — ACETAMINOPHEN, ASPIRIN AND CAFFEINE 2 TABLET: 250; 250; 65 TABLET, FILM COATED ORAL at 16:55

## 2017-11-21 RX ADMIN — SERTRALINE HYDROCHLORIDE 50 MG: 50 TABLET ORAL at 08:56

## 2017-11-21 RX ADMIN — HYDROXYZINE HYDROCHLORIDE 50 MG: 25 TABLET ORAL at 04:37

## 2017-11-21 RX ADMIN — GABAPENTIN 500 MG: 400 CAPSULE ORAL at 14:30

## 2017-11-21 RX ADMIN — DIAZEPAM 10 MG: 5 TABLET ORAL at 18:15

## 2017-11-21 RX ADMIN — ONDANSETRON 4 MG: 4 TABLET, ORALLY DISINTEGRATING ORAL at 17:45

## 2017-11-21 RX ADMIN — AMLODIPINE BESYLATE 10 MG: 10 TABLET ORAL at 08:56

## 2017-11-21 RX ADMIN — DIAZEPAM 10 MG: 5 TABLET ORAL at 08:57

## 2017-11-21 RX ADMIN — HYDROXYZINE HYDROCHLORIDE 50 MG: 25 TABLET ORAL at 15:00

## 2017-11-21 RX ADMIN — DIAZEPAM 10 MG: 5 TABLET ORAL at 00:23

## 2017-11-21 RX ADMIN — ACETAMINOPHEN 650 MG: 325 TABLET, FILM COATED ORAL at 20:07

## 2017-11-21 RX ADMIN — GABAPENTIN 300 MG: 300 CAPSULE ORAL at 08:55

## 2017-11-21 RX ADMIN — GABAPENTIN 500 MG: 400 CAPSULE ORAL at 20:05

## 2017-11-21 RX ADMIN — HYDROXYZINE HYDROCHLORIDE 50 MG: 25 TABLET ORAL at 10:49

## 2017-11-21 RX ADMIN — BUSPIRONE HYDROCHLORIDE 20 MG: 5 TABLET ORAL at 14:30

## 2017-11-21 RX ADMIN — DIAZEPAM 10 MG: 5 TABLET ORAL at 16:09

## 2017-11-21 RX ADMIN — DIAZEPAM 10 MG: 5 TABLET ORAL at 12:20

## 2017-11-21 RX ADMIN — BUSPIRONE HYDROCHLORIDE 20 MG: 5 TABLET ORAL at 20:05

## 2017-11-21 RX ADMIN — FOLIC ACID 1 MG: 1 TABLET ORAL at 08:57

## 2017-11-21 RX ADMIN — OMEPRAZOLE 40 MG: 20 CAPSULE, DELAYED RELEASE ORAL at 08:56

## 2017-11-21 RX ADMIN — ONDANSETRON 4 MG: 4 TABLET, ORALLY DISINTEGRATING ORAL at 11:58

## 2017-11-21 RX ADMIN — DIAZEPAM 10 MG: 5 TABLET ORAL at 21:57

## 2017-11-21 RX ADMIN — CLONIDINE HYDROCHLORIDE 0.1 MG: 0.1 TABLET ORAL at 20:06

## 2017-11-21 RX ADMIN — NICOTINE POLACRILEX 8 MG: 4 GUM, CHEWING ORAL at 14:33

## 2017-11-21 RX ADMIN — Medication 100 MG: at 08:56

## 2017-11-21 RX ADMIN — BUSPIRONE HYDROCHLORIDE 10 MG: 5 TABLET ORAL at 08:56

## 2017-11-21 RX ADMIN — MULTIPLE VITAMINS W/ MINERALS TAB 1 TABLET: TAB at 08:56

## 2017-11-21 RX ADMIN — DIAZEPAM 10 MG: 5 TABLET ORAL at 03:47

## 2017-11-21 RX ADMIN — CLONIDINE HYDROCHLORIDE 0.1 MG: 0.1 TABLET ORAL at 08:56

## 2017-11-21 RX ADMIN — HYDROXYZINE HYDROCHLORIDE 50 MG: 25 TABLET ORAL at 21:59

## 2017-11-21 ASSESSMENT — ACTIVITIES OF DAILY LIVING (ADL)
GROOMING: INDEPENDENT
DRESS: INDEPENDENT;SCRUBS (BEHAVIORAL HEALTH)
ORAL_HYGIENE: INDEPENDENT
GROOMING: INDEPENDENT

## 2017-11-21 NOTE — PROGRESS NOTES
Rule 25 Chemical Health Assessment Update:   Nickolas Lang had a Rule 25 Evaluation with Sammi Benjamin MS, ARLENE  at [facility] on 6/30/2017 and is in the patients EMR ([employer]). The original Rule 25 chemical health assessment was reviewed and updated on 11/21/17 by ARLENE Alcantar.  Please refer to the patients EMR (R[employer]mir) for the aforementioned assessment.    Pt reports his last use of alcohol was on 11/20/17 @ 0400. Patient was given a UA upon admit and UA was POS for ethanol, cannabinoids, and benzodiazepines. Pt was given a breathalyzer upon ER admission and pt's AMANDA was 0.27.    Updated Information:    DIMENSION I - Acute Intoxication /Withdrawal Potential   1. Chemical use most recent 12 months outside a facility and other significant use history (client self-report)              X = Primary Drug Used   Age of First Use Most Recent Pattern of Use and Duration   Need enough information to show pattern (both frequency and amounts) and to show tolerance for each chemical that has a diagnosis   Date of last use and time, if needed   Withdrawal Potential? Requiring special care Method of use  (oral, smoked, snort, IV, etc)   x   Alcohol     16 1.75L daily since    11/20/17 yes oral      Marijuana/  Hashish   16  A few hits here and there 11/20/17 no smoke      Cocaine/Crack     N/A           Meth/  Amphetamines   N/A           Heroin     N/A           Other Opiates/  Synthetics   N/A           Inhalants     N/A           Benzodiazepines      Pt was seen for detox in ED prior to this admission and received valium         Hallucinogens     N/A           Barbiturates/  Sedatives/  Hypnotics N/A           Over-the-Counter Drugs   N/A           Other     N/A           Nicotine     16  1 PPD 9/1/17  smoke     ASA PLACEMENT CRITERIA:   DIMENSION 1: Intoxication/Withdrawal: Risk level 1. The patient is being treated for withdrawal and will be medically stable at discharge.  DIMENSION 2: Biomedical Conditions: Risk  level 1. The patient is treated for Hypertension.  He has a PCP and is able to navigate the health care system.   DIMENSION 3: Emotional/Behavioral: Risk level 1. The patient has a current diagnosis of Depression, anxiety, and ADHD.  Prescribed Adderall, Wellbutrin, Zoloft, Clonodine, amlodipine, trazodone, gabapentin, hydroxyzine, and buspar.  Finds services helpful when he attends.  IS seen for MH problems by [facility], Therapist Amanda Ness and Psychiatrist is Leopoldo Lovell.  Patient denies current suicidal/homicidal ideation, plan or intention.    DIMENSION 4: Treatment Readiness: Risk level 1. The patient Has been unable to maintain sobriety in Phase II of treatment following primary treatment at [facility].  Lacks consistent behaviors. He demonstrates a sincere desire to be sober.  DIMENSION 5: Relapse & Continued Use Potential: Risk level 2/3. The Patient reports having been involved in 9 past treatments (completed 5), 19 past detox admissions, and minimal past 12-Step support group participation. Pt reports having minimal sober time (only when in treatment) and has tried to quit drinking in the past but relapsed. Pt has some insight into his personal relapse process along with early warning signs and triggers. Pt lacks impulse control, sober coping skills and long-term sober maintenance skills.  Pt is at a moderate to high risk for relapse/continued use.  DIMENSION 6: Recovery Environment: Risk level 3. The Pt has secured sober housing.  Pt reports he is unemployed. Pt lacks a sober support network. Pt reports that he has some legal involvement for DUI and is on probation for it.     Counselor Notes: Pt is requesting funding for a higher level of care and would like a door to door transfer for treatment as he is homeless.    ARLENE Alcantar

## 2017-11-21 NOTE — PROGRESS NOTES
Met with Pt to initiate discharge planning.  Pt recognizes need to return to a treatment program but is uncertain of the level of care.  Pt does acknowledge that he cannot return to his mother's home so residential treatment may be his best option.  Pt is funded via [employer] And will need assessment updated and faxed for new funding.  Case management continues.

## 2017-11-21 NOTE — PROGRESS NOTES
Patient:  Nickolas Lang            Adult CD Progress Note and Treatment Plan Review     Attendance  Please refer to OP BEH CD Adult Attendance Record Documentation Flowsheet    Support group attended this week: yes    Reporting sobriety:  no    Treatment Plan     Treatment Plan Review competed on:    11/9/17    Client preferred learning style: Visual; Hands on; Verbal; Demonstration     Staff Members contributing: ARLENE Green                    Received Supervision: No    Client: contributed to goals and plan.    Client received copy of plan/revised plan: Yes    Client agrees with plan/revised plan: Yes    Changes to Treatment Plan: No    New Goals added since last review: None    Goals worked on since last review: building sober support, managing mental health, reaching out and making sober connections, getting help when he needs it, attempting to remain abstinent    Strategies effective: yes    Strategies need these changes: Continue working on above goals.     ASAM Risk Rating:    Dimension 1 0 No problems. Per pt report, JONY: 11/4/17.    Dimension 2 0 No problems.    Dimension 3 1 Pt has a diagnosis of depression and anxiety and has been following recommendations of his physician and remaining medication compliant. Pt denied any suicidal ideations this week.    Dimension 4 2 Pt continues to identify the negative consequences of his chemical use. Pt reported that he did not remain sober this week and checked himself into detox.     Dimension 5 3  Pt continues to build and to utilize coping skills to combat relapse triggers and cues and avoid relapse. Pt appears to be attempting to implement his relapse prevention plan. Pt appears to be struggling with remaining sober and seems to have a hard time with complacency and entitlement.     Dimension 6 2 Pt reported that he attended one 12-step meeting this week and has a sponsor whom he is in regular contact with. Pt reported that he is living at home  with his mother and grandmother. Pt reported that he started his new job, but has already been fired for drinking and passing out while at work. Pt is on probation and reported that he is remaining in contact with his PO.     Any changes in Vulnerable Adult Status?  No  If yes, add to treatment plan and individual abuse prevention plan.    Family Involvement:   N/A--Pt participated in family sessions while in [place].     Data:   Pt stated that he is feeling embarrassed, shamed, and frustrated this evening. Pt reported that he did not stay sober this past week and that he ended up losing his job last Friday when he passed out at his desk and woke up at 7:30pm. Pt shared that he is not sure why he cannot stay sober and talked about having feelings of being entitled because he got a job and felt he should be able to drink. Pt reported he started taking Antabuse again.  Pt and writer met 1:1 and discussed what the pt wants to do at this point since Phase II is not an appropriate placement for him. Pt reported to writer that he would think about it and get back to writer.     Intervention:   Staff facilitated group and pt actively participated and offered fellow peers feedback.    Assessment:   Pt appears to be shameful about drinking again and seems to be struggling with if he wants to be sober long-term or not.     Plan:  Focus on recovery environment  Monitor emotional/physical health      ARLENE Howard

## 2017-11-21 NOTE — PROGRESS NOTES
[address]  [facility] [place] [address]          TO WHOM IT MAY CONCERN:    RE: Aftercare        Nickolas Lang completed primary treatment for chemical dependency at [facility].  Following this, the patient was referred to the Phase II Aftercare program.  It consists of 8 - 12 weekly support group sessions that are counselor led.        __X___ Patient is in need of a higher level of care and was discharged from the program on 11/16/17.     If you have any further questions or concerns, please do not hesitate to contact me at [phone].      Sincerely,    ARLENE Carcamo.

## 2017-11-21 NOTE — H&P
"DATE OF ADMISSION:  11/20/2017       DATE OF SERVICE:  11/21/2017       LEGAL STATUS AND REASON FOR ADMISSION:  The patient was admitted under a voluntary status after he presented to the Emergency Department seeking admission for alcohol detoxification.      SOURCES FOR INTAKE:  The patient's interview and review of available medical records.      CHIEF COMPLAINT:  \"Alcohol.\"      HISTORY OF PRESENT ILLNESS:  Nickolas Lang is a 27-year-old  male with longstanding history of alcoholism who presented to the Emergency Department seeking admission for alcohol detoxification.  Alcohol level at the Emergency Department was 0.27.  He was also at the Emergency Department on 11/16 with similar presentation but opted to return home.  He lives with his mother.  He is unemployed.  He tells me that he lost his job because of drinking.  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  He is on probation for DUI  from 03/25/2017 and has a .      The patient tells me that he smokes about half a pack of tobacco per day.  He seldomly uses cannabis.  He test tells me that he has used only few times but mainly when he is intoxicated.  His drug of choice is alcohol.  He started drinking when he was 16 years old.  His longest sobriety from alcohol has been 90 days while attending a chemical dependency program.  He has been to CD treatment 8 times, most recently was at [facility] in August.  He was attending Phase II prior to relapsing.  He was in detox about a month ago.  He has been drinking about 1.75 liters of vodka daily.  He has had 2 severe withdrawals with possible DTs, but no seizures in the past.  He acknowledged progressive use of despite negative consequences, loss of control.  He is interested in referral to residential CD treatment.  He has been on Antabuse, Vivitrol, and Campral for cravings.  He tells me that he often stops taking the Antabuse.  Vivitrol was helpful in the past, last use was about " a month ago and he did not find Campral helpful.  He denies other illicit drug use such as cocaine, heroin and methamphetamine.  He initially denied prescription or over-the-counter medication misuse,  but later acknowledged that he has used benzodiazepines 5-6 times in the past.  He tells me that he was given those medications by his grandmother who is prescribed benzodiazepines.  He last took it a few days ago.  He adamantly denies persistent or heavy use.      The patient tells me that he carries a diagnosis of depression, anxiety and ADHD.  He sees Adilson Jewell for medication management and Maddie Del Rio for therapy at [facility].  He was off his medication for less than 1 week and he restarted taking them about 2 days ago.  He is on Wellbutrin, gabapentin, trazodone, Zoloft, BuSpar and Adderall for ADHD.  He alleges that he last took Adderall 2 days ago.  Urine drug screen was negative for amphetamine.  He admitted that he has taken some benzodiazepine from his grandmother 1-2 days ago.  He was hospitalized in a mental health unit once.  He tells me that he had a suicide gesture via attempting to overdose on medications while intoxicated.  In the past, no history of self-harming behavior.      The patient states that he has been stable on his current medication and reported no recent bouts of depression; however, his sleep and appetite have been poor.  He tells me that he has not been eating and mainly drinks alcohol.  He has noticed some minimal weight loss.  Adamantly denies changes in energy, motivation, concentration and focus.  Denies hopelessness, helplessness, thought of suicide, homicide or self-harm.  He reported no history of lanny or psychosis.  He endorsed increased anxiety, acknowledged poor coping skills and inability to tolerate stress.  No prior history related to PTSD, eating disorder or OCD.      PAST MEDICAL HISTORY:  The patient tells me that he had shoulder injury playing  "football.  He underwent shoulder surgery, deviated septum repair and pyloric stenosis repair.  He also has history of hypertension, obesity, hepatic steatosis.      ALLERGIES:  He denied medical allergies.      FAMILY HISTORY:  The patient tells me that his father and grandfather both were alcoholic.  He also stated that depression and anxiety runs in the family.      SOCIAL HISTORY:  The patient grew up in [place] and was raised mainly by his mother.  He denied any siblings.  He graduated high school on time and has 4 years of college, majoring in finance.  He denies childhood abuse and neglect.  He has never  and has no kids.  He currently resides with his mother, lost his job 2 weeks ago due to drinking.  He is currently on probation for DUI.      PHYSICAL EXAMINATION:  Please refer to the physical exam done by her Lexus Bustillo on 11/21/2017.      LABORATORY DATA:  CMP was within normal limits except for creatinine of 0.57, calcium 8.2, anion gap 16, , AST 75,  and nonfasting glucose 102.  CBC was within normal limits.  Urine drug screen was positive for alcohol and benzodiazepines and cannabinoids.  Alcohol level at the Emergency Department was 0.27.      REVIEW OF SYSTEMS:  A 12-point review of systems was obtained and negative except for HPI.      VITAL SIGNS:  Temperature 97.1, respiratory rate 16, heart rate 89, blood pressure 146/103.      MENTAL STATUS EXAMINATION:  A 27-year-old  male, appears stated age, cooperative, pleasant, fully engaged, established good rapport and eye contact.  No major psychomotor abnormality, tics or tremors were noted.  Mood described as \"okay\" with full, reactive and congruent affect.  Gait and muscle tone within normal limits.  Speech was normal pace, volume and clarity with linear and goal oriented thought process.  Thought content was negative for current thoughts of suicide and urges to self-harm.  Denies hallucination and perceptual " disturbances.  No paranoia or grandiosity noted.  Sensorium was clear.  He was oriented to time, place, person and situation.  Immediate memory intact.  Language and fund of knowledge adequate for age and level of training.  Concentration and focus are intact.  Insight and judgment fair to good and adequate for safety at the current level of care.      DIAGNOSES:   1.  Alcohol use disorder, severe.     2.  Active alcohol withdrawal.   3.  Major depressive disorder, recurrent, without psychotic features (rule out substance-induced mood disorder).   4.  Historical diagnosis of attention deficit hyperactivity disorder.     5.  Anxiety, not  otherwise specified.      PLAN AND RECOMMENDATIONS:  The patient was admitted to detox under voluntary status after presenting to the Emergency Department seeking admission for alcohol detoxification and requesting referral to chemical dependency treatment.  The patient has been to treatment 8 times. He recently completed a residential portion and failed phase 2.  He requested to restart on all of his home medications, alleging that he was off for less than a week and they were restarted 2 days ago.  After reviewing proposed treatment and medication profile, patient agreed to:     1.  Wellbutrin and Adderall were discontinued due to comorbid substance use and active alcohol withdrawal.     2.  BuSpar was restarted and gradually titrated to 20 mg t.i.d.   3.  Gabapentin was restarted.  Will titrate to 500 mg t.i.d. The patient tells me that his home dosage was 800 mg t.i.d.   4.  Zoloft was restarted at 100 mg daily.  The patient's home dosage is 200 mg.   5.  Trazodone was restarted at 150 mg at bedtime.     6.  The patient was placed on alcohol withdrawal protocol including MSSA, Valium p.r.n., multivitamin, folic acid and thiamine.   7.  Internal Medicine consult was obtained to address somatic complaints and for health maintenance.  Psychosocial assessment was obtained by our  clinical  who will assist with setting up post-discharge care.  The patient completed CD assessment and evaluation was submitted at [facility] for funding for [facility] program and [facility].  Discharge will be granted once patient completed detoxification and establishes safety in the community.         ROMULO ROBERSON MD             D: 2017 14:23   T: 2017 16:12   MT: CD      Name:     PARTH HARPER   MRN:      [id number]        Account:      [id number]   :      1990           Admitted:     [id number]      Document: [id number]

## 2017-11-21 NOTE — ADDENDUM NOTE
Encounter addended by: Amish Murillo LADC on: 11/21/2017  4:03 PM<BR>     Actions taken: Sign clinical note

## 2017-11-21 NOTE — CONSULTS
"  [facility]  Internal Medicine Consult     Nickolas Lang MRN# [id number]   Age: 27 year old YOB: 1990     Date of Admission: 11/20/2017  Date of Consult:  11/21/2017    Primary Care Provider: Nicolette Frye    Requesting Service: Psychiatry  Reason for Consult: General Medical Evaluation         Assessment and Recommendations:   Nickolas Lang is a 27 year old male with a pmh of HTN, hepatic steatosis, GERD, ADD, and alcohol abuse who was admitted to [facility] station 3A seeking detox from alcohol.     # Alcohol abuse, acute withdrawal. Management per primary team, psychiatry. Labs significant for isolated ALT, AST elevation with elevated GGT likely 2/2 acute alcohol insult.   - Agree with MSSA using valium  - Agree with folic acid/thiamine supplementation    HTN. Managed on amlodipine and clonidine PTA. Currently elevated, likely 2/2 acute withdrawal.   - Continue home meds  - Clonidine prn for SBP >170, DBP >100  - Notify IM if SBP consistently >150 despite resolution of withdrawal    GERD. Continue omeprazole    Atypical chest pain. Complained of chest tightness in ED. EKG unremarkable, troponins negative x 2, chest tightness likely secondary to hypertension and alcohol withdrawal. Improves with valium given for withdrawal.   - Notify IM if pain changes in character, intensifies or is associated with VS changes        Internal Medicine will sign off at this time. Please notify if any intercurrent medical questions or concerns arise. Thank you for involving me in this patients care.       Lexus Bustillo PA-C  Hospitalist Service  [phone]           Chief Complaint:   \"I'm sick of doing this\"         History of Present Illness:   Nickolas Lang is a 27 year old male with a pmg of HTN, hepatic steatosis, GERD, ADD, and alcohol abuse who was admitted to [facility] station 3A seeking detox from alcohol.   Had been sober for 2 weeks following his recent discharge " "but relapsed on and off and has been consistently drinking for 1.5 weeks, about  1.75L of vodka per day. He is very motivated to be sober as \"I can't keep doing this, it stopped being fun a long time ago\".   In the ED he complained of chest pressure, and had a cardiac work up that was negative. He states that he has experienced this sensation before, and it is seems to be associated with anxiety. He states that the pressure improves when he gets valium for withdrawal. He denies any radiation of pressure, change in character, or associated nausea or vomiting. His pmh is significant for HTN , although he feels most of his high blood pressure is related to alcohol use. He denies any other drug or substance abuse. Currently he complains of diaphoresis and anxiety. He denies chest pain, shortness or breath or difficulty breathing.          Review of Systems:   A 10 point review of systems was performed and is negative unless otherwise noted in HPI.          Past Medical History:     Past Medical History:   Diagnosis Date     ADD (attention deficit disorder)      Alcohol abuse      Anxiety     gabapentin helps the most      GERD (gastroesophageal reflux disease)      Hepatic steatosis      Hypertension      Obesity      Pyloric stenosis     s/p pyloromyotomy as an infant            Past Surgical History:      Past Surgical History:   Procedure Laterality Date     Deviated septum repair       PYLOROMYOTOMY SURGERY  as an infant      SHOULDER SURGERY Left 07/29/2016    Removal of cartilage             Family History:     Family History   Problem Relation Age of Onset     Neurologic Disorder Mother      Multiple Sclerosis     Depression Mother      Anxiety Disorder Mother      Alcohol/Drug Father      Substance Abuse Father      Depression Maternal Grandmother      Anxiety Disorder Maternal Grandmother      Hypertension Maternal Grandmother      Substance Abuse Maternal Grandfather      Alcohol/Drug Paternal Grandfather     " "         Social History:     Social History   Substance Use Topics     Smoking status: Current Every Day Smoker     Packs/day: 0.50     Years: 4.00     Types: Cigarettes     Smokeless tobacco: Never Used     Alcohol use 0.0 oz/week     0 Standard drinks or equivalent per week      Comment: 1.75L vodka daily             Medications:     Current Facility-Administered Medications   Medication     amLODIPine (NORVASC) tablet 10 mg     cloNIDine (CATAPRES) tablet 0.1 mg     nicotine (NICODERM CQ) 21 MG/24HR 24 hr patch 1 patch     gabapentin (NEURONTIN) capsule 300 mg     omeprazole (priLOSEC) CR capsule 40 mg     traZODone (DESYREL) tablet 100 mg     busPIRone (BUSPAR) tablet 10 mg     sertraline (ZOLOFT) tablet 50 mg     aspirin-acetaminophen-caffeine (EXCEDRIN MIGRAINE) per tablet 2 tablet     methocarbamol (ROBAXIN) tablet 1,000 mg     multivitamin, therapeutic with minerals (THERA-VIT-M) tablet 1 tablet     hydrOXYzine (ATARAX) tablet 25-50 mg     diazepam (VALIUM) tablet 5-20 mg     thiamine tablet 100 mg     folic acid (FOLVITE) tablet 1 mg     nicotine polacrilex (NICORETTE) gum 4-8 mg     acetaminophen (TYLENOL) tablet 650 mg     alum & mag hydroxide-simethicone (MYLANTA ES/MAALOX  ES) suspension 30 mL     magnesium hydroxide (MILK OF MAGNESIA) suspension 30 mL     bisacodyl (DULCOLAX) Suppository 10 mg     Facility-Administered Medications Ordered in Other Encounters   Medication     Self Administer Medications: Behavioral Services            Allergies:   No Known Allergies          Physical Exam:   BP (!) 149/105  Pulse 89  Temp 97.6  F (36.4  C) (Oral)  Resp 16  Ht 1.854 m (6' 1\")  Wt (!) 139.7 kg (308 lb)  SpO2 95%  BMI 40.64 kg/m2   GENERAL: Alert and oriented x 3. NAD.   HEENT: Anicteric sclera. Mucous membranes moist.   CV: RRR. S1, S2. No murmurs appreciated.   RESPIRATORY: Effort normal on room air. Lungs CTAB with no wheezing, rales, rhonchi.   GI: Abdomen soft and non distended with normoactive " bowel sounds present in all quadrants. No tenderness, rebound, guarding.   MUSCULOSKELETAL: No joint swelling or tenderness. Moves all extremities.   NEUROLOGICAL: No focal deficits. Strength 5/5 bilaterally in upper and lower extremities.   EXTREMITIES: No peripheral edema. Intact bilateral pedal pulses.   SKIN: No jaundice. No rashes.          Labs:   CBC:  Recent Labs   Lab Test  11/20/17   0935   WBC  6.1   RBC  5.50   HGB  16.5   HCT  48.1   MCV  88   MCH  30.0   MCHC  34.3   RDW  13.6   PLT  271       CMP:  Recent Labs   Lab Test  11/20/17   0935   NA  140   POTASSIUM  3.8   CHLORIDE  100   AZAM  8.2*   CO2  24   BUN  13   CR  0.57*   GLC  102*   AST  75*   ALT  139*   BILITOTAL  0.5   ALBUMIN  3.7   PROTTOTAL  7.5   ALKPHOS  92             Lexus Bustillo PA-C  Internal Medicine ALLI Hospitalist   [facility]   Pager: [phone]

## 2017-11-21 NOTE — PROGRESS NOTES
After speaking with detox/3A , pt will be allowed to return to Phase II aftercare with the understanding that he will remain sober, or will be referred to a higher level of care.

## 2017-11-21 NOTE — PROGRESS NOTES
11/20/17 1854   Patient Belongings   Did you bring any home meds/supplements to the hospital?  No   Patient Belongings other (see comments)   Disposition of Belongings Other (see comment)   Belongings Search Yes   Clothing Search Yes   Second Staff Curtis RN     Locked drawer: cell phone    Med Room: pt's visitor on 11/23/17 brought in pack with belongings for discharge tomorrow. Wallet ($9 cash and charge cards) and meds from home placed in Medroom per RN.    Storage bin: shoes (w/ laces), lip balm, lighter, cigarettes, phone , metal necklace, hat, hoodie (w/ strings), jacket, tank top, $27.00  Red backpack with ecig and fluid. 2 lighters. Chew. Keys.    A               Admission:  I am responsible for any personal items that are not sent to the safe or pharmacy.  [facility] is not responsible for loss, theft or damage of any property in my possession.  Signature:  _________________________________ Date: _______  Time: _____                                              Staff Signature:  ____________________________ Date: ________  Time: _____      2nd Staff person, if patient is unable/unwilling to sign:    Signature: ________________________________ Date: ________  Time: _____   Discharge:  [facility] has returned all of my personal belongings:    Signature: _________________________________ Date: ________  Time: _____                                          Staff Signature:  ____________________________ Date: ________  Time: _____

## 2017-11-21 NOTE — ADDENDUM NOTE
Encounter addended by: Amish Murillo LADC on: 11/21/2017  3:45 PM<BR>     Actions taken: Sign clinical note

## 2017-11-21 NOTE — PROGRESS NOTES
Patient:  Nickolas Lang            Adult CD Progress Note and Treatment Plan Review     Attendance  Please refer to OP BEH CD Adult Attendance Record Documentation Flowsheet    Support group attended this week: yes    Reporting sobriety:  no    Treatment Plan     Treatment Plan Review competed on:    10/19/17    Client preferred learning style: Visual; Hands on; Verbal; Demonstration     Staff Members contributing: ARLENE Green                    Received Supervision: No    Client: contributed to goals and plan.    Client received copy of plan/revised plan: Yes    Client agrees with plan/revised plan: Yes    Changes to Treatment Plan: No    New Goals added since last review: None    Goals worked on since last review: building sober support, managing mental health, getting a job, relapse prevention, sober fun, reaching out and making sober connections, getting help when he needs it    Strategies effective: yes    Strategies need these changes: Continue working on above goals.     ASAM Risk Rating:    Dimension 1 0 No problems. Pt's JONY: 9/10/17.    Dimension 2 0 No problems.    Dimension 3 1 Pt has a diagnosis of depression and anxiety and has been following recommendations of his physician and remaining medication compliant. Pt denied any suicidal ideations this week.    Dimension 4 2 Pt continues to identify the negative consequences of his chemical use. Pt reported that he did not remain sober this week and checked himself into detox. Pt reported he is starting to regain hope for recovery.     Dimension 5 3  Pt continues to build and to utilize coping skills to combat relapse triggers and cues and avoid relapse. Pt appears to be attempting to implement his relapse prevention plan.    Dimension 6 2 Pt reported that he attended four AA meetings this week and has a sponsor whom he is in regular contact with. Pt reported that he is living at home with his mother and grandmother and that he got a job and  starts on the 30th. Pt reported he reached out and connected with sober people multiple times this week.     Any changes in Vulnerable Adult Status?  No  If yes, add to treatment plan and individual abuse prevention plan.    Family Involvement:   N/A--Pt participated in family sessions while in [facility].     Data:   Pt stated that he is feeling embarrassed, guilty, hopeful again, and glad to see everyone this evening in Phase II. Pt shared that he did not remain sober and checked into detox since last Wednesday. Pt talked about that he was baffled that he was happy in his sobriety, but he still allowed the addiction to creep in and returned to use. Pt also reported that he got a job at [employer] and starts on the 30th.     Intervention:   Staff facilitated group and pt actively participated and offered fellow peers feedback.    Assessment:   Pt appears to be upset and remorseful about relapsing, but seems to be regaining hope and motivation.     Plan:  Focus on recovery environment  Monitor emotional/physical health      ARLENE Howard

## 2017-11-21 NOTE — ADDENDUM NOTE
Encounter addended by: Amish Murillo LADC on: 11/21/2017  3:33 PM<BR>     Actions taken: Sign clinical note

## 2017-11-21 NOTE — PROGRESS NOTES
Met with Pt and updated assessment.  Pt's assessment was submitted to [facility] for funding of [facility] and [facility].  Pt signed ALFONSO and clinical was faxed to [facility].  [facility] has immediate openings and is a 120 day MICD program.

## 2017-11-22 PROCEDURE — 25000132 ZZH RX MED GY IP 250 OP 250 PS 637: Performed by: EMERGENCY MEDICINE

## 2017-11-22 PROCEDURE — 25000132 ZZH RX MED GY IP 250 OP 250 PS 637: Performed by: CLINICAL NURSE SPECIALIST

## 2017-11-22 PROCEDURE — 25000132 ZZH RX MED GY IP 250 OP 250 PS 637: Performed by: PSYCHIATRY & NEUROLOGY

## 2017-11-22 PROCEDURE — 99232 SBSQ HOSP IP/OBS MODERATE 35: CPT | Performed by: PSYCHIATRY & NEUROLOGY

## 2017-11-22 PROCEDURE — 12800012 ZZH R&B CD MH INTERMEDIATE ADULT

## 2017-11-22 PROCEDURE — 25000132 ZZH RX MED GY IP 250 OP 250 PS 637: Performed by: PHYSICIAN ASSISTANT

## 2017-11-22 RX ADMIN — ACETAMINOPHEN, ASPIRIN AND CAFFEINE 2 TABLET: 250; 250; 65 TABLET, FILM COATED ORAL at 11:04

## 2017-11-22 RX ADMIN — DIAZEPAM 10 MG: 5 TABLET ORAL at 20:17

## 2017-11-22 RX ADMIN — CLONIDINE HYDROCHLORIDE 0.1 MG: 0.1 TABLET ORAL at 21:20

## 2017-11-22 RX ADMIN — BUSPIRONE HYDROCHLORIDE 20 MG: 5 TABLET ORAL at 14:37

## 2017-11-22 RX ADMIN — SERTRALINE HYDROCHLORIDE 100 MG: 100 TABLET, FILM COATED ORAL at 08:44

## 2017-11-22 RX ADMIN — HYDROXYZINE HYDROCHLORIDE 50 MG: 25 TABLET ORAL at 22:05

## 2017-11-22 RX ADMIN — DIAZEPAM 10 MG: 5 TABLET ORAL at 11:26

## 2017-11-22 RX ADMIN — AMLODIPINE BESYLATE 10 MG: 10 TABLET ORAL at 08:43

## 2017-11-22 RX ADMIN — BUSPIRONE HYDROCHLORIDE 20 MG: 5 TABLET ORAL at 08:43

## 2017-11-22 RX ADMIN — TRAZODONE HYDROCHLORIDE 150 MG: 150 TABLET ORAL at 22:05

## 2017-11-22 RX ADMIN — GABAPENTIN 500 MG: 400 CAPSULE ORAL at 08:43

## 2017-11-22 RX ADMIN — ACETAMINOPHEN, ASPIRIN AND CAFFEINE 2 TABLET: 250; 250; 65 TABLET, FILM COATED ORAL at 17:12

## 2017-11-22 RX ADMIN — DIAZEPAM 10 MG: 5 TABLET ORAL at 08:45

## 2017-11-22 RX ADMIN — MULTIPLE VITAMINS W/ MINERALS TAB 1 TABLET: TAB at 08:43

## 2017-11-22 RX ADMIN — DIAZEPAM 10 MG: 5 TABLET ORAL at 06:14

## 2017-11-22 RX ADMIN — Medication 100 MG: at 08:43

## 2017-11-22 RX ADMIN — FOLIC ACID 1 MG: 1 TABLET ORAL at 08:44

## 2017-11-22 RX ADMIN — BUSPIRONE HYDROCHLORIDE 20 MG: 5 TABLET ORAL at 20:14

## 2017-11-22 RX ADMIN — GABAPENTIN 500 MG: 400 CAPSULE ORAL at 14:37

## 2017-11-22 RX ADMIN — GABAPENTIN 500 MG: 400 CAPSULE ORAL at 20:15

## 2017-11-22 RX ADMIN — OMEPRAZOLE 40 MG: 20 CAPSULE, DELAYED RELEASE ORAL at 08:44

## 2017-11-22 RX ADMIN — HYDROXYZINE HYDROCHLORIDE 50 MG: 25 TABLET ORAL at 11:04

## 2017-11-22 RX ADMIN — HYDROXYZINE HYDROCHLORIDE 50 MG: 25 TABLET ORAL at 15:04

## 2017-11-22 RX ADMIN — CLONIDINE HYDROCHLORIDE 0.1 MG: 0.1 TABLET ORAL at 08:43

## 2017-11-22 RX ADMIN — DIAZEPAM 10 MG: 5 TABLET ORAL at 01:55

## 2017-11-22 RX ADMIN — CLONIDINE HYDROCHLORIDE 0.1 MG: 0.1 TABLET ORAL at 20:17

## 2017-11-22 ASSESSMENT — ACTIVITIES OF DAILY LIVING (ADL)
GROOMING: INDEPENDENT
LAUNDRY: WITH SUPERVISION
ORAL_HYGIENE: INDEPENDENT
DRESS: INDEPENDENT

## 2017-11-22 NOTE — PROGRESS NOTES
[facility] declined Pt referral.  Pt signed ALFONSO and referral made to [facility].  Provided Pt with recovery housing resource list to begin search for sober housing.

## 2017-11-22 NOTE — PROGRESS NOTES
"[facility], [place]   Psychiatric Progress Note        Interim History:   The patient's care was discussed with the treatment team during the daily team meeting and/or staff's chart notes were reviewed.  Staff report patient exhibited medication seeking behavior. He is doing well and withdrawal scores subsiding, but somatic and c/o withdrawal. MSSA score was 8. Denied dep, SI and MARIE. C/o mild anxiety. Affect is bright and engaged. No overt psychosis, confusion or lanny.     The patient noted that he is feeling \"crappy\" and believes that he continues to have severe withdrawal. Slept well. Appetite is poor. No tremors noted. Noted that depression improved and denied SI and MARIE. Focused on increasing medications. But agreed to continue gabapentin at current dose due to sedation. Noted that he is feeling anxious because of perceived withdrawal and being nervious about CD treatment. He also had an argument with his mother yesterday. No hallucinations or racing thought.     Discussed medications and care plan.         Medications:       gabapentin  500 mg Oral TID     busPIRone  20 mg Oral TID     sertraline  100 mg Oral Daily     amLODIPine  10 mg Oral Daily     cloNIDine  0.1 mg Oral BID     nicotine  1 patch Transdermal Once     omeprazole  40 mg Oral QAM AC     multivitamin, therapeutic with minerals  1 tablet Oral Daily     thiamine  100 mg Oral Daily     folic acid  1 mg Oral Daily          Allergies:   No Known Allergies       Labs:   No results found for this or any previous visit (from the past 24 hour(s)).       Psychiatric Examination:     Vitals:    11/21/17 2200 11/22/17 0155 11/22/17 0615 11/22/17 0820   BP: (!) 141/105 120/76 113/83 (!) 147/103   BP Location:       Pulse: 78   65   Resp: 16 18 16 16   Temp: 98.4  F (36.9  C) 98.1  F (36.7  C) 97.4  F (36.3  C) 96.8  F (36  C)   TempSrc: Tympanic Tympanic Oral Oral   SpO2:       Weight:       Height:                              "                         Weight is 308 lbs 0 oz  Body mass index is 40.64 kg/(m^2).    Appearance: awake, alert, adequately groomed, dressed in hospital scrubs and no apparent distress  Attitude:  cooperative  Eye Contact:  good  Mood:  anxious  Affect:  intensity is normal and full range  Speech:  clear, coherent and normal prosody  Psychomotor Behavior:  no evidence of tardive dyskinesia, dystonia, or tics and intact station, gait and muscle tone  Throught Process:  linear and goal oriented  Associations:  no loose associations  Thought Content:  no evidence of suicidal ideation or homicidal ideation and no evidence of psychotic thought  Insight:  fair  Judgement:  intact  Oriented to:  time, person, and place  Attention Span and Concentration:  intact  Recent and Remote Memory:  intact         Precautions:     Behavioral Orders   Procedures     Code 1 - Restrict to Unit     Routine Programming     As clinically indicated     Status 15     Every 15 minutes.     Withdrawal precautions          Diagnoses:     1.  Alcohol use disorder, severe.     2.  Active alcohol withdrawal.   3.  Major depressive disorder, recurrent, without psychotic features (rule out substance-induced mood disorder).   4.  Historical diagnosis of attention deficit hyperactivity disorder.     5.  Anxiety, not  otherwise specified.          Plan:     Medications:  1.  Wellbutrin and Adderall were discontinued due to comorbid substance use and active alcohol withdrawal.     2.  BuSpar was restarted and gradually titrated to 20 mg t.i.d.   3.  Gabapentin was restarted and titrate to 500 mg t.i.d. The patient tells me that his home dosage was 800 mg t.i.d.   4.  Zoloft was restarted and titrated to 150 mg daily.  The patient's home dosage is 200 mg.   5.  Trazodone was restarted at 150 mg at bedtime.     6.  The patient was placed on alcohol withdrawal protocol including MSSA, Valium p.r.n., multivitamin, folic acid and thiamine.     Legal Status  and Disposition:  --  The patient was admitted to detox under voluntary status   --  The patient completed CD assessment and evaluation was submitted at [facility] for funding for [facility] and [facility].  Discharge will be granted once patient completed detoxification and establishes safety in the community.

## 2017-11-23 PROCEDURE — 25000132 ZZH RX MED GY IP 250 OP 250 PS 637: Performed by: EMERGENCY MEDICINE

## 2017-11-23 PROCEDURE — 12800012 ZZH R&B CD MH INTERMEDIATE ADULT

## 2017-11-23 PROCEDURE — 25000125 ZZHC RX 250: Performed by: PSYCHIATRY & NEUROLOGY

## 2017-11-23 PROCEDURE — 25000132 ZZH RX MED GY IP 250 OP 250 PS 637: Performed by: CLINICAL NURSE SPECIALIST

## 2017-11-23 PROCEDURE — 25000132 ZZH RX MED GY IP 250 OP 250 PS 637: Performed by: PSYCHIATRY & NEUROLOGY

## 2017-11-23 RX ADMIN — ACETAMINOPHEN, ASPIRIN AND CAFFEINE 2 TABLET: 250; 250; 65 TABLET, FILM COATED ORAL at 18:11

## 2017-11-23 RX ADMIN — TRAZODONE HYDROCHLORIDE 150 MG: 150 TABLET ORAL at 22:51

## 2017-11-23 RX ADMIN — FOLIC ACID 1 MG: 1 TABLET ORAL at 09:06

## 2017-11-23 RX ADMIN — GABAPENTIN 500 MG: 400 CAPSULE ORAL at 14:02

## 2017-11-23 RX ADMIN — CLONIDINE HYDROCHLORIDE 0.1 MG: 0.1 TABLET ORAL at 09:06

## 2017-11-23 RX ADMIN — GABAPENTIN 500 MG: 400 CAPSULE ORAL at 09:05

## 2017-11-23 RX ADMIN — MULTIPLE VITAMINS W/ MINERALS TAB 1 TABLET: TAB at 09:06

## 2017-11-23 RX ADMIN — OMEPRAZOLE 40 MG: 20 CAPSULE, DELAYED RELEASE ORAL at 09:05

## 2017-11-23 RX ADMIN — CLONIDINE HYDROCHLORIDE 0.1 MG: 0.1 TABLET ORAL at 20:16

## 2017-11-23 RX ADMIN — BUSPIRONE HYDROCHLORIDE 20 MG: 5 TABLET ORAL at 09:07

## 2017-11-23 RX ADMIN — ACETAMINOPHEN 650 MG: 325 TABLET, FILM COATED ORAL at 16:26

## 2017-11-23 RX ADMIN — HYDROXYZINE HYDROCHLORIDE 50 MG: 25 TABLET ORAL at 22:51

## 2017-11-23 RX ADMIN — HYDROXYZINE HYDROCHLORIDE 50 MG: 25 TABLET ORAL at 10:12

## 2017-11-23 RX ADMIN — ACETAMINOPHEN, ASPIRIN AND CAFFEINE 2 TABLET: 250; 250; 65 TABLET, FILM COATED ORAL at 09:53

## 2017-11-23 RX ADMIN — BUSPIRONE HYDROCHLORIDE 20 MG: 5 TABLET ORAL at 20:15

## 2017-11-23 RX ADMIN — Medication 100 MG: at 09:06

## 2017-11-23 RX ADMIN — BUSPIRONE HYDROCHLORIDE 20 MG: 5 TABLET ORAL at 14:03

## 2017-11-23 RX ADMIN — ONDANSETRON 4 MG: 4 TABLET, ORALLY DISINTEGRATING ORAL at 18:12

## 2017-11-23 RX ADMIN — GABAPENTIN 500 MG: 400 CAPSULE ORAL at 20:17

## 2017-11-23 RX ADMIN — HYDROXYZINE HYDROCHLORIDE 50 MG: 25 TABLET ORAL at 16:26

## 2017-11-23 RX ADMIN — AMLODIPINE BESYLATE 10 MG: 10 TABLET ORAL at 09:07

## 2017-11-23 RX ADMIN — SERTRALINE HYDROCHLORIDE 150 MG: 50 TABLET ORAL at 09:05

## 2017-11-23 ASSESSMENT — ACTIVITIES OF DAILY LIVING (ADL)
ORAL_HYGIENE: INDEPENDENT
LAUNDRY: UNABLE TO COMPLETE
GROOMING: INDEPENDENT
DRESS: INDEPENDENT

## 2017-11-23 NOTE — DISCHARGE INSTRUCTIONS
Behavioral Discharge Planning and Instructions  THANK YOU FOR CHOOSING THE [address]  [phone]    Summary: You were admitted to [facility] on 11/20/17 for detoxification from Alcohol.  A medical exam was performed that included lab work. You have met with a  and opted to enter a sober house and pursue IOP at [facility].  Your funding is through [facility].  Sangita can be reached at [phone].  Please take care and make your recovery a priority!    [facility]  [address]  [address] [place] [address]  Phone: [phone]  Fax: [phone]    Critical access hospital I  [address] [place] [address]  Phone: [phone]  Fax: [phone]      Main Diagnosis:  Per  Dr. Brasher/Kristen     Alcohol use disorder, severe.     Active alcohol withdrawal.   Major depressive disorder, recurrent, without psychotic features (rule out substance-induced mood disorder).     Major Treatments, Procedures and Findings:  You were detoxed from alcohol. You have met with a  to develop a treatment plan for discharge.  You have had labs drawn and a copy of those labs will be sent home with you. Your alcohol withdrawal is complete and you are being discharged today.  Please bring your lab results with to your follow up doctor appointment.  Make your recovery a priority!                        Symptoms to Report:  If you experience more anxiety, confusion, sleeplessness, deep sadness or thoughts of suicide, notify your treatment team or notify your primary care physician. IF ANY OF THE SYMPTOMS YOU ARE EXPERIENCING ARE A MEDICAL EMERGENCY CALL 911 IMMEDIATELY.     Lifestyle Adjustment: Adjust your lifestyle to get enough sleep, relaxation, exercise and  good nutrition. Continue to develop healthy coping skills to decrease stress and promote a sober living environment. Do not use alcohol, illegal drugs or addictive medications other than what is currently  prescribed. AA, NA, and  Sponsor are excellent resources for support.     Primary Provider:  Nicolette Frye RN, FNP, NP-C  [facility]  Phone: [phone] ([phone])    Appointment: 12/10/17 at 2:00pm    Psychiatric appointment:  Dr. Lovell   [facility]    Address: [address] [place] [address]  phone: [phone]    Appointment:  12/5/17 st 1:30pm    Resources:     [facility] [phone]    Support Group:  AA/NA and Sponsor/support    Crisis Intervention: 832.453.6498 or 487-192-0819 (TTY: 538.696.2786).  Call anytime for help.  National [place] on Mental Illness (www.[place].chaya.org): 758.234.6311 or 280-935-8212.  Alcoholics Anonymous (www.alcoholics-anonymous.org): Check your phone book for your local chapter.  Suicide Awareness Voices of Education (SAVE) (www.save.org): 264-045-RLJI (6683)  National Suicide Prevention Line (www.mentalhealthmn.org): 427-844-YDQT (9440)  Mental Health Consumer/Survivor Network of [place] (www.mhcsn.net): 235.704.7774 or 343-254-6015  Mental Health Association of [place] (www.mentalhealth.org): 624.170.5339 or 309-468-0441   Substance Abuse and Mental Health Services (www.samhsa.gov)    [facility] ([facility])  [facility] connects people seeking recovery to resources that help foster and sustain long-term recovery.  Whether you are seeking resources for treatment, transportation, housing, job training, education, health care or other pathways to recovery, [facility] is a great place to start.  724.587.1661.  [facility].org    General Medication Instructions:   See your medication sheet(s) for instructions.   Take all medicines as directed.  Make no changes unless your doctor suggests them.   Go to all your doctor visits.  Be sure to have all your required lab tests. This way, your medicines can be refilled on time.  Do not use any drugs not prescribed by your provider.  AA/NA and Sponsors are  excellent resources for support  Avoid alcohol.    Please Note:  If you have any questions at anytime after you are discharged please call the [facility] [place] detox unit 3AW unit at [phone].    [facility], Behavioral Intake [phone]    Please take this discharge folder with you to all your follow up appointments, it contains your lab results, diagnosis, medication list and discharge recommendations.      THANK YOU FOR CHOOSING THE [facility]

## 2017-11-23 NOTE — PROGRESS NOTES
Pt has secured a sober house and would like to discharge when OOD.  Referral to Dominique made and Pt can follow up from his sober house.

## 2017-11-23 NOTE — PLAN OF CARE
Problem: Substance Withdrawal  Goal: Substance Withdrawal  Signs and symptoms of listed problems will be absent or manageable.   Outcome: Improving  Patient remains under monitoring for withdrawal from alcohol. MSSA scores today were 6 and 6. BP remains elevated (see flow sheets), pulse is stable. Patient requested and received PRN excedrin for a headache this morning at 0950 which patient reported was not helpful, as well as vistaril for anxiety at 1015 which patient reports was not helpful. Patient states he is very anxious and this is his greatest concern. Writer and patient discussed patient's withdrawal status, that his scores are too low for diazepam despite his anxiety, and patient expressed understanding. Patient is overall pleasant and cooperative. Denies SI, hallucinations. No other concerns at this time, will continue to monitor.

## 2017-11-24 VITALS
DIASTOLIC BLOOD PRESSURE: 91 MMHG | TEMPERATURE: 96.9 F | SYSTOLIC BLOOD PRESSURE: 133 MMHG | BODY MASS INDEX: 40.82 KG/M2 | HEART RATE: 90 BPM | RESPIRATION RATE: 16 BRPM | HEIGHT: 73 IN | WEIGHT: 308 LBS | OXYGEN SATURATION: 95 %

## 2017-11-24 PROCEDURE — 25000132 ZZH RX MED GY IP 250 OP 250 PS 637: Performed by: EMERGENCY MEDICINE

## 2017-11-24 PROCEDURE — 99238 HOSP IP/OBS DSCHRG MGMT 30/<: CPT | Performed by: PSYCHIATRY & NEUROLOGY

## 2017-11-24 PROCEDURE — 25000132 ZZH RX MED GY IP 250 OP 250 PS 637: Performed by: PSYCHIATRY & NEUROLOGY

## 2017-11-24 PROCEDURE — 25000132 ZZH RX MED GY IP 250 OP 250 PS 637: Performed by: CLINICAL NURSE SPECIALIST

## 2017-11-24 RX ORDER — BUSPIRONE HYDROCHLORIDE 10 MG/1
20 TABLET ORAL 3 TIMES DAILY
Qty: 180 TABLET | Refills: 0 | Status: SHIPPED | OUTPATIENT
Start: 2017-11-24 | End: 2017-12-24

## 2017-11-24 RX ORDER — OMEPRAZOLE 40 MG/1
40 CAPSULE, DELAYED RELEASE ORAL
Qty: 30 CAPSULE | Refills: 0 | Status: SHIPPED | OUTPATIENT
Start: 2017-11-25 | End: 2017-12-25

## 2017-11-24 RX ORDER — FOLIC ACID 1 MG/1
1 TABLET ORAL DAILY
Qty: 30 TABLET | Refills: 0 | Status: SHIPPED | OUTPATIENT
Start: 2017-11-25 | End: 2017-12-25

## 2017-11-24 RX ORDER — TRAZODONE HYDROCHLORIDE 100 MG/1
200 TABLET ORAL
Status: DISCONTINUED | OUTPATIENT
Start: 2017-11-24 | End: 2017-11-24 | Stop reason: HOSPADM

## 2017-11-24 RX ORDER — CLONIDINE HYDROCHLORIDE 0.1 MG/1
0.1 TABLET ORAL 2 TIMES DAILY
Qty: 60 TABLET | Refills: 0 | Status: SHIPPED | OUTPATIENT
Start: 2017-11-24 | End: 2018-01-22

## 2017-11-24 RX ORDER — AMLODIPINE BESYLATE 10 MG/1
10 TABLET ORAL DAILY
Qty: 30 TABLET | Refills: 0 | Status: SHIPPED | OUTPATIENT
Start: 2017-11-25 | End: 2018-01-22

## 2017-11-24 RX ORDER — TRAZODONE HYDROCHLORIDE 100 MG/1
200 TABLET ORAL
Qty: 30 TABLET | Refills: 0 | Status: SHIPPED | OUTPATIENT
Start: 2017-11-24 | End: 2018-03-26

## 2017-11-24 RX ORDER — GABAPENTIN 800 MG/1
800 TABLET ORAL 3 TIMES DAILY
Qty: 90 TABLET | Refills: 0 | Status: SHIPPED | OUTPATIENT
Start: 2017-11-24 | End: 2018-03-26

## 2017-11-24 RX ORDER — MULTIPLE VITAMINS W/ MINERALS TAB 9MG-400MCG
1 TAB ORAL DAILY
Qty: 30 EACH | Refills: 0 | Status: ON HOLD | OUTPATIENT
Start: 2017-11-25 | End: 2018-07-03

## 2017-11-24 RX ORDER — SERTRALINE HYDROCHLORIDE 100 MG/1
200 TABLET, FILM COATED ORAL DAILY
Status: DISCONTINUED | OUTPATIENT
Start: 2017-11-25 | End: 2017-11-24 | Stop reason: HOSPADM

## 2017-11-24 RX ORDER — SERTRALINE HYDROCHLORIDE 100 MG/1
200 TABLET, FILM COATED ORAL DAILY
Qty: 60 TABLET | Refills: 0 | Status: SHIPPED | OUTPATIENT
Start: 2017-11-25 | End: 2018-03-26

## 2017-11-24 RX ORDER — GABAPENTIN 800 MG/1
800 TABLET ORAL 3 TIMES DAILY
Status: DISCONTINUED | OUTPATIENT
Start: 2017-11-24 | End: 2017-11-24 | Stop reason: HOSPADM

## 2017-11-24 RX ADMIN — ACETAMINOPHEN, ASPIRIN AND CAFFEINE 2 TABLET: 250; 250; 65 TABLET, FILM COATED ORAL at 08:38

## 2017-11-24 RX ADMIN — MULTIPLE VITAMINS W/ MINERALS TAB 1 TABLET: TAB at 08:39

## 2017-11-24 RX ADMIN — GABAPENTIN 500 MG: 400 CAPSULE ORAL at 08:38

## 2017-11-24 RX ADMIN — AMLODIPINE BESYLATE 10 MG: 10 TABLET ORAL at 08:38

## 2017-11-24 RX ADMIN — OMEPRAZOLE 40 MG: 20 CAPSULE, DELAYED RELEASE ORAL at 08:38

## 2017-11-24 RX ADMIN — SERTRALINE HYDROCHLORIDE 150 MG: 50 TABLET ORAL at 08:38

## 2017-11-24 RX ADMIN — HYDROXYZINE HYDROCHLORIDE 50 MG: 25 TABLET ORAL at 08:38

## 2017-11-24 RX ADMIN — BUSPIRONE HYDROCHLORIDE 20 MG: 5 TABLET ORAL at 08:37

## 2017-11-24 RX ADMIN — CLONIDINE HYDROCHLORIDE 0.1 MG: 0.1 TABLET ORAL at 08:38

## 2017-11-24 RX ADMIN — FOLIC ACID 1 MG: 1 TABLET ORAL at 08:38

## 2017-11-24 ASSESSMENT — ACTIVITIES OF DAILY LIVING (ADL)
ORAL_HYGIENE: INDEPENDENT
LAUNDRY: WITH SUPERVISION
DRESS: INDEPENDENT
GROOMING: INDEPENDENT

## 2017-11-24 NOTE — PROGRESS NOTES
Discharge instructions were given and were explained to the patient who stated that he understood them.  He states that he has others he  can talk to.  He denies that he has any thoughts of self harm.  He was picked up by his grandmother at 1310.  He was sent home with multiple medications.

## 2017-11-24 NOTE — PROGRESS NOTES
As of 2000 pt has not received Valium for ETOH withdrawal in the past 24 hour. Pt is be taken out of ETOH detox.

## 2017-11-24 NOTE — DISCHARGE SUMMARY
Psychiatric Discharge Summary    Nickolas Lang MRN# [id number]   Age: 27 year old YOB: 1990     Date of Admission:  11/20/2017  Date of Discharge:  11/24/2017  Admitting Physician:  Lachelle Peterson MD  Discharge Physician:  Lachelle Peterson MD         Event Leading to Hospitalization:     Nickolas Lang is a 27-year-old  male with longstanding history of alcoholism who presented to the Emergency Department seeking admission for alcohol detoxification.  Alcohol level at the Emergency Department was 0.27.  He was also at the Emergency Department on 11/16 with similar presentation but opted to return home.  He lives with his mother.  He is unemployed.  He tells me that he lost his job because of drinking.  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  He is on probation for DUI  from 03/25/2017 and has a .       The patient tells me that he smokes about half a pack of tobacco per day.  He seldomly uses cannabis.  He test tells me that he has used only few times but mainly when he is intoxicated.  His drug of choice is alcohol.  He started drinking when he was 16 years old.  His longest sobriety from alcohol has been 90 days while attending a chemical dependency program.  He has been to CD treatment 8 times, most recently was at [facility] in August.  He was attending Phase II prior to relapsing.  He was in detox about a month ago.  He has been drinking about 1.75 liters of vodka daily.  He has had 2 severe withdrawals with possible DTs, but no seizures in the past.  He acknowledged progressive use of despite negative consequences, loss of control.  He is interested in referral to residential CD treatment.  He has been on Antabuse, Vivitrol, and Campral for cravings.  He tells me that he often stops taking the Antabuse.  Vivitrol was helpful in the past, last use was about a month ago and he did not find Campral helpful.  He denies other illicit drug use such as cocaine,  heroin and methamphetamine.  He initially denied prescription or over-the-counter medication misuse,  but later acknowledged that he has used benzodiazepines 5-6 times in the past.  He tells me that he was given those medications by his grandmother who is prescribed benzodiazepines.  He last took it a few days ago.  He adamantly denies persistent or heavy use.       The patient tells me that he carries a diagnosis of depression, anxiety and ADHD.  He sees Adilson Jewell for medication management and Maddie Del Rio for therapy at [facility].  He was off his medication for less than 1 week and he restarted taking them about 2 days ago.  He is on Wellbutrin, gabapentin, trazodone, Zoloft, BuSpar and Adderall for ADHD.  He alleges that he last took Adderall 2 days ago.  Urine drug screen was negative for amphetamine.  He admitted that he has taken some benzodiazepine from his grandmother 1-2 days ago.  He was hospitalized in a mental health unit once.  He tells me that he had a suicide gesture via attempting to overdose on medications while intoxicated.  In the past, no history of self-harming behavior.       The patient states that he has been stable on his current medication and reported no recent bouts of depression; however, his sleep and appetite have been poor.  He tells me that he has not been eating and mainly drinks alcohol.  He has noticed some minimal weight loss.  Adamantly denies changes in energy, motivation, concentration and focus.  Denies hopelessness, helplessness, thought of suicide, homicide or self-harm.  He reported no history of lanny or psychosis.  He endorsed increased anxiety, acknowledged poor coping skills and inability to tolerate stress.  No prior history related to PTSD, eating disorder or OCD.       The patient tells me that he had shoulder injury playing football.  He underwent shoulder surgery, deviated septum repair and pyloric stenosis repair.  He also has history of  hypertension, obesity, hepatic steatosis.  He denied medical allergies.  The patient tells me that his father and grandfather both were alcoholic.  He also stated that depression and anxiety runs in the family.       The patient grew up in [place] and was raised mainly by his mother.  He denied any siblings.  He graduated high school on time and has 4 years of college, majoring in finance.  He denies childhood abuse and neglect.  He has never  and has no kids.  He currently resides with his mother, lost his job 2 weeks ago due to drinking.  He is currently on probation for DUI.     See Admission note by Lachelle Peterson MD on 11/21/2017 for additional details.          Diagnoses:     1.  Alcohol use disorder, severe.     2.  Active alcohol withdrawal resolved.   3.  Major depressive disorder, recurrent, without psychotic features (rule out substance-induced mood disorder).   4.  Historical diagnosis of attention deficit hyperactivity disorder.     5.  Anxiety, not  otherwise specified.          Labs:     Recent Results (from the past 168 hour(s))   Alcohol breath test POCT    Collection Time: 11/20/17  9:03 AM   Result Value Ref Range    Alcohol Breath Test 0.220 (A) 0.00 - 0.01   EKG 12-lead, tracing only    Collection Time: 11/20/17  9:25 AM   Result Value Ref Range    Interpretation ECG Click View Image link to view waveform and result    CBC with platelets differential    Collection Time: 11/20/17  9:35 AM   Result Value Ref Range    WBC 6.1 4.0 - 11.0 10e9/L    RBC Count 5.50 4.4 - 5.9 10e12/L    Hemoglobin 16.5 13.3 - 17.7 g/dL    Hematocrit 48.1 40.0 - 53.0 %    MCV 88 78 - 100 fl    MCH 30.0 26.5 - 33.0 pg    MCHC 34.3 31.5 - 36.5 g/dL    RDW 13.6 10.0 - 15.0 %    Platelet Count 271 150 - 450 10e9/L    Diff Method Automated Method     % Neutrophils 41.4 %    % Lymphocytes 48.3 %    % Monocytes 8.5 %    % Eosinophils 0.8 %    % Basophils 0.8 %    % Immature Granulocytes 0.2 %    Nucleated RBCs 0 0 /100     Absolute Neutrophil 2.5 1.6 - 8.3 10e9/L    Absolute Lymphocytes 3.0 0.8 - 5.3 10e9/L    Absolute Monocytes 0.5 0.0 - 1.3 10e9/L    Absolute Eosinophils 0.1 0.0 - 0.7 10e9/L    Absolute Basophils 0.1 0.0 - 0.2 10e9/L    Abs Immature Granulocytes 0.0 0 - 0.4 10e9/L    Absolute Nucleated RBC 0.0    Troponin I    Collection Time: 11/20/17  9:35 AM   Result Value Ref Range    Troponin I ES <0.015 0.000 - 0.045 ug/L   Comprehensive metabolic panel    Collection Time: 11/20/17  9:35 AM   Result Value Ref Range    Sodium 140 133 - 144 mmol/L    Potassium 3.8 3.4 - 5.3 mmol/L    Chloride 100 94 - 109 mmol/L    Carbon Dioxide 24 20 - 32 mmol/L    Anion Gap 16 (H) 3 - 14 mmol/L    Glucose 102 (H) 70 - 99 mg/dL    Urea Nitrogen 13 7 - 30 mg/dL    Creatinine 0.57 (L) 0.66 - 1.25 mg/dL    GFR Estimate >90 >60 mL/min/1.7m2    GFR Estimate If Black >90 >60 mL/min/1.7m2    Calcium 8.2 (L) 8.5 - 10.1 mg/dL    Bilirubin Total 0.5 0.2 - 1.3 mg/dL    Albumin 3.7 3.4 - 5.0 g/dL    Protein Total 7.5 6.8 - 8.8 g/dL    Alkaline Phosphatase 92 40 - 150 U/L     (H) 0 - 70 U/L    AST 75 (H) 0 - 45 U/L   INR    Collection Time: 11/20/17  9:35 AM   Result Value Ref Range    INR 0.99 0.86 - 1.14   D dimer quantitative    Collection Time: 11/20/17  9:35 AM   Result Value Ref Range    D Dimer 0.3 0.0 - 0.50 ug/ml FEU   Nt probnp inpatient (BNP)    Collection Time: 11/20/17  9:35 AM   Result Value Ref Range    N-Terminal Pro BNP Inpatient <5 0 - 450 pg/mL   Alcohol level blood    Collection Time: 11/20/17  9:35 AM   Result Value Ref Range    Ethanol g/dL 0.27 (H) <0.01 g/dL   Troponin POCT    Collection Time: 11/20/17  9:35 AM   Result Value Ref Range    Troponin I 0.00 0.00 - 0.10 ug/L   Drug abuse screen 6 urine (tox)    Collection Time: 11/20/17 11:10 AM   Result Value Ref Range    Amphetamine Qual Urine Negative NEG^Negative    Barbiturates Qual Urine Negative NEG^Negative    Benzodiazepine Qual Urine Positive (A) NEG^Negative     Cannabinoids Qual Urine Positive (A) NEG^Negative    Cocaine Qual Urine Negative NEG^Negative    Ethanol Qual Urine Positive (A) NEG^Negative    Opiates Qualitative Urine Negative NEG^Negative   UA reflex to Microscopic and Culture    Collection Time: 11/20/17 11:10 AM   Result Value Ref Range    Color Urine Yellow     Appearance Urine Clear     Glucose Urine Negative NEG^Negative mg/dL    Bilirubin Urine Negative NEG^Negative    Ketones Urine 5 (A) NEG^Negative mg/dL    Specific Gravity Urine 1.027 1.003 - 1.035    Blood Urine Small (A) NEG^Negative    pH Urine 6.0 5.0 - 7.0 pH    Protein Albumin Urine 300 (A) NEG^Negative mg/dL    Urobilinogen mg/dL Normal 0.0 - 2.0 mg/dL    Nitrite Urine Negative NEG^Negative    Leukocyte Esterase Urine Negative NEG^Negative    Source Midstream Urine     RBC Urine 1 0 - 2 /HPF    WBC Urine 1 0 - 2 /HPF    Squamous Epithelial /HPF Urine <1 0 - 1 /HPF    Mucous Urine Present (A) NEG^Negative /LPF    Hyaline Casts 13 (H) 0 - 2 /LPF    Granular Casts 19 (A) NEG^Negative /LPF    Cellular Cast 1 (A) NEG^Negative /LPF    Calcium Oxalate Few (A) NEG^Negative /HPF   Troponin I    Collection Time: 11/20/17 12:11 PM   Result Value Ref Range    Troponin I ES <0.015 0.000 - 0.045 ug/L   GGT    Collection Time: 11/20/17 12:11 PM   Result Value Ref Range     (H) 0 - 75 U/L          Consults:   Consultation during this admission received from internal medicine    # Alcohol abuse, acute withdrawal. Management per primary team, psychiatry. Labs significant for isolated ALT, AST elevation with elevated GGT likely 2/2 acute alcohol insult.   - Agree with MSSA using valium  - Agree with folic acid/thiamine supplementation     HTN. Managed on amlodipine and clonidine PTA. Currently elevated, likely 2/2 acute withdrawal.   - Continue home meds  - Clonidine prn for SBP >170, DBP >100  - Notify IM if SBP consistently >150 despite resolution of withdrawal     GERD. Continue omeprazole     Atypical  chest pain. Complained of chest tightness in ED. EKG unremarkable, troponins negative x 2, chest tightness likely secondary to hypertension and alcohol withdrawal. Improves with valium given for withdrawal.   - Notify IM if pain changes in character, intensifies or is associated with VS changes          Hospital Course:   Nickolas Lang was admitted to [facility] with attending Lachelle Peterson MD as a voluntary patient. The patient was placed under status 15 (15 minute checks) to ensure patient safety.     The following medication changes took place:   1.  Wellbutrin and Adderall were discontinued due to comorbid substance use and active alcohol withdrawal. May consider restarting Wellbutrin if clinically indicated and able to maintain sobriety in 7-10 days.   2.  BuSpar was restarted and gradually titrated to 20 mg t.i.d.   3.  Gabapentin was restarted and gradually titrate to home dosage of 800 mg t.i.d.    4.  Zoloft was restarted and gradually titrate to home dosage 200 mg daily.    5.  Trazodone PRN was restarted and gradually titrate to home dosage 200 mg daily.    6.  The patient was placed on alcohol withdrawal protocol including MSSA, Valium p.r.n., multivitamin, folic acid and thiamine.     The patient exhibited medications seeking behavior and inconsistent with alleged symptoms but was compliant with care and medications.  MSSA protocol was initiated due to the patient's history of alcohol abuse and concern for withdrawal symptoms. The patient completed detox uneventfully. CBC, BMP and utox obtained. Patient  did not require seclusion or administration of emergency medications to manage behavior. at one point he demanded discharge but was receptive when advised to extend hospital stay due to high risk for relapse. He was declined by [facility] program and was referred to [facility] outpatient CD program.  The patient tolerated medications well. Reported mood symptoms resolved. The patient was  active on the unit. The patient was social, engaged and attended groups. No overt lanny, confusion or psychosis noted. The patient maintained denial of SI, HI and MARIE. The patient slept well. Appetite was intact. The patient was compliant with medications and care.     Nickolas Lang was released to [facility]. At the time of this encounter, Nickolas Lang was determined to not be a danger to himself or others and symptoms did not meet criteria for involuntary hospitalization.  The patient denied depression, anxiety, racing thoughts and irritability. The patient denied hallucinations and paranoia. The patient was future oriented and denied SI, MARIE and HI. The patient noted tolerating medications well.    Safety plan, post discharge recommendations and relapse prevention were discussed with the patient. The patient agreed to call 911 or present to ED if symptoms worsen or developed thoughts of suicide, self harm or homicide.  The patient agreed to continue medications and outpatient care.         Discharge Medications:     Current Discharge Medication List      START taking these medications    Details   folic acid (FOLVITE) 1 MG tablet Take 1 tablet (1 mg) by mouth daily  Qty: 30 tablet, Refills: 0         CONTINUE these medications which have CHANGED    Details   busPIRone (BUSPAR) 10 MG tablet Take 2 tablets (20 mg) by mouth 3 times daily  Qty: 180 tablet, Refills: 0      gabapentin (NEURONTIN) 800 MG tablet Take 1 tablet (800 mg) by mouth 3 times daily  Qty: 90 tablet, Refills: 0      sertraline (ZOLOFT) 100 MG tablet Take 2 tablets (200 mg) by mouth daily  Qty: 60 tablet, Refills: 0      amLODIPine (NORVASC) 10 MG tablet Take 1 tablet (10 mg) by mouth daily  Qty: 30 tablet, Refills: 0      multivitamin, therapeutic with minerals (THERA-VIT-M) TABS tablet Take 1 tablet by mouth daily  Qty: 30 each, Refills: 0      omeprazole (PRILOSEC) 40 MG capsule Take 1 capsule (40 mg) by mouth every morning  (before breakfast)  Qty: 30 capsule, Refills: 0      traZODone (DESYREL) 100 MG tablet Take 2 tablets (200 mg) by mouth nightly as needed for sleep  Qty: 30 tablet, Refills: 0      cloNIDine (CATAPRES) 0.1 MG tablet Take 1 tablet (0.1 mg) by mouth 2 times daily  Qty: 60 tablet, Refills: 0         CONTINUE these medications which have NOT CHANGED    Details   disulfiram (ANTABUSE) 250 MG tablet Take 1 tablet (250 mg) by mouth daily  Qty: 30 tablet, Refills: 1    Associated Diagnoses: Alcohol dependence with uncomplicated withdrawal (H)      aspirin-acetaminophen-caffeine (EXCEDRIN MIGRAINE) 250-250-65 MG per tablet Take 2 tablets by mouth every 6 hours as needed for headaches      methocarbamol (ROBAXIN) 500 MG tablet Take 2 tablets (1,000 mg) by mouth 3 times daily as needed for muscle spasms  Qty: 30 tablet, Refills: 1    Associated Diagnoses: Chronic low back pain, unspecified back pain laterality, with sciatica presence unspecified      hydrOXYzine (ATARAX) 25 MG tablet Take 2 tablets (50 mg) by mouth every 6 hours as needed for anxiety  Qty: 120 tablet, Refills: 1    Associated Diagnoses: Alcohol dependence with uncomplicated intoxication (H); Anxiety         STOP taking these medications       buPROPion (WELLBUTRIN XL) 150 MG 24 hr tablet Comments:   Reason for Stopping:         nicotine (NICODERM CQ) 21 MG/24HR 24 hr patch Comments:   Reason for Stopping:         amphetamine-dextroamphetamine (ADDERALL XR) 30 MG per 24 hr capsule Comments:   Reason for Stopping:                    Psychiatric and Physical Examinations:   Appearance:  awake, alert, adequately groomed, dressed in hospital scrubs and no apparent distress  Attitude:  cooperative  Eye Contact:  good  Mood:  better  Affect:  appropriate and in normal range and mood congruent  Speech:  clear, coherent and normal prosody  Psychomotor Behavior:  no evidence of tardive dyskinesia, dystonia, or tics and intact station, gait and muscle tone  Thought  Process:  linear and goal oriented  Associations:  no loose associations  Thought Content:  no evidence of suicidal ideation or homicidal ideation and no evidence of psychotic thought  Insight:  fair  Judgment:  intact  Oriented to:  time, person, and place  Attention Span and Concentration:  intact  Recent and Remote Memory:  intact  Language and Fund of Knowledge: appropriate  Muscle Strength and Tone: normal  Gait and Station: Normal  Vitals:    11/23/17 1615 11/23/17 2009 11/23/17 2100 11/24/17 0818   BP: 127/90 (!) 144/101 (!) 143/97 (!) 132/99   BP Location:       Pulse: 79 78  72   Resp: 16 16  16   Temp: 97.7  F (36.5  C) 98.3  F (36.8  C)  96.9  F (36.1  C)   TempSrc: Tympanic Tympanic  Oral   SpO2:       Weight:       Height:              Discharge Plan:     Follow up Appointment:  [facility]  [address]  [address] [place] [address]  Phone: [phone]  Fax: [phone]     [facility]  [address] [place] [address]  Phone: [phone]  Fax: [phone]    Primary Provider:  Nicolette Frye RN, FNP, NP-C  [facility]  Phone: [phone])     Appointment: 12/10/17 at 2:00pm     Psychiatric appointment:  Dr. Lovell   [facility]    Address: [address] [place] [address]  phone: [phone]     Appointment:  12/5/17 st 1:30pm     Resources:      [facility] [phone]     Support Group:  AA/NA and Sponsor/support     Crisis Intervention: 517.374.3448 or 784-924-1860 (TTY: 871.327.7009).  Call anytime for help.  National Drifting on Mental Illness (www.mn.chaya.org): 668.513.3761 or 580-263-0897.  Alcoholics Anonymous (www.alcoholics-anonymous.org): Check your phone book for your local chapter.  Suicide Awareness Voices of Education (SAVE) (www.save.org): 540-486-NRHJ (7283)  National Suicide Prevention Line (www.mentalhealthmn.org): 158-294-BMJE (4606)  Mental Health  Consumer/Survivor Network of [place] (www.mhcsn.net): 019-695-4488 or 763-108-2457  Mental Health Association of [place] (www.mentalhealth.org): 418.462.8865 or 790-537-0581   Substance Abuse and Mental Health Services (www.sama.gov)     [place] Recovery Connection ([facility])  [facility] connects people seeking recovery to resources that help foster and sustain long-term recovery.  Whether you are seeking resources for treatment, transportation, housing, job training, education, health care or other pathways to recovery, [facility] is a great place to start.  694.387.3760.  Www.[facility].org    Attestation:  The patient has been seen and evaluated by me,  Lachelle Peterson MD

## 2017-11-27 NOTE — PROGRESS NOTES
Pt called and requested update of assessment for IOP with sober housing.  Updated and faxed to Dominique at Pt's request.

## 2017-12-16 DIAGNOSIS — F41.9 ANXIETY: Chronic | ICD-10-CM

## 2017-12-16 DIAGNOSIS — F10.20 ALCOHOL USE DISORDER, SEVERE, DEPENDENCE (H): Chronic | ICD-10-CM

## 2017-12-19 RX ORDER — GABAPENTIN 800 MG/1
TABLET ORAL
Qty: 120 TABLET | Refills: 1 | OUTPATIENT
Start: 2017-12-19

## 2017-12-19 NOTE — TELEPHONE ENCOUNTER
He sees Adilson Jewell for medication management and Maddie Del Rio for therapy at [facility].  He was off his medication for less than 1 week and he restarted taking them about 2 days ago.  He is on Wellbutrin, gabapentin, trazodone, Zoloft, BuSpar and Adderall for ADHD.     Is he getting medication from the above provider?   This was in recent ER notes     Also not due til 12/22 earliest

## 2017-12-19 NOTE — TELEPHONE ENCOUNTER
Patient states tried to refill Gabapentin by phone jerri. Patient states doesn't go to see Dr Jewell until next week. Medications are managed by Dr. Jewell. Patient states Dr Jewell was out of town but will contact Dr. Jewell's office for refill.

## 2018-01-11 DIAGNOSIS — F41.9 ANXIETY: Chronic | ICD-10-CM

## 2018-01-11 DIAGNOSIS — F33.1 MAJOR DEPRESSIVE DISORDER, RECURRENT EPISODE, MODERATE (H): ICD-10-CM

## 2018-01-11 DIAGNOSIS — I10 ESSENTIAL HYPERTENSION WITH GOAL BLOOD PRESSURE LESS THAN 140/90: ICD-10-CM

## 2018-01-11 NOTE — TELEPHONE ENCOUNTER
Received faxed refill request from pharmacy for Busbar, Clonidine, Amlodipine, Abilify.   Clonidine last written 11/24/17 #60 with 0 refills.   Buspar last written 11/20/17 #180 with 1 refill.  Provider approval required.

## 2018-01-12 NOTE — TELEPHONE ENCOUNTER
Again, I have not seen him for 6 months and he has been hospitalized and his medication list post hospital stays or ER visits x 4  have changed. I am not filling these medications  By our records he is not taking abilify or buspar  He was to be seeing psych for his psych medication   Can we call him and see what he is doing, and for any ongoing medication he needs to be seen by someone in this clinic. There are no appointment scheduled     If he needs amlodipine or clonidine for blood pressure these can be refilled if he makes an appointment  The other medication should be filled by psych

## 2018-01-12 NOTE — TELEPHONE ENCOUNTER
He sees Adilson Jewell for medication management and Maddie Del Rio for therapy at [facility].  He was off his medication for less than 1 week and he restarted taking them about 2 days ago.  He is on Wellbutrin, gabapentin, trazodone, Zoloft, BuSpar and Adderall for ADHD.   This was on previous ER Notes   Last ER notes do not say he is on these medications, and I have not seen him  since 9/2017  He needs to get filled with psych provider

## 2018-01-19 RX ORDER — CLONIDINE HYDROCHLORIDE 0.1 MG/1
TABLET ORAL
Qty: 60 TABLET | Refills: 3 | OUTPATIENT
Start: 2018-01-19

## 2018-01-19 RX ORDER — AMLODIPINE BESYLATE 10 MG/1
TABLET ORAL
Qty: 30 TABLET | Refills: 3 | OUTPATIENT
Start: 2018-01-19

## 2018-01-19 RX ORDER — ARIPIPRAZOLE 2 MG/1
TABLET ORAL
Qty: 60 TABLET | Refills: 1 | OUTPATIENT
Start: 2018-01-19

## 2018-01-19 RX ORDER — BUSPIRONE HYDROCHLORIDE 10 MG/1
TABLET ORAL
Qty: 180 TABLET | Refills: 1 | OUTPATIENT
Start: 2018-01-19

## 2018-01-19 NOTE — TELEPHONE ENCOUNTER
Attempted to contact pt at only number listed. No longer a valid number. Attempted to contact mother at only number listed for her. No longer a valid number. No other numbers in chart. Call to pharmacy. They have same number on file for pt. States pt was just in this am requesting refills. Explained details of provider message below to give info to pt and request he call for apt. Will close pending apt made by pt.

## 2018-01-22 RX ORDER — AMLODIPINE BESYLATE 10 MG/1
10 TABLET ORAL DAILY
Qty: 30 TABLET | Refills: 0 | Status: SHIPPED | OUTPATIENT
Start: 2018-01-22 | End: 2018-02-27

## 2018-01-22 RX ORDER — CLONIDINE HYDROCHLORIDE 0.1 MG/1
0.1 TABLET ORAL 2 TIMES DAILY
Qty: 60 TABLET | Refills: 0 | Status: SHIPPED | OUTPATIENT
Start: 2018-01-22 | End: 2018-02-27

## 2018-01-22 NOTE — TELEPHONE ENCOUNTER
Please call him and let him know blood pressure meds filled for 30 days with no refills to give him time to see Nicolette.

## 2018-01-22 NOTE — TELEPHONE ENCOUNTER
Patient states his BP meds have not changed during hospital visits.  He is still taking Amlodipine 10 mg qam and Clonidine 0.1 mg bid .  He has been out of BP meds x5 days.  He sees his psychiatrist on a regular basis for refill of psych meds and is currently in day treatment program.    He has scheduled an office visit for this Thursday 1/25/18 and assures RN he will make it to that appt.  He will need to take a bus from [facility] but will make it.  Will PCP/partner approve meds now (Amlodipine and Clonidine).   FREDERICK Benedict R.N.

## 2018-02-02 RX ORDER — METHOCARBAMOL 500 MG/1
TABLET, FILM COATED ORAL
Qty: 30 TABLET | Refills: 1 | OUTPATIENT
Start: 2018-02-02

## 2018-02-27 ENCOUNTER — OFFICE VISIT (OUTPATIENT)
Dept: ADDICTION MEDICINE | Facility: CLINIC | Age: 28
End: 2018-02-27
Payer: COMMERCIAL

## 2018-02-27 VITALS
WEIGHT: 315 LBS | DIASTOLIC BLOOD PRESSURE: 84 MMHG | OXYGEN SATURATION: 98 % | RESPIRATION RATE: 18 BRPM | HEART RATE: 91 BPM | SYSTOLIC BLOOD PRESSURE: 132 MMHG | BODY MASS INDEX: 42.35 KG/M2

## 2018-02-27 DIAGNOSIS — I10 ESSENTIAL HYPERTENSION WITH GOAL BLOOD PRESSURE LESS THAN 140/90: ICD-10-CM

## 2018-02-27 DIAGNOSIS — F10.20 ALCOHOL USE DISORDER, SEVERE, DEPENDENCE (H): Primary | Chronic | ICD-10-CM

## 2018-02-27 LAB
ALBUMIN SERPL-MCNC: 3.6 G/DL (ref 3.4–5)
ALP SERPL-CCNC: 70 U/L (ref 40–150)
ALT SERPL W P-5'-P-CCNC: 37 U/L (ref 0–70)
AST SERPL W P-5'-P-CCNC: 22 U/L (ref 0–45)
BILIRUB DIRECT SERPL-MCNC: <0.1 MG/DL (ref 0–0.2)
BILIRUB SERPL-MCNC: 0.2 MG/DL (ref 0.2–1.3)
PROT SERPL-MCNC: 6.4 G/DL (ref 6.8–8.8)

## 2018-02-27 PROCEDURE — 80076 HEPATIC FUNCTION PANEL: CPT | Performed by: FAMILY MEDICINE

## 2018-02-27 PROCEDURE — 99214 OFFICE O/P EST MOD 30 MIN: CPT | Performed by: FAMILY MEDICINE

## 2018-02-27 PROCEDURE — 36415 COLL VENOUS BLD VENIPUNCTURE: CPT | Performed by: FAMILY MEDICINE

## 2018-02-27 RX ORDER — CLONIDINE HYDROCHLORIDE 0.1 MG/1
0.1 TABLET ORAL 2 TIMES DAILY
Qty: 60 TABLET | Refills: 1 | Status: SHIPPED | OUTPATIENT
Start: 2018-02-27 | End: 2018-03-25

## 2018-02-27 RX ORDER — AMLODIPINE BESYLATE 10 MG/1
10 TABLET ORAL DAILY
Qty: 30 TABLET | Refills: 1 | Status: SHIPPED | OUTPATIENT
Start: 2018-02-27 | End: 2018-03-25

## 2018-02-27 NOTE — MR AVS SNAPSHOT
"              After Visit Summary   2018    Nickolas Lang    MRN: [id number]           Patient Information     Date Of Birth          1990        Visit Information        Provider Department      2018 10:15 AM Tima Crews MD [facility]        Today's Diagnoses     Alcohol use disorder, severe, dependence (H)    -  1    Essential hypertension with goal blood pressure less than 140/90           Follow-ups after your visit        Who to contact     If you have questions or need follow up information about today's clinic visit or your schedule please contact [facility] [place] [facility] PRIMARY [facility] directly at [phone].  Normal or non-critical lab and imaging results will be communicated to you by MyChart, letter or phone within 4 business days after the clinic has received the results. If you do not hear from us within 7 days, please contact the clinic through [facility] or phone. If you have a critical or abnormal lab result, we will notify you by phone as soon as possible.  Submit refill requests through Docstoc or call your pharmacy and they will forward the refill request to us. Please allow 3 business days for your refill to be completed.          Additional Information About Your Visit        MyChart Information     Docstoc lets you send messages to your doctor, view your test results, renew your prescriptions, schedule appointments and more. To sign up, go to [email or web address] . Click on \"Log in\" on the left side of the screen, which will take you to the Welcome page. Then click on \"Sign up Now\" on the right side of the page.     You will be asked to enter the access code listed below, as well as some personal information. Please follow the directions to create your username and password.     Your access code is: [email or web address]  Expires: 2018 10:47 AM     Your access code will  in 90 days. If you need help or a new code, please call your " [facility] or [phone].        Care EveryWhere ID     This is your Care EveryWhere ID. This could be used by other organizations to access your [facility] medical records  [id number]        Your Vitals Were     Pulse Respirations Pulse Oximetry BMI (Body Mass Index)          91 18 98% 42.35 kg/m2         Blood Pressure from Last 3 Encounters:   02/27/18 132/84   11/24/17 (!) 133/91   11/16/17 (!) 142/97    Weight from Last 3 Encounters:   02/27/18 (!) 321 lb (145.6 kg)   11/20/17 (!) 308 lb (139.7 kg)   11/16/17 (!) 308 lb 9.6 oz (140 kg)              We Performed the Following     Hepatic panel          Where to get your medicines      These medications were sent to [facility] [place], [place] - [address]  [address], [facility] [address]     Phone:  [phone]     amLODIPine 10 MG tablet    cloNIDine 0.1 MG tablet          Primary Care Provider Office Phone # Fax #    Nciolette Shawnee Frye, APRN [phone]       [address] [facility] [address]        Equal Access to Services     ERMELINDA LOZADA AH: Hadii breezy ku hadasho Soomaali, waaxda luqadaha, qaybta kaalmada adeegyada, shawna ledesma haynoni espinoza . So [facility] [phone].    ATENCIÓN: Si habla español, tiene a miller disposición servicios gratuitos de asistencia lingüística. Llame al [phone].    We comply with applicable federal civil rights laws and [place] laws. We do not discriminate on the basis of race, color, national origin, age, disability, sex, sexual orientation, or gender identity.            Thank you!     Thank you for choosing [facility] PRIMARY CARE  for your care. Our goal is always to provide you with excellent care. Hearing back from our patients is one way we can continue to improve our services. Please take a few minutes to complete the written survey that you may receive in the mail after your visit with us. Thank you!             Your Updated  Medication List - Protect others around you: Learn how to safely use, store and throw away your medicines at www.disposemymeds.org.          This list is accurate as of 2/27/18 10:47 AM.  Always use your most recent med list.                   Brand Name Dispense Instructions for use Diagnosis    amLODIPine 10 MG tablet    NORVASC    30 tablet    Take 1 tablet (10 mg) by mouth daily    Essential hypertension with goal blood pressure less than 140/90       aspirin-acetaminophen-caffeine 250-250-65 MG per tablet    EXCEDRIN MIGRAINE     Take 2 tablets by mouth every 6 hours as needed for headaches        cloNIDine 0.1 MG tablet    CATAPRES    60 tablet    Take 1 tablet (0.1 mg) by mouth 2 times daily    Essential hypertension with goal blood pressure less than 140/90       disulfiram 250 MG tablet    ANTABUSE    30 tablet    Take 1 tablet (250 mg) by mouth daily    Alcohol dependence with uncomplicated withdrawal (H)       gabapentin 800 MG tablet    NEURONTIN    90 tablet    Take 1 tablet (800 mg) by mouth 3 times daily        hydrOXYzine 25 MG tablet    ATARAX    120 tablet    Take 2 tablets (50 mg) by mouth every 6 hours as needed for anxiety    Alcohol dependence with uncomplicated intoxication (H), Anxiety       methocarbamol 500 MG tablet    ROBAXIN    30 tablet    Take 2 tablets (1,000 mg) by mouth 3 times daily as needed for muscle spasms    Chronic low back pain, unspecified back pain laterality, with sciatica presence unspecified       multivitamin, therapeutic with minerals Tabs tablet     30 each    Take 1 tablet by mouth daily        sertraline 100 MG tablet    ZOLOFT    60 tablet    Take 2 tablets (200 mg) by mouth daily        traZODone 100 MG tablet    DESYREL    30 tablet    Take 2 tablets (200 mg) by mouth nightly as needed for sleep

## 2018-02-27 NOTE — PROGRESS NOTES
SUBJECTIVE:   Nickolas Lang is a 27 year old male who presents to clinic today for the following health issues:          ADDICTION MEDICINE NOTE:    HERE IN FOLLOW UP OF TREATMENT FOR ALCOHOL DEPENDENCE     DOING WELL  3 MONTHS SOBER - BEST HE'S EVER DONE    WANTS TO CHECK IN WITH ME AS PART OF HIS RECOVERY PROGRAM    NOT ON MAT NOW; HAD BEEN ON ANTABUSE, CAMPRAL, NALTREXONE, VIVITROL IN PAST BUT NOW FEELS HE DOESN'T NEED IT    LIVING IN [facility] SOBER HOUSE  INVOLVED WITH [facility]  GOES TO OUT-PATIENT TREATMENT AT [facility]    PROUD OF HIS RECOVERY AND FEELS HE IS PASSING THE 3 MONTH WALL; FEELS EMPOWERED    HAS A THERAPIST, PSYCHIATRIST    NEEDS A PRIMARY CARE PROVIDER - ADVISED [facility]    PMH: REVIEWED   HYPERTENSION (ON MEDICATIONS)    SOCIAL HISTORY:  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  Additional history: as documented    Patient Active Problem List   Diagnosis     Morbid obesity (H)     Alcohol dependence with withdrawal (H)     Chemical dependency (H)     Essential hypertension     Suicidal ideation     Elevated liver enzymes     Hepatic steatosis     Obesity     Insomnia, unspecified type     Anxiety     Gastroesophageal reflux disease without esophagitis     Alcohol abuse with alcohol-induced mood disorder (H)     Attention deficit hyperactivity disorder (ADHD), unspecified ADHD type     Alcohol use disorder, severe, dependence (H)     Controlled substance agreement signed     Mild episode of recurrent major depressive disorder (H)     Alcohol withdrawal (H)     Past Surgical History:   Procedure Laterality Date     Deviated septum repair       PYLOROMYOTOMY SURGERY  as an infant      SHOULDER SURGERY Left 07/29/2016    Removal of cartilage       Social History   Substance Use Topics     Smoking status: Current Every Day  Smoker     Packs/day: 0.50     Years: 4.00     Types: Cigarettes     Smokeless tobacco: Never Used     Alcohol use 0.0 oz/week     0 Standard drinks or equivalent per week      Comment: 1.75L vodka daily     Family History   Problem Relation Age of Onset     Neurologic Disorder Mother      Multiple Sclerosis     Depression Mother      Anxiety Disorder Mother      Alcohol/Drug Father      Substance Abuse Father      Depression Maternal Grandmother      Anxiety Disorder Maternal Grandmother      Hypertension Maternal Grandmother      Substance Abuse Maternal Grandfather      Alcohol/Drug Paternal Grandfather          Current Outpatient Prescriptions   Medication Sig Dispense Refill     amLODIPine (NORVASC) 10 MG tablet Take 1 tablet (10 mg) by mouth daily 30 tablet 1     cloNIDine (CATAPRES) 0.1 MG tablet Take 1 tablet (0.1 mg) by mouth 2 times daily 60 tablet 1     [DISCONTINUED] amLODIPine (NORVASC) 10 MG tablet Take 1 tablet (10 mg) by mouth daily 30 tablet 0     [DISCONTINUED] cloNIDine (CATAPRES) 0.1 MG tablet Take 1 tablet (0.1 mg) by mouth 2 times daily 60 tablet 0     gabapentin (NEURONTIN) 800 MG tablet Take 1 tablet (800 mg) by mouth 3 times daily 90 tablet 0     sertraline (ZOLOFT) 100 MG tablet Take 2 tablets (200 mg) by mouth daily 60 tablet 0     multivitamin, therapeutic with minerals (THERA-VIT-M) TABS tablet Take 1 tablet by mouth daily 30 each 0     traZODone (DESYREL) 100 MG tablet Take 2 tablets (200 mg) by mouth nightly as needed for sleep 30 tablet 0     disulfiram (ANTABUSE) 250 MG tablet Take 1 tablet (250 mg) by mouth daily 30 tablet 1     aspirin-acetaminophen-caffeine (EXCEDRIN MIGRAINE) 250-250-65 MG per tablet Take 2 tablets by mouth every 6 hours as needed for headaches       methocarbamol (ROBAXIN) 500 MG tablet Take 2 tablets (1,000 mg) by mouth 3 times daily as needed for muscle spasms 30 tablet 1     hydrOXYzine (ATARAX) 25 MG tablet Take 2 tablets (50 mg) by mouth every 6 hours as  needed for anxiety 120 tablet 1     No Known Allergies  Labs reviewed in EPIC    Reviewed and updated as needed this visit by clinical staff  Tobacco  Allergies       Reviewed and updated as needed this visit by Provider         ROS:      OBJECTIVE:     /84  Pulse 91  Resp 18  Wt (!) 321 lb (145.6 kg)  SpO2 98%  BMI 42.35 kg/m2  Body mass index is 42.35 kg/(m^2).       ROS:  Constitutional, HEENT, cardiovascular, pulmonary, gi and gu systems are negative, except as otherwise noted.    /84  Pulse 91  Resp 18  Wt (!) 321 lb (145.6 kg)  SpO2 98%  BMI 42.35 kg/m2  EXAM:  GENERAL APPEARANCE: healthy, alert and no distress  EYES: Eyes grossly normal to inspection, PERRL and conjunctivae and sclerae normal  NEURO: Normal strength and tone, mentation intact and speech normal  PSYCH: mentation appears normal and affect normal/bright  MENTAL STATUS EXAM:  Appearance/Behavior: No apparent distress and Casually groomed  Speech: Normal  Mood/Affect: normal affect  Insight: Adequate      Diagnostic Test Results:  No results found for this or any previous visit (from the past 24 hour(s)).    ASSESSMENT:   ALCOHOL DEPENDENCE   HYPERTENSION    PLAN:       ICD-10-CM    1. Alcohol use disorder, severe, dependence (H) F10.20 Hepatic panel   2. Essential hypertension with goal blood pressure less than 140/90 I10 amLODIPine (NORVASC) 10 MG tablet     cloNIDine (CATAPRES) 0.1 MG tablet           MEDICATIONS:   Orders Placed This Encounter   Medications     amLODIPine (NORVASC) 10 MG tablet     Sig: Take 1 tablet (10 mg) by mouth daily     Dispense:  30 tablet     Refill:  1     cloNIDine (CATAPRES) 0.1 MG tablet     Sig: Take 1 tablet (0.1 mg) by mouth 2 times daily     Dispense:  60 tablet     Refill:  1          - Continue other medications without change  FUTURE APPOINTMENTS:       - Follow-up visit in 2 MONTHS    Tima Crews MD  [facility] PRIMARY [facility]

## 2018-03-25 ENCOUNTER — HOSPITAL ENCOUNTER (EMERGENCY)
Facility: CLINIC | Age: 28
Discharge: HOME | End: 2018-03-25
Attending: EMERGENCY MEDICINE | Admitting: EMERGENCY MEDICINE
Payer: COMMERCIAL

## 2018-03-25 VITALS
HEART RATE: 96 BPM | OXYGEN SATURATION: 98 % | SYSTOLIC BLOOD PRESSURE: 155 MMHG | TEMPERATURE: 97.1 F | DIASTOLIC BLOOD PRESSURE: 105 MMHG | RESPIRATION RATE: 16 BRPM

## 2018-03-25 DIAGNOSIS — F10.20 ALCOHOL USE DISORDER, SEVERE, DEPENDENCE (H): Chronic | ICD-10-CM

## 2018-03-25 LAB — ALCOHOL BREATH TEST: 0.34 (ref 0–0.01)

## 2018-03-25 PROCEDURE — 99283 EMERGENCY DEPT VISIT LOW MDM: CPT | Mod: Z6 | Performed by: EMERGENCY MEDICINE

## 2018-03-25 PROCEDURE — 99283 EMERGENCY DEPT VISIT LOW MDM: CPT | Performed by: EMERGENCY MEDICINE

## 2018-03-25 ASSESSMENT — ENCOUNTER SYMPTOMS
ABDOMINAL PAIN: 0
FEVER: 0
VOMITING: 0
SHORTNESS OF BREATH: 0

## 2018-03-25 NOTE — ED PROVIDER NOTES
History     Chief Complaint   Patient presents with     Addiction Problem     detox: ETOH; 1.75 daily: last drink on hour ago: no bed reserved: previouse seizure     HPI  Nickolas Lang is a 27 year old male with history of alcohol dependence who presents emergency department today with desire for detox.  The patient presents with his girlfriend seeking detox.  The patient states that he has a history of binge drinking.  He states that most recently he was sober for approximately 4 months before relapsing on Wednesday, 4 days ago.  The patient states that since that time he has been drinking 1 bottle of liquor per day, last drink just 1 hour prior to arrival.  The patient denies any other recreational drug use.  The patient has no physical complaints at this time.    No current facility-administered medications for this encounter.      Current Outpatient Prescriptions   Medication     amLODIPine (NORVASC) 10 MG tablet     cloNIDine (CATAPRES) 0.1 MG tablet     gabapentin (NEURONTIN) 800 MG tablet     sertraline (ZOLOFT) 100 MG tablet     multivitamin, therapeutic with minerals (THERA-VIT-M) TABS tablet     traZODone (DESYREL) 100 MG tablet     aspirin-acetaminophen-caffeine (EXCEDRIN MIGRAINE) 250-250-65 MG per tablet     methocarbamol (ROBAXIN) 500 MG tablet     hydrOXYzine (ATARAX) 25 MG tablet     Facility-Administered Medications Ordered in Other Encounters   Medication     Self Administer Medications: Behavioral Services     Past Medical History:   Diagnosis Date     ADD (attention deficit disorder)      Alcohol abuse      Anxiety     gabapentin helps the most      GERD (gastroesophageal reflux disease)      Hepatic steatosis      Hypertension      Obesity      Pyloric stenosis     s/p pyloromyotomy as an infant       Past Surgical History:   Procedure Laterality Date     Deviated septum repair       PYLOROMYOTOMY SURGERY  as an infant      SHOULDER SURGERY Left 07/29/2016    Removal of cartilage        Family History   Problem Relation Age of Onset     Neurologic Disorder Mother      Multiple Sclerosis     Depression Mother      Anxiety Disorder Mother      Alcohol/Drug Father      Substance Abuse Father      Depression Maternal Grandmother      Anxiety Disorder Maternal Grandmother      Hypertension Maternal Grandmother      Substance Abuse Maternal Grandfather      Alcohol/Drug Paternal Grandfather        Social History   Substance Use Topics     Smoking status: Current Every Day Smoker     Packs/day: 0.50     Years: 4.00     Types: Cigarettes     Smokeless tobacco: Never Used     Alcohol use 0.0 oz/week     0 Standard drinks or equivalent per week      Comment: 1.75L vodka daily     No Known Allergies    I have reviewed the Medications, Allergies, Past Medical and Surgical History, and Social History in the Epic system.    Review of Systems   Constitutional: Negative for fever.   Respiratory: Negative for shortness of breath.    Cardiovascular: Negative for chest pain.   Gastrointestinal: Negative for abdominal pain and vomiting.   All other systems reviewed and are negative.      Physical Exam   BP: (!) 155/105  Pulse: 96  Temp: 97.1  F (36.2  C)  Resp: 16  SpO2: 98 %      Physical Exam   Constitutional: He is oriented to person, place, and time. He appears well-developed and well-nourished. No distress.   Cardiovascular: Normal rate, regular rhythm and normal heart sounds.    Pulmonary/Chest: Effort normal and breath sounds normal.   Abdominal: Soft.   Obese, nontender   Neurological: He is alert and oriented to person, place, and time.   Intoxicated   Skin: He is not diaphoretic.   Psychiatric: His speech is slurred. He expresses no suicidal ideation.   Tearful   Nursing note and vitals reviewed.      ED Course     ED Course     Procedures        12:02 PM No Detox beds available       Labs Ordered and Resulted from Time of ED Arrival Up to the Time of Departure from the ED   ALCOHOL BREATH TEST POCT -  Abnormal; Notable for the following:        Result Value    Alcohol Breath Test 0.339 (*)     All other components within normal limits            Assessments & Plan (with Medical Decision Making)   27-year-old male chronic alcoholic with multiple detox and treatments who presents with a sober roommate. He has been drinking for the last 5 days, he is currently intoxicated. He reports he is interested in getting into detox. Unfortunately, there are no beds available here, and he adamantly refuses to go to [address] [place] [address]. My advice to his sober friend is to call the phone number that I give you starting tomorrow morning, they will hold a bed for a couple hours to get into detox, and then most importantly get into a treatment program on completion of detox. The sober friend is willing to take care of him and he is discharged from the department.     This part of the document was transcribed by Shukri Mixon for Ezekiel Bull MD.    I have reviewed the nursing notes.    I have reviewed the findings, diagnosis, plan and need for follow up with the patient.    New Prescriptions    No medications on file       Final diagnoses:   Alcohol use disorder, severe, dependence (H)     I, Shukri Mixon, am serving as a trained medical scribe to document services personally performed by Ezekiel Bull MD, based on the provider's statements to me.      Ezekiel VELAZQUEZ MD, was physically present and have reviewed and verified the accuracy of this note documented by Shukri Mixon.    3/25/2018   [facility], [place], EMERGENCY DEPARTMENT     Ezekiel Bull MD  03/25/18 [id number]

## 2018-03-25 NOTE — ED AVS SNAPSHOT
[facility], [place], Emergency Department    [address]    [facility] [address]    Phone:  [phone]    Fax:  [phone]                                       Nickolas Lang   MRN: [id number]    Department:  [facility], Emergency Department   Date of Visit:  3/25/2018           After Visit Summary Signature Page     I have received my discharge instructions, and my questions have been answered. I have discussed any challenges I see with this plan with the nurse or doctor.    ..........................................................................................................................................  Patient/Patient Representative Signature      ..........................................................................................................................................  Patient Representative Print Name and Relationship to Patient    ..................................................               ................................................  Date                                            Time    ..........................................................................................................................................  Reviewed by Signature/Title    ...................................................              ..............................................  Date                                                            Time

## 2018-03-25 NOTE — ED AVS SNAPSHOT
[facility], Emergency Department    [address]    [place] [address]    Phone:  [phone]    Fax:  [phone]                                       Nickolas Lang   MRN: [id number]    Department:  [facility], Emergency Department   Date of Visit:  3/25/2018           Patient Information     Date Of Birth          1990        Your diagnoses for this visit were:     Alcohol use disorder, severe, dependence (H)        You were seen by Ezekiel Bull MD.        Discharge Instructions       There are no detox beds available at this time at [facility]  You may call the following numbers tomorrow to check on bed availability:    [facility]    [phone] [address] [facility]         [phone]  [facility]        [phone]      Your next 10 appointments already scheduled     Apr 24, 2018  9:00 AM CDT   Return Visit with Tima Crews MD   [facility] ([facility]    [address] [place] [address] So  [address]  [facility] [address] [phone]              24 Hour Appointment Hotline       To make an appointment at any [facility], call [phone]). If you don't have a family doctor or clinic, we will help you find one. [facility] [place] are conveniently located to serve the needs of you and your family.             Review of your medicines      Our records show that you are taking the medicines listed below. If these are incorrect, please call your family doctor or clinic.        Dose / Directions Last dose taken    amLODIPine 10 MG tablet   Commonly known as:  NORVASC   Dose:  10 mg   Quantity:  30 tablet        Take 1 tablet (10 mg) by mouth daily   Refills:  1        aspirin-acetaminophen-caffeine 250-250-65 MG per tablet   Commonly known as:  EXCEDRIN MIGRAINE   Dose:  2 tablet        Take 2 tablets by mouth every 6 hours as needed for headaches   Refills:  0         cloNIDine 0.1 MG tablet   Commonly known as:  CATAPRES   Dose:  0.1 mg   Quantity:  60 tablet        Take 1 tablet (0.1 mg) by mouth 2 times daily   Refills:  1        gabapentin 800 MG tablet   Commonly known as:  NEURONTIN   Dose:  800 mg   Quantity:  90 tablet        Take 1 tablet (800 mg) by mouth 3 times daily   Refills:  0        hydrOXYzine 25 MG tablet   Commonly known as:  ATARAX   Dose:  50 mg   Quantity:  120 tablet        Take 2 tablets (50 mg) by mouth every 6 hours as needed for anxiety   Refills:  1        methocarbamol 500 MG tablet   Commonly known as:  ROBAXIN   Dose:  1000 mg   Quantity:  30 tablet        Take 2 tablets (1,000 mg) by mouth 3 times daily as needed for muscle spasms   Refills:  1        multivitamin, therapeutic with minerals Tabs tablet   Dose:  1 tablet   Quantity:  30 each        Take 1 tablet by mouth daily   Refills:  0        sertraline 100 MG tablet   Commonly known as:  ZOLOFT   Dose:  200 mg   Quantity:  60 tablet        Take 2 tablets (200 mg) by mouth daily   Refills:  0        traZODone 100 MG tablet   Commonly known as:  DESYREL   Dose:  200 mg   Quantity:  30 tablet        Take 2 tablets (200 mg) by mouth nightly as needed for sleep   Refills:  0                Procedures and tests performed during your visit     Alcohol breath test POCT      Orders Needing Specimen Collection     None      Pending Results     No orders found from 3/23/2018 to 3/26/2018.            Pending Culture Results     No orders found from 3/23/2018 to 3/26/2018.            Pending Results Instructions     If you had any lab results that were not finalized at the time of your Discharge, you can call the ED Lab Result RN at [phone]. You will be contacted by this team for any positive Lab results or changes in treatment. The nurses are available 7 days a week from 10A to 6:30P.  You can leave a message 24 hours per day and they will return your call.        Thank you for choosing [facility]      "  Thank you for choosing [facility] for your care. Our goal is always to provide you with excellent care. Hearing back from our patients is one way we can continue to improve our services. Please take a few minutes to complete the written survey that you may receive in the mail after you visit with us. Thank you!        [employer] Information     [employer] lets you send messages to your doctor, view your test results, renew your prescriptions, schedule appointments and more. To sign up, go to [email or web address] . Click on \"Log in\" on the left side of the screen, which will take you to the Welcome page. Then click on \"Sign up Now\" on the right side of the page.     You will be asked to enter the access code listed below, as well as some personal information. Please follow the directions to create your username and password.     Your access code is: [email or web address]  Expires: 2018 11:47 AM     Your access code will  in 90 days. If you need help or a new code, please call your [facility] clinic or [phone].        Care EveryWhere ID     This is your Care EveryWhere ID. This could be used by other organizations to access your [facility] medical records  [id number]        Equal Access to Services     ERMELINDA LOZADA AH: Rishabh Petersen, wajose rafaelda yesenia, qaybta kaalmada adetashia, shawna barfield. So [facility] [phone].    ATENCIÓN: Si habla español, tiene a miller disposición servicios gratuitos de asistencia lingüística. Llame al [phone].    We comply with applicable federal civil rights laws and [place] laws. We do not discriminate on the basis of race, color, national origin, age, disability, sex, sexual orientation, or gender identity.            After Visit Summary       This is your record. Keep this with you and show to your community pharmacist(s) and doctor(s) at your next visit.                  "

## 2018-03-25 NOTE — DISCHARGE INSTRUCTIONS
There are no detox beds available at this time at [facility]  You may call the following numbers tomorrow to check on bed availability:    [facility]    [phone]  [address] [place] [address] Detox         [phone]  [facility] Det[facility]        [phone]

## 2018-03-26 ENCOUNTER — HOSPITAL ENCOUNTER (INPATIENT)
Facility: CLINIC | Age: 28
LOS: 2 days | Discharge: HOME | DRG: 897 | End: 2018-03-28
Attending: EMERGENCY MEDICINE | Admitting: PSYCHIATRY & NEUROLOGY
Payer: COMMERCIAL

## 2018-03-26 DIAGNOSIS — F10.229 ACUTE ALCOHOLIC INTOXICATION IN ALCOHOLISM WITH COMPLICATION (H): ICD-10-CM

## 2018-03-26 DIAGNOSIS — K21.9 GASTROESOPHAGEAL REFLUX DISEASE WITHOUT ESOPHAGITIS: Primary | ICD-10-CM

## 2018-03-26 PROBLEM — F19.10 SUBSTANCE ABUSE (H): Status: ACTIVE | Noted: 2018-03-26

## 2018-03-26 LAB
ALBUMIN SERPL-MCNC: 3.6 G/DL (ref 3.4–5)
ALBUMIN UR-MCNC: 30 MG/DL
ALCOHOL BREATH TEST: 0.3 (ref 0–0.01)
ALP SERPL-CCNC: 105 U/L (ref 40–150)
ALT SERPL W P-5'-P-CCNC: 49 U/L (ref 0–70)
AMPHETAMINES UR QL SCN: NEGATIVE
ANION GAP SERPL CALCULATED.3IONS-SCNC: 8 MMOL/L (ref 3–14)
APPEARANCE UR: CLEAR
AST SERPL W P-5'-P-CCNC: 24 U/L (ref 0–45)
BARBITURATES UR QL: NEGATIVE
BASOPHILS # BLD AUTO: 0 10E9/L (ref 0–0.2)
BASOPHILS NFR BLD AUTO: 0.2 %
BENZODIAZ UR QL: NEGATIVE
BILIRUB SERPL-MCNC: 0.3 MG/DL (ref 0.2–1.3)
BILIRUB UR QL STRIP: NEGATIVE
BUN SERPL-MCNC: 11 MG/DL (ref 7–30)
CALCIUM SERPL-MCNC: 7.9 MG/DL (ref 8.5–10.1)
CANNABINOIDS UR QL SCN: NEGATIVE
CHLORIDE SERPL-SCNC: 106 MMOL/L (ref 94–109)
CO2 SERPL-SCNC: 30 MMOL/L (ref 20–32)
COCAINE UR QL: NEGATIVE
COLOR UR AUTO: YELLOW
CREAT SERPL-MCNC: 0.55 MG/DL (ref 0.66–1.25)
DIFFERENTIAL METHOD BLD: NORMAL
EOSINOPHIL # BLD AUTO: 0 10E9/L (ref 0–0.7)
EOSINOPHIL NFR BLD AUTO: 0.5 %
ERYTHROCYTE [DISTWIDTH] IN BLOOD BY AUTOMATED COUNT: 13.3 % (ref 10–15)
ETHANOL UR QL SCN: POSITIVE
GFR SERPL CREATININE-BSD FRML MDRD: >90 ML/MIN/1.7M2
GLUCOSE SERPL-MCNC: 120 MG/DL (ref 70–99)
GLUCOSE UR STRIP-MCNC: NEGATIVE MG/DL
HCT VFR BLD AUTO: 50.1 % (ref 40–53)
HGB BLD-MCNC: 17.3 G/DL (ref 13.3–17.7)
HGB UR QL STRIP: NEGATIVE
IMM GRANULOCYTES # BLD: 0 10E9/L (ref 0–0.4)
IMM GRANULOCYTES NFR BLD: 0.3 %
KETONES UR STRIP-MCNC: NEGATIVE MG/DL
LEUKOCYTE ESTERASE UR QL STRIP: NEGATIVE
LIPASE SERPL-CCNC: 177 U/L (ref 73–393)
LYMPHOCYTES # BLD AUTO: 3.6 10E9/L (ref 0.8–5.3)
LYMPHOCYTES NFR BLD AUTO: 57.9 %
MCH RBC QN AUTO: 29.7 PG (ref 26.5–33)
MCHC RBC AUTO-ENTMCNC: 34.5 G/DL (ref 31.5–36.5)
MCV RBC AUTO: 86 FL (ref 78–100)
MONOCYTES # BLD AUTO: 0.3 10E9/L (ref 0–1.3)
MONOCYTES NFR BLD AUTO: 5.1 %
MUCOUS THREADS #/AREA URNS LPF: PRESENT /LPF
NEUTROPHILS # BLD AUTO: 2.3 10E9/L (ref 1.6–8.3)
NEUTROPHILS NFR BLD AUTO: 36 %
NITRATE UR QL: NEGATIVE
NRBC # BLD AUTO: 0 10*3/UL
NRBC BLD AUTO-RTO: 0 /100
OPIATES UR QL SCN: NEGATIVE
PH UR STRIP: 6.5 PH (ref 5–7)
PLATELET # BLD AUTO: 296 10E9/L (ref 150–450)
POTASSIUM SERPL-SCNC: 3.9 MMOL/L (ref 3.4–5.3)
PROT SERPL-MCNC: 7.2 G/DL (ref 6.8–8.8)
RBC # BLD AUTO: 5.83 10E12/L (ref 4.4–5.9)
RBC #/AREA URNS AUTO: 1 /HPF (ref 0–2)
SODIUM SERPL-SCNC: 144 MMOL/L (ref 133–144)
SOURCE: ABNORMAL
SP GR UR STRIP: 1.02 (ref 1–1.03)
SQUAMOUS #/AREA URNS AUTO: 1 /HPF (ref 0–1)
UROBILINOGEN UR STRIP-MCNC: NORMAL MG/DL (ref 0–2)
WBC # BLD AUTO: 6.3 10E9/L (ref 4–11)
WBC #/AREA URNS AUTO: 1 /HPF (ref 0–5)

## 2018-03-26 PROCEDURE — 99284 EMERGENCY DEPT VISIT MOD MDM: CPT | Mod: Z6 | Performed by: EMERGENCY MEDICINE

## 2018-03-26 PROCEDURE — 80053 COMPREHEN METABOLIC PANEL: CPT | Performed by: EMERGENCY MEDICINE

## 2018-03-26 PROCEDURE — 25000132 ZZH RX MED GY IP 250 OP 250 PS 637: Performed by: PSYCHIATRY & NEUROLOGY

## 2018-03-26 PROCEDURE — 80307 DRUG TEST PRSMV CHEM ANLYZR: CPT | Performed by: EMERGENCY MEDICINE

## 2018-03-26 PROCEDURE — 80320 DRUG SCREEN QUANTALCOHOLS: CPT | Performed by: EMERGENCY MEDICINE

## 2018-03-26 PROCEDURE — 99285 EMERGENCY DEPT VISIT HI MDM: CPT | Performed by: EMERGENCY MEDICINE

## 2018-03-26 PROCEDURE — 12800012 ZZH R&B CD MH INTERMEDIATE ADULT

## 2018-03-26 PROCEDURE — HZ2ZZZZ DETOXIFICATION SERVICES FOR SUBSTANCE ABUSE TREATMENT: ICD-10-PCS | Performed by: PSYCHIATRY & NEUROLOGY

## 2018-03-26 PROCEDURE — 85025 COMPLETE CBC W/AUTO DIFF WBC: CPT | Performed by: EMERGENCY MEDICINE

## 2018-03-26 PROCEDURE — 83690 ASSAY OF LIPASE: CPT | Performed by: EMERGENCY MEDICINE

## 2018-03-26 PROCEDURE — 82075 ASSAY OF BREATH ETHANOL: CPT | Performed by: EMERGENCY MEDICINE

## 2018-03-26 PROCEDURE — 81001 URINALYSIS AUTO W/SCOPE: CPT | Performed by: EMERGENCY MEDICINE

## 2018-03-26 PROCEDURE — 25000132 ZZH RX MED GY IP 250 OP 250 PS 637: Performed by: EMERGENCY MEDICINE

## 2018-03-26 RX ORDER — SERTRALINE HYDROCHLORIDE 100 MG/1
200 TABLET, FILM COATED ORAL DAILY
Status: DISCONTINUED | OUTPATIENT
Start: 2018-03-26 | End: 2018-03-28 | Stop reason: HOSPADM

## 2018-03-26 RX ORDER — MULTIPLE VITAMINS W/ MINERALS TAB 9MG-400MCG
1 TAB ORAL ONCE
Status: COMPLETED | OUTPATIENT
Start: 2018-03-26 | End: 2018-03-26

## 2018-03-26 RX ORDER — LORAZEPAM 1 MG/1
1-4 TABLET ORAL EVERY 30 MIN PRN
Status: DISCONTINUED | OUTPATIENT
Start: 2018-03-26 | End: 2018-03-26

## 2018-03-26 RX ORDER — SERTRALINE HYDROCHLORIDE 100 MG/1
200 TABLET, FILM COATED ORAL DAILY
COMMUNITY
Start: 2018-03-22 | End: 2018-06-04

## 2018-03-26 RX ORDER — AMLODIPINE BESYLATE 10 MG/1
10 TABLET ORAL DAILY
Status: DISCONTINUED | OUTPATIENT
Start: 2018-03-26 | End: 2018-03-28 | Stop reason: HOSPADM

## 2018-03-26 RX ORDER — OMEPRAZOLE 40 MG/1
40 CAPSULE, DELAYED RELEASE ORAL DAILY
Status: ON HOLD | COMMUNITY
End: 2018-03-27

## 2018-03-26 RX ORDER — DIAZEPAM 5 MG
5-20 TABLET ORAL EVERY 30 MIN PRN
Status: DISCONTINUED | OUTPATIENT
Start: 2018-03-26 | End: 2018-03-28 | Stop reason: HOSPADM

## 2018-03-26 RX ORDER — GABAPENTIN 300 MG/1
600 CAPSULE ORAL 4 TIMES DAILY
Status: DISCONTINUED | OUTPATIENT
Start: 2018-03-26 | End: 2018-03-28 | Stop reason: HOSPADM

## 2018-03-26 RX ORDER — CLONIDINE HYDROCHLORIDE 0.1 MG/1
0.1 TABLET ORAL 2 TIMES DAILY
Status: DISCONTINUED | OUTPATIENT
Start: 2018-03-26 | End: 2018-03-28 | Stop reason: HOSPADM

## 2018-03-26 RX ORDER — ACETAMINOPHEN 325 MG/1
650 TABLET ORAL EVERY 4 HOURS PRN
Status: DISCONTINUED | OUTPATIENT
Start: 2018-03-26 | End: 2018-03-28 | Stop reason: HOSPADM

## 2018-03-26 RX ORDER — FOLIC ACID 1 MG/1
1 TABLET ORAL DAILY
Status: DISCONTINUED | OUTPATIENT
Start: 2018-03-27 | End: 2018-03-28 | Stop reason: HOSPADM

## 2018-03-26 RX ORDER — LANOLIN ALCOHOL/MO/W.PET/CERES
100 CREAM (GRAM) TOPICAL DAILY
Status: DISCONTINUED | OUTPATIENT
Start: 2018-03-27 | End: 2018-03-28 | Stop reason: HOSPADM

## 2018-03-26 RX ORDER — MULTIPLE VITAMINS W/ MINERALS TAB 9MG-400MCG
1 TAB ORAL DAILY
Status: DISCONTINUED | OUTPATIENT
Start: 2018-03-27 | End: 2018-03-28 | Stop reason: HOSPADM

## 2018-03-26 RX ORDER — TRAZODONE HYDROCHLORIDE 100 MG/1
100-200 TABLET ORAL
COMMUNITY
Start: 2018-02-06 | End: 2018-06-04

## 2018-03-26 RX ORDER — BISACODYL 10 MG
10 SUPPOSITORY, RECTAL RECTAL DAILY PRN
Status: DISCONTINUED | OUTPATIENT
Start: 2018-03-26 | End: 2018-03-28 | Stop reason: HOSPADM

## 2018-03-26 RX ORDER — METHOCARBAMOL 500 MG/1
1000 TABLET, FILM COATED ORAL 3 TIMES DAILY PRN
Status: DISCONTINUED | OUTPATIENT
Start: 2018-03-26 | End: 2018-03-28 | Stop reason: HOSPADM

## 2018-03-26 RX ORDER — ATENOLOL 50 MG/1
50 TABLET ORAL DAILY PRN
Status: DISCONTINUED | OUTPATIENT
Start: 2018-03-26 | End: 2018-03-28 | Stop reason: HOSPADM

## 2018-03-26 RX ORDER — TRAZODONE HYDROCHLORIDE 100 MG/1
100-200 TABLET ORAL
Status: DISCONTINUED | OUTPATIENT
Start: 2018-03-26 | End: 2018-03-28 | Stop reason: HOSPADM

## 2018-03-26 RX ORDER — HYDROXYZINE HYDROCHLORIDE 25 MG/1
25-50 TABLET, FILM COATED ORAL EVERY 6 HOURS PRN
Status: DISCONTINUED | OUTPATIENT
Start: 2018-03-26 | End: 2018-03-28 | Stop reason: HOSPADM

## 2018-03-26 RX ORDER — FOLIC ACID 1 MG/1
1 TABLET ORAL ONCE
Status: COMPLETED | OUTPATIENT
Start: 2018-03-26 | End: 2018-03-26

## 2018-03-26 RX ORDER — BUSPIRONE HYDROCHLORIDE 10 MG/1
20 TABLET ORAL 3 TIMES DAILY
Status: DISCONTINUED | OUTPATIENT
Start: 2018-03-26 | End: 2018-03-28 | Stop reason: HOSPADM

## 2018-03-26 RX ORDER — HYDROXYZINE HYDROCHLORIDE 25 MG/1
25-50 TABLET, FILM COATED ORAL EVERY 6 HOURS PRN
Status: ON HOLD | COMMUNITY
End: 2018-06-29

## 2018-03-26 RX ORDER — GABAPENTIN 300 MG/1
600 CAPSULE ORAL 4 TIMES DAILY
COMMUNITY
End: 2018-06-04

## 2018-03-26 RX ORDER — LANOLIN ALCOHOL/MO/W.PET/CERES
100 CREAM (GRAM) TOPICAL ONCE
Status: COMPLETED | OUTPATIENT
Start: 2018-03-26 | End: 2018-03-26

## 2018-03-26 RX ORDER — BUSPIRONE HYDROCHLORIDE 10 MG/1
20 TABLET ORAL 3 TIMES DAILY
COMMUNITY
Start: 2018-02-28 | End: 2018-06-04

## 2018-03-26 RX ORDER — BUPROPION HYDROCHLORIDE 150 MG/1
150 TABLET ORAL EVERY MORNING
COMMUNITY
End: 2018-06-04

## 2018-03-26 RX ORDER — ALUMINA, MAGNESIA, AND SIMETHICONE 2400; 2400; 240 MG/30ML; MG/30ML; MG/30ML
30 SUSPENSION ORAL EVERY 4 HOURS PRN
Status: DISCONTINUED | OUTPATIENT
Start: 2018-03-26 | End: 2018-03-28 | Stop reason: HOSPADM

## 2018-03-26 RX ADMIN — OMEPRAZOLE 40 MG: 20 CAPSULE, DELAYED RELEASE ORAL at 15:02

## 2018-03-26 RX ADMIN — BUSPIRONE HYDROCHLORIDE 20 MG: 10 TABLET ORAL at 20:02

## 2018-03-26 RX ADMIN — LORAZEPAM 1 MG: 1 TABLET ORAL at 13:22

## 2018-03-26 RX ADMIN — CLONIDINE HYDROCHLORIDE 0.1 MG: 0.1 TABLET ORAL at 20:02

## 2018-03-26 RX ADMIN — DIAZEPAM 10 MG: 5 TABLET ORAL at 15:02

## 2018-03-26 RX ADMIN — AMLODIPINE BESYLATE 10 MG: 10 TABLET ORAL at 15:02

## 2018-03-26 RX ADMIN — GABAPENTIN 600 MG: 300 CAPSULE ORAL at 20:02

## 2018-03-26 RX ADMIN — SERTRALINE HYDROCHLORIDE 200 MG: 100 TABLET ORAL at 15:02

## 2018-03-26 RX ADMIN — BUSPIRONE HYDROCHLORIDE 20 MG: 10 TABLET ORAL at 15:02

## 2018-03-26 RX ADMIN — METHOCARBAMOL 1000 MG: 500 TABLET ORAL at 16:18

## 2018-03-26 RX ADMIN — DIAZEPAM 10 MG: 5 TABLET ORAL at 18:17

## 2018-03-26 RX ADMIN — DIAZEPAM 10 MG: 5 TABLET ORAL at 16:17

## 2018-03-26 RX ADMIN — DIAZEPAM 10 MG: 5 TABLET ORAL at 20:02

## 2018-03-26 RX ADMIN — MULTIPLE VITAMINS W/ MINERALS TAB 1 TABLET: TAB at 10:45

## 2018-03-26 RX ADMIN — HYDROXYZINE HYDROCHLORIDE 50 MG: 25 TABLET ORAL at 21:40

## 2018-03-26 RX ADMIN — TRAZODONE HYDROCHLORIDE 200 MG: 100 TABLET ORAL at 21:40

## 2018-03-26 RX ADMIN — GABAPENTIN 600 MG: 300 CAPSULE ORAL at 15:02

## 2018-03-26 RX ADMIN — Medication 100 MG: at 10:45

## 2018-03-26 RX ADMIN — FOLIC ACID 1 MG: 1 TABLET ORAL at 10:45

## 2018-03-26 ASSESSMENT — ACTIVITIES OF DAILY LIVING (ADL)
TRANSFERRING: 0-->INDEPENDENT
AMBULATION: 0-->INDEPENDENT
BATHING: 0 - INDEPENDENT
BATHING: 0-->INDEPENDENT
ORAL_HYGIENE: INDEPENDENT
DRESS: INDEPENDENT
TRANSFERRING: 0 - INDEPENDENT
GROOMING: INDEPENDENT
LAUNDRY: WITH SUPERVISION
TOILETING: 0-->INDEPENDENT
AMBULATION: 0 - INDEPENDENT
COGNITION: 0 - NO COGNITION ISSUES REPORTED
SWALLOWING: 0-->SWALLOWS FOODS/LIQUIDS WITHOUT DIFFICULTY
COMMUNICATION: 0 - UNDERSTANDS/COMMUNICATES WITHOUT DIFFICULTY
CHANGE_IN_FUNCTIONAL_STATUS_SINCE_ONSET_OF_CURRENT_ILLNESS/INJURY: NO
SWALLOWING: 0 - SWALLOWS FOODS/LIQUIDS WITHOUT DIFFICULTY
TOILETING: 0 - INDEPENDENT
DRESS: 0 - INDEPENDENT
FALL_HISTORY_WITHIN_LAST_SIX_MONTHS: NO
RETIRED_COMMUNICATION: 0-->UNDERSTANDS/COMMUNICATES WITHOUT DIFFICULTY
RETIRED_EATING: 0-->INDEPENDENT
DRESS: 0-->INDEPENDENT
EATING: 0 - INDEPENDENT

## 2018-03-26 NOTE — PROGRESS NOTES
03/26/18 1422   Patient Belongings   Did you bring any home meds/supplements to the hospital?  No   Patient Belongings clothing   Disposition of Belongings Kept with patient;Sent to security per site process;Locker   Belongings Search Yes   Clothing Search Yes   Second Staff Fredrick MEADOWS, Will RESENDEZ     Shorts and pants w/strings in security    No cell, wallet, cash, meds or credit cards    [place] ID, [employer] card in security    A               Admission:  I am responsible for any personal items that are not sent to the safe or pharmacy.  [facility] is not responsible for loss, theft or damage of any property in my possession.    Signature:  _________________________________ Date: _______  Time: _____                                              Staff Signature:  ____________________________ Date: ________  Time: _____      2nd Staff person, if patient is unable/unwilling to sign:    Signature: ________________________________ Date: ________  Time: _____     Discharge:  [place] has returned all of my personal belongings:    Signature: _________________________________ Date: ________  Time: _____                                          Staff Signature:  ____________________________ Date: ________  Time: _____

## 2018-03-26 NOTE — IP AVS SNAPSHOT
" [facility]: [phone]                                              INTERAGENCY TRANSFER FORM - LAB / IMAGING / EKG / EMG RESULTS   3/26/2018                    Hospital Admission Date: 3/26/2018  PARTH HARPER   : 1990  Sex: Male        Attending Provider: Filippo Brasher MD     Allergies:  No Known Allergies    Infection:  None   Service:  CHEMICAL DEP    Ht:  1.854 m (6' 1\")   Wt:  146.5 kg (323 lb)   Admission Wt:  145.2 kg (320 lb)    BMI:  42.61 kg/m 2   BSA:  2.75 m 2            Patient PCP Information     Provider PCP Type    Tima Crews MD General         Lab Results - 3 Days      Beta strep group A culture [[id number]]  Resulted: 18 1355, Result status: Preliminary result    Ordering provider: Lexus Bustillo PA-C  18 1155 Resulting lab: [facility]    Specimen Information    Type Source Collected On   Throat  18 1155          Components       Value Reference Range Flag Lab   Specimen Description Throat      Special Requests Specimen collected in eSwab transport (white cap)   75   Culture Micro PENDING               Rapid strep screen [[id number]]  Resulted: 18 1230, Result status: Final result    Ordering provider: Lexus Bustillo PA-C  18 1104 Resulting lab: [facility]    Specimen Information    Type Source Collected On   Throat  18 1155          Components       Value Reference Range Flag Lab   Specimen Description Throat      Rapid Strep A Screen NEGATIVE: No Group A streptococcal antigen detected by immunoassay, await culture report.   13            TSH with free T4 reflex and/or T3 as indicated [[id number]]  Resulted: 18 0827, Result status: Final result    Ordering provider: Filippo Brasher MD  18 0030 Resulting lab: [facility]    Specimen Information    Type Source Collected On   Blood  18 0725 "          Components       Value Reference Range Flag Lab   TSH 3.79 0.40 - 4.00 mU/L  13            Comprehensive metabolic panel [[id number]] (Abnormal)  Resulted: 03/27/18 0827, Result status: Final result    Ordering provider: Filippo Brasher MD  03/27/18 0030 Resulting lab: [facility]    Specimen Information    Type Source Collected On   Blood  03/27/18 0725          Components       Value Reference Range Flag Lab   Sodium 141 133 - 144 mmol/L  13   Potassium 3.8 3.4 - 5.3 mmol/L  13   Chloride 104 94 - 109 mmol/L  13   Carbon Dioxide 32 20 - 32 mmol/L  13   Anion Gap 5 3 - 14 mmol/L  13   Glucose 100 70 - 99 mg/dL H 13   Urea Nitrogen 11 7 - 30 mg/dL  13   Creatinine 0.54 0.66 - 1.25 mg/dL L 13   GFR Estimate >90 >60 mL/min/1.7m2  13   Comment:  Non  GFR Calc   GFR Estimate If Black >90 >60 mL/min/1.7m2  13   Comment:  African American GFR Calc   Calcium 8.2 8.5 - 10.1 mg/dL L 13   Bilirubin Total 1.1 0.2 - 1.3 mg/dL  13   Albumin 3.2 3.4 - 5.0 g/dL L 13   Protein Total 6.2 6.8 - 8.8 g/dL L 13   Alkaline Phosphatase 89 40 - 150 U/L  13   ALT 41 0 - 70 U/L  13   AST 17 0 - 45 U/L  13            Lipid panel [[id number]] (Abnormal)  Resulted: 03/27/18 0827, Result status: Final result    Ordering provider: Filippo Brasher MD  03/27/18 0030 Resulting lab: [facility]    Specimen Information    Type Source Collected On   Blood  03/27/18 0725          Components       Value Reference Range Flag Lab   Cholesterol 179 <200 mg/dL  13   Triglycerides 373 <150 mg/dL H 13   Comment:         Borderline high:  150-199 mg/dl  High:             200-499 mg/dl  Very high:       >499 mg/dl     HDL Cholesterol 40 >39 mg/dL  13   LDL Cholesterol Calculated 64 <100 mg/dL  13   Comment:  Desirable:       <100 mg/dl   Non HDL Cholesterol 139 <130 mg/dL H 13   Comment:         Above Desirable:  130-159 mg/dl  Borderline high:  160-189 mg/dl  High:              190-219 mg/dl  Very high:       >219 mg/dl              CBC with platelets differential [[id number]]  Resulted: 03/27/18 0759, Result status: Final result    Ordering provider: Filippo Brasher MD  03/27/18 0030 Resulting lab: [facility]    Specimen Information    Type Source Collected On   Blood  03/27/18 0725          Components       Value Reference Range Flag Lab   WBC 7.0 4.0 - 11.0 10e9/L  13   RBC Count 5.04 4.4 - 5.9 10e12/L  13   Hemoglobin 15.0 13.3 - 17.7 g/dL  13   Hematocrit 43.8 40.0 - 53.0 %  13   MCV 87 78 - 100 fl  13   MCH 29.8 26.5 - 33.0 pg  13   MCHC 34.2 31.5 - 36.5 g/dL  13   RDW 12.9 10.0 - 15.0 %  13   Platelet Count 188 150 - 450 10e9/L  13   Diff Method Automated Method   13   % Neutrophils 43.8 %  13   % Lymphocytes 46.1 %  13   % Monocytes 7.5 %  13   % Eosinophils 2.2 %  13   % Basophils 0.1 %  13   % Immature Granulocytes 0.3 %  13   Nucleated RBCs 0 0 /100  13   Absolute Neutrophil 3.1 1.6 - 8.3 10e9/L  13   Absolute Lymphocytes 3.2 0.8 - 5.3 10e9/L  13   Absolute Monocytes 0.5 0.0 - 1.3 10e9/L  13   Absolute Eosinophils 0.2 0.0 - 0.7 10e9/L  13   Absolute Basophils 0.0 0.0 - 0.2 10e9/L  13   Abs Immature Granulocytes 0.0 0 - 0.4 10e9/L  13   Absolute Nucleated RBC 0.0   13            Drug abuse screen 6 urine (tox) [[id number]] (Abnormal)  Resulted: 03/26/18 1357, Result status: Final result    Ordering provider: Stephany Lugo MD  03/26/18 1147 Resulting lab: [facility]    Specimen Information    Type Source Collected On   Urine  03/26/18 1308          Components       Value Reference Range Flag Lab   Amphetamine Qual Urine Negative NEG^Negative  13   Comment:  Cutoff for a negative amphetamine is 500 ng/mL or less.   Barbiturates Qual Urine Negative NEG^Negative  13   Comment:  Cutoff for a negative barbiturate is 200 ng/mL or less.   Benzodiazepine Qual Urine Negative NEG^Negative  13   Comment:  Cutoff for a  negative benzodiazepine is 200 ng/mL or less.   Cannabinoids Qual Urine Negative NEG^Negative  13   Comment:  Cutoff for a negative cannabinoid is 50 ng/mL or less.   Cocaine Qual Urine Negative NEG^Negative  13   Comment:  Cutoff for a negative cocaine is 300 ng/mL or less.   Ethanol Qual Urine Positive NEG^Negative A 13   Comment:         Cutoff for a positive urine ethanol is greater than 0.05 g/dL.  This is an   unconfirmed screening result to be used for medical purposes only.     Opiates Qualitative Urine Negative NEG^Negative  13   Comment:  Cutoff for a negative opiate is 300 ng/mL or less.            UA reflex to Microscopic and Culture [[id number]] (Abnormal)  Resulted: 03/26/18 1339, Result status: Final result    Ordering provider: Stephany Lugo MD  03/26/18 1020 Resulting lab: [facility]    Specimen Information    Type Source Collected On   Unspecified Urine  03/26/18 1308          Components       Value Reference Range Flag Lab   Color Urine Yellow   13   Appearance Urine Clear   13   Glucose Urine Negative NEG^Negative mg/dL  13   Bilirubin Urine Negative NEG^Negative  13   Ketones Urine Negative NEG^Negative mg/dL  13   Specific Gravity Urine 1.020 1.003 - 1.035  13   Blood Urine Negative NEG^Negative  13   pH Urine 6.5 5.0 - 7.0 pH  13   Protein Albumin Urine 30 NEG^Negative mg/dL A 13   Urobilinogen mg/dL Normal 0.0 - 2.0 mg/dL  13   Nitrite Urine Negative NEG^Negative  13   Leukocyte Esterase Urine Negative NEG^Negative  13   Source Unspecified Urine   13   RBC Urine 1 0 - 2 /HPF  13   WBC Urine 1 0 - 5 /HPF  13   Squamous Epithelial /HPF Urine 1 0 - 1 /HPF  13   Mucous Urine Present NEG^Negative /LPF A 13            Comprehensive metabolic panel [[id number]] (Abnormal)  Resulted: 03/26/18 1111, Result status: Final result    Ordering provider: Stephany Lugo MD  03/26/18 1020 Resulting lab: [facility]    Specimen Information    Type  Source Collected On   Blood  03/26/18 1040          Components       Value Reference Range Flag Lab   Sodium 144 133 - 144 mmol/L  13   Potassium 3.9 3.4 - 5.3 mmol/L  13   Chloride 106 94 - 109 mmol/L  13   Carbon Dioxide 30 20 - 32 mmol/L  13   Anion Gap 8 3 - 14 mmol/L  13   Glucose 120 70 - 99 mg/dL H 13   Urea Nitrogen 11 7 - 30 mg/dL  13   Creatinine 0.55 0.66 - 1.25 mg/dL L 13   GFR Estimate >90 >60 mL/min/1.7m2  13   Comment:  Non  GFR Calc   GFR Estimate If Black >90 >60 mL/min/1.7m2  13   Comment:  African American GFR Calc   Calcium 7.9 8.5 - 10.1 mg/dL L 13   Bilirubin Total 0.3 0.2 - 1.3 mg/dL  13   Albumin 3.6 3.4 - 5.0 g/dL  13   Protein Total 7.2 6.8 - 8.8 g/dL  13   Alkaline Phosphatase 105 40 - 150 U/L  13   ALT 49 0 - 70 U/L  13   AST 24 0 - 45 U/L  13            Lipase [[id number]]  Resulted: 03/26/18 1110, Result status: Final result    Ordering provider: Stephany Lugo MD  03/26/18 1020 Resulting lab: [facility]    Specimen Information    Type Source Collected On   Blood  03/26/18 1040          Components       Value Reference Range Flag Lab   Lipase 177 73 - 393 U/L  13            CBC with platelets differential [[id number]]  Resulted: 03/26/18 1051, Result status: Final result    Ordering provider: Stephany Lugo MD  03/26/18 1020 Resulting lab: [facility]    Specimen Information    Type Source Collected On   Blood  03/26/18 1040          Components       Value Reference Range Flag Lab   WBC 6.3 4.0 - 11.0 10e9/L  13   RBC Count 5.83 4.4 - 5.9 10e12/L  13   Hemoglobin 17.3 13.3 - 17.7 g/dL  13   Hematocrit 50.1 40.0 - 53.0 %  13   MCV 86 78 - 100 fl  13   MCH 29.7 26.5 - 33.0 pg  13   MCHC 34.5 31.5 - 36.5 g/dL  13   RDW 13.3 10.0 - 15.0 %  13   Platelet Count 296 150 - 450 10e9/L  13   Diff Method Automated Method   13   % Neutrophils 36.0 %  13   % Lymphocytes 57.9 %  13   % Monocytes 5.1 %  13   % Eosinophils  0.5 %  13   % Basophils 0.2 %  13   % Immature Granulocytes 0.3 %  13   Nucleated RBCs 0 0 /100  13   Absolute Neutrophil 2.3 1.6 - 8.3 10e9/L  13   Absolute Lymphocytes 3.6 0.8 - 5.3 10e9/L  13   Absolute Monocytes 0.3 0.0 - 1.3 10e9/L  13   Absolute Eosinophils 0.0 0.0 - 0.7 10e9/L  13   Absolute Basophils 0.0 0.0 - 0.2 10e9/L  13   Abs Immature Granulocytes 0.0 0 - 0.4 10e9/L  13   Absolute Nucleated RBC 0.0   13            Testing Performed By     Lab - Abbreviation Name Director Address Valid Date Range    13 - Unknown [facility] Unknown [address] [facility] [address] 01/15/15 0916 - Present    75 - Unknown [facility] Unknown [address] [facility] [address] 01/15/15 1019 - Present            Unresulted Labs     None      Encounter-Level Documents:     There are no encounter-level documents.      Order-Level Documents:     There are no order-level documents.

## 2018-03-26 NOTE — IP AVS SNAPSHOT
[facility]    [address] [place] [address]    Phone:  [phone]                                       After Visit Summary   3/26/2018    Nickolas Lang    MRN: [id number]           After Visit Summary Signature Page     I have received my discharge instructions, and my questions have been answered. I have discussed any challenges I see with this plan with the nurse or doctor.    ..........................................................................................................................................  Patient/Patient Representative Signature      ..........................................................................................................................................  Patient Representative Print Name and Relationship to Patient    ..................................................               ................................................  Date                                            Time    ..........................................................................................................................................  Reviewed by Signature/Title    ...................................................              ..............................................  Date                                                            Time

## 2018-03-26 NOTE — PLAN OF CARE
Problem: Substance Withdrawal  Goal: Substance Withdrawal  Signs and symptoms of listed problems will be absent or manageable.   Pt admitted to voluntarily to 3A to detox from Alcohol. He reports using 175 ML of vodka daily for the past 4-5 days. Pt states he relapsed after being getting out of treatment 5 days ago. Upon admission pt appears tearful, reports anxiety and feelings of depression regarding relapsing. Pt denies past or active suicidal thoughts. Patent has history of depression, anxiety, ADHD and Alcohol abuse. Safety search completed. VS stable at admit time. See patient profile. 15 min checks initiated. Treatment plan initiated. Brief orientation to unit provided.    MSSA 8@1430, pt given 10 mg of valium.

## 2018-03-26 NOTE — PHARMACY-ADMISSION MEDICATION HISTORY
Admission medication history interview status for the 3/26/2018 admission is complete. See Epic admission navigator for allergy information, pharmacy, prior to admission medications and immunization status.     Medication history interview sources:  Patient, [facility] ([place]; [phone])    Changes made to PTA medication list (reason)  Added: omeprazole, bupropion, dextroamphetamine-amphetamine ER  Deleted: none  Changed: buspirone (directions per [facility]), gabapentin (directions per [facility]), sertraline  (directions per [facility]), trazodone  (directions per [facility])    Additional medication history information (including reliability of information, actions taken by pharmacist): The patient was not a reliable historian as there were medications (bupropion, dextroamphetamine-amphetamine ER) he recently filled but did not report taking. This writer spoke with the outpatient [facility] pharmacist about his prescription filling history.    Gabapentin 300 mg last filled 03/21/18 for quantity 240  Dextroamphetamine - amphetamine ER 25 mg last filled 03/21/18 for quantity 60      Prior to Admission medications    Medication Sig Last Dose Taking? Auth Provider   busPIRone (BUSPAR) 10 MG tablet Take 20 mg by mouth 3 times daily  couple days ago at Unknown time Yes Reported, Patient   sertraline (ZOLOFT) 100 MG tablet Take 200 mg by mouth daily  couple days ago at Unknown time Yes Reported, Patient   traZODone (DESYREL) 100 MG tablet Take 100-200 mg by mouth nightly as needed for sleep  unknown Yes Reported, Patient   gabapentin (NEURONTIN) 300 MG capsule Take 600 mg by mouth 4 times daily couple days ago at Unknown time Yes Unknown, Entered By History   hydrOXYzine (ATARAX) 25 MG tablet Take 25-50 mg by mouth every 6 hours as needed for anxiety couple days ago at Unknown time Yes Unknown, Entered By History   buPROPion (WELLBUTRIN XL) 150 MG 24 hr tablet Take 150 mg by mouth every morning unknown Yes Unknown, Entered By History   omeprazole  (PRILOSEC) 40 MG capsule Take 40 mg by mouth daily couple days ago at Unknown time Yes Unknown, Entered By History   DEXTROAMPHETAMINE SULFATE ER PO Take 25 mg by mouth 2 times daily Takes in the morning and afternoon unknown Yes Unknown, Entered By History   amLODIPine (NORVASC) 10 MG tablet Take 1 tablet (10 mg) by mouth daily couple days ago at Unknown time Yes Tima Crews MD   cloNIDine (CATAPRES) 0.1 MG tablet Take 1 tablet (0.1 mg) by mouth 2 times daily couple days ago at Unknown time Yes Tima Crews MD   multivitamin, therapeutic with minerals (THERA-VIT-M) TABS tablet Take 1 tablet by mouth daily couple days ago at Unknown time Yes Lachelle Peterson MD   aspirin-acetaminophen-caffeine (EXCEDRIN MIGRAINE) 250-250-65 MG per tablet Take 2 tablets by mouth every 6 hours as needed for headaches  Yes Unknown, Entered By History   methocarbamol (ROBAXIN) 500 MG tablet Take 2 tablets (1,000 mg) by mouth 3 times daily as needed for muscle spasms unknown Yes Nicolette Frye APRN CNP       Medication history completed by:  Tasneem Herman, PharmD, BCPP  Behavioral ER Pharmacist  [phone]

## 2018-03-26 NOTE — ED PROVIDER NOTES
"  History     Chief Complaint   Patient presents with     Alcohol Problem     seeking detox,      BEAR  Nickolas Lang is a 27 year old male with history of alcohol abuse who presents to the ED today seeking detox from alcohol. Per review of his chart, he was just seen in the ED yesterday seeking detox from alcohol. However, there were no available detox beds, and he was instructed to call detox to place a bed hold in the morning. Patient states he was living at a sober house and was sober for 4 months previously until he relapsed on Wednesday, 5 days ago and was kicked out of the sober house on Friday. When asked where he was staying, he replied, \"[place] However, his girlfriend states he has been staying with her since then. He reports drinking  175 mL of vodka daily and last drank 2 hours prior to arrival. He has a breathalyzer test of 0.296.  He otherwise currently denies any previous history of alcohol withdrawal seizures, other substance abuse, or suicidal ideation. He at one point states \"I'm a drunken skunk.\"    Social: Here with his girlfriend. Currently unemployed.  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 Denies other substance abuse    PAST MEDICAL HISTORY  Past Medical History:   Diagnosis Date     ADD (attention deficit disorder)      Alcohol abuse      Anxiety     gabapentin helps the most      GERD (gastroesophageal reflux disease)      Hepatic steatosis      Hypertension      Obesity      Pyloric stenosis     s/p pyloromyotomy as an infant     PAST SURGICAL HISTORY  Past Surgical History:   Procedure Laterality Date     Deviated septum repair       PYLOROMYOTOMY SURGERY  as an infant      SHOULDER SURGERY Left 07/29/2016    Removal of cartilage     FAMILY HISTORY  Family History   Problem Relation Age of Onset     Neurologic Disorder Mother      Multiple Sclerosis     Depression Mother      Anxiety Disorder Mother      Alcohol/Drug Father      " Substance Abuse Father      Depression Maternal Grandmother      Anxiety Disorder Maternal Grandmother      Hypertension Maternal Grandmother      Substance Abuse Maternal Grandfather      Alcohol/Drug Paternal Grandfather      SOCIAL HISTORY  Social History   Substance Use Topics     Smoking status: Current Every Day Smoker     Packs/day: 0.50     Years: 4.00     Types: Cigarettes     Smokeless tobacco: Never Used     Alcohol use 0.0 oz/week     0 Standard drinks or equivalent per week      Comment: 1.75L vodka daily     MEDICATIONS  No current facility-administered medications for this encounter.      Current Outpatient Prescriptions   Medication     GABAPENTIN PO     busPIRone (BUSPAR) 10 MG tablet     sertraline (ZOLOFT) 100 MG tablet     traZODone (DESYREL) 100 MG tablet     amLODIPine (NORVASC) 10 MG tablet     cloNIDine (CATAPRES) 0.1 MG tablet     multivitamin, therapeutic with minerals (THERA-VIT-M) TABS tablet     hydrOXYzine (ATARAX) 25 MG tablet     [DISCONTINUED] sertraline (ZOLOFT) 100 MG tablet     [DISCONTINUED] traZODone (DESYREL) 100 MG tablet     aspirin-acetaminophen-caffeine (EXCEDRIN MIGRAINE) 250-250-65 MG per tablet     methocarbamol (ROBAXIN) 500 MG tablet     Facility-Administered Medications Ordered in Other Encounters   Medication     Self Administer Medications: Behavioral Services     ALLERGIES  No Known Allergies    I have reviewed the Medications, Allergies, Past Medical and Surgical History, and Social History in the Epic system.    Review of Systems   Unable to perform ROS: Mental status change       Physical Exam   BP: (!) 143/98  Pulse: 90  Temp: 97  F (36.1  C)  Resp: 16  Weight: 145.2 kg (320 lb)  SpO2: 96 %      Physical Exam  Physical Exam   Constitutional:   well nourished, well developed, resting comfortably   HENT:   Head: Normocephalic and atraumatic.   Marked facial flushing  Eyes: Conjunctivae are normal. Pupils are 4 mmequal, round, and reactive to light.   pharynx has  no erythema or exudate, mucous membranes are moist  Neck:   no adenopathy, no bony tenderness  Cardiovascular: regular rate and rhythm without murmurs or gallops  Pulmonary/Chest: Clear to auscultation bilaterally, with no wheezes or retractions. No respiratory distress.  GI: Soft with good bowel sounds.  Non-tender, non-distended, with no guarding, no rebound, no peritoneal signs.   Back:  No bony or CVA tenderness   Musculoskeletal:  no edema or clubbing   Skin: Skin is warm and dry. No rash noted. flushing of face and skin  Neurological: alert and oriented to person, place, and time. Nonfocal exam, slightly slurred speech, no tremors  Psychiatric:  normal mood and affect.   ED Course     ED Course     Procedures   10:15 AM  The patient was seen and examined by Dr. Lugo in Room 10.                Critical Care time:  none            Results for orders placed or performed during the hospital encounter of 03/26/18 (from the past 24 hour(s))   Alcohol breath test POCT   Result Value Ref Range    Alcohol Breath Test 0.296 (A) 0.00 - 0.01   CBC with platelets differential   Result Value Ref Range    WBC 6.3 4.0 - 11.0 10e9/L    RBC Count 5.83 4.4 - 5.9 10e12/L    Hemoglobin 17.3 13.3 - 17.7 g/dL    Hematocrit 50.1 40.0 - 53.0 %    MCV 86 78 - 100 fl    MCH 29.7 26.5 - 33.0 pg    MCHC 34.5 31.5 - 36.5 g/dL    RDW 13.3 10.0 - 15.0 %    Platelet Count 296 150 - 450 10e9/L    Diff Method Automated Method     % Neutrophils 36.0 %    % Lymphocytes 57.9 %    % Monocytes 5.1 %    % Eosinophils 0.5 %    % Basophils 0.2 %    % Immature Granulocytes 0.3 %    Nucleated RBCs 0 0 /100    Absolute Neutrophil 2.3 1.6 - 8.3 10e9/L    Absolute Lymphocytes 3.6 0.8 - 5.3 10e9/L    Absolute Monocytes 0.3 0.0 - 1.3 10e9/L    Absolute Eosinophils 0.0 0.0 - 0.7 10e9/L    Absolute Basophils 0.0 0.0 - 0.2 10e9/L    Abs Immature Granulocytes 0.0 0 - 0.4 10e9/L    Absolute Nucleated RBC 0.0    Comprehensive metabolic panel   Result Value Ref  Range    Sodium 144 133 - 144 mmol/L    Potassium 3.9 3.4 - 5.3 mmol/L    Chloride 106 94 - 109 mmol/L    Carbon Dioxide 30 20 - 32 mmol/L    Anion Gap 8 3 - 14 mmol/L    Glucose 120 (H) 70 - 99 mg/dL    Urea Nitrogen 11 7 - 30 mg/dL    Creatinine 0.55 (L) 0.66 - 1.25 mg/dL    GFR Estimate >90 >60 mL/min/1.7m2    GFR Estimate If Black >90 >60 mL/min/1.7m2    Calcium 7.9 (L) 8.5 - 10.1 mg/dL    Bilirubin Total 0.3 0.2 - 1.3 mg/dL    Albumin 3.6 3.4 - 5.0 g/dL    Protein Total 7.2 6.8 - 8.8 g/dL    Alkaline Phosphatase 105 40 - 150 U/L    ALT 49 0 - 70 U/L    AST 24 0 - 45 U/L   Lipase   Result Value Ref Range    Lipase 177 73 - 393 U/L        Labs Ordered and Resulted from Time of ED Arrival Up to the Time of Departure from the ED   COMPREHENSIVE METABOLIC PANEL - Abnormal; Notable for the following:        Result Value    Glucose 120 (*)     Creatinine 0.55 (*)     Calcium 7.9 (*)     All other components within normal limits   ALCOHOL BREATH TEST POCT - Abnormal; Notable for the following:     Alcohol Breath Test 0.296 (*)     All other components within normal limits   CBC WITH PLATELETS DIFFERENTIAL   LIPASE   UA MACROSCOPIC WITH REFLEX TO MICRO AND CULTURE            Assessments & Plan (with Medical Decision Making)       I have reviewed the nursing notes.  Emergency Department course:  The patient was seen and examined at 1011 am.  I treated with thiamine, folate and multivitamins p.o.  Breathalyzer today is 0.296.  Laboratory studies show an unremarkable CBC and lipase.  Comprehensive metabolic panel shows an elevated glucose of 120 and a slightly low calcium and is otherwise unremarkable.    The patient is a 27-year-old alcoholic here for alcohol detox.  He is unemployed and currently is homeless although he has been living with his girlfriend.  He will be admitted to detox here at [facility].   I placed him on the [facility] protocol for alcohol withdrawal at about 13:20 pm.  He will be admitted to Station  3 A under the care of Dr. Brasher.  He was admitted upstairs at about 13:30 pm  I have reviewed the findings, diagnosis, plan and need for follow up with the patient.    New Prescriptions    No medications on file       Final diagnoses:   Acute alcoholic intoxication in alcoholism with complication (H)     I, Aidan Ragsdale , am serving as a trained medical scribe to document services personally performed by Stephany Lugo MD, based on the provider's statements to me.      IStephany MD, was physically present and have reviewed and verified the accuracy of this note documented by Aidan Ragsdale.   This note was created in part by the use of Dragon voice recognition dictation system. Inadvertent grammatical errors and typographical errors may still exist.  Stephany Lugo MD        3/26/2018   [facility], [place], EMERGENCY DEPARTMENT     Stephany Lugo MD  03/26/18 5308

## 2018-03-26 NOTE — IP AVS SNAPSHOT
MRN:[id number]                      After Visit Summary   3/26/2018    Nickolas Lang    MRN: [id number]           Thank you!     Thank you for choosing [facility] for your care. Our goal is always to provide you with excellent care.        Patient Information     Date Of Birth          1990        Designated Caregiver       Most Recent Value    Caregiver    Will someone help with your care after discharge? yes    Name of designated caregiver nancy    Phone number of caregiver [phone]    Caregiver address [place]      About your hospital stay     You were admitted on:  March 26, 2018 You last received care in the:  [facility]    You were discharged on:  March 28, 2018       Who to Call     For medical emergencies, please call 911.  For non-urgent questions about your medical care, please call your primary care provider or clinic, [phone]          Attending Provider     Provider Specialty    Stephany Lugo MD Emergency Medicine    [facility], MD [facility]       Primary Care Provider Office Phone # Fax #    Tima Crews [phone]      Your next 10 appointments already scheduled     Apr 24, 2018  9:00 AM CDT   Return Visit with Tima Crews MD   [facility] ([facility])    [address] [place], [place] [address] [address]  [address]  [facility] [address] [phone]              Further instructions from your care team       Behavioral Discharge Planning and Instructions  THANK YOU FOR CHOOSING THE [address]  [phone]    Summary: You were admitted to [facility] on 3/26/2018 for detoxification from alcohol.  A medical exam was performed that included lab work. You have met with a  and opted to discharge to your mother's home and commit to regular attendance and active participation in AA Meetings - at least 3 per week/obtain a new sponsor and  return to work.  Please take care and make your recovery a daily priority, Nickolas!     Recommendation:  In addition to the above, consider psychotherapy/CD counseling weekly and SMART Recovery.    Main Diagnoses:  Per Dr. Brasher;  1.  Alcohol use disorder.   2.  Major depressive disorder, recurrent without psychotic features.     Major Treatments, Procedures and Findings:  You were treated for alcohol detoxification using valium administered based on the [facility] protocol. You *** a chemical dependency assessment. You had labs drawn and those results were reviewed with you. Please take a copy of your lab work with you to your next primary care physician appointment.    Symptoms to Report:  If you experience more anxiety, confusion, sleeplessness, deep sadness or thoughts of suicide, notify your treatment team or notify your primary care physician. IF ANY OF THE SYMPTOMS YOU ARE EXPERIENCING ARE A MEDICAL EMERGENCY CALL 911 IMMEDIATELY.     Lifestyle Adjustment: Adjust your lifestyle to get enough sleep, relaxation, exercise and good nutrition. Continue to develop healthy coping skills to decrease stress and promote a sober living environment. Do not use mood altering substances including alcohol, illegal drugs or addictive medications other than what is currently prescribed.     Follow-up Appointment:   With Dr. Crews on May 13th at 12:00 pm at his clinic at [facility].  With Dr. Lovell at [facility] on June 12th at 6:45 pm    Resources:   AA/NA and Sponsors are excellent resources for support and you can find one at any support group meeting.   SMART Recovery - self management for addiction recovery:  www.smartrecovery.org  http://www.aastpaul.org/?topic=8  http://aamin[place].org/meetings/  http://www.nam[place].org/index.php/meeting-list-pdf  Pathways ~ A Health Crisis Resource & Support Center:  780.630.4185.  http://www.harmreduction.org  [facility]  580.618.3409  Support Group:  AA/NA and Sponsor/support  Crisis Intervention: 623.528.1328 or 275-018-6913 (TTY: 875.178.7083).  Call anytime for help.  National [place] on Mental Illness (www.[place].chaya.org): 754.514.3339 or 346-406-3885.  Alcoholics Anonymous (www.alcoholics-anonymous.org): Check your phone book for your local chapter.  Suicide Awareness Voices of Education (SAVE) (www.save.org): 811-392-XBEV (0831)  National Suicide Prevention Line (www.mentalRenovate Americamn.org): 948-640-HYQL (2337)  Mental Health Consumer/Survivor Network of [place] (www.mhcsn.net): 958.146.2587 or 666-662-1650  Mental Health Association of [place] (www.mentalhealth.org): 802.105.5301 or 598-346-3584   Substance Abuse and Mental Health Services (www.samhsa.gov)    [place] Recovery Connection ([facility])  [facility] connects people seeking recovery to resources that help foster and sustain long-term recovery.  Whether you are seeking resources for treatment, transportation, housing, job training, education, health care or other pathways to recovery, [facility] is a great place to start.  682.667.3275.  www.[place] Healthy.org    General Medication Instructions:   See your medication sheet(s) for instructions.   Take all medicines as directed.  Make no changes unless your doctor suggests them.   Go to all your doctor visits.  Be sure to have all your required lab tests. This way, your medicines can be refilled on time.  Do not use any forms of alcohol.    Please Note:  If you have any questions at anytime after you are discharged please call the [facility] [place] detox unit 3AW unit at [phone].  [facility], Behavioral Intake [phone]  Please take this discharge folder with you to all your follow up appointments, it contains your lab results, diagnosis, medication list and discharge recommendations.      THANK YOU FOR CHOOSING THE [facility]       Pending Results     Date and Time  "Order Name Status Description    3/27/2018 1155 Beta strep group A culture Preliminary             Statement of Approval     Ordered          18 0916  I have reviewed and agree with all the recommendations and orders detailed in this document.  EFFECTIVE NOW     Comments:  After cd assessment is done   Approved and electronically signed by:  Filippo Brasher MD             Admission Information     Date & Time Provider Department Dept. Phone    3/26/2018 Filippo Brasher MD [facility] [phone]      Your Vitals Were     Blood Pressure Pulse Temperature Respirations Height Weight    151/108 (BP Location: Left arm) 84 96.7  F (35.9  C) (Oral) 16 1.854 m (6' 1\") 146.5 kg (323 lb)    Pulse Oximetry BMI (Body Mass Index)                96% 42.61 kg/m2          [employer] Information     griddig lets you send messages to your doctor, view your test results, renew your prescriptions, schedule appointments and more. To sign up, go to [email or web address] . Click on \"Log in\" on the left side of the screen, which will take you to the Welcome page. Then click on \"Sign up Now\" on the right side of the page.     You will be asked to enter the access code listed below, as well as some personal information. Please follow the directions to create your username and password.     Your access code is: [email or web address]  Expires: 2018 11:47 AM     Your access code will  in 90 days. If you need help or a new code, please call your [facility] clinic or [phone].        Care EveryWhere ID     This is your Care EveryWhere ID. This could be used by other organizations to access your [facility] medical records  [id number]        Equal Access to Services     CATHY LOZADA AH: Rishabh Petersen, cherry patterson, shawna cloud. So [facility] [phone].    ATENCIÓN: Si habla español, tiene a miller disposición servicios gratuitos de " asistencia lingüística. Stan al [phone].    We comply with applicable federal civil rights laws and [place] laws. We do not discriminate on the basis of race, color, national origin, age, disability, sex, sexual orientation, or gender identity.               Review of your medicines      START taking        Dose / Directions    thiamine 100 MG tablet   Used for:  Acute alcoholic intoxication in alcoholism with complication (H)        Dose:  100 mg   Start taking on:  3/29/2018   Take 1 tablet (100 mg) by mouth daily   Quantity:  30 tablet   Refills:  1         CONTINUE these medicines which have NOT CHANGED        Dose / Directions    amLODIPine 10 MG tablet   Commonly known as:  NORVASC   Used for:  Essential hypertension with goal blood pressure less than 140/90        Dose:  10 mg   Take 1 tablet (10 mg) by mouth daily   Quantity:  30 tablet   Refills:  1       aspirin-acetaminophen-caffeine 250-250-65 MG per tablet   Commonly known as:  EXCEDRIN MIGRAINE        Dose:  2 tablet   Take 2 tablets by mouth every 6 hours as needed for headaches   Refills:  0       buPROPion 150 MG 24 hr tablet   Commonly known as:  WELLBUTRIN XL        Dose:  150 mg   Take 150 mg by mouth every morning   Refills:  0       busPIRone 10 MG tablet   Commonly known as:  BUSPAR        Dose:  20 mg   Take 20 mg by mouth 3 times daily   Refills:  0       cloNIDine 0.1 MG tablet   Commonly known as:  CATAPRES   Used for:  Essential hypertension with goal blood pressure less than 140/90        Dose:  0.1 mg   Take 1 tablet (0.1 mg) by mouth 2 times daily   Quantity:  60 tablet   Refills:  1       gabapentin 300 MG capsule   Commonly known as:  NEURONTIN        Dose:  600 mg   Take 600 mg by mouth 4 times daily   Refills:  0       hydrOXYzine 25 MG tablet   Commonly known as:  ATARAX        Dose:  25-50 mg   Take 25-50 mg by mouth every 6 hours as needed for anxiety   Refills:  0       methocarbamol 500 MG tablet   Commonly known as:   ROBAXIN   Used for:  Chronic low back pain, unspecified back pain laterality, with sciatica presence unspecified        Dose:  1000 mg   Take 2 tablets (1,000 mg) by mouth 3 times daily as needed for muscle spasms   Quantity:  30 tablet   Refills:  1       multivitamin, therapeutic with minerals Tabs tablet        Dose:  1 tablet   Take 1 tablet by mouth daily   Quantity:  30 each   Refills:  0       omeprazole 40 MG capsule   Commonly known as:  priLOSEC   Used for:  Gastroesophageal reflux disease without esophagitis        Dose:  40 mg   Take 1 capsule (40 mg) by mouth daily   Quantity:  15 capsule   Refills:  0       sertraline 100 MG tablet   Commonly known as:  ZOLOFT        Dose:  200 mg   Take 200 mg by mouth daily   Refills:  0       traZODone 100 MG tablet   Commonly known as:  DESYREL        Dose:  100-200 mg   Take 100-200 mg by mouth nightly as needed for sleep   Refills:  0         STOP taking     DEXTROAMPHETAMINE SULFATE ER PO                Where to get your medicines      These medications were sent to [facility] [address]  [address] [address]     Phone:  [phone]     omeprazole 40 MG capsule    thiamine 100 MG tablet                Protect others around you: Learn how to safely use, store and throw away your medicines at www.disposemymeds.org.             Medication List: This is a list of all your medications and when to take them. Check marks below indicate your daily home schedule. Keep this list as a reference.      Medications           Morning Afternoon Evening Bedtime As Needed    amLODIPine 10 MG tablet   Commonly known as:  NORVASC   Take 1 tablet (10 mg) by mouth daily   Last time this was given:  10 mg on 3/28/2018  8:07 AM                                aspirin-acetaminophen-caffeine 250-250-65 MG per tablet   Commonly known as:  EXCEDRIN MIGRAINE   Take 2 tablets by mouth every 6 hours as needed for headaches    Last time this was given:  2 tablets on 3/28/2018  6:56 AM                                buPROPion 150 MG 24 hr tablet   Commonly known as:  WELLBUTRIN XL   Take 150 mg by mouth every morning                                busPIRone 10 MG tablet   Commonly known as:  BUSPAR   Take 20 mg by mouth 3 times daily   Last time this was given:  20 mg on 3/28/2018  8:07 AM                                cloNIDine 0.1 MG tablet   Commonly known as:  CATAPRES   Take 1 tablet (0.1 mg) by mouth 2 times daily   Last time this was given:  0.1 mg on 3/28/2018  8:07 AM                                gabapentin 300 MG capsule   Commonly known as:  NEURONTIN   Take 600 mg by mouth 4 times daily   Last time this was given:  600 mg on 3/28/2018  8:07 AM                                hydrOXYzine 25 MG tablet   Commonly known as:  ATARAX   Take 25-50 mg by mouth every 6 hours as needed for anxiety   Last time this was given:  50 mg on 3/27/2018  9:47 PM                                methocarbamol 500 MG tablet   Commonly known as:  ROBAXIN   Take 2 tablets (1,000 mg) by mouth 3 times daily as needed for muscle spasms   Last time this was given:  1,000 mg on 3/27/2018 12:31 PM                                multivitamin, therapeutic with minerals Tabs tablet   Take 1 tablet by mouth daily   Last time this was given:  1 tablet on 3/28/2018  8:07 AM                                omeprazole 40 MG capsule   Commonly known as:  priLOSEC   Take 1 capsule (40 mg) by mouth daily   Last time this was given:  40 mg on 3/28/2018  8:07 AM                                sertraline 100 MG tablet   Commonly known as:  ZOLOFT   Take 200 mg by mouth daily   Last time this was given:  200 mg on 3/28/2018  8:07 AM                                thiamine 100 MG tablet   Take 1 tablet (100 mg) by mouth daily   Start taking on:  3/29/2018   Last time this was given:  100 mg on 3/28/2018  8:07 AM                                traZODone 100 MG tablet    Commonly known as:  DESYREL   Take 100-200 mg by mouth nightly as needed for sleep   Last time this was given:  200 mg on 3/27/2018  9:47 PM

## 2018-03-26 NOTE — ED NOTES
ED to Behavioral Floor Handoff    SITUATION  Nickolas Lang is a 27 year old male who speaks English and lives in a sober house until Friday when he relapsed on alcohol, since as been staying a wifes home.  The patient arrived in the ED by private car from home with a complaint of Alcohol Problem (seeking detox, )  .The patient's current symptoms started/worsened 4 day(s) ago and during this time the symptoms have increased.   In the ED, pt was diagnosed with   Final diagnoses:   Acute alcoholic intoxication in alcoholism with complication (H)        Initial vitals were: BP: (!) 143/98  Pulse: 90  Temp: 97  F (36.1  C)  Resp: 16  Weight: 145.2 kg (320 lb)  SpO2: 96 %   --------  Is the patient diabetic? No   If yes, last blood glucose? --     If yes, was this treated in the ED? --  --------  Is the patient inebriated (ETOH) Yes or Impaired on other substances? No  MSSA done? Yes  Last MSSA score 4 at 1017 am:    Were withdrawal symptoms treated? No  Does the patient have a seizure history? No. If yes, date of most recent seizure--  --------  Is the patient patient experiencing suicidal ideation? denies current or recent suicidal ideation     Homicidal ideation? denies current or recent homicidal ideation or behaviors.    Self-injurious behavior/urges? denies current or recent self injurious behavior or ideation.  ------  Was pt aggressive in the ED No  Was a code called No  Is the pt now cooperative? Yes  -------  Meds given in ED:   Medications   thiamine tablet 100 mg (100 mg Oral Given 3/26/18 1045)   folic acid (FOLVITE) tablet 1 mg (1 mg Oral Given 3/26/18 1045)   multivitamin, therapeutic with minerals (THERA-VIT-M) tablet 1 tablet (1 tablet Oral Given 3/26/18 1045)      Family present during ED course? Yes  Family currently present? Yes    BACKGROUND  Does the patient have a cognitive impairment or developmental disability? No  Allergies: No Known Allergies.   Social demographics are   Social History      Social History     Marital status: Single     Spouse name: N/A     Number of children: N/A     Years of education: N/A     Social History Main Topics     Smoking status: Current Every Day Smoker     Packs/day: 0.50     Years: 4.00     Types: Cigarettes     Smokeless tobacco: Never Used     Alcohol use 0.0 oz/week     0 Standard drinks or equivalent per week      Comment: 1.75L vodka daily     Drug use: No     Sexual activity: Not Currently     Other Topics Concern     None     Social History Narrative    Lives at home until kicked out.  Family supportive.          ASSESSMENT  Labs results   Labs Ordered and Resulted from Time of ED Arrival Up to the Time of Departure from the ED   COMPREHENSIVE METABOLIC PANEL - Abnormal; Notable for the following:        Result Value    Glucose 120 (*)     Creatinine 0.55 (*)     Calcium 7.9 (*)     All other components within normal limits   ALCOHOL BREATH TEST POCT - Abnormal; Notable for the following:     Alcohol Breath Test 0.296 (*)     All other components within normal limits   CBC WITH PLATELETS DIFFERENTIAL   LIPASE   UA MACROSCOPIC WITH REFLEX TO MICRO AND CULTURE   DRUG ABUSE SCREEN 6 CHEM DEP URINE ([facility])      Imaging Studies: No results found for this or any previous visit (from the past 24 hour(s)).   Most recent vital signs BP (!) 143/98  Pulse 90  Temp 97  F (36.1  C) (Oral)  Resp 16  Wt 145.2 kg (320 lb)  SpO2 96%  BMI 42.22 kg/m2   Abnormal labs/tests/findings requiring intervention:---   Pain control: pt had none  Nausea control: pt had none    RECOMMENDATION  Are any infection precautions needed (MRSA, VRE, etc.)? No If yes, what infection? --  ---  Does the patient have mobility issues? independently. If yes, what device does the pt use? ---  ---  Is patient on 72 hour hold or commitment? No If on 72 hour hold, have hold and rights been given to patient? N/A  Are admitting orders written if after 10 p.m. ?N/A  Tasks needing to be completed:---      [facility]-- [id number]   [phone] ED   [phone] [address] ED

## 2018-03-27 LAB
ALBUMIN SERPL-MCNC: 3.2 G/DL (ref 3.4–5)
ALP SERPL-CCNC: 89 U/L (ref 40–150)
ALT SERPL W P-5'-P-CCNC: 41 U/L (ref 0–70)
ANION GAP SERPL CALCULATED.3IONS-SCNC: 5 MMOL/L (ref 3–14)
AST SERPL W P-5'-P-CCNC: 17 U/L (ref 0–45)
BASOPHILS # BLD AUTO: 0 10E9/L (ref 0–0.2)
BASOPHILS NFR BLD AUTO: 0.1 %
BILIRUB SERPL-MCNC: 1.1 MG/DL (ref 0.2–1.3)
BUN SERPL-MCNC: 11 MG/DL (ref 7–30)
CALCIUM SERPL-MCNC: 8.2 MG/DL (ref 8.5–10.1)
CHLORIDE SERPL-SCNC: 104 MMOL/L (ref 94–109)
CHOLEST SERPL-MCNC: 179 MG/DL
CO2 SERPL-SCNC: 32 MMOL/L (ref 20–32)
CREAT SERPL-MCNC: 0.54 MG/DL (ref 0.66–1.25)
DEPRECATED S PYO AG THROAT QL EIA: NORMAL
DIFFERENTIAL METHOD BLD: NORMAL
EOSINOPHIL # BLD AUTO: 0.2 10E9/L (ref 0–0.7)
EOSINOPHIL NFR BLD AUTO: 2.2 %
ERYTHROCYTE [DISTWIDTH] IN BLOOD BY AUTOMATED COUNT: 12.9 % (ref 10–15)
GFR SERPL CREATININE-BSD FRML MDRD: >90 ML/MIN/1.7M2
GLUCOSE SERPL-MCNC: 100 MG/DL (ref 70–99)
HCT VFR BLD AUTO: 43.8 % (ref 40–53)
HDLC SERPL-MCNC: 40 MG/DL
HGB BLD-MCNC: 15 G/DL (ref 13.3–17.7)
IMM GRANULOCYTES # BLD: 0 10E9/L (ref 0–0.4)
IMM GRANULOCYTES NFR BLD: 0.3 %
LDLC SERPL CALC-MCNC: 64 MG/DL
LYMPHOCYTES # BLD AUTO: 3.2 10E9/L (ref 0.8–5.3)
LYMPHOCYTES NFR BLD AUTO: 46.1 %
MCH RBC QN AUTO: 29.8 PG (ref 26.5–33)
MCHC RBC AUTO-ENTMCNC: 34.2 G/DL (ref 31.5–36.5)
MCV RBC AUTO: 87 FL (ref 78–100)
MONOCYTES # BLD AUTO: 0.5 10E9/L (ref 0–1.3)
MONOCYTES NFR BLD AUTO: 7.5 %
NEUTROPHILS # BLD AUTO: 3.1 10E9/L (ref 1.6–8.3)
NEUTROPHILS NFR BLD AUTO: 43.8 %
NONHDLC SERPL-MCNC: 139 MG/DL
NRBC # BLD AUTO: 0 10*3/UL
NRBC BLD AUTO-RTO: 0 /100
PLATELET # BLD AUTO: 188 10E9/L (ref 150–450)
POTASSIUM SERPL-SCNC: 3.8 MMOL/L (ref 3.4–5.3)
PROT SERPL-MCNC: 6.2 G/DL (ref 6.8–8.8)
RBC # BLD AUTO: 5.04 10E12/L (ref 4.4–5.9)
SODIUM SERPL-SCNC: 141 MMOL/L (ref 133–144)
SPECIMEN SOURCE: NORMAL
TRIGL SERPL-MCNC: 373 MG/DL
TSH SERPL DL<=0.005 MIU/L-ACNC: 3.79 MU/L (ref 0.4–4)
WBC # BLD AUTO: 7 10E9/L (ref 4–11)

## 2018-03-27 PROCEDURE — 87081 CULTURE SCREEN ONLY: CPT | Performed by: PHYSICIAN ASSISTANT

## 2018-03-27 PROCEDURE — 36415 COLL VENOUS BLD VENIPUNCTURE: CPT | Performed by: PSYCHIATRY & NEUROLOGY

## 2018-03-27 PROCEDURE — 80053 COMPREHEN METABOLIC PANEL: CPT | Performed by: PSYCHIATRY & NEUROLOGY

## 2018-03-27 PROCEDURE — 99207 ZZC CONSULT E&M CHANGED TO INITIAL LEVEL: CPT | Performed by: PHYSICIAN ASSISTANT

## 2018-03-27 PROCEDURE — 84443 ASSAY THYROID STIM HORMONE: CPT | Performed by: PSYCHIATRY & NEUROLOGY

## 2018-03-27 PROCEDURE — 87880 STREP A ASSAY W/OPTIC: CPT | Performed by: PHYSICIAN ASSISTANT

## 2018-03-27 PROCEDURE — 25000132 ZZH RX MED GY IP 250 OP 250 PS 637: Performed by: PSYCHIATRY & NEUROLOGY

## 2018-03-27 PROCEDURE — 80061 LIPID PANEL: CPT | Performed by: PSYCHIATRY & NEUROLOGY

## 2018-03-27 PROCEDURE — 25000132 ZZH RX MED GY IP 250 OP 250 PS 637: Performed by: PHYSICIAN ASSISTANT

## 2018-03-27 PROCEDURE — 99222 1ST HOSP IP/OBS MODERATE 55: CPT | Performed by: PHYSICIAN ASSISTANT

## 2018-03-27 PROCEDURE — 99207 ZZC DOWN CODE DUE TO INITIAL EXAM: CPT | Performed by: PSYCHIATRY & NEUROLOGY

## 2018-03-27 PROCEDURE — 85025 COMPLETE CBC W/AUTO DIFF WBC: CPT | Performed by: PSYCHIATRY & NEUROLOGY

## 2018-03-27 PROCEDURE — 99221 1ST HOSP IP/OBS SF/LOW 40: CPT | Mod: AI | Performed by: PSYCHIATRY & NEUROLOGY

## 2018-03-27 PROCEDURE — 12800012 ZZH R&B CD MH INTERMEDIATE ADULT

## 2018-03-27 RX ORDER — OMEPRAZOLE 40 MG/1
40 CAPSULE, DELAYED RELEASE ORAL DAILY
Qty: 15 CAPSULE | Refills: 0 | Status: SHIPPED | OUTPATIENT
Start: 2018-03-27 | End: 2018-04-26

## 2018-03-27 RX ORDER — DIPHENHYDRAMINE HCL 25 MG
25 CAPSULE ORAL EVERY 6 HOURS PRN
Status: DISCONTINUED | OUTPATIENT
Start: 2018-03-27 | End: 2018-03-28 | Stop reason: HOSPADM

## 2018-03-27 RX ORDER — CLONIDINE HYDROCHLORIDE 0.1 MG/1
0.1 TABLET ORAL 3 TIMES DAILY PRN
Status: DISCONTINUED | OUTPATIENT
Start: 2018-03-27 | End: 2018-03-28 | Stop reason: HOSPADM

## 2018-03-27 RX ADMIN — OMEPRAZOLE 40 MG: 20 CAPSULE, DELAYED RELEASE ORAL at 08:22

## 2018-03-27 RX ADMIN — HYDROXYZINE HYDROCHLORIDE 50 MG: 25 TABLET ORAL at 14:46

## 2018-03-27 RX ADMIN — ACETAMINOPHEN 650 MG: 325 TABLET ORAL at 07:43

## 2018-03-27 RX ADMIN — BUSPIRONE HYDROCHLORIDE 20 MG: 10 TABLET ORAL at 14:46

## 2018-03-27 RX ADMIN — GABAPENTIN 600 MG: 300 CAPSULE ORAL at 12:30

## 2018-03-27 RX ADMIN — MULTIPLE VITAMINS W/ MINERALS TAB 1 TABLET: TAB at 08:21

## 2018-03-27 RX ADMIN — HYDROXYZINE HYDROCHLORIDE 50 MG: 25 TABLET ORAL at 10:18

## 2018-03-27 RX ADMIN — BUSPIRONE HYDROCHLORIDE 20 MG: 10 TABLET ORAL at 08:22

## 2018-03-27 RX ADMIN — FOLIC ACID 1 MG: 1 TABLET ORAL at 08:21

## 2018-03-27 RX ADMIN — GABAPENTIN 600 MG: 300 CAPSULE ORAL at 20:01

## 2018-03-27 RX ADMIN — METHOCARBAMOL 1000 MG: 500 TABLET ORAL at 12:31

## 2018-03-27 RX ADMIN — CLONIDINE HYDROCHLORIDE 0.1 MG: 0.1 TABLET ORAL at 08:22

## 2018-03-27 RX ADMIN — Medication 100 MG: at 08:22

## 2018-03-27 RX ADMIN — HYDROXYZINE HYDROCHLORIDE 50 MG: 25 TABLET ORAL at 21:47

## 2018-03-27 RX ADMIN — ACETAMINOPHEN 650 MG: 325 TABLET ORAL at 16:24

## 2018-03-27 RX ADMIN — DIAZEPAM 10 MG: 5 TABLET ORAL at 00:03

## 2018-03-27 RX ADMIN — ACETAMINOPHEN 650 MG: 325 TABLET ORAL at 12:31

## 2018-03-27 RX ADMIN — BUSPIRONE HYDROCHLORIDE 20 MG: 10 TABLET ORAL at 20:02

## 2018-03-27 RX ADMIN — AMLODIPINE BESYLATE 10 MG: 10 TABLET ORAL at 08:22

## 2018-03-27 RX ADMIN — CLONIDINE HYDROCHLORIDE 0.1 MG: 0.1 TABLET ORAL at 20:00

## 2018-03-27 RX ADMIN — SERTRALINE HYDROCHLORIDE 200 MG: 100 TABLET ORAL at 08:22

## 2018-03-27 RX ADMIN — GABAPENTIN 600 MG: 300 CAPSULE ORAL at 08:21

## 2018-03-27 RX ADMIN — DIPHENHYDRAMINE HYDROCHLORIDE 25 MG: 25 CAPSULE ORAL at 18:37

## 2018-03-27 RX ADMIN — TRAZODONE HYDROCHLORIDE 200 MG: 100 TABLET ORAL at 21:47

## 2018-03-27 RX ADMIN — GABAPENTIN 600 MG: 300 CAPSULE ORAL at 16:24

## 2018-03-27 RX ADMIN — ACETAMINOPHEN, ASPIRIN AND CAFFEINE 2 TABLET: 250; 250; 65 TABLET, FILM COATED ORAL at 18:38

## 2018-03-27 ASSESSMENT — ACTIVITIES OF DAILY LIVING (ADL)
LAUNDRY: WITH SUPERVISION
DRESS: SCRUBS (BEHAVIORAL HEALTH)
GROOMING: INDEPENDENT
GROOMING: INDEPENDENT
ORAL_HYGIENE: INDEPENDENT

## 2018-03-27 NOTE — PROGRESS NOTES
"CASE MANAGEMENT NOTE    Writer met with patient to initiate discharge planning. He reports just having completed and maintained sobriety with 4 months of intensive outpatient treatment with sober housing last week at [facility]. His plan now is to discharge to his mother's home stating he has had so much treatment and knows what he needs to do. Pt was attending AA but recently has been slacking off. Patient reports his mother's mental health is currently unstable, she has county case management assisting but and is refusing to take her antipsychotic. \"She needs my help and I need to stay clean so I can be that person.\" Patient reports plan to commit to 3+ AA Meetings weekly and obtain a new sponsor. He was to start a new job today and would like a letter of hospitalization for his potential employer. Pt is declining to sign an ALFONSO for his mother at this time.    Siomara Valdovinos) REJI Branham, [facility]  3A Psychotherapist  [phone]   "

## 2018-03-27 NOTE — PLAN OF CARE
Problem: Substance Withdrawal  Goal: Substance Withdrawal  Signs and symptoms of listed problems will be absent or manageable.     MSSA score for alcohol withdrawal today are 4 and 4. Pt has received 50 mg po valium since admission. Reports this as a 5 day relapse. Pt does endorse feeling anxiety, irritable, tense, anxious, depressed along with happiness and hopefulness. Denies any suicidal ideation plans or intent. States will be going to stay with his Mother after discharge, declines wanting treatment.

## 2018-03-27 NOTE — PLAN OF CARE
Behavioral Team Discussion: (3/27/2018)    Continued Stay Criteria/Rationale: Patient admitted for Chemical Use Issues.  Plan: The following services will be provided to the patient; psychiatric assessment, medication management, therapeutic milieu, individual and group support, and skills groups.   Participants: 3A Provider: Dr. Filippo Brasher MD; 3A RN's: Radha Armando, RN, Fredrick Case, RN, Pedro Pablo Ayala, RN and Tima Arceo, RN; 3A CM's: Delilah Branham, Sammi Benjamin , Scott Aquino and Theresa Lopez (Clinical ).  Summary/Recommendation: Providers will assess today for treatment recommendations, discharge planning, and aftercare plans. CM to assist with pt for discharge planning.   Medical/Physical: Deferred (see medical notes).  Precautions:   Behavioral Orders   Procedures     Code 1 - Restrict to Unit     Routine Programming     As clinically indicated     Status 15     Every 15 minutes.     Withdrawal precautions     Rationale for change in precautions or plan: N/A  Progress: No Change.

## 2018-03-27 NOTE — CONSULTS
"  [facility]  Internal Medicine Consult     Nickolas Lang MRN# [id number]   Age: 27 year old YOB: 1990     Date of Admission: 11/20/2017  Date of Consult:  11/21/2017    Primary Care Provider: Nicolette Frye    Requesting Service: Psychiatry  Reason for Consult: General Medical Evaluation         Assessment and Recommendations:   Nickolas Lang is a 27 year old male with a pmh of HTN, hepatic steatosis, GERD, ADD, and alcohol abuse who was admitted to [facility] station 3A seeking detox from alcohol.     # Alcohol abuse, acute withdrawal. Management per primary team, psychiatry.   - Agree with MSSA using valium  - Agree with folic acid/thiamine supplementation    HTN. Managed on amlodipine and clonidine PTA. Currently elevated, likely 2/2 acute withdrawal.   - Continue home meds  - Clonidine prn for SBP >180, DBP >110  - Notify IM if SBP consistently >150 despite resolution of withdrawal    GERD. Continue omeprazole. Will need short prescription on discharge to bridge to next PCP visit (ordered for you).    URI. One week hx of feeling \"run down\", productive cough, sore throat. No fevers.    - nasal saline, benadryl  - tylenol/ibuprofen  - strep swab      Internal Medicine will sign off at this time. Please notify if any intercurrent medical questions or concerns arise. Thank you for involving me in this patients care.       Lexus Bustillo PA-C  Hospitalist Service  [phone]           Chief Complaint:   \"I'm sick of doing this\"         History of Present Illness:   Nickolas Lang is a 27 year old male with a pmg of HTN, hepatic steatosis, GERD, ADD, and alcohol abuse who was admitted to [facility] station 3A seeking detox from alcohol.     Per ED note:  \"The patient presents with his girlfriend seeking detox.  The patient states that he has a history of binge drinking.  He states that most recently he was sober for approximately 4 months before relapsing on " "Wednesday, 4 days ago.  The patient states that since that time he has been drinking 1 bottle of liquor per day, last drink just 1 hour prior to arrival.  The patient denies any other recreational drug use.  The patient has no physical complaints at this time\"    Currently complains of irritabilaty, hot/cold, and anxiety. Feeling really bad about relapsing.  Denies overt chest pain, shortness of breath or difficulty breathing. States that his BP has been well controlled outpatient. Complains of URI symptoms such as nasal congestion, sore throat, productive cough, and ear fullness which began about a week ago. He denies fevers, abdominal pain, nausea, vomiting, or diarrhea.          Review of Systems:   A 10 point review of systems was performed and is negative unless otherwise noted in HPI.          Past Medical History:     Past Medical History:   Diagnosis Date     ADD (attention deficit disorder)      Alcohol abuse      Anxiety     gabapentin helps the most      GERD (gastroesophageal reflux disease)      Hepatic steatosis      Hypertension      Obesity      Pyloric stenosis     s/p pyloromyotomy as an infant            Past Surgical History:      Past Surgical History:   Procedure Laterality Date     Deviated septum repair       PYLOROMYOTOMY SURGERY  as an infant      SHOULDER SURGERY Left 07/29/2016    Removal of cartilage             Family History:     Family History   Problem Relation Age of Onset     Neurologic Disorder Mother      Multiple Sclerosis     Depression Mother      Anxiety Disorder Mother      Alcohol/Drug Father      Substance Abuse Father      Depression Maternal Grandmother      Anxiety Disorder Maternal Grandmother      Hypertension Maternal Grandmother      Substance Abuse Maternal Grandfather      Alcohol/Drug Paternal Grandfather              Social History:     Social History   Substance Use Topics     Smoking status: Current Every Day Smoker     Packs/day: 0.50     Years: 4.00     " "Types: Cigarettes     Smokeless tobacco: Never Used     Alcohol use 0.0 oz/week     0 Standard drinks or equivalent per week      Comment: 1.75L vodka daily             Medications:     Current Facility-Administered Medications   Medication     amLODIPine (NORVASC) tablet 10 mg     aspirin-acetaminophen-caffeine (EXCEDRIN MIGRAINE) per tablet 2 tablet     busPIRone (BUSPAR) tablet 20 mg     cloNIDine (CATAPRES) tablet 0.1 mg     gabapentin (NEURONTIN) capsule 600 mg     hydrOXYzine (ATARAX) tablet 25-50 mg     methocarbamol (ROBAXIN) tablet 1,000 mg     multivitamin, therapeutic with minerals (THERA-VIT-M) tablet 1 tablet     omeprazole (priLOSEC) CR capsule 40 mg     sertraline (ZOLOFT) tablet 200 mg     traZODone (DESYREL) tablet 100-200 mg     diazepam (VALIUM) tablet 5-20 mg     folic acid (FOLVITE) tablet 1 mg     thiamine tablet 100 mg     atenolol (TENORMIN) tablet 50 mg     acetaminophen (TYLENOL) tablet 650 mg     alum & mag hydroxide-simethicone (MYLANTA ES/MAALOX  ES) suspension 30 mL     magnesium hydroxide (MILK OF MAGNESIA) suspension 30 mL     bisacodyl (DULCOLAX) Suppository 10 mg     nicotine polacrilex (NICORETTE) gum 4-8 mg     Facility-Administered Medications Ordered in Other Encounters   Medication     Self Administer Medications: Behavioral Services            Allergies:   No Known Allergies          Physical Exam:   BP (!) 151/95  Pulse 74  Temp 97.1  F (36.2  C) (Oral)  Resp 16  Ht 1.854 m (6' 1\")  Wt 146.5 kg (323 lb)  SpO2 96%  BMI 42.61 kg/m2   GENERAL: Alert and oriented x 3. NAD.   HEENT: Anicteric sclera. Mucous membranes moist.   CV: RRR. S1, S2. No murmurs appreciated.   RESPIRATORY: Effort normal on room air. Lungs CTAB with no wheezing, rales, rhonchi.   GI: Abdomen soft and non distended with normoactive bowel sounds present in all quadrants. No tenderness, rebound, guarding.   MUSCULOSKELETAL: No joint swelling or tenderness. Moves all extremities.   NEUROLOGICAL: No focal " deficits. Strength 5/5 bilaterally in upper and lower extremities.   EXTREMITIES: No peripheral edema. Intact bilateral pedal pulses.   SKIN: No jaundice. No rashes.         Lexus Bustillo PA-C  Internal Medicine ALLI Hospitalist   [facility]   Pager: [phone]

## 2018-03-28 VITALS
RESPIRATION RATE: 16 BRPM | SYSTOLIC BLOOD PRESSURE: 151 MMHG | DIASTOLIC BLOOD PRESSURE: 108 MMHG | HEIGHT: 73 IN | TEMPERATURE: 96.7 F | HEART RATE: 84 BPM | BODY MASS INDEX: 41.75 KG/M2 | OXYGEN SATURATION: 96 % | WEIGHT: 315 LBS

## 2018-03-28 PROCEDURE — 25000132 ZZH RX MED GY IP 250 OP 250 PS 637: Performed by: PSYCHIATRY & NEUROLOGY

## 2018-03-28 RX ORDER — LANOLIN ALCOHOL/MO/W.PET/CERES
100 CREAM (GRAM) TOPICAL DAILY
Qty: 30 TABLET | Refills: 1 | Status: ON HOLD | OUTPATIENT
Start: 2018-03-29 | End: 2018-07-03

## 2018-03-28 RX ADMIN — GABAPENTIN 600 MG: 300 CAPSULE ORAL at 11:41

## 2018-03-28 RX ADMIN — HYDROXYZINE HYDROCHLORIDE 50 MG: 25 TABLET ORAL at 11:41

## 2018-03-28 RX ADMIN — MULTIPLE VITAMINS W/ MINERALS TAB 1 TABLET: TAB at 08:07

## 2018-03-28 RX ADMIN — ACETAMINOPHEN, ASPIRIN AND CAFFEINE 2 TABLET: 250; 250; 65 TABLET, FILM COATED ORAL at 06:56

## 2018-03-28 RX ADMIN — SERTRALINE HYDROCHLORIDE 200 MG: 100 TABLET ORAL at 08:07

## 2018-03-28 RX ADMIN — GABAPENTIN 600 MG: 300 CAPSULE ORAL at 08:07

## 2018-03-28 RX ADMIN — OMEPRAZOLE 40 MG: 20 CAPSULE, DELAYED RELEASE ORAL at 08:07

## 2018-03-28 RX ADMIN — CLONIDINE HYDROCHLORIDE 0.1 MG: 0.1 TABLET ORAL at 08:07

## 2018-03-28 RX ADMIN — FOLIC ACID 1 MG: 1 TABLET ORAL at 08:07

## 2018-03-28 RX ADMIN — AMLODIPINE BESYLATE 10 MG: 10 TABLET ORAL at 08:07

## 2018-03-28 RX ADMIN — BUSPIRONE HYDROCHLORIDE 20 MG: 10 TABLET ORAL at 08:07

## 2018-03-28 RX ADMIN — Medication 100 MG: at 08:07

## 2018-03-28 NOTE — DISCHARGE INSTRUCTIONS
Behavioral Discharge Planning and Instructions  THANK YOU FOR CHOOSING THE [facility]  3A  [phone]    Summary: You were admitted to Station 3A on 3/26/2018 for detoxification from alcohol.  A medical exam was performed that included lab work. You have met with a  and opted to discharge to your mother's home and commit to regular attendance and active participation in AA Meetings - at least 3 per week/obtain a new sponsor and return to work.  Please take care and make your recovery a daily priority, Nickolas!     Recommendation:  In addition to the above, consider psychotherapy/CD counseling weekly and SMART Recovery.    Main Diagnoses:  Per Dr. Brasher;  1.  Alcohol use disorder.   2.  Major depressive disorder, recurrent without psychotic features.     Major Treatments, Procedures and Findings:  You were treated for alcohol detoxification using valium administered based on the [facility] protocol. You declined a chemical dependency assessment. You had labs drawn and those results were reviewed with you. Please take a copy of your lab work with you to your next primary care physician appointment.    Symptoms to Report:  If you experience more anxiety, confusion, sleeplessness, deep sadness or thoughts of suicide, notify your treatment team or notify your primary care physician. IF ANY OF THE SYMPTOMS YOU ARE EXPERIENCING ARE A MEDICAL EMERGENCY CALL 911 IMMEDIATELY.     Lifestyle Adjustment: Adjust your lifestyle to get enough sleep, relaxation, exercise and good nutrition. Continue to develop healthy coping skills to decrease stress and promote a sober living environment. Do not use mood altering substances including alcohol, illegal drugs or addictive medications other than what is currently prescribed.     Follow-up Appointment:   With Dr. Crews on May 13th at 12:00 pm at his clinic at [facility].  With Dr. Lovell at [facility] on June 12th at 6:45  pm    Resources:   AA/NA and Sponsors are excellent resources for support and you can find one at any support group meeting.   SMART Recovery - self management for addiction recovery:  www.smartrecovery.org  http://www.aastpaul.org/?topic=8  http://a[place].org/meetings/  http://www.nam[place].org/index.php/meeting-list-pdf  Pathways ~ A Health Crisis Resource & Support Center:  687.276.2780.  http://www.harmreduction.org  [facility] 140-779-3364  Support Group:  AA/NA and Sponsor/support  Crisis Intervention: 168.309.8816 or 019-644-8007 (TTY: 493.193.2841).  Call anytime for help.  [place] on [place] Illness (www.[place].chaya.org): 540.525.5294 or 156-436-9683.  Alcoholics Anonymous (www.alcoholics-anonymous.org): Check your phone book for your local chapter.  Suicide Awareness Voices of Education (SAVE) (www.save.org): 729-674-IZWV (4083)  National Suicide Prevention Line (www.mentalhealthmn.org): 320-757-GSKX (9737)  Mental Health Consumer/Survivor Network of [place] (www.mhcsn.net): 771.463.9357 or 217-810-8048  Mental Health Association of [place] (www.mentalhealth.org): 989.815.7694 or 646-512-5941   Substance Abuse and Mental Health Services (www.samhsa.gov)    [facility] ([facility])  [facility] connects people seeking recovery to resources that help foster and sustain long-term recovery.  Whether you are seeking resources for treatment, transportation, housing, job training, education, health care or other pathways to recovery, [facility] is a great place to start.  881.371.7979.  www.[facility].org    General Medication Instructions:   See your medication sheet(s) for instructions.   Take all medicines as directed.  Make no changes unless your doctor suggests them.   Go to all your doctor visits.  Be sure to have all your required lab tests. This way, your medicines can be refilled on time.  Do not use any forms of alcohol.    Please Note:  If you have any questions at anytime after  you are discharged please call the [facility] [place] detox unit 3AW unit at [phone].  [facility], Behavioral Intake [phone]  Please take this discharge folder with you to all your follow up appointments, it contains your lab results, diagnosis, medication list and discharge recommendations.      THANK YOU FOR CHOOSING THE [facility]

## 2018-03-28 NOTE — PROGRESS NOTES
Pt has not required Valium for alcohol withdrawal for over 24 hours so is removed from monitoring status.

## 2018-03-29 LAB
BACTERIA SPEC CULT: NORMAL
Lab: NORMAL
SPECIMEN SOURCE: NORMAL

## 2018-03-29 NOTE — DISCHARGE SUMMARY
Admit Date:     03/26/2018   Discharge Date:     03/28/2018      DISCHARGE DIAGNOSIS:   AXIS I:  Alcohol use disorder, severe.      IDENTIFYING INFORMATION:  The patient is a 27-year-old obese  male admitted to detox.  Please review detailed admission note by Dr. Brasher on 3/27/2018.      HOSPITAL COURSE:  During the hospitalization, the patient was detoxed off alcohol using MSSA protocol on Valium.  He had an uneventful detox.  He was seen by Lexus Bustillo on 3/27/2018 for Internal Medicine consult.  He was continued on omeprazole during the hospitalization.  The patient had an uneventful detox.  His energy, motivation, sleep and interest improved.  He had increased triglycerides of 377.  CBC, CMP normal.  He completed detoxification.  He had a new job, and he would like to go there.  He had a culture done.  Beta strep throat was negative.  He met with the , and the plan is to go home and continue with his recovery.  He is very relapse prone.  He has an appointment on 5/13/2018 with Dr. Crews and Dr. Lovell on 6/12/2018 at 6:45.  He has all his medications.      DISCHARGE MENTAL STATUS EXAMINATION:  The patient is alert, oriented x3.  Recent and remote memory, language, fund of knowledge are all adequate.  No loose associations.  The patient is stable to be discharged.               DISCHARGE MENTAL STATUS EXAMINATION:  The patient is alert, oriented x3.  Good fund of knowledge.  Good use of language.  Recent and remote memory, language, fund of knowledge are all adequate.  Euthymic mood congruent affect  Speech normal rate/rhythm linear tp no loose asso,The patient does not have any active suicidal or homicidal ideation.  Does not have any auditory or visual hallucination.  Fair insight/judgment At this time, the patient was stable to be discharged.         Educated about side effects/risk vs benefits /alternative including non treatment.Pt consented to be on medication.     .Total time  spent on discharge summary more than 35 min  More than  20 min  planning, coordination of care, medication reconciliation and performance of physical exam on day of discharge.Care was coordinated with unit RN and unit therapist       Nickolas Lang   Home Medication Instructions JAN:[id number]    Printed on:03/29/18 8893   Medication Information                      amLODIPine (NORVASC) 10 MG tablet  Take 1 tablet (10 mg) by mouth daily             aspirin-acetaminophen-caffeine (EXCEDRIN MIGRAINE) 250-250-65 MG per tablet  Take 2 tablets by mouth every 6 hours as needed for headaches             buPROPion (WELLBUTRIN XL) 150 MG 24 hr tablet  Take 150 mg by mouth every morning             busPIRone (BUSPAR) 10 MG tablet  Take 20 mg by mouth 3 times daily              cloNIDine (CATAPRES) 0.1 MG tablet  Take 1 tablet (0.1 mg) by mouth 2 times daily             gabapentin (NEURONTIN) 300 MG capsule  Take 600 mg by mouth 4 times daily             hydrOXYzine (ATARAX) 25 MG tablet  Take 25-50 mg by mouth every 6 hours as needed for anxiety             methocarbamol (ROBAXIN) 500 MG tablet  Take 2 tablets (1,000 mg) by mouth 3 times daily as needed for muscle spasms             multivitamin, therapeutic with minerals (THERA-VIT-M) TABS tablet  Take 1 tablet by mouth daily             omeprazole (PRILOSEC) 40 MG capsule  Take 1 capsule (40 mg) by mouth daily             sertraline (ZOLOFT) 100 MG tablet  Take 200 mg by mouth daily              thiamine 100 MG tablet  Take 1 tablet (100 mg) by mouth daily             traZODone (DESYREL) 100 MG tablet  Take 100-200 mg by mouth nightly as needed for sleep              Follow-up Appointment:   With Dr. Crews on May 13th at 12:00 pm at his clinic at [facility].  With Dr. Lovell at [facility] on June 12th at 6:45 pm  Prognosis is guared if he relapses , pt wants YUSUF LOCO MD             D: 03/29/2018   T: 03/29/2018   MT: PITO       Name:     PARTH HARPER   MRN:      [id number]        Account:        [id number]   :      1990           Admit Date:     2018                                  Discharge Date: 2018      Document: [id number]

## 2018-04-08 ENCOUNTER — HOSPITAL ENCOUNTER (EMERGENCY)
Facility: CLINIC | Age: 28
Discharge: PSYCHIATRIC OR SUBSTANCE USE FACILITY | End: 2018-04-08
Attending: FAMILY MEDICINE | Admitting: FAMILY MEDICINE
Payer: COMMERCIAL

## 2018-04-08 ENCOUNTER — APPOINTMENT (OUTPATIENT)
Dept: GENERAL RADIOLOGY | Facility: CLINIC | Age: 28
End: 2018-04-08
Attending: FAMILY MEDICINE
Payer: COMMERCIAL

## 2018-04-08 VITALS
WEIGHT: 311.06 LBS | HEART RATE: 133 BPM | OXYGEN SATURATION: 95 % | RESPIRATION RATE: 25 BRPM | BODY MASS INDEX: 41.04 KG/M2 | SYSTOLIC BLOOD PRESSURE: 157 MMHG | TEMPERATURE: 98.1 F | DIASTOLIC BLOOD PRESSURE: 103 MMHG

## 2018-04-08 DIAGNOSIS — F10.229 ALCOHOL DEPENDENCE WITH INTOXICATION WITH COMPLICATION (H): ICD-10-CM

## 2018-04-08 LAB
ALBUMIN SERPL-MCNC: 3.6 G/DL (ref 3.4–5)
ALCOHOL BREATH TEST: 0.38 (ref 0–0.01)
ALP SERPL-CCNC: 97 U/L (ref 40–150)
ALT SERPL W P-5'-P-CCNC: 61 U/L (ref 0–70)
ANION GAP SERPL CALCULATED.3IONS-SCNC: 20 MMOL/L (ref 3–14)
AST SERPL W P-5'-P-CCNC: 32 U/L (ref 0–45)
BASOPHILS # BLD AUTO: 0 10E9/L (ref 0–0.2)
BASOPHILS NFR BLD AUTO: 0.5 %
BILIRUB SERPL-MCNC: 0.4 MG/DL (ref 0.2–1.3)
BUN SERPL-MCNC: 16 MG/DL (ref 7–30)
CALCIUM SERPL-MCNC: 8 MG/DL (ref 8.5–10.1)
CHLORIDE SERPL-SCNC: 101 MMOL/L (ref 94–109)
CO2 SERPL-SCNC: 20 MMOL/L (ref 20–32)
CREAT SERPL-MCNC: 0.6 MG/DL (ref 0.66–1.25)
D DIMER PPP FEU-MCNC: 0.5 UG/ML FEU (ref 0–0.5)
DIFFERENTIAL METHOD BLD: NORMAL
EOSINOPHIL # BLD AUTO: 0.1 10E9/L (ref 0–0.7)
EOSINOPHIL NFR BLD AUTO: 0.7 %
ERYTHROCYTE [DISTWIDTH] IN BLOOD BY AUTOMATED COUNT: 13.6 % (ref 10–15)
ETHANOL SERPL-MCNC: 0.43 G/DL
GFR SERPL CREATININE-BSD FRML MDRD: >90 ML/MIN/1.7M2
GLUCOSE SERPL-MCNC: 199 MG/DL (ref 70–99)
HCT VFR BLD AUTO: 50.4 % (ref 40–53)
HGB BLD-MCNC: 17.2 G/DL (ref 13.3–17.7)
IMM GRANULOCYTES # BLD: 0 10E9/L (ref 0–0.4)
IMM GRANULOCYTES NFR BLD: 0.3 %
LIPASE SERPL-CCNC: 275 U/L (ref 73–393)
LYMPHOCYTES # BLD AUTO: 2.5 10E9/L (ref 0.8–5.3)
LYMPHOCYTES NFR BLD AUTO: 32.9 %
MCH RBC QN AUTO: 29.7 PG (ref 26.5–33)
MCHC RBC AUTO-ENTMCNC: 34.1 G/DL (ref 31.5–36.5)
MCV RBC AUTO: 87 FL (ref 78–100)
MONOCYTES # BLD AUTO: 0.5 10E9/L (ref 0–1.3)
MONOCYTES NFR BLD AUTO: 7.1 %
NEUTROPHILS # BLD AUTO: 4.4 10E9/L (ref 1.6–8.3)
NEUTROPHILS NFR BLD AUTO: 58.5 %
NRBC # BLD AUTO: 0 10*3/UL
NRBC BLD AUTO-RTO: 0 /100
PLATELET # BLD AUTO: 321 10E9/L (ref 150–450)
POTASSIUM SERPL-SCNC: 3.2 MMOL/L (ref 3.4–5.3)
PROT SERPL-MCNC: 7.4 G/DL (ref 6.8–8.8)
RBC # BLD AUTO: 5.8 10E12/L (ref 4.4–5.9)
SODIUM SERPL-SCNC: 141 MMOL/L (ref 133–144)
TROPONIN I BLD-MCNC: 0 UG/L (ref 0–0.1)
TROPONIN I SERPL-MCNC: <0.015 UG/L (ref 0–0.04)
WBC # BLD AUTO: 7.5 10E9/L (ref 4–11)

## 2018-04-08 PROCEDURE — 85379 FIBRIN DEGRADATION QUANT: CPT | Performed by: FAMILY MEDICINE

## 2018-04-08 PROCEDURE — 99284 EMERGENCY DEPT VISIT MOD MDM: CPT | Mod: 25 | Performed by: FAMILY MEDICINE

## 2018-04-08 PROCEDURE — 80320 DRUG SCREEN QUANTALCOHOLS: CPT | Performed by: FAMILY MEDICINE

## 2018-04-08 PROCEDURE — 93010 ELECTROCARDIOGRAM REPORT: CPT | Mod: Z6 | Performed by: FAMILY MEDICINE

## 2018-04-08 PROCEDURE — 71045 X-RAY EXAM CHEST 1 VIEW: CPT

## 2018-04-08 PROCEDURE — 85025 COMPLETE CBC W/AUTO DIFF WBC: CPT | Performed by: FAMILY MEDICINE

## 2018-04-08 PROCEDURE — 96360 HYDRATION IV INFUSION INIT: CPT | Performed by: FAMILY MEDICINE

## 2018-04-08 PROCEDURE — 80053 COMPREHEN METABOLIC PANEL: CPT | Performed by: FAMILY MEDICINE

## 2018-04-08 PROCEDURE — 25800025 ZZH RX 258: Performed by: FAMILY MEDICINE

## 2018-04-08 PROCEDURE — 25000128 H RX IP 250 OP 636: Performed by: FAMILY MEDICINE

## 2018-04-08 PROCEDURE — 25000132 ZZH RX MED GY IP 250 OP 250 PS 637: Performed by: FAMILY MEDICINE

## 2018-04-08 PROCEDURE — 84484 ASSAY OF TROPONIN QUANT: CPT

## 2018-04-08 PROCEDURE — 84484 ASSAY OF TROPONIN QUANT: CPT | Performed by: FAMILY MEDICINE

## 2018-04-08 PROCEDURE — 25000125 ZZHC RX 250: Performed by: FAMILY MEDICINE

## 2018-04-08 PROCEDURE — 82075 ASSAY OF BREATH ETHANOL: CPT | Performed by: FAMILY MEDICINE

## 2018-04-08 PROCEDURE — 93005 ELECTROCARDIOGRAM TRACING: CPT | Performed by: FAMILY MEDICINE

## 2018-04-08 PROCEDURE — 83690 ASSAY OF LIPASE: CPT | Performed by: FAMILY MEDICINE

## 2018-04-08 RX ORDER — HYDROXYZINE HYDROCHLORIDE 25 MG/1
25-50 TABLET, FILM COATED ORAL EVERY 6 HOURS PRN
Qty: 30 TABLET | Refills: 0 | Status: ON HOLD | OUTPATIENT
Start: 2018-04-08 | End: 2018-07-03

## 2018-04-08 RX ORDER — SERTRALINE HYDROCHLORIDE 100 MG/1
200 TABLET, FILM COATED ORAL DAILY
Qty: 30 TABLET | Refills: 1 | Status: SHIPPED | OUTPATIENT
Start: 2018-04-08 | End: 2018-06-04

## 2018-04-08 RX ORDER — TRAZODONE HYDROCHLORIDE 100 MG/1
100 TABLET ORAL
Qty: 14 TABLET | Refills: 0 | Status: SHIPPED | OUTPATIENT
Start: 2018-04-08 | End: 2018-06-04

## 2018-04-08 RX ORDER — CLONIDINE HYDROCHLORIDE 0.1 MG/1
0.1 TABLET ORAL 2 TIMES DAILY
Qty: 14 TABLET | Refills: 0 | Status: SHIPPED | OUTPATIENT
Start: 2018-04-08 | End: 2018-06-03

## 2018-04-08 RX ORDER — POTASSIUM CHLORIDE 1.5 G/1.58G
20 POWDER, FOR SOLUTION ORAL ONCE
Status: COMPLETED | OUTPATIENT
Start: 2018-04-08 | End: 2018-04-08

## 2018-04-08 RX ORDER — AMLODIPINE BESYLATE 10 MG/1
10 TABLET ORAL DAILY
Qty: 7 TABLET | Refills: 0 | Status: SHIPPED | OUTPATIENT
Start: 2018-04-08 | End: 2018-06-04

## 2018-04-08 RX ORDER — AMLODIPINE BESYLATE 10 MG/1
10 TABLET ORAL ONCE
Status: COMPLETED | OUTPATIENT
Start: 2018-04-08 | End: 2018-04-08

## 2018-04-08 RX ORDER — CLONIDINE HYDROCHLORIDE 0.1 MG/1
0.1 TABLET ORAL ONCE
Status: COMPLETED | OUTPATIENT
Start: 2018-04-08 | End: 2018-04-08

## 2018-04-08 RX ORDER — BUSPIRONE HYDROCHLORIDE 15 MG/1
30 TABLET ORAL 2 TIMES DAILY
Qty: 14 TABLET | Refills: 0 | Status: SHIPPED | OUTPATIENT
Start: 2018-04-08 | End: 2018-06-04

## 2018-04-08 RX ORDER — GABAPENTIN 600 MG/1
600 TABLET ORAL 3 TIMES DAILY
Qty: 20 TABLET | Refills: 0 | Status: SHIPPED | OUTPATIENT
Start: 2018-04-08 | End: 2018-06-04

## 2018-04-08 RX ADMIN — FOLIC ACID: 5 INJECTION, SOLUTION INTRAMUSCULAR; INTRAVENOUS; SUBCUTANEOUS at 13:20

## 2018-04-08 RX ADMIN — AMLODIPINE BESYLATE 10 MG: 10 TABLET ORAL at 14:39

## 2018-04-08 RX ADMIN — CLONIDINE HYDROCHLORIDE 0.1 MG: 0.1 TABLET ORAL at 14:39

## 2018-04-08 RX ADMIN — POTASSIUM CHLORIDE 20 MEQ: 1.5 POWDER, FOR SOLUTION ORAL at 14:08

## 2018-04-08 NOTE — ED AVS SNAPSHOT
[facility], Emergency Department    [address]    [place] [address]    Phone:  [phone]    Fax:  [phone]                                       Nickolas Lang   MRN: [id number]    Department:  [facility], Emergency Department   Date of Visit:  4/8/2018           Patient Information     Date Of Birth          1990        Your diagnoses for this visit were:     Alcohol dependence with intoxication with complication (H)        You were seen by Jorge Baker MD.      Follow-up Information     Follow up with Tima Crews MD.    Specialty:  Family Practice    Why:  As needed    Contact information:    [address]  [facility] [address] [phone]          Discharge Instructions       Discharged to [facility] detox undergo medical detox and follow-up with outpatient treatment program contact [place] outpatient program at [phone]    Your next 10 appointments already scheduled     Apr 24, 2018  9:00 AM CDT   Return Visit with Tima Crews MD   [facility] ([facility])    [address]  [address]  [facility] [address] [phone]              24 Hour Appointment Hotline       To make an appointment at any [facility], call [phone] ([phone]). If you don't have a family doctor or clinic, we will help you find one. [facility] are conveniently located to serve the needs of you and your family.             Review of your medicines      Our records show that you are taking the medicines listed below. If these are incorrect, please call your family doctor or clinic.        Dose / Directions Last dose taken    aspirin-acetaminophen-caffeine 250-250-65 MG per tablet   Commonly known as:  EXCEDRIN MIGRAINE   Dose:  2 tablet        Take 2 tablets by mouth every 6 hours as needed for headaches   Refills:  0        buPROPion 150 MG 24 hr tablet   Commonly  known as:  WELLBUTRIN XL   Dose:  150 mg        Take 150 mg by mouth every morning   Refills:  0        methocarbamol 500 MG tablet   Commonly known as:  ROBAXIN   Dose:  1000 mg   Quantity:  30 tablet        Take 2 tablets (1,000 mg) by mouth 3 times daily as needed for muscle spasms   Refills:  1        multivitamin, therapeutic with minerals Tabs tablet   Dose:  1 tablet   Quantity:  30 each        Take 1 tablet by mouth daily   Refills:  0        omeprazole 40 MG capsule   Commonly known as:  priLOSEC   Dose:  40 mg   Quantity:  15 capsule        Take 1 capsule (40 mg) by mouth daily   Refills:  0        thiamine 100 MG tablet   Dose:  100 mg   Quantity:  30 tablet        Take 1 tablet (100 mg) by mouth daily   Refills:  1          ASK your doctor about these medications        Dose / Directions Last dose taken    * amLODIPine 10 MG tablet   Commonly known as:  NORVASC   What changed:  Another medication with the same name was added. Make sure you understand how and when to take each.   Quantity:  30 tablet   Ask about: Which instructions should I use?        TAKE 1 TABLET (10 MG) BY MOUTH DAILY   Refills:  0        * amLODIPine 10 MG tablet   Commonly known as:  NORVASC   Dose:  10 mg   What changed:  You were already taking a medication with the same name, and this prescription was added. Make sure you understand how and when to take each.   Quantity:  7 tablet   Ask about: Which instructions should I use?        Take 1 tablet (10 mg) by mouth daily   Refills:  0        * busPIRone 10 MG tablet   Commonly known as:  BUSPAR   Dose:  20 mg   What changed:  Another medication with the same name was added. Make sure you understand how and when to take each.   Ask about: Which instructions should I use?        Take 20 mg by mouth 3 times daily   Refills:  0        * busPIRone 15 MG tablet   Commonly known as:  BUSPAR   Dose:  30 mg   What changed:  You were already taking a medication with the same name, and this  prescription was added. Make sure you understand how and when to take each.   Quantity:  14 tablet   Ask about: Which instructions should I use?        Take 2 tablets (30 mg) by mouth 2 times daily   Refills:  0        * cloNIDine 0.1 MG tablet   Commonly known as:  CATAPRES   What changed:  Another medication with the same name was added. Make sure you understand how and when to take each.   Quantity:  60 tablet   Ask about: Which instructions should I use?        TAKE 1 TABLET (0.1 MG) BY MOUTH 2 TIMES DAILY   Refills:  0        * cloNIDine 0.1 MG tablet   Commonly known as:  CATAPRES   Dose:  0.1 mg   What changed:  You were already taking a medication with the same name, and this prescription was added. Make sure you understand how and when to take each.   Quantity:  14 tablet   Ask about: Which instructions should I use?        Take 1 tablet (0.1 mg) by mouth 2 times daily   Refills:  0        * gabapentin 300 MG capsule   Commonly known as:  NEURONTIN   Dose:  600 mg   What changed:  Another medication with the same name was added. Make sure you understand how and when to take each.   Ask about: Which instructions should I use?        Take 600 mg by mouth 4 times daily   Refills:  0        * gabapentin 600 MG tablet   Commonly known as:  NEURONTIN   Dose:  600 mg   What changed:  You were already taking a medication with the same name, and this prescription was added. Make sure you understand how and when to take each.   Quantity:  20 tablet   Ask about: Which instructions should I use?        Take 1 tablet (600 mg) by mouth 3 times daily   Refills:  0        * hydrOXYzine 25 MG tablet   Commonly known as:  ATARAX   Dose:  25-50 mg   What changed:  Another medication with the same name was added. Make sure you understand how and when to take each.   Ask about: Which instructions should I use?        Take 25-50 mg by mouth every 6 hours as needed for anxiety   Refills:  0        * hydrOXYzine 25 MG tablet    Commonly known as:  ATARAX   Dose:  25-50 mg   What changed:  You were already taking a medication with the same name, and this prescription was added. Make sure you understand how and when to take each.   Quantity:  30 tablet   Ask about: Which instructions should I use?        Take 1-2 tablets (25-50 mg) by mouth every 6 hours as needed for anxiety   Refills:  0        * sertraline 100 MG tablet   Commonly known as:  ZOLOFT   Dose:  200 mg   What changed:  Another medication with the same name was added. Make sure you understand how and when to take each.   Ask about: Which instructions should I use?        Take 200 mg by mouth daily   Refills:  0        * sertraline 100 MG tablet   Commonly known as:  ZOLOFT   Dose:  200 mg   What changed:  You were already taking a medication with the same name, and this prescription was added. Make sure you understand how and when to take each.   Quantity:  30 tablet   Ask about: Which instructions should I use?        Take 2 tablets (200 mg) by mouth daily   Refills:  1        * traZODone 100 MG tablet   Commonly known as:  DESYREL   Dose:  100-200 mg   What changed:  Another medication with the same name was added. Make sure you understand how and when to take each.   Ask about: Which instructions should I use?        Take 100-200 mg by mouth nightly as needed for sleep   Refills:  0        * traZODone 100 MG tablet   Commonly known as:  DESYREL   Dose:  100 mg   What changed:  You were already taking a medication with the same name, and this prescription was added. Make sure you understand how and when to take each.   Quantity:  14 tablet   Ask about: Which instructions should I use?        Take 1 tablet (100 mg) by mouth nightly as needed for sleep   Refills:  0        * Notice:  This list has 14 medication(s) that are the same as other medications prescribed for you. Read the directions carefully, and ask your doctor or other care provider to review them with you.             Prescriptions were sent or printed at these locations (7 Prescriptions)                   Other Prescriptions                Printed at Department/Unit printer (7 of 7)         amLODIPine (NORVASC) 10 MG tablet               cloNIDine (CATAPRES) 0.1 MG tablet               gabapentin (NEURONTIN) 600 MG tablet               sertraline (ZOLOFT) 100 MG tablet               busPIRone (BUSPAR) 15 MG tablet               hydrOXYzine (ATARAX) 25 MG tablet               traZODone (DESYREL) 100 MG tablet                Procedures and tests performed during your visit     Alcohol breath test POCT    Alcohol level blood    CBC with platelets differential    Chest  XR, 1 view portable    Comprehensive metabolic panel    D dimer quantitative    EKG 12 lead    ISTAT troponin POCT    Lipase    Troponin I    Troponin POCT      Orders Needing Specimen Collection     None      Pending Results     Date and Time Order Name Status Description    4/8/2018 1241 EKG 12 lead Preliminary             Pending Culture Results     No orders found from 4/6/2018 to 4/9/2018.            Pending Results Instructions     If you had any lab results that were not finalized at the time of your Discharge, you can call the ED Lab Result RN at [phone]. You will be contacted by this team for any positive Lab results or changes in treatment. The nurses are available 7 days a week from 10A to 6:30P.  You can leave a message 24 hours per day and they will return your call.        Thank you for choosing [facility]       Thank you for choosing [facility] for your care. Our goal is always to provide you with excellent care. Hearing back from our patients is one way we can continue to improve our services. Please take a few minutes to complete the written survey that you may receive in the mail after you visit with us. Thank you!        [employer] Information     [facility] lets you send messages to your doctor, view your test results, renew your prescriptions,  "schedule appointments and more. To sign up, go to [email or web address] . Click on \"Log in\" on the left side of the screen, which will take you to the Welcome page. Then click on \"Sign up Now\" on the right side of the page.     You will be asked to enter the access code listed below, as well as some personal information. Please follow the directions to create your username and password.     Your access code is: [email or web address]  Expires: 2018 11:47 AM     Your access code will  in 90 days. If you need help or a new code, please call your [facility] clinic or [phone].        Care EveryWhere ID     This is your Care EveryWhere ID. This could be used by other organizations to access your [facility] medical records  [id number]        Equal Access to Services     ERMELINDA LOZADA : Rishabh Petersen, cherry patterson, juliana concepcion, shawna espinoza . So [facility] [phone].    ATENCIÓN: Si habla español, tiene a miller disposición servicios gratuitos de asistencia lingüística. Llame al [phone].    We comply with applicable federal civil rights laws and [place] laws. We do not discriminate on the basis of race, color, national origin, age, disability, sex, sexual orientation, or gender identity.            After Visit Summary       This is your record. Keep this with you and show to your community pharmacist(s) and doctor(s) at your next visit.                  "

## 2018-04-08 NOTE — DISCHARGE INSTRUCTIONS
Discharged to [facility] detox undergo medical detox and follow-up with outpatient treatment program contact [place] outpatient program at [phone]

## 2018-04-08 NOTE — ED AVS SNAPSHOT
[facility], [place], Emergency Department    [address]    [facility] [address]    Phone:  [phone]    Fax:  [phone]                                       Nickolas Lang   MRN: [id number]    Department:  [facility], Emergency Department   Date of Visit:  4/8/2018           After Visit Summary Signature Page     I have received my discharge instructions, and my questions have been answered. I have discussed any challenges I see with this plan with the nurse or doctor.    ..........................................................................................................................................  Patient/Patient Representative Signature      ..........................................................................................................................................  Patient Representative Print Name and Relationship to Patient    ..................................................               ................................................  Date                                            Time    ..........................................................................................................................................  Reviewed by Signature/Title    ...................................................              ..............................................  Date                                                            Time

## 2018-04-08 NOTE — ED NOTES
Arranged for Emily to provide transportation to [facility]. ETA 4 pm

## 2018-04-09 LAB — INTERPRETATION ECG - MUSE: NORMAL

## 2018-04-10 ASSESSMENT — ENCOUNTER SYMPTOMS
AGITATION: 1
NERVOUS/ANXIOUS: 1
CHEST TIGHTNESS: 1
DYSPHORIC MOOD: 1
FEVER: 0
ABDOMINAL PAIN: 0
SHORTNESS OF BREATH: 0

## 2018-04-10 NOTE — ED PROVIDER NOTES
History     Chief Complaint   Patient presents with     Alcohol Intoxication     Intoxicated.  Last drink 15 minutes ago.  Unsteady gait.  Cooperative.  Here with his ,other.     HPI  Nickolas Lang is a 27 year old male who presents emergency room acutely intoxicated seeking detox and treatment patient has a long history of chemical dependency with multiple admissions here to the emergency room he has been drinking heavily for at least the last week and a half with up to a liter a day of alcohol.  Patient's initial blood alcohol level was 0.43 patient did note a slight chest discomfort intermittently over the past 2-3 days as well.  Although he also notes that he has had marked increase in his underlying anxiety.  Patient denies any suicidal ideation or any thoughts of self-injurious behavior.  Patient denies any other acute medical condition.    I have reviewed the Medications, Allergies, Past Medical and Surgical History, and Social History in the Epic system.    PERSONAL MEDICAL HISTORY  Past Medical History:   Diagnosis Date     ADD (attention deficit disorder)      Alcohol abuse      Anxiety     gabapentin helps the most      GERD (gastroesophageal reflux disease)      Hepatic steatosis      Hypertension      Obesity      Pyloric stenosis     s/p pyloromyotomy as an infant     PAST SURGICAL HISTORY  Past Surgical History:   Procedure Laterality Date     Deviated septum repair       PYLOROMYOTOMY SURGERY  as an infant      SHOULDER SURGERY Left 07/29/2016    Removal of cartilage     FAMILY HISTORY  Family History   Problem Relation Age of Onset     Neurologic Disorder Mother      Multiple Sclerosis     Depression Mother      Anxiety Disorder Mother      Alcohol/Drug Father      Substance Abuse Father      Depression Maternal Grandmother      Anxiety Disorder Maternal Grandmother      Hypertension Maternal Grandmother      Substance Abuse Maternal Grandfather      Alcohol/Drug Paternal Grandfather       SOCIAL HISTORY  Social History   Substance Use Topics     Smoking status: Current Every Day Smoker     Packs/day: 0.25     Years: 4.00     Types: Cigarettes     Smokeless tobacco: Never Used     Alcohol use 0.0 oz/week     0 Standard drinks or equivalent per week      Comment: 1.75L vodka daily     MEDICATIONS  No current facility-administered medications for this encounter.      Current Outpatient Prescriptions   Medication     amLODIPine (NORVASC) 10 MG tablet     cloNIDine (CATAPRES) 0.1 MG tablet     gabapentin (NEURONTIN) 600 MG tablet     sertraline (ZOLOFT) 100 MG tablet     busPIRone (BUSPAR) 15 MG tablet     hydrOXYzine (ATARAX) 25 MG tablet     traZODone (DESYREL) 100 MG tablet     amLODIPine (NORVASC) 10 MG tablet     cloNIDine (CATAPRES) 0.1 MG tablet     thiamine 100 MG tablet     omeprazole (PRILOSEC) 40 MG capsule     busPIRone (BUSPAR) 10 MG tablet     sertraline (ZOLOFT) 100 MG tablet     traZODone (DESYREL) 100 MG tablet     gabapentin (NEURONTIN) 300 MG capsule     hydrOXYzine (ATARAX) 25 MG tablet     buPROPion (WELLBUTRIN XL) 150 MG 24 hr tablet     multivitamin, therapeutic with minerals (THERA-VIT-M) TABS tablet     aspirin-acetaminophen-caffeine (EXCEDRIN MIGRAINE) 250-250-65 MG per tablet     methocarbamol (ROBAXIN) 500 MG tablet     Facility-Administered Medications Ordered in Other Encounters   Medication     Self Administer Medications: Behavioral Services     ALLERGIES  No Known Allergies      Review of Systems   Constitutional: Negative for fever.   Respiratory: Positive for chest tightness. Negative for shortness of breath.    Cardiovascular: Negative for chest pain.   Gastrointestinal: Negative for abdominal pain.   Psychiatric/Behavioral: Positive for agitation and dysphoric mood. Negative for suicidal ideas. The patient is nervous/anxious.    All other systems reviewed and are negative.      Physical Exam   BP: (!) 172/122  Pulse: 133  Heart Rate: 108  Temp: 98.1  F (36.7   C)  Resp: 20  Weight: 141.1 kg (311 lb 1 oz)  SpO2: 95 %      Physical Exam   Constitutional: He is oriented to person, place, and time. No distress.   HENT:   Head: Atraumatic.   Mouth/Throat: Oropharynx is clear and moist. No oropharyngeal exudate.   Eyes: Pupils are equal, round, and reactive to light. No scleral icterus.   Cardiovascular: Normal heart sounds and intact distal pulses.    Pulmonary/Chest: Breath sounds normal. No respiratory distress.   Abdominal: Soft. Bowel sounds are normal. There is no tenderness.   Musculoskeletal: He exhibits no edema or tenderness.   Neurological: He is alert and oriented to person, place, and time. No cranial nerve deficit. Coordination normal.   Skin: Skin is warm. No rash noted. He is not diaphoretic.   Psychiatric: His mood appears anxious. His affect is labile. His speech is slurred. He is agitated. He expresses impulsivity. He expresses no suicidal ideation.       ED Course     ED Course     Procedures         EKG Interpretation:      Interpreted by Jorge Baker  Time reviewed: 1300  Symptoms at time of EKG: Chest discomfort with alcohol intoxication  Rhythm: normal sinus   Rate: normal  Axis: normal  Ectopy: none  Conduction: normal  ST Segments/ T Waves: No ST-T wave changes  Q Waves: none  Comparison to prior: Unchanged    Clinical Impression: normal EKG          Critical Care time:  none             Labs Ordered and Resulted from Time of ED Arrival Up to the Time of Departure from the ED   COMPREHENSIVE METABOLIC PANEL - Abnormal; Notable for the following:        Result Value    Potassium 3.2 (*)     Anion Gap 20 (*)     Glucose 199 (*)     Creatinine 0.60 (*)     Calcium 8.0 (*)     All other components within normal limits   ALCOHOL ETHYL - Abnormal; Notable for the following:     Ethanol g/dL 0.43 (*)     All other components within normal limits   ALCOHOL BREATH TEST POCT - Abnormal; Notable for the following:     Alcohol Breath Test 0.378 (*)      All other components within normal limits   CBC WITH PLATELETS DIFFERENTIAL   LIPASE   TROPONIN I   D DIMER QUANTITATIVE   ISTAT TROPONIN NURSING POCT   TROPONIN POCT            Assessments & Plan (with Medical Decision Making)       I have reviewed the nursing notes.    I have reviewed the findings, diagnosis, plan and need for follow up with the patient.  Patient with chronic alcohol dependence at this time unfortunately there is no detox beds available here at [facility] I was however able to procure a bed at [facility] detox patient will be transported to [facility] for further stabilization and treatment.    Discharge Medication List as of 4/8/2018  3:46 PM      START taking these medications    Details   !! amLODIPine (NORVASC) 10 MG tablet Take 1 tablet (10 mg) by mouth daily, Disp-7 tablet, R-0, Local Print      !! cloNIDine (CATAPRES) 0.1 MG tablet Take 1 tablet (0.1 mg) by mouth 2 times daily, Disp-14 tablet, R-0, Local Print      gabapentin (NEURONTIN) 600 MG tablet Take 1 tablet (600 mg) by mouth 3 times daily, Disp-20 tablet, R-0, Local Print      !! sertraline (ZOLOFT) 100 MG tablet Take 2 tablets (200 mg) by mouth daily, Disp-30 tablet, R-1, Local Print      !! busPIRone (BUSPAR) 15 MG tablet Take 2 tablets (30 mg) by mouth 2 times daily, Disp-14 tablet, R-0, Local Print      !! hydrOXYzine (ATARAX) 25 MG tablet Take 1-2 tablets (25-50 mg) by mouth every 6 hours as needed for anxiety, Disp-30 tablet, R-0, Local Print      !! traZODone (DESYREL) 100 MG tablet Take 1 tablet (100 mg) by mouth nightly as needed for sleep, Disp-14 tablet, R-0, Local Print       !! - Potential duplicate medications found. Please discuss with provider.          Final diagnoses:   Alcohol dependence with intoxication with complication (H)       4/8/2018   [facility], [place], EMERGENCY DEPARTMENT     Jorge Baker MD  04/10/18 3652

## 2018-04-23 RX ORDER — AMLODIPINE BESYLATE 10 MG/1
10 TABLET ORAL DAILY
Qty: 7 TABLET | Refills: 0 | OUTPATIENT
Start: 2018-04-23

## 2018-04-23 NOTE — TELEPHONE ENCOUNTER
"Last Written Prescription Date:  3/27/18  Last Fill Quantity: 15 caps,  # refills: 0   Last office visit: 2/27/2018 with prescribing provider:  0   Future Office Visit:   Next 5 appointments (look out 90 days)     Apr 30, 2018  2:45 PM CDT   Return Visit with Tima Crews MD   [facility] ([facility])    [address]  [address]  [facility] [address] [phone]                 Requested Prescriptions   Pending Prescriptions Disp Refills     amLODIPine (NORVASC) 10 MG tablet 7 tablet 0     Sig: Take 1 tablet (10 mg) by mouth daily    Calcium Channel Blockers Protocol  Failed    4/23/2018 10:08 AM       Failed - Blood pressure under 140/90 in past 12 months    BP Readings from Last 3 Encounters:   04/08/18 (!) 157/103   03/25/18 (!) 155/105   02/27/18 132/84                Failed - Normal serum creatinine on file in past 12 months    Recent Labs   Lab Test  04/08/18   1310   CR  0.60*            Passed - Recent (12 mo) or future (30 days) visit within the authorizing provider's specialty    Patient had office visit in the last 12 months or has a visit in the next 30 days with authorizing provider or within the authorizing provider's specialty.  See \"Patient Info\" tab in inbasket, or \"Choose Columns\" in Meds & Orders section of the refill encounter.           Passed - Patient is age 18 or older          "

## 2018-04-23 NOTE — TELEPHONE ENCOUNTER
Pt's requesting Dr. Eleonora echeverria med until he's able to establish care with a PCP on the [facility].   Pt contact info: [phone]      Sandro Sanford

## 2018-04-23 NOTE — TELEPHONE ENCOUNTER
"Routing refill request to provider for review/approval because:  Labs out of range:  BP, creatinine            Requested Prescriptions   Pending Prescriptions Disp Refills     amLODIPine (NORVASC) 10 MG tablet 7 tablet 0     Sig: Take 1 tablet (10 mg) by mouth daily    Calcium Channel Blockers Protocol  Failed    4/23/2018 10:09 AM       Failed - Blood pressure under 140/90 in past 12 months    BP Readings from Last 3 Encounters:   04/08/18 (!) 157/103   03/25/18 (!) 155/105   02/27/18 132/84                Failed - Normal serum creatinine on file in past 12 months    Recent Labs   Lab Test  04/08/18   1310   CR  0.60*            Passed - Recent (12 mo) or future (30 days) visit within the authorizing provider's specialty    Patient had office visit in the last 12 months or has a visit in the next 30 days with authorizing provider or within the authorizing provider's specialty.  See \"Patient Info\" tab in inbasket, or \"Choose Columns\" in Meds & Orders section of the refill encounter.           Passed - Patient is age 18 or older          Marc Arredondo RN    "

## 2018-04-25 DIAGNOSIS — K21.9 GASTROESOPHAGEAL REFLUX DISEASE WITHOUT ESOPHAGITIS: ICD-10-CM

## 2018-04-25 DIAGNOSIS — F33.1 MAJOR DEPRESSIVE DISORDER, RECURRENT EPISODE, MODERATE (H): ICD-10-CM

## 2018-04-25 RX ORDER — OMEPRAZOLE 40 MG/1
CAPSULE, DELAYED RELEASE ORAL
Qty: 30 CAPSULE | Refills: 0 | Status: CANCELLED | OUTPATIENT
Start: 2018-04-25

## 2018-04-25 NOTE — TELEPHONE ENCOUNTER
"Abilify removed from med list on 10/13/17 when d/c from hosp-stating \"do not resume at d/c\"      Last OV w/Nicolette-9/6/17    Forwarded refills to Dr Crews   "

## 2018-04-26 DIAGNOSIS — K21.9 GASTROESOPHAGEAL REFLUX DISEASE WITHOUT ESOPHAGITIS: ICD-10-CM

## 2018-04-26 RX ORDER — OMEPRAZOLE 40 MG/1
CAPSULE, DELAYED RELEASE ORAL
Qty: 30 CAPSULE | Refills: 0 | Status: ON HOLD | OUTPATIENT
Start: 2018-04-26 | End: 2018-07-03

## 2018-04-26 RX ORDER — OMEPRAZOLE 40 MG/1
40 CAPSULE, DELAYED RELEASE ORAL DAILY
Qty: 15 CAPSULE | Refills: 0 | OUTPATIENT
Start: 2018-04-26

## 2018-04-26 RX ORDER — ARIPIPRAZOLE 2 MG/1
TABLET ORAL
Qty: 60 TABLET | Refills: 1 | OUTPATIENT
Start: 2018-04-26

## 2018-04-26 NOTE — TELEPHONE ENCOUNTER
"Requested Prescriptions   Pending Prescriptions Disp Refills     omeprazole (PRILOSEC) 40 MG capsule [Pharmacy Med Name: OMEPRAZOLE DR 40 MG CAPSULE] 30 capsule 0     Sig: TAKE 1 CAPSULE (40 MG) BY MOUTH DAILY    PPI Protocol Passed    4/26/2018  4:28 PM       Passed - Not on Clopidogrel (unless Pantoprazole ordered)       Passed - No diagnosis of osteoporosis on record       Passed - Recent (12 mo) or future (30 days) visit within the authorizing provider's specialty    Patient had office visit in the last 12 months or has a visit in the next 30 days with authorizing provider or within the authorizing provider's specialty.  See \"Patient Info\" tab in inbasket, or \"Choose Columns\" in Meds & Orders section of the refill encounter.    Last OV: 09/06/17       Passed - Patient is age 18 or older        Routing refill request to provider for review/approval because:  Last rx written by different provider    Please advise, thanks.  "

## 2018-05-10 RX ORDER — CLONIDINE HYDROCHLORIDE 0.1 MG/1
TABLET ORAL
Qty: 60 TABLET | Refills: 0 | OUTPATIENT
Start: 2018-05-10

## 2018-05-10 NOTE — TELEPHONE ENCOUNTER
"Requested Prescriptions   Pending Prescriptions Disp Refills     cloNIDine (CATAPRES) 0.1 MG tablet [Pharmacy Med Name: CLONIDINE HCL 0.1 MG TABLET] 60 tablet 0     Sig: TAKE 1 TABLET BY MOUTH TWICE A DAY    Central Acting Antiadrenergic Agents Failed    5/8/2018 10:14 AM       Failed - Blood pressure under 140/90 in past 12 months    BP Readings from Last 3 Encounters:   04/08/18 (!) 157/103   03/25/18 (!) 155/105   02/27/18 132/84                Failed - Normal serum creatinine on file within past 12 months    Recent Labs   Lab Test  04/08/18   1310   CR  0.60*            Passed - Patient is 6 years of age or older       Passed - Recent (12 mo) or future (30 days) visit within the authorizing provider's specialty    Patient had office visit in the last 12 months or has a visit in the next 30 days with authorizing provider or within the authorizing provider's specialty.  See \"Patient Info\" tab in inbasket, or \"Choose Columns\" in Meds & Orders section of the refill encounter.            Last office visit 9/6/17 (Nicolette Frye NP).   Last office visit 2/27/18 Dr. Crews (listed as PCP)    Routing refill request to provider for review/approval because:  BP out of range.  Marimar Grayson RN            "

## 2018-05-15 RX ORDER — CLONIDINE HYDROCHLORIDE 0.1 MG/1
TABLET ORAL
Qty: 60 TABLET | Refills: 0 | OUTPATIENT
Start: 2018-05-15

## 2018-05-15 RX ORDER — AMLODIPINE BESYLATE 10 MG/1
TABLET ORAL
Qty: 30 TABLET | Refills: 0 | OUTPATIENT
Start: 2018-05-15

## 2018-05-15 NOTE — TELEPHONE ENCOUNTER
"Requested Prescriptions   Pending Prescriptions Disp Refills     cloNIDine (CATAPRES) 0.1 MG tablet [Pharmacy Med Name: CLONIDINE HCL 0.1 MG TABLET] 60 tablet 0     Sig: TAKE 1 TABLET BY MOUTH TWICE A DAY    Central Acting Antiadrenergic Agents Failed    5/14/2018  5:48 PM       Failed - Blood pressure under 140/90 in past 12 months    BP Readings from Last 3 Encounters:   04/08/18 (!) 157/103   03/25/18 (!) 155/105   02/27/18 132/84                Failed - Normal serum creatinine on file within past 12 months    Recent Labs   Lab Test  04/08/18   1310   CR  0.60*            Passed - Patient is 6 years of age or older       Passed - Recent (12 mo) or future (30 days) visit within the authorizing provider's specialty    Patient had office visit in the last 12 months or has a visit in the next 30 days with authorizing provider or within the authorizing provider's specialty.  See \"Patient Info\" tab in inbasket, or \"Choose Columns\" in Meds & Orders section of the refill encounter.            amLODIPine (NORVASC) 10 MG tablet [Pharmacy Med Name: AMLODIPINE BESYLATE 10 MG TAB] 30 tablet 0     Sig: TAKE 1 TABLET BY MOUTH EVERY DAY    Calcium Channel Blockers Protocol  Failed    5/14/2018  5:48 PM       Failed - Blood pressure under 140/90 in past 12 months    BP Readings from Last 3 Encounters:   04/08/18 (!) 157/103   03/25/18 (!) 155/105   02/27/18 132/84                Failed - Normal serum creatinine on file in past 12 months    Recent Labs   Lab Test  04/08/18   1310   CR  0.60*            Passed - Recent (12 mo) or future (30 days) visit within the authorizing provider's specialty    Patient had office visit in the last 12 months or has a visit in the next 30 days with authorizing provider or within the authorizing provider's specialty.  See \"Patient Info\" tab in inbasket, or \"Choose Columns\" in Meds & Orders section of the refill encounter.           Passed - Patient is age 18 or older        Routing refill request " to provider for review/approval because:  Labs out of range:  BP, Cr

## 2018-06-02 ENCOUNTER — HOSPITAL ENCOUNTER (EMERGENCY)
Facility: CLINIC | Age: 28
Discharge: HOME | End: 2018-06-03
Attending: EMERGENCY MEDICINE | Admitting: EMERGENCY MEDICINE
Payer: COMMERCIAL

## 2018-06-02 DIAGNOSIS — R45.851 SUICIDAL IDEATION: ICD-10-CM

## 2018-06-02 DIAGNOSIS — F10.221 ACUTE ALCOHOL INTOXICATION WITH ALCOHOLISM WITH DELIRIUM (H): ICD-10-CM

## 2018-06-02 LAB
APAP SERPL-MCNC: <2 MG/L (ref 10–20)
ETHANOL SERPL-MCNC: 0.35 G/DL
SALICYLATES SERPL-MCNC: <2 MG/DL

## 2018-06-02 PROCEDURE — 80329 ANALGESICS NON-OPIOID 1 OR 2: CPT | Performed by: EMERGENCY MEDICINE

## 2018-06-02 PROCEDURE — 80320 DRUG SCREEN QUANTALCOHOLS: CPT | Performed by: EMERGENCY MEDICINE

## 2018-06-02 PROCEDURE — 80048 BASIC METABOLIC PNL TOTAL CA: CPT | Performed by: EMERGENCY MEDICINE

## 2018-06-02 PROCEDURE — 96372 THER/PROPH/DIAG INJ SC/IM: CPT

## 2018-06-02 PROCEDURE — 99285 EMERGENCY DEPT VISIT HI MDM: CPT

## 2018-06-02 PROCEDURE — 25000128 H RX IP 250 OP 636: Performed by: EMERGENCY MEDICINE

## 2018-06-02 PROCEDURE — 82075 ASSAY OF BREATH ETHANOL: CPT

## 2018-06-02 RX ORDER — ZIPRASIDONE MESYLATE 20 MG/ML
20 INJECTION, POWDER, LYOPHILIZED, FOR SOLUTION INTRAMUSCULAR ONCE
Status: COMPLETED | OUTPATIENT
Start: 2018-06-02 | End: 2018-06-02

## 2018-06-02 RX ORDER — LIDOCAINE 40 MG/G
CREAM TOPICAL
Status: DISCONTINUED | OUTPATIENT
Start: 2018-06-02 | End: 2018-06-03 | Stop reason: HOSPADM

## 2018-06-02 RX ORDER — SODIUM CHLORIDE, SODIUM LACTATE, POTASSIUM CHLORIDE, CALCIUM CHLORIDE 600; 310; 30; 20 MG/100ML; MG/100ML; MG/100ML; MG/100ML
1000 INJECTION, SOLUTION INTRAVENOUS CONTINUOUS
Status: DISCONTINUED | OUTPATIENT
Start: 2018-06-02 | End: 2018-06-03 | Stop reason: HOSPADM

## 2018-06-02 RX ADMIN — ZIPRASIDONE MESYLATE 20 MG: 20 INJECTION, POWDER, LYOPHILIZED, FOR SOLUTION INTRAMUSCULAR at 22:23

## 2018-06-02 ASSESSMENT — ENCOUNTER SYMPTOMS
VOMITING: 0
ABDOMINAL PAIN: 0
DYSPHORIC MOOD: 1
NAUSEA: 0

## 2018-06-02 NOTE — ED AVS SNAPSHOT
[facility] Emergency Department    [address]    [facility] [address]    Phone:  [phone]    Fax:  [phone]                                       Nickolas Lang   MRN: [id number]    Department:  [facility] Emergency Department   Date of Visit:  6/2/2018           After Visit Summary Signature Page     I have received my discharge instructions, and my questions have been answered. I have discussed any challenges I see with this plan with the nurse or doctor.    ..........................................................................................................................................  Patient/Patient Representative Signature      ..........................................................................................................................................  Patient Representative Print Name and Relationship to Patient    ..................................................               ................................................  Date                                            Time    ..........................................................................................................................................  Reviewed by Signature/Title    ...................................................              ..............................................  Date                                                            Time

## 2018-06-02 NOTE — ED AVS SNAPSHOT
[facility] Emergency Department    [address]    [facility] [address]    Phone:  [phone]    Fax:  [phone]                                       Nickolas Lang   MRN: [id number]    Department:  [facility] Emergency Department   Date of Visit:  6/2/2018           Patient Information     Date Of Birth          1990        Your diagnoses for this visit were:     Suicidal statement     Acute alcohol intoxication with alcoholism with delirium (H)        You were seen by Chidi Mims MD and Damon Astorga MD.      Follow-up Information     Follow up with Tima Crews MD.    Specialty:  Family Practice    Why:  As needed    Contact information:    [address]  [facility] [address] [phone]        Discharge References/Attachments     ALCOHOL INTOXICATION (ENGLISH)    DEPRESSION (ENGLISH)      24 Hour Appointment Hotline       To make an appointment at any [place] clinic, call [phone] ([phone]). If you don't have a family doctor or clinic, we will help you find one. [facility] [place] clinics are conveniently located to serve the needs of you and your family.             Review of your medicines      Our records show that you are taking the medicines listed below. If these are incorrect, please call your family doctor or clinic.        Dose / Directions Last dose taken    * amLODIPine 10 MG tablet   Commonly known as:  NORVASC   Quantity:  30 tablet        TAKE 1 TABLET (10 MG) BY MOUTH DAILY   Refills:  0        * amLODIPine 10 MG tablet   Commonly known as:  NORVASC   Dose:  10 mg   Quantity:  7 tablet        Take 1 tablet (10 mg) by mouth daily   Refills:  0        aspirin-acetaminophen-caffeine 250-250-65 MG per tablet   Commonly known as:  EXCEDRIN MIGRAINE   Dose:  2 tablet        Take 2 tablets by mouth every 6 hours as needed for headaches   Refills:  0        buPROPion 150 MG 24 hr tablet   Commonly known as:   WELLBUTRIN XL   Dose:  150 mg        Take 150 mg by mouth every morning   Refills:  0        * busPIRone 10 MG tablet   Commonly known as:  BUSPAR   Dose:  20 mg        Take 20 mg by mouth 3 times daily   Refills:  0        * busPIRone 15 MG tablet   Commonly known as:  BUSPAR   Dose:  30 mg   Quantity:  14 tablet        Take 2 tablets (30 mg) by mouth 2 times daily   Refills:  0        cloNIDine 0.1 MG tablet   Commonly known as:  CATAPRES   Quantity:  60 tablet        TAKE 1 TABLET (0.1 MG) BY MOUTH 2 TIMES DAILY   Refills:  0        * gabapentin 300 MG capsule   Commonly known as:  NEURONTIN   Dose:  600 mg        Take 600 mg by mouth 4 times daily   Refills:  0        * gabapentin 600 MG tablet   Commonly known as:  NEURONTIN   Dose:  600 mg   Quantity:  20 tablet        Take 1 tablet (600 mg) by mouth 3 times daily   Refills:  0        * hydrOXYzine 25 MG tablet   Commonly known as:  ATARAX   Dose:  25-50 mg        Take 25-50 mg by mouth every 6 hours as needed for anxiety   Refills:  0        * hydrOXYzine 25 MG tablet   Commonly known as:  ATARAX   Dose:  25-50 mg   Quantity:  30 tablet        Take 1-2 tablets (25-50 mg) by mouth every 6 hours as needed for anxiety   Refills:  0        methocarbamol 500 MG tablet   Commonly known as:  ROBAXIN   Dose:  1000 mg   Quantity:  30 tablet        Take 2 tablets (1,000 mg) by mouth 3 times daily as needed for muscle spasms   Refills:  1        multivitamin, therapeutic with minerals Tabs tablet   Dose:  1 tablet   Quantity:  30 each        Take 1 tablet by mouth daily   Refills:  0        omeprazole 40 MG capsule   Commonly known as:  priLOSEC   Quantity:  30 capsule        TAKE 1 CAPSULE (40 MG) BY MOUTH DAILY   Refills:  0        * sertraline 100 MG tablet   Commonly known as:  ZOLOFT   Dose:  200 mg        Take 200 mg by mouth daily   Refills:  0        * sertraline 100 MG tablet   Commonly known as:  ZOLOFT   Dose:  200 mg   Quantity:  30 tablet        Take 2  tablets (200 mg) by mouth daily   Refills:  1        thiamine 100 MG tablet   Dose:  100 mg   Quantity:  30 tablet        Take 1 tablet (100 mg) by mouth daily   Refills:  1        * traZODone 100 MG tablet   Commonly known as:  DESYREL   Dose:  100-200 mg        Take 100-200 mg by mouth nightly as needed for sleep   Refills:  0        * traZODone 100 MG tablet   Commonly known as:  DESYREL   Dose:  100 mg   Quantity:  14 tablet        Take 1 tablet (100 mg) by mouth nightly as needed for sleep   Refills:  0        * Notice:  This list has 12 medication(s) that are the same as other medications prescribed for you. Read the directions carefully, and ask your doctor or other care provider to review them with you.            Procedures and tests performed during your visit     Acetaminophen level    Alcohol ethyl    Basic metabolic panel    Peripheral IV catheter    Restraints Violent or Self-Destructive Adult (Age 18 or Older)    Salicylate level      Orders Needing Specimen Collection     None      Pending Results     No orders found for last 3 day(s).            Pending Culture Results     No orders found for last 3 day(s).            Pending Results Instructions     If you had any lab results that were not finalized at the time of your Discharge, you can call the ED Lab Result RN at [phone]. You will be contacted by this team for any positive Lab results or changes in treatment. The nurses are available 7 days a week from 10A to 6:30P.  You can leave a message 24 hours per day and they will return your call.        Test Results From Your Hospital Stay        6/2/2018 10:12 PM      Component Results     Component Value Ref Range & Units Status    Acetaminophen Level <2 mg/L Final    Therapeutic range: 10-30 mg/L         6/2/2018 10:09 PM      Component Results     Component Value Ref Range & Units Status    Salicylate Level <2 mg/dL Final    Therapeutic:        <20  Anti inflammatory:  15-30           6/2/2018  10:07 PM      Component Results     Component Value Ref Range & Units Status    Ethanol g/dL 0.35 (HH) <0.01 g/dL Final    Critical Value called to and read back by  MARQUIS (CRISTOPHER GEORGE) @1067 ON 06/02/18 BY MT           6/3/2018 12:55 AM      Component Results     Component Value Ref Range & Units Status    Sodium 143 133 - 144 mmol/L Final    Potassium 3.5 3.4 - 5.3 mmol/L Final    Chloride 106 94 - 109 mmol/L Final    Carbon Dioxide 32 20 - 32 mmol/L Final    Anion Gap 5 3 - 14 mmol/L Final    Glucose 89 70 - 99 mg/dL Final    Urea Nitrogen 10 7 - 30 mg/dL Final    Creatinine 0.60 (L) 0.66 - 1.25 mg/dL Final    GFR Estimate >90 >60 mL/min/1.7m2 Final    Non  GFR Calc    GFR Estimate If Black >90 >60 mL/min/1.7m2 Final    African American GFR Calc    Calcium 8.2 (L) 8.5 - 10.1 mg/dL Final                Clinical Quality Measure: Blood Pressure Screening     Your blood pressure was checked while you were in the emergency department today. The last reading we obtained was  BP: (!) 144/98 . Please read the guidelines below about what these numbers mean and what you should do about them.  If your systolic blood pressure (the top number) is less than 120 and your diastolic blood pressure (the bottom number) is less than 80, then your blood pressure is normal. There is nothing more that you need to do about it.  If your systolic blood pressure (the top number) is 120-139 or your diastolic blood pressure (the bottom number) is 80-89, your blood pressure may be higher than it should be. You should have your blood pressure rechecked within a year by a primary care provider.  If your systolic blood pressure (the top number) is 140 or greater or your diastolic blood pressure (the bottom number) is 90 or greater, you may have high blood pressure. High blood pressure is treatable, but if left untreated over time it can put you at risk for heart attack, stroke, or kidney failure. You should have your blood  "pressure rechecked by a primary care provider within the next 4 weeks.  If your provider in the emergency department today gave you specific instructions to follow-up with your doctor or provider even sooner than that, you should follow that instruction and not wait for up to 4 weeks for your follow-up visit.        Thank you for choosing [facility]       Thank you for choosing [facility] for your care. Our goal is always to provide you with excellent care. Hearing back from our patients is one way we can continue to improve our services. Please take a few minutes to complete the written survey that you may receive in the mail after you visit with us. Thank you!        [employer] Information     [facility] lets you send messages to your doctor, view your test results, renew your prescriptions, schedule appointments and more. To sign up, go to [email or web address] . Click on \"Log in\" on the left side of the screen, which will take you to the Welcome page. Then click on \"Sign up Now\" on the right side of the page.     You will be asked to enter the access code listed below, as well as some personal information. Please follow the directions to create your username and password.     Your access code is: [email or web address]  Expires: 2018  7:02 AM     Your access code will  in 90 days. If you need help or a new code, please call your [facility] clinic or [phone].        Care EveryWhere ID     This is your Care EveryWhere ID. This could be used by other organizations to access your [facility] medical records  [id number]        Equal Access to Services     ERMELINDA LOZADA : Hadii breezy gonzalez hadasho Soomaali, waaxda luqadaha, qaybta kaalmada adeegyada, shawna espinoza . So [facility] [phone].    ATENCIÓN: Si habla español, tiene a miller disposición servicios gratuitos de asistencia lingüística. Llame al [phone].    We comply with applicable federal civil rights laws and [place] laws. We do not " discriminate on the basis of race, color, national origin, age, disability, sex, sexual orientation, or gender identity.            After Visit Summary       This is your record. Keep this with you and show to your community pharmacist(s) and doctor(s) at your next visit.

## 2018-06-03 ENCOUNTER — APPOINTMENT (OUTPATIENT)
Dept: CT IMAGING | Facility: CLINIC | Age: 28
End: 2018-06-03
Attending: EMERGENCY MEDICINE
Payer: COMMERCIAL

## 2018-06-03 ENCOUNTER — HOSPITAL ENCOUNTER (EMERGENCY)
Facility: CLINIC | Age: 28
Discharge: OTHER INSTITUTION | End: 2018-06-04
Attending: EMERGENCY MEDICINE | Admitting: EMERGENCY MEDICINE
Payer: COMMERCIAL

## 2018-06-03 VITALS
OXYGEN SATURATION: 97 % | TEMPERATURE: 97.3 F | SYSTOLIC BLOOD PRESSURE: 141 MMHG | DIASTOLIC BLOOD PRESSURE: 95 MMHG | RESPIRATION RATE: 18 BRPM | HEART RATE: 71 BPM

## 2018-06-03 DIAGNOSIS — K21.9 GASTROESOPHAGEAL REFLUX DISEASE WITHOUT ESOPHAGITIS: ICD-10-CM

## 2018-06-03 DIAGNOSIS — F10.220 ACUTE ALCOHOLIC INTOXICATION IN ALCOHOLISM WITHOUT COMPLICATION (H): ICD-10-CM

## 2018-06-03 DIAGNOSIS — I10 ESSENTIAL HYPERTENSION WITH GOAL BLOOD PRESSURE LESS THAN 140/90: ICD-10-CM

## 2018-06-03 LAB
ALBUMIN SERPL-MCNC: 3.6 G/DL (ref 3.4–5)
ALP SERPL-CCNC: 110 U/L (ref 40–150)
ALT SERPL W P-5'-P-CCNC: 41 U/L (ref 0–70)
ANION GAP SERPL CALCULATED.3IONS-SCNC: 5 MMOL/L (ref 3–14)
ANION GAP SERPL CALCULATED.3IONS-SCNC: 9 MMOL/L (ref 3–14)
AST SERPL W P-5'-P-CCNC: 25 U/L (ref 0–45)
BILIRUB SERPL-MCNC: <0.1 MG/DL (ref 0.2–1.3)
BUN SERPL-MCNC: 10 MG/DL (ref 7–30)
BUN SERPL-MCNC: 7 MG/DL (ref 7–30)
CALCIUM SERPL-MCNC: 8.1 MG/DL (ref 8.5–10.1)
CALCIUM SERPL-MCNC: 8.2 MG/DL (ref 8.5–10.1)
CHLORIDE SERPL-SCNC: 104 MMOL/L (ref 94–109)
CHLORIDE SERPL-SCNC: 106 MMOL/L (ref 94–109)
CK SERPL-CCNC: 81 U/L (ref 30–300)
CO2 SERPL-SCNC: 29 MMOL/L (ref 20–32)
CO2 SERPL-SCNC: 32 MMOL/L (ref 20–32)
CREAT SERPL-MCNC: 0.6 MG/DL (ref 0.66–1.25)
CREAT SERPL-MCNC: 0.6 MG/DL (ref 0.66–1.25)
GFR SERPL CREATININE-BSD FRML MDRD: >90 ML/MIN/1.7M2
GFR SERPL CREATININE-BSD FRML MDRD: >90 ML/MIN/1.7M2
GLUCOSE SERPL-MCNC: 84 MG/DL (ref 70–99)
GLUCOSE SERPL-MCNC: 89 MG/DL (ref 70–99)
POTASSIUM SERPL-SCNC: 3.5 MMOL/L (ref 3.4–5.3)
POTASSIUM SERPL-SCNC: 3.7 MMOL/L (ref 3.4–5.3)
PROT SERPL-MCNC: 8 G/DL (ref 6.8–8.8)
SODIUM SERPL-SCNC: 142 MMOL/L (ref 133–144)
SODIUM SERPL-SCNC: 143 MMOL/L (ref 133–144)

## 2018-06-03 PROCEDURE — 25000128 H RX IP 250 OP 636: Performed by: EMERGENCY MEDICINE

## 2018-06-03 PROCEDURE — 96360 HYDRATION IV INFUSION INIT: CPT

## 2018-06-03 PROCEDURE — 80053 COMPREHEN METABOLIC PANEL: CPT | Performed by: EMERGENCY MEDICINE

## 2018-06-03 PROCEDURE — 99284 EMERGENCY DEPT VISIT MOD MDM: CPT | Mod: 25

## 2018-06-03 PROCEDURE — 96361 HYDRATE IV INFUSION ADD-ON: CPT

## 2018-06-03 PROCEDURE — 80320 DRUG SCREEN QUANTALCOHOLS: CPT | Performed by: EMERGENCY MEDICINE

## 2018-06-03 PROCEDURE — 70450 CT HEAD/BRAIN W/O DYE: CPT

## 2018-06-03 PROCEDURE — 82550 ASSAY OF CK (CPK): CPT | Performed by: EMERGENCY MEDICINE

## 2018-06-03 RX ADMIN — SODIUM CHLORIDE 1000 ML: 9 INJECTION, SOLUTION INTRAVENOUS at 23:25

## 2018-06-03 NOTE — ED TRIAGE NOTES
Patient was [place] out [place] [address]. Per patient, feels suicidal everyday and does have thoughts of self harm. Did consume about 4 bottles of alcohol. Now awake and alert. Patient appears depress, teary and withdrawn. GCS 15. C/o generalized body aches otherwise no other physical complaints.

## 2018-06-03 NOTE — ED NOTES
Spoke with pt about getting a ride. He states his mother has MS so she can't come get him.  His girlfriend is in [place] and he has no one else to ask or call.  Pt states he could walk home.  MD Gauri states he can be discharged to walk home.

## 2018-06-03 NOTE — ED NOTES
Patient signed out to me at 0005 by Dr Mims.  Please see his H&P.  Patient with known EtOH abuse, found intoxicated by PD on [address].  Made statements about not wanting to go on, but no definite suicidal statement.  Attempted to elope from ED, on BRI Hold.  Required IM Geodon after Code 21 called. EtOH 0.35.  APAP and Salicylates neg.  Will add BMP and Urine drug screen onto labs.  Doing well since sedation.  Restraints in place.  Will try removing restraints now that phagological sedation in place.  Plan for reassessment through the night.  DEC evaluation once sober. Will also recheck Blood pressure as only documented reading was 141/114.     Damon Astorga MD  06/03/18 0010
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
## 2018-06-03 NOTE — ED NOTES
Patient awake and alert. Tolerating PO food and fluids well without difficulty. Currently denies any self harm, SI/HI. Will try to call his mother to come and pick him up. VS stable. Will continue to monitor.

## 2018-06-03 NOTE — ED NOTES
Security notified RN and told RN that pt ripped out iv, and voided on the floor.  RN applied dry dressing.  Pt apologized to security. Dr aware.  No further orders.
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
## 2018-06-03 NOTE — ED NOTES
Per security patient made multiple attempts to get out of the room multiple times and was redirected to stay in his room. Patient was also allow to use the restroom multiple times as well within the hour. Due to patient's multiple attempt to get out of his room despite multiple redirections and flight risk, patient was given a urinal to use. This writer has spoken to patient multiple times about plan of care and medical hold, however, patient still attempts to get out of his room. Patient then stated that he wants to call his mother. While this writer was looking for a phone for patient to use to call his mother, patient open the door and try to force himself through security. Code 21 was call due to staff and patient safety. MD aware and came to Code 21. Patient was put in restraints with no significant adverse effect. Patient was again updated with plan of care and BRI hold. Security on watch. Will continue to monitor.

## 2018-06-03 NOTE — ED NOTES
Bed: ED06  Expected date: 6/2/18  Expected time:   Means of arrival:   Comments:  BVM 2; 27 M ETOH

## 2018-06-04 VITALS
OXYGEN SATURATION: 94 % | SYSTOLIC BLOOD PRESSURE: 153 MMHG | HEART RATE: 89 BPM | DIASTOLIC BLOOD PRESSURE: 77 MMHG | TEMPERATURE: 98.2 F | RESPIRATION RATE: 18 BRPM

## 2018-06-04 LAB — ETHANOL SERPL-MCNC: 0.36 G/DL

## 2018-06-04 RX ORDER — BUSPIRONE HYDROCHLORIDE 10 MG/1
20 TABLET ORAL 3 TIMES DAILY
Qty: 18 TABLET | Refills: 0 | Status: SHIPPED | OUTPATIENT
Start: 2018-06-04 | End: 2018-06-07

## 2018-06-04 RX ORDER — AMLODIPINE BESYLATE 10 MG/1
10 TABLET ORAL DAILY
Qty: 3 TABLET | Refills: 0 | Status: ON HOLD | OUTPATIENT
Start: 2018-06-04 | End: 2018-06-29

## 2018-06-04 RX ORDER — GABAPENTIN 600 MG/1
600 TABLET ORAL 3 TIMES DAILY
Qty: 9 TABLET | Refills: 0 | Status: ON HOLD | OUTPATIENT
Start: 2018-06-04 | End: 2018-06-29

## 2018-06-04 RX ORDER — BUPROPION HYDROCHLORIDE 150 MG/1
150 TABLET ORAL EVERY MORNING
Qty: 3 TABLET | Refills: 0 | Status: ON HOLD | OUTPATIENT
Start: 2018-06-04 | End: 2018-06-29

## 2018-06-04 RX ORDER — SERTRALINE HYDROCHLORIDE 100 MG/1
200 TABLET, FILM COATED ORAL DAILY
Qty: 6 TABLET | Refills: 0 | Status: ON HOLD | OUTPATIENT
Start: 2018-06-04 | End: 2018-06-29

## 2018-06-04 RX ORDER — TRAZODONE HYDROCHLORIDE 100 MG/1
100 TABLET ORAL
Qty: 14 TABLET | Refills: 0 | Status: ON HOLD | OUTPATIENT
Start: 2018-06-04 | End: 2018-06-29

## 2018-06-04 NOTE — ED TRIAGE NOTES
Pt brought by EMS. [place]. He claims that he has been drinking alcohol since discharge from ER yesterday

## 2018-06-04 NOTE — ED PROVIDER NOTES
History     Chief Complaint:  Alcohol Intoxication    History limited secondary to the patient's alcohol intoxication and supplemented by EMS report.   BEAR Lang is a 27 year old male with history of alcohol abuse who presents to the emergency department today for evaluation of alcohol intoxication. Per chart review the patient was seen in the emergency department yesterday for similar presentation at which time he attempted to leave AMA. Today the patient states that he was drinking throughout the day today after his discharge from the hospital. He was found asleep in a park by pedestrians, so EMS was called and the patient was brought to the emergency department this evening for evaluation. The patient states he last remembers being awake some time this afternoon.     Allergies:  No Known Drug Allergies      Medications:    amLODIPine (NORVASC) 10 MG tablet  busPIRone (BUSPAR) 15 MG tablet  cloNIDine (CATAPRES) 0.1 MG tablet  gabapentin (NEURONTIN) 300 MG capsule  gabapentin (NEURONTIN) 600 MG tablet  hydrOXYzine (ATARAX) 25 MG tablet  omeprazole (PRILOSEC) 40 MG capsule  sertraline (ZOLOFT) 100 MG tablet  thiamine 100 MG tablet  traZODone (DESYREL) 100 MG tablet    Past Medical History:    ADD  Alcohol abuse  Anxiety   GERD   Hepatic stenosis   Hypertension   Pyloric stenosis     Past Surgical History:    Deviated septum repair  Rotator cuff repair     Family History:    Multiple sclerosis  Depression  Anxiety     Social History:  The patient was accompanied to the ED by EMS.  Smoking Status: Current every day smoker  Smokeless Tobacco: No  Alcohol Use: Yes    Marital Status:  Single      Review of Systems   Unable to perform ROS: Mental status change     Physical Exam   First Vitals:  BP: (!) 134/107  Pulse: 79  Temp: 98.2  F (36.8  C)  Resp: 18  SpO2: 99 %      Physical Exam   HENT:   Right Ear: External ear normal.   Left Ear: External ear normal.   Nose: Nose normal.   Eyes: Conjunctivae,  EOM and lids are normal. Pupils are equal, round, and reactive to light. No scleral icterus.   Neck: Neck supple. No tracheal deviation present.   Cardiovascular: Regular rhythm and intact distal pulses.    Pulmonary/Chest: Breath sounds normal. No respiratory distress.   Abdominal: Soft. There is no tenderness. There is no rebound and no guarding.   Musculoskeletal:   no peripheral edema, no bony ttp on skeletal survey, no palpable deformities, no major joint effusions, full ROM major joints     Neurological:   MAEE, no gross focal motor or sensory deficit   Skin: Skin is warm and dry. He is not diaphoretic.   Psychiatric: He expresses no homicidal and no suicidal ideation.   Nursing note and vitals reviewed.      Emergency Department Course     Imaging:  Radiology findings were communicated with the patient who voiced understanding of the findings.    CT Head w/o contrast:  IMPRESSION: No acute intracranial findings.  Report per radiology       Laboratory:  Laboratory findings were communicated with the patient who voiced understanding of the findings.    CMP: Creatinine 0.60 (L), Calcium 8.1 (L), Bilirubin total <0.1 (L), o/w WNL.   Alcohol level blood: 0.36 (HH)  CK total: 81    Interventions:  2325 NS Bolus 1,000mL IV      Emergency Department Course:  Nursing notes and vitals reviewed.  2207 I performed an exam of the patient as documented above.   IV was inserted and blood was drawn for laboratory testing, results above.   The patient was sent for a head CT while here in the emergency department, findings above.   0321 Patient rechecked and updated.    I personally reviewed the laboratory and imaging results with the Patient and answered all related questions prior to being sent to detox .  Impression & Plan      Medical Decision Making:  Nickolas Lang is a 27 year old male who suffers from alcohol abuse and who was found down in a park after being discharged from the emergency department this morning.  He became intoxicated with alcohol. Blood tests unremarkable other than elevated blood alcohol level. CT scan of head unremarkable. Will watch him here in the emergency department until he can safely ambulate and then having him go to a detox center.     ED course unremarkable able to safely ambulate and converse take p.o. fluid.  Will have him go to [facility] detox from here.    Diagnosis:    ICD-10-CM    1. Essential hypertension with goal blood pressure less than 140/90 I10    2. Gastroesophageal reflux disease without esophagitis K21.9    3. Acute alcoholic intoxication in alcoholism without complication (H) F10.220        Disposition:  Detox.    Scribe Disclosure:  I, Viraj Hilario, am serving as a scribe at 10:03 PM on 6/3/2018 to document services personally performed by Claus Diaz MD based on my observations and the provider's statements to me.    6/3/2018   [facility] EMERGENCY DEPARTMENT       Claus Diaz MD  06/04/18 0344       Claus Diaz MD  06/04/18 0456

## 2018-06-04 NOTE — ED NOTES
Bed: ED07  Expected date: 6/3/18  Expected time: 9:03 PM  Means of arrival: Ambulance  Comments:  JESSICA ALFREDO

## 2018-06-08 DIAGNOSIS — K21.9 GASTROESOPHAGEAL REFLUX DISEASE WITHOUT ESOPHAGITIS: ICD-10-CM

## 2018-06-12 RX ORDER — OMEPRAZOLE 40 MG/1
CAPSULE, DELAYED RELEASE ORAL
Qty: 30 CAPSULE | Refills: 0 | OUTPATIENT
Start: 2018-06-12

## 2018-06-12 NOTE — TELEPHONE ENCOUNTER
"Requested Prescriptions   Pending Prescriptions Disp Refills     omeprazole (PRILOSEC) 40 MG capsule [Pharmacy Med Name: OMEPRAZOLE DR 40 MG CAPSULE] 30 capsule 0     Sig: TAKE 1 CAPSULE (40 MG) BY MOUTH DAILY    PPI Protocol Passed    6/8/2018  9:35 AM       Passed - Not on Clopidogrel (unless Pantoprazole ordered)       Passed - No diagnosis of osteoporosis on record       Passed - Recent (12 mo) or future (30 days) visit within the authorizing provider's specialty    Patient had office visit in the last 12 months or has a visit in the next 30 days with authorizing provider or within the authorizing provider's specialty.  See \"Patient Info\" tab in inbasket, or \"Choose Columns\" in Meds & Orders section of the refill encounter.           Passed - Patient is age 18 or older        Last office visit 9/6/17.  Pt has new PCP (Dr. Crews)  Routed message to viet Grayson RN    "

## 2018-06-29 ENCOUNTER — TELEPHONE (OUTPATIENT)
Dept: BEHAVIORAL HEALTH | Facility: CLINIC | Age: 28
End: 2018-06-29

## 2018-06-29 ENCOUNTER — HOSPITAL ENCOUNTER (INPATIENT)
Facility: CLINIC | Age: 28
LOS: 4 days | Discharge: PSYCHIATRIC OR SUBSTANCE USE FACILITY | DRG: 897 | End: 2018-07-03
Attending: EMERGENCY MEDICINE | Admitting: PSYCHIATRY & NEUROLOGY
Payer: COMMERCIAL

## 2018-06-29 DIAGNOSIS — K21.9 GASTROESOPHAGEAL REFLUX DISEASE WITHOUT ESOPHAGITIS: ICD-10-CM

## 2018-06-29 DIAGNOSIS — I10 ESSENTIAL HYPERTENSION WITH GOAL BLOOD PRESSURE LESS THAN 140/90: ICD-10-CM

## 2018-06-29 DIAGNOSIS — F10.229 ACUTE ALCOHOLIC INTOXICATION IN ALCOHOLISM WITH COMPLICATION (H): ICD-10-CM

## 2018-06-29 DIAGNOSIS — F10.20 ALCOHOL DEPENDENCE, CONTINUOUS DRINKING BEHAVIOR (H): ICD-10-CM

## 2018-06-29 DIAGNOSIS — F19.20 CHEMICAL DEPENDENCY (H): Primary | ICD-10-CM

## 2018-06-29 LAB
ALCOHOL BREATH TEST: 0.3 (ref 0–0.01)
AMPHETAMINES UR QL SCN: NEGATIVE
BARBITURATES UR QL: NEGATIVE
BENZODIAZ UR QL: POSITIVE
CANNABINOIDS UR QL SCN: NEGATIVE
COCAINE UR QL: NEGATIVE
ETHANOL UR QL SCN: POSITIVE
OPIATES UR QL SCN: NEGATIVE

## 2018-06-29 PROCEDURE — 12800012 ZZH R&B CD MH INTERMEDIATE ADULT

## 2018-06-29 PROCEDURE — HZ2ZZZZ DETOXIFICATION SERVICES FOR SUBSTANCE ABUSE TREATMENT: ICD-10-PCS | Performed by: PSYCHIATRY & NEUROLOGY

## 2018-06-29 PROCEDURE — 99285 EMERGENCY DEPT VISIT HI MDM: CPT | Performed by: EMERGENCY MEDICINE

## 2018-06-29 PROCEDURE — 99284 EMERGENCY DEPT VISIT MOD MDM: CPT | Mod: Z6 | Performed by: EMERGENCY MEDICINE

## 2018-06-29 PROCEDURE — 80307 DRUG TEST PRSMV CHEM ANLYZR: CPT | Performed by: EMERGENCY MEDICINE

## 2018-06-29 PROCEDURE — 80320 DRUG SCREEN QUANTALCOHOLS: CPT | Performed by: EMERGENCY MEDICINE

## 2018-06-29 PROCEDURE — 25000132 ZZH RX MED GY IP 250 OP 250 PS 637: Performed by: PSYCHIATRY & NEUROLOGY

## 2018-06-29 PROCEDURE — 25000125 ZZHC RX 250: Performed by: PSYCHIATRY & NEUROLOGY

## 2018-06-29 PROCEDURE — 82075 ASSAY OF BREATH ETHANOL: CPT | Performed by: EMERGENCY MEDICINE

## 2018-06-29 RX ORDER — AMLODIPINE BESYLATE 10 MG/1
10 TABLET ORAL DAILY
Status: ON HOLD | COMMUNITY
Start: 2018-04-11 | End: 2018-07-03

## 2018-06-29 RX ORDER — GABAPENTIN 400 MG/1
400 CAPSULE ORAL 4 TIMES DAILY
Status: DISCONTINUED | OUTPATIENT
Start: 2018-06-29 | End: 2018-07-01

## 2018-06-29 RX ORDER — NICOTINE 21 MG/24HR
1 PATCH, TRANSDERMAL 24 HOURS TRANSDERMAL DAILY
Status: DISCONTINUED | OUTPATIENT
Start: 2018-06-29 | End: 2018-07-03 | Stop reason: HOSPADM

## 2018-06-29 RX ORDER — AMLODIPINE BESYLATE 10 MG/1
10 TABLET ORAL DAILY
Status: DISCONTINUED | OUTPATIENT
Start: 2018-06-29 | End: 2018-07-03 | Stop reason: HOSPADM

## 2018-06-29 RX ORDER — HYDROXYZINE HYDROCHLORIDE 25 MG/1
25-50 TABLET, FILM COATED ORAL EVERY 6 HOURS PRN
Status: DISCONTINUED | OUTPATIENT
Start: 2018-06-29 | End: 2018-07-03 | Stop reason: HOSPADM

## 2018-06-29 RX ORDER — TRAZODONE HYDROCHLORIDE 100 MG/1
100-200 TABLET ORAL
Status: DISCONTINUED | OUTPATIENT
Start: 2018-06-29 | End: 2018-07-03 | Stop reason: HOSPADM

## 2018-06-29 RX ORDER — TRAZODONE HYDROCHLORIDE 100 MG/1
100-200 TABLET ORAL
Status: ON HOLD | COMMUNITY
Start: 2018-05-08 | End: 2018-07-03

## 2018-06-29 RX ORDER — ATENOLOL 50 MG/1
50 TABLET ORAL DAILY PRN
Status: DISCONTINUED | OUTPATIENT
Start: 2018-06-29 | End: 2018-07-03 | Stop reason: HOSPADM

## 2018-06-29 RX ORDER — LANOLIN ALCOHOL/MO/W.PET/CERES
100 CREAM (GRAM) TOPICAL DAILY
Status: COMPLETED | OUTPATIENT
Start: 2018-06-30 | End: 2018-07-02

## 2018-06-29 RX ORDER — DEXTROAMPHETAMINE SACCHARATE, AMPHETAMINE ASPARTATE MONOHYDRATE, DEXTROAMPHETAMINE SULFATE AND AMPHETAMINE SULFATE 2.5; 2.5; 2.5; 2.5 MG/1; MG/1; MG/1; MG/1
10 CAPSULE, EXTENDED RELEASE ORAL 3 TIMES DAILY
Status: ON HOLD | COMMUNITY
Start: 2018-06-08 | End: 2018-07-03

## 2018-06-29 RX ORDER — SERTRALINE HYDROCHLORIDE 100 MG/1
200 TABLET, FILM COATED ORAL DAILY
Status: ON HOLD | COMMUNITY
Start: 2018-05-24 | End: 2018-07-03

## 2018-06-29 RX ORDER — BUSPIRONE HYDROCHLORIDE 10 MG/1
20 TABLET ORAL 3 TIMES DAILY
Status: ON HOLD | COMMUNITY
Start: 2018-05-24 | End: 2018-07-03

## 2018-06-29 RX ORDER — BISACODYL 10 MG
10 SUPPOSITORY, RECTAL RECTAL DAILY PRN
Status: DISCONTINUED | OUTPATIENT
Start: 2018-06-29 | End: 2018-07-03 | Stop reason: HOSPADM

## 2018-06-29 RX ORDER — ONDANSETRON 4 MG/1
4 TABLET, ORALLY DISINTEGRATING ORAL EVERY 6 HOURS PRN
Status: DISCONTINUED | OUTPATIENT
Start: 2018-06-29 | End: 2018-07-03 | Stop reason: HOSPADM

## 2018-06-29 RX ORDER — FOLIC ACID 1 MG/1
1 TABLET ORAL DAILY
Status: DISCONTINUED | OUTPATIENT
Start: 2018-06-30 | End: 2018-07-03 | Stop reason: HOSPADM

## 2018-06-29 RX ORDER — BUSPIRONE HYDROCHLORIDE 10 MG/1
20 TABLET ORAL 3 TIMES DAILY
Status: DISCONTINUED | OUTPATIENT
Start: 2018-06-29 | End: 2018-07-03 | Stop reason: HOSPADM

## 2018-06-29 RX ORDER — MULTIPLE VITAMINS W/ MINERALS TAB 9MG-400MCG
1 TAB ORAL DAILY
Status: DISCONTINUED | OUTPATIENT
Start: 2018-06-30 | End: 2018-07-03 | Stop reason: HOSPADM

## 2018-06-29 RX ORDER — GABAPENTIN 400 MG/1
800 CAPSULE ORAL 4 TIMES DAILY
Status: ON HOLD | COMMUNITY
Start: 2018-05-24 | End: 2018-07-03

## 2018-06-29 RX ORDER — CLONIDINE HYDROCHLORIDE 0.1 MG/1
0.1 TABLET ORAL 2 TIMES DAILY
Status: DISCONTINUED | OUTPATIENT
Start: 2018-06-29 | End: 2018-07-03 | Stop reason: HOSPADM

## 2018-06-29 RX ORDER — SERTRALINE HYDROCHLORIDE 100 MG/1
200 TABLET, FILM COATED ORAL DAILY
Status: DISCONTINUED | OUTPATIENT
Start: 2018-06-30 | End: 2018-07-03 | Stop reason: HOSPADM

## 2018-06-29 RX ORDER — ACETAMINOPHEN 325 MG/1
650 TABLET ORAL EVERY 4 HOURS PRN
Status: DISCONTINUED | OUTPATIENT
Start: 2018-06-29 | End: 2018-07-03 | Stop reason: HOSPADM

## 2018-06-29 RX ORDER — DIAZEPAM 5 MG
5-20 TABLET ORAL EVERY 30 MIN PRN
Status: DISCONTINUED | OUTPATIENT
Start: 2018-06-29 | End: 2018-07-03 | Stop reason: HOSPADM

## 2018-06-29 RX ORDER — DEXTROAMPHETAMINE SACCHARATE, AMPHETAMINE ASPARTATE MONOHYDRATE, DEXTROAMPHETAMINE SULFATE AND AMPHETAMINE SULFATE 5; 5; 5; 5 MG/1; MG/1; MG/1; MG/1
20 CAPSULE, EXTENDED RELEASE ORAL 3 TIMES DAILY
Status: ON HOLD | COMMUNITY
Start: 2018-05-09 | End: 2018-07-03

## 2018-06-29 RX ORDER — ALUMINA, MAGNESIA, AND SIMETHICONE 2400; 2400; 240 MG/30ML; MG/30ML; MG/30ML
30 SUSPENSION ORAL EVERY 4 HOURS PRN
Status: DISCONTINUED | OUTPATIENT
Start: 2018-06-29 | End: 2018-07-03 | Stop reason: HOSPADM

## 2018-06-29 RX ORDER — BUPROPION HYDROCHLORIDE 150 MG/1
150 TABLET ORAL DAILY
Status: ON HOLD | COMMUNITY
Start: 2018-05-22 | End: 2018-07-03

## 2018-06-29 RX ADMIN — DIAZEPAM 10 MG: 5 TABLET ORAL at 20:11

## 2018-06-29 RX ADMIN — ALUMINUM HYDROXIDE, MAGNESIUM HYDROXIDE, AND DIMETHICONE 30 ML: 400; 400; 40 SUSPENSION ORAL at 19:41

## 2018-06-29 RX ADMIN — ONDANSETRON 4 MG: 4 TABLET, ORALLY DISINTEGRATING ORAL at 22:03

## 2018-06-29 RX ADMIN — BUSPIRONE HYDROCHLORIDE 20 MG: 10 TABLET ORAL at 20:11

## 2018-06-29 RX ADMIN — GABAPENTIN 400 MG: 400 CAPSULE ORAL at 20:11

## 2018-06-29 RX ADMIN — OMEPRAZOLE 40 MG: 20 CAPSULE, DELAYED RELEASE ORAL at 20:15

## 2018-06-29 RX ADMIN — CLONIDINE HYDROCHLORIDE 0.1 MG: 0.1 TABLET ORAL at 20:11

## 2018-06-29 RX ADMIN — DIAZEPAM 10 MG: 5 TABLET ORAL at 22:57

## 2018-06-29 RX ADMIN — DIAZEPAM 10 MG: 5 TABLET ORAL at 21:54

## 2018-06-29 RX ADMIN — DIAZEPAM 10 MG: 5 TABLET ORAL at 17:25

## 2018-06-29 RX ADMIN — DIAZEPAM 10 MG: 5 TABLET ORAL at 18:32

## 2018-06-29 RX ADMIN — AMLODIPINE BESYLATE 10 MG: 10 TABLET ORAL at 18:32

## 2018-06-29 RX ADMIN — DIAZEPAM 10 MG: 5 TABLET ORAL at 20:52

## 2018-06-29 RX ADMIN — TRAZODONE HYDROCHLORIDE 100 MG: 100 TABLET ORAL at 22:58

## 2018-06-29 ASSESSMENT — ENCOUNTER SYMPTOMS
FEVER: 0
NECK STIFFNESS: 0
ARTHRALGIAS: 0
CONFUSION: 0
ABDOMINAL PAIN: 0
VOMITING: 0
EYE REDNESS: 0
DIFFICULTY URINATING: 0
SHORTNESS OF BREATH: 0
COLOR CHANGE: 0
HEADACHES: 0

## 2018-06-29 ASSESSMENT — ACTIVITIES OF DAILY LIVING (ADL)
DRESS: STREET CLOTHES;INDEPENDENT
ORAL_HYGIENE: INDEPENDENT
GROOMING: INDEPENDENT
LAUNDRY: WITH SUPERVISION

## 2018-06-29 NOTE — IP AVS SNAPSHOT
MRN:[id number]                      After Visit Summary   6/29/2018    Nickolas Lang    MRN: [id number]           Thank you!     Thank you for choosing [facility] for your care. Our goal is always to provide you with excellent care.        Patient Information     Date Of Birth          1990        Designated Caregiver       Most Recent Value    Caregiver    Will someone help with your care after discharge? no      About your hospital stay     You were admitted on:  June 29, 2018 You last received care in the:  [facility]    You were discharged on:  July 3, 2018       Who to Call     For medical emergencies, please call [phone].  For non-urgent questions about your medical care, please call your primary care provider or clinic, [phone]          Attending Provider     Provider Specialty    Inocente Ventura MD Emergency Medicine    [facility]    [place], Inocente Arvizu MD Psychiatry       Primary Care Provider Office Phone # Fax #    Tima Crews [phone]      Follow-up Appointments     Follow Up ([facility])       Follow up with primary care provider, Tima Crews, within 7 days for hospital follow- up.  The following labs/tests are recommended: CBC, CMP.      Appointments on [place] and/or [facility] (with [facility] or [facility] provider or service). Call [phone] if you haven't heard regarding these appointments within 7 days of discharge.                  Your next 10 appointments already scheduled     Jul 03, 2018  1:00 PM CDT   Treatment with LP/DOP ADMISSIONS   [facility] ([facility])    [address] [place] [id number] [phone]            Jul 12, 2018  3:30 PM CDT   Return Visit with Tima Crews MD   [facility] ([facility])    [address]  [address]  [facility] [id number] [phone]              Further instructions from your care team       Behavioral [facility] Discharge Planning and Instructions  THANK YOU FOR CHOOSING THE [facility]  [facility]: [phone]    Summary: You were admitted to and processed through [facility] on June 29, 2018 for detoxification from alcohol.  A medical examination was performed that included lab work. You have met with a  and been approved for admission to [facility].  Please make your recovery a priority, Mr. Lang.     Recommendation:  Residential Treatment, psychotherapy, sober support group engagement and active work with a sponsor through [place] [employer] [facility].    Main Diagnosis: Alcohol Dependence    Major Treatments, Procedures and Findings:  You were detoxified from alcohol using the appropriate protocol(s). You have had a chemical dependency assessment.  You have had blood drawn, and the results have been reviewed with you.  Please take your lab work with you to your next provider appointment.    Symptoms to Report:  If you experience more anxiety, confusion, sleeplessness, deep sadness or thoughts of suicide, notify your treatment team or notify your primary care physician. IF THE SYMPTOMS YOU ARE EXPERIENCING ARE A MEDICAL EMERGENCY, CALL 911 IMMEDIATELY.     Lifestyle Adjustment: Adjust your lifestyle to get enough sleep, relaxation, exercise and excellent nutrition. Continue to develop healthy coping skills to decrease stress and promote a sober living environment. Do not use alcohol, illegal drugs or addictive medications other than what is currently prescribed. AA, NA, and a sponsor are excellent resources for support.     Primary Provider:   Jul 12, 2018  3:30 PM CDT   Return Visit with Tima Crews MD   [facility] ([facility])    [address]  [address]  [facility] [id number] [phone]       Resources:  [facility] [phone] Support Group:  AA/NA and Sponsor/support.  Crisis Intervention: 682.302.7476 or 908-353-3308 (TTY: 241.895.5318). Call anytime for help.  National [place] on Mental Illness (www.[place].chaya.org): 368.591.3100 or 567-461-1780.  Alcoholics Anonymous (www.alcoholics-anonymous.org): Check your phone book for your local chapter.  Suicide Awareness Voices of Education (www.save.org): 626-253-VEYD (3302)  National Suicide Prevention Line (www.mentalInProntomn.org): 631-415-RIRX (6499)  Mental Health Consumer/Survivor Network of [place] (www.mhcsn.net): 714.696.2528 or 995-320-7067.  Mental Health Association of [place] (www.mentalhealth.org): 179.645.2495 or 465-262-9926  Substance Abuse and Mental Health Services. (www.samhsa.gov)    [place] [facility] ([facility])  [facility] connects people seeking recovery to resources that help foster and sustain long-term recovery.  Whether you are seeking resources for treatment, transportation, housing, job training, education, health care or other pathways to recovery, [facility] is a great place to start. 786.193.9772 www.[facility].org    General Medication Instruction: See your medication papers for instructions. Take all medicines as directed.  Make no changes unless your doctor suggests them. Go to all your doctor visits.  Be sure to have all your required lab tests. This way, your medicines may be refilled on time. Do not use any drugs not prescribed by your provider. AA/NA and sponsors are excellent resources for support. Avoid alcohol at all costs!    Please Note:  If you have any questions at anytime after you are discharged please call the [facility] [place] detoxification [facility] at [phone].  [facility] , Health, Behavioral Intake [phone]. Please take this discharge folder with you to all your follow up appointments, it contains your lab results, diagnosis, medication list and  "discharge recommendations. THANK YOU FOR CHOOSING THE [facility]                                              Pending Results     No orders found from 6/27/2018 to 6/30/2018.            Statement of Approval     Ordered          07/03/18 1218  I have reviewed and agree with all the recommendations and orders detailed in this document.  EFFECTIVE NOW     Approved and electronically signed by:  Filippo Brasher MD             Admission Information     Date & Time Provider Department Dept. Phone    6/29/2018 Filippo Brasher MD [facility] [phone]      Your Vitals Were     Blood Pressure Pulse Temperature Respirations Height Weight    136/100 85 97.1  F (36.2  C) (Oral) 16 1.854 m (6' 1\") 140.6 kg (310 lb)    Pulse Oximetry BMI (Body Mass Index)                95% 40.9 kg/m2          MyChart Information     [facility] gives you secure access to your electronic health record. If you see a primary care provider, you can also send messages to your care team and make appointments. If you have questions, please call your primary care clinic.  If you do not have a primary care provider, please call [phone] and they will assist you.        Care EveryWhere ID     This is your Care EveryWhere ID. This could be used by other organizations to access your [facility] medical records  [id number]        Equal Access to Services     ERMELINDA LOZADA : Rishabh Petersen, waaxda luqadaha, qaybta kaalmada carlene, shawna barfield. So [facility] [phone].    ATENCIÓN: Si habla español, tiene a miller disposición servicios gratuitos de asistencia lingüística. Llame al [phone].    We comply with applicable federal civil rights laws and [place] laws. We do not discriminate on the basis of race, color, national origin, age, disability, sex, sexual orientation, or gender identity.               Review of your medicines      START taking        Dose / " Directions    nicotine 21 MG/24HR 24 hr patch   Commonly known as:  NICODERM CQ   Used for:  Alcohol dependence, continuous drinking behavior (H)        Dose:  1 patch   Start taking on:  7/4/2018   Place 1 patch onto the skin daily   Quantity:  30 patch   Refills:  0         CONTINUE these medicines which may have CHANGED, or have new prescriptions. If we are uncertain of the size of tablets/capsules you have at home, strength may be listed as something that might have changed.        Dose / Directions    gabapentin 300 MG capsule   Commonly known as:  NEURONTIN   This may have changed:    - medication strength  - how much to take  - when to take this   Used for:  Alcohol dependence, continuous drinking behavior (H)        Dose:  600 mg   Take 2 capsules (600 mg) by mouth 3 times daily   Quantity:  90 capsule   Refills:  1         CONTINUE these medicines which have NOT CHANGED        Dose / Directions    amLODIPine 10 MG tablet   Commonly known as:  NORVASC   Used for:  Alcohol dependence, continuous drinking behavior (H)        Dose:  10 mg   Start taking on:  7/4/2018   Take 1 tablet (10 mg) by mouth daily   Quantity:  30 tablet   Refills:  0       busPIRone 10 MG tablet   Commonly known as:  BUSPAR   Used for:  Alcohol dependence, continuous drinking behavior (H)        Dose:  20 mg   Take 2 tablets (20 mg) by mouth 3 times daily   Quantity:  90 tablet   Refills:  0       cloNIDine 0.1 MG tablet   Commonly known as:  CATAPRES   Used for:  Alcohol dependence, continuous drinking behavior (H)        Dose:  0.1 mg   Take 1 tablet (0.1 mg) by mouth 2 times daily   Quantity:  60 tablet   Refills:  0       hydrOXYzine 25 MG tablet   Commonly known as:  ATARAX   Used for:  Chemical dependency (H)        Dose:  25-50 mg   Take 1-2 tablets (25-50 mg) by mouth every 6 hours as needed for anxiety   Quantity:  30 tablet   Refills:  0       multivitamin, therapeutic with minerals Tabs tablet   Used for:  Alcohol dependence,  continuous drinking behavior (H)        Dose:  1 tablet   Take 1 tablet by mouth daily   Quantity:  30 each   Refills:  0       omeprazole 40 MG capsule   Commonly known as:  priLOSEC   Used for:  Alcohol dependence, continuous drinking behavior (H)        Dose:  40 mg   Start taking on:  7/4/2018   Take 1 capsule (40 mg) by mouth daily   Quantity:  30 capsule   Refills:  0       sertraline 100 MG tablet   Commonly known as:  ZOLOFT   Used for:  Alcohol dependence, continuous drinking behavior (H)        Dose:  200 mg   Start taking on:  7/4/2018   Take 2 tablets (200 mg) by mouth daily   Quantity:  30 tablet   Refills:  0       thiamine 100 MG tablet   Used for:  Acute alcoholic intoxication in alcoholism with complication (H)        Dose:  100 mg   Take 1 tablet (100 mg) by mouth daily   Quantity:  30 tablet   Refills:  1       traZODone 100 MG tablet   Commonly known as:  DESYREL   Used for:  Alcohol dependence, continuous drinking behavior (H)        Dose:  100-200 mg   Take 1-2 tablets (100-200 mg) by mouth nightly as needed for sleep   Quantity:  90 tablet   Refills:  0         STOP taking     amphetamine-dextroamphetamine 10 MG per 24 hr capsule   Commonly known as:  ADDERALL XR           amphetamine-dextroamphetamine 20 MG per 24 hr capsule   Commonly known as:  ADDERALL XR           buPROPion 150 MG 24 hr tablet   Commonly known as:  WELLBUTRIN XL                Where to get your medicines      These medications were sent to [facility] [place], [place] - [address]  [address] [address]     Phone:  [phone]     amLODIPine 10 MG tablet    busPIRone 10 MG tablet    cloNIDine 0.1 MG tablet    gabapentin 300 MG capsule    hydrOXYzine 25 MG tablet    multivitamin, therapeutic with minerals Tabs tablet    nicotine 21 MG/24HR 24 hr patch    omeprazole 40 MG capsule    sertraline 100 MG tablet    thiamine 100 MG tablet    traZODone 100 MG tablet                 Protect others around you: Learn how to safely use, store and throw away your medicines at www.disposemymeds.org.             Medication List: This is a list of all your medications and when to take them. Check marks below indicate your daily home schedule. Keep this list as a reference.      Medications           Morning Afternoon Evening Bedtime As Needed    amLODIPine 10 MG tablet   Commonly known as:  NORVASC   Take 1 tablet (10 mg) by mouth daily   Start taking on:  7/4/2018   Last time this was given:  10 mg on 7/3/2018  8:18 AM                                busPIRone 10 MG tablet   Commonly known as:  BUSPAR   Take 2 tablets (20 mg) by mouth 3 times daily   Last time this was given:  20 mg on 7/3/2018  8:18 AM                                cloNIDine 0.1 MG tablet   Commonly known as:  CATAPRES   Take 1 tablet (0.1 mg) by mouth 2 times daily   Last time this was given:  0.1 mg on 7/3/2018  8:18 AM                                gabapentin 300 MG capsule   Commonly known as:  NEURONTIN   Take 2 capsules (600 mg) by mouth 3 times daily   Last time this was given:  600 mg on 7/3/2018  8:18 AM                                hydrOXYzine 25 MG tablet   Commonly known as:  ATARAX   Take 1-2 tablets (25-50 mg) by mouth every 6 hours as needed for anxiety   Last time this was given:  50 mg on 7/3/2018 10:55 AM                                multivitamin, therapeutic with minerals Tabs tablet   Take 1 tablet by mouth daily   Last time this was given:  1 tablet on 7/3/2018  8:18 AM                                nicotine 21 MG/24HR 24 hr patch   Commonly known as:  NICODERM CQ   Place 1 patch onto the skin daily   Start taking on:  7/4/2018                                omeprazole 40 MG capsule   Commonly known as:  priLOSEC   Take 1 capsule (40 mg) by mouth daily   Start taking on:  7/4/2018   Last time this was given:  40 mg on 7/3/2018  8:18 AM                                sertraline 100 MG tablet   Commonly  known as:  ZOLOFT   Take 2 tablets (200 mg) by mouth daily   Start taking on:  7/4/2018   Last time this was given:  200 mg on 7/3/2018  8:18 AM                                thiamine 100 MG tablet   Take 1 tablet (100 mg) by mouth daily   Last time this was given:  100 mg on 7/2/2018  8:45 AM                                traZODone 100 MG tablet   Commonly known as:  DESYREL   Take 1-2 tablets (100-200 mg) by mouth nightly as needed for sleep   Last time this was given:  200 mg on 7/2/2018 10:33 PM                                          More Information        Established High Blood Pressure    High blood pressure (hypertension) is a chronic disease. Often, healthcare providers don t know what causes it. But it can be caused by certain health conditions and medicines.  If you have high blood pressure, you may not have any symptoms. If you do have symptoms, they may include headache, dizziness, changes in your vision, chest pain, and shortness of breath. But even without symptoms, high blood pressure that s not treated raises your risk for heart attack, heart failure, and stroke. High blood pressure is a serious health risk and shouldn t be ignored.  Blood pressure measurements are given as 2 numbers. Systolic blood pressure is the upper number. This is the pressure when the heart contracts. Diastolic blood pressure is the lower number. This is the pressure when the heart relaxes between beats. You will see your blood pressure readings written together. For example, a person with a systolic pressure of 118 and a diastolic pressure of 78 will have 118/78 written in the medical record.  Blood pressure is categorized as normal, elevated, or stage 1 or stage 2 high blood pressure:    Normal blood pressure is systolic of less than 120 and diastolic of less than 80 (120/80)    Elevated blood pressure is systolic of 120 to 129 and diastolic less than 80    Stage 1 high blood pressure is systolic is 130 to 139 or  diastolic between 80 to 89    Stage 2 high blood pressure is when systolic is 140 or higher or the diastolic is 90 or higher  Home care  If you have high blood pressure, follow these home care guidelines to help lower your blood pressure. If you are taking medicines for high blood pressure, these methods may reduce or end your need for medicines in the future.    Start a weight-loss program if you are overweight.    Cut back on how much salt you get in your diet. Here s how to do this:  ? Don t eat foods that have a lot of salt. These include olives, pickles, smoked meats, and salted potato chips.  ? Don t add salt to your food at the table.  ? Use only small amounts of salt when cooking.    Start an exercise program. Talk with your healthcare provider about the type of exercise program that would be best for you. It doesn't have to be hard. Even brisk walking for 20 minutes 3 times a week is a good form of exercise.    Don t take medicines that stimulate the heart. This includes many over-the-counter cold and sinus decongestant pills and sprays, as well as diet pills. Check the warnings about high blood pressure on the label. Before buying any over-the-counter medicines or supplements, always ask the pharmacist about the product's potential interaction with your high blood pressure and your high blood pressure medicines.    Stimulants such as amphetamine or cocaine could be deadly for someone with high blood pressure. Never take these.    Limit how much caffeine you get in your diet. Switch to caffeine-free products.    Stop smoking. If you are a long-time smoker, this can be hard. Talk to your healthcare provider about medicines and nicotine replacement options to help you. Also, enroll in a stop-smoking program to make it more likely that you will quit for good.    Learn how to handle stress. This is an important part of any program to lower blood pressure. Learn about relaxation methods like meditation, yoga, or  biofeedback.    If your provider prescribed medicines, take them exactly as directed. Missing doses may cause your blood pressure get out of control.    If you miss a dose or doses, check with your healthcare provider or pharmacist about what to do.    Consider buying an automatic blood pressure machine to check your blood pressure at home. Ask your provider for a recommendation. You can get one of these at most pharmacies.     The American Heart Association recommends the following guidelines for home blood pressure monitoring:    Don't smoke or drink coffee for 30 minutes before taking your blood pressure.    Go to the bathroom before the test.    Relax for 5 minutes before taking the measurement.    Sit with your back supported (don't sit on a couch or soft chair); keep your feet on the floor uncrossed. Place your arm on a solid flat surface (like a table) with the upper part of the arm at heart level. Place the middle of the cuff directly above the bend of the elbow. Check the monitor's instruction manual for an illustration.    Take multiple readings. When you measure, take 2 to 3 readings one minute apart and record all of the results.    Take your blood pressure at the same time every day, or as your healthcare provider recommends.    Record the date, time, and blood pressure reading.    Take the record with you to your next medical appointment. If your blood pressure monitor has a built-in memory, simply take the monitor with you to your next appointment.    Call your provider if you have several high readings. Don't be frightened by a single high blood pressure reading, but if you get several high readings, check in with your healthcare provider.    Note: When blood pressure reaches a systolic (top number) of 180 or higher OR diastolic (bottom number) of 110 or higher, seek emergency medical treatment.  Follow-up care  You will need to see your healthcare provider regularly. This is to check your blood  pressure and to make changes to your medicines. Make a follow-up appointment as directed. Bring the record of your home blood pressure readings to the appointment.  When to seek medical advice  Call your healthcare provider right away if any of these occur:    Blood pressure reaches a systolic (upper number) of 180 or higher OR a diastolic (bottom number) of 110 or higher    Chest pain or shortness of breath    Severe headache    Throbbing or rushing sound in the ears    Nosebleed    Sudden severe pain in your belly (abdomen)    Extreme drowsiness, confusion, or fainting    Dizziness or spinning sensation (vertigo)    Weakness of an arm or leg or one side of the face    You have problems speaking or seeing   Date Last Reviewed: 12/1/2016 2000-2017 [employer]. [address] [place] [address], [place] [id number]. All rights reserved. This information is not intended as a substitute for professional medical care. Always follow your healthcare professional's instructions.                What is High Blood Pressure?  High blood pressure (also called hypertension) is known as the  silent killer.  This is because most of the time it doesn t cause symptoms. In fact, many people don t know they have it until other problems develop. In most cases, high blood pressure often requires lifelong treatment.  Understanding blood pressure  The circulatory system is made up of the heart and blood vessels that carry blood through the body. Your heart is the pump for this system. With each heartbeat (contraction), the heart sends blood out through large blood vessels called arteries. Blood pressure is a measure of how hard the moving blood pushes against the walls of the arteries.  High blood pressure can harm your health  In a healthy blood vessel, the blood moves smoothly through the vessel and puts normal pressure on the vessel walls.       High blood pressure occurs when blood pushes too hard against artery walls. This  causes damage to the artery walls and then the formation of scar tissue as it heals. This makes the arteries stiff and weak. Plaque sticks to the scarred tissue narrowing and hardening the arteries. High blood pressure also causes your heart to work harder to get blood out to the body. High blood pressure raises your risk of heart attack, also known as acute myocardial infarction, or AMI, heart failure, and stroke. It can also lead to kidney disease, and blindness. In general, if you have high blood pressure, keeping your blood pressure below 130/80 mmHg may help prevent these problems. Your healthcare provider may prescribe medicine to help control blood pressure if lifestyle changes are not enough.  It's important to know your blood pressure numbers. Blood pressure measurements are given as 2 numbers. Systolic blood pressure is the upper number. This is the pressure when the heart contracts. Diastolic blood pressure is the lower number. This is the pressure when the heart relaxes between beats.  Blood pressure is categorized as normal, elevated, or stage 1 or stage 2 high blood pressure:    Normal blood pressure is systolic of less than 120 and diastolic of less than 80 (120/80)    Elevated blood pressure is systolic of 120 to 129 and diastolic less than 80    Stage 1 high blood pressure is systolic is 130 to 139 or diastolic between 80 to 89    Stage 2 high blood pressure is when systolic is 140 or higher or the diastolic is 90 or higher  High blood pressure is diagnosed when multiple, separate readings show blood pressure above 130/80 mmHg. Talk with your healthcare provider if you have questions or concerns about your blood pressure readings.  Measuring blood pressure  An example of a blood pressure measurement is 120/70 (120 over 70). The top number is the pressure of blood against the artery walls during a heartbeat (systolic). The bottom number is the pressure of blood against artery walls between heartbeats  "(diastolic). Talk with your healthcare provider to find out what your blood pressure goals should be.   Controlling blood pressure  If your blood pressure is too high, work with your doctor on a plan for lowering it. Below are steps you can take that will help lower your blood pressure.    Choose heart-healthy foods. Eating healthier meals helps you control your blood pressure. Ask your doctor about the DASH eating plan. This plan helps reduce blood pressure by limiting the amount of sodium (salt) you have in your diet. DASH also encourages eating plenty of fruits and vegetables, low-fat or non-fat dairy, whole-grains, and foods high in fiber, and low in fat. This also provides an enhanced amount of potassium which can also help lower blood pressure.    Reduce sodium. Reducing sodium in your diet reduces fluid retention. Fluid retention caused by too much salt increases blood volume and blood pressure. The American Heart Association s (AHA) \"ideal\" sodium intake recommendation is 1,500 milligrams per day.  However, since American's eat so much salt, the AHA says a positive change can occur by cutting back to even 2,400 milligrams of sodium a day.    Maintain a healthy weight. Being overweight makes you more likely to have high blood pressure. Losing excess weight helps lower blood pressure.    Exercise regularly. Daily exercise helps your heart and blood vessels work better and stay healthier. It can help lower your blood pressure.    Stop smoking. Smoking increases blood pressure and damages blood vessels.    Limit alcohol. Drinking too much alcohol can raise blood pressure. Men should have no more than 2 drinks a day. Women should have no more than 1. (A drink is equal to 1 beer, or a small glass of wine, or a shot of liquor.)    Control stress. Stress makes your heart work harder and beat faster. Controlling stress helps you control your blood pressure.  Facts about high blood pressure    Feeling OK does not mean " that blood pressure is under control. Likewise, feeling bad doesn t mean it s out of control. The only way to know for sure is to check your pressure regularly.    Medicine is only one part of controlling high blood pressure. You also need to manage your weight, get regular exercise, and adjust your eating habits.    High blood pressure is usually a lifelong problem. But it can be controlled with healthy lifestyle changes and medicine.    Hypertension is not the same as stress. Although stress may be a factor in high blood pressure, it s only one part of the story.    Blood pressure medicines need to be taken every day. Stopping suddenly may cause a dangerous increase in pressure.     Date Last Reviewed: 4/27/2016 2000-2017 [employer]. [address] [place] [address], [place] [address]. All rights reserved. This information is not intended as a substitute for professional medical care. Always follow your healthcare professional's instructions.                Discharge Instructions for High Blood Pressure (Hypertension)  You have been diagnosed with high blood pressure (also called hypertension). This means the force of blood against your artery walls is too strong. It also means your heart is working hard to move blood. High blood pressure usually has no symptoms, but over time, it can cause serious health problems. High blood pressure raises your risk for heart attack, stroke, heart failure, kidney disease, and blindness. With help from your doctor, you can manage your blood pressure and protect your health.  Blood pressure measurements are given as 2 numbers. Systolic blood pressure is the upper number. This is the pressure when the heart contracts. Diastolic blood pressure is the lower number. This is the pressure when the heart relaxes between beats.  Blood pressure is categorized as normal, elevated, or stage 1 or stage 2 high blood pressure:    Normal blood pressure is systolic of less than 120 and  "diastolic of less than 80 (120/80)    Elevated blood pressure is systolic of 120 to 129 and diastolic less than 80    Stage 1 high blood pressure is systolic is 130 to 139 or diastolic between 80 to 89    Stage 2 high blood pressure is when systolic is 140 or higher or the diastolic is 90 or higher  Taking medicine    Learn to take your own blood pressure. Keep a record of your results. Ask your doctor which readings mean that you need medical attention.    Take your blood pressure medicine exactly as directed. Don t skip doses. Missing doses can cause your blood pressure to get out of control.    If you do miss a dose (or doses) check with your healthcare provider about what to do.    Don't take medicines that contain heart stimulants, including over-the-counter medicines. Check for warnings about high blood pressure on the label. Ask the pharmacist before purchasing something you haven't used before    Check with your doctor or pharmacist before taking a decongestant. Some decongestants can worsen high blood pressure.  Lifestyle changes    Maintain a healthy weight. Get help to lose any extra pounds.    Cut back on salt.  ? Limit canned, dried, packaged, and fast foods.  ? Don t add salt to your food at the table.  ? Season foods with herbs instead of salt when you cook.  ? Request no added salt when you go to a restaurant.  ? The American Heart Association (AHA) says the \"ideal\" amount of sodium is no more than 1,500 mg a day.  But because Americans eat so much salt, you can make a positive change by cutting back to even 2,400 mg of sodium a day.     Follow the DASH (Dietary Approaches to Stop Hypertension) eating plan. This plan recommends vegetables, fruits, whole gains, and other heart healthy foods.    Begin an exercise program. Ask your healthcare provider how to get started. The AHA recommends aerobic exercise 3 to 4 times a week for an average of 40 minutes at a time, with your provider's approval. Simple " activities like walking or gardening can help.    Break the smoking habit. Enroll in a stop-smoking program to improve your chances of success. Ask your healthcare provider about programs and medicines to help you stop smoking.    Limit drinks that contain caffeine such as coffee, black or green tea, and cola to 2 per day.    Never take stimulants such as amphetamines or cocaine. These drugs can be deadly for someone with high blood pressure.    Control your stress. Learn ways to manage stress.    Limit alcohol to no more than 1 drink a day for women and 2 drinks a day for men.  Follow-up care  Make a follow-up appointment as directed.  When to call your healthcare provider  Call your healthcare provider right away if you have any of the following:    Chest pain or shortness of breath (call 911)    Moderate to severe headache    Weakness in the muscles of your face, arms, or legs    Trouble speaking    Extreme drowsiness    Confusion    Fainting or dizziness    Pulsating or rushing sound in your ears    Unexplained nosebleed    Weakness, tingling, or numbness of your face, arms, or legs    Change in vision    Blood pressure measured at home that is greater than 180/110   Date Last Reviewed: 4/27/2016 2000-2017 [email or web address]. [address] [place] [address], [place] [address]. All rights reserved. This information is not intended as a substitute for professional medical care. Always follow your healthcare professional's instructions.

## 2018-06-29 NOTE — PLAN OF CARE
Problem: Substance Withdrawal  Goal: Substance Withdrawal  Problem: General Plan of Care (Inpatient Behavioral)  Goal: Individualization/ Patient Specific Goal (IP Behavioral)  The patient and/or their representative their patient-specific goals related to the plan of care.  Patient specific goals include:  1.  Patient will be evaluated by a physician and labs will be evaluated.  2.  Patient will be seen by a  for evaluation and discharge planning.  3.  Patient will complete assessment paperwork  4.  Patient will consume 75 % of meal to meet estimated energy needs.  5.  Detoxification from alcohol using valium.  6.  Patient will be provided with alcohol relapse prevention information.Signs and symptoms of listed problems will be absent or manageable.  Outcome: Declining  S:  Nickolas (goes by Kana) is a 26 yo M who comes to [facility] seeking detox from alcohol.  He states that he has been drinking at least a 1.75 L bottle of Vodka daily for the last two weeks.  He states his last drink was today (6/29/18) around 12 noon.  He denies using any other substances except tobacco.  He is a 1/2 ppd cigarette smoker.  He started around age 17.  He states that he has smoked off and on since then.  He states that in the last two weeks he has been down, depressed and hopeless.  But now that he is here he feels there is a chance to get his life back.  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 He denies current thoughts of self harm, suicide ideation, or thoughts of harming others.   He reports a suicide attempt once a couple of years ago while he was in a alcoholic black out.    He is currently staying with his mom.  She has Multiple Sclerosis.  She is physically independent but he helps her around the house.  He states that their house is in [facility].  His mom is his closest supporter, but he has a girlfriend with whom he has an  on again, off again relationship which goes downhill when he drinks.  He has been seeing a counselor and attending AA but he hasn't done this since he relapsed.    He reports that he is on probation for a DWI.    Kana states that he fell out of bed and hit his head a couple of days ago.  He did this when he was in a black out.  He states that his mother reported that she had observed this when it happened.  He endorses back and knee pain from the fall.  He says that he did not report this the the ED MD.      Pt stated that he was on Gabapentin 800 mg QID.  Writer contacted his pharmacy with his permission.  [facility] [place] [facility] [address] Reported that he is on 400 mg gabapentin QID.    Pt has been restless and relentlessly paces the hallway between his room and the nurses station between alcohol withdrawal assessments.    B:  History of HTN, GERD, SA, Pyloric Stenosis, Anxiety, Obesity, ADD and hepatic steatosis.    A:  Pt in moderate alcohol withdrawal AEB MSSA scores of 15, 12 , a third score of 12 (at 2011) was amended and increased to 17 (at 20:52) & 16    R:  Obtained admission orders.  Re-oriented to unit, schedule and routines, including lab work to be drawn.  Provided with a nutritious meal, snacks and encouraged increased fluid intake.  Counseled on smoking cessation.  Re-introduced to IM[facility] Treatment program and accompanying paperwork.  Administered diazepam 10 mg X 6 and amlodipine 10 mg.  Buspar 20 mg, clonidine 100 mg, gabapentin 400 mg and omeprazole 40 mg were also administered. Continue to monitor and medicate as ordered and indicated.

## 2018-06-29 NOTE — ED NOTES
Pt repeatedly kept asking for food, and pt was informed that he cannot have anything to eat until he is cleared by the MD. Pt attempted to walk out of ED and was redirected back to his seat in the hallway.

## 2018-06-29 NOTE — ED PROVIDER NOTES
"  History     Chief Complaint   Patient presents with     Alcohol Intoxication     seeking detox from alcohol; drinks >1.75L/day. Denies seizure hx.      Fall     fell out of bed last week; bruising on chest and back, possible hit head     HPI  Nickolas Lang is a 27 year old male with a history of alcohol abuse, hypertension and depression who presents to the Emergency Department with his girlfriend seeking detox from alcohol. The patient is currently intoxicated. He reports that he has been drinking the past 14 days, about 2L of vodka. His last drink was an hour ago. Prior to this binge he was sober for a few months, he notes that he has been in treatment 12 times. He denies seizures with alcohol withdrawal. He denies substance abuse. He states that he doesn't \"drink to have fun\", he drinks to \"pass out\". The patient is noncompliant with his medications for hypertension and depression. The patient denies vomiting. He notes that in the past few days he is \"ready to be done\" when asked if he has suicidal ideation, he denies a plan and states that he doesn't think that \"a noose is heavy or strong enough\". He is hungry, and notes he has not eaten the last three days.    I have reviewed the Medications, Allergies, Past Medical and Surgical History, and Social History in the "[employer] system.  Past Medical History:   Diagnosis Date     ADD (attention deficit disorder)      Alcohol abuse      Anxiety     gabapentin helps the most      GERD (gastroesophageal reflux disease)      Hepatic steatosis      Hypertension      Obesity      Pyloric stenosis     s/p pyloromyotomy as an infant       Past Surgical History:   Procedure Laterality Date     Deviated septum repair       PYLOROMYOTOMY SURGERY  as an infant      SHOULDER SURGERY Left 07/29/2016    Removal of cartilage       Family History   Problem Relation Age of Onset     Neurologic Disorder Mother      Multiple Sclerosis     Depression Mother      Anxiety Disorder " Mother      Alcohol/Drug Father      Substance Abuse Father      Depression Maternal Grandmother      Anxiety Disorder Maternal Grandmother      Hypertension Maternal Grandmother      Substance Abuse Maternal Grandfather      Alcohol/Drug Paternal Grandfather        Social History   Substance Use Topics     Smoking status: Current Every Day Smoker     Packs/day: 0.25     Years: 4.00     Types: Cigarettes     Smokeless tobacco: Never Used     Alcohol use 0.0 oz/week     0 Standard drinks or equivalent per week      Comment: 1.75L vodka daily       No current facility-administered medications for this encounter.      Current Outpatient Prescriptions   Medication     amLODIPine (NORVASC) 10 MG tablet     aspirin-acetaminophen-caffeine (EXCEDRIN MIGRAINE) 250-250-65 MG per tablet     buPROPion (WELLBUTRIN XL) 150 MG 24 hr tablet     cloNIDine (CATAPRES) 0.1 MG tablet     gabapentin (NEURONTIN) 600 MG tablet     hydrOXYzine (ATARAX) 25 MG tablet     hydrOXYzine (ATARAX) 25 MG tablet     methocarbamol (ROBAXIN) 500 MG tablet     multivitamin, therapeutic with minerals (THERA-VIT-M) TABS tablet     omeprazole (PRILOSEC) 40 MG capsule     sertraline (ZOLOFT) 100 MG tablet     thiamine 100 MG tablet     traZODone (DESYREL) 100 MG tablet     Facility-Administered Medications Ordered in Other Encounters   Medication     Self Administer Medications: Behavioral Services      No Known Allergies    Review of Systems   Constitutional: Negative for fever.   HENT: Negative for congestion.    Eyes: Negative for redness.   Respiratory: Negative for shortness of breath.    Cardiovascular: Negative for chest pain.   Gastrointestinal: Negative for abdominal pain and vomiting.   Genitourinary: Negative for difficulty urinating.   Musculoskeletal: Negative for arthralgias and neck stiffness.   Skin: Negative for color change.   Neurological: Negative for headaches.   Psychiatric/Behavioral: Positive for suicidal ideas (no plan). Negative  for confusion.   All other systems reviewed and are negative.      Physical Exam   BP: 147/87  Pulse: 120  Temp: 97.7  F (36.5  C)  Resp: 24  SpO2: 94 %      Physical Exam   Constitutional: He appears well-developed. No distress.   HENT:   Head: Normocephalic and atraumatic.   Mouth/Throat: Oropharynx is clear and moist. No oropharyngeal exudate.   Eyes: Pupils are equal, round, and reactive to light. No scleral icterus. Right eye exhibits nystagmus. Left eye exhibits nystagmus.   Cardiovascular: Normal heart sounds and intact distal pulses.    Pulmonary/Chest: Breath sounds normal. No respiratory distress.   Abdominal: Soft. Bowel sounds are normal. There is no tenderness.   Musculoskeletal: He exhibits no edema or tenderness.   Neurological: He is alert. He has normal strength. A cranial nerve deficit (bilateral, lateral gaze nystagmus) is present. GCS eye subscore is 4. GCS verbal subscore is 5. GCS motor subscore is 6.   Skin: Skin is warm. No rash noted. He is not diaphoretic.   Psychiatric: His speech is slurred.       ED Course     ED Course     Procedures             Labs Ordered and Resulted from Time of ED Arrival Up to the Time of Departure from the ED   DRUG ABUSE SCREEN 6 CHEM DEP URINE ([facility]) - Abnormal; Notable for the following:        Result Value    Benzodiazepine Qual Urine Positive (*)     Ethanol Qual Urine Positive (*)     All other components within normal limits   ALCOHOL BREATH TEST POCT - Abnormal; Notable for the following:     Alcohol Breath Test 0.301 (*)     All other components within normal limits            Assessments & Plan (with Medical Decision Making)   27-year-old male with long-standing history of alcohol dependence and abuse presents acutely intoxicated requesting detox.  Initial exam revealed slightly elevated blood pressure and heart rate as well as signs consistent with alcohol intoxication as noted in physical exam above.  An alcohol breath test measured 0.3.  Urine  toxicology screen was positive for benzodiazepines and alcohol.  The case was discussed with chemical dependency intake and the patient will be admitted to detox for further evaluation and management.    I have reviewed the nursing notes.    I have reviewed the findings, diagnosis, plan and need for follow up with the patient.    New Prescriptions    No medications on file       Final diagnoses:   Alcohol dependence, continuous drinking behavior (H)     IEloise, am serving as a trained medical scribe to document services personally performed by Jignesh Ventura MD, based on the provider's statements to me.   Jignesh VELAZQUEZ MD, was physically present and have reviewed and verified the accuracy of this note documented by Eloise Ramírez.    6/29/2018   [facility], [place], EMERGENCY DEPARTMENT     Inocente Ventura MD  06/29/18 1092

## 2018-06-29 NOTE — PHARMACY-ADMISSION MEDICATION HISTORY
"Admission medication history for the June 29, 2018 admission is complete.     Interview sources:  Patient, [facility] (Jose; [phone])    Reliability of source: Moderate/poor - patient knew the names and how he takes most medications however several were not correct when verified with [facility] pharmacy; all medications/doses listed below based on [facility] records    Medication compliance: Moderate/poor - pt stopped taking his medications when he started drinking about 14 days ago    Changes made to PTA medication list (reason)  Added: buspirone, Adderall 10 and 20 mg capsules  Deleted: hydroxyzine (duplicate), methocarbamol (no active prescription), excedrine (not routine medication)  Changed:   - gabapentin 600 mg TID --> 400 mg four times daily (per pharmacy)  - trazodone 100 mg qhs prn --> 100-200 mg at bedtime prn (per pharmacy)    Additional medication history information:   - bupropion - pt reported this gives him increased anxiety so he stopped taking it over a month ago  - clonidine - only medication the pt has been taking the past week to help \"self wean off alcohol\"  - sertraline - the pharmacy has multiple doses on file (150 mg daily and 200 mg daily), unclear which dose is the most current. Patient reported taking 200 mg daily, but was also not the most reliable historian at the time.   - gabapentin - pt reported his dose was 800 mg four times daily, however [facility] and  his dose is 400 mg four times daily   - gabapentin 400 mg capsules dispensed on 6/13/18 for quantity #120 capsules (30 day supply)   - Adderall ER 20 mg capsules dispensed on 6/13/18 for quantity #90 capsules (30 day supply)   - Adderall ER 10 mg capsules dispensed on 6/8/18 for quantity #90 caspules (30 day supply)     Prior to Admission medications    Medication Sig Last Dose Taking? Auth Provider   amLODIPine (NORVASC) 10 MG tablet Take 10 mg by mouth daily Past Month Yes Unknown, Entered By History   amphetamine-dextroamphetamine " (ADDERALL XR) 10 MG per 24 hr capsule Take 10 mg by mouth 3 times daily Past Month Yes Unknown, Entered By History   amphetamine-dextroamphetamine (ADDERALL XR) 20 MG per 24 hr capsule Take 20 mg by mouth 3 times daily Past Month Yes Unknown, Entered By History   busPIRone (BUSPAR) 10 MG tablet Take 20 mg by mouth 3 times daily Past Month Yes Unknown, Entered By History   cloNIDine (CATAPRES) 0.1 MG tablet TAKE 1 TABLET (0.1 MG) BY MOUTH 2 TIMES DAILY Past Week Yes Lauro Mobley MD   gabapentin (NEURONTIN) 400 MG capsule Take 400 mg by mouth 4 times daily Past Month Yes Unknown, Entered By History   hydrOXYzine (ATARAX) 25 MG tablet Take 1-2 tablets (25-50 mg) by mouth every 6 hours as needed for anxiety Past Week Yes Jorge Baker MD   omeprazole (PRILOSEC) 40 MG capsule TAKE 1 CAPSULE (40 MG) BY MOUTH DAILY Past Month Yes Nicolette Frye APRN CNP   sertraline (ZOLOFT) 100 MG tablet Take 200 mg by mouth daily Past Month Yes Unknown, Entered By History   traZODone (DESYREL) 100 MG tablet Take 100-200 mg by mouth nightly as needed for sleep  Past Month Yes Unknown, Entered By History   buPROPion (WELLBUTRIN XL) 150 MG 24 hr tablet Take 150 mg by mouth daily not taking  Unknown, Entered By History   multivitamin, therapeutic with minerals (THERA-VIT-M) TABS tablet Take 1 tablet by mouth daily not taking  Lachelle Peterson MD   thiamine 100 MG tablet Take 1 tablet (100 mg) by mouth daily not taking  Filippo Brasher MD       Time spent: 40 minutes    Medication history completed by:   Maddie Degroot, [place]  [facility] [place]  Available daily from 1-9 PM: phone [phone], pager [phone]

## 2018-06-29 NOTE — IP AVS SNAPSHOT
[facility]    [address] [place] [id number]    Phone:  [phone]                                       After Visit Summary   6/29/2018    Nickolas Lang    MRN: [id number]           After Visit Summary Signature Page     I have received my discharge instructions, and my questions have been answered. I have discussed any challenges I see with this plan with the nurse or doctor.    ..........................................................................................................................................  Patient/Patient Representative Signature      ..........................................................................................................................................  Patient Representative Print Name and Relationship to Patient    ..................................................               ................................................  Date                                            Time    ..........................................................................................................................................  Reviewed by Signature/Title    ...................................................              ..............................................  Date                                                            Time

## 2018-06-29 NOTE — TELEPHONE ENCOUNTER
"S: Dr Dahl gave clinical saying pt was bib himself to er seeking detox from etoh.  B: Pt reports he has been drinking 2 liters of vodka per day for the past 2 wks. Hx of cd tx \"off and on.\" He said he had been sober for several months. No hx of sz's. Possible hx of DT's within last 2 wks when he tried to withdraw himself. Breath is . 301. Utox pending. Pt denies drug use. No chronic med prob's other than high BP and reflux. No DT's today, per Dr SIMON   A: vol, coop, denies SI, med cleared  R: danica solis at 2:06 pm;                   #3awest/lloyd carrillo/graciela accepted for herself   Author informed 3A RN at 2:35 pm and informed er at 2:37 pm                  mbw  "

## 2018-06-29 NOTE — PROGRESS NOTES
06/29/18 1726   Patient Belongings   Did you bring any home meds/supplements to the hospital?  No   Patient Belongings other (see comments)   Belongings Search Yes   Clothing Search Yes   Second Staff Nuradin   General Info Comment SEE Notes     BIN: Tennis shoes, belt chain/ring, face towel   Locked Drawer: Watch, Phone  NO Valuables to security  A               Admission:  I am responsible for any personal items that are not sent to the safe or pharmacy.  [facility] is not responsible for loss, theft or damage of any property in my possession.    Signature:  _________________________________ Date: _______  Time: _____                                              Staff Signature:  ____________________________ Date: ________  Time: _____      2nd Staff person, if patient is unable/unwilling to sign:    Signature: ________________________________ Date: ________  Time: _____     Discharge:  [facility] has returned all of my personal belongings:    Signature: _________________________________ Date: ________  Time: _____                                          Staff Signature:  ____________________________ Date: ________  Time: _____

## 2018-06-29 NOTE — ED NOTES
ED to Behavioral Floor Handoff    SITUATION  Nickolas Lang is a 27 year old male who speaks English and lives in a home with family members The patient arrived in the ED by private car from home with a complaint of Alcohol Intoxication (seeking detox from alcohol; drinks >1.75L/day. Denies seizure hx. ) and Fall (fell out of bed last week; bruising on chest and back, possible hit head)  .The patient's current symptoms started/worsened 1 month(s) ago and during this time the symptoms have increased.   In the ED, pt was diagnosed with   Final diagnoses:   Alcohol dependence, continuous drinking behavior (H)        Initial vitals were: BP: 147/87  Pulse: 120  Temp: 97.7  F (36.5  C)  Resp: 24  SpO2: 94 %   --------  Is the patient diabetic? No   If yes, last blood glucose? --     If yes, was this treated in the ED? --  --------  Is the patient inebriated (ETOH) Yes or Impaired on other substances? No  MSSA done? Yes  Last MSSA score: 6    Were withdrawal symptoms treated? No  Does the patient have a seizure history? No. If yes, date of most recent seizure--  --------  Is the patient patient experiencing suicidal ideation? denies current or recent suicidal ideation     Homicidal ideation? denies current or recent homicidal ideation or behaviors.    Self-injurious behavior/urges? denies current or recent self injurious behavior or ideation.  ------  Was pt aggressive in the ED No  Was a code called No  Is the pt now cooperative? Yes  -------  Meds given in ED: Medications - No data to display   Family present during ED course? No  Family currently present? No    BACKGROUND  Does the patient have a cognitive impairment or developmental disability? No  Allergies: No Known Allergies.   Social demographics are   Social History     Social History     Marital status: Single     Spouse name: N/A     Number of children: N/A     Years of education: N/A     Social History Main Topics     Smoking status: Current Every Day  Smoker     Packs/day: 0.25     Years: 4.00     Types: Cigarettes     Smokeless tobacco: Never Used     Alcohol use 0.0 oz/week     0 Standard drinks or equivalent per week      Comment: 1.75L vodka daily     Drug use: No     Sexual activity: Not Currently     Other Topics Concern     None     Social History Narrative    Lives at home until kicked out.  Family supportive.          ASSESSMENT  Labs results   Labs Ordered and Resulted from Time of ED Arrival Up to the Time of Departure from the ED   DRUG ABUSE SCREEN 6 CHEM DEP URINE ([facility]) - Abnormal; Notable for the following:        Result Value    Benzodiazepine Qual Urine Positive (*)     Ethanol Qual Urine Positive (*)     All other components within normal limits   ALCOHOL BREATH TEST POCT - Abnormal; Notable for the following:     Alcohol Breath Test 0.301 (*)     All other components within normal limits      Imaging Studies: No results found for this or any previous visit (from the past 24 hour(s)).   Most recent vital signs /89  Pulse 113  Temp 98.8  F (37.1  C) (Oral)  Resp 16  SpO2 95%   Abnormal labs/tests/findings requiring intervention:---   Pain control: pt had none  Nausea control: pt had none    RECOMMENDATION  Are any infection precautions needed (MRSA, VRE, etc.)? No If yes, what infection? --  ---  Does the patient have mobility issues? independently. If yes, what device does the pt use? ---  ---  Is patient on 72 hour hold or commitment? No If on 72 hour hold, have hold and rights been given to patient? N/A  Are admitting orders written if after 10 p.m. ?N/A  Tasks needing to be completed:---     Amish calderon-- [phone] [place] [facility]   [phone] [facility] ED

## 2018-06-30 ENCOUNTER — APPOINTMENT (OUTPATIENT)
Dept: ULTRASOUND IMAGING | Facility: CLINIC | Age: 28
DRG: 897 | End: 2018-06-30
Attending: PHYSICIAN ASSISTANT
Payer: COMMERCIAL

## 2018-06-30 LAB
ALBUMIN SERPL-MCNC: 3.4 G/DL (ref 3.4–5)
ALP SERPL-CCNC: 102 U/L (ref 40–150)
ALT SERPL W P-5'-P-CCNC: 111 U/L (ref 0–70)
ANION GAP SERPL CALCULATED.3IONS-SCNC: 9 MMOL/L (ref 3–14)
AST SERPL W P-5'-P-CCNC: 120 U/L (ref 0–45)
BASOPHILS # BLD AUTO: 0 10E9/L (ref 0–0.2)
BASOPHILS NFR BLD AUTO: 0.2 %
BILIRUB SERPL-MCNC: 1.5 MG/DL (ref 0.2–1.3)
BUN SERPL-MCNC: 15 MG/DL (ref 7–30)
CALCIUM SERPL-MCNC: 8.7 MG/DL (ref 8.5–10.1)
CHLORIDE SERPL-SCNC: 100 MMOL/L (ref 94–109)
CHOLEST SERPL-MCNC: 212 MG/DL
CO2 SERPL-SCNC: 29 MMOL/L (ref 20–32)
CREAT SERPL-MCNC: 0.59 MG/DL (ref 0.66–1.25)
DIFFERENTIAL METHOD BLD: ABNORMAL
EOSINOPHIL # BLD AUTO: 0.1 10E9/L (ref 0–0.7)
EOSINOPHIL NFR BLD AUTO: 3.3 %
ERYTHROCYTE [DISTWIDTH] IN BLOOD BY AUTOMATED COUNT: 13.1 % (ref 10–15)
ERYTHROCYTE [DISTWIDTH] IN BLOOD BY AUTOMATED COUNT: 13.2 % (ref 10–15)
GFR SERPL CREATININE-BSD FRML MDRD: >90 ML/MIN/1.7M2
GGT SERPL-CCNC: 1384 U/L (ref 0–75)
GLUCOSE SERPL-MCNC: 109 MG/DL (ref 70–99)
HCT VFR BLD AUTO: 38.9 % (ref 40–53)
HCT VFR BLD AUTO: 41 % (ref 40–53)
HDLC SERPL-MCNC: 79 MG/DL
HGB BLD-MCNC: 13 G/DL (ref 13.3–17.7)
HGB BLD-MCNC: 13.6 G/DL (ref 13.3–17.7)
IMM GRANULOCYTES # BLD: 0 10E9/L (ref 0–0.4)
IMM GRANULOCYTES NFR BLD: 0.2 %
LDLC SERPL CALC-MCNC: 86 MG/DL
LYMPHOCYTES # BLD AUTO: 1.5 10E9/L (ref 0.8–5.3)
LYMPHOCYTES NFR BLD AUTO: 34 %
MCH RBC QN AUTO: 28.8 PG (ref 26.5–33)
MCH RBC QN AUTO: 29.1 PG (ref 26.5–33)
MCHC RBC AUTO-ENTMCNC: 33.2 G/DL (ref 31.5–36.5)
MCHC RBC AUTO-ENTMCNC: 33.4 G/DL (ref 31.5–36.5)
MCV RBC AUTO: 87 FL (ref 78–100)
MCV RBC AUTO: 87 FL (ref 78–100)
MONOCYTES # BLD AUTO: 0.3 10E9/L (ref 0–1.3)
MONOCYTES NFR BLD AUTO: 7 %
NEUTROPHILS # BLD AUTO: 2.4 10E9/L (ref 1.6–8.3)
NEUTROPHILS NFR BLD AUTO: 55.3 %
NONHDLC SERPL-MCNC: 133 MG/DL
NRBC # BLD AUTO: 0 10*3/UL
NRBC BLD AUTO-RTO: 0 /100
PLATELET # BLD AUTO: 58 10E9/L (ref 150–450)
PLATELET # BLD AUTO: 69 10E9/L (ref 150–450)
POTASSIUM SERPL-SCNC: 3.7 MMOL/L (ref 3.4–5.3)
PROT SERPL-MCNC: 6.7 G/DL (ref 6.8–8.8)
RBC # BLD AUTO: 4.47 10E12/L (ref 4.4–5.9)
RBC # BLD AUTO: 4.73 10E12/L (ref 4.4–5.9)
RETICS # AUTO: 129.2 10E9/L (ref 25–95)
RETICS/RBC NFR AUTO: 2.9 % (ref 0.5–2)
SODIUM SERPL-SCNC: 138 MMOL/L (ref 133–144)
TRIGL SERPL-MCNC: 237 MG/DL
VIT B12 SERPL-MCNC: 620 PG/ML (ref 193–986)
WBC # BLD AUTO: 4.3 10E9/L (ref 4–11)
WBC # BLD AUTO: 4.6 10E9/L (ref 4–11)

## 2018-06-30 PROCEDURE — 36415 COLL VENOUS BLD VENIPUNCTURE: CPT | Performed by: PSYCHIATRY & NEUROLOGY

## 2018-06-30 PROCEDURE — 76705 ECHO EXAM OF ABDOMEN: CPT

## 2018-06-30 PROCEDURE — 86803 HEPATITIS C AB TEST: CPT | Performed by: PHYSICIAN ASSISTANT

## 2018-06-30 PROCEDURE — 99221 1ST HOSP IP/OBS SF/LOW 40: CPT | Mod: AI | Performed by: PSYCHIATRY & NEUROLOGY

## 2018-06-30 PROCEDURE — 82607 VITAMIN B-12: CPT | Performed by: PHYSICIAN ASSISTANT

## 2018-06-30 PROCEDURE — 12800012 ZZH R&B CD MH INTERMEDIATE ADULT

## 2018-06-30 PROCEDURE — 87389 HIV-1 AG W/HIV-1&-2 AB AG IA: CPT | Performed by: PHYSICIAN ASSISTANT

## 2018-06-30 PROCEDURE — 80053 COMPREHEN METABOLIC PANEL: CPT | Performed by: PSYCHIATRY & NEUROLOGY

## 2018-06-30 PROCEDURE — 85045 AUTOMATED RETICULOCYTE COUNT: CPT | Performed by: PHYSICIAN ASSISTANT

## 2018-06-30 PROCEDURE — 99207 ZZC CONSULT E&M CHANGED TO INITIAL LEVEL: CPT | Performed by: PHYSICIAN ASSISTANT

## 2018-06-30 PROCEDURE — 25000132 ZZH RX MED GY IP 250 OP 250 PS 637: Performed by: PHYSICIAN ASSISTANT

## 2018-06-30 PROCEDURE — 80061 LIPID PANEL: CPT | Performed by: PSYCHIATRY & NEUROLOGY

## 2018-06-30 PROCEDURE — 85027 COMPLETE CBC AUTOMATED: CPT | Performed by: PSYCHIATRY & NEUROLOGY

## 2018-06-30 PROCEDURE — 36415 COLL VENOUS BLD VENIPUNCTURE: CPT | Performed by: PHYSICIAN ASSISTANT

## 2018-06-30 PROCEDURE — 99222 1ST HOSP IP/OBS MODERATE 55: CPT | Performed by: PHYSICIAN ASSISTANT

## 2018-06-30 PROCEDURE — 25000125 ZZHC RX 250: Performed by: PSYCHIATRY & NEUROLOGY

## 2018-06-30 PROCEDURE — 40000611 ZZHCL STATISTIC MORPHOLOGY W/INTERP HEMEPATH TC 85060: Performed by: PHYSICIAN ASSISTANT

## 2018-06-30 PROCEDURE — 82977 ASSAY OF GGT: CPT | Performed by: PSYCHIATRY & NEUROLOGY

## 2018-06-30 PROCEDURE — 25000132 ZZH RX MED GY IP 250 OP 250 PS 637: Performed by: PSYCHIATRY & NEUROLOGY

## 2018-06-30 PROCEDURE — 85025 COMPLETE CBC W/AUTO DIFF WBC: CPT | Performed by: PHYSICIAN ASSISTANT

## 2018-06-30 RX ADMIN — DIAZEPAM 10 MG: 5 TABLET ORAL at 12:15

## 2018-06-30 RX ADMIN — ACETAMINOPHEN 650 MG: 325 TABLET, FILM COATED ORAL at 08:35

## 2018-06-30 RX ADMIN — ACETAMINOPHEN 650 MG: 325 TABLET, FILM COATED ORAL at 21:22

## 2018-06-30 RX ADMIN — MULTIPLE VITAMINS W/ MINERALS TAB 1 TABLET: TAB at 08:36

## 2018-06-30 RX ADMIN — Medication 100 MG: at 08:36

## 2018-06-30 RX ADMIN — SERTRALINE HYDROCHLORIDE 200 MG: 100 TABLET ORAL at 08:36

## 2018-06-30 RX ADMIN — BUSPIRONE HYDROCHLORIDE 20 MG: 10 TABLET ORAL at 14:31

## 2018-06-30 RX ADMIN — TRAZODONE HYDROCHLORIDE 200 MG: 100 TABLET ORAL at 22:57

## 2018-06-30 RX ADMIN — ONDANSETRON 4 MG: 4 TABLET, ORALLY DISINTEGRATING ORAL at 14:31

## 2018-06-30 RX ADMIN — ONDANSETRON 4 MG: 4 TABLET, ORALLY DISINTEGRATING ORAL at 22:23

## 2018-06-30 RX ADMIN — DIAZEPAM 10 MG: 5 TABLET ORAL at 16:33

## 2018-06-30 RX ADMIN — DIAZEPAM 10 MG: 5 TABLET ORAL at 08:36

## 2018-06-30 RX ADMIN — DIAZEPAM 10 MG: 5 TABLET ORAL at 00:44

## 2018-06-30 RX ADMIN — HYDROXYZINE HYDROCHLORIDE 50 MG: 25 TABLET ORAL at 22:57

## 2018-06-30 RX ADMIN — BUSPIRONE HYDROCHLORIDE 20 MG: 10 TABLET ORAL at 21:21

## 2018-06-30 RX ADMIN — GABAPENTIN 400 MG: 400 CAPSULE ORAL at 12:15

## 2018-06-30 RX ADMIN — CLONIDINE HYDROCHLORIDE 0.1 MG: 0.1 TABLET ORAL at 21:23

## 2018-06-30 RX ADMIN — ACETAMINOPHEN 650 MG: 325 TABLET, FILM COATED ORAL at 00:44

## 2018-06-30 RX ADMIN — AMLODIPINE BESYLATE 10 MG: 10 TABLET ORAL at 08:36

## 2018-06-30 RX ADMIN — GABAPENTIN 400 MG: 400 CAPSULE ORAL at 08:36

## 2018-06-30 RX ADMIN — FOLIC ACID 1 MG: 1 TABLET ORAL at 08:36

## 2018-06-30 RX ADMIN — CLONIDINE HYDROCHLORIDE 0.1 MG: 0.1 TABLET ORAL at 08:36

## 2018-06-30 RX ADMIN — DIAZEPAM 10 MG: 5 TABLET ORAL at 03:23

## 2018-06-30 RX ADMIN — DIAZEPAM 20 MG: 5 TABLET ORAL at 00:06

## 2018-06-30 RX ADMIN — BUSPIRONE HYDROCHLORIDE 20 MG: 10 TABLET ORAL at 08:36

## 2018-06-30 RX ADMIN — GABAPENTIN 400 MG: 400 CAPSULE ORAL at 21:22

## 2018-06-30 RX ADMIN — DIAZEPAM 10 MG: 5 TABLET ORAL at 05:28

## 2018-06-30 RX ADMIN — ALUMINUM HYDROXIDE, MAGNESIUM HYDROXIDE, AND DIMETHICONE 30 ML: 400; 400; 40 SUSPENSION ORAL at 21:23

## 2018-06-30 RX ADMIN — OMEPRAZOLE 40 MG: 20 CAPSULE, DELAYED RELEASE ORAL at 08:36

## 2018-06-30 RX ADMIN — TRAZODONE HYDROCHLORIDE 100 MG: 100 TABLET ORAL at 00:07

## 2018-06-30 RX ADMIN — ALUMINUM HYDROXIDE, MAGNESIUM HYDROXIDE, AND DIMETHICONE 30 ML: 400; 400; 40 SUSPENSION ORAL at 14:31

## 2018-06-30 RX ADMIN — GABAPENTIN 400 MG: 400 CAPSULE ORAL at 16:33

## 2018-06-30 RX ADMIN — DIAZEPAM 20 MG: 5 TABLET ORAL at 02:09

## 2018-06-30 ASSESSMENT — ACTIVITIES OF DAILY LIVING (ADL)
LAUNDRY: WITH SUPERVISION
GROOMING: INDEPENDENT
ORAL_HYGIENE: INDEPENDENT
DRESS: SCRUBS (BEHAVIORAL HEALTH)

## 2018-06-30 NOTE — PROGRESS NOTES
Pt is in moderate/severe alcohol withdrawal requiring 70 mg of Valium during the night shift.Pt did c/o some auditory hallucinations that seem to have resolved with aggressive tx.Pulse rates decreased to within normal limits. Pt remains moderately hypertensive but improving. Pt does have a history of essential hypertension. Pt remained oriented throughout..

## 2018-06-30 NOTE — PROGRESS NOTES
Writer met with pt to discuss aftercare plans. Pt reports his plan is to return home, back to his employer, AA, and therapy. Pt reports he will call to schedule therapy appointment prior to his d/c from hospital. Pt states he has done treatment many times and just needs to get back to AA and work with a sponsor. Pt reports this past relapse was difficult, he was having tremors, hallucinations, and can tell that it's impacting his cognition. Pt reports high stress related to job and his mother's house going into foreclosure. Pt is aware that if needs change he can seek out case management for assistance.

## 2018-06-30 NOTE — PROGRESS NOTES
"CLINICAL NUTRITION SERVICES - ASSESSMENT NOTE     Nutrition Prescription    RECOMMENDATIONS FOR MDs/PROVIDERS TO ORDER:  None currently.     Malnutrition Status:    Patient does not meet two of the criteria necessary for diagnosing malnutrition    Recommendations already ordered by Registered Dietitian (RD):  Ordered snacks as follows:  -10am: yogurt, banana   -2pm: cheese sticks, hard boiled eggs x2  -HS: 1/2 sandwich (turkey, cheese, lettuce, tomato, mustard), grapes     Future/Additional Recommendations:  1. Monitor PO intakes, weight trends. Adjust snacks per pt preference.      REASON FOR ASSESSMENT  Nickolas Lang is a/an 27 year old male assessed by the dietitian for Admission Nutrition Risk Screen for reduced oral intake over the last month    NUTRITION HISTORY  Pt with hx of alcohol abuse, hypertension, depression, GERD, SA, Pyloric Stenosis, Anxiety, Obesity, ADD and hepatic steatosis, presenting to ED seeking detox from alcohol. Per chart, drinking 2 liters/day of vokda over the past two weeks. Per ED note, patient is hungry and has not eating over the past 3 days.     Per discussion with pt, he follows a regular diet, no known food allergies. He reports PTA, he was eating maybe 1-2x/day (significantly less than typical PO intake) 2/2 alcohol use and work schedule. He reports typical PO intake when feeling well consists of small breakfast (granola bar or Boost/Ensure), small lunch (sandwich), and consumes the majority of his intake at night. Pt reports over the past 1-2 years, he has put on ~100 lbs due to eating \"junk food\" and large portions. He reports he would like to get back to regular eating pattern (healthy eating, returning to gym to work out), but is experiencing increased hunger currently with eating small amounts over the past two weeks.     CURRENT NUTRITION ORDERS  Diet: Regular  Intake/Tolerance: Pt repeatedly asking for food on admission to the unit per chart, received meal on " "arrival (sandwich, pasta salad, fruit). Patient reports since he has had very little to eat over the past 2 weeks 2/2 etOH, he feels more hungry, although is experiencing upset stomach at times. He is requesting more snacks between meals due to increased hunger, but does report he would like to get back to eating healthy and going to the gym as he would like to lose weight.     LABS  Labs reviewed    MEDICATIONS  Medications reviewed  Multivitamin with minerals, Folic acid, Thiamine    ANTHROPOMETRICS  Height: 185.4 cm (6' 1\")  Most Recent Weight: 140.6 kg (310 lb)    IBW: 83.6 kg (184 lb)  BMI: Obesity Grade III BMI >40  Weight History: Per discussion with patient, he reports over the past 1-2 years, he has gained ~100 lbs and would like to be back down to ~230 lbs ideally. He estimates he has likely lost weight recently over the past month 2/2 etoh. Per chart review, appears weight is down 4% (13 lbs) over the past 3 months; however historically weight has fluctuated between 308-325 lbs over the past year.  Wt Readings from Last 15 Encounters:   06/29/18 140.6 kg (310 lb)   04/08/18 141.1 kg (311 lb 1 oz)   03/26/18 146.5 kg (323 lb)   02/27/18 (!) 145.6 kg (321 lb)   11/20/17 (!) 139.7 kg (308 lb)   11/16/17 (!) 140 kg (308 lb 9.6 oz)   11/15/17 (!) 141.1 kg (311 lb)   10/11/17 (!) 141.3 kg (311 lb 7 oz)   09/06/17 (!) 146.1 kg (322 lb)   08/10/17 (!) 147.4 kg (325 lb)   07/27/17 (!) 143 kg (315 lb 4.8 oz)   07/12/17 (!) 144.7 kg (319 lb)   06/28/17 134.9 kg (297 lb 5 oz)   06/21/17 131.5 kg (290 lb)   05/17/17 135.6 kg (298 lb 14.4 oz)     Dosing Weight: 98 kg (adjusted per obesity based on current weight and IBW)    ASSESSED NUTRITION NEEDS  Estimated Energy Needs: 8060-4955 kcals/day (20 - 25 kcals/kg)  Justification: Obese  Estimated Protein Needs:  grams protein/day (1 - 1.2 grams of pro/kg)  Justification: Maintenance  Estimated Fluid Needs: 1 mL/kcal or per MD goals   Justification: " Maintenance    PHYSICAL FINDINGS  See malnutrition section below.    MALNUTRITION  % Intake: Decreased intake does not meet criteria  % Weight Loss: Up to 7.5% in 3 months (non-severe)  Subcutaneous Fat Loss: None observed  Muscle Loss: None observed  Fluid Accumulation/Edema: None noted  Malnutrition Diagnosis: Patient does not meet two of the above criteria necessary for diagnosing malnutrition    NUTRITION DIAGNOSIS  Predicted inadequate nutrient intake (calories/protein) related to hx of poor intake and weight loss 2/2 etoh as evidenced by improved PO intake since admission, though continues with upset stomach/withdrawal symptoms with ability to hinder PO intake.       INTERVENTIONS  Implementation  Nutrition Education: Discussed nutrition history and PO since admission. Discussed menu ordering and snacks available on the unit. Patient requests scheduled snacks 2/2 increased hunger as he has not had as much to eat over the past two weeks 2/2 etoh. Encouraged adequate PO of food and fluids.  Modify composition of meals/snacks - per above     Goals  Patient to consume % of nutritionally adequate meal trays TID, or the equivalent with supplements/snacks.     Monitoring/Evaluation  Progress toward goals will be monitored and evaluated per protocol.    Elaine Beal RD, LD  Weekend/On-call Pager: [phone]

## 2018-06-30 NOTE — PROGRESS NOTES
SPIRITUAL HEALTH SERVICES  SPIRITUAL ASSESSMENT Progress Note  [facility] ([facility]) 3AFH   ON-CALL VISIT    REFERRAL SOURCE: Hospital  request    Triaged chaplaincy request in consultation with pt's nurse who agreed that follow up from unit  on Monday would be appropriate.      PLAN: Will advise unit  of visit. [facility] will continue to be available to pt/family for emotional/spiritual support.    Jame Arias  Chaplain Resident  Pager [phone]

## 2018-06-30 NOTE — H&P
Admitted:     06/29/2018      IDENTIFYING INFORMATION:  The patient is a 27-year-old  male.  He is sitting with his mother.  He is single.  He is presently employed.      CHIEF COMPLAINT:  Alcohol.      HISTORY OF PRESENT ILLNESS:  The patient came to the emergency room on 06/29. Of note, he was also in the emergency room on 06/03 and went to [facility] detox.  Apparently, the patient relapsed.  This is a 14-day binge.  He has been drinking daily up to 1.75 liters of alcohol.  He realized that he needs to get help and brought himself here.  Alcohol is his drug of choice.  He has tolerance to alcohol, withdrawal from it, progressive use, loss of control, has used despite having negative consequences, strained family relations.  Alcohol is his drug of choice   He started drinking at age of 17, by 23 it was a problem.  He has tolerance, withdrawal, blackouts, history of DTs.  No history of seizures.  Has used despite negative consequences, work, money, job.  He does have depression and takes Zoloft and BuSpar for it.  He has anxiety.  Denies any PTSD.      PAST PSYCHIATRIC HISTORY:  Psychiatrically hospitalized about 2016 for suicidal gestures.  He overdosed on pills.  He was in 12 plus chemical dependency treatments, none of them were court ordered.      FAMILY HISTORY:  Grandfather and father were both alcoholic.  Depression and anxiety in his mother and maternal grandmother.      PAST MEDICAL HISTORY:  History of deviated nasal stenosis, hypertension, obesity.      SOCIAL HISTORY:  Grew up in [place], raised by his mother, 4 years of college.        The patient's vitals are as below:   VITAL SIGNS:  Temperature of 97.8, pulse of 83, respiratory rate of 16, blood pressure 139/91.      MENTAL STATUS EXAMINATION:  The patient is alert, oriented x 3.  Recent and remote memory, language, fund adequate.  No loose associations.  The patient does not have any suicidal or homicidal ideation, plan or intent.   Mood is tired.  Affect is congruent.  Speech is spontaneous, normal rate.      DIAGNOSES:   1.  Alcohol use, severe.   2.  Major depressive disorder, recurrent, without psychotic features.      PLAN:  The patient will be detoxed off alcohol using [facility] protocol on Valium.  The patient will be continued on Zoloft.  The patient at this time is not wanting to do any treatment.  He says his urine tox screen is positive  For valium because he was in detox recently.         HENRRY LOCO MD             D: 2018   T: 2018   MT: PITO      Name:     PARTH HARPER   MRN:      [id number]        Account:      [id number]   :      1990        Admitted:     2018                   Document: [id number]

## 2018-06-30 NOTE — PLAN OF CARE
Problem: Substance Withdrawal  Goal: Substance Withdrawal  Problem: General Plan of Care (Inpatient Behavioral)  Goal: Individualization/ Patient Specific Goal (IP Behavioral)  The patient and/or their representative their patient-specific goals related to the plan of care.  Patient specific goals include:  1.  Patient will be evaluated by a physician and labs will be evaluated.  2.  Patient will be seen by a  for evaluation and discharge planning.  3.  Patient will complete assessment paperwork  4.  Patient will consume 75 % of meal to meet estimated energy needs.  5.  Detoxification from alcohol using valium.  6.  Patient will be provided with alcohol relapse prevention information.Signs and symptoms of listed problems will be absent or manageable.   Outcome: No Change  Pt being monitored for alcohol withdrawal. Pt medicated x x 2 for alcohol withdrawal. Pt has received a total of 150 mg of valium so far. Pt out on unit. No oversedation noted but patient does report feeling tired. Pt reporting sore throat he feels is related to vomiting prior to admission. BP and P slightly elevated. Pt went down for a abdominal US on a ticket to ride. See new orders for VS parameters with antihypertension medications. Pt states he was in [facility] detox 2 weeks ago and that is the reason he had +benzodiazepine result in his utox. Nickolas is stating he wants detox only. 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 Pt has poor insight into the seriousness of his medical health related to his alcohol use. Pt reported he was having some visual and auditory hallucinations last night but denies that he is having any today.

## 2018-06-30 NOTE — CONSULTS
[facility]  Internal Medicine Consult     Nickolas Lang MRN# [id number]   Age: 27 year old YOB: 1990     Date of Admission: 6/29/2018  Date of Consult:  6/30/2018    Primary Care Provider: Tima Crews    Requesting Service: Dr. Brasher  Reason for Consult: General Medical Evaluation      SUBJECTIVE   CC:   GERD, HTN   Assessment and Plan/Recommendations:   Nickolas Lang is a 27 year old male with history of HTN, hepatic steatosis, GERD, ADD, and alcohol abuse who was admitted to [facility] station 3A seeking detox from alcohol.     Alcohol abuse, ADD: with acute withdrawal  - Management per psych    Transaminitis: , , Tbili 1.5. ALP normal. LFTs normal 6/3/2018. Reports RUQ pain. Has nausea, but thinks related to withdrawal. Some confusion since taking Valium this am  - RUQ US  - Trend CMP, GGT    Thrombocytopenia: Plts 69, were 296 3/26/2018. Denies s/s of acute bleed. No rash. Denies recent illness. No recent heparin use. No rheumatologic history. Possibly 2/2 liver disease. Would likely see drop in all cell lines if related to chronic suppression. Other etiology to consider includes ITP, less likely TTP or HUS  - Peripheral smear, retics, HIV, HCV, B12    Recent fall: Fell and hit chest, possibly his head last week. Possible LOC. No focal neuro deficits at this time  - Contact medicine with any changes, low threshold for HCT given low plts, however, will hold off for now given normal exam     HTN: PAT on Norvasc, Clonidine. BP 130s-150s/90s-100s in the setting of acute etoh withdrawal.   - Continue PTA meds with hold parameters  - Hydralazine prn for SBP>175, DBP>110  - Contact medicine if BP spikes >175/>110 or is consistently >150s/>110s despite completion of detox    GERD: continue PTA PPI    Currently, medically stable and internal medicine will sign off. Please contact if future questions or concerns arise. Thank you for the opportunity to be  a part of this patient's care.      Anai Greenberg  Internal Medicine [facility] Hospitalist  [phone]  June 30, 2018         HPI:   Nickolas Lang is a 27 year old male with history of HTN, hepatic steatosis, GERD, ADD, and alcohol abuse who was admitted to [facility] station 3A seeking detox from alcohol.     Patient reports feeling very sleepy now. Per nursing, was given Valium after breakfast. Prior to this, he was up on the unit and conversational. Currently, he reports some nausea, RUQ pain. Confusion since getting valium dose, but is oriented x3 and converses. Reports fall about a week ago. He hit his chest and possibly his head. He may have lost consciousness. He denies focal neuro changes. Denies s/s of acute bleed. No melena or hematochezia. Denies recent illness, fever, acute visual changes, dizziness, chest pain, dyspnea, urinary symptoms, change in bowels, peripheral edema, new onset joint pain, or rash       Past Medical History:     Past Medical History:   Diagnosis Date     ADD (attention deficit disorder)      Alcohol abuse      Anxiety     gabapentin helps the most      GERD (gastroesophageal reflux disease)      Hepatic steatosis      Hypertension      Obesity      Pyloric stenosis     s/p pyloromyotomy as an infant        Reviewed and updated in [facility].     Past Surgical History:      Past Surgical History:   Procedure Laterality Date     Deviated septum repair       PYLOROMYOTOMY SURGERY  as an infant      SHOULDER SURGERY Left 07/29/2016    Removal of cartilage         Social History:   Tobacco use: Yes  Alcohol use: As above       Family History:     Family History   Problem Relation Age of Onset     Neurologic Disorder Mother      Multiple Sclerosis     Depression Mother      Anxiety Disorder Mother      Alcohol/Drug Father      Substance Abuse Father      Depression Maternal Grandmother      Anxiety Disorder Maternal Grandmother      Hypertension Maternal Grandmother      Substance Abuse  "Maternal Grandfather      Alcohol/Drug Paternal Grandfather          Allergies:   No Known Allergies      Medications:   Reviewed. Please see MAR     Review of Systems:   10 point ROS of systems including Constitutional, Eyes, Respiratory, Cardiovascular, Gastroenterology, Genitourinary, Integumentary, Muscularskeletal, Psychiatric were all negative except for pertinent positives noted in my HPI.      OBJECTIVE   Physical Exam:   Vitals were reviewed  Blood pressure (!) 149/93, pulse 82, temperature 98.2  F (36.8  C), temperature source Tympanic, resp. rate 16, height 1.854 m (6' 1\"), weight 140.6 kg (310 lb), SpO2 95 %.  General: Alert, NAD, appears mildly uncomfortable  HEENT: Anicteric sclera, EOMI, membranes moist  Cardiovascular: RRR, S1S2. No murmur noted  Lungs: CTAB without wheezing or crackles   GI: Abdomen soft, with bowel sounds present. RUQ tenderness but no guarding or rebound present  Vascular: No peripheral edema, distal pulses palpable  Neurologic: AAO X 3, no focal deficits  Neuropsychiatric: Per psych  Skin: No jaundice, rashes, or lesions        Data:        Lab Results   Component Value Date     06/30/2018    Lab Results   Component Value Date    CHLORIDE 100 06/30/2018    Lab Results   Component Value Date    BUN 15 06/30/2018      Lab Results   Component Value Date    POTASSIUM 3.7 06/30/2018    Lab Results   Component Value Date    CO2 29 06/30/2018    Lab Results   Component Value Date    CR 0.59 06/30/2018        Lab Results   Component Value Date    WBC 4.6 06/30/2018    HGB 13.6 06/30/2018    HCT 41.0 06/30/2018    MCV 87 06/30/2018    PLT 69 (L) 06/30/2018     Lab Results   Component Value Date    WBC 4.6 06/30/2018            "

## 2018-07-01 LAB
ALBUMIN SERPL-MCNC: 3.2 G/DL (ref 3.4–5)
ALP SERPL-CCNC: 94 U/L (ref 40–150)
ALT SERPL W P-5'-P-CCNC: 142 U/L (ref 0–70)
ANION GAP SERPL CALCULATED.3IONS-SCNC: 8 MMOL/L (ref 3–14)
AST SERPL W P-5'-P-CCNC: 123 U/L (ref 0–45)
BILIRUB SERPL-MCNC: 0.7 MG/DL (ref 0.2–1.3)
BUN SERPL-MCNC: 15 MG/DL (ref 7–30)
CALCIUM SERPL-MCNC: 8.3 MG/DL (ref 8.5–10.1)
CHLORIDE SERPL-SCNC: 104 MMOL/L (ref 94–109)
CO2 SERPL-SCNC: 29 MMOL/L (ref 20–32)
CREAT SERPL-MCNC: 0.5 MG/DL (ref 0.66–1.25)
ERYTHROCYTE [DISTWIDTH] IN BLOOD BY AUTOMATED COUNT: 12.9 % (ref 10–15)
GFR SERPL CREATININE-BSD FRML MDRD: >90 ML/MIN/1.7M2
GGT SERPL-CCNC: 1245 U/L (ref 0–75)
GLUCOSE SERPL-MCNC: 109 MG/DL (ref 70–99)
HCT VFR BLD AUTO: 38.4 % (ref 40–53)
HGB BLD-MCNC: 12.6 G/DL (ref 13.3–17.7)
MCH RBC QN AUTO: 28.8 PG (ref 26.5–33)
MCHC RBC AUTO-ENTMCNC: 32.8 G/DL (ref 31.5–36.5)
MCV RBC AUTO: 88 FL (ref 78–100)
PLATELET # BLD AUTO: 57 10E9/L (ref 150–450)
POTASSIUM SERPL-SCNC: 3.9 MMOL/L (ref 3.4–5.3)
PROT SERPL-MCNC: 6.3 G/DL (ref 6.8–8.8)
RBC # BLD AUTO: 4.38 10E12/L (ref 4.4–5.9)
SODIUM SERPL-SCNC: 141 MMOL/L (ref 133–144)
WBC # BLD AUTO: 3.4 10E9/L (ref 4–11)

## 2018-07-01 PROCEDURE — 85027 COMPLETE CBC AUTOMATED: CPT | Performed by: PHYSICIAN ASSISTANT

## 2018-07-01 PROCEDURE — 82977 ASSAY OF GGT: CPT | Performed by: PHYSICIAN ASSISTANT

## 2018-07-01 PROCEDURE — 25000132 ZZH RX MED GY IP 250 OP 250 PS 637: Performed by: PSYCHIATRY & NEUROLOGY

## 2018-07-01 PROCEDURE — 25000125 ZZHC RX 250: Performed by: PSYCHIATRY & NEUROLOGY

## 2018-07-01 PROCEDURE — 80053 COMPREHEN METABOLIC PANEL: CPT | Performed by: PHYSICIAN ASSISTANT

## 2018-07-01 PROCEDURE — 12800012 ZZH R&B CD MH INTERMEDIATE ADULT

## 2018-07-01 PROCEDURE — 25000132 ZZH RX MED GY IP 250 OP 250 PS 637: Performed by: PHYSICIAN ASSISTANT

## 2018-07-01 PROCEDURE — 36415 COLL VENOUS BLD VENIPUNCTURE: CPT | Performed by: PHYSICIAN ASSISTANT

## 2018-07-01 RX ORDER — GABAPENTIN 300 MG/1
600 CAPSULE ORAL 3 TIMES DAILY
Status: DISCONTINUED | OUTPATIENT
Start: 2018-07-01 | End: 2018-07-03 | Stop reason: HOSPADM

## 2018-07-01 RX ADMIN — ONDANSETRON 4 MG: 4 TABLET, ORALLY DISINTEGRATING ORAL at 18:52

## 2018-07-01 RX ADMIN — HYDROXYZINE HYDROCHLORIDE 50 MG: 25 TABLET ORAL at 18:52

## 2018-07-01 RX ADMIN — SERTRALINE HYDROCHLORIDE 200 MG: 100 TABLET ORAL at 08:37

## 2018-07-01 RX ADMIN — ALUMINUM HYDROXIDE, MAGNESIUM HYDROXIDE, AND DIMETHICONE 30 ML: 400; 400; 40 SUSPENSION ORAL at 08:44

## 2018-07-01 RX ADMIN — Medication 100 MG: at 08:37

## 2018-07-01 RX ADMIN — BUSPIRONE HYDROCHLORIDE 20 MG: 10 TABLET ORAL at 14:27

## 2018-07-01 RX ADMIN — BUSPIRONE HYDROCHLORIDE 20 MG: 10 TABLET ORAL at 20:09

## 2018-07-01 RX ADMIN — GABAPENTIN 600 MG: 300 CAPSULE ORAL at 20:09

## 2018-07-01 RX ADMIN — CLONIDINE HYDROCHLORIDE 0.1 MG: 0.1 TABLET ORAL at 08:37

## 2018-07-01 RX ADMIN — OMEPRAZOLE 40 MG: 20 CAPSULE, DELAYED RELEASE ORAL at 08:37

## 2018-07-01 RX ADMIN — HYDROXYZINE HYDROCHLORIDE 50 MG: 25 TABLET ORAL at 12:12

## 2018-07-01 RX ADMIN — CLONIDINE HYDROCHLORIDE 0.1 MG: 0.1 TABLET ORAL at 20:09

## 2018-07-01 RX ADMIN — HYDROXYZINE HYDROCHLORIDE 50 MG: 25 TABLET ORAL at 22:44

## 2018-07-01 RX ADMIN — FOLIC ACID 1 MG: 1 TABLET ORAL at 08:38

## 2018-07-01 RX ADMIN — GABAPENTIN 400 MG: 400 CAPSULE ORAL at 08:37

## 2018-07-01 RX ADMIN — TRAZODONE HYDROCHLORIDE 200 MG: 100 TABLET ORAL at 22:44

## 2018-07-01 RX ADMIN — ACETAMINOPHEN 650 MG: 325 TABLET, FILM COATED ORAL at 08:44

## 2018-07-01 RX ADMIN — BUSPIRONE HYDROCHLORIDE 20 MG: 10 TABLET ORAL at 08:37

## 2018-07-01 RX ADMIN — GABAPENTIN 400 MG: 400 CAPSULE ORAL at 12:12

## 2018-07-01 RX ADMIN — AMLODIPINE BESYLATE 10 MG: 10 TABLET ORAL at 08:38

## 2018-07-01 RX ADMIN — MULTIPLE VITAMINS W/ MINERALS TAB 1 TABLET: TAB at 08:37

## 2018-07-01 RX ADMIN — ALUMINUM HYDROXIDE, MAGNESIUM HYDROXIDE, AND DIMETHICONE 30 ML: 400; 400; 40 SUSPENSION ORAL at 14:32

## 2018-07-01 ASSESSMENT — ACTIVITIES OF DAILY LIVING (ADL)
DRESS: SCRUBS (BEHAVIORAL HEALTH)
LAUNDRY: WITH SUPERVISION
ORAL_HYGIENE: INDEPENDENT
GROOMING: INDEPENDENT

## 2018-07-01 NOTE — PROGRESS NOTES
S:  Kana was anxious when writer spoke with him at the beginning of the shift.  He said that no one has reviewed his imaging or his lab work with him.  Writer explained that it was beyond the RN's Scope of Practice and that the medical or psychiatry team needed to do that with him first.   He also said that he saw Dr. Brasher and she has been given him an ultimatum regarding aftercare (voluntary treatment or she will pursue commitment.  Writer explained that given his current use pattern that he was going to destroy his liver and that the reason he was having problems with his GI tract was also because of the constant use of alcohol.  Writer tried to impress upon him the seriousness of his problem especially since he isn't even 29 yo yet.  Explained that Dr. HARRIS is just trying to save his life.  His MSSA score was 6.  He has scored less than an eight for greater than 24 hours.  He completed and turned in his paper work.  He has required no medication since yesterday at 16:33.  B:  Per Epic history of GERD, HTN, ADD, Pyloric Stenosis, Anxiety, Hepatic Steatosis, and obesity.  A:  Pt medically stable in the alcohol withdrawal assessment process AEB MSSA scores of < 8 for > 24 hours.  R:  Pt removed from the detox assessment process (taken out of detox).  Monitor VS daily and labs daily.  Provide medical support for GERD. Educate on the consequences of alcohol abuse.  Provide emotional support.

## 2018-07-01 NOTE — PLAN OF CARE
Problem: Substance Withdrawal  Goal: Substance Withdrawal  Problem: General Plan of Care (Inpatient Behavioral)  Goal: Individualization/ Patient Specific Goal (IP Behavioral)  The patient and/or their representative their patient-specific goals related to the plan of care.  Patient specific goals include:  1.  Patient will be evaluated by a physician and labs will be evaluated.  2.  Patient will be seen by a  for evaluation and discharge planning.  3.  Patient will complete assessment paperwork  4.  Patient will consume 75 % of meal to meet estimated energy needs.  5.  Detoxification from alcohol using valium.  6.  Patient will be provided with alcohol relapse prevention information.Signs and symptoms of listed problems will be absent or manageable.   Outcome: No Change  Pt wanted staff to relay request to Dr. Brasher  that he was wanting to be discharged today. Dr. Brasher spoke with patient. No discharge orders at this time. Pt aware he could fill out a 72 intent to leave but is not doing so at this time. Pt continues on alcohol withdrawal monitoring. No medications for alcohol withdrawal this shift. Pt has had a total of 160 mg of valium since admission. Pt reported his anxiety level a 3 on a 0-10 scale. Pt later on in afternoon requested vistaril 50 mg for anxiety. Discharge plans pending.   Pt low grade temp. Medical informed. No new orders.

## 2018-07-01 NOTE — PROGRESS NOTES
Writer and treatment team met with pt to discuss aftercare plans. Discussed possibility of petitioning for commitment. Pt stated he is willing to go to treatment to bypass commitment. States he would like to go back to +. Pt's last assessment was November 2017 and will need new referral to +. In-basket sent to evaluation department as well as Amish Murillo and Leighton Evans to see if pt would be allowed to return as he has done treatment at [facility] on 4 occassions.   Pt working on paperwork. CM continues.

## 2018-07-01 NOTE — PROGRESS NOTES
Brief Medicine Note:    IM following up on transaminitis with RUQ pain and new thrombocytopenia. RUQ US with steatosis. Tbili normalized.  (120),  (111). GGT 1245 (1384). Plts 57 (69). Does appear to have some dilutional component given drop in all cell lines. Retcs 2.9  - Peripheral smear, hepatitis C, and HIV pending   - CBC tomorrow  - Suggest OP follow up with LFTs in 1 week  - Will continue to follow    Anai Greenberg PA-C  Internal [facility]  [phone]

## 2018-07-02 ENCOUNTER — TELEPHONE (OUTPATIENT)
Dept: BEHAVIORAL HEALTH | Facility: CLINIC | Age: 28
End: 2018-07-02

## 2018-07-02 LAB
COPATH REPORT: NORMAL
ERYTHROCYTE [DISTWIDTH] IN BLOOD BY AUTOMATED COUNT: 13.2 % (ref 10–15)
HCT VFR BLD AUTO: 38.6 % (ref 40–53)
HCV AB SERPL QL IA: NONREACTIVE
HGB BLD-MCNC: 12.6 G/DL (ref 13.3–17.7)
HIV 1+2 AB+HIV1 P24 AG SERPL QL IA: NONREACTIVE
MCH RBC QN AUTO: 28.8 PG (ref 26.5–33)
MCHC RBC AUTO-ENTMCNC: 32.6 G/DL (ref 31.5–36.5)
MCV RBC AUTO: 88 FL (ref 78–100)
PLATELET # BLD AUTO: 73 10E9/L (ref 150–450)
RBC # BLD AUTO: 4.37 10E12/L (ref 4.4–5.9)
WBC # BLD AUTO: 4.3 10E9/L (ref 4–11)

## 2018-07-02 PROCEDURE — 12800012 ZZH R&B CD MH INTERMEDIATE ADULT

## 2018-07-02 PROCEDURE — 25000132 ZZH RX MED GY IP 250 OP 250 PS 637: Performed by: PSYCHIATRY & NEUROLOGY

## 2018-07-02 PROCEDURE — 36415 COLL VENOUS BLD VENIPUNCTURE: CPT | Performed by: PHYSICIAN ASSISTANT

## 2018-07-02 PROCEDURE — 25000125 ZZHC RX 250: Performed by: PSYCHIATRY & NEUROLOGY

## 2018-07-02 PROCEDURE — 25000132 ZZH RX MED GY IP 250 OP 250 PS 637: Performed by: PHYSICIAN ASSISTANT

## 2018-07-02 PROCEDURE — 85027 COMPLETE CBC AUTOMATED: CPT | Performed by: PHYSICIAN ASSISTANT

## 2018-07-02 PROCEDURE — 99232 SBSQ HOSP IP/OBS MODERATE 35: CPT | Performed by: PSYCHIATRY & NEUROLOGY

## 2018-07-02 RX ADMIN — GABAPENTIN 600 MG: 300 CAPSULE ORAL at 18:44

## 2018-07-02 RX ADMIN — CLONIDINE HYDROCHLORIDE 0.1 MG: 0.1 TABLET ORAL at 08:46

## 2018-07-02 RX ADMIN — BUSPIRONE HYDROCHLORIDE 20 MG: 10 TABLET ORAL at 08:46

## 2018-07-02 RX ADMIN — FOLIC ACID 1 MG: 1 TABLET ORAL at 08:45

## 2018-07-02 RX ADMIN — MULTIPLE VITAMINS W/ MINERALS TAB 1 TABLET: TAB at 08:45

## 2018-07-02 RX ADMIN — GABAPENTIN 600 MG: 300 CAPSULE ORAL at 08:45

## 2018-07-02 RX ADMIN — SERTRALINE HYDROCHLORIDE 200 MG: 100 TABLET ORAL at 08:45

## 2018-07-02 RX ADMIN — GABAPENTIN 600 MG: 300 CAPSULE ORAL at 12:17

## 2018-07-02 RX ADMIN — HYDROXYZINE HYDROCHLORIDE 50 MG: 25 TABLET ORAL at 22:33

## 2018-07-02 RX ADMIN — ACETAMINOPHEN 650 MG: 325 TABLET, FILM COATED ORAL at 16:18

## 2018-07-02 RX ADMIN — AMLODIPINE BESYLATE 10 MG: 10 TABLET ORAL at 08:45

## 2018-07-02 RX ADMIN — Medication 100 MG: at 08:45

## 2018-07-02 RX ADMIN — OMEPRAZOLE 40 MG: 20 CAPSULE, DELAYED RELEASE ORAL at 08:45

## 2018-07-02 RX ADMIN — BUSPIRONE HYDROCHLORIDE 20 MG: 10 TABLET ORAL at 18:44

## 2018-07-02 RX ADMIN — BUSPIRONE HYDROCHLORIDE 20 MG: 10 TABLET ORAL at 12:17

## 2018-07-02 RX ADMIN — CLONIDINE HYDROCHLORIDE 0.1 MG: 0.1 TABLET ORAL at 18:44

## 2018-07-02 RX ADMIN — TRAZODONE HYDROCHLORIDE 200 MG: 100 TABLET ORAL at 22:33

## 2018-07-02 RX ADMIN — HYDROXYZINE HYDROCHLORIDE 50 MG: 25 TABLET ORAL at 14:05

## 2018-07-02 RX ADMIN — ONDANSETRON 4 MG: 4 TABLET, ORALLY DISINTEGRATING ORAL at 08:48

## 2018-07-02 NOTE — TELEPHONE ENCOUNTER
7/2/2018 Please put patient on the men's, mixed face to face LP list.    Patient is not an IV user.    Patient's insurance is [employer].    He does not appear to have any medical or psychiatric issues that would eliminate him from LP.     Best tel contact number is on 3A at [phone].  Best secondary tel contact number is home [phone].    Scott is also trying to get him into [facility]. LP is a back up plan if he can't get into [facility].     Casa LYLES, LADC

## 2018-07-02 NOTE — PLAN OF CARE
Brief Medicine Note  July 2, 2018    Medicine following peripherally for thrombocytopenia. Plt slightly improved to 76 today. HIV, peripheral smear, HCV pending. Will repeat CBC on 7/4.     Recommendations for monitoring of transaminitis outlined in my colleague Shannon note from 7/1.    Valeria Bernal PA-C

## 2018-07-02 NOTE — PROGRESS NOTES
Case Management Note  7/2/2018    Writer met with pt to initiate discharge planning. Pt reports a desire to go to OP Treatment. Pt reports he is working to help his mother who has MS. Pt reports her house is in foreclosure and he needs to work to prevent her from loosing her home. Due to pt's history of ER admissions and harm to himself from his alcohol use the 3A treatment team believes he needs a higher level of care than OP Treatment. Writer has requested additional input from [facility] and pt's [facility].    Writer met with pt and informed him, he will be referred to [facility]. Pt signed a ALFONSO for [facility]. Writer faxed ALFONSO & H&P to [facility] as part of his intake/admission.    Writer met with pt to review completed intake paperwork. Rule 25 assessment completed. Writer faxed completed Rule 25 assessment to [facility] for intake/admission.    Scott Aquino MA, [facility]

## 2018-07-02 NOTE — PLAN OF CARE
Behavioral Team Discussion: (7/2/2018)    Continued Stay Criteria/Rationale: Patient admitted for Chemical Use Issues.  Plan: The following services will be provided to the patient; psychiatric assessment, medication management, therapeutic milieu, individual and group support, and skills groups.   Participants: 3A Provider: Dr. Filippo Brasher MD; 3A RN's: Fredrick Case, RN, Pedro Pablo Ayala, RN, Albert Valdez, RN and Janeen Downey, RN; 3A CM's: Sammi Benjamin , Scott Aquino and Lucille Graves.  Summary/Recommendation: Providers will assess today for treatment recommendations, discharge planning, and aftercare plans. CM will meet with pt for discharge planning.   Medical/Physical: Deferred (see medical notes).  Precautions:   Behavioral Orders   Procedures     Code 1 - Restrict to Unit     Fall precautions     Routine Programming     As clinically indicated     Status 15     Every 15 minutes.     Withdrawal precautions     Rationale for change in precautions or plan: N/A  Progress: Improving.

## 2018-07-02 NOTE — PROGRESS NOTES
"Rule 25 Assessment  Background Information   1. Date of Assessment Request  2. Date of Assessment  7/2/2018 3. Date Service Authorized     4.   Scott Aquino MA, [facility]  5.  Phone Number   [phone] 6. Referent  N/A 7. Assessment Site  [facility]     8. Client Name   Nickolas Lang 9. Date of Birth  1990 Age  27 year old 10. Gender  male  11. PMI/ Insurance No.  Payor: [facility] / Plan: ARE MA / Product Type: HMO /   [id number]   12. Client's Primary Language:  English 13. Do you require special accommodations, such as an  or assistance with written material? No   14. Current Address: [address] [place] [address] [facility] [address]   15. Client Phone Numbers: [phone] (home)      16. Tell me what has happened to bring you here today. Alcohol detoxification.    Per [facility] ER Note dated 6/29/18;    \"Nickolas Lang is a 27 year old male with a history of alcohol abuse, hypertension and depression who presents to the Emergency Department with his girlfriend seeking detox from alcohol. The patient is currently intoxicated. He reports that he has been drinking the past 14 days, about 2L of vodka. His last drink was an hour ago. Prior to this binge he was sober for a few months, he notes that he has been in treatment 12 times. He denies seizures with alcohol withdrawal. He denies substance abuse. He states that he doesn't \"drink to have fun\", he drinks to \"pass out\". The patient is noncompliant with his medications for hypertension and depression. The patient denies vomiting. He notes that in the past few days he is \"ready to be done\" when asked if he has suicidal ideation, he denies a plan and states that he doesn't think that \"a noose is heavy or strong enough\". He is hungry, and notes he has not eaten the last three days.\"      17. Have you had other rule 25 assessments? Yes. When, Where, and What circumstances: [facility] " 2017    DIMENSION I - Acute Intoxication /Withdrawal Potential   1. Chemical use most recent 12 months outside a facility and other significant use history (client self-report)              X = Primary Drug Used   Age of First Use Most Recent Pattern of Use and Duration   Need enough information to show pattern (both frequency and amounts) and to show tolerance for each chemical that has a diagnosis   Date of last use and time, if needed   Withdrawal Potential? Requiring special care Method of use  (oral, smoked, snort, IV, etc)   X Alcohol 16 1.75L/day for 2 wks     6/29/18 @ 12 noon  Yes  drink      Marijuana/  Hashish 16  A couple of puffs 6/1/17   No  smoked     Cocaine/Crack N/A             Meth/  Amphetamines N/A             Heroin N/A             Other Opiates/  Synthetics N/A             Inhalants N/A             Benzodiazepines N/A             Hallucinogens N/A             Barbiturates/  Sedatives/  Hypnotics N/A             Over-the-Counter Drugs N/A             Other N/A           X Nicotine 1  1/2 ppd 6/15/18 Yes smoke     2. Do you use greater amounts of alcohol/other drugs to feel intoxicated or achieve the desired effect? yes.  Or use the same amount and get less of an effect? no (DSM) Example: Increase in amounts and frequency of use.    3A. Have you ever been to detox? yes    3B. When was the first time? 2015    3C. How many times since then? 15    3D. Date of most recent detox: 6/29/18    4.  Withdrawal symptoms: Have you had any of the following withdrawal symptoms?  Past 12 months Recent (past 30 days)   Muscle Aches  Sensitivity to Noise  Nausea / Vomiting Sweating (Rapid Pulse)  Shaky / Jittery / Tremors  Unable to Sleep  Agitation  Headache  Fatigue / Extremely Tired  Sad / Depressed Feeling  Vivid / Unpleasant Dreams  Irritability  High Blood Pressure  Diarrhea  Diminished Appetite  Hallucinations  Unable to Eat  Confused / Disrupted Speech  Anxiety / Worried     's Visual  Observations and Symptoms: Alert and orientated x4 with mild withdrawal symptomology.     Based on the above information, is withdrawal likely to require attention as part of treatment participation?  No.    Dimension I Ratings   Acute intoxication/Withdrawal potential - The placing authority must use the criteria in Dimension I to determine a client s acute intoxication and withdrawal potential.    RISK DESCRIPTIONS - Severity ratin Client can tolerate and cope with withdrawal discomfort. The client displays mild to moderate intoxication or signs and symptoms interfering with daily functioning but does not immediately endanger self or others. Client poses minimal risk of severe withdrawal.    REASONS SEVERITY WAS ASSIGNED (What about the amount of the person s use and date of most recent use and history of withdrawal problems suggests the potential of withdrawal symptoms requiring professional assistance? )     Patient displays mild intoxication symptomology at this time. Pt endorses feelings of withdrawal. The patient's withdrawal symptomology was identified, managed and addressed by [facility]. Pt reports that his last use of alcohol was on 18. Pt was given a UA at time of ER admit and the UA was POS for ethanol and benzodiazepine. Pt was given a breathalyzer at the time of detox admit and patients AMANDA was 0.301.        DIMENSION II - Biomedical Complications and Conditions   1. Do you have any current health/medical conditions?(Include any infectious diseases, allergies, or chronic or acute pain, history of chronic conditions)   Past Medical History:   Diagnosis Date     ADD (attention deficit disorder)      Alcohol abuse      Anxiety     gabapentin helps the most      GERD (gastroesophageal reflux disease)      Hepatic steatosis      Hypertension      Obesity      Pyloric stenosis     s/p pyloromyotomy as an infant       2. Do you have a health care provider? When was your most recent  appointment? What concerns were identified?     No. NA    3. If indicated by answers to items 1 or 2: How do you deal with these concerns? Is that working for you? If you are not receiving care for this problem, why not?      NA    4A. List current medication(s) including over-the-counter or herbal supplements--including pain management:     Prior to Admission medications    Medication Sig Start Date End Date Taking? Authorizing Provider   amLODIPine (NORVASC) 10 MG tablet Take 10 mg by mouth daily 4/11/18  Yes Unknown, Entered By History   amphetamine-dextroamphetamine (ADDERALL XR) 10 MG per 24 hr capsule Take 10 mg by mouth 3 times daily 6/8/18  Yes Unknown, Entered By History   amphetamine-dextroamphetamine (ADDERALL XR) 20 MG per 24 hr capsule Take 20 mg by mouth 3 times daily 5/9/18  Yes Unknown, Entered By History   busPIRone (BUSPAR) 10 MG tablet Take 20 mg by mouth 3 times daily 5/24/18  Yes Unknown, Entered By History   cloNIDine (CATAPRES) 0.1 MG tablet TAKE 1 TABLET (0.1 MG) BY MOUTH 2 TIMES DAILY 3/30/18  Yes Lauro Mobley MD   gabapentin (NEURONTIN) 400 MG capsule Take 800 mg by mouth 4 times daily  5/24/18  Yes Unknown, Entered By History   hydrOXYzine (ATARAX) 25 MG tablet Take 1-2 tablets (25-50 mg) by mouth every 6 hours as needed for anxiety 4/8/18  Yes Jorge Baker MD   omeprazole (PRILOSEC) 40 MG capsule TAKE 1 CAPSULE (40 MG) BY MOUTH DAILY 4/26/18  Yes Nicolette Frye APRN CNP   sertraline (ZOLOFT) 100 MG tablet Take 200 mg by mouth daily 5/24/18  Yes Unknown, Entered By History   traZODone (DESYREL) 100 MG tablet Take 100-200 mg by mouth nightly as needed for sleep  5/8/18  Yes Unknown, Entered By History   buPROPion (WELLBUTRIN XL) 150 MG 24 hr tablet Take 150 mg by mouth daily 5/22/18   Unknown, Entered By History   multivitamin, therapeutic with minerals (THERA-VIT-M) TABS tablet Take 1 tablet by mouth daily 11/25/17   Lachelle Peterson MD   thiamine 100 MG tablet Take 1  tablet (100 mg) by mouth daily 3/29/18   Filippo Brasher MD     Current Facility-Administered Medications   Medication     acetaminophen (TYLENOL) tablet 650 mg     alum & mag hydroxide-simethicone (MYLANTA ES/MAALOX  ES) suspension 30 mL     amLODIPine (NORVASC) tablet 10 mg     atenolol (TENORMIN) tablet 50 mg     bisacodyl (DULCOLAX) Suppository 10 mg     busPIRone (BUSPAR) tablet 20 mg     cloNIDine (CATAPRES) tablet 0.1 mg     diazepam (VALIUM) tablet 5-20 mg     folic acid (FOLVITE) tablet 1 mg     gabapentin (NEURONTIN) capsule 600 mg     hydrALAZINE (APRESOLINE) half-tab 12.5 mg     hydrOXYzine (ATARAX) tablet 25-50 mg     magnesium hydroxide (MILK OF MAGNESIA) suspension 30 mL     multivitamin, therapeutic with minerals (THERA-VIT-M) tablet 1 tablet     nicotine (NICODERM CQ) 21 MG/24HR 24 hr patch 1 patch     nicotine Patch in Place     nicotine patch REMOVAL     nicotine polacrilex (NICORETTE) gum 2-4 mg     omeprazole (priLOSEC) CR capsule 40 mg     ondansetron (ZOFRAN-ODT) ODT tab 4 mg     sertraline (ZOLOFT) tablet 200 mg     traZODone (DESYREL) tablet 100-200 mg       4B. Do you follow current medical recommendations/take medications as prescribed?     Yes    4C. When did you last take your medication? 2018    5. Has a health care provider/healer ever recommended that you reduce or quit alcohol/drug use? yes    6. Are you pregnant? No    7. Have you had any injuries, assaults/violence towards you, accidents, health related issues, overdose(s) or hospitalizations related to your use of alcohol or other drugs: Yes, explain: I have fallen when intoxicated.    8. Do you have any specific physical needs/accommodations? No    Dimension II Ratings   Biomedical Conditions and Complications - The placing authority must use the criteria in Dimension II to determine a client s biomedical conditions and complications.   RISK DESCRIPTIONS - Severity ratin Client tolerates and cody with physical  discomfort and is able to get the services that the client needs.    REASONS SEVERITY WAS ASSIGNED (What physical/medical problems does this person have that would inhibit his or her ability to participate in treatment? What issues does he or she have that require assistance to address?)    Patient denies having any chronic biomedical conditions that would interfere with treatment or any recovery skills training/workshop. Pt does endorse having the following medical conditions; hypertension and chronic shoulder pain. Pt reports taking the following medications at this time;    Current Facility-Administered Medications   Medication     acetaminophen (TYLENOL) tablet 650 mg     alum & mag hydroxide-simethicone (MYLANTA ES/MAALOX  ES) suspension 30 mL     amLODIPine (NORVASC) tablet 10 mg     atenolol (TENORMIN) tablet 50 mg     bisacodyl (DULCOLAX) Suppository 10 mg     busPIRone (BUSPAR) tablet 20 mg     cloNIDine (CATAPRES) tablet 0.1 mg     diazepam (VALIUM) tablet 5-20 mg     folic acid (FOLVITE) tablet 1 mg     gabapentin (NEURONTIN) capsule 600 mg     hydrALAZINE (APRESOLINE) half-tab 12.5 mg     hydrOXYzine (ATARAX) tablet 25-50 mg     magnesium hydroxide (MILK OF MAGNESIA) suspension 30 mL     multivitamin, therapeutic with minerals (THERA-VIT-M) tablet 1 tablet     nicotine (NICODERM CQ) 21 MG/24HR 24 hr patch 1 patch     nicotine Patch in Place     nicotine patch REMOVAL     nicotine polacrilex (NICORETTE) gum 2-4 mg     omeprazole (priLOSEC) CR capsule 40 mg     ondansetron (ZOFRAN-ODT) ODT tab 4 mg     sertraline (ZOLOFT) tablet 200 mg     traZODone (DESYREL) tablet 100-200 mg     At the time of detox admission the patients BP was 147/87 and Pulse was 120 BPM. Pt reports having shoulder pain at this time and reports his pain level is a 5 on the 0-10 Pain Rating Scale. Pt reports that he consumes nicotine daily (cigarette smoker) but isn't inclined to quit smoking at this time.        DIMENSION III -  Emotional, Behavioral, Cognitive Conditions and Complications   1. (Optional) Tell me what it was like growing up in your family. (substance use, mental health, discipline, abuse, support)     Raised by: Mom  Siblings: 0 and patient reports he was the only child born.  Family CD History: Maternal grandfather. Dad who was never in my life had his own addiction issues.  Family MH History: Maternal grandmother and mother has depression. My mom and myself and my cousin all have ADD  Abuse: Pt denies a history of abuse while growing up.   Supported?: Pt reports that they felt supported 100% of the time while growing up.  Forms of punishment growing up?: grounded once.     2. When was the last time that you had significant problems...  A. with feeling very trapped, lonely, sad, blue, depressed or hopeless  about the future? 1+ years ago    B. with sleep trouble, such as bad dreams, sleeping restlessly, or falling  asleep during the day? Past Month    C. with feeling very anxious, nervous, tense, scared, panicked, or like  something bad was going to happen? Past Month    D. with becoming very distressed and upset when something reminded  you of the past? 1+ years ago    E. with thinking about ending your life or committing suicide? 1+ years ago    3. When was the last time that you did the following things two or more times?  A. Lied or conned to get things you wanted or to avoid having to do  something? Past Month    B. Had a hard time paying attention at school, work, or home? 1+ years ago    C. Had a hard time listening to instructions at school, work, or home? 1+ years ago    D. Were a bully or threatened other people? Never    E. Started physical fights with other people? Never      Note: These questions are from the Global Appraisal of Individual Needs--Short Screener. Any item marked  past month  or  2 to 12 months ago  will be scored with a severity rating of at least 2.     For each item that has occurred in the  past month or past year ask follow up questions to determine how often the person has felt this way or has the behavior occurred? How recently? How has it affected their daily living? And, whether they were using or in withdrawal at the time?    NA  2A-2C: Pt reports and attributes these to his use of chemicals and possibly related to MH concerns.   2E: Pt denies having any SIB's/SI's/SA's at this time and feels hopeful about the future.   3A-3C: Pt reports and attributes these to his use of chemicals and possibly related to MH concerns.     4A. If the person has answered item 2E with  in the past year  or  the past month , ask about frequency and history of suicide in the family or someone close and whether they were under the influence.     NA    Any history of suicide in your family? Or someone close to you? No    4B. If the person answered item 2E  in the past month  ask about  intent, plan, means and access and any other follow-up information  to determine imminent risk. Document any actions taken to intervene  on any identified imminent risk.       NA    5A. Have you ever been diagnosed with a mental health problem?  yes    5B. Are you receiving care for any mental health issues? If yes, what is the focus of that care or treatment?  Are you satisfied with the service? Most recent appointment?  How has it been helpful?      Yes, pt reports receiving care for General anxiety and depression     6. Have you been prescribed medications for emotional/psychological problems? Yes.  6B. Current mental health medication(s) If these medications are listed for Dimension II, reference item II-5. 6C. Are you taking your medications as instructed?  yes.    7. Does your MH provider know about your use? Yes.  7B. What does he or she have to say about it?(DSM) Quit.    8A. Have you ever been verbally, emotionally, physically or sexually abused?  No     Follow up questions to learn current risk, continuing emotional impact.   NA    8B. Have you received counseling for abuse?  N/A    9. Have you ever experienced or been part of a group that experienced community violence, historical trauma, rape or assault? No    10A. : No    11. Do you have problems with any of the following things in your daily life?  Performing your job/school work & concentrating ( when off my ADD medication)    Note: If the person has any of the above problems, follow up with items 12, 13, and 14. If none of the issues in item 11 are a problem for the person, skip to item 15.    I really don't cope. I lack coping skills.      12. Have you been diagnosed with traumatic brain injury or Alzheimer s?  no    13. If the answer to #12 is no, ask the following questions:    Have you ever hit your head or been hit on the head? yes     Were you ever seen in the Emergency Room, hospital or by a doctor because of an injury to your head? no    Have you had any significant illness that affected your brain (brain tumor, meningitis, West Nile Virus, stroke or seizure, heart attack, near drowning or near suffocation)?  no    14. If the answer to #12 is yes, ask if any of the problems identified in #11 occurred since the head injury or loss of oxygen. NA    15A. Highest grade of school completed:     College graduate    15B. Do you have a learning disability? No.    15C. Did you ever have tutoring in Math or English? No.    15D. Have you ever been diagnosed with Fetal Alcohol Effects or Fetal Alcohol Syndrome? no.    16. If yes to item 15 B, C, or D: How has this affected your use or been affected by your use? ADD    Dimension III Ratings   Emotional/Behavioral/Cognitive - The placing authority must use the criteria in Dimension III to determine a client s emotional, behavioral, and cognitive conditions and complications.   RISK DESCRIPTIONS - Severity ratin Client has difficulty with impulse control and lacks coping skills. Client has thoughts of suicide or harm to others  without means; however, the thoughts may interfere with participation in some treatment activities. Client has difficulty functioning in significant life areas. Client has moderate symptoms of emotional, behavioral, or cognitive problems. Client is able to participate in most treatment activities.    REASONS SEVERITY WAS ASSIGNED - What current issues might with thinking, feelings or behavior pose barriers to participation in a treatment program? What coping skills or other assets does the person have to offset those issues? Are these problems that can be initially accommodated by a treatment provider? If not, what specialized skills or attributes must a provider have?    The patient reports having mental health diagnosis of Generalized anxiety, depression, and ADD. Pt reports that his childhood was great and felt supported 100% of the time while growing up. Pt reports having no siblings and reports that he was an only child. Pt denies a history of abuse. Pt lacks sober coping skills and impulse control. Pt lacks emotional and stress management skills. Pt denies SIB/SA/HI/HA at this time. Pt reports guilt and shame due to his alcohol use.       DIMENSION IV - Readiness for Change   1. You ve told me what brought you here today. (first section) What do you think the problem really is?     My mental health.    2. Tell me how things are going. Ask enough questions to determine whether the person has use related problems or assets that can be built upon in the following areas: Family/friends/relationships; Legal; Financial; Emotional; Educational; Recreational/ leisure; Vocational/employment; Living arrangements (DSM)      Relationships: Negative  Legal: On probation for DWI  Financial: I'm struggling to keep mom's place afloat  Emotional: I've gotten overwhelmed with everything  Education: College graduate  Leisure: I like to work out  Employment: Positive   Living Arrangements: When sober, positive. It's nice to able  to help my mom.      3. What activities have you engaged in when using alcohol/other drugs that could be hazardous to you or others (i.e. driving a car/motorcycle/boat, operating machinery, unsafe sex, sharing needles for drugs or tattoos, etc     Driven under the influence. Unsafe sex    4. How much time do you spend getting, using or getting over using alcohol or drugs? (DSM)     Pretty much 1/2 of the day when I am using.    5. Reasons for drinking/drug use (Use the space below to record answers. It may not be necessary to ask each item.)  Like the feeling Yes   Trying to forget problems Yes   To cope with stress Yes   To relieve physical pain N/A   To cope with anxiety Yes   To cope with depression Yes   To relax or unwind N/A   Makes it easier to talk with people N/A   Partner encourages use N/A   Most friends drink or use N/A   To cope with family problems N/A   Afraid of withdrawal symptoms/to feel better N/A   Other (specify)  N/A     A. What concerns other people about your alcohol or drug use/Has anyone told you that you use too much? What did they say? (DSM)     My family and friends are very concerned about my health. They want me to get help and quit drinking.    B. What did you think about that/ do you think you have a problem with alcohol or drug use?     I agree and realize that I have a problem.     6. What changes are you willing to make? What substance are you willing to stop using? How are you going to do that? Have you tried that before? What interfered with your success with that goal?      Willing to change everything.     7. What would be helpful to you in making this change?     Outpatient therapy with my meetings, [employer], and seeing my therapist again.    Dimension IV Ratings   Readiness for Change - The placing authority must use the criteria in Dimension IV to determine a client s readiness for change.   RISK DESCRIPTIONS - Severity ratin Client displays verbal compliance, but lacks  "consistent behaviors; has low motivation for change; and is passively involved in treatment.    REASONS SEVERITY WAS ASSIGNED - (What information did the person provide that supports your assessment of his or her readiness to change? How aware is the person of problems caused by continued use? How willing is she or he to make changes? What does the person feel would be helpful? What has the person been able to do without help?)      Patient displays verbal compliance and motivation but lacks consistent behaviors and follow-through. Pt has continued to use despite previous treatment attempts and negative consequences. Pt appears to be in the \"contemplation\" Stage within the Stages of Change Model.        DIMENSION V - Relapse, Continued Use, and Continued Problem Potential   1. In what ways have you tried to control, cut-down or quit your use? If you have had periods of sobriety, how did you accomplish that? What was helpful? What happened to prevent you from continuing your sobriety? (DSM)     I have tried cutting down and controlling but lead to me relapsing.  My longest peroid of sobriety has been 5 months.    2. Have you experienced cravings? If yes, ask follow up questions to determine if the person recognizes triggers and if the person has had any success in dealing with them.     Yes, stress and seeing certain people, places or things all can cause me cravings to want to use.    3. Have you been treated for alcohol/other drug abuse/dependence? Yes.  3B. Number of times(lifetime) (over what period) 11.  3C. Number of times completed treatment (lifetime) 6.  3D. During the past three years have you participated in outpatient and/or residential?  Yes.  3E. When and where? 2017 [facility].   3F. What was helpful? What was not? Staying connected was helpful.    4. Support group participation: Have you/do you attend support group meetings to reduce/stop your alcohol/drug use? How recently? What was your " experience? Are you willing to restart? If the person has not participated, is he or she willing?     Yes. 1.5 months ago. Very positive. Yes I am willing to restart.    5. What would assist you in staying sober/straight?     Meetings, therapy and Rast[employer]    Dimension V Ratings   Relapse/Continued Use/Continued problem potential - The placing authority must use the criteria in Dimension V to determine a client s relapse, continued use, and continued problem potential.   RISK DESCRIPTIONS - Severity ratin No awareness of the negative impact of mental health problems or substance abuse. No coping skills to arrest mental health or addiction illnesses, or prevent relapse.    REASONS SEVERITY WAS ASSIGNED - (What information did the person provide that indicates his or her understanding of relapse issues? What about the person s experience indicates how prone he or she is to relapse? What coping skills does the person have that decrease relapse potential?)      Patient reports having been involved in 11 past treatments (completed 6), 15 past detox admissions, and past 12-Step support group participation. Pt reports having some sober time (5 months) and has tried to quit drinking in the past but relapsed. Pt lacks insight into his personal relapse process along with early warning signs and triggers. Pt lacks impulse control, sober coping skills and long-term sober maintenance skills. Pt lacks insight into the effects his use has had on his physical and mental health. Pt is at a high risk for relapse/continued use.        DIMENSION VI - Recovery Environment   1. Are you employed/attending school? Tell me about that.     I am currently employed FT and I am not attending school.     2A. Describe a typical day; evening for you. Work, school, social, leisure, volunteer, spiritual practices. Include time spent obtaining, using, recovering from drugs or alcohol. (DSM)     I get up around 5. I catch the bus to work. I go to  work. I go home. I was hitting the gym. On the days I went to meetings, my boss would let me leave work early. I also had a Wednesday night meeting I would go to.  When drinking, the day would start with the shakes. Then I would try to get to the Liquor store as soon as possible. I would get bottles to keep from going back the store and I would drink to pass out. Some days I would try to be productive while drinking, but at the end of the day, I would drink until I passed our or blacked out.    2B. How often do you spend more time than you planned using or use more than you planned? (DSM)     1/2 of the time when I am drinking.     3. How important is using to your social connections? Do many of your family or friends use?     Not important at all.   My family and friends do use.     4A. Are you currently in a significant relationship? Yes.  4B. How long? 1.5 years    4C. Sexual Orientation: Heterosexual    5A. Who do you live with?  I live with my mom    5B. Tell me about their alcohol/drug use and mental health issues. My mom does not have alcohol/drug use and/or mental health issues.    5C. Are you concerned for your safety there? no.    5D. Are you concerned about the safety of anyone else who lives with you? no.    6A. Do you have children who live with you? No    6B. Do you have children who do not live with you? No    7A. Who supports you in making changes in your alcohol or drug use? What are they willing to do to support you? Who is upset or angry about you making changes in your alcohol or drug use? How big a problem is this for you?      My mom and girlfriend are supportive. I am sure they would help if I needed something and if they knew I was working hard on my sobriety.     7B. This table is provided to record information about the person s relationships and available support It is not necessary to ask each item; only to get a comprehensive picture of their support system.  How often can you count on  the following people when you need someone?   Partner / Spouse Always supportive   Parent(s)/Aunt(s)/Uncle(s)/Grandparents Always supportive   Sibling(s)/Cousin(s) N/A   Child(sage) N/A   Other relative(s) N/A   Friend(s)/neighbor(s) Always supportive   Child(sage) s father(s)/mother(s) N/A   Support group member(s) Always supportive   Community of al members Always supportive   /counselor/therapist/healer Always supportive   Other (specify) N/A     8A. What is your current living situation?     I am currently living with my Mom.    8B. What is your long term plan for where you will be living?     I would like to purchase the home we are living in.    8C. Tell me about your living environment/neighborhood? Ask enough follow up questions to determine safety, criminal activity, availability of alcohol and drugs, supportive or antagonistic to the person making changes.      Everything is safe and I have no concerns.     9. Criminal justice history: Gather current/recent history and any significant history related to substance use--Arrests? Convictions? Circumstances? Alcohol or drug involvement? Sentences? Still on probation or parole? Expectations of the court? Current court order? Any sex offenses - lifetime? What level? (DSM)    2 DWI's    10. What obstacles exist to participating in treatment? (Time off work, childcare, funding, transportation, pending [facility]PC time, living situation)     Work and taking care of my mom who has MS.    Dimension VI Ratings   Recovery environment - The placing authority must use the criteria in Dimension VI to determine a client s recovery environment.   RISK DESCRIPTIONS - Severity ratin Client is engaged in structured, meaningful activity, but peers, family, significant other, and living environment are unsupportive, or there is criminal justice involvement by the client or among the client s peers, significant others, or in the client s living environment.    REASONS  SEVERITY WAS ASSIGNED - (What support does the person have for making changes? What structure/stability does the person have in his or her daily life that will increase the likelihood that changes can be sustained? What problems exist in the person s environment that will jeopardize getting/staying clean and sober?)     Patient reports that his current living situation is supportive towards his recovery. Pt reports that he is currently living with his mother who has MS. Pt lacks a daily structure and meaningful activities that promote recovery. Pt reports that he is currently employed and is not attending school at this time. Pt has strained relationships with family and friends due to his use. Pt lacks a sober support network. Pt reports that he has some legal involvement for DWI's and is on probation for it.        Client Choice/Exceptions   Would you like services specific to language, age, gender, culture, Scientology preference, race, ethnicity, sexual orientation or disability?  No    What particular treatment choices and options would you like to have? Outpatient Treatment.    Do you have a preference for a particular treatment program? Outpatient Treatment.    Criteria for Diagnosis     Criteria for Diagnosis  DSM-5 Criteria for Substance Use Disorder  Instructions: Determine whether the client currently meets the criteria for Substance Use Disorder using the diagnostic criteria in the DSM-V pp.481-589. Current means during the most recent 12 months outside a facility that controls access to substances    Category of Substance Severity (ICD-10 Code / DSM 5 Code)     Alcohol Use Disorder Severe  (10.20) (303.90)   Cannabis Use Disorder NA   Hallucinogen Use Disorder NA   Inhalant Use Disorder NA   Opioid Use Disorder NA   Sedative, Hypnotic, or Anxiolytic Use Disorder NA   Stimulant Related Disorder NA   Tobacco Use Disorder NA   Other (or unknown) Substance Use Disorder NA     Collateral Contact Summary  "  Number of contacts made: 2    Contact with referring person:  N/A.    If court related records were reviewed, summarize here: N/A    Information from collateral contacts supported/largely agreed with information from the client and associated risk ratings.    Rule 25 Assessment Summary and Plan   's Recommendation    1)  Complete a residential based/IOP W/ lodging or similar treatment program.   2)  Abstain from all mood-altering chemicals unless prescribed by a licensed provider.   3)  Attend weekly 12-step support group meetings.     4)  Actively work with a male sponsor or  through [place] [employer] ([phone]).   5)  Follow all the recommendations of your treatment/medical providers.  6)  Remain law abiding and follow all recommendations of the Courts/PO.  7)  Patient may benefit from obtaining a full mental health evaluation.  8)  Patient may benefit from 1:1 psychotherapy due to mental health diagnosis.         Collateral Contacts     Name:    Dr. NASIR Brasher MD   Relationship:    3A Physician   Phone Number:    [phone] Releases:         \"The patient is a 27-year-old  male.  He is sitting with his mother.  He is single.  He is presently employed. The patient came to the emergency room on 06/29. Of note, he was also in the emergency room on 06/03 and went to [facility] detox.  Apparently, the patient relapsed.  This is a 14-day binge.  He has been drinking daily up to 1.75 liters of alcohol.  He realized that he needs to get help and brought himself here.  Alcohol is his drug of choice.  He has tolerance to alcohol, withdrawal from it, progressive use, loss of control, has used despite having negative consequences, strained family relations.  Alcohol is his drug of choice   He started drinking at age of 17, by 23 it was a problem.  He has tolerance, withdrawal, blackouts, history of DTs.  No history of seizures.  Has used despite negative consequences, work, " "money, job.  He does have depression and takes Zoloft and BuSpar for it.  He has anxiety.  Denies any PTSD.\"    Collateral Contacts     Name    Dr. NASIR Ventura MD Relationship    ER Physician Phone Number    [phone] Releases           \"Nickolas Lang is a 27 year old male with a history of alcohol abuse, hypertension and depression who presents to the Emergency Department with his girlfriend seeking detox from alcohol. The patient is currently intoxicated. He reports that he has been drinking the past 14 days, about 2L of vodka. His last drink was an hour ago. Prior to this binge he was sober for a few months, he notes that he has been in treatment 12 times. He denies seizures with alcohol withdrawal. He denies substance abuse. He states that he doesn't \"drink to have fun\", he drinks to \"pass out\". The patient is noncompliant with his medications for hypertension and depression. The patient denies vomiting. He notes that in the past few days he is \"ready to be done\" when asked if he has suicidal ideation, he denies a plan and states that he doesn't think that \"a noose is heavy or strong enough\". He is hungry, and notes he has not eaten the last three days.\"    Collateral Contacts     Name    [facility]'s EMR   Relationship     Phone Number     Releases           Information Provided:  This writer reviewed this patients Electronic Medical Record and the information reviewed largely supports the information the patient reported during their CD evaluation.    llateral Contacts      A problematic pattern of alcohol/drug use leading to clinically significant impairment or distress, as manifested by at least two of the following, occurring within a 12-month period:    Alcohol/drug is often taken in larger amounts or over a longer period than was intended.  There is a persistent desire or unsuccessful efforts to cut down or control alcohol/drug use  A great deal of time is spent in activities necessary to " obtain alcohol, use alcohol, or recover from its effects.  Craving, or a strong desire or urge to use alcohol/drug  Recurrent alcohol/drug use resulting in a failure to fulfill major role obligations at work, school or home.  Continued alcohol use despite having persistent or recurrent social or interpersonal problems caused or exacerbated by the effects of alcohol/drug.  Important social, occupational, or recreational activities are given up or reduced because of alcohol/drug use.  Recurrent alcohol/drug use in situations in which it is physically hazardous.  Alcohol/drug use is continued despite knowledge of having a persistent or recurrent physical or psychological problem that is likely to have been caused or exacerbated by alcohol.  Tolerance, as defined by either of the following: A need for markedly increased amounts of alcohol/drug to achieve intoxication or desired effect. and A markedly diminished effect with continued use of the same amount of alcohol/drug.  Withdrawal, as manifested by either of the following: The characteristic withdrawal syndrome for alcohol/drug (refer to Criteria A and B of the criteria set for alcohol/drug withdrawal). and Alcohol/drug (or a closely related substance, such as a benzodiazepine) is taken to relieve or avoid withdrawal symptoms.      Specify if: In early remission:  After full criteria for alcohol/drug use disorder were previously met, none of the criteria for alcohol/drug use disorder have been met for at least 3 months but for less than 12 months (with the exception that Criterion A4,  Craving or a strong desire or urge to use alcohol/drug  may be met).     In sustained remission:   After full criteria for alcohol use disorder were previously met, non of the criteria for alcohol/drug use disorder have been met at any time during a period of 12 months or longer (with the exception that Criterion A4,  Craving or strong desire or urge to use alcohol/drug  may be met).    Specify if:   This additional specifier is used if the individual is in an environment where access to alcohol is restricted.    Mild: Presence of 2-3 symptoms    Moderate: Presence of 4-5 symptoms    Severe: Presence of 6 or more symptoms

## 2018-07-02 NOTE — PROGRESS NOTES
"[facility], [place]   Psychiatric Progress Note    Interim history   This is a 27 year old male with 1.  Alcohol use, severe.   2.  Major depressive disorder, recurrent, without psychotic features..Pt seen in rounds. Patient's mood is good,  Energy Level:MODERATE  Sleep:No concerns, sleeps well through night  Appetite:normal motivation interest improving    deneid any Suicidal/homicidal ideation/plan intent.   denied any psychosis  No prior suicde attempts  No access to gun  Pt is in detox  Pt mssa/cows score are monitered  Tolerating meds and has no side effects.              Medications:     Current Facility-Administered Medications   Medication     acetaminophen (TYLENOL) tablet 650 mg     alum & mag hydroxide-simethicone (MYLANTA ES/MAALOX  ES) suspension 30 mL     amLODIPine (NORVASC) tablet 10 mg     atenolol (TENORMIN) tablet 50 mg     bisacodyl (DULCOLAX) Suppository 10 mg     busPIRone (BUSPAR) tablet 20 mg     cloNIDine (CATAPRES) tablet 0.1 mg     diazepam (VALIUM) tablet 5-20 mg     folic acid (FOLVITE) tablet 1 mg     gabapentin (NEURONTIN) capsule 600 mg     hydrALAZINE (APRESOLINE) half-tab 12.5 mg     hydrOXYzine (ATARAX) tablet 25-50 mg     magnesium hydroxide (MILK OF MAGNESIA) suspension 30 mL     multivitamin, therapeutic with minerals (THERA-VIT-M) tablet 1 tablet     nicotine (NICODERM CQ) 21 MG/24HR 24 hr patch 1 patch     nicotine Patch in Place     nicotine patch REMOVAL     nicotine polacrilex (NICORETTE) gum 2-4 mg     omeprazole (priLOSEC) CR capsule 40 mg     ondansetron (ZOFRAN-ODT) ODT tab 4 mg     sertraline (ZOLOFT) tablet 200 mg     traZODone (DESYREL) tablet 100-200 mg             Allergies:   No Known Allergies         Psychiatric Examination:   Blood pressure (!) 146/100, pulse 82, temperature 98.5  F (36.9  C), temperature source Oral, resp. rate 16, height 1.854 m (6' 1\"), weight 140.6 kg (310 lb), SpO2 95 %.  Weight is 310 lbs 0 oz  Body mass index " is 40.9 kg/(m^2).    Appearance:  awake, alert and adequately groomed  Attitude:  cooperative  Eye Contact:  good  Mood:  better  Affect:  appropriate and in normal range and mood congruent  Speech:  clear, coherent rate /rhythm are good  Psychomotor Behavior:  no evidence of tardive dyskinesia, dystonia, or tics and intact station, gait and muscle tone  Throught Process:  logical  Associations:  no loose associations  Thought Content:  no evidence of suicidal ideation or homicidal ideation, no evidence of psychotic thought, no auditory hallucinations present and no visual hallucinations present  Insight:  good  Judgement:  intact  Oriented to:  time, person, and place  Attention Span and Concentration:  intact  Recent and Remote Memory:  intact  Language fund of knowledge are adequate         Labs:     Lab Results   Component Value Date    NTBNPI <5 11/20/2017     Lab Results   Component Value Date    WBC 4.3 07/02/2018    HGB 12.6 (L) 07/02/2018    HCT 38.6 (L) 07/02/2018    MCV 88 07/02/2018    PLT 73 (L) 07/02/2018     Lab Results   Component Value Date    TSH 3.79 03/27/2018            DIAGNOSES:   1.  Alcohol use, severe.   2.  Major depressive disorder, recurrent, without psychotic features.       PLAN:  pt   Is out detox    The patient will be continued on Zoloft.  The patient at this time is willing to do trt, recent high risk behaviours , elevated liver enzymes , low platelets    Laboratory/Imaging: reviewed with patient   Consults: internal medicine consult reviewed  Patient will be treated in therapeutic milieu with appropriate individual and group therapies as described.      Medical diagnoses to be addressed this admission:    Plan:  Alcohol abuse, ADD: with acute withdrawal  - Management per psych     Transaminitis: , , Tbili 1.5. ALP normal. LFTs normal 6/3/2018. Reports RUQ pain. Has nausea, but thinks related to withdrawal. Some confusion since taking Valium this am  - RUQ US  - Trend  "CMP, GGT     Thrombocytopenia: Plts 69, were 296 3/26/2018. Denies s/s of acute bleed. No rash. Denies recent illness. No recent heparin use. No rheumatologic history. Possibly 2/2 liver disease. Would likely see drop in all cell lines if related to chronic suppression. Other etiology to consider includes ITP, less likely TTP or HUS  - Peripheral smear, retics, HIV, HCV, B12     Recent fall: Fell and hit chest, possibly his head last week. Possible LOC. No focal neuro deficits at this time  - Contact medicine with any changes, low threshold for HCT given low plts, however, will hold off for now given normal exam      HTN: PAT on Norvasc, Clonidine. BP 130s-150s/90s-100s in the setting of acute etoh withdrawal.   - Continue PTA meds with hold parameters  - Hydralazine prn for SBP>175, DBP>110  - Contact medicine if BP spikes >175/>110 or is consistently >150s/>110s despite completion of detox     GERD: continue PTA PPI     Currently, medically stable and internal medicine will sign off. Please contact if future questions or concerns arise. Thank you for the opportunity to be a part of this patient's care.  Liver imaging shows   \"EXAMINATION: Limited Abdominal Ultrasound, 6/30/2018 2:01 PM      COMPARISON: 10/6/2015     HISTORY: Right upper quadrant pain, transaminitis     FINDINGS:   Fluid: No evidence of ascites or pleural effusions.     Liver: The liver is diffusely echogenic with poor beam penetration.  There is no focal mass.      Gallbladder: There is no wall thickening, pericholecystic fluid,  positive sonographic Vieira's sign or evidence for cholelithiasis.     Bile Ducts: Both the intra- and extrahepatic biliary system are of  normal caliber.  The common bile duct measures 5 mm in diameter.     Pancreas: Obscured     Kidney: The right kidney measures 13.4 cm long. There is no  hydronephrosis or hydroureter, no shadowing renal calculi, cystic  lesion or mass.          IMPRESSION:   1.  Increased hepatic " "echogenicity as can be seen in intrinsic  parenchymal disease such as steatosis.  2.  No acute findings in the right upper quadrant.  No intrahepatic or  extrahepatic biliary ductal dilatation\"  Legal Status: voluntary    Safety Assessment:   Checks:  15 min  Precautions: withdrawal precautions  Pt has not required locked seclusion or restraints in the past 24 hours to maintain safety, please refer to RN documentation for further details.  Discussed with patient many issues of addiction,triggers, relapse, and establishing a solid recovery program.  Able to give informed consent:  YES   Discussed Risks/Benefits/Side Effects/Alternatives: YES    After discussion of the indications, risks, benefits, alternatives and consequences of no treatment, the patient elects to complete detox and do trt.  "

## 2018-07-03 ENCOUNTER — HOSPITAL ENCOUNTER (OUTPATIENT)
Dept: BEHAVIORAL HEALTH | Facility: CLINIC | Age: 28
End: 2018-07-03
Attending: FAMILY MEDICINE
Payer: COMMERCIAL

## 2018-07-03 VITALS
WEIGHT: 310 LBS | BODY MASS INDEX: 41.08 KG/M2 | DIASTOLIC BLOOD PRESSURE: 100 MMHG | HEART RATE: 85 BPM | OXYGEN SATURATION: 95 % | RESPIRATION RATE: 16 BRPM | HEIGHT: 73 IN | SYSTOLIC BLOOD PRESSURE: 136 MMHG | TEMPERATURE: 97.1 F

## 2018-07-03 PROCEDURE — 25000132 ZZH RX MED GY IP 250 OP 250 PS 637: Performed by: PHYSICIAN ASSISTANT

## 2018-07-03 PROCEDURE — 25000132 ZZH RX MED GY IP 250 OP 250 PS 637: Performed by: PSYCHIATRY & NEUROLOGY

## 2018-07-03 PROCEDURE — 99239 HOSP IP/OBS DSCHRG MGMT >30: CPT | Performed by: PSYCHIATRY & NEUROLOGY

## 2018-07-03 PROCEDURE — 10020000 ZZH LODGING PLUS FACILITY CHARGE ADULT

## 2018-07-03 PROCEDURE — H2035 A/D TX PROGRAM, PER HOUR: HCPCS

## 2018-07-03 RX ORDER — AMLODIPINE BESYLATE 10 MG/1
10 TABLET ORAL DAILY
Qty: 30 TABLET | Refills: 0 | Status: SHIPPED | OUTPATIENT
Start: 2018-07-04 | End: 2018-07-26

## 2018-07-03 RX ORDER — LANOLIN ALCOHOL/MO/W.PET/CERES
100 CREAM (GRAM) TOPICAL DAILY
Qty: 30 TABLET | Refills: 1 | Status: SHIPPED | OUTPATIENT
Start: 2018-07-03 | End: 2018-10-04

## 2018-07-03 RX ORDER — OMEPRAZOLE 40 MG/1
40 CAPSULE, DELAYED RELEASE ORAL DAILY
Qty: 30 CAPSULE | Refills: 0 | Status: SHIPPED | OUTPATIENT
Start: 2018-07-04 | End: 2018-08-07

## 2018-07-03 RX ORDER — NICOTINE 21 MG/24HR
1 PATCH, TRANSDERMAL 24 HOURS TRANSDERMAL DAILY
Qty: 30 PATCH | Refills: 0 | Status: SHIPPED | OUTPATIENT
Start: 2018-07-04 | End: 2018-10-04

## 2018-07-03 RX ORDER — LORATADINE 10 MG/1
10 TABLET ORAL DAILY PRN
COMMUNITY
End: 2018-07-28

## 2018-07-03 RX ORDER — HYDROXYZINE HYDROCHLORIDE 25 MG/1
25-50 TABLET, FILM COATED ORAL EVERY 6 HOURS PRN
Qty: 30 TABLET | Refills: 0 | Status: SHIPPED | OUTPATIENT
Start: 2018-07-03 | End: 2018-07-10

## 2018-07-03 RX ORDER — BUSPIRONE HYDROCHLORIDE 10 MG/1
20 TABLET ORAL 3 TIMES DAILY
Qty: 90 TABLET | Refills: 0 | Status: SHIPPED | OUTPATIENT
Start: 2018-07-03 | End: 2018-07-17

## 2018-07-03 RX ORDER — SERTRALINE HYDROCHLORIDE 100 MG/1
200 TABLET, FILM COATED ORAL DAILY
Qty: 30 TABLET | Refills: 0 | Status: ON HOLD | OUTPATIENT
Start: 2018-07-04 | End: 2018-10-15

## 2018-07-03 RX ORDER — AMOXICILLIN 250 MG
2 CAPSULE ORAL DAILY PRN
COMMUNITY
End: 2018-07-28

## 2018-07-03 RX ORDER — GABAPENTIN 300 MG/1
600 CAPSULE ORAL 3 TIMES DAILY
Qty: 90 CAPSULE | Refills: 1 | Status: SHIPPED | OUTPATIENT
Start: 2018-07-03 | End: 2018-07-12

## 2018-07-03 RX ORDER — MULTIPLE VITAMINS W/ MINERALS TAB 9MG-400MCG
1 TAB ORAL DAILY
Qty: 30 EACH | Refills: 0 | Status: ON HOLD | OUTPATIENT
Start: 2018-07-03 | End: 2018-10-15

## 2018-07-03 RX ORDER — TRAZODONE HYDROCHLORIDE 100 MG/1
100-200 TABLET ORAL
Qty: 90 TABLET | Refills: 0 | Status: ON HOLD | OUTPATIENT
Start: 2018-07-03 | End: 2018-10-15

## 2018-07-03 RX ORDER — MAGNESIUM HYDROXIDE/ALUMINUM HYDROXICE/SIMETHICONE 120; 1200; 1200 MG/30ML; MG/30ML; MG/30ML
30 SUSPENSION ORAL EVERY 6 HOURS PRN
COMMUNITY
End: 2018-07-28

## 2018-07-03 RX ORDER — CLONIDINE HYDROCHLORIDE 0.1 MG/1
0.1 TABLET ORAL 2 TIMES DAILY
Qty: 60 TABLET | Refills: 0 | Status: SHIPPED | OUTPATIENT
Start: 2018-07-03 | End: 2018-07-26

## 2018-07-03 RX ADMIN — GABAPENTIN 600 MG: 300 CAPSULE ORAL at 08:18

## 2018-07-03 RX ADMIN — HYDROXYZINE HYDROCHLORIDE 50 MG: 25 TABLET ORAL at 10:55

## 2018-07-03 RX ADMIN — BUSPIRONE HYDROCHLORIDE 20 MG: 10 TABLET ORAL at 12:29

## 2018-07-03 RX ADMIN — CLONIDINE HYDROCHLORIDE 0.1 MG: 0.1 TABLET ORAL at 08:18

## 2018-07-03 RX ADMIN — AMLODIPINE BESYLATE 10 MG: 10 TABLET ORAL at 08:18

## 2018-07-03 RX ADMIN — ACETAMINOPHEN 650 MG: 325 TABLET, FILM COATED ORAL at 10:55

## 2018-07-03 RX ADMIN — MULTIPLE VITAMINS W/ MINERALS TAB 1 TABLET: TAB at 08:18

## 2018-07-03 RX ADMIN — GABAPENTIN 600 MG: 300 CAPSULE ORAL at 12:29

## 2018-07-03 RX ADMIN — FOLIC ACID 1 MG: 1 TABLET ORAL at 08:18

## 2018-07-03 RX ADMIN — OMEPRAZOLE 40 MG: 20 CAPSULE, DELAYED RELEASE ORAL at 08:18

## 2018-07-03 RX ADMIN — SERTRALINE HYDROCHLORIDE 200 MG: 100 TABLET ORAL at 08:18

## 2018-07-03 RX ADMIN — BUSPIRONE HYDROCHLORIDE 20 MG: 10 TABLET ORAL at 08:18

## 2018-07-03 ASSESSMENT — ACTIVITIES OF DAILY LIVING (ADL)
ORAL_HYGIENE: INDEPENDENT
DRESS: INDEPENDENT
GROOMING: INDEPENDENT

## 2018-07-03 NOTE — PROGRESS NOTES
Name: Nickolas Lnag  Date: 7/3/2018  Medical Record: [id number]    Envelope Number: [id number]    List of Contents (List each item separately in new row):     One cellphone    Admission:  I am responsible for any personal items that are not sent to the safe or pharmacy.  [facility] is not responsible for loss, theft or damage of any property in my possession.      Patient Signature:  ___________________________________________       Date/Time:__________________________    Staff Signature: __________________________________       Date/Time:__________________________    2nd Staff person, if patient is unable/unwilling to sign:      __________________________________________________________       Date/Time: __________________________      Discharge:  [facility] has returned all of my personal belongings:    Patient Signature: ________________________________________     Date/Time: ____________________________________    Staff Signature: ______________________________________     Date/Time:_____________________________________

## 2018-07-03 NOTE — PROGRESS NOTES
Case Management Note  7/3/2018    Writer spoke with Aba Ren at [facility] [phone]. He reports as long as pt is no longer taking Adderall medication he is eligible for admission. Pt can admit on Thursday, 7/5/18. Aba faxed a ALFONSO for pt to sign for [facility] and a questionnaire for pt to complete and return.    Pt reports he is not willing to discontinue his adderall medication. Pt will transfer to [facility] instead of [facility] so he can continue his adderall medication. Writer met with pt to complete addendum for [facility]. SBAR completed. Rule 25 assessment and summary faxed to intake for [facility] insurance. Pt is scheduled to go to [facility] at 1:00 pm, today, Tuesday, 7/3/18.    Writer called and left a voicemail message with [facility] notifying them pt will not be coming there.    Scott Aquino MA, LADC

## 2018-07-03 NOTE — PROGRESS NOTES
Diet & BMI Assessment     Are you on a special diet?    No   Do you have any concerns regarding your nutritional status?   Yes, If yes explain: wants to get back in shape   Have you had any appetite changes in the last 3 months?       Yes, If yes explain: Eating less due to drinking   Have you had any weight loss or weight gain in the last 3 months?    If yes, how much weight gain or loss:    If weight patient gains or loses is more than 10 pounds, refer to primary care provider for assessment.        No   Was the patient informed of BMI?    Above,  General nutrition education   Yes       Zoey Banda RN
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
## 2018-07-03 NOTE — TELEPHONE ENCOUNTER
Per program client has arrived @ [facility]   No indication that 2 pg sum has been rcvd   Given to [facility] intake coord for auth
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
## 2018-07-03 NOTE — TELEPHONE ENCOUNTER
Charte reviewed, pt medically approved for admission to [facility].  Zoey Banda RN

## 2018-07-03 NOTE — DISCHARGE SUMMARY
Admit Date:     06/29/2018   Discharge Date:           More than 35 minutes spent on discharge summary, doing the discharge instructions, discharge medications, discharge mental status examination.      DISCHARGE DIAGNOSES:   Axis :  Alcohol use, severe.   Major depressive disorder, recurrent, without psychotic features.      IDENTIFYING INFORMATION:  The patient is a 27-year-old  male admitted for detox.  Please see detailed admission by Dr. Brasher on 06/30/2018.  He had an uneventful detox.  He was continued on BuSpar for his depression and Zoloft for his depression.  He was seen by Anai Greenberg PA-C, on 06/30/2018 for his medical consult.  He had elevated liver enzymes.  He also had thrombocytopenia and a recent fall.  The patient had lab work done, which shows creatinine 0.50, calcium is 8.3, albumin is 3.2, protein of 6.3, ALT is 142, AST is 123, GGT is 1245.      HOSPITAL COURSE:  During the hospitalization, the patient's energy, motivation, sleep and interest improved.  His platelet count climbed up to 73,000.  He met with the  and his plan is to do treatment at [facility].      DISCHARGE DISPOSITION:  The patient is going to [facility].      DISCHARGE MENTAL STATUS EXAMINATION:  The patient is alert, oriented x 3.  Recent and remote memory, language are all adequate.  No loose associations.  The patient does not have any suicidal ideation, plan or intent.            DISCHARGE MENTAL STATUS EXAMINATION:  The patient is alert, oriented x3.  Good fund of knowledge.  Good use of language.  Recent and remote memory, language, fund of knowledge are all adequate.  Euthymic mood congruent affect  Speech normal rate/rhythm linear tp no loose asso,The patient does not have any active suicidal or homicidal ideation.  Does not have any auditory or visual hallucination.  Fair insight/judgment At this time, the patient was stable to be discharged.         Educated about side effects/risk vs  benefits /alternative including non treatment.Pt consented to be on medication.     .Total time spent on discharge summary more than 35 min  More than  20 min  planning, coordination of care, medication reconciliation and performance of physical exam on day of discharge.Care was coordinated with unit RN and unit therapist       Parth Lang   Home Medication Instructions JAN:[id number]    Printed on:18 0219   Medication Information                      amLODIPine (NORVASC) 10 MG tablet  Take 1 tablet (10 mg) by mouth daily             busPIRone (BUSPAR) 10 MG tablet  Take 2 tablets (20 mg) by mouth 3 times daily             cloNIDine (CATAPRES) 0.1 MG tablet  Take 1 tablet (0.1 mg) by mouth 2 times daily             gabapentin (NEURONTIN) 300 MG capsule  Take 2 capsules (600 mg) by mouth 3 times daily             hydrOXYzine (ATARAX) 25 MG tablet  Take 1-2 tablets (25-50 mg) by mouth every 6 hours as needed for anxiety             multivitamin, therapeutic with minerals (THERA-VIT-M) TABS tablet  Take 1 tablet by mouth daily             nicotine (NICODERM CQ) 21 MG/24HR 24 hr patch  Place 1 patch onto the skin daily             omeprazole (PRILOSEC) 40 MG capsule  Take 1 capsule (40 mg) by mouth daily             sertraline (ZOLOFT) 100 MG tablet  Take 2 tablets (200 mg) by mouth daily             thiamine 100 MG tablet  Take 1 tablet (100 mg) by mouth daily             traZODone (DESYREL) 100 MG tablet  Take 1-2 tablets (100-200 mg) by mouth nightly as needed for sleep             Primary Provider:   2018  3:30 PM CDT   Return Visit with Tima Crews MD   [facility] ([facility])             HENRRY LOCO MD             D: 2018   T: 2018   MT: CC      Name:     PARTH LANG   MRN:      [id number]        Account:        [id number]   :      1990           Admit Date:     2018                                   Discharge Date:       Document: [id number]

## 2018-07-03 NOTE — PROGRESS NOTES
Initial Services Plan        Before your first treatment group, please do the following    Immediate health & safety concerns: Go to the emergency room if you start to have withdrawal symptoms.  Look for sober housing and a supportive social network.  Look for a support network (such as AA, NA, DBT group, a Confucianist group, etc.)    Suggestions for client during the time between intake & completion of treatment plan:  Tour your treatment center (unit or outpatient clinic).  Introduce yourself to the treatment group.  Spend time getting to know your peers.  Start drug and alcohol use history.  Review your patient or client handbook.    Client issues to be addressed in the first treatment sessions:  Identify concerns about coming into treatment, i.e. fear of failing again, sharing a room in treatment      Zoey Banda RN  7/3/2018  1:01 PM

## 2018-07-03 NOTE — PLAN OF CARE
"Problem: Patient Care Overview  Goal: Plan of Care/Patient Progress Review  Outcome: Improving  Patient experienced an uneventful discharge from [facility], with direct transfer to the [facility]. Per medical PA, patient not at risk for negative sequellae as related to his current gabapentin dosing scheduling and slightly lower than \"normal range\" creatinine level. Thus, patient may continue his current discharge medication protocol as ordered by MD Brasher without undue concern.  Per pharmacy, all discharge medications shall [facility].  Patient departed with all personal effects in hand. He was hopeful, in good spirits, and absent of all suicidal ideation.  Relevant staff updated accordingly as to above.      "

## 2018-07-03 NOTE — PROGRESS NOTES
Brief Medicine Note  July 3, 2018      Medicine has been following peripherally for thrombocytopenia. Plt slightly improved to 76 on 7/2. HIV and Hep C returned neg. Peripheral smear returned showing slight normochromic, normocytic anemia with increased erythrocyte regeneration, no e/o hemolysis, mod thrombocytopenia with normal platelet morphology and no clumping. Peripheral smear results are reassuring. Likely etiology of thrombocytopenia is liver disease with recent ongoing alcohol use resulting in suppression. Recommend following up with PCP in 1 month for repeat CBC to monitor platelets.     Recommendations for monitoring of transaminitis outlined in my colleague Shannon note from 7/1.    Medicine will sign off. No need to trend CBC tomorrow given above results.    Valeria Bernal PA-C

## 2018-07-03 NOTE — PROGRESS NOTES
[facility] Assessment      Vital signs: 3A    There were no vitals taken for this visit.      Transfer from 3A    Counselor: Group [facility]  Drug of Choice: Alcohol  Last use: Fri June 29th  Home clinic/MD: Dr Crews   Psychiatrist/therapist: [facility], Leopoldo Arguello     Medical history/current conditions:  Hypertension, hepatic steatosis, needs a left shoulder replacement d/t football injury     H&P Screen:  H&P within the last 90 days: Yes.  Date: 7/2018 Location: 3A      Mental Health diagnosis: anxiety, depression, ADHD   Medication compliant?: Yes  Recent sucidal thoughts? No    When? N/A  Current thought of self-harm? No    Plan? N/A    Pain assessment:   Pt. Experiencing pain at this time?  Yes.  Rating on 0-10 scale: (1-10 scale): 5.  Location: left shoulder  Chronic  Result of: football injury .       Nursing Assessment Summary:  No acute concerns at this time     On-going nursing intervention required?   No    Acute care visit recommended: no     Zoey Banda RN

## 2018-07-03 NOTE — DISCHARGE INSTRUCTIONS
Behavioral [facility] Discharge Planning and Instructions  THANK YOU FOR CHOOSING THE [facility]: [phone]    Summary: You were admitted to and processed through [facility] on June 29, 2018 for detoxification from alcohol.  A medical examination was performed that included lab work. You have met with a  and been approved for admission to [facility].  Please make your recovery a priority, Mr. Lang.     Recommendation:  Residential Treatment, psychotherapy, sober support group engagement and active work with a sponsor through [facility].    Main Diagnosis: Alcohol Dependence    Major Treatments, Procedures and Findings:  You were detoxified from alcohol using the appropriate protocol(s). You have had a chemical dependency assessment.  You have had blood drawn, and the results have been reviewed with you.  Please take your lab work with you to your next provider appointment.    Symptoms to Report:  If you experience more anxiety, confusion, sleeplessness, deep sadness or thoughts of suicide, notify your treatment team or notify your primary care physician. IF THE SYMPTOMS YOU ARE EXPERIENCING ARE A MEDICAL EMERGENCY, CALL 911 IMMEDIATELY.     Lifestyle Adjustment: Adjust your lifestyle to get enough sleep, relaxation, exercise and excellent nutrition. Continue to develop healthy coping skills to decrease stress and promote a sober living environment. Do not use alcohol, illegal drugs or addictive medications other than what is currently prescribed. AA, NA, and a sponsor are excellent resources for support.     Primary Provider:   Jul 12, 2018  3:30 PM CDT   Return Visit with Tima Crews MD   [facility] ([facility])    [address] [facility]  [address]  [facility] [id number] [phone]      Resources:  [facility] [phone] Support Group:  AA/NA and Sponsor/support.  Crisis  Intervention: 903.218.1993 or 759-685-7470 (TTY: 937.926.3910). Call anytime for help.  National [place] on Mental Illness (www.mn.chaya.org): 331.558.9546 or 646-869-9745.  Alcoholics Anonymous (www.alcoholics-anonymous.org): Check your phone book for your local chapter.  Suicide Awareness Voices of Education (www.save.org): 092-510-PWDN (7692)  National Suicide Prevention Line (www.mentalhealthmn.org): 743-552-KWBP (8376)  Mental Health Consumer/Survivor Network of [place] (www.mhcsn.net): 727.778.5411 or 032-326-3922.  Mental Health Association of [place] (www.mentalhealth.org): 458.250.2125 or 705-394-3388  Substance Abuse and Mental Health Services. (www.samhsa.gov)    [place] [facility] ([facility])  [facility] connects people seeking recovery to resources that help foster and sustain long-term recovery.  Whether you are seeking resources for treatment, transportation, housing, job training, education, health care or other pathways to recovery, [facility] is a great place to start. 398.579.1566 www.[facility].org    General Medication Instruction: See your medication papers for instructions. Take all medicines as directed.  Make no changes unless your doctor suggests them. Go to all your doctor visits.  Be sure to have all your required lab tests. This way, your medicines may be refilled on time. Do not use any drugs not prescribed by your provider. AA/NA and sponsors are excellent resources for support. Avoid alcohol at all costs!    Please Note:  If you have any questions at anytime after you are discharged please call the [facility] [place] detoxification [facility] at [phone].  [facility] , Health, Behavioral Intake [phone]. Please take this discharge folder with you to all your follow up appointments, it contains your lab results, diagnosis, medication list and discharge recommendations. THANK YOU FOR CHOOSING THE [facility]  HEALTH

## 2018-07-04 ENCOUNTER — HOSPITAL ENCOUNTER (OUTPATIENT)
Dept: BEHAVIORAL HEALTH | Facility: CLINIC | Age: 28
End: 2018-07-04
Attending: FAMILY MEDICINE
Payer: COMMERCIAL

## 2018-07-04 PROCEDURE — 10020000 ZZH LODGING PLUS FACILITY CHARGE ADULT

## 2018-07-04 PROCEDURE — H2035 A/D TX PROGRAM, PER HOUR: HCPCS | Mod: HQ

## 2018-07-04 PROCEDURE — H2035 A/D TX PROGRAM, PER HOUR: HCPCS

## 2018-07-04 NOTE — PROGRESS NOTES
Name: Nickolas Lang  Date: 7/3/2018  Medical Record: [id number]    Envelope Number: [id number]    List of Contents (List each item separately in new row):   1 bottle of Gabapentin 400mg  Admission:  I am responsible for any personal items that are not sent to the safe or pharmacy.  [facility] is not responsible for loss, theft or damage of any property in my possession.      Patient Signature:  ___________________________________________       Date/Time:__________________________    Staff Signature: __________________________________       Date/Time:__________________________    2nd Staff person, if patient is unable/unwilling to sign:      __________________________________________________________       Date/Time: __________________________      Discharge:  [facility] has returned all of my personal belongings:    Patient Signature: ________________________________________     Date/Time: ____________________________________    Staff Signature: ______________________________________     Date/Time:_____________________________________

## 2018-07-04 NOTE — PROGRESS NOTES
Comprehensive Assessment Summary     Based on client interview, review of previous assessments and   comprehensive assessment interview the following diagnosis and recommendations are:     Patient: Nickolas Lang  MRN; [id number]   : 1990  Age: 27 year old Sex: male       Client meets criteria for:  303.90/F10.20 Alcohol Use Disorder, Severe;  305.10/F17.20 Tobacco Use Disorder, Moderate.    Dimension One: Acute Intoxication/Withdrawal Potential     Ratin  (Consider the client's ability to cope with withdrawal symptoms and current state of intoxication)     Pt was medically stabilized on the inpatient detox unit.     Dimension Two: Biomedical Condition and Complications    Ratin  (Consider the degree to which any physical disorder would interfere with treatment for substance abuse, and the client's ability to tolerate any related discomfort; determine the impact of continued chemical use on the unborn child if the client is pregnant)     Pt has a hx of HTN and chronic shoulder pain, but does not have any health conditions that would interfere with his ability to participate in LP tx.     Dimension Three: Emotional/Behavioral/Cognitive Conditions & Complications  Ratin  (Determine the degree to which any condition or complications are likely to interfere with treatment for substance abuse or with functioning in significant life areas and the likelihood of risk of harm to self or others)     Pt reports a diagnosis of depression and generalized anxiety disorder. Pt reports he has been receiving care for his mental health and that he takes medications prescribed to him by his psychiatrist. Pt reports that his psychiatrist is aware of his chemical use. Pt denies any current SI/SIB/SA. Pt reports that in 2016, he was in a blackout and took a handful of pills. Pt participated in a suicide risk screening during his CD assessment and his risk rating is low/no current risk. Pt will continue to be  monitored throughout treatment for changes in his risk rating and will complete a Safety Plan Template. Pt reports that he was raised by his mother and that he is an only child. Pt reports that he felt supported while growing up. Pt reports that his mother has MS and that it is stressful for him to care for him and her finances. Pt reports that his father has addiction issues, but that he did not and still does not have any contact with him. Pt lacks coping skills. Pt lacks impulse control and emotional regulation management. Pt struggles to effectively manage stress. Pt reports guilt and shame due to his continued alcohol use.     Dimension Four: Treatment Acceptance/Resistance     Ratin  (Consider the amount of support and encouragement necessary to keep the client involved in treatment)     Pt reports that he sought help for his mental health and his drinking. Pt displays verbal compliance and motivation to be sober, but has lacked inconsistent behaviors and follow-through. Pt has continued to drink despite negative consequences in several areas of his life and multiple previous tx experiences. Pt agrees that he has a problem with alcohol and reports that he is willing to change all necessary things to remain sober. Pt reports that his family, friends, and loved ones have all expressed concern about his drinking and his health. Pt struggles with complacency and not prioritizing his recovery above other important things in his life.     Dimension Five: Continued Use/Relaspe Prevention     Ratin  (Consider the degree to which the client's recognizes relapse issues and has the skills to prevent relapse of either substance use or mental health problems)     Pt has a hx of being in tx eleven previous times and has completed six of them. Pt has also been in detox fifteen previous times. Pt reports that his longest period of sobriety was for five months and that he participates in 12-step groups and Muslim,  along with therapy, to remain sober. Pt has a hx of attempting to control and quit his use and not being successful. Pt lacks awareness and insight into his personal relapse process and into his personal triggers and warning signs. Pt also struggles to intervene early when he does recognize any potential warning signs of relapse. Pt lacks long-term sober maintenance skills. Pt appears to be at a high risk for relapse at this time.     Dimension Six: Recovery Environment     Rating:   3  (Consider the degree to which key areas of the client's life are supportive of or antagonistic to treatment participation and recovery)     Pt reports that he is currently employed full-time. Pt reports that he lives with his mother, who has MS, and that he has been trying to help her and assist in household finances. Pt reports that when he is actively drinking, he does not engage in any of his life obligations or commitments and just drinks all day. Pt reports that he is in a significant/romantic relationship and that he and his girlfriend have been together for a year and a half. Pt reports that his mother and girlfriend are supportive of him and his sobriety. Pt hopes to purchase his mother's home in the future and to live there. Pt reports that he has a legal history of two DWIs and that he is currently on probation at this time. Pt has not been relying on his sober support network recently.    I have reviewed the information on the assessment, psychosocial and medical history and checklist and the following changes have been made: The diagnosis of 305.10/F17.20 Tobacco Use Disorder, Moderate has been added to pt's list of diagnoses, as he reports that he smokes a half of a pack of cigarettes per day. Dim 1 has been changed from a risk level of a 1 to a 0 as the pt has been medically stabilized while on an inpatient detox unit.

## 2018-07-04 NOTE — PROGRESS NOTES
Patient:  Nickolas Lang    Date: July 4, 2018    Comprehensive Assessment UPDATE        Comprehensive Summary Update and Review  Counselor met with patient on 7/4/18 and reviewed the Comprehensive Assessment.    The following updates have been made:and are reflected in the [facility] document.

## 2018-07-05 ENCOUNTER — HOSPITAL ENCOUNTER (OUTPATIENT)
Dept: BEHAVIORAL HEALTH | Facility: CLINIC | Age: 28
End: 2018-07-05
Attending: FAMILY MEDICINE
Payer: COMMERCIAL

## 2018-07-05 PROCEDURE — 10020000 ZZH LODGING PLUS FACILITY CHARGE ADULT

## 2018-07-05 PROCEDURE — H2035 A/D TX PROGRAM, PER HOUR: HCPCS

## 2018-07-05 PROCEDURE — H2035 A/D TX PROGRAM, PER HOUR: HCPCS | Mod: HQ

## 2018-07-06 ENCOUNTER — HOSPITAL ENCOUNTER (OUTPATIENT)
Dept: BEHAVIORAL HEALTH | Facility: CLINIC | Age: 28
End: 2018-07-06
Attending: FAMILY MEDICINE
Payer: COMMERCIAL

## 2018-07-06 PROCEDURE — H2035 A/D TX PROGRAM, PER HOUR: HCPCS | Mod: HQ

## 2018-07-06 PROCEDURE — H2035 A/D TX PROGRAM, PER HOUR: HCPCS

## 2018-07-06 PROCEDURE — 10020000 ZZH LODGING PLUS FACILITY CHARGE ADULT

## 2018-07-06 NOTE — PROGRESS NOTES
Received a letter from patient's provider Dr. Luis Alberto Lovell at [facility] today stating he is aware patient is in CD treatment and believes he should continue his Adderall XR 20mg TID dosing. Adderall approved for use in LP by Dr. Crews at this time.     Per pharmacy too soon to fill until 7/11/2018. Patient notified to have family bring in supply from home.

## 2018-07-07 ENCOUNTER — HOSPITAL ENCOUNTER (OUTPATIENT)
Dept: BEHAVIORAL HEALTH | Facility: CLINIC | Age: 28
End: 2018-07-07
Attending: FAMILY MEDICINE
Payer: COMMERCIAL

## 2018-07-07 PROCEDURE — H2035 A/D TX PROGRAM, PER HOUR: HCPCS | Mod: HQ

## 2018-07-07 PROCEDURE — 10020000 ZZH LODGING PLUS FACILITY CHARGE ADULT

## 2018-07-07 PROCEDURE — H2035 A/D TX PROGRAM, PER HOUR: HCPCS

## 2018-07-07 NOTE — PROGRESS NOTES
Acknowledgement of Current Treatment Plan - Initial Treatment Plan     INITIAL TREATMENT PLAN:     1. I have participated in creating my treatment plan with my therapist / counselor on __________.     I agree with the plan as it is written in the electronic health record.    Name Signature/Date   Patient     Name of Therapist / Counselor Signature/Date   Counselor/Therapist        2. I have completed and reviewed my Safety Plan with my counselor and signed this on _________. I have been given the hard copy of this plan.    Patient signature/date:      _____________________________________________________________________________    3. Last Use Date: __________    Patient signature/date:     _____________________________________________________________________________                                                    Acknowledgement of Current Treatment Plan - Additional Entries       ADDITIONAL GOALS AND INTERVENTIONS:    Signatures/dates required for any additional Problems, Goals, and/or Interventions added to treatment plan:  Change/Addition in Dimension ____ on date:_________  Insert here:         I have participated in creating this change and/or addition to my treatment plan copied above.   I have been given a copy of this signature page with change/addition to my treatment plan and I am in agreement with how it is written in the electronic record.       Patient signature:       ________________________________________________________________________      Counselor/Therapist signature:    ________________________________________________________________              Change in date of last use:       ________________________________________________________________        Patient signature/date (required for change in last use date):     ________________________________________________________________

## 2018-07-08 ENCOUNTER — HOSPITAL ENCOUNTER (OUTPATIENT)
Dept: BEHAVIORAL HEALTH | Facility: CLINIC | Age: 28
End: 2018-07-08
Attending: FAMILY MEDICINE
Payer: COMMERCIAL

## 2018-07-08 PROCEDURE — 10020000 ZZH LODGING PLUS FACILITY CHARGE ADULT

## 2018-07-08 PROCEDURE — H2035 A/D TX PROGRAM, PER HOUR: HCPCS

## 2018-07-09 ENCOUNTER — HOSPITAL ENCOUNTER (OUTPATIENT)
Dept: BEHAVIORAL HEALTH | Facility: CLINIC | Age: 28
End: 2018-07-09
Attending: FAMILY MEDICINE
Payer: COMMERCIAL

## 2018-07-09 PROCEDURE — H2035 A/D TX PROGRAM, PER HOUR: HCPCS

## 2018-07-09 PROCEDURE — 10020000 ZZH LODGING PLUS FACILITY CHARGE ADULT

## 2018-07-09 PROCEDURE — H2035 A/D TX PROGRAM, PER HOUR: HCPCS | Mod: HQ

## 2018-07-10 ENCOUNTER — HOSPITAL ENCOUNTER (OUTPATIENT)
Dept: BEHAVIORAL HEALTH | Facility: CLINIC | Age: 28
End: 2018-07-10
Attending: FAMILY MEDICINE
Payer: COMMERCIAL

## 2018-07-10 ENCOUNTER — OFFICE VISIT (OUTPATIENT)
Dept: BEHAVIORAL HEALTH | Facility: CLINIC | Age: 28
End: 2018-07-10
Payer: COMMERCIAL

## 2018-07-10 VITALS
HEART RATE: 84 BPM | TEMPERATURE: 98.3 F | SYSTOLIC BLOOD PRESSURE: 118 MMHG | DIASTOLIC BLOOD PRESSURE: 68 MMHG | BODY MASS INDEX: 42.81 KG/M2 | WEIGHT: 315 LBS | OXYGEN SATURATION: 96 % | RESPIRATION RATE: 16 BRPM

## 2018-07-10 DIAGNOSIS — F19.20 CHEMICAL DEPENDENCY (H): ICD-10-CM

## 2018-07-10 DIAGNOSIS — R11.2 NAUSEA AND VOMITING, INTRACTABILITY OF VOMITING NOT SPECIFIED, UNSPECIFIED VOMITING TYPE: ICD-10-CM

## 2018-07-10 DIAGNOSIS — R19.7 DIARRHEA, UNSPECIFIED TYPE: Primary | ICD-10-CM

## 2018-07-10 DIAGNOSIS — N18.9 CHRONIC KIDNEY DISEASE, UNSPECIFIED CKD STAGE: ICD-10-CM

## 2018-07-10 LAB
ALBUMIN SERPL-MCNC: 3.9 G/DL (ref 3.4–5)
ALP SERPL-CCNC: 89 U/L (ref 40–150)
ALT SERPL W P-5'-P-CCNC: 175 U/L (ref 0–70)
ANION GAP SERPL CALCULATED.3IONS-SCNC: 6 MMOL/L (ref 3–14)
AST SERPL W P-5'-P-CCNC: 56 U/L (ref 0–45)
BILIRUB SERPL-MCNC: 0.2 MG/DL (ref 0.2–1.3)
BUN SERPL-MCNC: 15 MG/DL (ref 7–30)
CALCIUM SERPL-MCNC: 8.9 MG/DL (ref 8.5–10.1)
CHLORIDE SERPL-SCNC: 106 MMOL/L (ref 94–109)
CO2 SERPL-SCNC: 27 MMOL/L (ref 20–32)
CREAT SERPL-MCNC: 0.74 MG/DL (ref 0.66–1.25)
GFR SERPL CREATININE-BSD FRML MDRD: >90 ML/MIN/1.7M2
GLUCOSE SERPL-MCNC: 118 MG/DL (ref 70–99)
POTASSIUM SERPL-SCNC: 4.6 MMOL/L (ref 3.4–5.3)
PROT SERPL-MCNC: 7.5 G/DL (ref 6.8–8.8)
SODIUM SERPL-SCNC: 139 MMOL/L (ref 133–144)

## 2018-07-10 PROCEDURE — H2035 A/D TX PROGRAM, PER HOUR: HCPCS | Mod: HQ

## 2018-07-10 PROCEDURE — 99214 OFFICE O/P EST MOD 30 MIN: CPT | Performed by: NURSE PRACTITIONER

## 2018-07-10 PROCEDURE — 10020000 ZZH LODGING PLUS FACILITY CHARGE ADULT

## 2018-07-10 PROCEDURE — H2035 A/D TX PROGRAM, PER HOUR: HCPCS

## 2018-07-10 PROCEDURE — 36415 COLL VENOUS BLD VENIPUNCTURE: CPT | Performed by: NURSE PRACTITIONER

## 2018-07-10 PROCEDURE — 80053 COMPREHEN METABOLIC PANEL: CPT | Performed by: NURSE PRACTITIONER

## 2018-07-10 RX ORDER — ONDANSETRON 4 MG/1
4 TABLET, FILM COATED ORAL EVERY 8 HOURS PRN
Qty: 10 TABLET | Refills: 0 | Status: SHIPPED | OUTPATIENT
Start: 2018-07-10 | End: 2018-10-04

## 2018-07-10 RX ORDER — HYDROXYZINE HYDROCHLORIDE 50 MG/1
50 TABLET, FILM COATED ORAL EVERY 4 HOURS PRN
Qty: 120 TABLET | Refills: 0 | Status: ON HOLD | OUTPATIENT
Start: 2018-07-10 | End: 2018-10-15

## 2018-07-10 RX ORDER — LOPERAMIDE HYDROCHLORIDE 2 MG/1
2 TABLET ORAL 4 TIMES DAILY PRN
Qty: 20 TABLET | Refills: 0 | Status: ON HOLD | OUTPATIENT
Start: 2018-07-10 | End: 2018-10-15

## 2018-07-10 NOTE — MR AVS SNAPSHOT
After Visit Summary   7/10/2018    Nickolas Lang    MRN: [id number]           Patient Information     Date Of Birth          1990        Visit Information        Provider Department      7/10/2018 11:00 AM Dora Velazquez APRN CNP [facility]        Today's Diagnoses     Diarrhea, unspecified type    -  1    Nausea and vomiting, intractability of vomiting not specified, unspecified vomiting type        Chemical dependency (H)        Chronic kidney disease, unspecified CKD stage           Follow-ups after your visit        Your next 10 appointments already scheduled     Jul 11, 2018  7:15 AM CDT   Treatment with ADULT LODGING PLUS B   [facility] ([facility])    [address] [place] [address]   [phone]            Jul 12, 2018  7:15 AM CDT   Treatment with ADULT LODGING PLUS B   [facility] ([facility])    [address] [place] [address]   [phone]            Jul 12, 2018  3:30 PM CDT   Return Visit with Tima Crews MD   [facility] ([facility])    [address]  [address]  [facility] [address]   [phone]            Jul 13, 2018  7:15 AM CDT   Treatment with ADULT LODGING PLUS B   [facility] ([facility])    [address] [place] [address]   [phone]            Jul 14, 2018  7:15 AM CDT   Treatment with ADULT LODGING PLUS B   [facility] ([facility])    [address] [place] [address]   [phone]            Jul 15, 2018  7:15 AM CDT   Treatment with ADULT LODGING PLUS B   [facility] ([facility]    [facility] [address] [address]   [phone]            Jul 16, 2018  7:15 AM CDT   Treatment with ADULT LODGING PLUS B   [facility] ([facility])    [facility] [address] [address]   [phone]            Jul 17, 2018  7:15 AM CDT   Treatment with ADULT LODGING PLUS B   [facility] ([facility])    [facility] [address] [address]   [phone]            Jul 18, 2018  7:15 AM CDT   Treatment with ADULT LODGING PLUS B   [facility] ([facility])    [facility] [address] [address]   [phone]            Jul 19, 2018  7:15 AM CDT   Treatment with ADULT LODGING PLUS B   [facility] ([facility])    [address] [place] [address]   [phone]              Who to contact     If you have questions or need follow up information about today's clinic visit or your schedule please contact [facility] directly at [phone].  Normal or non-critical lab and imaging results will be communicated to you by MyChart, letter or phone within 4 business days after the clinic has received the results. If you do not hear from us within 7 days, please contact the clinic through [employer] or phone. If you have a critical or abnormal lab result, we will notify you by phone as soon as possible.  Submit refill requests through [employer] or call your pharmacy and they will forward the refill request to us. Please allow 3 business days for your refill to be completed.          Additional Information About Your Visit        [employer] Information     [employer] gives you secure access to your electronic health record. If you see a primary care provider, you can also send  messages to your care team and make appointments. If you have questions, please call your primary care clinic.  If you do not have a primary care provider, please call [phone] and they will assist you.        Care EveryWhere ID     This is your Care EveryWhere ID. This could be used by other organizations to access your [facility] medical records  [id number]        Your Vitals Were     Pulse Temperature Respirations Pulse Oximetry BMI (Body Mass Index)       84 98.3  F (36.8  C) (Oral) 16 96% 42.81 kg/m2        Blood Pressure from Last 3 Encounters:   07/10/18 118/68   06/04/18 153/77   06/03/18 (!) 141/95    Weight from Last 3 Encounters:   07/10/18 324 lb 8 oz (147.2 kg)   04/08/18 311 lb 1 oz (141.1 kg)   02/27/18 (!) 321 lb (145.6 kg)              We Performed the Following     Comprehensive metabolic panel (BMP + Alb, Alk Phos, ALT, AST, Total. Bili, TP)          Today's Medication Changes          These changes are accurate as of 7/10/18 11:30 AM.  If you have any questions, ask your nurse or doctor.               Start taking these medicines.        Dose/Directions    loperamide 2 MG tablet   Commonly known as:  IMODIUM A-D   Used for:  Diarrhea, unspecified type   Started by:  Droa Velazquez APRN CNP        Dose:  2 mg   Take 1 tablet (2 mg) by mouth 4 times daily as needed for diarrhea   Quantity:  20 tablet   Refills:  0       ondansetron 4 MG tablet   Commonly known as:  ZOFRAN   Used for:  Nausea and vomiting, intractability of vomiting not specified, unspecified vomiting type   Started by:  Dora Velazquez APRN CNP        Dose:  4 mg   Take 1 tablet (4 mg) by mouth every 8 hours as needed for nausea   Quantity:  10 tablet   Refills:  0         These medicines have changed or have updated prescriptions.        Dose/Directions    hydrOXYzine 50 MG tablet   Commonly known as:  ATARAX   This may have changed:    - medication strength  - how much to take  - when to take this   Used for:   Chemical dependency (H)   Changed by:  Dora Velazquez APRN CNP        Dose:  50 mg   Take 1 tablet (50 mg) by mouth every 4 hours as needed for anxiety   Quantity:  120 tablet   Refills:  0            Where to get your medicines      These medications were sent to [facility] [place], [place] - [address]  [address], [facility] [address]     Phone:  [phone]     hydrOXYzine 50 MG tablet    loperamide 2 MG tablet    ondansetron 4 MG tablet                Primary Care Provider Office Phone # Fax #    Tima Crews [phone]       [address]  [facility] [address]        Equal Access to Services     ERMELINDA LOZADA : Hadii breezy gonzalez hadasho Sorabiaali, waaxda luqadaha, qaybta kaalmada adeegyada, shawna espinoza . So [facility] [phone].    ATENCIÓN: Si habla español, tiene a miller disposición servicios gratuitos de asistencia lingüística. [facility] [phone].    We comply with applicable federal civil rights laws and [place] laws. We do not discriminate on the basis of race, color, national origin, age, disability, sex, sexual orientation, or gender identity.            Thank you!     Thank you for choosing [facility]  for your care. Our goal is always to provide you with excellent care. Hearing back from our patients is one way we can continue to improve our services. Please take a few minutes to complete the written survey that you may receive in the mail after your visit with us. Thank you!             Your Updated Medication List - Protect others around you: Learn how to safely use, store and throw away your medicines at www.disposemymeds.org.          This list is accurate as of 7/10/18 11:30 AM.  Always use your most recent med list.                   Brand Name Dispense Instructions for use Diagnosis    alum & mag hydroxide-simethicone 200-200-20 MG/5ML Susp suspension     MYLANTA/MAALOX     Take 30 mLs by mouth every 6 hours as needed for indigestion        amLODIPine 10 MG tablet    NORVASC    30 tablet    Take 1 tablet (10 mg) by mouth daily    Alcohol dependence, continuous drinking behavior (H)       busPIRone 10 MG tablet    BUSPAR    90 tablet    Take 2 tablets (20 mg) by mouth 3 times daily    Alcohol dependence, continuous drinking behavior (H)       cloNIDine 0.1 MG tablet    CATAPRES    60 tablet    Take 1 tablet (0.1 mg) by mouth 2 times daily    Alcohol dependence, continuous drinking behavior (H)       gabapentin 300 MG capsule    NEURONTIN    90 capsule    Take 2 capsules (600 mg) by mouth 3 times daily    Alcohol dependence, continuous drinking behavior (H)       guaiFENesin 20 mg/mL Soln solution    ROBITUSSIN     Take 10 mLs by mouth every 4 hours as needed for cough        hydrOXYzine 50 MG tablet    ATARAX    120 tablet    Take 1 tablet (50 mg) by mouth every 4 hours as needed for anxiety    Chemical dependency (H)       IBUPROFEN PO      Take 400 mg by mouth every 6 hours as needed for moderate pain        loperamide 2 MG tablet    IMODIUM A-D    20 tablet    Take 1 tablet (2 mg) by mouth 4 times daily as needed for diarrhea    Diarrhea, unspecified type       loratadine 10 MG tablet    CLARITIN     Take 10 mg by mouth daily as needed for allergies        MELATONIN PO      Take 3 mg by mouth nightly as needed        multivitamin, therapeutic with minerals Tabs tablet     30 each    Take 1 tablet by mouth daily    Alcohol dependence, continuous drinking behavior (H)       nicotine 21 MG/24HR 24 hr patch    NICODERM CQ    30 patch    Place 1 patch onto the skin daily    Alcohol dependence, continuous drinking behavior (H)       omeprazole 40 MG capsule    priLOSEC    30 capsule    Take 1 capsule (40 mg) by mouth daily    Alcohol dependence, continuous drinking behavior (H)       ondansetron 4 MG tablet    ZOFRAN    10 tablet    Take 1 tablet (4 mg) by mouth every 8  hours as needed for nausea    Nausea and vomiting, intractability of vomiting not specified, unspecified vomiting type       phenol-menthol 14.5 MG lozenge      Place 1 lozenge inside cheek every 2 hours as needed for moderate pain        senna-docusate 8.6-50 MG per tablet    SENOKOT-S;PERICOLACE     Take 2 tablets by mouth daily as needed for constipation        sertraline 100 MG tablet    ZOLOFT    30 tablet    Take 2 tablets (200 mg) by mouth daily    Alcohol dependence, continuous drinking behavior (H)       thiamine 100 MG tablet     30 tablet    Take 1 tablet (100 mg) by mouth daily    Acute alcoholic intoxication in alcoholism with complication (H)       traZODone 100 MG tablet    DESYREL    90 tablet    Take 1-2 tablets (100-200 mg) by mouth nightly as needed for sleep    Alcohol dependence, continuous drinking behavior (H)       TYLENOL PO      Take 650 mg by mouth every 4 hours as needed for mild pain or fever

## 2018-07-10 NOTE — PROGRESS NOTES
"Patient:  Nickolas Lang            Adult CD Progress Note and Treatment Plan Review     Attendance  Please refer to OP BEH CD Adult Attendance Record Documentation Flowsheet    Support group attended this week: yes    Reporting sobriety:  yes    Treatment Plan     Treatment Plan Review competed on: 7/10/18       Client preferred learning style: Visual  Hands on  Verbal    Staff Members contributing ARLENE Duff,SUHAIL Green, SUHAIL Gr.                    Received Supervision: No    Client: contributed to goals and plan.    Client received copy of plan/revised plan: Yes    Client agrees with plan/revised plan: Yes        Changes to Treatment Plan: No    New Goals added since last review None needed.    Goals worked on since last review Identifying areas of powerlessness related to his drinking.Acceptance of the first step.Identifying relapse warning signs and triggers.    Strategies effective: yes    Strategies need these changes: None needed.    1) Care Coordination Activities:  Continues to see staff nurse as needed.  2) Medical, Mental Health and other appointments the client attended: None reported.  3) Medication issues: Pt.staying med compliant.  4) Physical and mental health problems: None reported by pt.  5) Any changes in Vulnerable Adult Status?  No If yes, add to treatment plan and individual abuse prevention plan.  6) Review and evaluation of the individual abuse prevention plan: Current IAPP for this program is adequate for this client          ASAM Risk Rating:    Dimension 1 0 No changes.    Dimension 2 1 No changes.    Dimension 3 2 Pt.reported no suicidal ideation this week.Pt.is working on his stress management assignments.Pt.is reading \"The Search for Serenity\" book.    Dimension 4 2 Pt.is attending all groups and lectures.Pt.has become an active group member.Pt.presented his first step assignment in group. He received positive feedback from his peers.Pt.is working on " "his drug use history and consequences of use assignment.    Dimension 5 4 Pt.attended the relapse prevention work group.He was asked to identify his personal relapse warning signs and triggers in the areas of people,places,activities and feelings.Pt.is reading \"The [employer] Book of Living\"patient.needs to practice mindfulness and stay in the present.    Dimension 6 3 Pt.is attending 5 12 step meetings per week while in treatment.Pt.needs to obtain a sponsor.Pt.needs to keep in contact with his P.O.      Guide to Risk Ratings for Suicidality:   IDEATION: Active thoughts of suicide? INTENT: Intent to follow on suicide? PLAN: Plan to follow through on suicide? Level of Risk:   IF Yes Yes Yes Patient = High Emergent   IF Yes Yes No Patient = High Urgent/Non-Emergent   IF Yes No No Patient = Moderate Non-Urgent   IF No No   No Patient = Low Risk   The patient's ADDITIONAL RISK FACTORS and lack of PROTECTIVE FACTORS may increase their overall suicide risk ratings.     Patient's/client's current risk rating:  Low Risk    Family Involvement:   ALFONSO signed    Data:   offered feedback good insight client did actively participate      Intervention:   Aftercare planning  Behavior modification  Counselor feedback  Education  Group feedback  Relapse prevention  Twelve Step facilitation      Assessment:   Stages of Change Model  Preparation/Determination    Appears/Sounds:  Cooperative  Motivated  Engaged      Plan:  Continue group therapy.      ARLENE Castorena        "

## 2018-07-10 NOTE — PROGRESS NOTES
SUBJECTIVE:                                                    Nickolas Lang is a 27 year old male who presents to clinic today for the following health issues:    Medication Followup of  Hydroxyzine : Anxiety   Pt reports he takes every 4 hours currently taking every 6 hours in lodging plus. He repor/;'ts wanting to continue previous sig every 4 hours at 120 tablets per month. Here for evaluation and  consult.    Taking Medication as prescribed: yes    Side Effects:  None    Medication Helping Symptoms:  yes     Diarrhea      Duration 06/29/2018    Description:       Consistency of stool: loose       Blood in stool: no        Number of loose stools past 24 hours:  4-6     Intensity:  moderate    Accompanying signs and symptoms:       Fever: no        Nausea/vomitting: YES       Abdominal pain: no        Weight loss: no     History (recent antibiotics or travel/ill contacts/med changes/testing done): no, withdrew from alcohol 2 weeks ago. Has had diarrhea bouts after withdrawing previously when coming off of alcohol     Precipitating or alleviating factors: None    Therapies tried and outcome: previously         Social History   Substance Use Topics     Smoking status: Current Every Day Smoker     Packs/day: 0.25     Years: 4.00     Types: Cigarettes     Smokeless tobacco: Never Used     Alcohol use 0.0 oz/week     0 Standard drinks or equivalent per week      Comment: 1.75L vodka daily        Problem list and histories reviewed & adjusted, as indicated.  Additional history: as documented    Patient Active Problem List   Diagnosis     Morbid obesity (H)     Alcohol dependence with withdrawal (H)     Chemical dependency (H)     Essential hypertension     Suicidal ideation     Elevated liver enzymes     Hepatic steatosis     Obesity     Insomnia, unspecified type     Anxiety     Gastroesophageal reflux disease without esophagitis     Alcohol abuse with alcohol-induced mood disorder (H)     Attention deficit  hyperactivity disorder (ADHD), unspecified ADHD type     Alcohol use disorder, severe, dependence (H)     Controlled substance agreement signed     Mild episode of recurrent major depressive disorder (H)     Alcohol withdrawal (H)     Substance abuse     Past Surgical History:   Procedure Laterality Date     Deviated septum repair       PYLOROMYOTOMY SURGERY  as an infant      SHOULDER SURGERY Left 07/29/2016    Removal of cartilage       Social History   Substance Use Topics     Smoking status: Current Every Day Smoker     Packs/day: 0.25     Years: 4.00     Types: Cigarettes     Smokeless tobacco: Never Used     Alcohol use 0.0 oz/week     0 Standard drinks or equivalent per week      Comment: 1.75L vodka daily     Family History   Problem Relation Age of Onset     Neurologic Disorder Mother      Multiple Sclerosis     Depression Mother      Anxiety Disorder Mother      Alcohol/Drug Father      Substance Abuse Father      Depression Maternal Grandmother      Anxiety Disorder Maternal Grandmother      Hypertension Maternal Grandmother      Substance Abuse Maternal Grandfather      Alcohol/Drug Paternal Grandfather            Current Outpatient Prescriptions   Medication Sig Dispense Refill     Acetaminophen (TYLENOL PO) Take 650 mg by mouth every 4 hours as needed for mild pain or fever       alum & mag hydroxide-simethicone (MYLANTA/MAALOX) 200-200-20 MG/5ML SUSP suspension Take 30 mLs by mouth every 6 hours as needed for indigestion       amLODIPine (NORVASC) 10 MG tablet Take 1 tablet (10 mg) by mouth daily 30 tablet 0     busPIRone (BUSPAR) 10 MG tablet Take 2 tablets (20 mg) by mouth 3 times daily 90 tablet 0     cloNIDine (CATAPRES) 0.1 MG tablet Take 1 tablet (0.1 mg) by mouth 2 times daily 60 tablet 0     gabapentin (NEURONTIN) 300 MG capsule Take 2 capsules (600 mg) by mouth 3 times daily 90 capsule 1     guaiFENesin (ROBITUSSIN) 20 mg/mL SOLN solution Take 10 mLs by mouth every 4 hours as needed for cough        hydrOXYzine (ATARAX) 25 MG tablet Take 1-2 tablets (25-50 mg) by mouth every 6 hours as needed for anxiety 30 tablet 0     IBUPROFEN PO Take 400 mg by mouth every 6 hours as needed for moderate pain       loratadine (CLARITIN) 10 MG tablet Take 10 mg by mouth daily as needed for allergies       MELATONIN PO Take 3 mg by mouth nightly as needed       multivitamin, therapeutic with minerals (THERA-VIT-M) TABS tablet Take 1 tablet by mouth daily 30 each 0     nicotine (NICODERM CQ) 21 MG/24HR 24 hr patch Place 1 patch onto the skin daily 30 patch 0     omeprazole (PRILOSEC) 40 MG capsule Take 1 capsule (40 mg) by mouth daily 30 capsule 0     phenol-menthol (CEPASTAT) 14.5 MG lozenge Place 1 lozenge inside cheek every 2 hours as needed for moderate pain       senna-docusate (SENOKOT-S;PERICOLACE) 8.6-50 MG per tablet Take 2 tablets by mouth daily as needed for constipation       sertraline (ZOLOFT) 100 MG tablet Take 2 tablets (200 mg) by mouth daily 30 tablet 0     thiamine 100 MG tablet Take 1 tablet (100 mg) by mouth daily 30 tablet 1     traZODone (DESYREL) 100 MG tablet Take 1-2 tablets (100-200 mg) by mouth nightly as needed for sleep 90 tablet 0     No Known Allergies  Recent Labs   Lab Test  07/01/18   0740  06/30/18   0807  06/03/18   2300   03/27/18   0725   11/28/16   0740   LDL   --   86   --    --   64   --    --    HDL   --   79   --    --   40   --    --    TRIG   --   237*   --    --   373*   --    --    ALT  142*  111*  41   < >  41   < >  38   CR  0.50*  0.59*  0.60*   < >  0.54*   < >  0.54*   GFRESTIMATED  >90  >90  >90   < >  >90   < >  >90  Non  GFR Calc     GFRESTBLACK  >90  >90  >90   < >  >90   < >  >90   GFR Calc     POTASSIUM  3.9  3.7  3.7   < >  3.8   < >  3.7   TSH   --    --    --    --   3.79   --   6.12*    < > = values in this interval not displayed.      BP Readings from Last 3 Encounters:   06/04/18 153/77   06/03/18 (!) 141/95   04/08/18 (!)  157/103    Wt Readings from Last 3 Encounters:   04/08/18 311 lb 1 oz (141.1 kg)   02/27/18 (!) 321 lb (145.6 kg)   11/16/17 (!) 308 lb 9.6 oz (140 kg)        Labs reviewed in EPIC  Problem list, Medication list, Allergies, and Medical/Social/Surgical histories reviewed in [facility] and updated as appropriate.     ROS: Constitutional, neuro, ENT, endocrine, pulmonary, cardiac, gastrointestinal, genitourinary, musculoskeletal, integument and psychiatric systems are negative, except as otherwise noted above in the HPI.    OBJECTIVE:                                                    /68  Pulse 84  Temp 98.3  F (36.8  C) (Oral)  Resp 16  Wt 324 lb 8 oz (147.2 kg)  SpO2 96%  BMI 42.81 kg/m2  Body mass index is 42.81 kg/(m^2).  GENERAL: healthy, alert, well nourished, well hydrated, no distress  RESP: lungs clear to auscultation - no rales, no rhonchi, no wheezes  CV: regular rates and rhythm, normal S1 S2, no S3 or S4 and no murmur  Mental Status Assessment:  Appearance:   Appropriate   Eye Contact:   Good   Psychomotor Behavior: Normal   Attitude:   Cooperative   Orientation:   All  Speech   Rate / Production: Normal    Volume:  Normal   Mood:    Normal  Affect:    Appropriate   Thought Content:  Clear   Thought Form:  Coherent  Logical   Insight:    Good   Attention Span and Concentration:  limited  Recent and Remote Memory:  intact  Fund of Knowledge: appropriate  Muscle Strength and Tone: normal   Suicidal Ideation: reports no thoughts, no intention  Hallucination: No  Paranoid-No  Manic-No  Panic-No  Self harm-No      See [facility] notes     Diagnostic Test Results:  none      ASSESSMENT/PLAN:                                                    (R19.7) Diarrhea, unspecified type  (primary encounter diagnosis)  Comment: will try imodium for few days to see if can slow things down, anticipate this will be self limited as previous. No SS of infectious or inflammatory etiology.   Plan: loperamide (IMODIUM A-D) 2 MG  tablet,         Comprehensive metabolic panel (BMP + Alb, Alk         Phos, ALT, AST, Total. Bili, TP)        Will check for dehydration and follow up on liver enzymes    (R11.2) Nausea and vomiting, intractability of vomiting not specified, unspecified vomiting type  Comment: use sparingly prior to medication admin to prevent vomiting   Plan: ondansetron (ZOFRAN) 4 MG tablet            (F19.20) Chemical dependency (H)  Comment: refill needed at previous dose  Plan: hydrOXYzine (ATARAX) 50 MG tablet            (N18.9) Chronic kidney disease, unspecified CKD stage  Comment:   Plan: Comprehensive metabolic panel (BMP + Alb, Alk         Phos, ALT, AST, Total. Bili, TP)            ROXI Rodgers [facility]

## 2018-07-11 ENCOUNTER — HOSPITAL ENCOUNTER (OUTPATIENT)
Dept: BEHAVIORAL HEALTH | Facility: CLINIC | Age: 28
End: 2018-07-11
Attending: FAMILY MEDICINE
Payer: COMMERCIAL

## 2018-07-11 PROCEDURE — 10020000 ZZH LODGING PLUS FACILITY CHARGE ADULT

## 2018-07-11 PROCEDURE — H2035 A/D TX PROGRAM, PER HOUR: HCPCS | Mod: HQ

## 2018-07-11 PROCEDURE — H2035 A/D TX PROGRAM, PER HOUR: HCPCS

## 2018-07-12 ENCOUNTER — OFFICE VISIT (OUTPATIENT)
Dept: ADDICTION MEDICINE | Facility: CLINIC | Age: 28
End: 2018-07-12
Payer: COMMERCIAL

## 2018-07-12 ENCOUNTER — HOSPITAL ENCOUNTER (OUTPATIENT)
Dept: BEHAVIORAL HEALTH | Facility: CLINIC | Age: 28
End: 2018-07-12
Attending: FAMILY MEDICINE
Payer: COMMERCIAL

## 2018-07-12 VITALS
OXYGEN SATURATION: 96 % | TEMPERATURE: 98.4 F | DIASTOLIC BLOOD PRESSURE: 78 MMHG | SYSTOLIC BLOOD PRESSURE: 114 MMHG | WEIGHT: 315 LBS | BODY MASS INDEX: 42.75 KG/M2 | RESPIRATION RATE: 16 BRPM | HEART RATE: 94 BPM

## 2018-07-12 DIAGNOSIS — F10.20 ALCOHOL USE DISORDER, SEVERE, DEPENDENCE (H): Chronic | ICD-10-CM

## 2018-07-12 DIAGNOSIS — F41.9 ANXIETY: Primary | Chronic | ICD-10-CM

## 2018-07-12 LAB
AMPHETAMINES UR QL: ABNORMAL NG/ML
BARBITURATES UR QL SCN: NOT DETECTED NG/ML
BENZODIAZ UR QL SCN: ABNORMAL NG/ML
BUPRENORPHINE UR QL: NOT DETECTED NG/ML
CANNABINOIDS UR QL: NOT DETECTED NG/ML
COCAINE UR QL SCN: NOT DETECTED NG/ML
D-METHAMPHET UR QL: NOT DETECTED NG/ML
METHADONE UR QL SCN: NOT DETECTED NG/ML
OPIATES UR QL SCN: NOT DETECTED NG/ML
OXYCODONE UR QL SCN: NOT DETECTED NG/ML
PCP UR QL SCN: NOT DETECTED NG/ML
PROPOXYPH UR QL: NOT DETECTED NG/ML
TRICYCLICS UR QL SCN: NOT DETECTED NG/ML

## 2018-07-12 PROCEDURE — H2035 A/D TX PROGRAM, PER HOUR: HCPCS

## 2018-07-12 PROCEDURE — H2035 A/D TX PROGRAM, PER HOUR: HCPCS | Mod: HQ

## 2018-07-12 PROCEDURE — 80306 DRUG TEST PRSMV INSTRMNT: CPT | Performed by: PEDIATRICS

## 2018-07-12 PROCEDURE — 10020000 ZZH LODGING PLUS FACILITY CHARGE ADULT

## 2018-07-12 PROCEDURE — 99214 OFFICE O/P EST MOD 30 MIN: CPT | Performed by: FAMILY MEDICINE

## 2018-07-12 RX ORDER — GABAPENTIN 800 MG/1
800 TABLET ORAL 4 TIMES DAILY
Qty: 120 TABLET | Refills: 1 | Status: ON HOLD | OUTPATIENT
Start: 2018-07-12 | End: 2018-09-10

## 2018-07-12 NOTE — MR AVS SNAPSHOT
After Visit Summary   7/12/2018    Nickolas Lang    MRN: [id number]           Patient Information     Date Of Birth          1990        Visit Information        Provider Department      7/12/2018 1:30 PM Tima Crews MD [facility]        Today's Diagnoses     Anxiety    -  1    Alcohol use disorder, severe, dependence (H)           Follow-ups after your visit        Your next 10 appointments already scheduled     Jul 14, 2018  7:15 AM CDT   Treatment with ADULT LODGING PLUS B   [facility] ([facility])    [address] [place] [address]   [phone]            Jul 15, 2018  7:15 AM CDT   Treatment with ADULT LODGING PLUS B   [facility] ([facility])    [address] [place] [address]   [phone]            Jul 16, 2018  7:15 AM CDT   Treatment with ADULT LODGING PLUS B   [facility] ([facility])    [address] [place] [address]   [phone]            Jul 17, 2018  7:15 AM CDT   Treatment with ADULT LODGING PLUS B   [facility] ([facility])    [address] [place] [address]   [phone]            Jul 18, 2018  7:15 AM CDT   Treatment with ADULT LODGING PLUS B   [facility] ([facility])    [address] [place] [address]   [phone]            Jul 19, 2018  7:15 AM CDT   Treatment with ADULT LODGING PLUS B   [facility] ([facility])    [address] [place] [address]   [phone]            Jul 20, 2018  7:15 AM CDT   Treatment with ADULT LODGING PLUS B   [facility] ([facility])    [facility] [address] [address]   [phone]            Jul 21, 2018  7:15 AM CDT   Treatment with ADULT LODGING PLUS B   [facility]    [facility] [address] [address]   [phone]            Jul 22, 2018  7:15 AM CDT   Treatment with ADULT LODGING PLUS B   [facility]    [facility] [address] [address]   [phone]            Jul 23, 2018  7:15 AM CDT   Treatment with ADULT LODGING PLUS B   [facility]    [facility] [address] [address]   [phone]              Who to contact     If you have questions or need follow up information about today's clinic visit or your schedule please contact [facility] directly at [phone].  Normal or non-critical lab and imaging results will be communicated to you by [employer], letter or phone within 4 business days after the clinic has received the results. If you do not hear from us within 7 days, please contact the clinic through [employer] or phone. If you have a critical or abnormal lab result, we will notify you by phone as soon as possible.  Submit refill requests through [employer] or call your pharmacy and they will forward the refill request to us. Please allow 3 business days for your refill to be completed.          Additional Information About Your Visit        [employer] Information     [employer] gives you secure access to your electronic health record. If you see a primary care provider, you can also send messages to your care team and make appointments. If you have questions, please call your primary care clinic.  If you do not have a primary care provider, please  call [phone] and they will assist you.        Care EveryWhere ID     This is your Care EveryWhere ID. This could be used by other organizations to access your [facility] medical records  [id number]        Your Vitals Were     Pulse Temperature Respirations Pulse Oximetry BMI (Body Mass Index)       94 98.4  F (36.9  C) (Oral) 16 96% 42.75 kg/m2        Blood Pressure from Last 3 Encounters:   07/12/18 114/78   07/10/18 118/68   07/03/18 (!) 136/100    Weight from Last 3 Encounters:   07/12/18 324 lb (147 kg)   07/10/18 324 lb 8 oz (147.2 kg)   06/29/18 310 lb (140.6 kg)              We Performed the Following     Urine Drugs of Abuse Screen Panel 13          Today's Medication Changes          These changes are accurate as of 7/12/18 11:59 PM.  If you have any questions, ask your nurse or doctor.               Start taking these medicines.        Dose/Directions    gabapentin 800 MG tablet   Commonly known as:  NEURONTIN   Used for:  Anxiety   Replaces:  gabapentin 300 MG capsule   Started by:  Tima Crews MD        Dose:  800 mg   Take 1 tablet (800 mg) by mouth 4 times daily   Quantity:  120 tablet   Refills:  1         Stop taking these medicines if you haven't already. Please contact your care team if you have questions.     gabapentin 300 MG capsule   Commonly known as:  NEURONTIN   Replaced by:  gabapentin 800 MG tablet   Stopped by:  Tima Crews MD                Where to get your medicines      These medications were sent to [facility] [place], [place] - [address]  [address], [facility] [address]     Phone:  [phone]     gabapentin 800 MG tablet                Primary Care Provider Office Phone # Fax #    Tima Crews [phone]       [address]  [facility] [address]        Equal Access to Services     ERMELINDA LOZADA AH: Rishabh Petersen, cherry patterson, juliana concepcion, shawna florentino  sukhwinder ospinasureshtarah perdomoAliceaajennifer ah. So [facility] [phone].    ATENCIÓN: Si vinniela nyasia, tiene a miller disposición servicios gratuitos de asistencia lingüística. Stan vargas [phone].    We comply with applicable federal civil rights laws and [place] laws. We do not discriminate on the basis of race, color, national origin, age, disability, sex, sexual orientation, or gender identity.            Thank you!     Thank you for choosing [facility]  for your care. Our goal is always to provide you with excellent care. Hearing back from our patients is one way we can continue to improve our services. Please take a few minutes to complete the written survey that you may receive in the mail after your visit with us. Thank you!             Your Updated Medication List - Protect others around you: Learn how to safely use, store and throw away your medicines at www.disposemymeds.org.          This list is accurate as of 7/12/18 11:59 PM.  Always use your most recent med list.                   Brand Name Dispense Instructions for use Diagnosis    alum & mag hydroxide-simethicone 200-200-20 MG/5ML Susp suspension    MYLANTA/MAALOX     Take 30 mLs by mouth every 6 hours as needed for indigestion        amLODIPine 10 MG tablet    NORVASC    30 tablet    Take 1 tablet (10 mg) by mouth daily    Alcohol dependence, continuous drinking behavior (H)       busPIRone 10 MG tablet    BUSPAR    90 tablet    Take 2 tablets (20 mg) by mouth 3 times daily    Alcohol dependence, continuous drinking behavior (H)       cloNIDine 0.1 MG tablet    CATAPRES    60 tablet    Take 1 tablet (0.1 mg) by mouth 2 times daily    Alcohol dependence, continuous drinking behavior (H)       gabapentin 800 MG tablet    NEURONTIN    120 tablet    Take 1 tablet (800 mg) by mouth 4 times daily    Anxiety       guaiFENesin 20 mg/mL Soln solution    ROBITUSSIN     Take 10 mLs by mouth every 4 hours as needed for cough        hydrOXYzine 50 MG  tablet    ATARAX    120 tablet    Take 1 tablet (50 mg) by mouth every 4 hours as needed for anxiety    Chemical dependency (H)       IBUPROFEN PO      Take 400 mg by mouth every 6 hours as needed for moderate pain        loperamide 2 MG tablet    IMODIUM A-D    20 tablet    Take 1 tablet (2 mg) by mouth 4 times daily as needed for diarrhea    Diarrhea, unspecified type       loratadine 10 MG tablet    CLARITIN     Take 10 mg by mouth daily as needed for allergies        MELATONIN PO      Take 3 mg by mouth nightly as needed        multivitamin, therapeutic with minerals Tabs tablet     30 each    Take 1 tablet by mouth daily    Alcohol dependence, continuous drinking behavior (H)       nicotine 21 MG/24HR 24 hr patch    NICODERM CQ    30 patch    Place 1 patch onto the skin daily    Alcohol dependence, continuous drinking behavior (H)       omeprazole 40 MG capsule    priLOSEC    30 capsule    Take 1 capsule (40 mg) by mouth daily    Alcohol dependence, continuous drinking behavior (H)       ondansetron 4 MG tablet    ZOFRAN    10 tablet    Take 1 tablet (4 mg) by mouth every 8 hours as needed for nausea    Nausea and vomiting, intractability of vomiting not specified, unspecified vomiting type       phenol-menthol 14.5 MG lozenge      Place 1 lozenge inside cheek every 2 hours as needed for moderate pain        senna-docusate 8.6-50 MG per tablet    SENOKOT-S;PERICOLACE     Take 2 tablets by mouth daily as needed for constipation        sertraline 100 MG tablet    ZOLOFT    30 tablet    Take 2 tablets (200 mg) by mouth daily    Alcohol dependence, continuous drinking behavior (H)       thiamine 100 MG tablet     30 tablet    Take 1 tablet (100 mg) by mouth daily    Acute alcoholic intoxication in alcoholism with complication (H)       traZODone 100 MG tablet    DESYREL    90 tablet    Take 1-2 tablets (100-200 mg) by mouth nightly as needed for sleep    Alcohol dependence, continuous drinking behavior (H)        TYLENOL PO      Take 650 mg by mouth every 4 hours as needed for mild pain or fever

## 2018-07-12 NOTE — PROGRESS NOTES
Name: Nickolas Lang  Date: 7/12/2018  Medical Record: [id number]    Envelope Number: [id number]    List of Contents (List each item separately in new row):   Gabapentin 300 mg    Admission:  I am responsible for any personal items that are not sent to the safe or pharmacy.  [facility] is not responsible for loss, theft or damage of any property in my possession.      Patient Signature:  ___________________________________________       Date/Time:__________________________    Staff Signature: __________________________________       Date/Time:__________________________    2nd Staff person, if patient is unable/unwilling to sign:      __________________________________________________________       Date/Time: __________________________      Discharge:  [facility] has returned all of my personal belongings:    Patient Signature: ________________________________________     Date/Time: ____________________________________    Staff Signature: ______________________________________     Date/Time:_____________________________________

## 2018-07-13 ENCOUNTER — HOSPITAL ENCOUNTER (OUTPATIENT)
Dept: BEHAVIORAL HEALTH | Facility: CLINIC | Age: 28
End: 2018-07-13
Attending: FAMILY MEDICINE
Payer: COMMERCIAL

## 2018-07-13 PROCEDURE — H2035 A/D TX PROGRAM, PER HOUR: HCPCS | Mod: HQ

## 2018-07-13 PROCEDURE — 10020000 ZZH LODGING PLUS FACILITY CHARGE ADULT

## 2018-07-13 PROCEDURE — H2035 A/D TX PROGRAM, PER HOUR: HCPCS

## 2018-07-13 NOTE — PROGRESS NOTES
SUBJECTIVE:   Nickolas Lang is a 27 year old male who presents to clinic today for the following health issues:      ADDICTION MEDICINE NOTE:    NOW IN LODGING PLUS    HERE TO DISCUSS MAT FOR ALCOHOL DEPENDENCE     HAS BEEN RELAPSE PRONE    LONG DISCUSSION RE: RELAPSE, RECOVERY    WANTS VIVITROL; HAS HAD BEFORE; RISKS AND BENEFITS, PROS AND CONS DISCUSSED    OK ALSO TO RESUME USUAL DOSE OF GABAPENTIN (800 MG QID) FOR ANXIETY    [place] : NO ISSUES; ADDERALL NOTED; CHECKED 7/12/18          Problem list and histories reviewed & adjusted, as indicated.  Additional history: as documented    Patient Active Problem List   Diagnosis     Morbid obesity (H)     Alcohol dependence with withdrawal (H)     Chemical dependency (H)     Essential hypertension     Suicidal ideation     Elevated liver enzymes     Hepatic steatosis     Obesity     Insomnia, unspecified type     Anxiety     Gastroesophageal reflux disease without esophagitis     Alcohol abuse with alcohol-induced mood disorder (H)     Attention deficit hyperactivity disorder (ADHD), unspecified ADHD type     Alcohol use disorder, severe, dependence (H)     Controlled substance agreement signed     Mild episode of recurrent major depressive disorder (H)     Alcohol withdrawal (H)     Substance abuse     Past Surgical History:   Procedure Laterality Date     Deviated septum repair       PYLOROMYOTOMY SURGERY  as an infant      SHOULDER SURGERY Left 07/29/2016    Removal of cartilage       Social History   Substance Use Topics     Smoking status: Current Every Day Smoker     Packs/day: 0.25     Years: 4.00     Types: Cigarettes     Smokeless tobacco: Never Used     Alcohol use 0.0 oz/week     0 Standard drinks or equivalent per week      Comment: 1.75L vodka daily     Family History   Problem Relation Age of Onset     Neurologic Disorder Mother      Multiple Sclerosis     Depression Mother      Anxiety Disorder Mother      Alcohol/Drug Father      Substance Abuse  Father      Depression Maternal Grandmother      Anxiety Disorder Maternal Grandmother      Hypertension Maternal Grandmother      Substance Abuse Maternal Grandfather      Alcohol/Drug Paternal Grandfather          Current Outpatient Prescriptions   Medication Sig Dispense Refill     Acetaminophen (TYLENOL PO) Take 650 mg by mouth every 4 hours as needed for mild pain or fever       alum & mag hydroxide-simethicone (MYLANTA/MAALOX) 200-200-20 MG/5ML SUSP suspension Take 30 mLs by mouth every 6 hours as needed for indigestion       amLODIPine (NORVASC) 10 MG tablet Take 1 tablet (10 mg) by mouth daily 30 tablet 0     busPIRone (BUSPAR) 10 MG tablet Take 2 tablets (20 mg) by mouth 3 times daily 90 tablet 0     cloNIDine (CATAPRES) 0.1 MG tablet Take 1 tablet (0.1 mg) by mouth 2 times daily 60 tablet 0     gabapentin (NEURONTIN) 800 MG tablet Take 1 tablet (800 mg) by mouth 4 times daily 120 tablet 1     guaiFENesin (ROBITUSSIN) 20 mg/mL SOLN solution Take 10 mLs by mouth every 4 hours as needed for cough       hydrOXYzine (ATARAX) 50 MG tablet Take 1 tablet (50 mg) by mouth every 4 hours as needed for anxiety 120 tablet 0     IBUPROFEN PO Take 400 mg by mouth every 6 hours as needed for moderate pain       loperamide (IMODIUM A-D) 2 MG tablet Take 1 tablet (2 mg) by mouth 4 times daily as needed for diarrhea 20 tablet 0     loratadine (CLARITIN) 10 MG tablet Take 10 mg by mouth daily as needed for allergies       MELATONIN PO Take 3 mg by mouth nightly as needed       multivitamin, therapeutic with minerals (THERA-VIT-M) TABS tablet Take 1 tablet by mouth daily 30 each 0     nicotine (NICODERM CQ) 21 MG/24HR 24 hr patch Place 1 patch onto the skin daily 30 patch 0     omeprazole (PRILOSEC) 40 MG capsule Take 1 capsule (40 mg) by mouth daily 30 capsule 0     ondansetron (ZOFRAN) 4 MG tablet Take 1 tablet (4 mg) by mouth every 8 hours as needed for nausea 10 tablet 0     phenol-menthol (CEPASTAT) 14.5 MG lozenge Place  1 lozenge inside cheek every 2 hours as needed for moderate pain       senna-docusate (SENOKOT-S;PERICOLACE) 8.6-50 MG per tablet Take 2 tablets by mouth daily as needed for constipation       sertraline (ZOLOFT) 100 MG tablet Take 2 tablets (200 mg) by mouth daily 30 tablet 0     thiamine 100 MG tablet Take 1 tablet (100 mg) by mouth daily 30 tablet 1     traZODone (DESYREL) 100 MG tablet Take 1-2 tablets (100-200 mg) by mouth nightly as needed for sleep 90 tablet 0     No Known Allergies  Labs reviewed in EPIC    Reviewed and updated as needed this visit by clinical staff  Tobacco  Allergies  Meds       Reviewed and updated as needed this visit by Provider         ROS:      OBJECTIVE:     /78  Pulse 94  Temp 98.4  F (36.9  C) (Oral)  Resp 16  Wt 324 lb (147 kg)  SpO2 96%  BMI 42.75 kg/m2  Body mass index is 42.75 kg/(m^2).       ROS:  Constitutional, HEENT, cardiovascular, pulmonary, gi and gu systems are negative, except as otherwise noted.    /78  Pulse 94  Temp 98.4  F (36.9  C) (Oral)  Resp 16  Wt 324 lb (147 kg)  SpO2 96%  BMI 42.75 kg/m2  EXAM:  GENERAL APPEARANCE: healthy, alert and no distress  EYES: Eyes grossly normal to inspection, PERRL and conjunctivae and sclerae normal  NEURO: Normal strength and tone, mentation intact and speech normal  PSYCH: mentation appears normal and affect normal/bright  MENTAL STATUS EXAM:  Appearance/Behavior: No apparent distress and Casually groomed  Speech: Normal  Mood/Affect: normal affect  Insight: Adequate      Diagnostic Test Results:  none     ASSESSMENT:   ALCOHOL DEPENDENCE     PLAN:       ICD-10-CM    1. Anxiety F41.9 gabapentin (NEURONTIN) 800 MG tablet   2. Alcohol use disorder, severe, dependence (H) F10.20 Urine Drugs of Abuse Screen Panel 13           MEDICATIONS:   Orders Placed This Encounter   Medications     gabapentin (NEURONTIN) 800 MG tablet     Sig: Take 1 tablet (800 mg) by mouth 4 times daily     Dispense:  120 tablet      Refill:  1          - Continue other medications without change  FUTURE APPOINTMENTS:       - Follow-up visit in 3 WEEKS    Tima Crews MD  [facility]IC[facility]

## 2018-07-14 ENCOUNTER — HOSPITAL ENCOUNTER (OUTPATIENT)
Dept: BEHAVIORAL HEALTH | Facility: CLINIC | Age: 28
End: 2018-07-14
Attending: FAMILY MEDICINE
Payer: COMMERCIAL

## 2018-07-14 PROCEDURE — H2035 A/D TX PROGRAM, PER HOUR: HCPCS

## 2018-07-14 PROCEDURE — 10020000 ZZH LODGING PLUS FACILITY CHARGE ADULT

## 2018-07-15 ENCOUNTER — HOSPITAL ENCOUNTER (OUTPATIENT)
Dept: BEHAVIORAL HEALTH | Facility: CLINIC | Age: 28
End: 2018-07-15
Attending: FAMILY MEDICINE
Payer: COMMERCIAL

## 2018-07-15 DIAGNOSIS — Z71.6 ENCOUNTER FOR SMOKING CESSATION COUNSELING: Primary | ICD-10-CM

## 2018-07-15 PROCEDURE — H2035 A/D TX PROGRAM, PER HOUR: HCPCS

## 2018-07-15 PROCEDURE — 10020000 ZZH LODGING PLUS FACILITY CHARGE ADULT

## 2018-07-16 ENCOUNTER — HOSPITAL ENCOUNTER (OUTPATIENT)
Dept: BEHAVIORAL HEALTH | Facility: CLINIC | Age: 28
End: 2018-07-16
Attending: FAMILY MEDICINE
Payer: COMMERCIAL

## 2018-07-16 DIAGNOSIS — Z71.6 ENCOUNTER FOR SMOKING CESSATION COUNSELING: Primary | ICD-10-CM

## 2018-07-16 PROCEDURE — H2035 A/D TX PROGRAM, PER HOUR: HCPCS | Mod: HQ

## 2018-07-16 PROCEDURE — 10020000 ZZH LODGING PLUS FACILITY CHARGE ADULT

## 2018-07-16 PROCEDURE — H2035 A/D TX PROGRAM, PER HOUR: HCPCS

## 2018-07-16 NOTE — PROGRESS NOTES
"Patient:  Nickolas Lang            Adult CD Progress Note and Treatment Plan Review     Attendance  Please refer to OP BEH CD Adult Attendance Record Documentation Flowsheet    Support group attended this week: yes    Reporting sobriety:  yes    Treatment Plan     Treatment Plan Review competed on: 7-16-18       Client preferred learning style: Visual  Hands on  Verbal    Staff Members contributing ARLENE Duff,SUHAIL Green,SUHAIL Gr.                     Received Supervision: No     Client: contributed to goals and plan.    Client received copy of plan/revised plan: Yes    Client agrees with plan/revised plan: Yes        Changes to Treatment Plan: No    New Goals added since last review None needed.    Goals worked on since last review Increasing internal motivation to change life style to maintain long term sobriety.Insight into how his addiction has affected his mental health.    Strategies effective: yes    Strategies need these changes: None needed.    1) Care Coordination Activities:  None needed.  2) Medical, Mental Health and other appointments the client attended: Continues to work daily with staff nurse.  3) Medication issues: Pt.is staying med compliant.  4) Physical and mental health problems: Pt.reported none.  5) Any changes in Vulnerable Adult Status?  No If yes, add to treatment plan and individual abuse prevention plan.  6) Review and evaluation of the individual abuse prevention plan: Current IAPP for this program is adequate for this client          ASAM Risk Rating:    Dimension 1 0 No changes.    Dimension 2 1 No changes.    Dimension 3 2 Pt.reported he had no suicidal ideation this week.Pt.continues to work on identifying stress management skills.Pt.working on \"Search For Serenity\"assignment.    Dimension 4 2 pt.is attending all group sessions.Pt.is a very active group member.Pt.has shared his drug use history and first step assignments.Pt.continuies to work on " increasing his internal motivation to change.    Dimension 5 4 Pt.continues to work on identifying his relapse warning signs and triggers in the areas of people,places,activities and feelings.Pt.needs to identify his high risk using situations and prepare and present his relapse prevention plan.      Dimension 6 3 Pt.is attending 5 12 step meetings per week while in treatment.Pt.has a sponsor that he talks to daily.      Guide to Risk Ratings for Suicidality:   IDEATION: Active thoughts of suicide? INTENT: Intent to follow on suicide? PLAN: Plan to follow through on suicide? Level of Risk:   IF Yes Yes Yes Patient = High Emergent   IF Yes Yes No Patient = High Urgent/Non-Emergent   IF Yes No No Patient = Moderate Non-Urgent   IF No No   No Patient = Low Risk   The patient's ADDITIONAL RISK FACTORS and lack of PROTECTIVE FACTORS may increase their overall suicide risk ratings.     Patient's/client's current risk rating:  Low Risk    Family Involvement:   ALFONSO signed    Data:   offered feedback good insight client did actively participate      Intervention:   Aftercare planning  Behavior modification  Counselor feedback  Education  Group feedback  Relapse prevention  Twelve Step facilitation      Assessment:   Stages of Change Model  Preparation/Determination    Appears/Sounds:  Cooperative  Motivated  Engaged      Plan:  Continue group therapy.      ARLENE Castorena

## 2018-07-17 ENCOUNTER — HOSPITAL ENCOUNTER (OUTPATIENT)
Dept: BEHAVIORAL HEALTH | Facility: CLINIC | Age: 28
End: 2018-07-17
Attending: FAMILY MEDICINE
Payer: COMMERCIAL

## 2018-07-17 ENCOUNTER — TELEPHONE (OUTPATIENT)
Dept: BEHAVIORAL HEALTH | Facility: CLINIC | Age: 28
End: 2018-07-17

## 2018-07-17 DIAGNOSIS — R11.2 NAUSEA AND VOMITING, INTRACTABILITY OF VOMITING NOT SPECIFIED, UNSPECIFIED VOMITING TYPE: ICD-10-CM

## 2018-07-17 DIAGNOSIS — F10.20 ALCOHOL DEPENDENCE, CONTINUOUS DRINKING BEHAVIOR (H): ICD-10-CM

## 2018-07-17 PROCEDURE — H2035 A/D TX PROGRAM, PER HOUR: HCPCS | Mod: HQ

## 2018-07-17 PROCEDURE — 10020000 ZZH LODGING PLUS FACILITY CHARGE ADULT

## 2018-07-17 PROCEDURE — H2035 A/D TX PROGRAM, PER HOUR: HCPCS

## 2018-07-17 RX ORDER — ONDANSETRON 4 MG/1
4 TABLET, FILM COATED ORAL EVERY 8 HOURS PRN
Qty: 10 TABLET | Refills: 0 | OUTPATIENT
Start: 2018-07-17

## 2018-07-17 RX ORDER — BUSPIRONE HYDROCHLORIDE 10 MG/1
20 TABLET ORAL 3 TIMES DAILY
Qty: 90 TABLET | Refills: 0 | Status: ON HOLD | OUTPATIENT
Start: 2018-07-17 | End: 2018-10-15

## 2018-07-17 RX ORDER — BUSPIRONE HYDROCHLORIDE 10 MG/1
20 TABLET ORAL 3 TIMES DAILY
Qty: 90 TABLET | Refills: 0 | OUTPATIENT
Start: 2018-07-17

## 2018-07-17 NOTE — TELEPHONE ENCOUNTER
A phone call was made as follow-up to the Family Program mailing for the week of 7/23/18. Message left or spoke in person to individuals on ALFONSO.

## 2018-07-17 NOTE — TELEPHONE ENCOUNTER
This is an LP patient and we manage those patients for acute issues while in treatment, he is not an IPC patient.  Dr. Crews is an Addiction medicine provider and can not be this patient's PCP but would refill these medications.  I will forward this to him

## 2018-07-18 ENCOUNTER — HOSPITAL ENCOUNTER (OUTPATIENT)
Dept: BEHAVIORAL HEALTH | Facility: CLINIC | Age: 28
End: 2018-07-18
Attending: FAMILY MEDICINE
Payer: COMMERCIAL

## 2018-07-18 PROCEDURE — H2035 A/D TX PROGRAM, PER HOUR: HCPCS

## 2018-07-18 PROCEDURE — H2035 A/D TX PROGRAM, PER HOUR: HCPCS | Mod: HQ

## 2018-07-18 PROCEDURE — 10020000 ZZH LODGING PLUS FACILITY CHARGE ADULT

## 2018-07-19 ENCOUNTER — HOSPITAL ENCOUNTER (OUTPATIENT)
Dept: BEHAVIORAL HEALTH | Facility: CLINIC | Age: 28
End: 2018-07-19
Attending: FAMILY MEDICINE
Payer: COMMERCIAL

## 2018-07-19 PROCEDURE — H2035 A/D TX PROGRAM, PER HOUR: HCPCS

## 2018-07-19 PROCEDURE — H2035 A/D TX PROGRAM, PER HOUR: HCPCS | Mod: HQ

## 2018-07-19 PROCEDURE — 10020000 ZZH LODGING PLUS FACILITY CHARGE ADULT

## 2018-07-20 ENCOUNTER — INFUSION THERAPY VISIT (OUTPATIENT)
Dept: INFUSION THERAPY | Facility: CLINIC | Age: 28
End: 2018-07-20
Attending: FAMILY MEDICINE
Payer: COMMERCIAL

## 2018-07-20 ENCOUNTER — HOSPITAL ENCOUNTER (OUTPATIENT)
Dept: BEHAVIORAL HEALTH | Facility: CLINIC | Age: 28
End: 2018-07-20
Attending: FAMILY MEDICINE
Payer: COMMERCIAL

## 2018-07-20 VITALS
HEART RATE: 97 BPM | RESPIRATION RATE: 14 BRPM | DIASTOLIC BLOOD PRESSURE: 84 MMHG | OXYGEN SATURATION: 97 % | TEMPERATURE: 98.5 F | SYSTOLIC BLOOD PRESSURE: 127 MMHG

## 2018-07-20 DIAGNOSIS — F10.20 ALCOHOL USE DISORDER, SEVERE, DEPENDENCE (H): Primary | ICD-10-CM

## 2018-07-20 PROCEDURE — H2035 A/D TX PROGRAM, PER HOUR: HCPCS | Mod: HQ

## 2018-07-20 PROCEDURE — 96372 THER/PROPH/DIAG INJ SC/IM: CPT

## 2018-07-20 PROCEDURE — 25000128 H RX IP 250 OP 636: Performed by: FAMILY MEDICINE

## 2018-07-20 PROCEDURE — 10020000 ZZH LODGING PLUS FACILITY CHARGE ADULT

## 2018-07-20 RX ORDER — DEXTROAMPHETAMINE SACCHARATE, AMPHETAMINE ASPARTATE MONOHYDRATE, DEXTROAMPHETAMINE SULFATE AND AMPHETAMINE SULFATE 7.5; 7.5; 7.5; 7.5 MG/1; MG/1; MG/1; MG/1
30 CAPSULE, EXTENDED RELEASE ORAL 2 TIMES DAILY
Status: ON HOLD | COMMUNITY
End: 2018-10-15

## 2018-07-20 RX ADMIN — NALTREXONE 380 MG: KIT at 10:03

## 2018-07-20 NOTE — MR AVS SNAPSHOT
After Visit Summary   7/20/2018    Nickolas Lang    MRN: [id number]           Patient Information     Date Of Birth          1990        Visit Information        Provider Department      7/20/2018 9:00 AM UR BD 01 [facility] [place], Infusion Services        Today's Diagnoses     Alcohol use disorder, severe, dependence (H)    -  1       Follow-ups after your visit        Your next 10 appointments already scheduled     Jul 21, 2018  7:15 AM CDT   Treatment with ADULT LODGING PLUS B   [facility] ([facility])    [address] [place] [id number]   [phone]            Jul 22, 2018  7:15 AM CDT   Treatment with ADULT LODGING PLUS B   [facility] ([facility])    [address] [place] [address]   [phone]            Jul 23, 2018  7:15 AM CDT   Treatment with ADULT LODGING PLUS B   [facility] ([facility])    [address] [place] [address]   [phone]            Jul 24, 2018  7:15 AM CDT   Treatment with ADULT LODGING PLUS B   [facility] ([facility])    [address] [place] [address]   [phone]            Jul 25, 2018  7:15 AM CDT   Treatment with ADULT LODGING PLUS B   [facility] ([facility])    [address] [place] [address]   [phone]            Jul 26, 2018  7:15 AM CDT   Treatment with ADULT LODGING PLUS B   [facility] ([facility])    [address] [place] [address]   [phone]            Jul 26, 2018  2:30 PM CDT   Office Visit with ROXI Solo [facility]  [facility]    [address] [place] [id number] [phone]           Bring a current list of meds and any records pertaining to this visit. For Physicals, please bring immunization records and any forms needing to be filled out. Please arrive 10 minutes early to complete paperwork.            Jul 27, 2018  7:15 AM CDT   Treatment with ADULT LODGING PLUS B   [facility] ([facility])    [address] [place] [id number] [phone]            Jul 28, 2018  7:15 AM CDT   Treatment with ADULT LODGING PLUS B   [facility] ([facility])    [address] [place] [address]   [phone]            Jul 29, 2018  7:15 AM CDT   Treatment with ADULT LODGING PLUS B   [facility] ([facility])    [address] [place] [id number] [phone]              Who to contact     If you have questions or need follow up information about today's clinic visit or your schedule please contact [facility] [place], INFUSION SERVICES directly at [phone].  Normal or non-critical lab and imaging results will be communicated to you by VChargehart, letter or phone within 4 business days after the clinic has received the results. If you do not hear from us within 7 days, please contact the clinic through VChargehart or phone. If you have a critical or abnormal lab result, we will notify you by phone as soon as possible.  Submit refill requests through [email or web address] or call your pharmacy and they will forward the refill request to us. Please allow 3 business days for your refill to be completed.          Additional Information About Your Visit        [email or web address] Information     [email or web address] gives you secure access to your electronic health record. If you see a primary care provider, you can also  send messages to your care team and make appointments. If you have questions, please call your primary care clinic.  If you do not have a primary care provider, please call [phone] and they will assist you.        Care EveryWhere ID     This is your Care EveryWhere ID. This could be used by other organizations to access your [facility] medical records  [id number]        Your Vitals Were     Pulse Temperature Respirations Pulse Oximetry          97 98.5  F (36.9  C) (Oral) 14 97%         Blood Pressure from Last 3 Encounters:   07/20/18 127/84   07/12/18 114/78   07/10/18 118/68    Weight from Last 3 Encounters:   07/12/18 147 kg (324 lb)   07/10/18 147.2 kg (324 lb 8 oz)   04/08/18 141.1 kg (311 lb 1 oz)              Today, you had the following     No orders found for display       Primary Care Provider Office Phone # Fax #    Tima Will Crews [phone]       [address] [address]        Equal Access to Services     [facility]: Hadii aad ku hadasho Soomaali, waaxda luqadaha, qaybta kaalmada adeegyada, shawna espinoza . So [facility] [phone].    ATENCIÓN: Si habla español, tiene a miller disposición servicios gratuitos de asistencia lingüística. [facility] [phone].    We comply with applicable federal civil rights laws and [place] laws. We do not discriminate on the basis of race, color, national origin, age, disability, sex, sexual orientation, or gender identity.            Thank you!     Thank you for choosing [facility]  for your care. Our goal is always to provide you with excellent care. Hearing back from our patients is one way we can continue to improve our services. Please take a few minutes to complete the written survey that you may receive in the mail after your visit with us. Thank you!             Your Updated Medication List - Protect others around you: Learn how to safely use, store and throw away  your medicines at www.disposemymeds.org.          This list is accurate as of 7/20/18 12:01 PM.  Always use your most recent med list.                   Brand Name Dispense Instructions for use Diagnosis    alum & mag hydroxide-simethicone 200-200-20 MG/5ML Susp suspension    MYLANTA/MAALOX     Take 30 mLs by mouth every 6 hours as needed for indigestion        amLODIPine 10 MG tablet    NORVASC    30 tablet    Take 1 tablet (10 mg) by mouth daily    Alcohol dependence, continuous drinking behavior (H)       amphetamine-dextroamphetamine 30 MG per 24 hr capsule    ADDERALL XR     Take 30 mg by mouth 2 times daily        busPIRone 10 MG tablet    BUSPAR    90 tablet    Take 2 tablets (20 mg) by mouth 3 times daily    Alcohol dependence, continuous drinking behavior (H)       cloNIDine 0.1 MG tablet    CATAPRES    60 tablet    Take 1 tablet (0.1 mg) by mouth 2 times daily    Alcohol dependence, continuous drinking behavior (H)       gabapentin 800 MG tablet    NEURONTIN    120 tablet    Take 1 tablet (800 mg) by mouth 4 times daily    Anxiety       guaiFENesin 20 mg/mL Soln solution    ROBITUSSIN     Take 10 mLs by mouth every 4 hours as needed for cough        hydrOXYzine 50 MG tablet    ATARAX    120 tablet    Take 1 tablet (50 mg) by mouth every 4 hours as needed for anxiety    Chemical dependency (H)       IBUPROFEN PO      Take 400 mg by mouth every 6 hours as needed for moderate pain        loperamide 2 MG tablet    IMODIUM A-D    20 tablet    Take 1 tablet (2 mg) by mouth 4 times daily as needed for diarrhea    Diarrhea, unspecified type       loratadine 10 MG tablet    CLARITIN     Take 10 mg by mouth daily as needed for allergies        MELATONIN PO      Take 3 mg by mouth nightly as needed        multivitamin, therapeutic with minerals Tabs tablet     30 each    Take 1 tablet by mouth daily    Alcohol dependence, continuous drinking behavior (H)       nicotine 21 MG/24HR 24 hr patch    NICODERM CQ    30  patch    Place 1 patch onto the skin daily    Alcohol dependence, continuous drinking behavior (H)       * nicotine polacrilex 4 MG lozenge     360 tablet    1-2 lozenges every hour as needed, max 10 lozenges per day    Encounter for smoking cessation counseling       * nicotine polacrilex 4 MG gum    NICORETTE    100 tablet    Chew one piece every hour as directed.    Encounter for smoking cessation counseling       omeprazole 40 MG capsule    priLOSEC    30 capsule    Take 1 capsule (40 mg) by mouth daily    Alcohol dependence, continuous drinking behavior (H)       ondansetron 4 MG tablet    ZOFRAN    10 tablet    Take 1 tablet (4 mg) by mouth every 8 hours as needed for nausea    Nausea and vomiting, intractability of vomiting not specified, unspecified vomiting type       phenol-menthol 14.5 MG lozenge      Place 1 lozenge inside cheek every 2 hours as needed for moderate pain        senna-docusate 8.6-50 MG per tablet    SENOKOT-S;PERICOLACE     Take 2 tablets by mouth daily as needed for constipation        sertraline 100 MG tablet    ZOLOFT    30 tablet    Take 2 tablets (200 mg) by mouth daily    Alcohol dependence, continuous drinking behavior (H)       thiamine 100 MG tablet     30 tablet    Take 1 tablet (100 mg) by mouth daily    Acute alcoholic intoxication in alcoholism with complication (H)       traZODone 100 MG tablet    DESYREL    90 tablet    Take 1-2 tablets (100-200 mg) by mouth nightly as needed for sleep    Alcohol dependence, continuous drinking behavior (H)       TYLENOL PO      Take 650 mg by mouth every 4 hours as needed for mild pain or fever        * Notice:  This list has 2 medication(s) that are the same as other medications prescribed for you. Read the directions carefully, and ask your doctor or other care provider to review them with you.

## 2018-07-20 NOTE — PROGRESS NOTES
Here for injection. No w health issues. Tolerated injection. Left ambulatory when discharged. To return at next appointment.

## 2018-07-21 ENCOUNTER — HOSPITAL ENCOUNTER (OUTPATIENT)
Dept: BEHAVIORAL HEALTH | Facility: CLINIC | Age: 28
End: 2018-07-21
Attending: FAMILY MEDICINE
Payer: COMMERCIAL

## 2018-07-21 PROCEDURE — 10020000 ZZH LODGING PLUS FACILITY CHARGE ADULT

## 2018-07-21 PROCEDURE — H2035 A/D TX PROGRAM, PER HOUR: HCPCS

## 2018-07-21 NOTE — PROGRESS NOTES
Name: Nickolas Lang  Date: 7/21/2018  Medical Record: [id number]    Envelope Number: [id number]    List of Contents (List each item separately in new row):     Nicotine gum  Quantity: 16     Admission:  I am responsible for any personal items that are not sent to the safe or pharmacy.  [facility] is not responsible for loss, theft or damage of any property in my possession.      Patient Signature:  ___________________________________________       Date/Time:__________________________    Staff Signature: __________________________________       Date/Time:__________________________    2nd Staff person, if patient is unable/unwilling to sign:      __________________________________________________________       Date/Time: __________________________      Discharge:  [facility] has returned all of my personal belongings:    Patient Signature: ________________________________________     Date/Time: ____________________________________    Staff Signature: ______________________________________     Date/Time:_____________________________________

## 2018-07-22 ENCOUNTER — HOSPITAL ENCOUNTER (OUTPATIENT)
Dept: BEHAVIORAL HEALTH | Facility: CLINIC | Age: 28
End: 2018-07-22
Attending: FAMILY MEDICINE
Payer: COMMERCIAL

## 2018-07-22 PROCEDURE — 10020000 ZZH LODGING PLUS FACILITY CHARGE ADULT

## 2018-07-22 PROCEDURE — H2035 A/D TX PROGRAM, PER HOUR: HCPCS | Mod: HQ

## 2018-07-22 NOTE — PROGRESS NOTES
Late Entry Med error- on 7/21, this writer discovered that pt had both Nicotine Lozenges (4mg) and Nicotine Gum (4mg) transcribed on MAR at the same time.  On multiple days, pt did access both lozenges and gum putting him over the limit of what was prescribed in a 24 hours period  This writer explained to pt informed pt he could not have both, that he would have to pick one of the other.  Pt picked lozenges.  Gum sent to [facility] Medical Director, manager and supervisor notified.  Protocol followed

## 2018-07-23 ENCOUNTER — HOSPITAL ENCOUNTER (OUTPATIENT)
Dept: BEHAVIORAL HEALTH | Facility: CLINIC | Age: 28
End: 2018-07-23
Attending: FAMILY MEDICINE
Payer: COMMERCIAL

## 2018-07-23 PROCEDURE — H2035 A/D TX PROGRAM, PER HOUR: HCPCS | Mod: HQ

## 2018-07-23 PROCEDURE — 10020000 ZZH LODGING PLUS FACILITY CHARGE ADULT

## 2018-07-23 NOTE — PROGRESS NOTES
Patient:  Nickolas Lang            Adult CD Progress Note and Treatment Plan Review     Attendance  Please refer to OP BEH CD Adult Attendance Record Documentation Flowsheet    Support group attended this week: yes    Reporting sobriety:  yes    Treatment Plan     Treatment Plan Review competed on: 7-23-18       Client preferred learning style: Visual  Hands on  Verbal    Staff Members contributing ARLENE Duff,ARLENE Green and ARLENE Gr.                     Received Supervision: No    Client: contributed to goals and plan.    Client received copy of plan/revised plan: Yes    Client agrees with plan/revised plan: Yes        Changes to Treatment Plan: No    New Goals added since last review None needed.    Goals worked on since last review Identifying sober behaviors.Gaining insight into the progression of his addiction and th consequences to himself and family.    Strategies effective: yes    Strategies need these changes: None needed.    1) Care Coordination Activities:  None  2) Medical, Mental Health and other appointments the client attended: Pt.continues to follow all recommendations of his medical provider.  3) Medication issues: Med compliant.  4) Physical and mental health problems: Pt.reported he has a dentist appointment.  5) Any changes in Vulnerable Adult Status?  No If yes, add to treatment plan and individual abuse prevention plan.  6) Review and evaluation of the individual abuse prevention plan: Current IAPP for this program is adequate for this client          ASAM Risk Rating:    Dimension 1 0 No changes.    Dimension 2 1 No changes.    Dimension 3 2 Pt.reported he had no suicidal ideation this week.Pt.continues to gain insight into how his addiction has affected his mental health.Pt.continues to identify sober coping skills while in treatment.    Dimension 4 2 Pt.is attending all groups.Pt.has become an active group member.Pt.completed his first step and drug use  "history assignments.Pt.seems to be increasing his internal motivation to change his life style to maintain sobriety.    Dimension 5 4 Pt.completed his \"The [employer] Book of Living\" assignment.Pt.gained insight into successful living in recovery. Pt.has been working hard to implement what he learn from the assignment.Pt.seems to be having some success.Pt.continues to identify his relapse warning signs and triggers in the areas of people,places,activities and feelings.    Dimension 6 3 Pt.is attending 5 AA meetings per week while in treatment.He is participating in the family program with his mother and SO.Pt.needs to obtain a sponsor.      Guide to Risk Ratings for Suicidality:   IDEATION: Active thoughts of suicide? INTENT: Intent to follow on suicide? PLAN: Plan to follow through on suicide? Level of Risk:   IF Yes Yes Yes Patient = High Emergent   IF Yes Yes No Patient = High Urgent/Non-Emergent   IF Yes No No Patient = Moderate Non-Urgent   IF No No   No Patient = Low Risk   The patient's ADDITIONAL RISK FACTORS and lack of PROTECTIVE FACTORS may increase their overall suicide risk ratings.     Patient's/client's current risk rating:  Low Risk    Family Involvement:   ALFONSO signed    Data:   offered feedback good insight client did actively participate      Intervention:   Aftercare planning  Behavior modification  Counselor feedback  Education  Emotional management  Group feedback  Relapse prevention  Twelve Step facilitation      Assessment:   Stages of Change Model  Action    Appears/Sounds:  Cooperative  Motivated  Engaged      Plan:  Continue group therapy.      ARLENE Castorena        "

## 2018-07-24 ENCOUNTER — HOSPITAL ENCOUNTER (OUTPATIENT)
Dept: BEHAVIORAL HEALTH | Facility: CLINIC | Age: 28
End: 2018-07-24
Attending: FAMILY MEDICINE
Payer: COMMERCIAL

## 2018-07-24 PROCEDURE — H2035 A/D TX PROGRAM, PER HOUR: HCPCS | Mod: HQ

## 2018-07-24 PROCEDURE — H2035 A/D TX PROGRAM, PER HOUR: HCPCS

## 2018-07-24 PROCEDURE — 10020000 ZZH LODGING PLUS FACILITY CHARGE ADULT

## 2018-07-25 ENCOUNTER — HOSPITAL ENCOUNTER (OUTPATIENT)
Dept: BEHAVIORAL HEALTH | Facility: CLINIC | Age: 28
End: 2018-07-25
Attending: FAMILY MEDICINE
Payer: COMMERCIAL

## 2018-07-25 PROCEDURE — 10020000 ZZH LODGING PLUS FACILITY CHARGE ADULT

## 2018-07-25 PROCEDURE — H2035 A/D TX PROGRAM, PER HOUR: HCPCS | Mod: HQ

## 2018-07-25 NOTE — ADDENDUM NOTE
Encounter addended by: Tima Farley LADC on: 7/25/2018 12:01 PM<BR>     Actions taken: Sign clinical note

## 2018-07-25 NOTE — PROGRESS NOTES
D) Pt shared detailed chemical use history and listened as family shared their feelings of fear,confusion and sadness. Day 2 pt shared feeling shame for his use and resentment over his father. A) Pt seems to be seeking support from family system.  Everyone understands the importance of good communication focused on sharing feelings.  P) Pt to follow counselors' recommendations.  Family to attend Phase 2/[facility] .

## 2018-07-26 ENCOUNTER — HOSPITAL ENCOUNTER (OUTPATIENT)
Dept: BEHAVIORAL HEALTH | Facility: CLINIC | Age: 28
End: 2018-07-26
Attending: FAMILY MEDICINE
Payer: COMMERCIAL

## 2018-07-26 ENCOUNTER — OFFICE VISIT (OUTPATIENT)
Dept: FAMILY MEDICINE | Facility: CLINIC | Age: 28
End: 2018-07-26
Payer: COMMERCIAL

## 2018-07-26 VITALS
TEMPERATURE: 97.8 F | DIASTOLIC BLOOD PRESSURE: 82 MMHG | BODY MASS INDEX: 29.82 KG/M2 | SYSTOLIC BLOOD PRESSURE: 130 MMHG | WEIGHT: 226 LBS | HEART RATE: 98 BPM

## 2018-07-26 DIAGNOSIS — K76.0 HEPATIC STEATOSIS: Primary | ICD-10-CM

## 2018-07-26 DIAGNOSIS — E66.01 MORBID OBESITY (H): ICD-10-CM

## 2018-07-26 DIAGNOSIS — F10.20 ALCOHOL DEPENDENCE, CONTINUOUS DRINKING BEHAVIOR (H): ICD-10-CM

## 2018-07-26 PROCEDURE — H2035 A/D TX PROGRAM, PER HOUR: HCPCS

## 2018-07-26 PROCEDURE — H2035 A/D TX PROGRAM, PER HOUR: HCPCS | Mod: HQ

## 2018-07-26 PROCEDURE — 99203 OFFICE O/P NEW LOW 30 MIN: CPT | Performed by: NURSE PRACTITIONER

## 2018-07-26 PROCEDURE — 10020000 ZZH LODGING PLUS FACILITY CHARGE ADULT

## 2018-07-26 RX ORDER — CLONIDINE HYDROCHLORIDE 0.1 MG/1
0.1 TABLET ORAL 2 TIMES DAILY
Qty: 180 TABLET | Refills: 1 | Status: ON HOLD | OUTPATIENT
Start: 2018-07-26 | End: 2018-10-15

## 2018-07-26 RX ORDER — AMLODIPINE BESYLATE 10 MG/1
10 TABLET ORAL DAILY
Qty: 90 TABLET | Refills: 1 | Status: ON HOLD | OUTPATIENT
Start: 2018-07-26 | End: 2018-10-15

## 2018-07-26 NOTE — PATIENT INSTRUCTIONS
-Weigh yourself about once per week  -wait a few months before attempting any serious lifestyle changes  -If you try anything, try for half the plate vegetables

## 2018-07-26 NOTE — PROGRESS NOTES
Name: Nickolas Lang  Date: 7/26/2018  Medical Record: [id number]    Envelope Number: [id number]    List of Contents (List each item separately in new row):   Amphetamine Salts ER 20mg tabs  Qty:90    Admission:  I am responsible for any personal items that are not sent to the safe or pharmacy.  [facility] is not responsible for loss, theft or damage of any property in my possession.      Patient Signature:  ___________________________________________       Date/Time:__________________________    Staff Signature: __________________________________       Date/Time:__________________________    2nd Staff person, if patient is unable/unwilling to sign:      __________________________________________________________       Date/Time: __________________________      Discharge:  [facility] has returned all of my personal belongings:    Patient Signature: ________________________________________     Date/Time: ____________________________________    Staff Signature: ______________________________________     Date/Time:_____________________________________

## 2018-07-26 NOTE — PROGRESS NOTES
SUBJECTIVE:   Nickolas Lang is a 27 year old male who presents to clinic today for the following health issues:      Establish Care   He is currently at [facility] receiving alcohol abuse treatment, and will be discharging in the next 2 weeks to live with his Mom. He has recently started on Vivitrol, which has been helpful in reducing cravings. He will be working at a low stress job when he gets out of treatment, and attending meetings at least weekly. He is very hopeful about the vivitrol injections- has noticed he has less cravings for sweets than when he has been sober in the past- has concerns about his weight gain over the past several years, but recognizes that this was largely due to heavy alcohol consumption, and eating unhealthy food when he was drunk. Has a psychiatrist he is seeing for his chronic mental health issues.  Would like to recheck liver function tests and cholesterol.    -------------------------------------    Problem list and histories reviewed & adjusted, as indicated.  Additional history: as documented    Patient Active Problem List   Diagnosis     Morbid obesity (H)     Alcohol dependence with withdrawal (H)     Chemical dependency (H)     Essential hypertension     Suicidal ideation     Elevated liver enzymes     Hepatic steatosis     Obesity     Insomnia, unspecified type     Anxiety     Gastroesophageal reflux disease without esophagitis     Alcohol abuse with alcohol-induced mood disorder (H)     Attention deficit hyperactivity disorder (ADHD), unspecified ADHD type     Alcohol use disorder, severe, dependence (H)     Controlled substance agreement signed     Mild episode of recurrent major depressive disorder (H)     Alcohol withdrawal (H)     Substance abuse     Past Surgical History:   Procedure Laterality Date     Deviated septum repair       PYLOROMYOTOMY SURGERY  as an infant      SHOULDER SURGERY Left 07/29/2016    Removal of cartilage       Social History   Substance  Use Topics     Smoking status: Current Every Day Smoker     Packs/day: 1.00     Years: 10.00     Types: Cigarettes     Smokeless tobacco: Never Used     Alcohol use No     Family History   Problem Relation Age of Onset     Neurologic Disorder Mother      Multiple Sclerosis     Depression Mother      Anxiety Disorder Mother      Alcohol/Drug Father      Substance Abuse Father      Depression Maternal Grandmother      Anxiety Disorder Maternal Grandmother      Hypertension Maternal Grandmother      Substance Abuse Maternal Grandfather      Alcohol/Drug Paternal Grandfather            Reviewed and updated as needed this visit by clinical staff       Reviewed and updated as needed this visit by Provider         ROS:  Constitutional, HEENT, cardiovascular, pulmonary, gi and gu systems are negative, except as otherwise noted.    OBJECTIVE:     /82  Pulse 98  Temp 97.8  F (36.6  C) (Oral)  Wt 226 lb (102.5 kg)  BMI 29.82 kg/m2  Body mass index is 29.82 kg/(m^2).   GENERAL: healthy, alert and no distress  NECK: no adenopathy, no asymmetry, masses, or scars and thyroid normal to palpation  RESP: lungs clear to auscultation - no rales, rhonchi or wheezes  CV: regular rate and rhythm, normal S1 S2, no S3 or S4, no murmur, click or rub, no peripheral edema and peripheral pulses strong  ABDOMEN: soft, nontender, no hepatosplenomegaly, no masses and bowel sounds normal  MS: no gross musculoskeletal defects noted, no edema    Diagnostic Test Results:  Results for orders placed or performed in visit on 07/12/18   Urine Drugs of Abuse Screen Panel 13   Result Value Ref Range    Cannabinoids (52-gyq-9-carboxy-9-THC) Not Detected NDET^Not Detected ng/mL    Phencyclidine (Phencyclidine) Not Detected NDET^Not Detected ng/mL    Cocaine (Benzoylecgonine) Not Detected NDET^Not Detected ng/mL    Methamphetamine (d-Methamphetamine) Not Detected NDET^Not Detected ng/mL    Opiates (Morphine) Not Detected NDET^Not Detected ng/mL     Amphetamine (d-Amphetamine) Detected, Abnormal Result (A) NDET^Not Detected ng/mL    Benzodiazepines (Nordiazepam) Detected, Abnormal Result (A) NDET^Not Detected ng/mL    Tricyclic Antidepressants (Desipramine) Not Detected NDET^Not Detected ng/mL    Methadone (Methadone) Not Detected NDET^Not Detected ng/mL    Barbiturates (Butalbital) Not Detected NDET^Not Detected ng/mL    Oxycodone (Oxycodone) Not Detected NDET^Not Detected ng/mL    Propoxyphene (Norpropoxyphene) Not Detected NDET^Not Detected ng/mL    Buprenorphine (Buprenorphine) Not Detected NDET^Not Detected ng/mL       ASSESSMENT/PLAN:     Problem List Items Addressed This Visit     Morbid obesity (H)    Relevant Orders    TSH with free T4 reflex (Completed)    Hemoglobin A1c (Completed)    Lipid panel reflex to direct LDL Fasting (Completed)    Hepatic steatosis - Primary    Relevant Orders    Comprehensive metabolic panel (Completed)      Other Visit Diagnoses     Alcohol dependence, continuous drinking behavior (H)        Relevant Medications    amLODIPine (NORVASC) 10 MG tablet    cloNIDine (CATAPRES) 0.1 MG tablet         Follow up 1-2 months to discuss weight  ROXI Solo CNP  [facility]

## 2018-07-26 NOTE — MR AVS SNAPSHOT
After Visit Summary   7/26/2018    Nickolas Lang    MRN: [id number]           Patient Information     Date Of Birth          1990        Visit Information        Provider Department      7/26/2018 2:30 PM Radha Botello APRN [facility]        Today's Diagnoses     Hepatic steatosis    -  1    Morbid obesity (H)        Alcohol dependence, continuous drinking behavior (H)          Care Instructions    -Weigh yourself about once per week  -wait a few months before attempting any serious lifestyle changes  -If you try anything, try for half the plate vegetables          Follow-ups after your visit        Your next 10 appointments already scheduled     Jul 27, 2018  7:15 AM CDT   Treatment with ADULT LODGING PLUS B   [facility] ([facility])    [address] [place] [address]   [phone]            Jul 28, 2018  7:15 AM CDT   Treatment with ADULT LODGING PLUS B   [facility] ([facility])    [address] [place] [address]   [phone]            Jul 29, 2018  7:15 AM CDT   Treatment with ADULT LODGING PLUS B   [facility] ([facility])    [address] [place] [address]   [phone]            Jul 30, 2018  7:15 AM CDT   Treatment with ADULT LODGING PLUS B   [facility] ([facility])    [address] [place] [address]   [phone]            Jul 30, 2018  9:00 AM CDT   Return Visit with Tima Crews MD   [facility] ([facility])    [address]  [address]  [facility] [address]   [phone]            Aug 21, 2018  9:00 AM CDT   Level O with UR CH 06   [facility]    [address] [place] [address] [address] [address]   [phone]              Future tests that were ordered for you today     Open Future Orders        Priority Expected Expires Ordered    Comprehensive metabolic panel Routine  7/26/2019 7/26/2018    TSH with free T4 reflex Routine  7/26/2019 7/26/2018    Hemoglobin A1c Routine  7/26/2019 7/26/2018    Lipid panel reflex to direct LDL Fasting Routine 6/26/2019 7/26/2019 7/26/2018            Who to contact     If you have questions or need follow up information about today's clinic visit or your schedule please contact [facility] directly at [phone].  Normal or non-critical lab and imaging results will be communicated to you by [employer], letter or phone within 4 business days after the clinic has received the results. If you do not hear from us within 7 days, please contact the clinic through [employer] or phone. If you have a critical or abnormal lab result, we will notify you by phone as soon as possible.  Submit refill requests through [employer] or call your pharmacy and they will forward the refill request to us. Please allow 3 business days for your refill to be completed.          Additional Information About Your Visit        [employer] Information     [employer] gives you secure access to your electronic health record. If you see a primary care provider, you can also send messages to your care team and make appointments. If you have questions, please call your primary care clinic.  If you do not have a primary care provider, please call [phone] and they will assist you.        Care EveryWhere ID     This is your Care EveryWhere ID. This could be used by other organizations to access your [place] medical records  [id number]        Your Vitals Were     Pulse Temperature BMI (Body Mass Index)             98 97.8  F (36.6  C) (Oral) 29.82 kg/m2          Blood Pressure from  Last 3 Encounters:   07/26/18 130/82   07/20/18 127/84   07/12/18 114/78    Weight from Last 3 Encounters:   07/26/18 226 lb (102.5 kg)   07/12/18 324 lb (147 kg)   07/10/18 324 lb 8 oz (147.2 kg)                 Where to get your medicines      These medications were sent to [facility] [place], [place] - [address]  [address], [facility] [address]     Phone:  [phone]     amLODIPine 10 MG tablet    cloNIDine 0.1 MG tablet          Primary Care Provider Office Phone # Fax #    Tima Crews [phone]       [address]  [facility] [address]        Equal Access to Services     [facility] Hospital[facility] : Hadii breezy gonzalez hadasho Soblanca, waaxda luqadaha, qaybta kaalmada adeegyada, waxhayele espinoza . So [facility] [phone].    ATENCIÓN: Si habla español, tiene a miller disposición servicios gratuitos de asistencia lingüística. Stan al [phone].    We comply with applicable federal civil rights laws and [place] laws. We do not discriminate on the basis of race, color, national origin, age, disability, sex, sexual orientation, or gender identity.            Thank you!     Thank you for choosing [facility]  for your care. Our goal is always to provide you with excellent care. Hearing back from our patients is one way we can continue to improve our services. Please take a few minutes to complete the written survey that you may receive in the mail after your visit with us. Thank you!             Your Updated Medication List - Protect others around you: Learn how to safely use, store and throw away your medicines at www.disposemymeds.org.          This list is accurate as of 7/26/18  3:13 PM.  Always use your most recent med list.                   Brand Name Dispense Instructions for use Diagnosis    alum & mag hydroxide-simethicone 200-200-20 MG/5ML Susp suspension    MYLANTA/MAALOX     Take 30 mLs by mouth every 6 hours  as needed for indigestion        amLODIPine 10 MG tablet    NORVASC    90 tablet    Take 1 tablet (10 mg) by mouth daily    Alcohol dependence, continuous drinking behavior (H)       amphetamine-dextroamphetamine 30 MG per 24 hr capsule    ADDERALL XR     Take 30 mg by mouth 2 times daily        busPIRone 10 MG tablet    BUSPAR    90 tablet    Take 2 tablets (20 mg) by mouth 3 times daily    Alcohol dependence, continuous drinking behavior (H)       cloNIDine 0.1 MG tablet    CATAPRES    180 tablet    Take 1 tablet (0.1 mg) by mouth 2 times daily    Alcohol dependence, continuous drinking behavior (H)       gabapentin 800 MG tablet    NEURONTIN    120 tablet    Take 1 tablet (800 mg) by mouth 4 times daily    Anxiety       guaiFENesin 20 mg/mL Soln solution    ROBITUSSIN     Take 10 mLs by mouth every 4 hours as needed for cough        hydrOXYzine 50 MG tablet    ATARAX    120 tablet    Take 1 tablet (50 mg) by mouth every 4 hours as needed for anxiety    Chemical dependency (H)       IBUPROFEN PO      Take 400 mg by mouth every 6 hours as needed for moderate pain        loperamide 2 MG tablet    IMODIUM A-D    20 tablet    Take 1 tablet (2 mg) by mouth 4 times daily as needed for diarrhea    Diarrhea, unspecified type       loratadine 10 MG tablet    CLARITIN     Take 10 mg by mouth daily as needed for allergies        MELATONIN PO      Take 3 mg by mouth nightly as needed        multivitamin, therapeutic with minerals Tabs tablet     30 each    Take 1 tablet by mouth daily    Alcohol dependence, continuous drinking behavior (H)       nicotine 21 MG/24HR 24 hr patch    NICODERM CQ    30 patch    Place 1 patch onto the skin daily    Alcohol dependence, continuous drinking behavior (H)       * nicotine polacrilex 4 MG lozenge     360 tablet    1-2 lozenges every hour as needed, max 10 lozenges per day    Encounter for smoking cessation counseling       * nicotine polacrilex 4 MG gum    NICORETTE    100 tablet    Chew  one piece every hour as directed.    Encounter for smoking cessation counseling       omeprazole 40 MG capsule    priLOSEC    30 capsule    Take 1 capsule (40 mg) by mouth daily    Alcohol dependence, continuous drinking behavior (H)       ondansetron 4 MG tablet    ZOFRAN    10 tablet    Take 1 tablet (4 mg) by mouth every 8 hours as needed for nausea    Nausea and vomiting, intractability of vomiting not specified, unspecified vomiting type       phenol-menthol 14.5 MG lozenge      Place 1 lozenge inside cheek every 2 hours as needed for moderate pain        senna-docusate 8.6-50 MG per tablet    SENOKOT-S;PERICOLACE     Take 2 tablets by mouth daily as needed for constipation        sertraline 100 MG tablet    ZOLOFT    30 tablet    Take 2 tablets (200 mg) by mouth daily    Alcohol dependence, continuous drinking behavior (H)       thiamine 100 MG tablet     30 tablet    Take 1 tablet (100 mg) by mouth daily    Acute alcoholic intoxication in alcoholism with complication (H)       traZODone 100 MG tablet    DESYREL    90 tablet    Take 1-2 tablets (100-200 mg) by mouth nightly as needed for sleep    Alcohol dependence, continuous drinking behavior (H)       TYLENOL PO      Take 650 mg by mouth every 4 hours as needed for mild pain or fever        * Notice:  This list has 2 medication(s) that are the same as other medications prescribed for you. Read the directions carefully, and ask your doctor or other care provider to review them with you.

## 2018-07-27 ENCOUNTER — HOSPITAL ENCOUNTER (OUTPATIENT)
Dept: BEHAVIORAL HEALTH | Facility: CLINIC | Age: 28
End: 2018-07-27
Attending: FAMILY MEDICINE
Payer: COMMERCIAL

## 2018-07-27 PROCEDURE — H2035 A/D TX PROGRAM, PER HOUR: HCPCS

## 2018-07-27 PROCEDURE — H2035 A/D TX PROGRAM, PER HOUR: HCPCS | Mod: HQ

## 2018-07-27 PROCEDURE — 10020000 ZZH LODGING PLUS FACILITY CHARGE ADULT

## 2018-07-28 ENCOUNTER — HOSPITAL ENCOUNTER (OUTPATIENT)
Dept: BEHAVIORAL HEALTH | Facility: CLINIC | Age: 28
End: 2018-07-28
Attending: FAMILY MEDICINE
Payer: COMMERCIAL

## 2018-07-28 PROCEDURE — 10020000 ZZH LODGING PLUS FACILITY CHARGE ADULT

## 2018-07-28 PROCEDURE — H2035 A/D TX PROGRAM, PER HOUR: HCPCS

## 2018-07-28 NOTE — PROGRESS NOTES
Nursing Discharge Planning Meeting    Writer completed discharge planning meeting with patient. Discharge is planned for Tuesday, July 31st to home with phase II outpatient treatment at [facility].    Discussed appropriate follow up care to manage GERD, HTN, ADD, anxiety, and to obtain medication refills. Patient given a copy of his current medications for reference. Questions answered and patient verbalized understanding of post-discharge follow up plan.    Patient to schedule an appointment with his PCP as needed and to discuss Adderall XR dosing to assess if it needs to be changed back to 20mg TID instead of the current 30 mg BID regimen.    Continue to support patient in discharge planning as needed to assure appropriate continuity of care.     Tobacco Cessation  Patient participated in the nicotine replacement therapy for tobacco cessation or reduction during their treatment programming: Yes    The patient declined community resources for follow-up to continue tobacco cessation support once in the community. Also the patient was encoruaged to discuss their tobacco cessation efforts with the primary care provider.

## 2018-07-29 ENCOUNTER — HOSPITAL ENCOUNTER (OUTPATIENT)
Dept: BEHAVIORAL HEALTH | Facility: CLINIC | Age: 28
End: 2018-07-29
Attending: FAMILY MEDICINE
Payer: COMMERCIAL

## 2018-07-29 PROCEDURE — H2035 A/D TX PROGRAM, PER HOUR: HCPCS

## 2018-07-29 PROCEDURE — 10020000 ZZH LODGING PLUS FACILITY CHARGE ADULT

## 2018-07-30 ENCOUNTER — OFFICE VISIT (OUTPATIENT)
Dept: ADDICTION MEDICINE | Facility: CLINIC | Age: 28
End: 2018-07-30
Payer: COMMERCIAL

## 2018-07-30 ENCOUNTER — HOSPITAL ENCOUNTER (OUTPATIENT)
Dept: BEHAVIORAL HEALTH | Facility: CLINIC | Age: 28
End: 2018-07-30
Attending: FAMILY MEDICINE
Payer: COMMERCIAL

## 2018-07-30 VITALS
RESPIRATION RATE: 18 BRPM | WEIGHT: 315 LBS | SYSTOLIC BLOOD PRESSURE: 128 MMHG | HEART RATE: 88 BPM | BODY MASS INDEX: 41.89 KG/M2 | DIASTOLIC BLOOD PRESSURE: 74 MMHG | OXYGEN SATURATION: 100 %

## 2018-07-30 DIAGNOSIS — E66.01 MORBID OBESITY (H): ICD-10-CM

## 2018-07-30 DIAGNOSIS — K76.0 HEPATIC STEATOSIS: ICD-10-CM

## 2018-07-30 DIAGNOSIS — F10.20 ALCOHOL USE DISORDER, SEVERE, DEPENDENCE (H): Chronic | ICD-10-CM

## 2018-07-30 LAB
ALBUMIN SERPL-MCNC: 4.2 G/DL (ref 3.4–5)
ALP SERPL-CCNC: 86 U/L (ref 40–150)
ALT SERPL W P-5'-P-CCNC: 82 U/L (ref 0–70)
AMPHETAMINES UR QL: ABNORMAL NG/ML
ANION GAP SERPL CALCULATED.3IONS-SCNC: 6 MMOL/L (ref 3–14)
AST SERPL W P-5'-P-CCNC: 43 U/L (ref 0–45)
BARBITURATES UR QL SCN: NOT DETECTED NG/ML
BENZODIAZ UR QL SCN: ABNORMAL NG/ML
BILIRUB SERPL-MCNC: 0.4 MG/DL (ref 0.2–1.3)
BUN SERPL-MCNC: 14 MG/DL (ref 7–30)
BUPRENORPHINE UR QL: NOT DETECTED NG/ML
CALCIUM SERPL-MCNC: 9.4 MG/DL (ref 8.5–10.1)
CANNABINOIDS UR QL: NOT DETECTED NG/ML
CHLORIDE SERPL-SCNC: 103 MMOL/L (ref 94–109)
CHOLEST SERPL-MCNC: 210 MG/DL
CO2 SERPL-SCNC: 30 MMOL/L (ref 20–32)
COCAINE UR QL SCN: NOT DETECTED NG/ML
CREAT SERPL-MCNC: 0.75 MG/DL (ref 0.66–1.25)
D-METHAMPHET UR QL: NOT DETECTED NG/ML
GFR SERPL CREATININE-BSD FRML MDRD: >90 ML/MIN/1.7M2
GLUCOSE SERPL-MCNC: 101 MG/DL (ref 70–99)
HBA1C MFR BLD: 5.4 % (ref 0–5.6)
HDLC SERPL-MCNC: 39 MG/DL
LDLC SERPL CALC-MCNC: 129 MG/DL
METHADONE UR QL SCN: NOT DETECTED NG/ML
NONHDLC SERPL-MCNC: 171 MG/DL
OPIATES UR QL SCN: NOT DETECTED NG/ML
OXYCODONE UR QL SCN: NOT DETECTED NG/ML
PCP UR QL SCN: NOT DETECTED NG/ML
POTASSIUM SERPL-SCNC: 5.1 MMOL/L (ref 3.4–5.3)
PROPOXYPH UR QL: NOT DETECTED NG/ML
PROT SERPL-MCNC: 7.8 G/DL (ref 6.8–8.8)
SODIUM SERPL-SCNC: 139 MMOL/L (ref 133–144)
TRICYCLICS UR QL SCN: NOT DETECTED NG/ML
TRIGL SERPL-MCNC: 210 MG/DL
TSH SERPL DL<=0.005 MIU/L-ACNC: 2.97 MU/L (ref 0.4–4)

## 2018-07-30 PROCEDURE — 99214 OFFICE O/P EST MOD 30 MIN: CPT | Performed by: FAMILY MEDICINE

## 2018-07-30 PROCEDURE — 80053 COMPREHEN METABOLIC PANEL: CPT | Performed by: NURSE PRACTITIONER

## 2018-07-30 PROCEDURE — 10020000 ZZH LODGING PLUS FACILITY CHARGE ADULT

## 2018-07-30 PROCEDURE — 36415 COLL VENOUS BLD VENIPUNCTURE: CPT | Performed by: NURSE PRACTITIONER

## 2018-07-30 PROCEDURE — 83036 HEMOGLOBIN GLYCOSYLATED A1C: CPT | Performed by: NURSE PRACTITIONER

## 2018-07-30 PROCEDURE — 84443 ASSAY THYROID STIM HORMONE: CPT | Performed by: NURSE PRACTITIONER

## 2018-07-30 PROCEDURE — H2035 A/D TX PROGRAM, PER HOUR: HCPCS | Mod: HQ

## 2018-07-30 PROCEDURE — 80061 LIPID PANEL: CPT | Performed by: NURSE PRACTITIONER

## 2018-07-30 PROCEDURE — H2035 A/D TX PROGRAM, PER HOUR: HCPCS

## 2018-07-30 PROCEDURE — 80306 DRUG TEST PRSMV INSTRMNT: CPT | Performed by: PEDIATRICS

## 2018-07-30 RX ORDER — DISULFIRAM 250 MG/1
250 TABLET ORAL DAILY
Qty: 30 TABLET | Refills: 1 | Status: ON HOLD | OUTPATIENT
Start: 2018-07-30 | End: 2018-09-10

## 2018-07-30 RX ORDER — DISULFIRAM 250 MG/1
250 TABLET ORAL DAILY
Qty: 30 TABLET | Refills: 1 | Status: SHIPPED | OUTPATIENT
Start: 2018-07-30 | End: 2018-07-30

## 2018-07-30 NOTE — PROGRESS NOTES
[address] [place] [address]        Nickolas Lang, 1990, was admitted for evaluation/treatment of chemical dependency at [facility].  This person took part in these program(s):    ______ The Inpatient Program   ______ The Outpatient Program   __X____ The Lodging Plus Program   ______ Lodging Day Outpatient       Date admitted: 7-3-18  Date discharged: 7-31-18    Type of discharge:   ___X___ Satisfactory - completed evaluation / treatment   ______ Discharged without completing   ______ Behavioral discharge   ______ Transferred to another chemical dependency program   ______ Transferred to another type of service   ______ Left against medical advice (AMA) / Eloped       Comments:       Counselor: ARLENE Castorena                       Date: 7/30/2018             Time: 12:44 PM

## 2018-07-30 NOTE — MR AVS SNAPSHOT
After Visit Summary   7/30/2018    Nickolas Lang    MRN: [id number]           Patient Information     Date Of Birth          1990        Visit Information        Provider Department      7/30/2018 9:00 AM Tima Crews MD [facility]        Today's Diagnoses     Alcohol use disorder, severe, dependence (H)           Follow-ups after your visit        Your next 10 appointments already scheduled     Jul 30, 2018  9:15 AM CDT   LAB with RD LAB   [facility] ([facility])    [address] [place] [address]  [facility] [id number] [phone]           Please do not eat 10-12 hours before your appointment if you are coming in fasting for labs on lipids, cholesterol, or glucose (sugar). This does not apply to pregnant women. Water, hot tea and black coffee (with nothing added) are okay. Do not drink other fluids, diet soda or chew gum.            Aug 21, 2018  9:00 AM CDT   Level O with UR CH 06   [facility] [place] ([facility])    [address] [place] [address]   [phone]            Aug 27, 2018  8:00 AM CDT   Office Visit with ROXI Solo CNP   [facility] ([facility])    [address] [place] [address]  [facility] [address] [phone]           Bring a current list of meds and any records pertaining to this visit. For Physicals, please bring immunization records and any forms needing to be filled out. Please arrive 10 minutes early to complete paperwork.              Who to contact     If you have questions or need follow up information about today's clinic visit or your schedule please contact [facility] directly at [phone].  Normal or non-critical lab and imaging results will be communicated to you by MyChart, letter or phone within 4 business days  after the clinic has received the results. If you do not hear from us within 7 days, please contact the clinic through [employer] or phone. If you have a critical or abnormal lab result, we will notify you by phone as soon as possible.  Submit refill requests through [employer] or call your pharmacy and they will forward the refill request to us. Please allow 3 business days for your refill to be completed.          Additional Information About Your Visit        [employer] Information     [employer] gives you secure access to your electronic health record. If you see a primary care provider, you can also send messages to your care team and make appointments. If you have questions, please call your primary care clinic.  If you do not have a primary care provider, please call [phone] and they will assist you.        Care EveryWhere ID     This is your Care EveryWhere ID. This could be used by other organizations to access your [place] medical records  [id number]        Your Vitals Were     Pulse Respirations Pulse Oximetry BMI (Body Mass Index)          88 18 100% 41.89 kg/m2         Blood Pressure from Last 3 Encounters:   07/30/18 128/74   07/26/18 130/82   07/20/18 127/84    Weight from Last 3 Encounters:   07/30/18 317 lb 8 oz (144 kg)   07/26/18 226 lb (102.5 kg)   07/12/18 324 lb (147 kg)              We Performed the Following     Urine Drugs of Abuse Screen Panel 13          Today's Medication Changes          These changes are accurate as of 7/30/18  9:03 AM.  If you have any questions, ask your nurse or doctor.               Start taking these medicines.        Dose/Directions    disulfiram 250 MG tablet   Commonly known as:  ANTABUSE   Used for:  Alcohol use disorder, severe, dependence (H)   Started by:  Tima Crews MD        Dose:  250 mg   Take 1 tablet (250 mg) by mouth daily   Quantity:  30 tablet   Refills:  1            Where to get your medicines      These medications were sent to [facility] [place], [place] - [address]  [address], [facility] [address]     Phone:  [phone]     disulfiram 250 MG tablet                Primary Care Provider Office Phone # Fax #    Tima Will Crews [phone]       [address]  [facility] [address]        Equal Access to Services     ERMELINDA LOZADA : Hadii aad ku hadasho Soomaali, waaxda luqadaha, qaybta kaalmada adeegyada, waxay idiin hayaan adeeg kharash la'bienvenidon . So [facility] [phone].    ATENCIÓN: Si habla español, tiene a miller disposición servicios gratuitos de asistencia lingüística. Llame al [phone].    We comply with applicable federal civil rights laws and [place] laws. We do not discriminate on the basis of race, color, national origin, age, disability, sex, sexual orientation, or gender identity.            Thank you!     Thank you for choosing [facility]  for your care. Our goal is always to provide you with excellent care. Hearing back from our patients is one way we can continue to improve our services. Please take a few minutes to complete the written survey that you may receive in the mail after your visit with us. Thank you!             Your Updated Medication List - Protect others around you: Learn how to safely use, store and throw away your medicines at www.disposemymeds.org.          This list is accurate as of 7/30/18  9:03 AM.  Always use your most recent med list.                   Brand Name Dispense Instructions for use Diagnosis    amLODIPine 10 MG tablet    NORVASC    90 tablet    Take 1 tablet (10 mg) by mouth daily    Alcohol dependence, continuous drinking behavior (H)       amphetamine-dextroamphetamine 30 MG per 24 hr capsule    ADDERALL XR     Take 30 mg by mouth 2 times daily        busPIRone 10 MG tablet    BUSPAR    90 tablet    Take 2 tablets (20 mg) by mouth 3 times daily    Alcohol dependence, continuous drinking behavior (H)        cloNIDine 0.1 MG tablet    CATAPRES    180 tablet    Take 1 tablet (0.1 mg) by mouth 2 times daily    Alcohol dependence, continuous drinking behavior (H)       disulfiram 250 MG tablet    ANTABUSE    30 tablet    Take 1 tablet (250 mg) by mouth daily    Alcohol use disorder, severe, dependence (H)       gabapentin 800 MG tablet    NEURONTIN    120 tablet    Take 1 tablet (800 mg) by mouth 4 times daily    Anxiety       hydrOXYzine 50 MG tablet    ATARAX    120 tablet    Take 1 tablet (50 mg) by mouth every 4 hours as needed for anxiety    Chemical dependency (H)       loperamide 2 MG tablet    IMODIUM A-D    20 tablet    Take 1 tablet (2 mg) by mouth 4 times daily as needed for diarrhea    Diarrhea, unspecified type       multivitamin, therapeutic with minerals Tabs tablet     30 each    Take 1 tablet by mouth daily    Alcohol dependence, continuous drinking behavior (H)       nicotine 21 MG/24HR 24 hr patch    NICODERM CQ    30 patch    Place 1 patch onto the skin daily    Alcohol dependence, continuous drinking behavior (H)       * nicotine polacrilex 4 MG lozenge     360 tablet    1-2 lozenges every hour as needed, max 10 lozenges per day    Encounter for smoking cessation counseling       * nicotine polacrilex 4 MG gum    NICORETTE    100 tablet    Chew one piece every hour as directed.    Encounter for smoking cessation counseling       omeprazole 40 MG capsule    priLOSEC    30 capsule    Take 1 capsule (40 mg) by mouth daily    Alcohol dependence, continuous drinking behavior (H)       ondansetron 4 MG tablet    ZOFRAN    10 tablet    Take 1 tablet (4 mg) by mouth every 8 hours as needed for nausea    Nausea and vomiting, intractability of vomiting not specified, unspecified vomiting type       sertraline 100 MG tablet    ZOLOFT    30 tablet    Take 2 tablets (200 mg) by mouth daily    Alcohol dependence, continuous drinking behavior (H)       thiamine 100 MG tablet     30 tablet    Take 1 tablet (100  mg) by mouth daily    Acute alcoholic intoxication in alcoholism with complication (H)       traZODone 100 MG tablet    DESYREL    90 tablet    Take 1-2 tablets (100-200 mg) by mouth nightly as needed for sleep    Alcohol dependence, continuous drinking behavior (H)       * Notice:  This list has 2 medication(s) that are the same as other medications prescribed for you. Read the directions carefully, and ask your doctor or other care provider to review them with you.

## 2018-07-30 NOTE — PROGRESS NOTES
SUBJECTIVE:   Nickolas Lang is a 27 year old male who presents to clinic today for the following health issues:      ADDICTION MEDICINE NOTE:    Here in follow up of treatment of alcohol dependence with MAT    Has received Vivitrol injection    Completing Lodging Plus tomorrow and returning home    Plans to work with sponsor and go to meetings    Discussed antabuse; has taken it before but when he was asked by others to take it (he would cheek it)    Advised using it because he wants to and wants to be sober    Discussed pros and cons, how it works    Will begin    May need medication refills; he will let me know    Re-check 1 month    MN : NO ISSUES; ADDERALL NOTED; CHECKED 7/30/18          Problem list and histories reviewed & adjusted, as indicated.  Additional history: as documented    Patient Active Problem List   Diagnosis     Morbid obesity (H)     Alcohol dependence with withdrawal (H)     Chemical dependency (H)     Essential hypertension     Suicidal ideation     Elevated liver enzymes     Hepatic steatosis     Obesity     Insomnia, unspecified type     Anxiety     Gastroesophageal reflux disease without esophagitis     Alcohol abuse with alcohol-induced mood disorder (H)     Attention deficit hyperactivity disorder (ADHD), unspecified ADHD type     Alcohol use disorder, severe, dependence (H)     Controlled substance agreement signed     Mild episode of recurrent major depressive disorder (H)     Alcohol withdrawal (H)     Substance abuse     Past Surgical History:   Procedure Laterality Date     Deviated septum repair       PYLOROMYOTOMY SURGERY  as an infant      SHOULDER SURGERY Left 07/29/2016    Removal of cartilage       Social History   Substance Use Topics     Smoking status: Current Every Day Smoker     Packs/day: 1.00     Years: 10.00     Types: Cigarettes     Smokeless tobacco: Never Used     Alcohol use No     Family History   Problem Relation Age of Onset     Neurologic Disorder  Mother      Multiple Sclerosis     Depression Mother      Anxiety Disorder Mother      Alcohol/Drug Father      Substance Abuse Father      Depression Maternal Grandmother      Anxiety Disorder Maternal Grandmother      Hypertension Maternal Grandmother      Substance Abuse Maternal Grandfather      Alcohol/Drug Paternal Grandfather          Current Outpatient Prescriptions   Medication Sig Dispense Refill     disulfiram (ANTABUSE) 250 MG tablet Take 1 tablet (250 mg) by mouth daily 30 tablet 1     amLODIPine (NORVASC) 10 MG tablet Take 1 tablet (10 mg) by mouth daily 90 tablet 1     amphetamine-dextroamphetamine (ADDERALL XR) 30 MG per 24 hr capsule Take 30 mg by mouth 2 times daily       busPIRone (BUSPAR) 10 MG tablet Take 2 tablets (20 mg) by mouth 3 times daily 90 tablet 0     cloNIDine (CATAPRES) 0.1 MG tablet Take 1 tablet (0.1 mg) by mouth 2 times daily 180 tablet 1     gabapentin (NEURONTIN) 800 MG tablet Take 1 tablet (800 mg) by mouth 4 times daily 120 tablet 1     hydrOXYzine (ATARAX) 50 MG tablet Take 1 tablet (50 mg) by mouth every 4 hours as needed for anxiety 120 tablet 0     loperamide (IMODIUM A-D) 2 MG tablet Take 1 tablet (2 mg) by mouth 4 times daily as needed for diarrhea 20 tablet 0     multivitamin, therapeutic with minerals (THERA-VIT-M) TABS tablet Take 1 tablet by mouth daily 30 each 0     nicotine (NICODERM CQ) 21 MG/24HR 24 hr patch Place 1 patch onto the skin daily (Patient not taking: Reported on 7/26/2018) 30 patch 0     nicotine polacrilex (NICORETTE) 4 MG gum Chew one piece every hour as directed. 100 tablet 2     nicotine polacrilex 4 MG lozenge 1-2 lozenges every hour as needed, max 10 lozenges per day 360 tablet 1     omeprazole (PRILOSEC) 40 MG capsule Take 1 capsule (40 mg) by mouth daily 30 capsule 0     ondansetron (ZOFRAN) 4 MG tablet Take 1 tablet (4 mg) by mouth every 8 hours as needed for nausea (Patient not taking: Reported on 7/20/2018) 10 tablet 0     sertraline  (ZOLOFT) 100 MG tablet Take 2 tablets (200 mg) by mouth daily 30 tablet 0     thiamine 100 MG tablet Take 1 tablet (100 mg) by mouth daily 30 tablet 1     traZODone (DESYREL) 100 MG tablet Take 1-2 tablets (100-200 mg) by mouth nightly as needed for sleep 90 tablet 0     No Known Allergies  Labs reviewed in EPIC    Reviewed and updated as needed this visit by clinical staff  Tobacco  Allergies  Meds       Reviewed and updated as needed this visit by Provider  Tobacco         ROS:      OBJECTIVE:     /74  Pulse 88  Resp 18  Wt 317 lb 8 oz (144 kg)  SpO2 100%  BMI 41.89 kg/m2  Body mass index is 41.89 kg/(m^2).       ROS:  Constitutional, HEENT, cardiovascular, pulmonary, gi and gu systems are negative, except as otherwise noted.    /74  Pulse 88  Resp 18  Wt 317 lb 8 oz (144 kg)  SpO2 100%  BMI 41.89 kg/m2  EXAM:  GENERAL APPEARANCE: healthy, alert and no distress  EYES: Eyes grossly normal to inspection, PERRL and conjunctivae and sclerae normal  NEURO: Normal strength and tone, mentation intact and speech normal  PSYCH: mentation appears normal and affect normal/bright  MENTAL STATUS EXAM:  Appearance/Behavior: No apparent distress and Casually groomed  Speech: Normal  Mood/Affect: normal affect  Insight: Adequate      Diagnostic Test Results:  none     ASSESSMENT:   ALCOHOL DEPENDENCE     PLAN:       ICD-10-CM    1. Alcohol use disorder, severe, dependence (H) F10.20 Urine Drugs of Abuse Screen Panel 13     disulfiram (ANTABUSE) 250 MG tablet     DISCONTINUED: disulfiram (ANTABUSE) 250 MG tablet           MEDICATIONS:   Orders Placed This Encounter   Medications     DISCONTD: disulfiram (ANTABUSE) 250 MG tablet     Sig: Take 1 tablet (250 mg) by mouth daily     Dispense:  30 tablet     Refill:  1     disulfiram (ANTABUSE) 250 MG tablet     Sig: Take 1 tablet (250 mg) by mouth daily     Dispense:  30 tablet     Refill:  1     Please send to [facility]          - Continue other  medications without change  FUTURE APPOINTMENTS:       - Follow-up visit in 4 WEEKS    Tima Crews MD  [facility] ADDICTION MEDICINE

## 2018-07-30 NOTE — PROGRESS NOTES
MICD Discharge Summary/Instructions    Patient:  Nickolas Lang    MRN: [id number]  : 1990 Age: 27 year old Sex: male   -   Focus of Treatment / Discharge Recommendations   Personal Safety/ Management of Symptoms   * Follow your safety plan. Report increased symptoms to your care team and /or go to the nearest Emergency Department.   * Call crisis lines as needed   [facility] [phone] [facility] [phone]   [facility] [phone] Crisis Connection [phone]   [facility] [phone] [facility] [phone]   [facility] [phone] National Suicide Prevention [phone]   [facility] [phone] Suicide Prevention [phone]   [facility] [phone]   Abstinence/Relapse Prevention   * Take all medicines as directed. Carry a current list of medicines with you.   * Use coping skills: Use peer support group,continue to attend 12 step meetings.Obtain and work with sponsor.   * Do not use illicit (street) drugs, controlled substances (narcotics) or alcohol.   Develop/Improve Independent Living/Socialization Skills: Continue to build relationship with family and sober support network.  Community Resources/Supports and Discharge Planning: Attend three 12 step meetings per week.Obtain sponsor.Attend Phase 2 on Thurs at 5:30pm   Follow up with psychiatrist / main caregiver: LAWANDA Next visit: TBD   Follow up with your therapist: N/A Next visit: N/A   Go to group therapy and / or support groups at: Phase 2 and 12 step meetings by your home.   See your medical doctor about: N/A   Other:   Client Signature:_______________________ Date / Time:___________   Staff Signature:________________________ Date / Time:___________

## 2018-07-31 NOTE — PROGRESS NOTES
Visit Date:   07/30/2018      TRANSITION SUMMARY.      VISIT DATE: 7/31/2018      EVALUATION COUNSELOR:  ARLENE Malik.   TREATMENT COUNSELORS:  KYUNG Castorena, [facility] and KYUNG Green, [facility] and ARLENE Gr.  REFERRAL SOURCE:  [address]   PROGRAM:  [facility], [facility]ging [facility] Program.     ADMISSION DATE:  7/03/2018.   LAST SESSION DATE: 7/30/2018.   ADMISSION DIAGNOSIS:  Alcohol use disorder, severe, 303.90/F10.20.   DISCHARGE DIAGNOSIS:  Alcohol use disorder, severe, 303.90/F10.20.   DISCHARGE STATUS:  The patient successfully completed program at staff approval.   LAST USE DATE:  As client reported, 6/29/2018.   DAYS OF TREATMENT COMPLETED:  [facility] 28 days.      PRESENTING INFORMATION:  The patient was admitted to [address] [place] [address] at the [facility] for detoxification for his alcoholism.  Chemical dependency evaluation and assessment was completed.  The patient was requesting the [facility] Treatment Program.  The patient met the criteria for the program and once medically stable, he was transferred to the [facility] chemical dependency treatment program.      SERVICES PROVIDED:  Services included assessment, treatment planning, education regarding chemical dependency, individual, group and family therapy, spiritual care counseling and workshops dealing with the issues of depression, anxiety, relapse prevention and relationships.      DIMENSION 1:  ACUTE INTOXICATION AND WITHDRAWAL:  Admission risk factor 0.  Discharge risk factor 0.  The patient had no issues in this area.      DIMENSION 2:  BIOMEDICAL:  Admission risk factor 1.  Discharge risk factor 1.  Upon entering, the patient stated he did not have any current health issues that would interfere with him participating in the chemical dependency treatment program.  The patient is staying medically stable.  The patient has  followed all recommendations of his medical providers.      DIMENSION 3: EMOTIONAL BEHAVIORAL:  Admission risk factor 2.  Discharge risk factor 0.  Upon entering, the patient had a tendency to take too much on at work and at home.  The patient was given the assignment to read the Signs of Stress and Stress and Recovery.  The patient wrote a 2 page reflection paper on these assignments and shared them in group.  This allowed him to gain knowledge and awareness of ways to healthfully manage his stress.  Upon entering treatment, the patient stated that he struggles with contentment and humility.  The patient was given the assignment to read a Search for Serenity and write a 2-page reflection paper.  The patient was able to gain awareness and insight into Serenity and its benefit in long-term recovery.  As part of the CD assessment and evaluation, the patient participated in a suicide risk screening and was rated as a low or no risk.  The patient completed a safety plan and signed off on it.  The patient stated that at no time during the treatment program did he experience any suicidal ideation.      DIMENSION 4:  READINESS TO CHANGE:  Admission risk factor 2.  Discharge risk factor 0.  Upon entering, the patient had continued to drink despite negative consequences in several areas of his life.  The patient was asked to complete the first step assignment and share it in group.  This allowed him to identify areas of powerlessness related to his drinking.  Upon entering treatment, the patient had forgotten the pain that his addiction causes in his life.  The patient was asked to complete and present his drug use history, consequences of his use and the values that he has violated.  This allowed the patient to increase his internal motivation to change his lifestyle to maintain long-term sobriety.  The patient was able to gain awareness and insight into the progression of his illness, and the consequences of his use.  The  patient stated upon entering treatment that what he says and what he does is not consistent when it comes to recovery.  The patient was asked to write 2 pages on the man that he wants to be and share it in group.  The patient was asked to write 2 pages on how he can achieve being a man and what he needs to change in his behaviors to achieve that goal. The patient did an outstanding job on his assignments.  The patient received positive feedback from his peers.  The patient was able to explore how to become consistent and his goals and his behaviors to become the man he wants to be.      DIMENSION 5:  RELAPSE AND CONTINUED USE POTENTIAL:  Admission risk factor 4.  Discharge risk factor 3.  Upon entering treatment, the patient had difficulty dealing with life on life's terms, has been a trigger for the patient to resume drinking.  The patient was given the assignment to read the Fernando Book of Living.  The patient shared a 2-page reflection paper in group.  This allowed the patient to gain awareness and insight into successful sober living.  The patient reported that he had struggled with complacency and sticking to the original recovery plan once he has been in early recovery after past treatments.  The patient was given numerous articles to read on self-discipline. The patient shared 4 pages of reflection in group  after reading these articles.  The patient stated that this allowed him to gain awareness of the importance of discipline and how to apply it in recovery.  The patient stated that one of his main issues in recovery is he struggles to live day by day when he is in the relapse mode.  The patient was given the assignment to read just for today.  The patient was able to practice mindfulness and living in the present during the treatment process with a lot of success per the patient.  The patient has history of multiple relapses.  The patient was given the assignment to read symptoms leading to relapse and 12  steps of relapse.  The patient also completed a recovery plan.  These allowed the patient to create a plan to prevent relapse.      DIMENSION 6:  Recovery Environment:  Admission risk factor 3.  Discharge risk factor 2.  Upon entering treatment, the patient lacked a sober support network.  The patient attended five 12-step meetings per week while in treatment.  The patient was in contact with his sponsor on a daily basis.  This allowed the patient to continue to build a sober support system while in treatment.  The patient is on probation with [facility].  The patient contacted his  telling him he was in treatment and was following all recommendations and conditions of his probation.  This allowed the patient to abide by all conditions of his probation.  The patient was very thankful that he made the phone call.  The patient had damaged the relationship with his family.  The patient's girlfriend and mother participate in the family week program.  This allowed them to start the healing process.  The patient was asked to explore alternative living situations instead of returning home.  The patient stated that his mother's house is a safe sober living environment and the patient was adamant about returning there.      STRENGTHS:  The patient exhibited a positive and open attitude throughout the treatment process.  The patient demonstrated consistent support towards his peers.  The patient gained valuable insight into chemical dependency.      PROGNOSIS:  Favorable.      LIVING ARRANGEMENTS AT DISCHARGE:  The patient will be returning home to live with his mother.      CONTINUING CARE RECOMMENDATIONS AND REFERRALS:   1.  The patient needs to abstain from all mood-altering chemicals.   2.  The patient needs to attend a minimum of three 12-step meetings per week.   3.  The patient needs to build an open and honest relationship with his sponsor.   4.  The patient needs to complete Phase II of the  aftercare component here at [facility].   5.  The patient should stay medication compliant.   6.  The patient needs to follow all rules set forth by his .         This information has been disclosed to you from records protected by Federal confidentiality rules (42 CFR part 2). The Federal rules prohibit you from making any further disclosure of this information unless further disclosure is expressly permitted by the written consent of the person to whom it pertains or as otherwise permitted by 42 CFR part 2. A general authorization for the release of medical or other information is NOT sufficient for this purpose. The Federal rules restrict any use of the information to criminally investigate or prosecute any alcohol or drug abuse patient.      KYUNG CASTILLO, [facility]             D: 2018   T: 2018   MT: PITO      Name:     PARTH HARPER   MRN:      [id number]        Account:      [id number]   :      1990           Visit Date:   2018      Document: [id number]

## 2018-08-02 ENCOUNTER — HOSPITAL ENCOUNTER (OUTPATIENT)
Dept: BEHAVIORAL HEALTH | Facility: CLINIC | Age: 28
End: 2018-08-02
Attending: FAMILY MEDICINE
Payer: COMMERCIAL

## 2018-08-02 PROCEDURE — H2035 A/D TX PROGRAM, PER HOUR: HCPCS | Mod: HQ

## 2018-08-03 NOTE — TELEPHONE ENCOUNTER
----- [facility]ish [facility]illo [facility] sent at 8/2/2018  8:06 PM CDT -----  Please create a referral and schedule this pt for Phase II, group B2, beginning this evening, 8/2/18, even though the session has already occurred. I will request auth for treatment through [facility].  Thank you,   Amish

## 2018-08-07 DIAGNOSIS — F10.20 ALCOHOL DEPENDENCE, CONTINUOUS DRINKING BEHAVIOR (H): ICD-10-CM

## 2018-08-07 DIAGNOSIS — F10.20 ALCOHOL USE DISORDER, SEVERE, DEPENDENCE (H): Chronic | ICD-10-CM

## 2018-08-07 DIAGNOSIS — F41.9 ANXIETY: Chronic | ICD-10-CM

## 2018-08-07 RX ORDER — GABAPENTIN 800 MG/1
TABLET ORAL
Qty: 120 TABLET | Refills: 1 | Status: ON HOLD | OUTPATIENT
Start: 2018-08-07 | End: 2018-10-15

## 2018-08-07 RX ORDER — OMEPRAZOLE 40 MG/1
40 CAPSULE, DELAYED RELEASE ORAL DAILY
Qty: 30 CAPSULE | Refills: 3 | Status: ON HOLD | OUTPATIENT
Start: 2018-08-07 | End: 2018-10-15

## 2018-08-07 NOTE — TELEPHONE ENCOUNTER
"Requested Prescriptions   Pending Prescriptions Disp Refills     omeprazole (PRILOSEC) 40 MG capsule    Last Written Prescription Date:  7-4-18  Last Fill Quantity: 30,  # refills: 0   Last office visit: 7/26/2018 with prescribing provider:  7-26-18   Future Office Visit:   Next 5 appointments (look out 90 days)     Aug 27, 2018  8:00 AM CDT   Office Visit with ROXI Solo CNP   [facility]    [address] [place] [address]  [address]  [facility] [id number] [phone]            Aug 27, 2018  9:00 AM CDT   Return Visit with Tima Crews MD   [facility] ([facility]    [address] [place] [address]  [facility] [id number] [phone]                  30 capsule 0     Sig: Take 1 capsule (40 mg) by mouth daily    PPI Protocol Passed    8/7/2018 12:16 PM       Passed - Not on Clopidogrel (unless Pantoprazole ordered)       Passed - No diagnosis of osteoporosis on record       Passed - Recent (12 mo) or future (30 days) visit within the authorizing provider's specialty    Patient had office visit in the last 12 months or has a visit in the next 30 days with authorizing provider or within the authorizing provider's specialty.  See \"Patient Info\" tab in inbasket, or \"Choose Columns\" in Meds & Orders section of the refill encounter.           Passed - Patient is age 18 or older          "

## 2018-08-09 ENCOUNTER — HOSPITAL ENCOUNTER (OUTPATIENT)
Dept: BEHAVIORAL HEALTH | Facility: CLINIC | Age: 28
End: 2018-08-09
Attending: FAMILY MEDICINE
Payer: COMMERCIAL

## 2018-08-09 PROCEDURE — H2035 A/D TX PROGRAM, PER HOUR: HCPCS | Mod: HQ

## 2018-08-16 ENCOUNTER — HOSPITAL ENCOUNTER (OUTPATIENT)
Dept: BEHAVIORAL HEALTH | Facility: CLINIC | Age: 28
End: 2018-08-16
Attending: FAMILY MEDICINE
Payer: COMMERCIAL

## 2018-08-16 PROCEDURE — H2035 A/D TX PROGRAM, PER HOUR: HCPCS | Mod: HQ

## 2018-08-16 NOTE — PROGRESS NOTES
Patient:  Nickolas Lang            Adult CD Progress Note and Treatment Plan Review     Attendance  Please refer to OP BEH CD Adult Attendance Record Documentation Flowsheet    Support group attended this week: yes    Reporting sobriety:  yes    Treatment Plan     Treatment Plan Review competed on:    8/2/18    Client preferred learning style: Visual; Hands on; Verbal; Demonstration     Staff Members contributing: ARLEEN Green                    Received Supervision: No    Client: contributed to goals and plan.    Client received copy of plan/revised plan: Yes    Client agrees with plan/revised plan: Yes    Changes to Treatment Plan: No    New Goals added since last review: None    Goals worked on since last review: building sober support, meeting with sponsor, mental health management, completion of the LP primary program, adjusting to being home following tx    Strategies effective: yes    Strategies need these changes: Continue working on above goals.     ASAM Risk Rating:    Dimension 1 0 No problems. JONY: 6/29/18.    Dimension 2 0 No problems.    Dimension 3 1 Pt has a diagnosis of anxiety, depression, and ADHD and has been following recommendations of his physician and remaining medication compliant. Pt denies any suicidal ideation and his mood appears stable and hopeful.     Dimension 4 0 Pt continues to identify the negative consequences of his chemical use. Pt denies any major cravings this week.     Dimension 5 3 Pt continues to build and is beginning to utilize coping skills to combat relapse triggers and cues and avoid relapse.      Dimension 6 2 Pt reported that he attended two 12-step meetings this week and has a sponsor whom he is in regular contact with. Pt reported that he met with his sponsor and that they started digging through the Big Book, page by page. Pt returned to living with his mother at home.     Any changes in Vulnerable Adult Status?  No  If yes, add to treatment plan  and individual abuse prevention plan.    Family Involvement:   Pt attended and participated in the family week program while in LP.     Data:   This is the pt's first Phase II session. Pt stated that he is feeling grateful, excited, hopeful, and peaceful this evening. Pt shared that he met with his sponsor today and started digging into the Big Book together and got through the first ten pages. Pt reported that he started feeling sick again yesterday and didn't do all that much and took it easy.    Intervention:   Staff facilitated group and pt actively participated and offered fellow peers feedback.    Assessment:   Pt appears to be adjusting well to being home following tx.     Plan:  Focus on recovery environment  Monitor emotional/physical health      ARLENE Howard

## 2018-08-16 NOTE — ADDENDUM NOTE
Encounter addended by: Amish Murillo LADC on: 8/16/2018  1:48 PM<BR>     Actions taken: Sign clinical note

## 2018-09-09 ENCOUNTER — HOSPITAL ENCOUNTER (INPATIENT)
Facility: CLINIC | Age: 28
LOS: 3 days | Discharge: HOME | DRG: 896 | End: 2018-09-12
Attending: EMERGENCY MEDICINE | Admitting: INTERNAL MEDICINE
Payer: COMMERCIAL

## 2018-09-09 ENCOUNTER — APPOINTMENT (OUTPATIENT)
Dept: CT IMAGING | Facility: CLINIC | Age: 28
DRG: 896 | End: 2018-09-09
Attending: EMERGENCY MEDICINE
Payer: COMMERCIAL

## 2018-09-09 ENCOUNTER — APPOINTMENT (OUTPATIENT)
Dept: GENERAL RADIOLOGY | Facility: CLINIC | Age: 28
DRG: 896 | End: 2018-09-09
Attending: EMERGENCY MEDICINE
Payer: COMMERCIAL

## 2018-09-09 DIAGNOSIS — R45.1 AGITATION REQUIRING SEDATION PROTOCOL: ICD-10-CM

## 2018-09-09 DIAGNOSIS — F10.929 ALCOHOLIC INTOXICATION WITH COMPLICATION (H): ICD-10-CM

## 2018-09-09 LAB
ALBUMIN SERPL-MCNC: 4.7 G/DL (ref 3.4–5)
ALP SERPL-CCNC: 127 U/L (ref 40–150)
ALT SERPL W P-5'-P-CCNC: 74 U/L (ref 0–70)
AMPHETAMINES UR QL SCN: NEGATIVE
ANION GAP SERPL CALCULATED.3IONS-SCNC: 12 MMOL/L (ref 3–14)
APAP SERPL-MCNC: <2 MG/L (ref 10–20)
AST SERPL W P-5'-P-CCNC: 42 U/L (ref 0–45)
BARBITURATES UR QL: NEGATIVE
BASOPHILS # BLD AUTO: 0 10E9/L (ref 0–0.2)
BASOPHILS NFR BLD AUTO: 0 %
BENZODIAZ UR QL: NEGATIVE
BILIRUB SERPL-MCNC: 0.3 MG/DL (ref 0.2–1.3)
BUN SERPL-MCNC: 12 MG/DL (ref 7–30)
CALCIUM SERPL-MCNC: 10.2 MG/DL (ref 8.5–10.1)
CANNABINOIDS UR QL SCN: NEGATIVE
CHLORIDE SERPL-SCNC: 99 MMOL/L (ref 94–109)
CO2 BLDCOV-SCNC: 28 MMOL/L (ref 21–28)
CO2 BLDCOV-SCNC: 29 MMOL/L (ref 21–28)
CO2 SERPL-SCNC: 30 MMOL/L (ref 20–32)
COCAINE UR QL: NEGATIVE
CREAT SERPL-MCNC: 0.73 MG/DL (ref 0.66–1.25)
DIFFERENTIAL METHOD BLD: ABNORMAL
EOSINOPHIL # BLD AUTO: 0 10E9/L (ref 0–0.7)
EOSINOPHIL NFR BLD AUTO: 0 %
ERYTHROCYTE [DISTWIDTH] IN BLOOD BY AUTOMATED COUNT: 14.5 % (ref 10–15)
GFR SERPL CREATININE-BSD FRML MDRD: >90 ML/MIN/1.7M2
GLUCOSE SERPL-MCNC: 118 MG/DL (ref 70–99)
HCT VFR BLD AUTO: 55.4 % (ref 40–53)
HGB BLD-MCNC: 18.7 G/DL (ref 13.3–17.7)
LACTATE BLD-SCNC: 2.6 MMOL/L (ref 0.7–2.1)
LACTATE BLD-SCNC: 5.3 MMOL/L (ref 0.7–2.1)
LYMPHOCYTES # BLD AUTO: 7.5 10E9/L (ref 0.8–5.3)
LYMPHOCYTES NFR BLD AUTO: 49 %
MCH RBC QN AUTO: 28.6 PG (ref 26.5–33)
MCHC RBC AUTO-ENTMCNC: 33.8 G/DL (ref 31.5–36.5)
MCV RBC AUTO: 85 FL (ref 78–100)
MONOCYTES # BLD AUTO: 0.5 10E9/L (ref 0–1.3)
MONOCYTES NFR BLD AUTO: 3 %
NEUTROPHILS # BLD AUTO: 7.3 10E9/L (ref 1.6–8.3)
NEUTROPHILS NFR BLD AUTO: 48 %
OPIATES UR QL SCN: NEGATIVE
PCO2 BLDV: 44 MM HG (ref 40–50)
PCO2 BLDV: 56 MM HG (ref 40–50)
PCP UR QL SCN: NEGATIVE
PH BLDV: 7.32 PH (ref 7.32–7.43)
PH BLDV: 7.42 PH (ref 7.32–7.43)
PLATELET # BLD AUTO: 413 10E9/L (ref 150–450)
PLATELET # BLD EST: ABNORMAL 10*3/UL
PO2 BLDV: 52 MM HG (ref 25–47)
PO2 BLDV: 93 MM HG (ref 25–47)
POTASSIUM SERPL-SCNC: 3.7 MMOL/L (ref 3.4–5.3)
PROT SERPL-MCNC: 9.1 G/DL (ref 6.8–8.8)
RBC # BLD AUTO: 6.53 10E12/L (ref 4.4–5.9)
RBC MORPH BLD: ABNORMAL
SALICYLATES SERPL-MCNC: 2 MG/DL
SAO2 % BLDV FROM PO2: 82 %
SAO2 % BLDV FROM PO2: 97 %
SODIUM SERPL-SCNC: 141 MMOL/L (ref 133–144)
WBC # BLD AUTO: 15.3 10E9/L (ref 4–11)

## 2018-09-09 PROCEDURE — 83605 ASSAY OF LACTIC ACID: CPT

## 2018-09-09 PROCEDURE — 96374 THER/PROPH/DIAG INJ IV PUSH: CPT

## 2018-09-09 PROCEDURE — 25000128 H RX IP 250 OP 636: Performed by: EMERGENCY MEDICINE

## 2018-09-09 PROCEDURE — 80307 DRUG TEST PRSMV CHEM ANLYZR: CPT | Performed by: EMERGENCY MEDICINE

## 2018-09-09 PROCEDURE — 20000003 ZZH R&B ICU

## 2018-09-09 PROCEDURE — 87640 STAPH A DNA AMP PROBE: CPT | Performed by: INTERNAL MEDICINE

## 2018-09-09 PROCEDURE — 40000275 ZZH STATISTIC RCP TIME EA 10 MIN

## 2018-09-09 PROCEDURE — 93005 ELECTROCARDIOGRAM TRACING: CPT

## 2018-09-09 PROCEDURE — 82803 BLOOD GASES ANY COMBINATION: CPT

## 2018-09-09 PROCEDURE — 99285 EMERGENCY DEPT VISIT HI MDM: CPT | Mod: 25

## 2018-09-09 PROCEDURE — 87641 MR-STAPH DNA AMP PROBE: CPT | Performed by: INTERNAL MEDICINE

## 2018-09-09 PROCEDURE — 85025 COMPLETE CBC W/AUTO DIFF WBC: CPT | Performed by: EMERGENCY MEDICINE

## 2018-09-09 PROCEDURE — 40000281 ZZH STATISTIC TRANSPORT TIME EA 15 MIN

## 2018-09-09 PROCEDURE — 80053 COMPREHEN METABOLIC PANEL: CPT | Performed by: EMERGENCY MEDICINE

## 2018-09-09 PROCEDURE — HZ2ZZZZ DETOXIFICATION SERVICES FOR SUBSTANCE ABUSE TREATMENT: ICD-10-PCS | Performed by: EMERGENCY MEDICINE

## 2018-09-09 PROCEDURE — 25000128 H RX IP 250 OP 636

## 2018-09-09 PROCEDURE — 96361 HYDRATE IV INFUSION ADD-ON: CPT

## 2018-09-09 PROCEDURE — 96375 TX/PRO/DX INJ NEW DRUG ADDON: CPT

## 2018-09-09 PROCEDURE — 25000125 ZZHC RX 250: Performed by: EMERGENCY MEDICINE

## 2018-09-09 PROCEDURE — 80329 ANALGESICS NON-OPIOID 1 OR 2: CPT | Performed by: EMERGENCY MEDICINE

## 2018-09-09 PROCEDURE — 40000276 ZZH STATISTIC RCP TIME ED VENT EA 10 MIN

## 2018-09-09 PROCEDURE — 70450 CT HEAD/BRAIN W/O DYE: CPT

## 2018-09-09 PROCEDURE — 96372 THER/PROPH/DIAG INJ SC/IM: CPT

## 2018-09-09 PROCEDURE — 94003 VENT MGMT INPAT SUBQ DAY: CPT

## 2018-09-09 PROCEDURE — 5A1935Z RESPIRATORY VENTILATION, LESS THAN 24 CONSECUTIVE HOURS: ICD-10-PCS | Performed by: EMERGENCY MEDICINE

## 2018-09-09 PROCEDURE — 31500 INSERT EMERGENCY AIRWAY: CPT

## 2018-09-09 PROCEDURE — 40000986 XR CHEST PORT 1 VW

## 2018-09-09 RX ORDER — PROPOFOL 10 MG/ML
INJECTION, EMULSION INTRAVENOUS
Status: COMPLETED
Start: 2018-09-09 | End: 2018-09-09

## 2018-09-09 RX ORDER — PROPOFOL 10 MG/ML
10-20 INJECTION, EMULSION INTRAVENOUS EVERY 30 MIN PRN
Status: DISCONTINUED | OUTPATIENT
Start: 2018-09-09 | End: 2018-09-10

## 2018-09-09 RX ORDER — PROPOFOL 10 MG/ML
5-75 INJECTION, EMULSION INTRAVENOUS CONTINUOUS
Status: DISCONTINUED | OUTPATIENT
Start: 2018-09-09 | End: 2018-09-10

## 2018-09-09 RX ORDER — HALOPERIDOL 5 MG/ML
INJECTION INTRAMUSCULAR
Status: COMPLETED
Start: 2018-09-09 | End: 2018-09-09

## 2018-09-09 RX ORDER — HALOPERIDOL 5 MG/ML
10 INJECTION INTRAMUSCULAR ONCE
Status: COMPLETED | OUTPATIENT
Start: 2018-09-09 | End: 2018-09-09

## 2018-09-09 RX ORDER — ETOMIDATE 2 MG/ML
20 INJECTION INTRAVENOUS ONCE
Status: COMPLETED | OUTPATIENT
Start: 2018-09-09 | End: 2018-09-09

## 2018-09-09 RX ORDER — NALOXONE HYDROCHLORIDE 0.4 MG/ML
.1-.4 INJECTION, SOLUTION INTRAMUSCULAR; INTRAVENOUS; SUBCUTANEOUS
Status: DISCONTINUED | OUTPATIENT
Start: 2018-09-09 | End: 2018-09-12 | Stop reason: HOSPADM

## 2018-09-09 RX ORDER — PROCHLORPERAZINE 25 MG
25 SUPPOSITORY, RECTAL RECTAL EVERY 12 HOURS PRN
Status: DISCONTINUED | OUTPATIENT
Start: 2018-09-09 | End: 2018-09-12 | Stop reason: HOSPADM

## 2018-09-09 RX ORDER — HALOPERIDOL 5 MG/ML
5 INJECTION INTRAMUSCULAR ONCE
Status: COMPLETED | OUTPATIENT
Start: 2018-09-09 | End: 2018-09-09

## 2018-09-09 RX ORDER — ONDANSETRON 4 MG/1
4 TABLET, ORALLY DISINTEGRATING ORAL EVERY 6 HOURS PRN
Status: DISCONTINUED | OUTPATIENT
Start: 2018-09-09 | End: 2018-09-12 | Stop reason: HOSPADM

## 2018-09-09 RX ORDER — SODIUM CHLORIDE 450 MG/100ML
INJECTION, SOLUTION INTRAVENOUS CONTINUOUS
Status: DISCONTINUED | OUTPATIENT
Start: 2018-09-09 | End: 2018-09-11

## 2018-09-09 RX ORDER — ONDANSETRON 2 MG/ML
4 INJECTION INTRAMUSCULAR; INTRAVENOUS EVERY 6 HOURS PRN
Status: DISCONTINUED | OUTPATIENT
Start: 2018-09-09 | End: 2018-09-12 | Stop reason: HOSPADM

## 2018-09-09 RX ORDER — DIPHENHYDRAMINE HCL 25 MG
25 CAPSULE ORAL EVERY 6 HOURS PRN
Status: DISCONTINUED | OUTPATIENT
Start: 2018-09-09 | End: 2018-09-10

## 2018-09-09 RX ORDER — LORAZEPAM 2 MG/ML
0.5 INJECTION INTRAMUSCULAR EVERY 4 HOURS PRN
Status: DISCONTINUED | OUTPATIENT
Start: 2018-09-09 | End: 2018-09-11

## 2018-09-09 RX ORDER — PROCHLORPERAZINE MALEATE 5 MG
10 TABLET ORAL EVERY 6 HOURS PRN
Status: DISCONTINUED | OUTPATIENT
Start: 2018-09-09 | End: 2018-09-12 | Stop reason: HOSPADM

## 2018-09-09 RX ORDER — BISACODYL 10 MG
10 SUPPOSITORY, RECTAL RECTAL DAILY PRN
Status: DISCONTINUED | OUTPATIENT
Start: 2018-09-09 | End: 2018-09-12 | Stop reason: HOSPADM

## 2018-09-09 RX ORDER — LORAZEPAM 2 MG/ML
2 INJECTION INTRAMUSCULAR ONCE
Status: COMPLETED | OUTPATIENT
Start: 2018-09-09 | End: 2018-09-09

## 2018-09-09 RX ORDER — NALOXONE HYDROCHLORIDE 0.4 MG/ML
.1-.4 INJECTION, SOLUTION INTRAMUSCULAR; INTRAVENOUS; SUBCUTANEOUS
Status: DISCONTINUED | OUTPATIENT
Start: 2018-09-09 | End: 2018-09-12

## 2018-09-09 RX ORDER — DIPHENHYDRAMINE HCL 12.5MG/5ML
25 LIQUID (ML) ORAL EVERY 4 HOURS PRN
Status: DISCONTINUED | OUTPATIENT
Start: 2018-09-09 | End: 2018-09-10

## 2018-09-09 RX ADMIN — PROPOFOL 20 MCG/KG/MIN: 10 INJECTION, EMULSION INTRAVENOUS at 21:04

## 2018-09-09 RX ADMIN — MIDAZOLAM HYDROCHLORIDE 4 MG: 1 INJECTION, SOLUTION INTRAMUSCULAR; INTRAVENOUS at 19:17

## 2018-09-09 RX ADMIN — HALOPERIDOL LACTATE 5 MG: 5 INJECTION INTRAMUSCULAR at 20:31

## 2018-09-09 RX ADMIN — MIDAZOLAM 2 MG: 1 INJECTION INTRAMUSCULAR; INTRAVENOUS at 19:29

## 2018-09-09 RX ADMIN — HALOPERIDOL LACTATE 10 MG: 5 INJECTION INTRAMUSCULAR at 18:55

## 2018-09-09 RX ADMIN — SODIUM CHLORIDE 1000 ML: 9 INJECTION, SOLUTION INTRAVENOUS at 20:30

## 2018-09-09 RX ADMIN — MIDAZOLAM 2 MG: 1 INJECTION INTRAMUSCULAR; INTRAVENOUS at 20:51

## 2018-09-09 RX ADMIN — LORAZEPAM 2 MG: 2 INJECTION INTRAMUSCULAR; INTRAVENOUS at 20:31

## 2018-09-09 RX ADMIN — ROCURONIUM BROMIDE 140 MG: 10 INJECTION INTRAVENOUS at 21:04

## 2018-09-09 RX ADMIN — ETOMIDATE 20 MG: 2 INJECTION, SOLUTION INTRAVENOUS at 21:01

## 2018-09-09 RX ADMIN — PROPOFOL 75 MCG/KG/MIN: 10 INJECTION, EMULSION INTRAVENOUS at 23:03

## 2018-09-09 RX ADMIN — SODIUM CHLORIDE 2000 ML: 9 INJECTION, SOLUTION INTRAVENOUS at 19:39

## 2018-09-09 RX ADMIN — HALOPERIDOL 10 MG: 5 INJECTION INTRAMUSCULAR at 18:55

## 2018-09-09 RX ADMIN — MIDAZOLAM 2 MG: 1 INJECTION INTRAMUSCULAR; INTRAVENOUS at 19:51

## 2018-09-09 NOTE — ED NOTES
Bed: ED03  Expected date:   Expected time:   Means of arrival: Ambulance  Comments:  BV2. 30M. BEH

## 2018-09-09 NOTE — IP AVS SNAPSHOT
[place] [address] [facility]    [address]    [facility] [address]    Phone:  [phone]    Fax:  [phone]                                       After Visit Summary   9/9/2018    Nickolas Lang    MRN: [id number]           After Visit Summary Signature Page     I have received my discharge instructions, and my questions have been answered. I have discussed any challenges I see with this plan with the nurse or doctor.    ..........................................................................................................................................  Patient/Patient Representative Signature      ..........................................................................................................................................  Patient Representative Print Name and Relationship to Patient    ..................................................               ................................................  Date                                   Time    ..........................................................................................................................................  Reviewed by Signature/Title    ...................................................              ..............................................  Date                                               Time          22EPIC Rev 08/18

## 2018-09-09 NOTE — ED PROVIDER NOTES
History     Chief Complaint:  Alcohol Intoxication    Limited HPI due to patients level of intoxification.    HPI   Nickolas Lang is a 28 year old male with a history of alcohol abuse who presents to the ED via EMS for alcohol intoxication and possible additional substance use. Per EMS, the patient en route was initially crying but transitioned to combating, attempting to jump out of the ambulance and prompting placement of restraints.They reported a blood alcohol level of .257 and added that the patient's mother suspected that the patient may have taken some of his prescribed medications, though he would not respond to questions regarding this for EMS. The patient is vague but admitted to using another substance as well as alcohol. He denies attempting to harm himself or get high through doing this. The patient denies any pain.     Allergies:  No Known Drug Allergies     Medications:    Norvasc  Adderall  Buspar  Catapres  Antabuse  Neurontin  Atarax  Nicoderm  Nicorette  Prilosec  Zoloft  Desyrel     Past Medical History:    ADD  Alcohol abuse  Anxiety  GERD  Hepatic Steatosis  Hypertension  Obesity  Pyloric Stenosis    Past Surgical History:    Deviated septum repair  Pyloromyotomy surgery  Removal of cartilage    Family History:    Mother: MS, depression, anxiety  Father: alcohol/drug, substance abuse  Maternal Grandmother: depression, anxiety disorder, hypertension  Maternal Grandfather: substance abuse  Paternal Grandfather: alcohol/drug    Social History:  Smoking Status: Current Every Day Smoker   Packs/day: 1   Years: 10   Types: Cigarettes  Smokeless Tobacco: Never Used  Alcohol Use: Positive  Marital Status:  Single     Review of Systems   Reason unable to perform ROS: Due to patients intoxication      Physical Exam     Patient Vitals for the past 24 hrs:   BP Temp Temp src Pulse Heart Rate Resp SpO2   09/09/18 2230 (!) 133/96 - - - 105 20 99 %   09/09/18 2215 (!) 132/97 - - - 106 16 99 %    09/09/18 2212 (!) 140/100 99.5  F (37.5  C) Temporal - 106 20 100 %   09/09/18 2200 (!) 141/109 - - - 114 22 98 %   09/09/18 2145 (!) 170/107 - - - 123 30 -   09/09/18 2115 (!) 152/107 - - - 110 - 100 %   09/09/18 2114 (!) 161/117 - - - 107 20 100 %   09/09/18 2100 (!) 155/140 - - - 101 17 100 %   09/09/18 2045 (!) 130/107 - - - 124 (!) 36 100 %   09/09/18 2030 (!) 152/108 - - - 101 18 100 %   09/09/18 2015 (!) 163/114 - - - 120 18 100 %   09/09/18 2000 (!) 149/108 - - - 105 25 100 %   09/09/18 1945 (!) 171/152 - - - 114 (!) 38 100 %   09/09/18 1930 (!) 154/111 - - - 110 22 100 %   09/09/18 1915 (!) 144/123 - - - 141 (!) 33 -   09/09/18 1905 - 97  F (36.1  C) Temporal - - - -   09/09/18 1900 (!) 152/106 - - 122 122 24 (!) 84 %     Physical Exam  HENT: Moist oral mucosa.   Eyes: Grossly normal vision. Pupils are equal and round, reactive to light at 4mm bilaterally. Extraocular movements intact with bilateral horizontal nystagmus.  Sclera clear and without icterus. Conjunctiva without pallor.  Cardiovascular: Rapid rate. Regular rhythm. S1 and S2 without audible murmurs. 2+ radial pulses. Normal capillary refill.  Respiratory: Effort normal. Clear breath sounds bilaterally.  Gastrointestinal: Soft. No distension. No tenderness to palpation. No rebound or guarding.  Neurologic: Alert and awake. Coordinated movement of extremities. Speech clear. No rigidity or clonus. Full str off all extremities.  Skin: Diaphoretic  Musculoskeletal: No lower extremity edema. No gross deformity. No joint swelling.  Psychiatric: Limited slow thought process.       Emergency Department Course     ECG:  ECG taken at 1925, ECG read at 2119  Sinus tachycardia  Otherwise normal ECG  Rate 123 bpm. SC interval 144 ms. QRS duration 102 ms. QT/QTc 342/489 ms. P-R-T axes 58 78 32.    Imaging:  Radiology findings were communicated with the patient who voiced understanding of the findings.    Head CT w/o contrast  Normal head CT  Radiation dose  "for this scan was reduced using automated exposure  control, adjustment of the mA and/or kV according to patient size, or  iterative reconstruction technique  Reading per radiology    XR Chest Port 1 View  Endotracheal tube is midline tip 4 cm above the prince.  Patchy upper lobe infiltrates. No evidence for pleural effusion.  GÓMEZ LOPEZ MD  Reading per radiology    Laboratory:  Laboratory findings were communicated with the patient who voiced understanding of the findings.    Drug Abuse Screen 77 Urine: negative  CBC: WBC 15.3 (H), HGB 18.7 (H),   CMP: Glucose 118 (H), Calcium 10.2 (H), Protein Total 9.1 (H), ALT 74 (H) o/w WNL (Creatinine 0.73)  Acetaminophen Level: <2  Salicylate Level: 2  ISTAT Gases Lactate Gibran POCT (Collected 1921): pH 7.42, PCO2 44, PO2 93 (H), Bicarbonate 28, O2 Sat 97, Lactic Acid 5.3 (HH)  ISTAT Gases Lactate Gibran POCT (Collected 2155): pH 7.23, PCO2 56 (H), PO2 52 (H), Bicarbonate 29 (H), O2 Sat 82, Lactic Acid 2.5 (H)    Procedures:     Intubation      INDICATION: Agitated delirium    PERFORMED BY: Timbo Haines MD    CONSENT: The procedure was performed in an emergent situation.    TIMEOUT: Immediately prior to procedure a \"time out\" was called to verify the correct patient, procedure, equipment, support staff and site/side marked as required.    INTUBATION METHOD: Glidescope    PATIENT STATUS: RSI    PREOXYGENATION: Mask    PRETREATMENT MEDICATIONS: None    SEDATIVES: Etomidate    PARALYTIC: Rocuronium    LARYNGOSCOPE SIZE: Mac 4    TUBE SIZE: 8.0 cuffed with cuff inflated after placement  Number of attempts: 1    POST-PROCEDURE ASSESSMENT: Breath sounds equal bilaterally with chest rise and absent over the epigastrium, Chest x-ray interpreted by me demonstrating endotracheal tube in appropriate position and CO2 detector.    ETT TO TEETH: 23 cm  Tube secured with: ETT gu    Patient tolerated the procedure well with no immediate complications.  COMPLICATIONS:  " None    Interventions:  1855 Haldol 10 mg IM  1917 Versed 4 mg IV  1929 Versed 2 mg IV  1939 NS 2000 ml IV  1951 Versed 2 mg IV  2030 NS 1000 ml IV  2031 Ativan 2 mg IV  2031 Haldol 5 mg IV  2051 Versed 2 mg IV  2101 Etomidate 20 mg IV  2104 Rocuronium 140 mg IV  2104 Propofol drip started    Emergency Department Course:    1854 Nursing notes and vitals reviewed.    1856 I performed an exam of the patient as documented above.     1921 ISTAT blood gases obtained.    1924 IV was inserted and blood was drawn for laboratory testing, results above.    1925 EKG obtained as noted above.    1927 The patient provided a urine sample here in the emergency department. This was sent for laboratory testing, findings above.    1931 Recheck and update. Patient is tachycardic, diaphoretic, and fighting the restraints.    2101 I performed the intubation procedure as documented above.    2103 Portable chest xray obtained.     2138 The patient was sent for a head CT while in the emergency department, results above.     2203 I spoke with Dr. Sullivan of the hospitalist service from [facility] regarding patient's presentation, findings, and plan of care.    2239 I personally reviewed the laboratory and imaging results with the patient and answered all related questions prior to admission.    Impression & Plan      Medical Decision Making:  Nickolas Lang is a 28 year old male who presents to the emergency department today for evaluation of altered mental status.  The patient was noted to have a positive breathalyzer of 0.25.  Prehospital he and in the emergency department he was not redirectable and continues to try to get out of bed.  There was some concern that the patient had taken more than just alcohol.  He was given intramuscular Haldol for sedation.  He was placed in physical restraints.  He required intra-venous Versed for further sedation and was given intravenous Haldol and Ativan as well.  The patient was persistently  not redirectable and fighting against his restraints.  His heart rate elevated to the 130s and his blood pressure was elevated.  Given his physical exam findings, this is concerning for an acute sympathomimetic toxicity or anticholinergic toxicity.  It is possible that this is just severe alcohol intoxication or other mental health issue.  Given his ongoing hypermotor agitation, this raise concern for agitated delirium and the potential metabolic complications of this.  At this point, the patient was intubated because of this emergency situation.  This was done via RSI.  Chest x-ray confirmed tube placement and showed some bilateral hazy opacities in the upper lobes.  He does not have any fever or any other concerns that he might have an infection at this time.  His respiratory status will be monitored in the hospital.  He was placed on a propofol infusion. He was given 3 L of fluid and started on maintenance infusion.  A Webb catheter was placed.  CT head was negative.  EKG without any signs of interval changes.  Tylenol and salicylate levels were negative.  Chemistry was normal.  His VBG demonstrated elevated lactate and mildly elevated CO2.  His respiratory rate was adjusted after he was intubated repeat blood gas showed some improvement.  His lactate was clearing from 5 to 2.3 after fluids.  He requires ongoing critical care and will be admitted to the ICU.  His care was signed out to the hospitalist team and he left the emergency department in improved condition.    Diagnosis:    ICD-10-CM    1. Agitation requiring sedation protocol R45.1    2. Alcoholic intoxication with complication (H) F10.929      Disposition:   The patient is admitted into the care of Dr. Sullivan.    Critical Care time was 45 minutes for this patient excluding procedures.     CMS Diagnoses: The Lactic acid level is elevated due to hypermotor agitation and alcohol use, at this time there is no sign of severe sepsis or septic shock.    Scribe  Disclosure:  I, Carly Danya, am serving as a scribe at 7:12 PM on 9/9/2018 to document services personally performed by Timbo Haines MD based on my observations and the provider's statements to me.     [facility] EMERGENCY DEPARTMENT       Timbo Haines MD  09/09/18 4713

## 2018-09-09 NOTE — ED TRIAGE NOTES
Pt arrives to the ED via EMS from home for alcohol intoxication. Per EMS, pt blew a .24. When asked if pt took anything else he says yes but will not say what. Pt brought in with restraints on due to him trying to leave. Pt unable to answer questions appropriately or follow instructions.

## 2018-09-09 NOTE — IP AVS SNAPSHOT
MRN:[id number]                      After Visit Summary   9/9/2018    Nickolas Lang    MRN: [id number]           Thank you!     Thank you for choosing [facility] for your care. Our goal is always to provide you with excellent care. Hearing back from our patients is one way we can continue to improve our services. Please take a few minutes to complete the written survey that you may receive in the mail after you visit. If you would like to speak to someone directly about your visit please contact Patient Relations at [phone]. Thank you!          Patient Information     Date Of Birth          1990        About your hospital stay     You were admitted on:  September 9, 2018 You last received care in the:  [address] [facility]    You were discharged on:  September 12, 2018        Reason for your hospital stay       Admitted due to severe alcohol intoxication complicated with EtOH withdrawals                  Who to Call     For medical emergencies, please call 911.  For non-urgent questions about your medical care, please call your primary care provider or clinic, [phone]          Attending Provider     Provider Specialty    Timbo Haines MD Emergency Medicine    Homa Sullivan MD Internal Medicine       Primary Care Provider Office Phone # Fax #    Tima Crews [phone]      After Care Instructions     Activity       Your activity upon discharge: activity as tolerated            Diet       Follow this diet upon discharge: Orders Placed This Encounter      Regular Diet Adult                  Follow-up Appointments     Follow-up and recommended labs and tests        Follow up with primary care provider, Tima Crews, within 7 days to evaluate medication change, to evaluate treatment change and for hospital follow- up.  No follow up labs or test are needed.  You need to follow-up with your addiction specialist in the next 7  days then be your alcohol Anonymous as scheduled.                  Your next 10 appointments already scheduled     Sep 13, 2018  5:30 PM CDT   Treatment with ADULT LODGING PLUS B2   [facility] ([facility])    [address] [place] [address]   [phone]            Sep 20, 2018  5:30 PM CDT   Treatment with ADULT LODGING PLUS B2   [facility] ([facility])    [address] [place] [address]   [phone]            Sep 27, 2018  5:30 PM CDT   Treatment with ADULT LODGING PLUS B2   [facility] ([facility])    [address] [place] [address]   [phone]            Oct 04, 2018  5:30 PM CDT   Treatment with ADULT LODGING PLUS B2   [facility] ([facility])    [address] [place] [address]   [phone]            Oct 11, 2018  5:30 PM CDT   Treatment with ADULT LODGING PLUS B2   [facility] ([facility])    [address] [place] [address]   [phone]            Oct 18, 2018  5:30 PM CDT   Treatment with ADULT LODGING PLUS B2   [facility] ([facility])    [address] [place] [address]   [phone]              Pending Results     No orders found from 9/7/2018 to 9/10/2018.            Statement of Approval     Ordered          09/12/18 1054  I have reviewed and agree with all the recommendations and orders detailed in this document.  EFFECTIVE NOW     Approved and electronically signed by:  Endy Louis MD             Admission Information     Date & Time Provider Department Dept. Phone    9/9/2018 Homa Sullivan MD [facility]  [place] [address] [facility] [phone]      Your Vitals Were     Blood Pressure Pulse Temperature Respirations Weight Pulse Oximetry    112/78 (BP Location: Left arm) 122 98.1  F (36.7  C) (Oral) 16 141.5 kg (311 lb 15.2 oz) 100%    BMI (Body Mass Index)                   41.16 kg/m2           MyChart Information     [facility] [employer] [facility] gives you secure access to your electronic health record. If you see a primary care provider, you can also send messages to your care team and make appointments. If you have questions, please call your primary care clinic.  If you do not have a primary care provider, please call [phone] and they will assist you.        Care EveryWhere ID     This is your Care EveryWhere ID. This could be used by other organizations to access your [place] medical records  [id number]        Equal Access to Services     ERMELINDA LOZADA : iRshabh Petersen, cherry patterson, shawna cloud. So [facility] [phone].    ATENCIÓN: Si habla español, tiene a miller disposición servicios gratuitos de asistencia lingüística. Stan al [phone].    We comply with applicable federal civil rights laws and [place] laws. We do not discriminate on the basis of race, color, national origin, age, disability, sex, sexual orientation, or gender identity.               Review of your medicines      CONTINUE these medicines which have NOT CHANGED        Dose / Directions    amLODIPine 10 MG tablet   Commonly known as:  NORVASC   Used for:  Alcohol dependence, continuous drinking behavior (H)        Dose:  10 mg   Take 1 tablet (10 mg) by mouth daily   Quantity:  90 tablet   Refills:  1       amphetamine-dextroamphetamine 30 MG per 24 hr capsule   Commonly known as:  ADDERALL XR   Indication:  Attention Deficit Hyperactivity Disorder        Dose:  30 mg   Take 30 mg by mouth 2 times daily   Refills:  0       busPIRone 10 MG tablet   Commonly known as:  BUSPAR   Used  for:  Alcohol dependence, continuous drinking behavior (H)        Dose:  20 mg   Take 2 tablets (20 mg) by mouth 3 times daily   Quantity:  90 tablet   Refills:  0       cloNIDine 0.1 MG tablet   Commonly known as:  CATAPRES   Used for:  Alcohol dependence, continuous drinking behavior (H)        Dose:  0.1 mg   Take 1 tablet (0.1 mg) by mouth 2 times daily   Quantity:  180 tablet   Refills:  1       gabapentin 800 MG tablet   Commonly known as:  NEURONTIN   Used for:  Anxiety, Alcohol use disorder, severe, dependence (H)        TAKE 1 TABLET (800 MG) BY MOUTH 4 TIMES DAILY   Quantity:  120 tablet   Refills:  1       hydrOXYzine 50 MG tablet   Commonly known as:  ATARAX   Used for:  Chemical dependency (H)        Dose:  50 mg   Take 1 tablet (50 mg) by mouth every 4 hours as needed for anxiety   Quantity:  120 tablet   Refills:  0       loperamide 2 MG tablet   Commonly known as:  IMODIUM A-D   Used for:  Diarrhea, unspecified type        Dose:  2 mg   Take 1 tablet (2 mg) by mouth 4 times daily as needed for diarrhea   Quantity:  20 tablet   Refills:  0       multivitamin, therapeutic with minerals Tabs tablet   Used for:  Alcohol dependence, continuous drinking behavior (H)        Dose:  1 tablet   Take 1 tablet by mouth daily   Quantity:  30 each   Refills:  0       nicotine 21 MG/24HR 24 hr patch   Commonly known as:  NICODERM CQ   Used for:  Alcohol dependence, continuous drinking behavior (H)        Dose:  1 patch   Place 1 patch onto the skin daily   Quantity:  30 patch   Refills:  0       * nicotine polacrilex 4 MG lozenge   Used for:  Encounter for smoking cessation counseling        1-2 lozenges every hour as needed, max 10 lozenges per day   Quantity:  360 tablet   Refills:  1       * nicotine polacrilex 4 MG gum   Commonly known as:  NICORETTE   Used for:  Encounter for smoking cessation counseling        Chew one piece every hour as directed.   Quantity:  100 tablet   Refills:  2       omeprazole 40 MG  capsule   Commonly known as:  priLOSEC   Used for:  Alcohol dependence, continuous drinking behavior (H)        Dose:  40 mg   Take 1 capsule (40 mg) by mouth daily   Quantity:  30 capsule   Refills:  3       ondansetron 4 MG tablet   Commonly known as:  ZOFRAN   Used for:  Nausea and vomiting, intractability of vomiting not specified, unspecified vomiting type        Dose:  4 mg   Take 1 tablet (4 mg) by mouth every 8 hours as needed for nausea   Quantity:  10 tablet   Refills:  0       sertraline 100 MG tablet   Commonly known as:  ZOLOFT   Used for:  Alcohol dependence, continuous drinking behavior (H)        Dose:  200 mg   Take 2 tablets (200 mg) by mouth daily   Quantity:  30 tablet   Refills:  0       thiamine 100 MG tablet   Used for:  Acute alcoholic intoxication in alcoholism with complication (H)        Dose:  100 mg   Take 1 tablet (100 mg) by mouth daily   Quantity:  30 tablet   Refills:  1       traZODone 100 MG tablet   Commonly known as:  DESYREL   Used for:  Alcohol dependence, continuous drinking behavior (H)        Dose:  100-200 mg   Take 1-2 tablets (100-200 mg) by mouth nightly as needed for sleep   Quantity:  90 tablet   Refills:  0       * Notice:  This list has 2 medication(s) that are the same as other medications prescribed for you. Read the directions carefully, and ask your doctor or other care provider to review them with you.             Protect others around you: Learn how to safely use, store and throw away your medicines at www.disposemymeds.org.             Medication List: This is a list of all your medications and when to take them. Check marks below indicate your daily home schedule. Keep this list as a reference.      Medications           Morning Afternoon Evening Bedtime As Needed    amLODIPine 10 MG tablet   Commonly known as:  NORVASC   Take 1 tablet (10 mg) by mouth daily   Last time this was given:  10 mg on 9/12/2018  8:22 AM                                 amphetamine-dextroamphetamine 30 MG per 24 hr capsule   Commonly known as:  ADDERALL XR   Take 30 mg by mouth 2 times daily   Last time this was given:  30 mg on 9/12/2018  8:22 AM                                busPIRone 10 MG tablet   Commonly known as:  BUSPAR   Take 2 tablets (20 mg) by mouth 3 times daily   Last time this was given:  20 mg on 9/12/2018  8:22 AM                                cloNIDine 0.1 MG tablet   Commonly known as:  CATAPRES   Take 1 tablet (0.1 mg) by mouth 2 times daily   Last time this was given:  0.1 mg on 9/12/2018  8:23 AM                                gabapentin 800 MG tablet   Commonly known as:  NEURONTIN   TAKE 1 TABLET (800 MG) BY MOUTH 4 TIMES DAILY                                hydrOXYzine 50 MG tablet   Commonly known as:  ATARAX   Take 1 tablet (50 mg) by mouth every 4 hours as needed for anxiety                                loperamide 2 MG tablet   Commonly known as:  IMODIUM A-D   Take 1 tablet (2 mg) by mouth 4 times daily as needed for diarrhea                                multivitamin, therapeutic with minerals Tabs tablet   Take 1 tablet by mouth daily                                nicotine 21 MG/24HR 24 hr patch   Commonly known as:  NICODERM CQ   Place 1 patch onto the skin daily                                * nicotine polacrilex 4 MG lozenge   1-2 lozenges every hour as needed, max 10 lozenges per day                                * nicotine polacrilex 4 MG gum   Commonly known as:  NICORETTE   Chew one piece every hour as directed.                                omeprazole 40 MG capsule   Commonly known as:  priLOSEC   Take 1 capsule (40 mg) by mouth daily                                ondansetron 4 MG tablet   Commonly known as:  ZOFRAN   Take 1 tablet (4 mg) by mouth every 8 hours as needed for nausea                                sertraline 100 MG tablet   Commonly known as:  ZOLOFT   Take 2 tablets (200 mg) by mouth daily   Last time this was given:   200 mg on 9/12/2018  8:22 AM                                thiamine 100 MG tablet   Take 1 tablet (100 mg) by mouth daily   Last time this was given:  100 mg on 9/12/2018  8:23 AM                                traZODone 100 MG tablet   Commonly known as:  DESYREL   Take 1-2 tablets (100-200 mg) by mouth nightly as needed for sleep   Last time this was given:  200 mg on 9/11/2018 11:05 PM                                * Notice:  This list has 2 medication(s) that are the same as other medications prescribed for you. Read the directions carefully, and ask your doctor or other care provider to review them with you.

## 2018-09-10 LAB
ALBUMIN SERPL-MCNC: 3.4 G/DL (ref 3.4–5)
ALBUMIN UR-MCNC: 30 MG/DL
ALP SERPL-CCNC: 91 U/L (ref 40–150)
ALT SERPL W P-5'-P-CCNC: 54 U/L (ref 0–70)
AMORPH CRY #/AREA URNS HPF: ABNORMAL /HPF
ANION GAP SERPL CALCULATED.3IONS-SCNC: 9 MMOL/L (ref 3–14)
APPEARANCE UR: ABNORMAL
AST SERPL W P-5'-P-CCNC: 35 U/L (ref 0–45)
BACTERIA #/AREA URNS HPF: ABNORMAL /HPF
BASOPHILS # BLD AUTO: 0 10E9/L (ref 0–0.2)
BASOPHILS NFR BLD AUTO: 0 %
BILIRUB SERPL-MCNC: 0.3 MG/DL (ref 0.2–1.3)
BILIRUB UR QL STRIP: NEGATIVE
BUN SERPL-MCNC: 15 MG/DL (ref 7–30)
CALCIUM SERPL-MCNC: 7.5 MG/DL (ref 8.5–10.1)
CHLORIDE SERPL-SCNC: 105 MMOL/L (ref 94–109)
CO2 SERPL-SCNC: 27 MMOL/L (ref 20–32)
COLOR UR AUTO: YELLOW
CREAT SERPL-MCNC: 0.61 MG/DL (ref 0.66–1.25)
DIFFERENTIAL METHOD BLD: ABNORMAL
EOSINOPHIL # BLD AUTO: 0.2 10E9/L (ref 0–0.7)
EOSINOPHIL NFR BLD AUTO: 2 %
ERYTHROCYTE [DISTWIDTH] IN BLOOD BY AUTOMATED COUNT: 14.1 % (ref 10–15)
GFR SERPL CREATININE-BSD FRML MDRD: >90 ML/MIN/1.7M2
GLUCOSE BLDC GLUCOMTR-MCNC: 102 MG/DL (ref 70–99)
GLUCOSE BLDC GLUCOMTR-MCNC: 106 MG/DL (ref 70–99)
GLUCOSE BLDC GLUCOMTR-MCNC: 106 MG/DL (ref 70–99)
GLUCOSE BLDC GLUCOMTR-MCNC: 91 MG/DL (ref 70–99)
GLUCOSE BLDC GLUCOMTR-MCNC: 95 MG/DL (ref 70–99)
GLUCOSE BLDC GLUCOMTR-MCNC: 99 MG/DL (ref 70–99)
GLUCOSE SERPL-MCNC: 109 MG/DL (ref 70–99)
GLUCOSE UR STRIP-MCNC: NEGATIVE MG/DL
HCT VFR BLD AUTO: 44.4 % (ref 40–53)
HGB BLD-MCNC: 14.9 G/DL (ref 13.3–17.7)
HGB UR QL STRIP: NEGATIVE
HYALINE CASTS #/AREA URNS LPF: 14 /LPF (ref 0–2)
INTERPRETATION ECG - MUSE: NORMAL
KETONES UR STRIP-MCNC: NEGATIVE MG/DL
LACTATE BLD-SCNC: 2.5 MMOL/L (ref 0.7–2)
LEUKOCYTE ESTERASE UR QL STRIP: ABNORMAL
LYMPHOCYTES # BLD AUTO: 7.6 10E9/L (ref 0.8–5.3)
LYMPHOCYTES NFR BLD AUTO: 64 %
MCH RBC QN AUTO: 28.9 PG (ref 26.5–33)
MCHC RBC AUTO-ENTMCNC: 33.6 G/DL (ref 31.5–36.5)
MCV RBC AUTO: 86 FL (ref 78–100)
MONOCYTES # BLD AUTO: 0.5 10E9/L (ref 0–1.3)
MONOCYTES NFR BLD AUTO: 4 %
MRSA DNA SPEC QL NAA+PROBE: NEGATIVE
MUCOUS THREADS #/AREA URNS LPF: PRESENT /LPF
MYELOCYTES # BLD: 0.1 10E9/L
MYELOCYTES NFR BLD MANUAL: 1 %
NEUTROPHILS # BLD AUTO: 3.5 10E9/L (ref 1.6–8.3)
NEUTROPHILS NFR BLD AUTO: 29 %
NITRATE UR QL: NEGATIVE
PH UR STRIP: 5 PH (ref 5–7)
PLATELET # BLD AUTO: 303 10E9/L (ref 150–450)
PLATELET # BLD EST: ABNORMAL 10*3/UL
POTASSIUM SERPL-SCNC: 4 MMOL/L (ref 3.4–5.3)
PROT SERPL-MCNC: 6.5 G/DL (ref 6.8–8.8)
RBC # BLD AUTO: 5.15 10E12/L (ref 4.4–5.9)
RBC #/AREA URNS AUTO: 5 /HPF (ref 0–2)
RBC MORPH BLD: ABNORMAL
SODIUM SERPL-SCNC: 141 MMOL/L (ref 133–144)
SOURCE: ABNORMAL
SP GR UR STRIP: 1.03 (ref 1–1.03)
SPECIMEN SOURCE: NORMAL
UROBILINOGEN UR STRIP-MCNC: 0 MG/DL (ref 0–2)
WBC # BLD AUTO: 11.9 10E9/L (ref 4–11)
WBC #/AREA URNS AUTO: 0 /HPF (ref 0–5)

## 2018-09-10 PROCEDURE — 00000146 ZZHCL STATISTIC GLUCOSE BY METER IP

## 2018-09-10 PROCEDURE — 25000128 H RX IP 250 OP 636: Performed by: INTERNAL MEDICINE

## 2018-09-10 PROCEDURE — 25000128 H RX IP 250 OP 636: Performed by: SURGERY

## 2018-09-10 PROCEDURE — 80053 COMPREHEN METABOLIC PANEL: CPT | Performed by: INTERNAL MEDICINE

## 2018-09-10 PROCEDURE — 25000125 ZZHC RX 250: Performed by: SURGERY

## 2018-09-10 PROCEDURE — 99291 CRITICAL CARE FIRST HOUR: CPT | Performed by: SURGERY

## 2018-09-10 PROCEDURE — 94003 VENT MGMT INPAT SUBQ DAY: CPT

## 2018-09-10 PROCEDURE — 36415 COLL VENOUS BLD VENIPUNCTURE: CPT | Performed by: INTERNAL MEDICINE

## 2018-09-10 PROCEDURE — 27210995 ZZH RX 272: Performed by: INTERNAL MEDICINE

## 2018-09-10 PROCEDURE — 25000132 ZZH RX MED GY IP 250 OP 250 PS 637: Performed by: INTERNAL MEDICINE

## 2018-09-10 PROCEDURE — 40000914 ZZH STATISTIC SITTER, DAY HOURS

## 2018-09-10 PROCEDURE — 85025 COMPLETE CBC W/AUTO DIFF WBC: CPT | Performed by: INTERNAL MEDICINE

## 2018-09-10 PROCEDURE — 40000275 ZZH STATISTIC RCP TIME EA 10 MIN

## 2018-09-10 PROCEDURE — 20000003 ZZH R&B ICU

## 2018-09-10 PROCEDURE — 81001 URINALYSIS AUTO W/SCOPE: CPT | Performed by: INTERNAL MEDICINE

## 2018-09-10 PROCEDURE — 99233 SBSQ HOSP IP/OBS HIGH 50: CPT | Performed by: INTERNAL MEDICINE

## 2018-09-10 PROCEDURE — 83605 ASSAY OF LACTIC ACID: CPT | Performed by: INTERNAL MEDICINE

## 2018-09-10 PROCEDURE — 40000915 ZZH STATISTIC SITTER, EVENING HOURS

## 2018-09-10 RX ORDER — AMLODIPINE BESYLATE 10 MG/1
10 TABLET ORAL DAILY
Status: DISCONTINUED | OUTPATIENT
Start: 2018-09-10 | End: 2018-09-12 | Stop reason: HOSPADM

## 2018-09-10 RX ORDER — LORAZEPAM 2 MG/ML
4 INJECTION INTRAMUSCULAR ONCE
Status: COMPLETED | OUTPATIENT
Start: 2018-09-10 | End: 2018-09-10

## 2018-09-10 RX ORDER — THIAMINE HYDROCHLORIDE 100 MG/ML
50 INJECTION, SOLUTION INTRAMUSCULAR; INTRAVENOUS DAILY
Status: DISCONTINUED | OUTPATIENT
Start: 2018-09-10 | End: 2018-09-10

## 2018-09-10 RX ORDER — CLONIDINE HYDROCHLORIDE 0.1 MG/1
0.1 TABLET ORAL 2 TIMES DAILY
Status: DISCONTINUED | OUTPATIENT
Start: 2018-09-10 | End: 2018-09-12 | Stop reason: HOSPADM

## 2018-09-10 RX ORDER — FOLIC ACID 5 MG/ML
1 INJECTION, SOLUTION INTRAMUSCULAR; INTRAVENOUS; SUBCUTANEOUS DAILY
Status: DISCONTINUED | OUTPATIENT
Start: 2018-09-10 | End: 2018-09-10

## 2018-09-10 RX ORDER — FOLIC ACID 1 MG/1
1 TABLET ORAL DAILY
Status: DISCONTINUED | OUTPATIENT
Start: 2018-09-10 | End: 2018-09-12 | Stop reason: HOSPADM

## 2018-09-10 RX ADMIN — PROPOFOL 60 MCG/KG/MIN: 10 INJECTION, EMULSION INTRAVENOUS at 06:17

## 2018-09-10 RX ADMIN — PROPOFOL 70 MCG/KG/MIN: 10 INJECTION, EMULSION INTRAVENOUS at 04:36

## 2018-09-10 RX ADMIN — PROPOFOL 40 MCG/KG/MIN: 10 INJECTION, EMULSION INTRAVENOUS at 10:32

## 2018-09-10 RX ADMIN — SODIUM CHLORIDE: 4.5 INJECTION, SOLUTION INTRAVENOUS at 00:03

## 2018-09-10 RX ADMIN — CLONIDINE HYDROCHLORIDE 0.1 MG: 0.1 TABLET ORAL at 00:35

## 2018-09-10 RX ADMIN — CLONIDINE HYDROCHLORIDE 0.1 MG: 0.1 TABLET ORAL at 20:26

## 2018-09-10 RX ADMIN — DIPHENHYDRAMINE HYDROCHLORIDE 25 MG: 25 CAPSULE ORAL at 00:03

## 2018-09-10 RX ADMIN — MIDAZOLAM HYDROCHLORIDE 1 MG/HR: 5 INJECTION, SOLUTION INTRAMUSCULAR; INTRAVENOUS at 01:38

## 2018-09-10 RX ADMIN — SODIUM CHLORIDE: 4.5 INJECTION, SOLUTION INTRAVENOUS at 17:56

## 2018-09-10 RX ADMIN — PROPOFOL 75 MCG/KG/MIN: 10 INJECTION, EMULSION INTRAVENOUS at 03:04

## 2018-09-10 RX ADMIN — PROPOFOL 20 MG: 10 INJECTION, EMULSION INTRAVENOUS at 06:15

## 2018-09-10 RX ADMIN — DEXMEDETOMIDINE 0.2 MCG/KG/HR: 100 INJECTION, SOLUTION, CONCENTRATE INTRAVENOUS at 11:18

## 2018-09-10 RX ADMIN — CLONIDINE HYDROCHLORIDE 0.1 MG: 0.1 TABLET ORAL at 09:23

## 2018-09-10 RX ADMIN — SODIUM CHLORIDE: 4.5 INJECTION, SOLUTION INTRAVENOUS at 08:32

## 2018-09-10 RX ADMIN — PROPOFOL 75 MCG/KG/MIN: 10 INJECTION, EMULSION INTRAVENOUS at 01:27

## 2018-09-10 RX ADMIN — LORAZEPAM 4 MG: 2 INJECTION INTRAMUSCULAR; INTRAVENOUS at 01:26

## 2018-09-10 RX ADMIN — THIAMINE HCL (VITAMIN B1) 50 MG TABLET 50 MG: at 14:37

## 2018-09-10 RX ADMIN — Medication 50 MCG/HR: at 01:27

## 2018-09-10 RX ADMIN — PROPOFOL 20 MG: 10 INJECTION, EMULSION INTRAVENOUS at 00:47

## 2018-09-10 RX ADMIN — FOLIC ACID 1 MG: 1 TABLET ORAL at 14:37

## 2018-09-10 RX ADMIN — PROPOFOL 75 MCG/KG/MIN: 10 INJECTION, EMULSION INTRAVENOUS at 00:02

## 2018-09-10 RX ADMIN — AMLODIPINE BESYLATE 10 MG: 10 TABLET ORAL at 09:23

## 2018-09-10 RX ADMIN — LORAZEPAM 0.5 MG: 2 INJECTION INTRAMUSCULAR; INTRAVENOUS at 00:03

## 2018-09-10 RX ADMIN — PROPOFOL 60 MCG/KG/MIN: 10 INJECTION, EMULSION INTRAVENOUS at 08:27

## 2018-09-10 ASSESSMENT — ACTIVITIES OF DAILY LIVING (ADL)
ADLS_ACUITY_SCORE: 12
ADLS_ACUITY_SCORE: 12
BATHING: 0-->INDEPENDENT
ADLS_ACUITY_SCORE: 12
AMBULATION: 0-->INDEPENDENT
SWALLOWING: 0-->SWALLOWS FOODS/LIQUIDS WITHOUT DIFFICULTY
ADLS_ACUITY_SCORE: 12
COGNITION: 0 - NO COGNITION ISSUES REPORTED
FALL_HISTORY_WITHIN_LAST_SIX_MONTHS: NO
ADLS_ACUITY_SCORE: 10
RETIRED_EATING: 0-->INDEPENDENT
DRESS: 0-->INDEPENDENT
ADLS_ACUITY_SCORE: 12
RETIRED_COMMUNICATION: 0-->UNDERSTANDS/COMMUNICATES WITHOUT DIFFICULTY
TOILETING: 0-->INDEPENDENT
TRANSFERRING: 0-->INDEPENDENT

## 2018-09-10 NOTE — ED NOTES
8ettt in and no gut sounds and bilat LS all lobes. = chest rise and good wink on etco2 detector. 23 at teeth.

## 2018-09-10 NOTE — PLAN OF CARE
Problem: Alcohol Withdrawal Acute, Risk/Actual (Adult)  Goal: Signs and Symptoms of Listed Potential Problems Will be Absent, Minimized or Managed (Alcohol Withdrawal Acute, Risk/Actual)  Signs and symptoms of listed potential problems will be absent, minimized or managed by discharge/transition of care (reference Alcohol Withdrawal Acute, Risk/Actual (Adult) CPG).   Outcome: Improving  ICU End of Shift Summary.  For vital signs and complete assessments, please see documentation flowsheets.   Pt extubated at 1230. Precedex drip had been started to bridge gap weaning propofol. Pt has been off all sedation before extubation and precedex off soon after extubation. Pt pleasant but unsure of where he was and why he was admitted.  Pt has been sleepy since extubation. Bedside swallow eval done and pt doing well with water. Have not advanced diet yet due to lethargy.   Spoke to pt's mom on phone who was tearful and said she would visit later in afternoon.   BP elevated. BP meds given which helped. Temp 99.1 ax. SR - ST.  Urine cloudy. ? Due to propofol. 1800 Urine now clear. Webb removed as it was bothering patient. 1830  Pt walked to bathroom with stand by assist and voided.    Pertinent assessments:   Major Shift Events:   Plan (Upcoming Events):   Discharge/Transfer Needs:     Bedside Shift Report Completed :   Bedside Safety Check Completed:

## 2018-09-10 NOTE — PROGRESS NOTES
RT: Patient extubated by nurse at 12:30 to 2L nasal cannula, SPO2 97%. No complications noted.  BBS clear/ diminished. Patient looks comfortable and appropriate. Will continue to follow and assess.

## 2018-09-10 NOTE — H&P
Admitted:     09/09/2018      PRESENTING COMPLAINT: Agitation.       HISTORY OF PRESENT ILLNESS:  Mr. Lang is a 28-year-old gentleman who has a past medical history of heavy alcohol use and dependence, as well as hypertension and depression, who is brought in by EMS with agitation. It sounds as if his mother activated EMS because she felt like he was intoxicated, but also was behaving rather erratically.  When the police and paramedics arrived, he was actually pretty cooperative. They were able to walk him out to the vehicle and get him on a cot, but then on the ride here he was struggling with the belt on the cot, trying to get it off, and became agitated but kept apologizing because he could not understand when they were trying to explain to him what the belt was on for and he was just trying to get up and get off the cot.  The police did have him take a breathalyzer and that reportedly came back at 0.257.       In any case, when he came into the Emergency Room, he was fairly agitated. In the ER, he received quite a bit of agents to get his agitation under control, including 15 mg of Haldol, 2 mg of lorazepam, and 6 mg of midazolam and he just was apparently quite uncontrollable and quite agitated. That is when they made the decision to sedate him and put him on mechanical ventilation.       There was some concern that he might have an alternative ingestion other than just being intoxicated on alcohol.        In the Emergency Room, a variety of labs were obtained and notable for normal kidney function but he had pretty high lactate on admission that was 5.3. CBC showed an elevated white count with an absolute lymphocytosis and salicylate and aspirin levels were negative. uTox was negative and CT of the head shows no structural abnormalities.       I have been asked to admit him to the Intensive Care Unit until he is able to clear whatever toxic agents he has on board.      I discussed the case with one of  the officers who brought him in, as well as the ER provider.      I did discuss the case with Poison Control as I was concerned what might be with his usual medications, and per their discussion he is certainly at risk of co-ingestion with something like bupropion and if he is still taking Adderall, that too, and both of these stimulant drugs are often abused, particularly in a setting like this. That could certainly lower his threshold for seizures. Propofol should offer some protection but they did recommend having some additional benzodiazepines available.  I neglected to mention this earlier but his EKG did not show any type of conduction abnormalities. Otherwise, Poison Control felt like just symptomatic management of IV fluids, propofol, and letting him clear certainly the alcohol on his own, should be appropriate treatment in this case.       PAST MEDICAL HISTORY:   1.  Obesity.    2.  Hypertension.    3.  GERD.    4.  Depression with anxiety. He has had multiple psychiatric stays over the past couple of years and was recently discharged from psychiatry in November 2017, March of 2018, and again in July 2018 and basically it is just depression without psychotic features, as well as alcohol abuse.   5.  Alcohol abuse. It sounds like he went to rehab in June but I am not really quite sure of those details because it looks like he spent quite a bit of time in the hospital, he said the end of June. It is unclear what his current drinking status has been.    6.  Attention deficit disorder.       PAST SURGICAL HISTORY:   1.  Shoulder surgery.    2.  Pyloromyotomy as an infant.    3.  Deviated septum repair.       CURRENT MEDICATIONS:  Unclear.       On review of his chart, I do see that he had a discharge summary completed on 07/03/2018, at which time his medications were:   1.  Amlodipine 10 mg p.o. daily.    2.  Buspirone 20 mg p.o. t.i.d.   3.  Clonidine 0.1 mg p.o. b.i.d.    4.  Gabapentin 600 mg p.o. t.i.d.     5.  Hydroxyzine 25-50 mg p.o. q.6 hours p.r.n.    6.  Multivitamin one tablet p.o. daily.    7.  Nicotine patch.    8.  Omeprazole 40 mg p.o. daily.    9.  Sertraline 200 mg p.o. daily.    10.  Thiamine 100 mg daily.    11.  Trazodone 200 mg p.o. at bedtime.       ALLERGIES:  NO KNOWN DRUG ALLERGIES.       SOCIAL HISTORY: He was actually discharged at that hospitalization to [facility]. I do not believe that he is currently there now.  It is unclear if he lives alone or with a family member. Presumably, he is not smoking, and clearly, he is drinking. Family history is reviewed and non-contributory.       REVIEW OF SYSTEMS:  I am unable to complete a 10-point review of systems based on the fact that he is sedated and intubated.        PHYSICAL EXAMINATION:   GENERAL: The patient has a non-blanchable rash over his face and arms and upper chest. It almost looks like it could be a sunburn but it is not clearly a sunburn.    EYES: Cornea is injected.    LUNGS: Clear to auscultation. He exhibits normal respiratory effort.    HEART: Regular rate and rhythm but he is tachycardic. No murmurs, rubs or gallops. No lower extremity edema.    SKIN: Warm and dry. There is no cyanosis or clubbing of the extremities.    ABDOMEN: Obese, soft and nontender.    PSYCHIATRIC:  Affect is sedated. He has no spontaneous movements.       LABORATORY EVALUATION:  Comprehensive metabolic panel is notable for mildly elevated calcium of 10.2, as well as an elevated protein of 9.1, I think in the setting of dehydration. ALT is elevated at 74. Lactic acid was 5.3 and on recheck is 2.6. Glucose is 118.  VBG is 7.32, 56, 52.  CBC shows white count of 15.3 with hemoglobin of 18.7, platelet count is normal. He has an absolute lymphocytosis. Salicylate, acetaminophen and uTox are all negative. Chest x-ray: It does look like he has some upper airway patchiness or kind of poor inspiration. The tube looks like it is in good position. The radiologist  comments on this upper lobe infiltrate as well. EKG to my personal read shows sinus tachycardia. QTc is on the long side of normal at 49.  CT of the head shows no acute abnormality.       ASSESSMENT AND PLAN: Mr. Lang is a 28-year-old gentleman. He has a past medical history for hypertension, depression, with 3 stays in inpatient psychiatry since November of 2017, as well as alcohol abuse and dependence, who apparently went to treatment sometime in the early summer; the dates are unclear to me, who was brought in by ambulance with agitation and confusion and found to have an alcohol level of 0.257.  He had escalating agitation in the Emergency Room to the point that he was electively intubated and sedated:   1.  Agitation.  It sounds like he was confused and agitated, not just agitated, when I discussed his care with the  who was part of the crew that brought him in. Certainly, alcohol is part of it. He apparently denied any additional ingestions to the police, so hopefully that is true, although this seems like a pretty bizarre reaction just to intoxication, so I am guessing time will tell. I did speak with Poison Control and wanted to ensure there were not some other places I should look, and based on his evaluation, they would be most worried about Adderall and bupropion use but it looks like he might be off of those medications unless he has a stash from previous.  Nonetheless, we will just keep him in the ICU on IV fluids. I do have p.r.n. benzodiazepines available if needed.    2.  Intubated for profound agitation and then ultimately somnolence when treating the agitation. We will leave him on the ventilator overnight. I am hoping that he clears pretty quickly and we can get him off the ventilator as soon as possible. He does have these bilateral upper lobe infiltrates. He is not febrile currently. I will repeat a chest x-ray in the morning. I wonder if he might have a little pneumonitis or a  little aspiration. He is currently not febrile but I would have a low threshold for antibiotics should he spike a fever overnight.    3.  Hypertension. My guess is that he is probably not on that different of a regimen since he was discharged back in early July. I will make sure that he gets his clonidine at 0.1 mg twice a day although clearly that is kind of a bad agent for people unless they are consistent with it, although it can help with withdrawal symptoms so presumably that is why they ordered it. Will also continue his amlodipine. These have not been formerly reconciled and they may need to be changed tomorrow.    4.  Proton pump inhibitor for gastroesophageal reflux disease.    5.  I would continue his BuSpar at 20 mg p.o. t.i.d. for now.    6.  Deep venous thrombosis prophylaxis:  We will use PCDs.    7.  Code status: Full.    8.  Disposition: Admit inpatient to the Intensive Care Unit.       Note: 45 minutes of critical care time was spent on this dictation.         JEFF GARCIA MD        discussed with [facility]     D: 09/10/2018   T: 09/10/2018   MT: PITO      Name:     PARTH HARPER   MRN:      [id number]        Account:      [id number]   :      1990        Admitted:     2018                   Document: [id number]

## 2018-09-10 NOTE — H&P
Full note dictated    29 yo brought in by ems for confusion/agitation, acutely intoxicated with breathalyzer at 0.257, ? Complaints of-ingestion, is on  High dose hydroxyzine and multiple agents for depression.    Long standing hx of etoh addiction/abuse, failed treatments, multiple hospitalizations for alcohol intoxication and depression without psychotic features.     In ER difficult to get agitation under control and when controlled patient too sedated and unable to protect airway-so electively intubated    Post intubation cxr with upper lobe infiltrates-suspect aspiration.       A/P;  Acute etoh intoxication/? Complaints of-ingestions:  Propofol and prn lorazepam overnight           Sedation with agitation treatment: electively intubated and with suspected pneumonitis-no Abx unless fever

## 2018-09-10 NOTE — PROGRESS NOTES
Ventilation Mode: CMV/AC  (Continuous Mandatory Ventilation/ Assist Control)  FiO2 (%): 35 %  Rate Set (breaths/minute): 16 breaths/min  Tidal Volume Set (mL): 600 mL  PEEP (cm H2O): 5 cmH2O  Oxygen Concentration (%): 50 %  Peak Inspiratory Pressure (cm H2O) (Drager Tameka): 40  Resp: 16     Pt is resting on ordered vent settings with no observable changes in status through the night. Thin moderate amounts of clear sputum were suctioned from ETT infrequently. BS are clear bilaterally with equal expansion and normal RR of 16-20. FIO2 was weaned down to 35% with SPO2 maintained at 97%. No complications to report, respiratory will continue to follow.

## 2018-09-10 NOTE — PROGRESS NOTES
RT Note:    Pt intubated in ER with 8.0 ETT tube at 23 at teeth. CT run done for Head CT. No complications. Set up on vent 10. CMV 18, 650, 65% +5. Awaiting bed in ICU.

## 2018-09-10 NOTE — PROGRESS NOTES
Physician Attestation   I, Kade Hastings, was present with the medical student who participated in the service and in the documentation of the note.  I have verified the history and personally performed the physical exam and medical decision making.  I agree with the assessment and plan of care as documented in the note.      I personally reviewed vital signs, medications, labs and imaging.    Mr. Lang is a 28-year-old man with extensive psychiatric  history including numerous inpatient psychiatric stays who presents with alcohol intoxication with development of agitation.  In the ER he required large doses of sedatives and 5 point restraints which appears to ultimately have culminated in intubation for airway compromise.  Urine drug screen and Tylenol and salicylate levels have returned normal.  BAL was 0.25 upon admission.  Currently he is requiring fairly minimal vent settings but it is unclear if he is awake enough yet to extubate    BP (!) 147/103  Pulse 122  Temp 98.8  F (37.1  C)  Resp 16  Wt 140.4 kg (309 lb 8.4 oz)  SpO2 95%  BMI 40.84 kg/m2  On exam he is not responsive but currently sedated.  Large adult male, obese but also quite well-built  Heart is tachycardic with a regular rhythm  Lungs clear to auscultation bilaterally with good ventilation  Extremities warm and well-perfused  Abdomen is soft, nondistended  Webb catheter in place    Key decisions made by me: Agree with ongoing supportive care in the setting of alcohol intoxication and possible concomitant toxidrome.  His current tachycardia and flushing could suggest an anticholinergic toxidrome though he is somewhat diaphoretic so sympathomimetics from Adderall is possible as well.  Currently sedated with propofol and Versed as well as previous administration of fentanyl and Ativan.  --Start Precedex  --Weaning trials as able, agitation may be an issue in the [place]-extubation.  Will need to be monitored closely  --Continue  supportive cares    Kade Hastings MD  Date of Service (when I saw the patient): 09/10/18      [facility]  Hospitalist ICU Progress Note  Cece Daniel, medical student   09/10/18    Reason for Stay (Diagnosis): alcohol intoxication, agitated delirium         Assessment and Plan:        Summary of Stay: Nickolas Lang is a 28 year old male with a history of alcohol use disorder admitted on 9/9/2018 with blood alcohol level of 0.25, agitation. Was in 5 point restraints in ED, received multiple sedating medications and was intubated for airway protection.     Problem List/Assessment and Plan:     -Alcohol intoxication, agitation requiring sedation: BAL of 0.25 on admission, with negative UDS but reported ingestion of another substance. Concern for hydroxyzine (anticholinergic), bupropion, adderall. Poison control recommends use of benzos in light of seizure risk with these medications. Has not had seizure activity witnessed since admission  -currently sedated with propofol, versed, s/p fentanyl, ativan. Started precedex drip preparing for extubation  -attempt at extubation today if mental status allows  -alcohol withdrawal protocol  maintenance fluids 1/2NS 125 ml/hr  -NPO (NG in place)    Acute respiratory failure due to airway compromise: no underlying lung disease. Weaning off ventilator when able.   -respiratory therapy consult    -Question of aspiration   -Chest xray with infiltrates in upper lobes. Holding antibiotics unless developing fever. Sputum has been clear.    -Hypertension  -PTA on clonidine, amlodipine  -clonidine 0.1 mg BID, 10 mg amlodipine daily    -Alcohol use disorder:   -IV thiamine, folate  -treatment history unclear; can be clarified once awake    -Anxiety: holding PTA meds until mental status clears    Lines/drains/tubes: right hand, upper forearm IV, joel catheter, NG   DVT Prophylaxis: Pneumatic Compression Devices, will escalate if intubated >24 hrs  GI  Prophylaxis: will start if intubated >24 hours  Code Status: Full Code  Discharge Dispo/Date: TBD        Interval History (Subjective):      Remained agitated on propofol infusion overnight, teleICU started versed infusion. S/p ativan bolus.                   Physical Exam:      Last Vital Signs:  BP (!) 148/107  Pulse 122  Temp 99.8  F (37.7  C) (Axillary)  Resp 16  Wt 140.4 kg (309 lb 8.4 oz)  SpO2 97%  BMI 40.84 kg/m2    I/O last 3 completed shifts:  In: 1262.07 [I.V.:1212.07; NG/GT:50]  Out: 725 [Urine:725]    General: Intubated and sedated. Opens eyes, fights tube and restraints for intermittently, then calms. Diaphoretic  HEENT: NC/AT, eyes anicteric with injection, opens eyes spontaneously.   Cardiac: tachycardia, regular rhythm. S1, S2.  No murmurs appreciated.  Pulmonary: Normal chest rise, intubated. Lungs CTA BL  Abdomen: obese, soft, non-tender, non-distended.  Bowel Sounds Present.  No guarding.  : joel in place, draining dark yellow urine with opaque white-orange sediment/residue  Extremities: no deformities. well perfused. No pedal edema  Skin: no rashes or lesions noted.  Warm and Dry.  Neuro: Sedated.  Psych: unable to assess         Medications:          amLODIPine  10 mg Oral or NG Tube Daily     cloNIDine  0.1 mg Oral or NG Tube BID            Data:      All new lab and imaging data was reviewed.   Labs:  salicylate, tylenol levels negative. Urine drug screen negative   Lactate 5.3-> 2.5     Lab Results   Component Value Date     09/10/2018     09/09/2018     07/30/2018    Lab Results   Component Value Date    CHLORIDE 105 09/10/2018    CHLORIDE 99 09/09/2018    CHLORIDE 103 07/30/2018    Lab Results   Component Value Date    BUN 15 09/10/2018    BUN 12 09/09/2018    BUN 14 07/30/2018      Lab Results   Component Value Date    POTASSIUM 4.0 09/10/2018    POTASSIUM 3.7 09/09/2018    POTASSIUM 5.1 07/30/2018    Lab Results   Component Value Date    CO2 27 09/10/2018     CO2 30 09/09/2018    CO2 30 07/30/2018    Lab Results   Component Value Date    CR 0.61 09/10/2018    CR 0.73 09/09/2018    CR 0.75 07/30/2018          Recent Labs  Lab 09/10/18  0510 09/09/18  1924   WBC 11.9* 15.3*   HGB 14.9 18.7*   HCT 44.4 55.4*   MCV 86 85    413       Recent Labs  Lab 09/10/18  0355   COLOR Yellow   APPEARANCE Cloudy   URINEGLC Negative   URINEBILI Negative   URINEKETONE Negative   SG 1.031   UBLD Negative   URINEPH 5.0   PROTEIN 30*   NITRITE Negative   LEUKEST Trace*   RBCU 5*   WBCU 0      Imaging:   Recent Results (from the past 24 hour(s))   XR Chest Port 1 View    Narrative        Impression    IMPRESSION: Endotracheal tube is midline tip 4 cm above the prince.  Patchy upper lobe infiltrates. No evidence for pleural effusion.    GÓMEZ LOPEZ MD   Head CT w/o contrast    Narrative    CT OF THE HEAD WITHOUT CONTRAST 9/9/2018 9:41 PM     FINDINGS: The ventricles and basal cisterns are within normal limits  in configuration. There is no midline shift. There are no extra-axial  fluid collections.  Gray-white differentiation is well maintained.    No intracranial hemorrhage, mass or recent infarct.    The visualized paranasal sinuses are well-aerated. There is no  mastoiditis. There are no fractures of the visualized bones.          IMPRESSION:  Normal head CT.    BRENDA CHEW MD

## 2018-09-10 NOTE — PLAN OF CARE
Problem: Patient Care Overview  Goal: Plan of Care/Patient Progress Review  Outcome: No Change  ICU End of Shift Summary.  For vital signs and complete assessments, please see documentation flowsheets.     Pertinent assessments: Pt sedated, intubated.  Was agitated-IVP of ativan given, added versed and fentanyl to regimen.  LS clear.  BS present, abd obese.  Webb with adequate amount but cloudy and blaise currently, UA sent.  Restraints used.  Flushed body.    Major Shift Events: admission  Plan (Upcoming Events): attempt to wean today  Discharge/Transfer Needs: TBD    Bedside Shift Report Completed : yes  Bedside Safety Check Completed: yes

## 2018-09-10 NOTE — PROGRESS NOTES
RT Note      Transport to ICU uneventful. Patient is placed back on mechanical ventilator.      Stormy Sauer, RRT

## 2018-09-10 NOTE — PROGRESS NOTES
Brief ICU Note  28M with history of substance use admitted to ICU after intubation for altered mental status.  Blood etoh level was high but per conversations with police he was variably agitated and confused, inconsistent with isolated alcohol intoxication per their opinion.  He is dehydrated based on labs and also flushed per admitting hosp report.    He is on hydroxyzine at relatively high prn doses at home. Some of his symptoms could be consistent with anticholinergic toxicity due to this 1st generation antihistamine.  The use of physostigmine carries significant risks and given that he is intubated already I don't think there is any benefit to its empiric use.      No QRS lengthening or conduction abnormalities on my eval of EKG.    No evident seizure activity.    His ventilator settings appear appropriate.    Possible apical infiltrates on CXR.    Plan  Continue mechanical ventilation  Monitor temperature  Sedation with propofol  Plan on lightening in AM to evaluate for extubation  No antibiotics unless there are clinical signs of infection.  ICU team will continue to follow.    Plan discussed with tele RN and admitting hospitalist.    Noam Andrade MD  Tele ICU

## 2018-09-10 NOTE — PROGRESS NOTES
Brief ICU Note  28M with history of substance use admitted to ICU after intubation for altered mental status.  Blood etoh level was high but per conversations with police he was variably agitated and confused, inconsistent with isolated alcohol intoxication per their opinion.  He is dehydrated based on labs and also flushed per admitting hosp report.    He is on hydroxyzine at relatively high prn doses at home. Some of his symptoms could be consistent with anticholinergic toxicity due to this 1st generation antihistamine.  The use of physostigmine carries significant risks and given that he is intubated already I don't think there is any benefit to its empiric use.      No QRS lengthening or conduction abnormalities on my eval of EKG.    No evident seizure activity.    His ventilator settings appear appropriate.    Possible apical infiltrates on CXR.    Plan  Continue mechanical ventilation  Monitor temperature  Sedation with propofol  Plan on lightening in AM to evaluate for extubation  No antibiotics unless there are clinical signs of infection.  ICU team will continue to follow.    Plan discussed with tele RN and admitting hospitalist.    Noam Andrade MD  Tele ICU    Addendum  Called by bedside RN re: persistent agitation despite propofol.  Evaluated patient visually via remote camera.  Restless despite propofol bolus. No repetitive movements.    High sedation requirement in large patient with substance use and possibly complicating anticholinergic toxicity.  Plan  Monitor temperature  Ativan bolus  Fentanyl infusion for any pain related to ET tube  Versed infusion for agitation not controlled with propofol.  RN instructed to preferentially titrate versed down.

## 2018-09-10 NOTE — PHARMACY-ADMISSION MEDICATION HISTORY
"Admission medication history interview status for this patient is complete. See [facility] admission navigator for allergy information, prior to admission medications and immunization status.     Medication history interview source(s):Patient  Medication history resources (including written lists, pill bottles, clinic record):None  Primary [facility] [address] in [place].    Changes made to PTA medication list:  Added: none  Deleted: gabapentin (duplicate), disulfiram (stopped by MD \"a while ago\").  Changed: none    Actions taken by pharmacist (provider contacted, etc): called [facility] to clarify Adderall XR dose, then talked to pt again (per pt: takes bid, per pharmacy: prescribed 30mg XR formulation once daily). Asked pt to clarify Adderall instructions with his MD and to check the prescription vial.    Additional medication history information: pt does not remember when he took last doses of home meds.    Medication reconciliation/reorder completed by provider prior to medication history? No    Do you take OTC medications (eg tylenol, ibuprofen, fish oil, eye/ear drops, etc)? Yes (Y/N)    For patients on insulin therapy: No (Y/N)    Prior to Admission medications    Medication Sig Last Dose Taking? Auth Provider   amLODIPine (NORVASC) 10 MG tablet Take 1 tablet (10 mg) by mouth daily  Yes Radha Botello APRN CNP   amphetamine-dextroamphetamine (ADDERALL XR) 30 MG per 24 hr capsule Take 30 mg by mouth 2 times daily  Yes Reported, Patient   busPIRone (BUSPAR) 10 MG tablet Take 2 tablets (20 mg) by mouth 3 times daily  Yes Tima Crews MD   cloNIDine (CATAPRES) 0.1 MG tablet Take 1 tablet (0.1 mg) by mouth 2 times daily  Yes Radha Botello APRN CNP   gabapentin (NEURONTIN) 800 MG tablet TAKE 1 TABLET (800 MG) BY MOUTH 4 TIMES DAILY  Yes Tima Crews MD   hydrOXYzine (ATARAX) 50 MG tablet Take 1 tablet (50 mg) by mouth every 4 hours as needed for anxiety  Yes Caroline, " "ROXI Duffy CNP   loperamide (IMODIUM A-D) 2 MG tablet Take 1 tablet (2 mg) by mouth 4 times daily as needed for diarrhea  Yes Dora Velazquez APRN CNP   multivitamin, therapeutic with minerals (THERA-VIT-M) TABS tablet Take 1 tablet by mouth daily  Yes Filippo Brasher MD   nicotine (NICODERM CQ) 21 MG/24HR 24 hr patch Place 1 patch onto the skin daily \"a while ago\" per pt Yes Filippo Brasher MD   nicotine polacrilex (NICORETTE) 4 MG gum Chew one piece every hour as directed.  Yes Tima Crews MD   nicotine polacrilex 4 MG lozenge 1-2 lozenges every hour as needed, max 10 lozenges per day  Yes Tima Crews MD   omeprazole (PRILOSEC) 40 MG capsule Take 1 capsule (40 mg) by mouth daily  Yes Radha Botello APRN CNP   ondansetron (ZOFRAN) 4 MG tablet Take 1 tablet (4 mg) by mouth every 8 hours as needed for nausea  Yes Dora Velazquez APRN CNP   sertraline (ZOLOFT) 100 MG tablet Take 2 tablets (200 mg) by mouth daily  Yes Filippo Brasher MD   thiamine 100 MG tablet Take 1 tablet (100 mg) by mouth daily  Yes Filippo Brasher MD   traZODone (DESYREL) 100 MG tablet Take 1-2 tablets (100-200 mg) by mouth nightly as needed for sleep  Yes Filippo Brasher MD           "

## 2018-09-10 NOTE — PROGRESS NOTES
ICU Attending Note    I have seen and examined Nickolas Lang, reviewed the patient's history, pertinent labs, vital signs, medications, physical exam, and radiographs.  The patient is critically ill by my examination and requires continued ICU monitoring and cares    Nickolas Lang is admitted to ICU after being brought to ED by EMS for confusion and agitation, acute alcohol intoxication.  Has a long history of alcohol abuse and has failed treatment.  Carries a diagnosis of depression with psychotic features..    Recent Events:  Required intubation to control agitated state and to protect care givers.  Kept on propofol overnight at high doses and needed some additional benzodiazepines to keep down.     Exam:  Temp:  [97  F (36.1  C)-99.8  F (37.7  C)] 98.8  F (37.1  C)  Pulse:  [122] 122  Heart Rate:  [] 112  Resp:  [16-38] 16  BP: (130-171)/() 162/106  FiO2 (%):  [30 %-65 %] 30 %  SpO2:  [84 %-100 %] 96 %    Intake/Output Summary (Last 24 hours) at 09/10/18 0917  Last data filed at 09/10/18 0800   Gross per 24 hour   Intake          1262.07 ml   Output              850 ml   Net           412.07 ml     Ventilation Mode: CMV/AC  (Continuous Mandatory Ventilation/ Assist Control)  FiO2 (%): 30 %  Rate Set (breaths/minute): 16 breaths/min  Tidal Volume Set (mL): 600 mL  PEEP (cm H2O): 5 cmH2O  Oxygen Concentration (%): 30 %  Peak Inspiratory Pressure (cm H2O) (Drager Tameka): 40  Resp: 16    Lungs clear  Heart rrr  abd obese, soft  Ext warm, normal pulses  gen diaphoretic, some red jhonatan to shoulders    Results:  ABG No lab results found in last 7 days.  CBC  Recent Labs  Lab 09/10/18  0510 09/09/18  1924   WBC 11.9* 15.3*   HGB 14.9 18.7*   HCT 44.4 55.4*    413     BMP  Recent Labs  Lab 09/10/18  0510 09/09/18  1923    141   POTASSIUM 4.0 3.7   CHLORIDE 105 99   CO2 27 30   BUN 15 12   CR 0.61* 0.73   * 118*     LFT  Recent Labs  Lab 09/10/18  0510 09/09/18  1923   AST 35 42    ALT 54 74*   ALKPHOS 91 127   BILITOTAL 0.3 0.3   ALBUMIN 3.4 4.7     PancreasNo lab results found in last 7 days.  INR  Lab Results   Component Value Date    INR 0.99 11/20/2017    INR 1.08 08/18/2016    INR 1.00 05/08/2016    INR 1.01 04/15/2015       Current Issues:  1.  Respiratory failure: intubated to protect airway in the setting of high sedation needs. Oxygenating and ventilating adequately. PS trial.  May extubate if able to control behavior.  2.  Alcohol intoxication: withdrawal could be a component.  CIWA protocol when sedation off and extubated. May need chem dep if cooperative.  3.  Hypertension: follow, beta blocker might help with agitated state too.  4.  Depression with anxiety.    Evaluation and management time exclusive of procedures was 30 minutes critical care time including:  examination with the ICU team, discussion of the patient's condition with other physicians and members of the care team, reviewing all data related to the patient, and time utilizing the EMR for documentation of this patient's care.    Sudhir Irene MD  Acute Care Surgery/Critical Care    I ordered the extubation. RT was not available but I was at the patient's bedside and ordered and helped the RN with the extubation without waiting for RT as the patient had been agitated, and with sedation off, was potentially going to become more agitated.

## 2018-09-11 LAB
GLUCOSE BLDC GLUCOMTR-MCNC: 106 MG/DL (ref 70–99)
GLUCOSE BLDC GLUCOMTR-MCNC: 83 MG/DL (ref 70–99)
GLUCOSE BLDC GLUCOMTR-MCNC: 89 MG/DL (ref 70–99)

## 2018-09-11 PROCEDURE — 25000128 H RX IP 250 OP 636: Performed by: INTERNAL MEDICINE

## 2018-09-11 PROCEDURE — 25000132 ZZH RX MED GY IP 250 OP 250 PS 637: Performed by: INTERNAL MEDICINE

## 2018-09-11 PROCEDURE — 40000914 ZZH STATISTIC SITTER, DAY HOURS

## 2018-09-11 PROCEDURE — 99233 SBSQ HOSP IP/OBS HIGH 50: CPT | Performed by: INTERNAL MEDICINE

## 2018-09-11 PROCEDURE — 12000007 ZZH R&B INTERMEDIATE

## 2018-09-11 PROCEDURE — 00000146 ZZHCL STATISTIC GLUCOSE BY METER IP

## 2018-09-11 PROCEDURE — 27210995 ZZH RX 272: Performed by: INTERNAL MEDICINE

## 2018-09-11 RX ORDER — TRAZODONE HYDROCHLORIDE 100 MG/1
100-200 TABLET ORAL
Status: DISCONTINUED | OUTPATIENT
Start: 2018-09-11 | End: 2018-09-12 | Stop reason: HOSPADM

## 2018-09-11 RX ORDER — SERTRALINE HYDROCHLORIDE 100 MG/1
200 TABLET, FILM COATED ORAL DAILY
Status: DISCONTINUED | OUTPATIENT
Start: 2018-09-11 | End: 2018-09-12 | Stop reason: HOSPADM

## 2018-09-11 RX ORDER — BUSPIRONE HYDROCHLORIDE 10 MG/1
20 TABLET ORAL 3 TIMES DAILY
Status: DISCONTINUED | OUTPATIENT
Start: 2018-09-11 | End: 2018-09-12 | Stop reason: HOSPADM

## 2018-09-11 RX ORDER — ACETAMINOPHEN 325 MG/1
650 TABLET ORAL EVERY 4 HOURS PRN
Status: DISCONTINUED | OUTPATIENT
Start: 2018-09-11 | End: 2018-09-12 | Stop reason: HOSPADM

## 2018-09-11 RX ORDER — GABAPENTIN 400 MG/1
800 CAPSULE ORAL 3 TIMES DAILY
Status: DISCONTINUED | OUTPATIENT
Start: 2018-09-11 | End: 2018-09-12 | Stop reason: HOSPADM

## 2018-09-11 RX ORDER — ATENOLOL 25 MG/1
25 TABLET ORAL DAILY
Status: DISCONTINUED | OUTPATIENT
Start: 2018-09-11 | End: 2018-09-12 | Stop reason: HOSPADM

## 2018-09-11 RX ORDER — HYDROXYZINE HYDROCHLORIDE 50 MG/1
50 TABLET, FILM COATED ORAL EVERY 4 HOURS PRN
Status: DISCONTINUED | OUTPATIENT
Start: 2018-09-11 | End: 2018-09-12 | Stop reason: HOSPADM

## 2018-09-11 RX ORDER — DIAZEPAM 10 MG/2ML
5-10 INJECTION, SOLUTION INTRAMUSCULAR; INTRAVENOUS EVERY 30 MIN PRN
Status: DISCONTINUED | OUTPATIENT
Start: 2018-09-11 | End: 2018-09-12 | Stop reason: HOSPADM

## 2018-09-11 RX ORDER — DEXTROAMPHETAMINE SACCHARATE, AMPHETAMINE ASPARTATE MONOHYDRATE, DEXTROAMPHETAMINE SULFATE AND AMPHETAMINE SULFATE 7.5; 7.5; 7.5; 7.5 MG/1; MG/1; MG/1; MG/1
30 CAPSULE, EXTENDED RELEASE ORAL DAILY
Status: DISCONTINUED | OUTPATIENT
Start: 2018-09-11 | End: 2018-09-12 | Stop reason: HOSPADM

## 2018-09-11 RX ORDER — DIAZEPAM 5 MG
10 TABLET ORAL EVERY 30 MIN PRN
Status: DISCONTINUED | OUTPATIENT
Start: 2018-09-11 | End: 2018-09-12 | Stop reason: HOSPADM

## 2018-09-11 RX ADMIN — AMLODIPINE BESYLATE 10 MG: 10 TABLET ORAL at 08:44

## 2018-09-11 RX ADMIN — ATENOLOL 25 MG: 25 TABLET ORAL at 08:51

## 2018-09-11 RX ADMIN — GABAPENTIN 800 MG: 400 CAPSULE ORAL at 09:42

## 2018-09-11 RX ADMIN — SERTRALINE HYDROCHLORIDE 200 MG: 100 TABLET ORAL at 08:50

## 2018-09-11 RX ADMIN — DIAZEPAM 10 MG: 5 TABLET ORAL at 18:11

## 2018-09-11 RX ADMIN — GABAPENTIN 800 MG: 400 CAPSULE ORAL at 23:04

## 2018-09-11 RX ADMIN — LORAZEPAM 0.5 MG: 2 INJECTION INTRAMUSCULAR; INTRAVENOUS at 03:52

## 2018-09-11 RX ADMIN — BUSPIRONE HYDROCHLORIDE 20 MG: 10 TABLET ORAL at 15:43

## 2018-09-11 RX ADMIN — DIAZEPAM 10 MG: 5 TABLET ORAL at 20:35

## 2018-09-11 RX ADMIN — THIAMINE HCL (VITAMIN B1) 50 MG TABLET 50 MG: at 08:44

## 2018-09-11 RX ADMIN — CLONIDINE HYDROCHLORIDE 0.1 MG: 0.1 TABLET ORAL at 20:35

## 2018-09-11 RX ADMIN — SODIUM CHLORIDE: 4.5 INJECTION, SOLUTION INTRAVENOUS at 02:21

## 2018-09-11 RX ADMIN — BUSPIRONE HYDROCHLORIDE 20 MG: 10 TABLET ORAL at 23:05

## 2018-09-11 RX ADMIN — CLONIDINE HYDROCHLORIDE 0.1 MG: 0.1 TABLET ORAL at 08:44

## 2018-09-11 RX ADMIN — GABAPENTIN 800 MG: 400 CAPSULE ORAL at 15:44

## 2018-09-11 RX ADMIN — BUSPIRONE HYDROCHLORIDE 20 MG: 10 TABLET ORAL at 08:50

## 2018-09-11 RX ADMIN — DIAZEPAM 10 MG: 5 TABLET ORAL at 15:59

## 2018-09-11 RX ADMIN — FOLIC ACID 1 MG: 1 TABLET ORAL at 08:45

## 2018-09-11 RX ADMIN — TRAZODONE HYDROCHLORIDE 200 MG: 100 TABLET ORAL at 23:05

## 2018-09-11 RX ADMIN — DIAZEPAM 10 MG: 5 TABLET ORAL at 08:42

## 2018-09-11 RX ADMIN — DIAZEPAM 10 MG: 5 TABLET ORAL at 13:42

## 2018-09-11 ASSESSMENT — ACTIVITIES OF DAILY LIVING (ADL)
ADLS_ACUITY_SCORE: 12
ADLS_ACUITY_SCORE: 11
ADLS_ACUITY_SCORE: 12
ADLS_ACUITY_SCORE: 11
ADLS_ACUITY_SCORE: 11
ADLS_ACUITY_SCORE: 12

## 2018-09-11 NOTE — PLAN OF CARE
Problem: Patient Care Overview  Goal: Plan of Care/Patient Progress Review  Outcome: No Change  .ICU End of Shift Summary.  For vital signs and complete assessments, please see documentation flowsheets.     Pertinent assessments: Patient slept for most of the shift.  Wakes up to use the bathroom.  VSS except BP elevated at times.  Patient is on RA.  LS clear/diminished.  Denies pain.  Face flushed.  Patient goes from being hot to cold then hot.  Sitter remains at the bedside.  Patient calm and cooperative.    Major Shift Events: Complains of restlessness/ this morning; PRN Ativan given x 1 with some improvement.    Plan (Upcoming Events): Continue to monitor.  Psych to see.    Discharge/Transfer Needs: TBD    Bedside Shift Report Completed :   Bedside Safety Check Completed:

## 2018-09-11 NOTE — PLAN OF CARE
Problem: Alcohol Withdrawal Acute, Risk/Actual (Adult)  Goal: Signs and Symptoms of Listed Potential Problems Will be Absent, Minimized or Managed (Alcohol Withdrawal Acute, Risk/Actual)  Signs and symptoms of listed potential problems will be absent, minimized or managed by discharge/transition of care (reference Alcohol Withdrawal Acute, Risk/Actual (Adult) CPG).   Outcome: Improving  ICU End of Shift Summary.  For vital signs and complete assessments, please see documentation flowsheets.   Pt alert and oriented but requiring valium for ciwa greater than 9. Pt has tremors and states he has a headache. Was restless this am pacing room and walking around unit. Seen by psych this am and declared not a suicide risk. Sitter discontinued.    Sats greater than 95% on RA. Had BM today.  Pt eating and drinking well  Pt states he has been voiding frequently. No burning. Dr Rosen notified. Orders to watch for now. Pt bladder scanned when pt wanted to go to bathroom having just been. 3 scans 20 - 30cc. Pt asked to use urinal instead of walking to BR.   Pt restarted on all home meds. Pt did refuse adderall this am as he said it keeps him awake.     Pertinent assessments:   Major Shift Events:   Plan (Upcoming Events):   Discharge/Transfer Needs:     Bedside Shift Report Completed :   Bedside Safety Check Completed:

## 2018-09-11 NOTE — CONSULTS
Care Transition Initial Assessment -   Reason For Consult: substance use concerns  Met with: Patient    Active Problems:    Agitation requiring sedation protocol       DATA  Lives With: parent(s)  Living Arrangements: house  Description of Support System: Supportive, Involved  Who is your support system?: Parent(s)  Support Assessment: Adequate family and caregiver support, Adequate social supports.   Identified issues/concerns regarding health management: Admitted for CD/MH issues  Chronic issues with ETOH has been in treatment 4 times at [facility]/ Just dc in June from a CD program         Other Resources: [facility]   PT not interested in CD assessment  Was willing to accept CD Resources from      ASSESSMENT  Cognitive Status:  awake, alert and oriented-   Concerns to be addressed: Met with pt is living with his mother at this time. Pt does attend AA meeting he stated 3-4 times per week and has a Sponsor. PT stated he also attends a [facility] called [employer] in [place]. This is a Christian that supports persons in recovery,   .  PT stated he has no other needs but did confirm that he needs to start taking responsibility for his recovery. He does not feel attending another treatment program will change his behaviors    PLAN  CD resources provided  Pt stated no other needs.

## 2018-09-11 NOTE — CONSULTS
"Patient was seen following an overdose to help in assessing his current status and to determine future mental health needs.  Consult was requested by Dr. Hastings and total time spent with patient and staff was 45 minutes.  History and physical exam were reviewed prior to patient being seen.    Patient acknowledges a history of depression and anxiety.  [facility] [place].  His therapist is Maddie Ramirez and psychiatrist is Dr. Bae.  He has had a drinking problem \"for years.\"  He denies any past psychiatric hospital admissions.  He attends  and has a sponsor.  His current list of medications was reviewed.      Patient lives with his mother with whom he has a positive relationship.  He has not relationship with his father.  He attended [employer] in [place] and has a BS degree.  He has worked in the area of [employer] and completed his most recent project one week ago.      Patient states he only remembers \"waking up here\".  He was unable to identify any triggers that led to his drinking yesterday.  He states he drinks often and this problem has been persistent for years.    Strengths include a past work history and an awareness of drinking as a problem.  Liabilities include continued drinking despite his awareness of potential consequences.    Mental Status Exam:    Patient was seen bedside.  He had just been given Valium so his attention and concentration were inconsistent.  He was oriented to time, place and person.  His memory was intact except for the time surrounding his excessive drinking prior to admission.  His language and fund of knowledge appeared adequate.  His mood and affect were appropriate.  He was remorseful for his actions.    Diagnosis:    Alcohol Dependency  Depression  Anxiety    Disposition:    Patient denies suicidal intent.  He is currently receiving mental health treatment on an out patient basis, " and he needs to continue working with his providers.  In patient mental health treatment is not indicated.    He also needs to continue attending AA and working with his sponsor.      Results were shared with hospital staff and patient.  Both were in general agreement with results of consultation.

## 2018-09-11 NOTE — PROGRESS NOTES
Physician Attestation   I, Kade Hastings, was present with the medical student who participated in the service and in the documentation of the note.  I have verified the history and personally performed the physical exam and medical decision making.  I agree with the assessment and plan of care as documented in the note.      I personally reviewed vital signs, medications, labs and imaging.    BP (!) 140/93  Pulse 122  Temp 98.2  F (36.8  C) (Oral)  Resp 18  Wt 141.5 kg (311 lb 15.2 oz)  SpO2 97%  BMI 41.16 kg/m2    Mr. Lang is calm and lying in bed, mildly tremulous.  No acute distress.  Heart is mildly tachycardic, regular rhythm  Lungs clear to auscultation  Abdomen soft and benign  Extremities warm and well-perfused no edema.  Alert and oriented ×4, cooperative and appropriate.  Speech is clear.  No focal deficits, appropriate eye contact.    Labs notable for white blood count downtrending to 11.9.  Urine tox screen negative, acetaminophen and salicylate levels negative.    Assessment/plan :      Mr. Lang is a 28-year-old man with extensive psychiatric  history including numerous inpatient psychiatric stays who presented 9/9 with alcohol intoxication with development of agitation.  In the ER he required large doses of sedatives and 5 point restraints which appears to ultimately have culminated in intubation for airway compromise.  Urine drug screen and Tylenol and salicylate levels have returned normal.  BAL was 0.25 upon admission.  He remained intubated overnight and then was extubated without issue on 9/10.  Following extubation he does admit to recent relapse of alcohol abuse and is quite remorseful regarding his behaviors.  He denies any intent of harming himself or any suicidal ideation at all.  He is calm and cooperative and absolutely willing to stay here for treatment of his currently mild alcohol withdrawal.  --Valium as per withdrawal protocol  --Continue supportive cares,  oral vitamins etc.  --Discussed with psych consult service today, they feel he is safe to discharge home from a psychiatric standpoint once medically stable  --Social work consult for consideration of CD resources  --Currently stable to transfer out of the ICU but I expect he will need another day or 2 to treat his alcohol withdrawal here in the hospital.  --See note below for full details.    Kade Hastings MD  Date of Service (when I saw the patient): 09/11/18        [facility]  Hospitalist ICU Progress Note  09/11/18    Reason for Stay (Diagnosis): alcohol intoxication, agitated delirium         Assessment and Plan:        Summary of Stay: Nickolas Lang is a 28 year old male with a history of alcohol use disorder, anxiety admitted on 9/9/2018 with blood alcohol level of 0.25, agitation. Was in 5 point restraints in ED, received multiple sedating medications and was intubated for airway protection. Now extubated and off sedation, on withdrawal protocol, cleared by psych to discharge home when medically stable.     Problem List/Assessment and Plan:     -Alcohol intoxication, agitation requiring sedation: Now extubated not requiring sedation or restraints. BAL of 0.25 on admission, with negative UDS. Psych consulted and he report no suicidal ideation. Ok to discharge home, lives with his mom. Had been sober for a few months before relapsing-reports he has required hospitalization for withdrawal lately.  -off all sedation including propofol for nearly 24 hours. Has sitter but has been calm and cooperative. Intermittently anxious, pacing.  -alcohol withdrawal protocol with oral valium  -Stop maintenance fluids today-good PO intake  -social work consult placed    Acute respiratory failure due to airway compromise: resolved. Had required airway protection for agitation requiring heavy sedation. Extubated yesterday and has been on room air.     -Question of aspiration   -Chest xray with  infiltrates in upper lobes. No fever, cough, chest pain or exam findings-no indications for antibiotics at this time    -Hypertension-chronic and acute with withdrawal  -PTA clonidine 0.1 mg BID, 10 mg amlodipine daily.   -ordered atenolol     -Alcohol use disorder:   -oral vitamins  -on CIWA protocol    -Anxiety: restart PTA meds today including buspar, hydroxyzine PRN, zoloft. On CIWA protocol with oral valium    Lines/drains/tubes: right upper forearm IV  DVT Prophylaxis: Pneumatic Compression Devices  GI Prophylaxis: none  Code Status: Full Code  Discharge Dispo/Date: stable for medical floor. Can discharge to home once not requiring medication for withdrawal        Interval History (Subjective):      Nickolas is feeling better today. Reports some anxiety and wants his home meds. One dose of ativan last night helped. No trouble breathing, no headache. No GI complaints. No urinary symptoms; joel is out. Reports tremor. Eating and drinking.                   Physical Exam:      Last Vital Signs:  BP (!) 140/93  Pulse 122  Temp 98.2  F (36.8  C) (Oral)  Resp 18  Wt 141.5 kg (311 lb 15.2 oz)  SpO2 97%  BMI 41.16 kg/m2    I/O last 3 completed shifts:  In: 3757.41 [P.O.:480; I.V.:3277.41]  Out: 2045 [Urine:2045]    General: awake and alert. Oriented and calm. Not diaphoretic. tremulous  HEENT: NC/AT, eyes anicteric with injection, opens eyes spontaneously.   Cardiac: regular rhythm. S1, S2.  No murmurs appreciated.  Pulmonary: Normal chest rise, intubated. Lungs CTA BL. No crackles or wheezes  Abdomen: obese, soft, non-tender, non-distended.  Bowel Sounds Present.  No guarding.  Extremities: no deformities. Chronic venous stasis changes. well perfused. No pedal edema  Skin: no rashes or lesions noted. Warm and Dry.  Neuro: oriented x3. Tremulous. Cranial nerves grossly in tact  Psych: reports anxiety. Denies suicidal ideation.          Medications:          amLODIPine  10 mg Oral or NG Tube Daily      amphetamine-dextroamphetamine  30 mg Oral Daily     atenolol  25 mg Oral Daily     busPIRone  20 mg Oral TID     cloNIDine  0.1 mg Oral or NG Tube BID     folic acid  1 mg Oral Daily     gabapentin  800 mg Oral TID     sertraline  200 mg Oral Daily     vitamin  B-1  50 mg Oral Daily            Data:      All new lab and imaging data was reviewed.   Labs no new labs glucose checks within normal range  Imaging:   No new imaging      Cece Daniel, medical student  09/11/18 8:19 AM

## 2018-09-12 ENCOUNTER — TELEPHONE (OUTPATIENT)
Dept: FAMILY MEDICINE | Facility: CLINIC | Age: 28
End: 2018-09-12

## 2018-09-12 VITALS
OXYGEN SATURATION: 100 % | SYSTOLIC BLOOD PRESSURE: 112 MMHG | HEART RATE: 122 BPM | BODY MASS INDEX: 41.16 KG/M2 | WEIGHT: 311.95 LBS | RESPIRATION RATE: 16 BRPM | DIASTOLIC BLOOD PRESSURE: 78 MMHG | TEMPERATURE: 98.1 F

## 2018-09-12 PROCEDURE — 25000132 ZZH RX MED GY IP 250 OP 250 PS 637: Performed by: INTERNAL MEDICINE

## 2018-09-12 PROCEDURE — 99239 HOSP IP/OBS DSCHRG MGMT >30: CPT | Performed by: INTERNAL MEDICINE

## 2018-09-12 RX ORDER — LANOLIN ALCOHOL/MO/W.PET/CERES
100 CREAM (GRAM) TOPICAL DAILY
Status: DISCONTINUED | OUTPATIENT
Start: 2018-09-12 | End: 2018-09-12 | Stop reason: HOSPADM

## 2018-09-12 RX ADMIN — Medication 100 MG: at 08:23

## 2018-09-12 RX ADMIN — GABAPENTIN 800 MG: 400 CAPSULE ORAL at 08:22

## 2018-09-12 RX ADMIN — FOLIC ACID 1 MG: 1 TABLET ORAL at 08:22

## 2018-09-12 RX ADMIN — DEXTROAMPHETAMINE SACCHARATE, AMPHETAMINE ASPARTATE MONOHYDRATE, DEXTROAMPHETAMINE SULFATE, AMPHETAMINE SULFATE 30 MG: 7.5; 7.5; 7.5; 7.5 CAPSULE, EXTENDED RELEASE ORAL at 08:22

## 2018-09-12 RX ADMIN — SERTRALINE HYDROCHLORIDE 200 MG: 100 TABLET ORAL at 08:22

## 2018-09-12 RX ADMIN — AMLODIPINE BESYLATE 10 MG: 10 TABLET ORAL at 08:22

## 2018-09-12 RX ADMIN — ATENOLOL 25 MG: 25 TABLET ORAL at 08:22

## 2018-09-12 RX ADMIN — CLONIDINE HYDROCHLORIDE 0.1 MG: 0.1 TABLET ORAL at 08:23

## 2018-09-12 RX ADMIN — BUSPIRONE HYDROCHLORIDE 20 MG: 10 TABLET ORAL at 08:22

## 2018-09-12 RX ADMIN — Medication 1 LOZENGE: at 11:22

## 2018-09-12 RX ADMIN — ACETAMINOPHEN 650 MG: 325 TABLET, FILM COATED ORAL at 11:22

## 2018-09-12 ASSESSMENT — ACTIVITIES OF DAILY LIVING (ADL)
ADLS_ACUITY_SCORE: 11

## 2018-09-12 NOTE — PLAN OF CARE
Problem: Patient Care Overview  Goal: Plan of Care/Patient Progress Review  Outcome: Improving  Pt transfer from ICU this shift came to unit about 1800. A/O cooperative. VSS afebrile, LS clear bilaterally RA. Tele NSR. On CIWA protocol score 12. Continued c/o mild headache and feeling anxious. PRN Valium given per CIWA score. New PIV placed. No c/o chest pain or difficulty breathing. Will monitor and continue POC.

## 2018-09-12 NOTE — PLAN OF CARE
Problem: Patient Care Overview  Goal: Discharge Needs Assessment  Outcome: Improving  Pt alert and oriented, up with standby assist in the room. Pt scoring 2 on the CIWA scale, no valium given this shift. Pt declines chem dep, will continue with AA.

## 2018-09-12 NOTE — PLAN OF CARE
Patient meets criteria for discharge. No c/o pain/discomfort. Alert and oriented X 4. Patient given AVS and discussed with patient. Patient verbalized understanding. Discharge medications - none. Patient to discharge home by ambulation.     Orientation: Awake, alert, cooperative, no apparent distress.  Sitting in chair   VSS. 99% on RA. Afebrile.  IVF: SL  Tele: SR.   LS: clear and equal bilaterally. No wheezing  GI: Passing gas. No BM. Denies N/V.  Normal bowel sounds, soft, non-distended, non-tender, tolerated regular breakfast this AM  : Adequate urine output. Clear yellow.   Skin: bruising noted, Skin otherwise intact. No rashes, no cyanosis, dry to touch  Activity: Independent. Up to shower.   Pain: 3/10 throat discomfort, tylenol and lozenges given.  CIWA 1  DC Plan: To home today

## 2018-09-12 NOTE — DISCHARGE SUMMARY
[facility]  Discharge Summary  Name: Nickolas Lang    MRN: [id number]  YOB: 1990    Age: 28 year old  Date of Discharge:  9/12/2018  Date of Admission: 9/9/2018  Primary Care Provider: Tima Crews  Discharge Physician:  Endy Louis MD  Discharging Service:  Hospitalist      Discharge Diagnosis:  Severe acute alcohol intoxication with severe agitation requiring aggressive sedation  Acute respiratory failure secondary to airway compromise from #1 resulting for mechanical ventilation  Hypertension  Alcohol abuse  Anxiety disorder     Other Diagnosis:  Past Medical History:   Diagnosis Date     ADD (attention deficit disorder)      Alcohol abuse      Anxiety     gabapentin helps the most      GERD (gastroesophageal reflux disease)      Hepatic steatosis      Hypertension      Obesity      Pyloric stenosis     s/p pyloromyotomy as an infant          Discharge Disposition:  Discharged to home     Allergies:  No Known Allergies     Discharge Medications:   Current Discharge Medication List      CONTINUE these medications which have NOT CHANGED    Details   amLODIPine (NORVASC) 10 MG tablet Take 1 tablet (10 mg) by mouth daily  Qty: 90 tablet, Refills: 1    Associated Diagnoses: Alcohol dependence, continuous drinking behavior (H)      amphetamine-dextroamphetamine (ADDERALL XR) 30 MG per 24 hr capsule Take 30 mg by mouth 2 times daily      busPIRone (BUSPAR) 10 MG tablet Take 2 tablets (20 mg) by mouth 3 times daily  Qty: 90 tablet, Refills: 0    Associated Diagnoses: Alcohol dependence, continuous drinking behavior (H)      cloNIDine (CATAPRES) 0.1 MG tablet Take 1 tablet (0.1 mg) by mouth 2 times daily  Qty: 180 tablet, Refills: 1    Associated Diagnoses: Alcohol dependence, continuous drinking behavior (H)      gabapentin (NEURONTIN) 800 MG tablet TAKE 1 TABLET (800 MG) BY MOUTH 4 TIMES DAILY  Qty: 120 tablet, Refills: 1    Associated Diagnoses: Anxiety; Alcohol use disorder,  severe, dependence (H)      hydrOXYzine (ATARAX) 50 MG tablet Take 1 tablet (50 mg) by mouth every 4 hours as needed for anxiety  Qty: 120 tablet, Refills: 0    Associated Diagnoses: Chemical dependency (H)      loperamide (IMODIUM A-D) 2 MG tablet Take 1 tablet (2 mg) by mouth 4 times daily as needed for diarrhea  Qty: 20 tablet, Refills: 0    Comments: LP  Associated Diagnoses: Diarrhea, unspecified type      multivitamin, therapeutic with minerals (THERA-VIT-M) TABS tablet Take 1 tablet by mouth daily  Qty: 30 each, Refills: 0    Associated Diagnoses: Alcohol dependence, continuous drinking behavior (H)      nicotine (NICODERM CQ) 21 MG/24HR 24 hr patch Place 1 patch onto the skin daily  Qty: 30 patch, Refills: 0    Associated Diagnoses: Alcohol dependence, continuous drinking behavior (H)      nicotine polacrilex (NICORETTE) 4 MG gum Chew one piece every hour as directed.  Qty: 100 tablet, Refills: 2    Comments: LP patient  Associated Diagnoses: Encounter for smoking cessation counseling      nicotine polacrilex 4 MG lozenge 1-2 lozenges every hour as needed, max 10 lozenges per day  Qty: 360 tablet, Refills: 1    Comments: LP patient  Associated Diagnoses: Encounter for smoking cessation counseling      omeprazole (PRILOSEC) 40 MG capsule Take 1 capsule (40 mg) by mouth daily  Qty: 30 capsule, Refills: 3    Associated Diagnoses: Alcohol dependence, continuous drinking behavior (H)      ondansetron (ZOFRAN) 4 MG tablet Take 1 tablet (4 mg) by mouth every 8 hours as needed for nausea  Qty: 10 tablet, Refills: 0    Comments: LP  Associated Diagnoses: Nausea and vomiting, intractability of vomiting not specified, unspecified vomiting type      sertraline (ZOLOFT) 100 MG tablet Take 2 tablets (200 mg) by mouth daily  Qty: 30 tablet, Refills: 0    Associated Diagnoses: Alcohol dependence, continuous drinking behavior (H)      thiamine 100 MG tablet Take 1 tablet (100 mg) by mouth daily  Qty: 30 tablet, Refills: 1     Associated Diagnoses: Acute alcoholic intoxication in alcoholism with complication (H)      traZODone (DESYREL) 100 MG tablet Take 1-2 tablets (100-200 mg) by mouth nightly as needed for sleep  Qty: 90 tablet, Refills: 0    Associated Diagnoses: Alcohol dependence, continuous drinking behavior (H)              Condition on Discharge:  Discharge condition: Stable   Discharge vitals: Blood pressure 112/78, pulse 122, temperature 98.1  F (36.7  C), temperature source Oral, resp. rate 16, weight 141.5 kg (311 lb 15.2 oz), SpO2 100 %.   Code status on discharge: Full Code     History of Present Illness:  See detailed admission note for full details.        Significant Physical Exam Findings Day of Discharge:  HEENT; Atraumatic, normocephalic, pinkish conjuctiva, pupils bilateral reactive   Skin: warm and moist, no rashes  Lymphatics: no cervical or axillary lymphandenopathy  Lungs: equal chest expansion, clear to auscultation, no wheezes, no stridor, no crackles,   Heart: normal rate, normal rhythm, no rubs or gallops.   Abdomen: normal bowel sounds, no tenderness, no peritoneal signs, no guarding  Extremities: no deformities, no edema   Neuro; follow commands, alert and oriented x3, spontaneous speech, coherent, moves all extremities spontaneously  Psych; no hallucination, euthymic mood, not agitated        Procedures other than Imaging:  Intubation requiring mechanical ventilation     Imaging:  Results for orders placed or performed during the hospital encounter of 09/09/18   Head CT w/o contrast    Narrative    CT OF THE HEAD WITHOUT CONTRAST 9/9/2018 9:41 PM     COMPARISON: Head CT 6/3/2018.    HISTORY:  AMS.     TECHNIQUE: Axial CT images of the head from the skull base to the  vertex were acquired without IV contrast.    FINDINGS: The ventricles and basal cisterns are within normal limits  in configuration. There is no midline shift. There are no extra-axial  fluid collections.  Gray-white differentiation is well  maintained.    No intracranial hemorrhage, mass or recent infarct.    The visualized paranasal sinuses are well-aerated. There is no  mastoiditis. There are no fractures of the visualized bones.      Impression    IMPRESSION:  Normal head CT.      Radiation dose for this scan was reduced using automated exposure  control, adjustment of the mA and/or kV according to patient size, or  iterative reconstruction technique    BRENDA CHEW MD   XR Chest Port 1 View    Narrative    CHEST ONE VIEW PORTABLE   9/9/2018 9:18 PM     HISTORY: following intubation;     COMPARISON: 4/8/2018      Impression    IMPRESSION: Endotracheal tube is midline tip 4 cm above the prince.  Patchy upper lobe infiltrates. No evidence for pleural effusion.    GÓMEZ LOPEZ MD        Consultations:  Consultation during this admission received from ICU care.     Recent Lab Results:    Recent Labs  Lab 09/10/18  0510 09/09/18  1924   WBC 11.9* 15.3*   HGB 14.9 18.7*   HCT 44.4 55.4*   MCV 86 85    413     No results for input(s): CULT in the last 168 hours.    Recent Labs  Lab 09/10/18  0510 09/09/18  1923    141   POTASSIUM 4.0 3.7   CHLORIDE 105 99   CO2 27 30   ANIONGAP 9 12   * 118*   BUN 15 12   CR 0.61* 0.73   GFRESTIMATED >90 >90   GFRESTBLACK >90 >90   AZAM 7.5* 10.2*   PROTTOTAL 6.5* 9.1*   ALBUMIN 3.4 4.7   BILITOTAL 0.3 0.3   ALKPHOS 91 127   AST 35 42   ALT 54 74*       Recent Labs  Lab 09/11/18  0822 09/11/18  0405 09/11/18  0013 09/10/18  2028 09/10/18  1608  09/10/18  0510  09/09/18  1923   GLC  --   --   --   --   --   --  109*  --  118*   * 83 89 106* 91  < >  --   < >  --    < > = values in this interval not displayed.    Recent Labs  Lab 09/10/18  0216 09/09/18  2155 09/09/18  1921   LACT 2.5* 2.6* 5.3*     No results for input(s): TROPONIN, TROPI, TROPR in the last 168 hours.    Invalid input(s): TROP, TROPONINIES    Recent Labs  Lab 09/10/18  4202   COLOR Yellow   APPEARANCE Cloudy   URINEGLC  Negative   URINEBILI Negative   URINEKETONE Negative   SG 1.031   UBLD Negative   URINEPH 5.0   PROTEIN 30*   NITRITE Negative   LEUKEST Trace*   RBCU 5*   WBCU 0          Pending Results:    Unresulted Labs Ordered in the Past 30 Days of this Admission     No orders found from 7/11/2018 to 9/10/2018.           Discharge Instructions and Follow-Up:   Discharge diet:   Active Diet Order      Regular Diet Adult      Diet   Discharge activity: Activity as tolerated   Discharge follow-up: 1-2 weeks with PCP, addiction clinic the next 7 days, AA as scheduled   Outpatient therapy: None    Other instructions: None      Hospital Course:  I have assumed hospitalist service care today for this young individual.  Seen and examined.  He was recently managed in the intensive care unit setting due to severe alcohol intoxication requiring aggressive sedation and also had some accompanying acute respiratory failure due to airway compromise leading to intubation and mechanical ventilation.  He was subsequently extubated without any issues or complications.  Transferred to telemetry monitoring floor since yesterday.  He did well and currently not scoring with CIWA protocol.  Is not requiring any benzodiazepines administration.  He is easily aroused with verbal stimuli today.  He denies any suicidal or homicidal ideation.  Nickolas is very much a cooperative, pleasant, interactive and denies any and ongoing complaints.  He stated that he usually follows up regularly with his addiction clinic but unfortunately fell into the cracks in the past month or so.  He stated no unusual stressors that led to his relapse but admitted that he started to drink regularly and probably excessively as well.  Currently he remained hemodynamically stable, afebrile, not requiring oxygen supplementation.  He is asking for home discharge today.  We will proceed with this once he demonstrated safe ambulation is continue to tolerate oral diet.  And remained  hemodynamically stable.     Total time spent in face to face contact with the patient and coordinating discharge was:  > 30 Minutes.

## 2018-09-12 NOTE — TELEPHONE ENCOUNTER
Dr. Leal called to speak with Amradha regarding this pt and his current inpatient status and to discuss his care.     Please call Dr. Molina   Cell: [phone] (if he doesn't answer please page him)  Pager: [phone]    Lucille Walker

## 2018-09-13 ENCOUNTER — APPOINTMENT (OUTPATIENT)
Dept: GENERAL RADIOLOGY | Facility: CLINIC | Age: 28
End: 2018-09-13
Attending: EMERGENCY MEDICINE
Payer: COMMERCIAL

## 2018-09-13 ENCOUNTER — APPOINTMENT (OUTPATIENT)
Dept: GENERAL RADIOLOGY | Facility: CLINIC | Age: 28
DRG: 897 | End: 2018-09-13
Attending: INTERNAL MEDICINE
Payer: COMMERCIAL

## 2018-09-13 ENCOUNTER — HOSPITAL ENCOUNTER (INPATIENT)
Facility: CLINIC | Age: 28
LOS: 3 days | Discharge: HOME | DRG: 897 | End: 2018-09-16
Attending: INTERNAL MEDICINE | Admitting: INTERNAL MEDICINE
Payer: COMMERCIAL

## 2018-09-13 ENCOUNTER — HOSPITAL ENCOUNTER (EMERGENCY)
Facility: CLINIC | Age: 28
Discharge: ANOTHER HOSPITAL | End: 2018-09-13
Attending: EMERGENCY MEDICINE | Admitting: EMERGENCY MEDICINE
Payer: COMMERCIAL

## 2018-09-13 VITALS
RESPIRATION RATE: 14 BRPM | SYSTOLIC BLOOD PRESSURE: 114 MMHG | BODY MASS INDEX: 41.03 KG/M2 | OXYGEN SATURATION: 98 % | HEART RATE: 104 BPM | TEMPERATURE: 98.1 F | DIASTOLIC BLOOD PRESSURE: 68 MMHG | WEIGHT: 311 LBS

## 2018-09-13 DIAGNOSIS — F10.929 ALCOHOLIC INTOXICATION WITH COMPLICATION (H): ICD-10-CM

## 2018-09-13 DIAGNOSIS — G47.00 INSOMNIA, UNSPECIFIED TYPE: ICD-10-CM

## 2018-09-13 DIAGNOSIS — F10.239 ALCOHOL DEPENDENCE WITH WITHDRAWAL WITH COMPLICATION (H): Primary | ICD-10-CM

## 2018-09-13 DIAGNOSIS — F10.982 ALCOHOL-INDUCED INSOMNIA (H): ICD-10-CM

## 2018-09-13 PROBLEM — F10.180 ALCOHOL ABUSE WITH ALCOHOL-INDUCED ANXIETY DISORDER (H): Status: ACTIVE | Noted: 2018-09-13

## 2018-09-13 LAB
ALBUMIN SERPL-MCNC: 3.9 G/DL (ref 3.4–5)
ALP SERPL-CCNC: 110 U/L (ref 40–150)
ALT SERPL W P-5'-P-CCNC: 93 U/L (ref 0–70)
AMPHETAMINES UR QL SCN: POSITIVE
ANION GAP SERPL CALCULATED.3IONS-SCNC: 13 MMOL/L (ref 3–14)
APAP SERPL-MCNC: <2 MG/L (ref 10–20)
AST SERPL W P-5'-P-CCNC: 81 U/L (ref 0–45)
BARBITURATES UR QL: NEGATIVE
BASE DEFICIT BLDA-SCNC: 5.6 MMOL/L
BASOPHILS # BLD AUTO: 0.1 10E9/L (ref 0–0.2)
BASOPHILS NFR BLD AUTO: 0.6 %
BENZODIAZ UR QL: POSITIVE
BILIRUB SERPL-MCNC: 0.4 MG/DL (ref 0.2–1.3)
BUN SERPL-MCNC: 17 MG/DL (ref 7–30)
CALCIUM SERPL-MCNC: 7.8 MG/DL (ref 8.5–10.1)
CANNABINOIDS UR QL SCN: NEGATIVE
CHLORIDE SERPL-SCNC: 108 MMOL/L (ref 94–109)
CK SERPL-CCNC: 112 U/L (ref 30–300)
CO2 SERPL-SCNC: 21 MMOL/L (ref 20–32)
COCAINE UR QL: NEGATIVE
CREAT SERPL-MCNC: 0.65 MG/DL (ref 0.66–1.25)
CREAT SERPL-MCNC: 0.76 MG/DL (ref 0.66–1.25)
DIFFERENTIAL METHOD BLD: NORMAL
EOSINOPHIL # BLD AUTO: 0.1 10E9/L (ref 0–0.7)
EOSINOPHIL NFR BLD AUTO: 1.2 %
ERYTHROCYTE [DISTWIDTH] IN BLOOD BY AUTOMATED COUNT: 13.4 % (ref 10–15)
ETHANOL SERPL-MCNC: 0.47 G/DL
GFR SERPL CREATININE-BSD FRML MDRD: >90 ML/MIN/1.7M2
GFR SERPL CREATININE-BSD FRML MDRD: >90 ML/MIN/1.7M2
GLUCOSE BLDC GLUCOMTR-MCNC: 104 MG/DL (ref 70–99)
GLUCOSE BLDC GLUCOMTR-MCNC: 84 MG/DL (ref 70–99)
GLUCOSE SERPL-MCNC: 98 MG/DL (ref 70–99)
HCO3 BLD-SCNC: 22 MMOL/L (ref 21–28)
HCT VFR BLD AUTO: 47.7 % (ref 40–53)
HGB BLD-MCNC: 16 G/DL (ref 13.3–17.7)
IMM GRANULOCYTES # BLD: 0.1 10E9/L (ref 0–0.4)
IMM GRANULOCYTES NFR BLD: 0.9 %
INR PPP: 1.01 (ref 0.86–1.14)
INR PPP: 1.03 (ref 0.86–1.14)
LACTATE BLD-SCNC: 2.8 MMOL/L (ref 0.7–2)
LYMPHOCYTES # BLD AUTO: 4.3 10E9/L (ref 0.8–5.3)
LYMPHOCYTES NFR BLD AUTO: 42.6 %
MCH RBC QN AUTO: 28.5 PG (ref 26.5–33)
MCHC RBC AUTO-ENTMCNC: 33.5 G/DL (ref 31.5–36.5)
MCV RBC AUTO: 85 FL (ref 78–100)
MONOCYTES # BLD AUTO: 0.6 10E9/L (ref 0–1.3)
MONOCYTES NFR BLD AUTO: 5.9 %
NEUTROPHILS # BLD AUTO: 4.9 10E9/L (ref 1.6–8.3)
NEUTROPHILS NFR BLD AUTO: 48.8 %
NRBC # BLD AUTO: 0 10*3/UL
NRBC BLD AUTO-RTO: 0 /100
OPIATES UR QL SCN: NEGATIVE
OXYHGB MFR BLD: 97 % (ref 92–100)
PCO2 BLD: 48 MM HG (ref 35–45)
PCP UR QL SCN: NEGATIVE
PH BLD: 7.27 PH (ref 7.35–7.45)
PLATELET # BLD AUTO: 222 10E9/L (ref 150–450)
PLATELET # BLD AUTO: 277 10E9/L (ref 150–450)
PO2 BLD: 113 MM HG (ref 80–105)
POTASSIUM SERPL-SCNC: 4.1 MMOL/L (ref 3.4–5.3)
PROT SERPL-MCNC: 7.8 G/DL (ref 6.8–8.8)
RBC # BLD AUTO: 5.61 10E12/L (ref 4.4–5.9)
SALICYLATES SERPL-MCNC: <2 MG/DL
SODIUM SERPL-SCNC: 142 MMOL/L (ref 133–144)
TROPONIN I SERPL-MCNC: <0.015 UG/L (ref 0–0.04)
WBC # BLD AUTO: 10 10E9/L (ref 4–11)

## 2018-09-13 PROCEDURE — 40000986 XR CHEST PORT 1 VW

## 2018-09-13 PROCEDURE — 25000128 H RX IP 250 OP 636

## 2018-09-13 PROCEDURE — 25000125 ZZHC RX 250: Performed by: EMERGENCY MEDICINE

## 2018-09-13 PROCEDURE — 80320 DRUG SCREEN QUANTALCOHOLS: CPT | Performed by: EMERGENCY MEDICINE

## 2018-09-13 PROCEDURE — 25000128 H RX IP 250 OP 636: Performed by: EMERGENCY MEDICINE

## 2018-09-13 PROCEDURE — 25000132 ZZH RX MED GY IP 250 OP 250 PS 637: Performed by: INTERNAL MEDICINE

## 2018-09-13 PROCEDURE — 99292 CRITICAL CARE ADDL 30 MIN: CPT

## 2018-09-13 PROCEDURE — 25000125 ZZHC RX 250: Performed by: INTERNAL MEDICINE

## 2018-09-13 PROCEDURE — 84484 ASSAY OF TROPONIN QUANT: CPT | Performed by: INTERNAL MEDICINE

## 2018-09-13 PROCEDURE — 96372 THER/PROPH/DIAG INJ SC/IM: CPT

## 2018-09-13 PROCEDURE — 96365 THER/PROPH/DIAG IV INF INIT: CPT

## 2018-09-13 PROCEDURE — 96366 THER/PROPH/DIAG IV INF ADDON: CPT

## 2018-09-13 PROCEDURE — 82565 ASSAY OF CREATININE: CPT | Performed by: INTERNAL MEDICINE

## 2018-09-13 PROCEDURE — 85610 PROTHROMBIN TIME: CPT | Performed by: INTERNAL MEDICINE

## 2018-09-13 PROCEDURE — 5A1935Z RESPIRATORY VENTILATION, LESS THAN 24 CONSECUTIVE HOURS: ICD-10-PCS | Performed by: INTERNAL MEDICINE

## 2018-09-13 PROCEDURE — 82550 ASSAY OF CK (CPK): CPT | Performed by: INTERNAL MEDICINE

## 2018-09-13 PROCEDURE — 80329 ANALGESICS NON-OPIOID 1 OR 2: CPT | Performed by: EMERGENCY MEDICINE

## 2018-09-13 PROCEDURE — 94002 VENT MGMT INPAT INIT DAY: CPT

## 2018-09-13 PROCEDURE — 82805 BLOOD GASES W/O2 SATURATION: CPT | Performed by: INTERNAL MEDICINE

## 2018-09-13 PROCEDURE — 74018 RADEX ABDOMEN 1 VIEW: CPT

## 2018-09-13 PROCEDURE — 93010 ELECTROCARDIOGRAM REPORT: CPT | Performed by: INTERNAL MEDICINE

## 2018-09-13 PROCEDURE — 99223 1ST HOSP IP/OBS HIGH 75: CPT | Performed by: INTERNAL MEDICINE

## 2018-09-13 PROCEDURE — 83605 ASSAY OF LACTIC ACID: CPT | Performed by: INTERNAL MEDICINE

## 2018-09-13 PROCEDURE — 40000276 ZZH STATISTIC RCP TIME ED VENT EA 10 MIN

## 2018-09-13 PROCEDURE — 93005 ELECTROCARDIOGRAM TRACING: CPT

## 2018-09-13 PROCEDURE — 00000146 ZZHCL STATISTIC GLUCOSE BY METER IP

## 2018-09-13 PROCEDURE — 80053 COMPREHEN METABOLIC PANEL: CPT | Performed by: EMERGENCY MEDICINE

## 2018-09-13 PROCEDURE — HZ2ZZZZ DETOXIFICATION SERVICES FOR SUBSTANCE ABUSE TREATMENT: ICD-10-PCS | Performed by: INTERNAL MEDICINE

## 2018-09-13 PROCEDURE — 31500 INSERT EMERGENCY AIRWAY: CPT

## 2018-09-13 PROCEDURE — 80307 DRUG TEST PRSMV CHEM ANLYZR: CPT | Performed by: EMERGENCY MEDICINE

## 2018-09-13 PROCEDURE — 85025 COMPLETE CBC W/AUTO DIFF WBC: CPT | Performed by: EMERGENCY MEDICINE

## 2018-09-13 PROCEDURE — 99291 CRITICAL CARE FIRST HOUR: CPT | Mod: 25

## 2018-09-13 PROCEDURE — 96375 TX/PRO/DX INJ NEW DRUG ADDON: CPT

## 2018-09-13 PROCEDURE — 85610 PROTHROMBIN TIME: CPT | Performed by: EMERGENCY MEDICINE

## 2018-09-13 PROCEDURE — 40000275 ZZH STATISTIC RCP TIME EA 10 MIN

## 2018-09-13 PROCEDURE — 85049 AUTOMATED PLATELET COUNT: CPT | Performed by: INTERNAL MEDICINE

## 2018-09-13 PROCEDURE — C9113 INJ PANTOPRAZOLE SODIUM, VIA: HCPCS | Performed by: INTERNAL MEDICINE

## 2018-09-13 PROCEDURE — 25000128 H RX IP 250 OP 636: Performed by: INTERNAL MEDICINE

## 2018-09-13 PROCEDURE — 36600 WITHDRAWAL OF ARTERIAL BLOOD: CPT

## 2018-09-13 PROCEDURE — 36415 COLL VENOUS BLD VENIPUNCTURE: CPT | Performed by: INTERNAL MEDICINE

## 2018-09-13 PROCEDURE — 20000003 ZZH R&B ICU

## 2018-09-13 RX ORDER — PROPOFOL 10 MG/ML
5-75 INJECTION, EMULSION INTRAVENOUS CONTINUOUS
Status: DISCONTINUED | OUTPATIENT
Start: 2018-09-13 | End: 2018-09-13 | Stop reason: HOSPADM

## 2018-09-13 RX ORDER — CLONIDINE HYDROCHLORIDE 0.1 MG/1
0.1 TABLET ORAL 2 TIMES DAILY
Status: DISCONTINUED | OUTPATIENT
Start: 2018-09-13 | End: 2018-09-14

## 2018-09-13 RX ORDER — CHLORHEXIDINE GLUCONATE ORAL RINSE 1.2 MG/ML
15 SOLUTION DENTAL EVERY 12 HOURS
Status: DISCONTINUED | OUTPATIENT
Start: 2018-09-13 | End: 2018-09-14

## 2018-09-13 RX ORDER — PROPOFOL 10 MG/ML
100 INJECTION, EMULSION INTRAVENOUS ONCE
Status: COMPLETED | OUTPATIENT
Start: 2018-09-13 | End: 2018-09-13

## 2018-09-13 RX ORDER — NICOTINE 21 MG/24HR
1 PATCH, TRANSDERMAL 24 HOURS TRANSDERMAL DAILY
Status: DISCONTINUED | OUTPATIENT
Start: 2018-09-13 | End: 2018-09-16 | Stop reason: HOSPADM

## 2018-09-13 RX ORDER — METOPROLOL TARTRATE 1 MG/ML
5 INJECTION, SOLUTION INTRAVENOUS EVERY 4 HOURS PRN
Status: DISCONTINUED | OUTPATIENT
Start: 2018-09-13 | End: 2018-09-16 | Stop reason: HOSPADM

## 2018-09-13 RX ORDER — AMLODIPINE BESYLATE 10 MG/1
10 TABLET ORAL DAILY
Status: DISCONTINUED | OUTPATIENT
Start: 2018-09-13 | End: 2018-09-14

## 2018-09-13 RX ORDER — IPRATROPIUM BROMIDE AND ALBUTEROL SULFATE 2.5; .5 MG/3ML; MG/3ML
3 SOLUTION RESPIRATORY (INHALATION)
Status: DISCONTINUED | OUTPATIENT
Start: 2018-09-14 | End: 2018-09-14

## 2018-09-13 RX ORDER — NALOXONE HYDROCHLORIDE 0.4 MG/ML
.1-.4 INJECTION, SOLUTION INTRAMUSCULAR; INTRAVENOUS; SUBCUTANEOUS
Status: DISCONTINUED | OUTPATIENT
Start: 2018-09-13 | End: 2018-09-14

## 2018-09-13 RX ORDER — HYDROCODONE BITARTRATE AND ACETAMINOPHEN 5; 325 MG/1; MG/1
1-2 TABLET ORAL EVERY 4 HOURS PRN
Status: DISCONTINUED | OUTPATIENT
Start: 2018-09-13 | End: 2018-09-16 | Stop reason: HOSPADM

## 2018-09-13 RX ORDER — ONDANSETRON 2 MG/ML
4 INJECTION INTRAMUSCULAR; INTRAVENOUS ONCE
Status: COMPLETED | OUTPATIENT
Start: 2018-09-13 | End: 2018-09-13

## 2018-09-13 RX ORDER — FLUMAZENIL 0.1 MG/ML
0.2 INJECTION, SOLUTION INTRAVENOUS
Status: DISCONTINUED | OUTPATIENT
Start: 2018-09-13 | End: 2018-09-16 | Stop reason: HOSPADM

## 2018-09-13 RX ORDER — IPRATROPIUM BROMIDE AND ALBUTEROL SULFATE 2.5; .5 MG/3ML; MG/3ML
3 SOLUTION RESPIRATORY (INHALATION) EVERY 4 HOURS
Status: DISCONTINUED | OUTPATIENT
Start: 2018-09-13 | End: 2018-09-13

## 2018-09-13 RX ORDER — FENTANYL CITRATE 50 UG/ML
50 INJECTION, SOLUTION INTRAMUSCULAR; INTRAVENOUS
Status: DISCONTINUED | OUTPATIENT
Start: 2018-09-13 | End: 2018-09-14

## 2018-09-13 RX ORDER — PROPOFOL 10 MG/ML
INJECTION, EMULSION INTRAVENOUS
Status: COMPLETED
Start: 2018-09-13 | End: 2018-09-13

## 2018-09-13 RX ORDER — LORAZEPAM 2 MG/ML
1-4 INJECTION INTRAMUSCULAR
Status: DISCONTINUED | OUTPATIENT
Start: 2018-09-13 | End: 2018-09-16 | Stop reason: HOSPADM

## 2018-09-13 RX ORDER — BUSPIRONE HYDROCHLORIDE 10 MG/1
20 TABLET ORAL 3 TIMES DAILY
Status: DISCONTINUED | OUTPATIENT
Start: 2018-09-13 | End: 2018-09-14

## 2018-09-13 RX ORDER — HYDROXYZINE HYDROCHLORIDE 25 MG/1
50 TABLET, FILM COATED ORAL EVERY 4 HOURS PRN
Status: DISCONTINUED | OUTPATIENT
Start: 2018-09-13 | End: 2018-09-16 | Stop reason: HOSPADM

## 2018-09-13 RX ORDER — SERTRALINE HYDROCHLORIDE 100 MG/1
200 TABLET, FILM COATED ORAL DAILY
Status: DISCONTINUED | OUTPATIENT
Start: 2018-09-13 | End: 2018-09-16 | Stop reason: HOSPADM

## 2018-09-13 RX ORDER — FENTANYL CITRATE 50 UG/ML
100 INJECTION, SOLUTION INTRAMUSCULAR; INTRAVENOUS ONCE
Status: COMPLETED | OUTPATIENT
Start: 2018-09-13 | End: 2018-09-13

## 2018-09-13 RX ORDER — NALOXONE HYDROCHLORIDE 0.4 MG/ML
.1-.4 INJECTION, SOLUTION INTRAMUSCULAR; INTRAVENOUS; SUBCUTANEOUS
Status: DISCONTINUED | OUTPATIENT
Start: 2018-09-13 | End: 2018-09-16 | Stop reason: HOSPADM

## 2018-09-13 RX ORDER — PROPOFOL 10 MG/ML
5-75 INJECTION, EMULSION INTRAVENOUS CONTINUOUS
Status: DISCONTINUED | OUTPATIENT
Start: 2018-09-13 | End: 2018-09-14

## 2018-09-13 RX ORDER — GABAPENTIN 250 MG/5ML
800 SOLUTION ORAL 4 TIMES DAILY
Status: DISCONTINUED | OUTPATIENT
Start: 2018-09-13 | End: 2018-09-14

## 2018-09-13 RX ORDER — SODIUM CHLORIDE 9 MG/ML
INJECTION, SOLUTION INTRAVENOUS CONTINUOUS
Status: DISCONTINUED | OUTPATIENT
Start: 2018-09-13 | End: 2018-09-14

## 2018-09-13 RX ORDER — ETOMIDATE 2 MG/ML
30 INJECTION INTRAVENOUS ONCE
Status: COMPLETED | OUTPATIENT
Start: 2018-09-13 | End: 2018-09-13

## 2018-09-13 RX ADMIN — PROPOFOL 30 MCG/KG/MIN: 10 INJECTION, EMULSION INTRAVENOUS at 15:27

## 2018-09-13 RX ADMIN — ENOXAPARIN SODIUM 40 MG: 40 INJECTION SUBCUTANEOUS at 21:22

## 2018-09-13 RX ADMIN — ETOMIDATE 30 MG: 2 INJECTION, SOLUTION INTRAVENOUS at 15:27

## 2018-09-13 RX ADMIN — PROPOFOL 100 MG: 10 INJECTION, EMULSION INTRAVENOUS at 15:27

## 2018-09-13 RX ADMIN — NICOTINE 1 PATCH: 21 PATCH, EXTENDED RELEASE TRANSDERMAL at 21:22

## 2018-09-13 RX ADMIN — AMLODIPINE BESYLATE 10 MG: 10 TABLET ORAL at 21:22

## 2018-09-13 RX ADMIN — PROPOFOL 65 MCG/KG/MIN: 10 INJECTION, EMULSION INTRAVENOUS at 20:29

## 2018-09-13 RX ADMIN — PANTOPRAZOLE SODIUM 40 MG: 40 INJECTION, POWDER, FOR SOLUTION INTRAVENOUS at 21:22

## 2018-09-13 RX ADMIN — CLONIDINE HYDROCHLORIDE 0.1 MG: 0.1 TABLET ORAL at 21:21

## 2018-09-13 RX ADMIN — CHLORHEXIDINE GLUCONATE 15 ML: 1.2 RINSE ORAL at 21:22

## 2018-09-13 RX ADMIN — SODIUM CHLORIDE: 9 INJECTION, SOLUTION INTRAVENOUS at 21:11

## 2018-09-13 RX ADMIN — FENTANYL CITRATE 100 MCG: 50 INJECTION INTRAMUSCULAR; INTRAVENOUS at 14:33

## 2018-09-13 RX ADMIN — ONDANSETRON 4 MG: 2 INJECTION INTRAMUSCULAR; INTRAVENOUS at 14:33

## 2018-09-13 RX ADMIN — DEXMEDETOMIDINE 0.2 MCG/KG/HR: 100 INJECTION, SOLUTION, CONCENTRATE INTRAVENOUS at 16:36

## 2018-09-13 RX ADMIN — FOLIC ACID: 5 INJECTION, SOLUTION INTRAMUSCULAR; INTRAVENOUS; SUBCUTANEOUS at 21:10

## 2018-09-13 RX ADMIN — MIDAZOLAM HYDROCHLORIDE 2 MG: 1 INJECTION, SOLUTION INTRAMUSCULAR; INTRAVENOUS at 15:50

## 2018-09-13 RX ADMIN — MIDAZOLAM HYDROCHLORIDE 2 MG: 1 INJECTION, SOLUTION INTRAMUSCULAR; INTRAVENOUS at 17:32

## 2018-09-13 RX ADMIN — SERTRALINE HYDROCHLORIDE 200 MG: 100 TABLET ORAL at 21:21

## 2018-09-13 RX ADMIN — PROPOFOL 70 MCG/KG/MIN: 10 INJECTION, EMULSION INTRAVENOUS at 16:41

## 2018-09-13 RX ADMIN — MIDAZOLAM HYDROCHLORIDE 2 MG: 1 INJECTION, SOLUTION INTRAMUSCULAR; INTRAVENOUS at 17:10

## 2018-09-13 NOTE — ED PROVIDER NOTES
History     Chief Complaint:  Alcohol Intoxication    The history is provided by the EMS personnel.      Nickolas Lang is a 28 year old male with a history of alcoholism, alcohol withdrawal, substance abuse, depression, suicidal ideation and hypertension who presents to the ED via EMS for alcohol intoxication. Per EMS the patient was found unresponsive, surrounded by empty vodka bottles on a park bench. EMS was called by a local pedestrian. Upon arrival there were no evident signs of external trauma. The history is otherwise limited by the condition of the patient.     Allergies:  No known drug allergies     Medications:    Norvasc  Buspar  Adderall  Atarax  Imodium  Nicoderm  Nicorette  Neurontin  Zoloft  Prilosec  Thiamine    Past Medical History:    Substance abuse  Alcohol withdrawal  Depression  Suicidal ideation  Chemical dependency  ADHD  Anxiety  Obesity  GERD  Hypertension  Hepatic steatosis  Pyloric stenosis    Past Surgical History:    Deviated septum repair  Pyloromyotomy  Shoulder surgery, left    Family History:    Multiple sclerosis  Depression  Anxiety  Alcohol/drug  Hypertension    Social History:  The patient was accompanied to the ED by EMS.  Smoking Status: Current  Smokeless Tobacco: Never  Alcohol Use: History of abuse  Marital Status:  Single     Review of Systems   Unable to perform ROS: Patient unresponsive     Physical Exam     Patient Vitals for the past 24 hrs:   BP Temp Pulse Heart Rate Resp SpO2 Weight   09/13/18 1556 - - - 103 - 96 % -   09/13/18 1555 (!) 133/92 - - 102 - 95 % -   09/13/18 1554 - - - 102 - 97 % -   09/13/18 1553 - - - 101 - 97 % -   09/13/18 1552 - - - 100 - 97 % -   09/13/18 1551 - - - 99 - 98 % -   09/13/18 1550 (!) 139/91 - - 100 - 95 % -   09/13/18 1549 - - - 101 - 98 % -   09/13/18 1548 - - - 101 - 98 % -   09/13/18 1547 - - - 101 - 97 % -   09/13/18 1546 - - - 101 - 97 % -   09/13/18 1545 (!) 141/95 - - 101 - 94 % -   09/13/18 1526 - - - 96 16 100 % -    09/13/18 1525 (!) 139/94 - - 95 16 100 % -   09/13/18 1524 - - - 96 16 99 % -   09/13/18 1523 - - - 96 16 100 % -   09/13/18 1522 - - - 96 15 100 % -   09/13/18 1521 - - - 97 16 100 % -   09/13/18 1520 (!) 137/93 - - 97 15 99 % -   09/13/18 1519 - - - 98 16 98 % -   09/13/18 1518 - - - 97 15 99 % -   09/13/18 1517 - - - 97 15 99 % -   09/13/18 1516 - - - 97 15 99 % -   09/13/18 1515 (!) 136/92 - - 99 12 99 % -   09/13/18 1500 (!) 130/91 - - 99 - 97 % -   09/13/18 1445 129/83 - - 89 - 98 % -   09/13/18 1424 116/83 98.1  F (36.7  C) - - - - -   09/13/18 1418 - - 104 104 12 91 % 141.1 kg (311 lb)       Physical Exam  Constitutional: Vital signs reviewed as above.  The patient is unresponsive  Head: No external signs of trauma noted.  Eyes: Pupils are equal, round, and reactive to light.   Neck: No JVD noted  Cardiovascular: Tachycardic rate, regular rhythm and normal heart sounds.  No murmur heard. Equal B/L peripheral pulses.  Pulmonary/Chest:Decreased respiratory drive. Patient has no wheezes. Patient has no rales.   Abdominal: Soft. There is no tenderness.   Musculoskeletal/Extremities: No edema noted  Neurological: Patient is unresponsive. GCS 3. No gag.   Skin: Skin is warm and dry. There is no diaphoresis noted.     Emergency Department Course     ECG (14:33:19):  Rate 95 bpm. KY interval 136. QRS duration 98. QT/QTc 394/495. P-R-T axes 63 70 7. Normal sinus rhythm. Prolonged QT. Abnormal ECG. No previous ECG on hand. Interpreted at 1435 by Francis Jara DO.    Imaging:  Radiology findings were communicated with the patient who voiced understanding of the findings.    Chest XR, 1 View portable:  IMPRESSION: On all 3 images, the endotracheal tube tip is above the  level of the clavicles. This should be repositioned.  EDMUND MACDONALD MD    Chest XR, 1 View portable:  IMPRESSION: On all 3 images, the endotracheal tube tip is above the  level of the clavicles. This should be repositioned.  EDMUND MACDONALD,  "MD    Chest XR, 1 View portable:  IMPRESSION: On all 3 images, the endotracheal tube tip is above the  level of the clavicles. This should be repositioned.  EDMUND MACDONALD MD    Chest XR, 1 View portable:  IMPRESSION: ET tube is in good position  Juan M Alas MD    Laboratory:  Laboratory findings were communicated with the patient who voiced understanding of the findings.    CBC: WNL (WBC 10.0, HGB 16.0, )  BMP: Creatinine 0.65 (L), Calcium 7.8 (L), ALT 93 (H), AST 81 (H), o/w WNL  INR: 1.01    Glucose by meter: 104 (H)  Salicylate level: <2  Acetaminophen: <2  Alcohol ethyl: 0.47 (HH)    Drug abuse screen: Positive for Amphetamine and Benzodiazepine, all other negative    Procedures:    Intubation Procedure Note:   Performed by: Francis Jara    The procedure was performed in an emergent situation.  Risks and benefits: risks, benefits and alternatives were discussed.  Patient identity confirmed: verbally with patient and arm band  Time out: Immediately prior to procedure a \"time out\" was called to verify the correct patient, procedure, equipment, support staff and site/side marked as required.    Indication: Airway protection.    Intubation method: Glidescope  Patient status: Unconscious (was given Etomidate)  Preoxygenation: BVM  Pretreatment medications: None  Sedatives: Etomidate, Midazolam (Versed) and Propofol (Etomidate initially, Propofol post intubation, used 2 push doses of Versed as well)  Paralytic: None  Laryngoscope size: Beal 3 (Glidescope)  Tube size: 7.5 cuffed with cuff inflated after placement  Number of attempts: 1  Cricoid pressure: yes  Cords visualized: yes  Post-procedure assessment: Breath sounds equal bilaterally with chest rise and absent over the epigastrium, Chest x-ray interpreted by me demonstrating endotracheal tube in appropriate position and CO2 detector.  ETT to teeth: 22 cm  Tube secured with: ETT gu    Patient tolerated the procedure well with no immediate " complications.  Complications:  None    Interventions:  1433 - Sublimaze 100 mcg IV  1433 - Zofran 4 mg IV  1527 - Diprivan 10 mg/mL IV  1527 - Amidate 30 mg IV  1527 - Diprivan infusion 30 mcg/kg/min x 141.1 kg IV  1550 - Versed 2 mg IV  1652 - Precedex 400 mcg in NaCl 0.9% 100 mL infusion IV  1653 - Diprivan 60 mcg/kg/min x 141.1 kg IV   1770 - Versed 2 mg IV  1772 - Versed 2 mg IV    Emergency Department Course:  Nursing notes and vitals reviewed.    An emergency portable chest x-ray was obtained while the patient was in the emergency department, results above.     IV was inserted and blood was drawn for laboratory testing, results above.    The patient was intubated as above.    The ET Tube was advanced twice, but there was minimal change on the CXR    1415: I performed an exam of the patient as documented above.   1713: I attempted ET tube adjustment at this time   1755: Patient rechecked.     Patient will be transferred to [facility] ICU via EMS. Discussed the case with Dr. Romero, who will admit the patient to a monitored bed for further monitoring, evaluation, and treatment.     Impression & Plan      Medical Decision Making:  This 28-year-old male patient presents the ED by EMS due to an unresponsive state.  Please see the HPI and exam for specifics.  The patient had no gag reflex and his GCS was 3 on arrival.  He was intubated for airway protection with one attempt.  Post intubation x-rays revealed the ET tube is above the clavicles.  This was repositioned and advanced twice without apparent change on the x-rays.     The patient eventually became maxed out on propofol and on record review he was sedated with Precedex at his last admission.  We added that medication as well. I personally re-evaluated the patient and looked with the Glidescope. The ETT was advanced to 28 cm at the lip. Post procedure XR shows improved positioning of the ET Tube.    I discussed the case with the intensivist at [facility] as  [facility] does not have any ICU beds and we will transfer the patient here for further monitoring and care.      Critical Care Note:    Systems at risk for life threatening failure: Respiratory  Associated problems: Alcohol intoxication, loss of gag reflex  Critical Interventions: ETT Intubation  Total Time: 35 min (excluding separately billed procedures)   Includes: Bedside management, Case discussion related to critical care, Documentation, Multiple re-evaluations,Record review, Test review, CXR review (initial interpretation at the point of care by me), Medication titration, ETT repositioning.     Excludes: EKG Interp, ETT Intubation        Diagnosis:    ICD-10-CM    1. Alcoholic intoxication with complication (H) F10.929        Disposition:  Transferred to       Shruti Hope  9/13/2018   [facility] EMERGENCY DEPARTMENT  Shruti VELAZQUEZ am serving as a scribe at 2:15 PM on 9/13/2018 to document services personally performed by Francis Jara DO based on my observations and the provider's statements to me.       Francis Jara DO  09/13/18 [id number]

## 2018-09-13 NOTE — IP AVS SNAPSHOT
[place] [address] [facility]    [address] [place] [address]    Phone:  [phone]                                       After Visit Summary   9/13/2018    Nickolas Lang    MRN: [id number]           After Visit Summary Signature Page     I have received my discharge instructions, and my questions have been answered. I have discussed any challenges I see with this plan with the nurse or doctor.    ..........................................................................................................................................  Patient/Patient Representative Signature      ..........................................................................................................................................  Patient Representative Print Name and Relationship to Patient    ..................................................               ................................................  Date                                   Time    ..........................................................................................................................................  Reviewed by Signature/Title    ...................................................              ..............................................  Date                                               Time          22EPIC Rev 08/18

## 2018-09-13 NOTE — ED NOTES
Bed: ED02  Expected date: 9/13/18  Expected time: 2:13 PM  Means of arrival: Ambulance  Comments:  BV2- AMS, ETOH

## 2018-09-13 NOTE — PROGRESS NOTES
29 yom found down  unresponsive at park with bottles of vodka. Pt was brught to ER and was intubated without problems. Ett 7.5, 25 at the lip. bs are equal bilat. Cmv/14/550/90%/peep of 5. Will continue to follow Dr's orders.

## 2018-09-13 NOTE — IP AVS SNAPSHOT
MRN:[id number]                      After Visit Summary   9/13/2018    Nickolas Lang    MRN: [id number]           Thank you!     Thank you for choosing [facility] for your care. Our goal is always to provide you with excellent care. Hearing back from our patients is one way we can continue to improve our services. Please take a few minutes to complete the written survey that you may receive in the mail after you visit with us. Thank you!        Patient Information     Date Of Birth          1990        About your hospital stay     You were admitted on:  September 13, 2018 You last received care in the:  [place] [address] [facility]    You were discharged on:  September 16, 2018        Reason for your hospital stay       ETOH intoxication                  Who to Call     For medical emergencies, please call 911.  For non-urgent questions about your medical care, please call your primary care provider or clinic, [phone]          Attending Provider     Provider Specialty    Miquel Romero MD Critical Care Medicine    Sterling, Luis Alberto Cali MD Internal Medicine       Primary Care Provider Office Phone # Fax #    Tima Crews [phone]      After Care Instructions     Activity       Your activity upon discharge: activity as tolerated            Diet       Follow this diet upon discharge: Regular Diet Adult. ETOH free                  Follow-up Appointments     Follow-up and recommended labs and tests        Follow up with primary care provider, Tima Crews, within 7 days to evaluate medication change, to evaluate treatment change and for hospital follow- up.  No follow up labs or test are needed.                  Your next 10 appointments already scheduled     Sep 20, 2018  5:30 PM CDT   Treatment with ADULT LODGING PLUS B2   [facility] ([facility])    [address]  [facility] [address]   [phone]            Sep 27, 2018  5:30 PM CDT   Treatment with ADULT LODGING PLUS B2   [facility] ([facility])    [facility] [address] [address]   [phone]            Oct 04, 2018  5:30 PM CDT   Treatment with ADULT LODGING PLUS B2   [facility] ([facility])    [address] [place] [address]   [phone]            Oct 11, 2018  5:30 PM CDT   Treatment with ADULT LODGING PLUS B2   [facility] ([facility])    [address] [place] [address]   [phone]            Oct 18, 2018  5:30 PM CDT   Treatment with ADULT LODGING PLUS B2   [facility] ([facility])    [address] [place] [address]   [phone]              Further instructions from your care team       PT belongings in [employer] locker #3: Cigarettes and lighter    Pending Results     Date and Time Order Name Status Description    9/13/2018 2026 EKG 12-lead Preliminary             Statement of Approval     Ordered          09/16/18 1219  I have reviewed and agree with all the recommendations and orders detailed in this document.  EFFECTIVE NOW     Approved and electronically signed by:  Kathy Flores MD             Admission Information     Date & Time Provider Department Dept. Phone    9/13/2018 Luis Alberto Gabriel MD [place] [address] [facility] [phone]      Your Vitals Were     Blood Pressure Pulse Temperature Respirations Weight Pulse Oximetry    124/88 (BP Location: Right arm) 92 97.9  F (36.6  C) (Oral) 16 138.6 kg (305 lb 9.6 oz) 96%    BMI (Body Mass Index)                   40.32 kg/m2           MyChart Information     [employer] gives you secure access to your  electronic health record. If you see a primary care provider, you can also send messages to your care team and make appointments. If you have questions, please call your primary care clinic.  If you do not have a primary care provider, please call [phone] and they will assist you.        Care EveryWhere ID     This is your Care EveryWhere ID. This could be used by other organizations to access your [place] medical records  [id number]        Equal Access to Services     ERMELINDA LOZADA : Hadii breezy linares Soblanca, wajose rafaelda luesmeadaha, qaybta kaalmada carlene, shawna barfield. So [facility] [phone].    ATENCIÓN: Si habla español, tiene a miller disposición servicios gratuitos de asistencia lingüística. Randyame al [phone].    We comply with applicable federal civil rights laws and [place] laws. We do not discriminate on the basis of race, color, national origin, age, disability, sex, sexual orientation, or gender identity.               Review of your medicines      START taking        Dose / Directions    disulfiram 250 MG tablet   Commonly known as:  ANTABUSE   Used for:  Alcohol dependence with withdrawal with complication (H)        Dose:  250 mg   Take 1 tablet (250 mg) by mouth daily   Quantity:  30 tablet   Refills:  0       * mirtazapine 7.5 MG Tabs tablet   Commonly known as:  REMERON   Used for:  Alcohol-induced insomnia (H)        Dose:  7.5 mg   Take 1 tablet (7.5 mg) by mouth At Bedtime   Quantity:  3 tablet   Refills:  0       * mirtazapine 15 MG tablet   Commonly known as:  REMERON   Used for:  Insomnia, unspecified type        Dose:  15 mg   Take 1 tablet (15 mg) by mouth At Bedtime To start after completing 7.5 mg/day Q HS x 3 doses.   Quantity:  30 tablet   Refills:  0       * Notice:  This list has 2 medication(s) that are the same as other medications prescribed for you. Read the directions carefully, and ask your doctor or other care provider to review them with  you.      CONTINUE these medicines which may have CHANGED, or have new prescriptions. If we are uncertain of the size of tablets/capsules you have at home, strength may be listed as something that might have changed.        Dose / Directions    traZODone 100 MG tablet   Commonly known as:  DESYREL   This may have changed:  how much to take   Used for:  Alcohol dependence, continuous drinking behavior (H)        Dose:  100-200 mg   Take 1-2 tablets (100-200 mg) by mouth nightly as needed for sleep   Quantity:  90 tablet   Refills:  0         CONTINUE these medicines which have NOT CHANGED        Dose / Directions    amLODIPine 10 MG tablet   Commonly known as:  NORVASC   Used for:  Alcohol dependence, continuous drinking behavior (H)        Dose:  10 mg   Take 1 tablet (10 mg) by mouth daily   Quantity:  90 tablet   Refills:  1       amphetamine-dextroamphetamine 30 MG per 24 hr capsule   Commonly known as:  ADDERALL XR   Indication:  Attention Deficit Hyperactivity Disorder        Dose:  30 mg   Take 30 mg by mouth 2 times daily   Refills:  0       busPIRone 10 MG tablet   Commonly known as:  BUSPAR   Used for:  Alcohol dependence, continuous drinking behavior (H)        Dose:  20 mg   Take 2 tablets (20 mg) by mouth 3 times daily   Quantity:  90 tablet   Refills:  0       cloNIDine 0.1 MG tablet   Commonly known as:  CATAPRES   Used for:  Alcohol dependence, continuous drinking behavior (H)        Dose:  0.1 mg   Take 1 tablet (0.1 mg) by mouth 2 times daily   Quantity:  180 tablet   Refills:  1       gabapentin 800 MG tablet   Commonly known as:  NEURONTIN   Used for:  Anxiety, Alcohol use disorder, severe, dependence (H)        TAKE 1 TABLET (800 MG) BY MOUTH 4 TIMES DAILY   Quantity:  120 tablet   Refills:  1       hydrOXYzine 50 MG tablet   Commonly known as:  ATARAX   Used for:  Chemical dependency (H)        Dose:  50 mg   Take 1 tablet (50 mg) by mouth every 4 hours as needed for anxiety   Quantity:  120  tablet   Refills:  0       loperamide 2 MG tablet   Commonly known as:  IMODIUM A-D   Used for:  Diarrhea, unspecified type        Dose:  2 mg   Take 1 tablet (2 mg) by mouth 4 times daily as needed for diarrhea   Quantity:  20 tablet   Refills:  0       multivitamin, therapeutic with minerals Tabs tablet   Used for:  Alcohol dependence, continuous drinking behavior (H)        Dose:  1 tablet   Take 1 tablet by mouth daily   Quantity:  30 each   Refills:  0       nicotine 21 MG/24HR 24 hr patch   Commonly known as:  NICODERM CQ   Used for:  Alcohol dependence, continuous drinking behavior (H)        Dose:  1 patch   Place 1 patch onto the skin daily   Quantity:  30 patch   Refills:  0       * nicotine polacrilex 4 MG lozenge   Used for:  Encounter for smoking cessation counseling        1-2 lozenges every hour as needed, max 10 lozenges per day   Quantity:  360 tablet   Refills:  1       * nicotine polacrilex 4 MG gum   Commonly known as:  NICORETTE   Used for:  Encounter for smoking cessation counseling        Chew one piece every hour as directed.   Quantity:  100 tablet   Refills:  2       omeprazole 40 MG capsule   Commonly known as:  priLOSEC   Used for:  Alcohol dependence, continuous drinking behavior (H)        Dose:  40 mg   Take 1 capsule (40 mg) by mouth daily   Quantity:  30 capsule   Refills:  3       ondansetron 4 MG tablet   Commonly known as:  ZOFRAN   Used for:  Nausea and vomiting, intractability of vomiting not specified, unspecified vomiting type        Dose:  4 mg   Take 1 tablet (4 mg) by mouth every 8 hours as needed for nausea   Quantity:  10 tablet   Refills:  0       sertraline 100 MG tablet   Commonly known as:  ZOLOFT   Used for:  Alcohol dependence, continuous drinking behavior (H)        Dose:  200 mg   Take 2 tablets (200 mg) by mouth daily   Quantity:  30 tablet   Refills:  0       thiamine 100 MG tablet   Used for:  Acute alcoholic intoxication in alcoholism with complication (H)         Dose:  100 mg   Take 1 tablet (100 mg) by mouth daily   Quantity:  30 tablet   Refills:  1       * Notice:  This list has 2 medication(s) that are the same as other medications prescribed for you. Read the directions carefully, and ask your doctor or other care provider to review them with you.         Where to get your medicines      These medications were sent to [facility]/pharmacy #[id number] [place], [place] - [address]  [address], [place] [address]     Phone:  [phone]     disulfiram 250 MG tablet    mirtazapine 15 MG tablet    mirtazapine 7.5 MG Tabs tablet                Protect others around you: Learn how to safely use, store and throw away your medicines at www.disposemymeds.org.             Medication List: This is a list of all your medications and when to take them. Check marks below indicate your daily home schedule. Keep this list as a reference.      Medications           Morning Afternoon Evening Bedtime As Needed    amLODIPine 10 MG tablet   Commonly known as:  NORVASC   Take 1 tablet (10 mg) by mouth daily   Last time this was given:  10 mg on 9/16/2018  8:24 AM   Next Dose Due:  9/17                                amphetamine-dextroamphetamine 30 MG per 24 hr capsule   Commonly known as:  ADDERALL XR   Take 30 mg by mouth 2 times daily                                busPIRone 10 MG tablet   Commonly known as:  BUSPAR   Take 2 tablets (20 mg) by mouth 3 times daily   Last time this was given:  20 mg on 9/16/2018  8:24 AM                                      cloNIDine 0.1 MG tablet   Commonly known as:  CATAPRES   Take 1 tablet (0.1 mg) by mouth 2 times daily   Last time this was given:  0.1 mg on 9/16/2018  8:24 AM                                   disulfiram 250 MG tablet   Commonly known as:  ANTABUSE   Take 1 tablet (250 mg) by mouth daily   Last time this was given:  250 mg on 9/16/2018 12:14 PM   Next Dose Due:  9/17                                gabapentin 800 MG tablet    Commonly known as:  NEURONTIN   TAKE 1 TABLET (800 MG) BY MOUTH 4 TIMES DAILY                                      hydrOXYzine 50 MG tablet   Commonly known as:  ATARAX   Take 1 tablet (50 mg) by mouth every 4 hours as needed for anxiety   Last time this was given:  50 mg on 9/15/2018  8:30 PM                                   loperamide 2 MG tablet   Commonly known as:  IMODIUM A-D   Take 1 tablet (2 mg) by mouth 4 times daily as needed for diarrhea                                   * mirtazapine 7.5 MG Tabs tablet   Commonly known as:  REMERON   Take 1 tablet (7.5 mg) by mouth At Bedtime                                   * mirtazapine 15 MG tablet   Commonly known as:  REMERON   Take 1 tablet (15 mg) by mouth At Bedtime To start after completing 7.5 mg/day Q HS x 3 doses.                                   multivitamin, therapeutic with minerals Tabs tablet   Take 1 tablet by mouth daily   Last time this was given:  1 tablet on 9/16/2018  8:24 AM   Next Dose Due:  9/17                                nicotine 21 MG/24HR 24 hr patch   Commonly known as:  NICODERM CQ   Place 1 patch onto the skin daily   Last time this was given:  1 patch on 9/15/2018  8:31 PM                                * nicotine polacrilex 4 MG lozenge   1-2 lozenges every hour as needed, max 10 lozenges per day                                   * nicotine polacrilex 4 MG gum   Commonly known as:  NICORETTE   Chew one piece every hour as directed.                                   omeprazole 40 MG capsule   Commonly known as:  priLOSEC   Take 1 capsule (40 mg) by mouth daily   Next Dose Due:  9/17                                ondansetron 4 MG tablet   Commonly known as:  ZOFRAN   Take 1 tablet (4 mg) by mouth every 8 hours as needed for nausea                                   sertraline 100 MG tablet   Commonly known as:  ZOLOFT   Take 2 tablets (200 mg) by mouth daily   Last time this was given:  200 mg on 9/16/2018  8:24 AM   Next Dose  Due:  9/17                                thiamine 100 MG tablet   Take 1 tablet (100 mg) by mouth daily   Last time this was given:  250 mg on 9/16/2018  8:23 AM   Next Dose Due:  9/17                                traZODone 100 MG tablet   Commonly known as:  DESYREL   Take 1-2 tablets (100-200 mg) by mouth nightly as needed for sleep   Last time this was given:  200 mg on 9/15/2018  9:04 PM                                   * Notice:  This list has 4 medication(s) that are the same as other medications prescribed for you. Read the directions carefully, and ask your doctor or other care provider to review them with you.

## 2018-09-14 LAB
ALBUMIN SERPL-MCNC: 3.4 G/DL (ref 3.4–5)
ALP SERPL-CCNC: 96 U/L (ref 40–150)
ALT SERPL W P-5'-P-CCNC: 77 U/L (ref 0–70)
AMMONIA PLAS-SCNC: 30 UMOL/L (ref 10–50)
ANION GAP SERPL CALCULATED.3IONS-SCNC: 12 MMOL/L (ref 3–14)
AST SERPL W P-5'-P-CCNC: 48 U/L (ref 0–45)
BILIRUB SERPL-MCNC: 0.4 MG/DL (ref 0.2–1.3)
BUN SERPL-MCNC: 14 MG/DL (ref 7–30)
CA-I BLD-MCNC: 4.2 MG/DL (ref 4.4–5.2)
CALCIUM SERPL-MCNC: 8 MG/DL (ref 8.5–10.1)
CHLORIDE SERPL-SCNC: 104 MMOL/L (ref 94–109)
CO2 SERPL-SCNC: 21 MMOL/L (ref 20–32)
CREAT SERPL-MCNC: 0.61 MG/DL (ref 0.66–1.25)
ERYTHROCYTE [DISTWIDTH] IN BLOOD BY AUTOMATED COUNT: 13.6 % (ref 10–15)
GFR SERPL CREATININE-BSD FRML MDRD: >90 ML/MIN/1.7M2
GLUCOSE SERPL-MCNC: 130 MG/DL (ref 70–99)
HCT VFR BLD AUTO: 40.4 % (ref 40–53)
HGB BLD-MCNC: 13.9 G/DL (ref 13.3–17.7)
INTERPRETATION ECG - MUSE: NORMAL
LACTATE BLD-SCNC: 1.1 MMOL/L (ref 0.7–2)
MAGNESIUM SERPL-MCNC: 1.7 MG/DL (ref 1.6–2.3)
MCH RBC QN AUTO: 29 PG (ref 26.5–33)
MCHC RBC AUTO-ENTMCNC: 34.4 G/DL (ref 31.5–36.5)
MCV RBC AUTO: 84 FL (ref 78–100)
MRSA DNA SPEC QL NAA+PROBE: NEGATIVE
PHOSPHATE SERPL-MCNC: 1.6 MG/DL (ref 2.5–4.5)
PHOSPHATE SERPL-MCNC: 2.3 MG/DL (ref 2.5–4.5)
PLATELET # BLD AUTO: 206 10E9/L (ref 150–450)
POTASSIUM SERPL-SCNC: 3.5 MMOL/L (ref 3.4–5.3)
PROT SERPL-MCNC: 6.7 G/DL (ref 6.8–8.8)
RBC # BLD AUTO: 4.8 10E12/L (ref 4.4–5.9)
SODIUM SERPL-SCNC: 137 MMOL/L (ref 133–144)
SPECIMEN SOURCE: NORMAL
WBC # BLD AUTO: 13.4 10E9/L (ref 4–11)

## 2018-09-14 PROCEDURE — 83605 ASSAY OF LACTIC ACID: CPT | Performed by: PHYSICIAN ASSISTANT

## 2018-09-14 PROCEDURE — 83735 ASSAY OF MAGNESIUM: CPT | Performed by: INTERNAL MEDICINE

## 2018-09-14 PROCEDURE — 84100 ASSAY OF PHOSPHORUS: CPT | Performed by: INTERNAL MEDICINE

## 2018-09-14 PROCEDURE — 87640 STAPH A DNA AMP PROBE: CPT | Performed by: PHYSICIAN ASSISTANT

## 2018-09-14 PROCEDURE — 87641 MR-STAPH DNA AMP PROBE: CPT | Performed by: PHYSICIAN ASSISTANT

## 2018-09-14 PROCEDURE — 99222 1ST HOSP IP/OBS MODERATE 55: CPT | Performed by: PSYCHIATRY & NEUROLOGY

## 2018-09-14 PROCEDURE — 25000125 ZZHC RX 250: Performed by: INTERNAL MEDICINE

## 2018-09-14 PROCEDURE — 99207 ZZC CONSULT E&M CHANGED TO SUBSEQUENT LEVEL: CPT | Performed by: PHYSICIAN ASSISTANT

## 2018-09-14 PROCEDURE — 25000125 ZZHC RX 250: Performed by: PHYSICIAN ASSISTANT

## 2018-09-14 PROCEDURE — 99233 SBSQ HOSP IP/OBS HIGH 50: CPT | Performed by: INTERNAL MEDICINE

## 2018-09-14 PROCEDURE — 25000128 H RX IP 250 OP 636: Performed by: INTERNAL MEDICINE

## 2018-09-14 PROCEDURE — 25000132 ZZH RX MED GY IP 250 OP 250 PS 637: Performed by: INTERNAL MEDICINE

## 2018-09-14 PROCEDURE — 80053 COMPREHEN METABOLIC PANEL: CPT | Performed by: INTERNAL MEDICINE

## 2018-09-14 PROCEDURE — 82330 ASSAY OF CALCIUM: CPT | Performed by: INTERNAL MEDICINE

## 2018-09-14 PROCEDURE — 36415 COLL VENOUS BLD VENIPUNCTURE: CPT | Performed by: PHYSICIAN ASSISTANT

## 2018-09-14 PROCEDURE — 25000132 ZZH RX MED GY IP 250 OP 250 PS 637: Performed by: NURSE PRACTITIONER

## 2018-09-14 PROCEDURE — 36415 COLL VENOUS BLD VENIPUNCTURE: CPT | Performed by: INTERNAL MEDICINE

## 2018-09-14 PROCEDURE — 99233 SBSQ HOSP IP/OBS HIGH 50: CPT | Performed by: PHYSICIAN ASSISTANT

## 2018-09-14 PROCEDURE — 12000000 ZZH R&B MED SURG/OB

## 2018-09-14 PROCEDURE — 25000128 H RX IP 250 OP 636: Performed by: PHYSICIAN ASSISTANT

## 2018-09-14 PROCEDURE — 25000132 ZZH RX MED GY IP 250 OP 250 PS 637: Performed by: PHYSICIAN ASSISTANT

## 2018-09-14 PROCEDURE — 82140 ASSAY OF AMMONIA: CPT | Performed by: INTERNAL MEDICINE

## 2018-09-14 PROCEDURE — 85027 COMPLETE CBC AUTOMATED: CPT | Performed by: INTERNAL MEDICINE

## 2018-09-14 RX ORDER — CLONIDINE HYDROCHLORIDE 0.1 MG/1
0.1 TABLET ORAL 2 TIMES DAILY
Status: DISCONTINUED | OUTPATIENT
Start: 2018-09-14 | End: 2018-09-16 | Stop reason: HOSPADM

## 2018-09-14 RX ORDER — POTASSIUM CHLORIDE 1500 MG/1
20-40 TABLET, EXTENDED RELEASE ORAL
Status: DISCONTINUED | OUTPATIENT
Start: 2018-09-14 | End: 2018-09-16 | Stop reason: HOSPADM

## 2018-09-14 RX ORDER — MAGNESIUM SULFATE HEPTAHYDRATE 40 MG/ML
2 INJECTION, SOLUTION INTRAVENOUS DAILY PRN
Status: DISCONTINUED | OUTPATIENT
Start: 2018-09-14 | End: 2018-09-16 | Stop reason: HOSPADM

## 2018-09-14 RX ORDER — SENNOSIDES 8.6 MG
1-2 TABLET ORAL 2 TIMES DAILY PRN
Status: DISCONTINUED | OUTPATIENT
Start: 2018-09-14 | End: 2018-09-16 | Stop reason: HOSPADM

## 2018-09-14 RX ORDER — SODIUM CHLORIDE 9 MG/ML
INJECTION, SOLUTION INTRAVENOUS CONTINUOUS
Status: DISCONTINUED | OUTPATIENT
Start: 2018-09-14 | End: 2018-09-14

## 2018-09-14 RX ORDER — MULTIPLE VITAMINS W/ MINERALS TAB 9MG-400MCG
1 TAB ORAL DAILY
Status: DISCONTINUED | OUTPATIENT
Start: 2018-09-14 | End: 2018-09-16 | Stop reason: HOSPADM

## 2018-09-14 RX ORDER — POTASSIUM CHLORIDE 1.5 G/1.58G
20-40 POWDER, FOR SOLUTION ORAL
Status: DISCONTINUED | OUTPATIENT
Start: 2018-09-14 | End: 2018-09-16 | Stop reason: HOSPADM

## 2018-09-14 RX ORDER — LOPERAMIDE HCL 2 MG
2 CAPSULE ORAL 4 TIMES DAILY PRN
Status: DISCONTINUED | OUTPATIENT
Start: 2018-09-14 | End: 2018-09-16 | Stop reason: HOSPADM

## 2018-09-14 RX ORDER — GABAPENTIN 400 MG/1
800 CAPSULE ORAL 4 TIMES DAILY
Status: DISCONTINUED | OUTPATIENT
Start: 2018-09-14 | End: 2018-09-16 | Stop reason: HOSPADM

## 2018-09-14 RX ORDER — ONDANSETRON 4 MG/1
4 TABLET, ORALLY DISINTEGRATING ORAL EVERY 6 HOURS PRN
Status: DISCONTINUED | OUTPATIENT
Start: 2018-09-14 | End: 2018-09-16 | Stop reason: HOSPADM

## 2018-09-14 RX ORDER — ONDANSETRON 2 MG/ML
4 INJECTION INTRAMUSCULAR; INTRAVENOUS EVERY 6 HOURS PRN
Status: DISCONTINUED | OUTPATIENT
Start: 2018-09-14 | End: 2018-09-16 | Stop reason: HOSPADM

## 2018-09-14 RX ORDER — FOLIC ACID 1 MG/1
1 TABLET ORAL DAILY
Status: DISCONTINUED | OUTPATIENT
Start: 2018-09-14 | End: 2018-09-16 | Stop reason: HOSPADM

## 2018-09-14 RX ORDER — POTASSIUM CHLORIDE 29.8 MG/ML
20 INJECTION INTRAVENOUS
Status: DISCONTINUED | OUTPATIENT
Start: 2018-09-14 | End: 2018-09-16 | Stop reason: HOSPADM

## 2018-09-14 RX ORDER — MAGNESIUM SULFATE HEPTAHYDRATE 40 MG/ML
4 INJECTION, SOLUTION INTRAVENOUS EVERY 4 HOURS PRN
Status: DISCONTINUED | OUTPATIENT
Start: 2018-09-14 | End: 2018-09-16 | Stop reason: HOSPADM

## 2018-09-14 RX ORDER — POTASSIUM CL/LIDO/0.9 % NACL 10MEQ/0.1L
10 INTRAVENOUS SOLUTION, PIGGYBACK (ML) INTRAVENOUS
Status: DISCONTINUED | OUTPATIENT
Start: 2018-09-14 | End: 2018-09-16 | Stop reason: HOSPADM

## 2018-09-14 RX ORDER — POTASSIUM CHLORIDE 7.45 MG/ML
10 INJECTION INTRAVENOUS
Status: DISCONTINUED | OUTPATIENT
Start: 2018-09-14 | End: 2018-09-16 | Stop reason: HOSPADM

## 2018-09-14 RX ORDER — IBUPROFEN 200 MG
200-400 TABLET ORAL EVERY 6 HOURS PRN
Status: DISCONTINUED | OUTPATIENT
Start: 2018-09-14 | End: 2018-09-16 | Stop reason: HOSPADM

## 2018-09-14 RX ORDER — TRAZODONE HYDROCHLORIDE 50 MG/1
100-200 TABLET, FILM COATED ORAL
Status: DISCONTINUED | OUTPATIENT
Start: 2018-09-14 | End: 2018-09-16 | Stop reason: HOSPADM

## 2018-09-14 RX ORDER — PANTOPRAZOLE SODIUM 40 MG/1
40 TABLET, DELAYED RELEASE ORAL
Status: DISCONTINUED | OUTPATIENT
Start: 2018-09-14 | End: 2018-09-16 | Stop reason: HOSPADM

## 2018-09-14 RX ORDER — BUSPIRONE HYDROCHLORIDE 10 MG/1
20 TABLET ORAL 3 TIMES DAILY
Status: DISCONTINUED | OUTPATIENT
Start: 2018-09-14 | End: 2018-09-16 | Stop reason: HOSPADM

## 2018-09-14 RX ORDER — AMLODIPINE BESYLATE 10 MG/1
10 TABLET ORAL DAILY
Status: DISCONTINUED | OUTPATIENT
Start: 2018-09-14 | End: 2018-09-16 | Stop reason: HOSPADM

## 2018-09-14 RX ADMIN — IBUPROFEN 400 MG: 200 TABLET, FILM COATED ORAL at 17:30

## 2018-09-14 RX ADMIN — CLONIDINE HYDROCHLORIDE 0.1 MG: 0.1 TABLET ORAL at 20:31

## 2018-09-14 RX ADMIN — SODIUM PHOSPHATE, MONOBASIC, MONOHYDRATE 15 MMOL: 276; 142 INJECTION, SOLUTION INTRAVENOUS at 23:05

## 2018-09-14 RX ADMIN — LORAZEPAM 2 MG: 2 INJECTION INTRAMUSCULAR; INTRAVENOUS at 20:31

## 2018-09-14 RX ADMIN — TRAZODONE HYDROCHLORIDE 200 MG: 50 TABLET ORAL at 20:45

## 2018-09-14 RX ADMIN — MULTIPLE VITAMINS W/ MINERALS TAB 1 TABLET: TAB at 09:53

## 2018-09-14 RX ADMIN — BUSPIRONE HYDROCHLORIDE 20 MG: 10 TABLET ORAL at 17:09

## 2018-09-14 RX ADMIN — Medication 1 ML: at 02:53

## 2018-09-14 RX ADMIN — Medication 250 MG: at 08:53

## 2018-09-14 RX ADMIN — FOLIC ACID 1 MG: 1 TABLET ORAL at 08:35

## 2018-09-14 RX ADMIN — LORAZEPAM 2 MG: 2 INJECTION INTRAMUSCULAR; INTRAVENOUS at 05:28

## 2018-09-14 RX ADMIN — SERTRALINE HYDROCHLORIDE 200 MG: 100 TABLET ORAL at 08:36

## 2018-09-14 RX ADMIN — LORAZEPAM 2 MG: 2 INJECTION INTRAMUSCULAR; INTRAVENOUS at 14:55

## 2018-09-14 RX ADMIN — GABAPENTIN 800 MG: 400 CAPSULE ORAL at 08:54

## 2018-09-14 RX ADMIN — LORAZEPAM 2 MG: 2 INJECTION INTRAMUSCULAR; INTRAVENOUS at 10:25

## 2018-09-14 RX ADMIN — AMLODIPINE BESYLATE 10 MG: 10 TABLET ORAL at 08:36

## 2018-09-14 RX ADMIN — LORAZEPAM 1 MG: 2 INJECTION INTRAMUSCULAR; INTRAVENOUS at 17:30

## 2018-09-14 RX ADMIN — GABAPENTIN 800 MG: 400 CAPSULE ORAL at 20:31

## 2018-09-14 RX ADMIN — GABAPENTIN 800 MG: 400 CAPSULE ORAL at 17:09

## 2018-09-14 RX ADMIN — LORAZEPAM 2 MG: 2 INJECTION INTRAMUSCULAR; INTRAVENOUS at 02:45

## 2018-09-14 RX ADMIN — LORAZEPAM 4 MG: 2 INJECTION INTRAMUSCULAR; INTRAVENOUS at 07:58

## 2018-09-14 RX ADMIN — SODIUM CHLORIDE: 9 INJECTION, SOLUTION INTRAVENOUS at 13:57

## 2018-09-14 RX ADMIN — BUSPIRONE HYDROCHLORIDE 20 MG: 10 TABLET ORAL at 20:31

## 2018-09-14 RX ADMIN — PANTOPRAZOLE SODIUM 40 MG: 40 TABLET, DELAYED RELEASE ORAL at 08:36

## 2018-09-14 RX ADMIN — SODIUM PHOSPHATE, MONOBASIC, MONOHYDRATE 20 MMOL: 276; 142 INJECTION, SOLUTION INTRAVENOUS at 09:02

## 2018-09-14 RX ADMIN — BUSPIRONE HYDROCHLORIDE 20 MG: 10 TABLET ORAL at 08:36

## 2018-09-14 RX ADMIN — GABAPENTIN 800 MG: 400 CAPSULE ORAL at 14:55

## 2018-09-14 RX ADMIN — CLONIDINE HYDROCHLORIDE 0.1 MG: 0.1 TABLET ORAL at 08:36

## 2018-09-14 RX ADMIN — NICOTINE 1 PATCH: 21 PATCH, EXTENDED RELEASE TRANSDERMAL at 20:31

## 2018-09-14 RX ADMIN — LORAZEPAM 2 MG: 2 INJECTION INTRAMUSCULAR; INTRAVENOUS at 13:48

## 2018-09-14 RX ADMIN — HYDROCODONE BITARTRATE AND ACETAMINOPHEN 1 TABLET: 5; 325 TABLET ORAL at 05:30

## 2018-09-14 RX ADMIN — CALCIUM GLUCONATE 1 G: 98 INJECTION, SOLUTION INTRAVENOUS at 13:58

## 2018-09-14 RX ADMIN — LORAZEPAM 2 MG: 2 INJECTION INTRAMUSCULAR; INTRAVENOUS at 08:56

## 2018-09-14 ASSESSMENT — ACTIVITIES OF DAILY LIVING (ADL)
NUMBER_OF_TIMES_PATIENT_HAS_FALLEN_WITHIN_LAST_SIX_MONTHS: 1
ADLS_ACUITY_SCORE: 11
RETIRED_COMMUNICATION: 0-->UNDERSTANDS/COMMUNICATES WITHOUT DIFFICULTY
BATHING: 0-->INDEPENDENT
ADLS_ACUITY_SCORE: 11
FALL_HISTORY_WITHIN_LAST_SIX_MONTHS: YES
ADLS_ACUITY_SCORE: 11
TOILETING: 0-->INDEPENDENT
AMBULATION: 0-->INDEPENDENT
SWALLOWING: 0-->SWALLOWS FOODS/LIQUIDS WITHOUT DIFFICULTY
COGNITION: 0 - NO COGNITION ISSUES REPORTED
TRANSFERRING: 0-->INDEPENDENT
RETIRED_EATING: 0-->INDEPENDENT
ADLS_ACUITY_SCORE: 12
WHICH_OF_THE_ABOVE_FUNCTIONAL_RISKS_HAD_A_RECENT_ONSET_OR_CHANGE?: AMBULATION;FALL HISTORY
DRESS: 0-->INDEPENDENT
ADLS_ACUITY_SCORE: 11
ADLS_ACUITY_SCORE: 11

## 2018-09-14 NOTE — CONSULTS
[facility]    Hospitalist Consultation    Date of Admission:  9/13/2018    Assessment & Plan   Nickolas Lang is a 28 year old male who was admitted on 9/13/2018 for alcohol intoxication, found unresponsive. I was asked to see the patient to take over as primary service for transfer out of the ICU now that patient is more medically stable and extubated..    Acute Alcohol Intoxication with AMANDA 0.5, found unresponsive  Chronic Severe Alcohol Abuse, with prior intubations and alcohol withdrawal   Went through inpatient alcohol treatment end of July 2018. Patient discharged 9/12/18 from [facility] for severe alcohol intoxication secondary to agitation confusion, requiring sedation resulting in intubation.  On 9/13/18 day after discharge from [facility] he was found down in a park for unknown period of time, unresponsive the bottles of vodka. AMANDA 0.5, due to unresponsiveness and for airway protection patient was intubated and admitted to the ICU for further management. CK wnl on admission, no signs of rhabdo. +Amphetamines and Benzos - scripts for Adderall PTA and received benzos at [facility]. Extubated last night at 2220. Troponin neg. Pt with prior withdrawals but denies prior EtOH related seizures, states has had shakes, sweats, and crawling sensations in the past.   - Transfer to general medical floor, patient presently extubated breathing RA  - Expect alcohol withdrawal symptoms, seizure precautions in place  - Pulmonary hygiene  - Continue IVF, continue 125 ml/hr x12 more hours until good oral intake maintained  - Intubated <24 hrs therefore no overt need for SLP, no prior dysphagia, may start Regular diet  - Telemetry [facility]  - [facility] protocol  - F/S checks BID  - Banana bag complete, continue high dose thiamine 250 mg daily and folate 1 mg daily  - Chemical dependency consultation    Recent Labs  Lab 09/14/18  0520 09/13/18  1938 09/13/18  1424 09/13/18  1422 09/11/18  0822 09/11/18  0405  09/11/18  0013 09/10/18  2028  09/10/18  0510  09/09/18  1923   *  --   --  98  --   --   --   --   --  109*  --  118*   BGM  --  84 104*  --  106* 83 89 106*  < >  --   < >  --    < > = values in this interval not displayed.    Hypophosphatemia:  Phos 1.6, repleted in ED.  - Check and replete lytes PRN - Mg++, Phos, K+, I-Ca++  - Lyte repletion protocol - to replace Mg++, Phos, and Calcium today  - Encourage good oral intake    Mild Leukocytosis:   Likely stress response. WBC 13.4. Plts holding at 200s but much decreased from last week while in 400s. Has been low in the 50s-70s in the past related to EtOH abuse. Stop lovenox given decrease in platelets and pt extubated with plans for ambulation today.  - Recheck CBC in AM    Hx Hypertension:  BPs adequate. SBP 110s-130s.  - Continue prior to admission amlodipine 10 mg daily    Anxiety Disorder  Depression  Attention deficit disorder:   Continues on several PTA psychiatric medications.  - Continue home meds- buspar 20 mg TID, clonidine 0.1 mg BID, gabapentin 800 mg QID, zoloft 200 mg   - Hydroxyzine 50 mg PRN for anxiety  - Drugs of abuse screen- (+) amphetamines and benzos per ativan from [facility] and ADD meds  - Psychiatry consultation    Tobacco Use Disorder:   Smokes 1 ppd since 16 yo.  - Nicotine patch in place  - Encourage cessation    GERD: Takes PTA omeprazole 40 mg daily.  - Started on Protonix 40 mg daily.    Hepatic steatosis:  ALT/AST appears at baseline. T Bili and alk phos wnl.    Obesity class III:  BMI 40.5. Encourage weight loss after alcohol use disorder is addressed.    Hx Pyloric stenosis: s/p pyloromyotomy as infant    DVT Prophylaxis: PCDs and ambulation  Code Status: Full Code    Disposition: Expected discharge in 1-2 days once medically stable and no EtOH withdrawal.    This patient was discussed with Dr. Luis Alberto Gabriel of the Hospitalist Service who agrees with current plans as outlined above.    Gladis Cali PA-C  Hospitalist  Physician Assistant  Pager: [phone]      Reason for Consult   Reason for consult: I was asked by Intensive Care to take over as primary service for transfer out of the ICU now that patient is more medically stable and extubated.    Primary Care Physician   Tima Crews    Chief Complaint   Found down, AMANDA 0.5    History is obtained from the patient, electronic health record and ICU physician.    History of Present Illness   Nickolas Lang is a 28 year old male past medical history significant for severe chronic alcohol abuse times 3 years with multiple intubations, hypertension, depression, anxiety, ADD who was admitted yesterday 9/13/18 after being found down unresponsive by a bystander with a blood alcohol level of 0.5 after being discharged from [facility] on 9/12/18 for another alcohol overdose episode requiring intubation.  He was intubated again 9/13/18 for airway protection and brought to the emergency department, admitted to the ICU for further management.  He was extubated last night around 2200.  Is done well overnight.  Tolerating IV fluids, replacing lites, and receiving Ativan per [facility] protocol.  Received banana bag, transition to oral vitamins.    During my visit, the patient is drowsy and tired from receiving Ativan and events from yesterday.  He is very pleasant and calm.  I reviewed why he is in the hospital and plans for today.  Patient understands and is agreeable to the above plan.  He is to transfer to general medical floor today.  VSS with SBP 110s-130s. Breathing RA. Afebrile.  He adamantly denies any prior alcohol related withdrawal seizures.  He does state that during past bouts of withdrawal he has had shakes, crawling skin, and sweats. He currently denies shortness of breath, chest discomfort, palpitations.  No current headaches.  No current complaints.    Past Medical History    I have reviewed this patient's medical history and updated it with pertinent  information if needed.   Past Medical History:   Diagnosis Date     ADD (attention deficit disorder)      Alcohol abuse      Anxiety     gabapentin helps the most      GERD (gastroesophageal reflux disease)      Hepatic steatosis      Hypertension      Obesity      Pyloric stenosis     s/p pyloromyotomy as an infant       Past Surgical History   I have reviewed this patient's surgical history and updated it with pertinent information if needed.  Past Surgical History:   Procedure Laterality Date     Deviated septum repair       PYLOROMYOTOMY SURGERY  as an infant      SHOULDER SURGERY Left 2016    Removal of cartilage       Prior to Admission Medications   Prior to Admission Medications   Prescriptions Last Dose Informant Patient Reported? Taking?   amLODIPine (NORVASC) 10 MG tablet   No No   Sig: Take 1 tablet (10 mg) by mouth daily   amphetamine-dextroamphetamine (ADDERALL XR) 30 MG per 24 hr capsule   Yes No   Sig: Take 30 mg by mouth 2 times daily   busPIRone (BUSPAR) 10 MG tablet   No No   Sig: Take 2 tablets (20 mg) by mouth 3 times daily   cloNIDine (CATAPRES) 0.1 MG tablet   No No   Sig: Take 1 tablet (0.1 mg) by mouth 2 times daily   gabapentin (NEURONTIN) 800 MG tablet   No No   Sig: TAKE 1 TABLET (800 MG) BY MOUTH 4 TIMES DAILY   hydrOXYzine (ATARAX) 50 MG tablet   No No   Sig: Take 1 tablet (50 mg) by mouth every 4 hours as needed for anxiety   loperamide (IMODIUM A-D) 2 MG tablet   No No   Sig: Take 1 tablet (2 mg) by mouth 4 times daily as needed for diarrhea   multivitamin, therapeutic with minerals (THERA-VIT-M) TABS tablet   No No   Sig: Take 1 tablet by mouth daily   nicotine (NICODERM CQ) 21 MG/24HR 24 hr patch   No No   Sig: Place 1 patch onto the skin daily   nicotine polacrilex (NICORETTE) 4 MG gum   No No   Sig: Chew one piece every hour as directed.   nicotine polacrilex 4 MG lozenge   No No   Si-2 lozenges every hour as needed, max 10 lozenges per day   omeprazole (PRILOSEC) 40 MG  capsule   No No   Sig: Take 1 capsule (40 mg) by mouth daily   ondansetron (ZOFRAN) 4 MG tablet   No No   Sig: Take 1 tablet (4 mg) by mouth every 8 hours as needed for nausea   sertraline (ZOLOFT) 100 MG tablet   No No   Sig: Take 2 tablets (200 mg) by mouth daily   thiamine 100 MG tablet   No No   Sig: Take 1 tablet (100 mg) by mouth daily   traZODone (DESYREL) 100 MG tablet   No No   Sig: Take 1-2 tablets (100-200 mg) by mouth nightly as needed for sleep      Facility-Administered Medications: None     Allergies   No Known Allergies    Social History   I have reviewed this patient's social history and updated it with pertinent information if needed. Nickolas Lang  reports that he has been smoking Cigarettes.  He has a 10.00 pack-year smoking history. He has never used smokeless tobacco. He reports that he drinks alcohol. He reports that he does not use illicit drugs. Patient lives with his mother in [place], [facility] she does not drink or do drugs. He has a degree in finance but has not been able to hold a full time job related to his alcohol abuse.    Family History   I have reviewed this patient's family history and updated it with pertinent information if needed.   Family History   Problem Relation Age of Onset     Neurologic Disorder Mother      Multiple Sclerosis     Depression Mother      Anxiety Disorder Mother      Alcohol/Drug Father      Substance Abuse Father      Depression Maternal Grandmother      Anxiety Disorder Maternal Grandmother      Hypertension Maternal Grandmother      Substance Abuse Maternal Grandfather      Alcohol/Drug Paternal Grandfather        Review of Systems   The 10 point Review of Systems is negative other than noted in the HPI or here.     Physical Exam   Temp: 98.2  F (36.8  C) Temp src: Oral BP: 122/77   Heart Rate: 89 Resp: 22 SpO2: 92 % O2 Device: None (Room air) Oxygen Delivery: 5 LPM  Vital Signs with Ranges  Temp:  [98  F (36.7  C)-98.2  F (36.8  C)] 98.2  F  (36.8  C)  Pulse:  [104] 104  Heart Rate:  [] 89  Resp:  [6-27] 22  BP: ()/() 122/77  FiO2 (%):  [40 %-60 %] 40 %  SpO2:  [91 %-100 %] 92 %  306 lbs 14.09 oz    Constitutional: Awake, alert but drowsy from benzos, cooperative, very pleasant and cooperative, no apparent distress.  Eyes: Conjunctiva and pupils examined and normal.  HEENT: Moist mucous membranes, normal dentition.  Respiratory: Clear to auscultation bilaterally, no crackles or wheezing.  Cardiovascular: Regular rate and rhythm, normal S1 and S2, and no murmur noted.  GI: Soft, obese but non-distended, non-tender, normal bowel sounds.  Skin: No rashes, no cyanosis, no edema.  Musculoskeletal: No joint swelling, erythema or tenderness.  Neurologic: Cranial nerves 2-12 intact, normal strength and sensation.  Psychiatric: Alert, oriented to person, place and time, no obvious anxiety or depression.    Data   -Data reviewed today: All pertinent laboratory and imaging results from this encounter were reviewed. I personally reviewed no images or EKG's today.    Recent Labs  Lab 09/14/18  0520 09/13/18  2100 09/13/18  1422 09/10/18  0510   WBC 13.4*  --  10.0 11.9*   HGB 13.9  --  16.0 14.9   MCV 84  --  85 86    222 277 303   INR  --  1.03 1.01  --      --  142 141   POTASSIUM 3.5  --  4.1 4.0   CHLORIDE 104  --  108 105   CO2 21  --  21 27   BUN 14  --  17 15   CR 0.61* 0.76 0.65* 0.61*   ANIONGAP 12  --  13 9   AZAM 8.0*  --  7.8* 7.5*   *  --  98 109*   ALBUMIN 3.4  --  3.9 3.4   PROTTOTAL 6.7*  --  7.8 6.5*   BILITOTAL 0.4  --  0.4 0.3   ALKPHOS 96  --  110 91   ALT 77*  --  93* 54   AST 48*  --  81* 35   TROPI  --  <0.015  --   --        Imaging:  Recent Results (from the past 24 hour(s))   XR Chest Port 1 View    Narrative    XR PORTABLE CHEST ONE VIEW, XR PORTABLE CHEST ONE VIEW, XR PORTABLE  CHEST ONE VIEW  9/13/2018 3:23 PM     HISTORY: Re-evaluate ETT placement after adjustment.    COMPARISON:  9/9/2018    FINDINGS: Three chest radiographs labeled 1420 hours, 1425 hours, 1458  hours. NG tube. Suboptimal inspiration with hypoventilatory changes.  No acute consolidation.      Impression    IMPRESSION: On all 3 images, the endotracheal tube tip is above the  level of the clavicles. This should be repositioned.    EDMUND MACDONALD MD   Chest  XR, 1 view PORTABLE    Narrative    XR PORTABLE CHEST ONE VIEW, XR PORTABLE CHEST ONE VIEW, XR PORTABLE  CHEST ONE VIEW  9/13/2018 3:23 PM     HISTORY: Re-evaluate ETT placement after adjustment.    COMPARISON: 9/9/2018    FINDINGS: Three chest radiographs labeled 1420 hours, 1425 hours, 1458  hours. NG tube. Suboptimal inspiration with hypoventilatory changes.  No acute consolidation.      Impression    IMPRESSION: On all 3 images, the endotracheal tube tip is above the  level of the clavicles. This should be repositioned.    EDMUND MACDONALD MD   Chest  XR, 1 view PORTABLE    Narrative    XR PORTABLE CHEST ONE VIEW, XR PORTABLE CHEST ONE VIEW, XR PORTABLE  CHEST ONE VIEW  9/13/2018 3:23 PM     HISTORY: Re-evaluate ETT placement after adjustment.    COMPARISON: 9/9/2018    FINDINGS: Three chest radiographs labeled 1420 hours, 1425 hours, 1458  hours. NG tube. Suboptimal inspiration with hypoventilatory changes.  No acute consolidation.      Impression    IMPRESSION: On all 3 images, the endotracheal tube tip is above the  level of the clavicles. This should be repositioned.    EDMUND MACDONALD MD   Chest  XR, 1 view PORTABLE    Narrative    XR CHEST PORT 1 VW  9/13/2018 5:32 PM     HISTORY:  move ET tube.;     COMPARISON: Known dated 9/13/2018    FINDINGS:  The tip of the ET tube is at the level of the clavicles. It  is located about 4 cm above the prince. An NG tube is in place. Mild  linear platelike atelectasis is seen at the left lung base. Shallow  inspiration.      Impression    IMPRESSION: ET tube is in good position.    ROSANA DANIELSON MD   XR Chest Port 1 View    Narrative    XR  CHEST PORT 1 VW  9/13/2018 9:08 PM     HISTORY:  Endotracheal tube positioning;     COMPARISON: Earlier film dated 9/13/2018.    FINDINGS:  ET tube is noted. The tip of the tube is about 3.3 cm above  the prince. NG tube is in place. Low lung volumes. The lungs appear  clear.      Impression    IMPRESSION: ET tube tip approximately 3.3 cm above the prince.    ROSANA DANIELSON MD   XR Abdomen 1 View    Narrative    ABDOMEN ONE VIEW   9/13/2018 9:10 PM     HISTORY: Assess OGT placement.     COMPARISON: None.      Impression    IMPRESSION: Enteric tube tip and sidehole projects over the left upper  quadrant. No dilated air-filled loops of bowel over the visualized  abdomen.    GABBY ROBLES MD

## 2018-09-14 NOTE — PHARMACY-ADMISSION MEDICATION HISTORY
Admission medication history interview status for the 9/13/2018  admission is complete. See EPIC admission navigator for prior to admission medications     Medication history source reliability:Good    Actions taken by pharmacist (provider contacted, etc):None     Additional medication history information not noted on PTA med list :None    Medication reconciliation/reorder completed by provider prior to medication history? Yes    Time spent in this activity: 15 min    Prior to Admission medications    Medication Sig Last Dose Taking? Auth Provider   amLODIPine (NORVASC) 10 MG tablet Take 1 tablet (10 mg) by mouth daily 9/12/2018 Yes Radha Botello APRN CNP   amphetamine-dextroamphetamine (ADDERALL XR) 30 MG per 24 hr capsule Take 30 mg by mouth 2 times daily 9/12/2018 Yes Reported, Patient   busPIRone (BUSPAR) 10 MG tablet Take 2 tablets (20 mg) by mouth 3 times daily 9/12/2018 Yes Tima Crews MD   cloNIDine (CATAPRES) 0.1 MG tablet Take 1 tablet (0.1 mg) by mouth 2 times daily 9/12/2018 Yes Radha Botello APRN CNP   gabapentin (NEURONTIN) 800 MG tablet TAKE 1 TABLET (800 MG) BY MOUTH 4 TIMES DAILY 9/12/2018 Yes Tima Crews MD   hydrOXYzine (ATARAX) 50 MG tablet Take 1 tablet (50 mg) by mouth every 4 hours as needed for anxiety  Yes Dora Velazquez APRN CNP   loperamide (IMODIUM A-D) 2 MG tablet Take 1 tablet (2 mg) by mouth 4 times daily as needed for diarrhea  Yes Dora Velazquez APRN CNP   multivitamin, therapeutic with minerals (THERA-VIT-M) TABS tablet Take 1 tablet by mouth daily Past Week at Unknown time Yes Filippo Brasher MD   nicotine (NICODERM CQ) 21 MG/24HR 24 hr patch Place 1 patch onto the skin daily 9/13/2018 at Unknown time Yes Filippo Brasher MD   nicotine polacrilex (NICORETTE) 4 MG gum Chew one piece every hour as directed.  Yes Tima Crews MD   nicotine polacrilex 4 MG lozenge 1-2 lozenges every hour as needed, max 10 lozenges per day   Yes Tima Crews MD   omeprazole (PRILOSEC) 40 MG capsule Take 1 capsule (40 mg) by mouth daily 9/12/2018 Yes Radha Botello APRN CNP   ondansetron (ZOFRAN) 4 MG tablet Take 1 tablet (4 mg) by mouth every 8 hours as needed for nausea  Yes Dora Velazquez APRN CNP   sertraline (ZOLOFT) 100 MG tablet Take 2 tablets (200 mg) by mouth daily 9/12/2018 Yes Filippo Brasher MD   thiamine 100 MG tablet Take 1 tablet (100 mg) by mouth daily 9/12/2018 Yes Filippo Brasher MD   traZODone (DESYREL) 100 MG tablet Take 1-2 tablets (100-200 mg) by mouth nightly as needed for sleep  Patient taking differently: Take 200 mg by mouth nightly as needed for sleep   Yes Filippo Brasher MD

## 2018-09-14 NOTE — PLAN OF CARE
Problem: Patient Care Overview  Goal: Plan of Care/Patient Progress Review  Outcome: Improving  Pt on RA oxygenating well. Pt passed bedside swallow and diet was advanced. Pt restless, pulled out two IV's. Needs redirection, Ativan, 2mg given x2. CIWA scale utilized. Will likely transfer to floor today.

## 2018-09-14 NOTE — PROGRESS NOTES
Pt fully awake. Cut sedation down and weaned. Pt pulling good volumes and pt extubated to nasal cannula at 2220. No stridor noted. Pt tolerated extubation without complictions. Pt's mother, Freya contacted per pt request. Mom updated of patient's condition.

## 2018-09-14 NOTE — PLAN OF CARE
Problem: Patient Care Overview  Goal: Individualization & Mutuality  Outcome: Improving  CIWA 13-21, Ativan given.  See MAR.  11:00: Pt resting, CIWA not assessed.  Pt cooperative.  States wants to stop drinking.  12:45:  Transferred to 614 via wheelchair.  Report given to DORI Acevedo. VSS.

## 2018-09-14 NOTE — H&P
Critical Care  Note      09/13/2018    Name: Nickolas Lang MRN#: [id number]   Age: 28 year old YOB: 1990     [facility]  [facility]    MV DAY # 0             Problem List:   1. Alcohol intoxication with AMANDA 0.5, unresponsive  2. Anxiety disorder, depression  3. Hypertension  4. GERD  5. Tobacco use         Summary/Hospital Course:   Mr. Lang was just discharged on 9/12 from [facility] for severe alcohol intoxication with secondary agitation and confusion, requiring sedation resulting in intubation.  He last was in inpatient alcohol treatment at the end of July, and reported to [facility] staff that he had fallen through the cracks.  This afternoon, 9/13, he was found down in a park, unresponsive, with bottles of vodka in his hands and a AMANDA of 0.500.  He was intubated without difficulty.         Assessment and plan :     Nickolas Lang is a 28 year old male admitted on 9/13/2018 for acute alcohol intoxication with AMANDA 0.500, unresponsiveness and inability to protect airway.   I have personally reviewed the daily labs, imaging studies, cultures and discussed the case with referring physician and consulting physicians.     My assessment and plan by system for this patient is as follows:    Neurology/Psychiatry:   1. Currently sedated on precedex 40 mcg/hr and propofol 60 mcg/kg/min.  Responsive only to pain.  2. Expectant management alcohol withdrawal; seizure precautions in place  3. History of anxiety/depression, home meds of buspar, clonidine, gabapentin, zoloft  Plan  - Continue home meds- buspar 20 mg TID, clonidine 0.1 mg BID, gabapentin 800 mg QID, zoloft 200 mg   - Hydroxyzine 50 mg PRN for anxiety  - Drugs of abuse screen ordered  - Will institute [facility] protocol     Cardiovascular:   1.Hemodynamics -stable  2.Rhythm sinus  3. Ischemia: no signs of ischemia  Plan   Continuing home norvasc 10 mg  Troponin, EKG ordered    Pulmonary/Ventilator Management:   1. Airway:  intubated for inability to protect airway  2. CMV FiO2 100%, rate 26, peep 5,   3. Blood gas ordered  Plan  - Daily vent wean per protocol    GI and Nutrition :   1. Alcohol dependency disorder, thiamine ordered  2. KUB ordered  3. Enteral feedings  Plan  - Ammonia, blood glucose, iCal, ordered    Renal/Fluids/Electrolytes:   1. Was down for unknown amount of time in park, rhabdo concern  2. 125 mL/hour NS maintenance fluid   Plan  - CMP, mag/phos, CK, ordered  - monitor function and electrolytes as needed with replacement per ICU protocols. - generally avoid nephrotoxic agents such as NSAID, IV contrast unless specifically required  - adjust medications as needed for renal clearance  - follow I/O's as appropriate.    Infectious Disease:   1. No signs of infection, does not meet SIRS criteria      Endocrine:   1. POCT glucose q4h; thiamine given    Plan  - ICU insulin protocol, goal sugar <180      Hematology/Oncology:   1. CBC ordered       IV/Access:   1. Venous access - peripheral IV in place  2. Arterial access - none          ICU Prophylaxis:   1. DVT: mechanical  2. VAP: HOB 30 degrees, chlorhexidine rinse  3. Stress Ulcer: PPI ordered  4. Restraints: Nonviolent soft two point restraints required and necessary for patient safety and continued cares and good effect as patient continues to pull at necessary lines, tubes despite education and distraction. Will readdress daily.   5. Wound care per unit protocol   6. Feeding - nursing will place OG tube  7. Family Update:   8. Disposition - ICU until can be weaned from ventilator        Key goals for next 24 hours:   1. CIWA protocol for likely alcohol withdrawal  2. Monitoring for rhabdo, aggressive fluid resuscitation   3. Vent wean per protocol               Medical History:     Past Medical History:   Diagnosis Date     ADD (attention deficit disorder)      Alcohol abuse      Anxiety     gabapentin helps the most      GERD (gastroesophageal reflux  disease)      Hepatic steatosis      Hypertension      Obesity      Pyloric stenosis     s/p pyloromyotomy as an infant     Past Surgical History:   Procedure Laterality Date     Deviated septum repair       PYLOROMYOTOMY SURGERY  as an infant      SHOULDER SURGERY Left 07/29/2016    Removal of cartilage     Social History     Social History     Marital status: Single     Spouse name: N/A     Number of children: N/A     Years of education: N/A     Occupational History     Not on file.     Social History Main Topics     Smoking status: Current Every Day Smoker     Packs/day: 1.00     Years: 10.00     Types: Cigarettes     Smokeless tobacco: Never Used     Alcohol use 0.0 oz/week     0 Standard drinks or equivalent per week      Comment: currently intoxicated     Drug use: No     Sexual activity: Not Currently     Other Topics Concern     Not on file     Social History Narrative    Lives at home until kicked out.  Family supportive.        No Known Allergies           Key Medications:          Home Meds    Current Facility-Administered Medications on File Prior to Encounter:  [COMPLETED] etomidate (AMIDATE) injection 30 mg   [COMPLETED] fentaNYL (PF) (SUBLIMAZE) injection 100 mcg   [COMPLETED] midazolam (VERSED) injection 2 mg   [COMPLETED] midazolam (VERSED) injection 2 mg   [COMPLETED] midazolam (VERSED) injection 2 mg   [COMPLETED] ondansetron (ZOFRAN) injection 4 mg   [COMPLETED] propofol (DIPRIVAN) injection 10 mg/mL vial   Self Administer Medications: Behavioral Services   [DISCONTINUED] dexmedetomidine (PRECEDEX) 400 mcg in sodium chloride 0.9 % 100 mL infusion   [DISCONTINUED] propofol (DIPRIVAN) infusion     Current Outpatient Prescriptions on File Prior to Encounter:  amLODIPine (NORVASC) 10 MG tablet Take 1 tablet (10 mg) by mouth daily   amphetamine-dextroamphetamine (ADDERALL XR) 30 MG per 24 hr capsule Take 30 mg by mouth 2 times daily   busPIRone (BUSPAR) 10 MG tablet Take 2 tablets (20 mg) by mouth 3  times daily   cloNIDine (CATAPRES) 0.1 MG tablet Take 1 tablet (0.1 mg) by mouth 2 times daily   gabapentin (NEURONTIN) 800 MG tablet TAKE 1 TABLET (800 MG) BY MOUTH 4 TIMES DAILY   hydrOXYzine (ATARAX) 50 MG tablet Take 1 tablet (50 mg) by mouth every 4 hours as needed for anxiety   loperamide (IMODIUM A-D) 2 MG tablet Take 1 tablet (2 mg) by mouth 4 times daily as needed for diarrhea   multivitamin, therapeutic with minerals (THERA-VIT-M) TABS tablet Take 1 tablet by mouth daily   nicotine (NICODERM CQ) 21 MG/24HR 24 hr patch Place 1 patch onto the skin daily   nicotine polacrilex (NICORETTE) 4 MG gum Chew one piece every hour as directed.   nicotine polacrilex 4 MG lozenge 1-2 lozenges every hour as needed, max 10 lozenges per day   omeprazole (PRILOSEC) 40 MG capsule Take 1 capsule (40 mg) by mouth daily   ondansetron (ZOFRAN) 4 MG tablet Take 1 tablet (4 mg) by mouth every 8 hours as needed for nausea   sertraline (ZOLOFT) 100 MG tablet Take 2 tablets (200 mg) by mouth daily   thiamine 100 MG tablet Take 1 tablet (100 mg) by mouth daily   traZODone (DESYREL) 100 MG tablet Take 1-2 tablets (100-200 mg) by mouth nightly as needed for sleep              Physical Examination:   Temp:  [98  F (36.7  C)-98.1  F (36.7  C)] 98  F (36.7  C)  Pulse:  [104] 104  Heart Rate:  [] 96  Resp:  [6-16] 13  BP: ()/() 112/83  SpO2:  [91 %-100 %] 95 %    Intake/Output Summary (Last 24 hours) at 09/13/18 2001  Last data filed at 09/13/18 1900   Gross per 24 hour   Intake                0 ml   Output               75 ml   Net              -75 ml     Wt Readings from Last 4 Encounters:   09/13/18 139.2 kg (306 lb 14.1 oz)   09/13/18 141.1 kg (311 lb)   09/11/18 141.5 kg (311 lb 15.2 oz)   07/30/18 144 kg (317 lb 8 oz)     BP - Mean:  [] 94  Ventilation Mode: CMV/AC  (Continuous Mandatory Ventilation/ Assist Control)  Rate Set (breaths/minute): 16 breaths/min  Tidal Volume Set (mL): 500 mL  PEEP (cm H2O): 5  cmH2O  Oxygen Concentration (%): 50 %  Peak Inspiratory Pressure (cm H2O) (Drager Tameka): 50  Resp: 13  No lab results found in last 7 days.    GEN: no acute distress   HEENT: head ncat, sclera anicteric, OP patent, trachea midline   PULM: unlabored synchronous with vent, clear anteriorly    CV/COR: RRR S1S2 no gallop,  No rub, no murmur  ABD: soft nontender, obese, non-distended hypoactive bowel sounds, no mass  EXT:  No edema, warm,  NEURO: grossly intact, responsive only to pain  SKIN: no obvious rash  LINES: clean, dry intact         Data:   All data and imaging reviewed     ROUTINE ICU LABS (Last four results)  CMP  Recent Labs  Lab 09/13/18  1422 09/10/18  0510 09/09/18  1923    141 141   POTASSIUM 4.1 4.0 3.7   CHLORIDE 108 105 99   CO2 21 27 30   ANIONGAP 13 9 12   GLC 98 109* 118*   BUN 17 15 12   CR 0.65* 0.61* 0.73   GFRESTIMATED >90 >90 >90   GFRESTBLACK >90 >90 >90   AZAM 7.8* 7.5* 10.2*   PROTTOTAL 7.8 6.5* 9.1*   ALBUMIN 3.9 3.4 4.7   BILITOTAL 0.4 0.3 0.3   ALKPHOS 110 91 127   AST 81* 35 42   ALT 93* 54 74*     CBC  Recent Labs  Lab 09/13/18  1422 09/10/18  0510 09/09/18  1924   WBC 10.0 11.9* 15.3*   RBC 5.61 5.15 6.53*   HGB 16.0 14.9 18.7*   HCT 47.7 44.4 55.4*   MCV 85 86 85   MCH 28.5 28.9 28.6   MCHC 33.5 33.6 33.8   RDW 13.4 14.1 14.5    303 413     INR  Recent Labs  Lab 09/13/18  1422   INR 1.01     Arterial Blood GasNo lab results found in last 7 days.    All cultures:  No results for input(s): CULT in the last 168 hours.  Recent Results (from the past 24 hour(s))   XR Chest Port 1 View    Narrative    XR PORTABLE CHEST ONE VIEW, XR PORTABLE CHEST ONE VIEW, XR PORTABLE  CHEST ONE VIEW  9/13/2018 3:23 PM     HISTORY: Re-evaluate ETT placement after adjustment.    COMPARISON: 9/9/2018    FINDINGS: Three chest radiographs labeled 1420 hours, 1425 hours, 1458  hours. NG tube. Suboptimal inspiration with hypoventilatory changes.  No acute consolidation.      Impression    IMPRESSION:  On all 3 images, the endotracheal tube tip is above the  level of the clavicles. This should be repositioned.    EDMUND MACDONALD MD   Chest  XR, 1 view PORTABLE    Narrative    XR PORTABLE CHEST ONE VIEW, XR PORTABLE CHEST ONE VIEW, XR PORTABLE  CHEST ONE VIEW  9/13/2018 3:23 PM     HISTORY: Re-evaluate ETT placement after adjustment.    COMPARISON: 9/9/2018    FINDINGS: Three chest radiographs labeled 1420 hours, 1425 hours, 1458  hours. NG tube. Suboptimal inspiration with hypoventilatory changes.  No acute consolidation.      Impression    IMPRESSION: On all 3 images, the endotracheal tube tip is above the  level of the clavicles. This should be repositioned.    EDMUND MACDONALD MD   Chest  XR, 1 view PORTABLE    Narrative    XR PORTABLE CHEST ONE VIEW, XR PORTABLE CHEST ONE VIEW, XR PORTABLE  CHEST ONE VIEW  9/13/2018 3:23 PM     HISTORY: Re-evaluate ETT placement after adjustment.    COMPARISON: 9/9/2018    FINDINGS: Three chest radiographs labeled 1420 hours, 1425 hours, 1458  hours. NG tube. Suboptimal inspiration with hypoventilatory changes.  No acute consolidation.      Impression    IMPRESSION: On all 3 images, the endotracheal tube tip is above the  level of the clavicles. This should be repositioned.    EDMUND MACDONALD MD   Chest  XR, 1 view PORTABLE    Narrative    XR CHEST PORT 1 VW  9/13/2018 5:32 PM     HISTORY:  move ET tube.;     COMPARISON: Known dated 9/13/2018    FINDINGS:  The tip of the ET tube is at the level of the clavicles. It  is located about 4 cm above the prince. An NG tube is in place. Mild  linear platelike atelectasis is seen at the left lung base. Shallow  inspiration.      Impression    IMPRESSION: ET tube is in good position.    ROSANA DANIELSON MD       [facility], [place] [facility]  Patient was seen and examined by me. I personally reviewed the electronic medical record including labs, flowsheets, imaging reports and films, vitals, and medications. I performed History and physical examination.  I  discussed the case in detail with the medical student Cindy Fulton and agree with her assessment and plan with the additions below    Patient is unable to provide medical details or updated ROS secondary to multiple barriers to effective communication including AMS, alcohol intoxication and mechanical ventilation via oral ett      PHYSICAL EXAMINATION:   GEN: sedated deeply, open eyes to deep sternal rum, in no acute distress, lying flat in bed with oral ett in place, very obese  HEENT: Pupils equal and reactive to light, conjunctiva are pink conjunctivae, sclera anicteric, limited inspection of oropharynx secondary to ett:  Oral mucosa moist without lesions.  NECK: supple no JVD/LAD  PULM: coarse ventilator sounds anteriorly. No rhonchi, no wheezes. Breathing non-labored.   CV: Normal S1 and S2. RRR.  No murmur, gallop, or rub.  No peripheral edema.  Peripheral pulses intact.   ABD: Soft, nontender, nondistended. Bowel sound normoactive, no guarding, no rebound.  Very obese  MSK: .  No apparent muscle wasting. No clubbing, cyanosis, or edema  NEURO: unable to assess orientation, withdrawals appropriately to painful stimuli  SKIN: Warm and dry. No visible rashes or lesions       Lines, Drains, and Devices:  Peripheral IV 09/13/18 Left Upper arm (Active)   Site Assessment [facility] 9/13/2018  2:17 PM   Phlebitis Scale 0-->no symptoms 9/13/2018  2:17 PM   Infiltration Scale 0 9/13/2018  2:17 PM   Number of days:0       Peripheral IV 09/13/18 Right Upper forearm (Active)   Site Assessment [facility] 9/13/2018  2:21 PM   Line Status Infusing 9/13/2018  2:21 PM   Phlebitis Scale 0-->no symptoms 9/13/2018  2:21 PM   Infiltration Scale 0 9/13/2018  2:21 PM   Number of days:0       Peripheral IV 09/13/18 Right Hand (Active)   Site Assessment [facility] 9/13/2018  8:37 PM   Line Status Saline locked 9/13/2018  8:37 PM   Phlebitis Scale 0-->no symptoms 9/13/2018  8:37 PM   Infiltration Scale 0 9/13/2018  8:37 PM   Dressing Intervention New  dressing  9/13/2018  8:37 PM   IV Site Rotation Due Date 09/17/18 9/13/2018  8:37 PM   Number of days:0            Data:     All vital signs, laboratory and imaging data for the past 24 hours reviewed.      Vital signs:  Temp: 98  F (36.7  C) Temp src: Axillary BP: 112/83   Heart Rate: 96 Resp: 13 SpO2: 95 %       Weight: 139.2 kg (306 lb 14.1 oz)    Weight trend:   Vitals:    09/13/18 1939   Weight: 139.2 kg (306 lb 14.1 oz)        I/O:   Intake/Output Summary (Last 24 hours) at 09/13/18 2105  Last data filed at 09/13/18 1900   Gross per 24 hour   Intake                0 ml   Output               75 ml   Net              -75 ml     CXR 1V 9/13/2018 report and film personally reviewed by me: oral ett with good placment, no acute cardiopulmonary disease  KUB flat 9/13/2018 ogt extends below the diaphragm    Assesment and Plan  1. Alcohol intoxication complicated by CNS depression and unable to protect airway. Now s/p ett/mech vent   - continue mech vent using lung protective strategy   - daily SBT, sedation holiday   - continue with alcohol withdrawal protocol   - social work and case management consults for discharge planning and services to assist with alcohol cessation   - analgesia and anxiolysis on a prn basis    DVT ppx: lovenox  GI ppx: Protonix  FEN: NS 0.9% at 125 ml/hr, electrolyte replacement per protocol. Currently NPO, will initiate tube feeds with Jevity.    Disposition: patient is in guarded condition, I anticipate he will remain in the ICU while requiring ventilatory support and medical stability for a alcohol withdrawal standpoint.       Kendrick Osullivan MD, [facility]   of Medicine  Pulmonary, Critical Care and Sleep Medicine  [facility]  Pager: [phone]  9/13/2018

## 2018-09-14 NOTE — PLAN OF CARE
Problem: Patient Care Overview  Goal: Plan of Care/Patient Progress Review  Outcome: No Change  A+Ox4, up with SBA in room. Calls appropriately. Tolerating regular diet. Good appetite. CIWA 11. Covered with Ativan. Sepsis fired, LA pending. Hypertensive 158/108. Phos 1.6 replaced, redraw in AM. Calcium 4.2, replacement running now. Voiding appropriately, uses urinal. LS clear, throat spray in pt bin. Other VSS, on RA. Nursing will continue to monitor. Chem-Dep pending.

## 2018-09-14 NOTE — PROGRESS NOTES
[facility]  Critical Care Service  Progress Note  Date of Service (when I saw the patient): 09/14/2018  Main Plans for Today  Transfer to floor  Assessment & Plan   Mr. Lang was just discharged on 9/12 from [facility] for severe alcohol intoxication with secondary agitation and confusion, requiring sedation resulting in intubation.  He last was in inpatient alcohol treatment at the end of July, and reported to [facility] staff that he had fallen through the cracks.  This afternoon, 9/13, he was found down in a park, unresponsive, with bottles of vodka in his hands and a BAC of 0.500.  He was intubated without difficulty.     Neurology/Psychiatry:   1. Alcohol abuse  2. Expectant management alcohol withdrawal; seizure precautions in place  3. History of anxiety/depression, home meds of buspar, clonidine, gabapentin, zoloft  Plan  - Continue home meds- buspar 20 mg TID, clonidine 0.1 mg BID, gabapentin 800 mg QID, zoloft 200 mg   - Hydroxyzine 50 mg PRN for anxiety  - Drugs of abuse screen ordered  - [facility] protocol   - Thiamine and folate ordered       Cardiovascular:   1.HTN   Plan   -Continuing home norvasc 10 mg       Pulmonary/Ventilator Management:   1. Airway: intubated for inability to protect airway  Plan  - Extubated, currently on room air.   -Pulmonary hygiene      GI and Nutrition :   No active issues   Plan  -ADAT, no indications for enteral feedings       Renal/Fluids/Electrolytes:   1. Was down for unknown amount of time in park, rhabdo concern- CK normal   2. 125 mL/hour NS maintenance fluid. Maintain until increase in PO intake   3. Hypophosphatemia  Plan  - CMP, mag/phos, CK, ordered  - monitor function and electrolytes as needed with replacement per ICU protocols. - generally avoid nephrotoxic agents such as NSAID, IV contrast unless specifically required  - adjust medications as needed for renal clearance  - follow I/O's as appropriate.     Infectious Disease:   1. No signs of  infection        Endocrine:   1. No acute issues      Plan  - ICU insulin protocol, goal sugar <180        Hematology/Oncology:   1. CBC ordered         IV/Access:   1. Venous access - peripheral IV in place                ICU Prophylaxis:   1. DVT: mechanical, lovenox   2. VAP: HOB 30 degrees,  3. Stress Ulcer:   4. Restraints: No  5. Wound care per unit protocol   6. Feeding -ADAT  7. Family Update: NO  8. Disposition - Transfer to [facility]     Time Spent on this Encounter   Billing:  I spent 30 minutes bedside and on the inpatient unit today managing the care of Nickolas Lang in relation to the issues listed in this note.  Interval History   Course reviewed. No acute events overnight. Pt asking for ativan given high anxiety and coming off of ETOH. Pleasant, Denies dyspnea, chest pain, palpitations, dizziness, vomiting.     Physical Exam   Temp: 98.2  F (36.8  C) Temp src: Oral Temp  Min: 98  F (36.7  C)  Max: 98.2  F (36.8  C) BP: 122/77   Heart Rate: 89 Resp: 22 SpO2: 92 % O2 Device: None (Room air) Oxygen Delivery: 5 LPM  Vitals:    09/13/18 1939   Weight: 139.2 kg (306 lb 14.1 oz)     I/O last 3 completed shifts:  In: 890.33 [I.V.:890.33]  Out: 525 [Urine:525]    GEN: Alert, awake, NAD   EYES: PERRL, Anicteric sclera.   HEENT:  Normocephalic, atraumatic, trachea midline,   CV: RRR, no gallops, rubs, or murmurs  PULM/CHEST: Clear breath sounds bilaterally without rhonchi, crackles or wheeze, symmetric chest rise  GI: normal bowel sounds, soft, non-tender, no rebound tenderness or guarding, no masses  : joel catheter in place, urine yellow and clear  EXTREMITIES: no peripheral edema, moving all extremities, peripheral pulses intact  NEURO: Cranial nerves II-XII grossly intact, no motor-sensory deficits noted  SKIN: No rashes, sores or ulcerations  PSYCH:  Affect: appropriate  anxious, mentation at baseline , no hallucinations  Imaging personally reviewed:  ECG, CXR     Data      Recent Labs  Lab 09/14/18  0520 09/13/18  2100 09/13/18  1422 09/10/18  0510   WBC 13.4*  --  10.0 11.9*   HGB 13.9  --  16.0 14.9   MCV 84  --  85 86    222 277 303   INR  --  1.03 1.01  --      --  142 141   POTASSIUM 3.5  --  4.1 4.0   CHLORIDE 104  --  108 105   CO2 21  --  21 27   BUN 14  --  17 15   CR 0.61* 0.76 0.65* 0.61*   ANIONGAP 12  --  13 9   AZAM 8.0*  --  7.8* 7.5*   *  --  98 109*   ALBUMIN 3.4  --  3.9 3.4   PROTTOTAL 6.7*  --  7.8 6.5*   BILITOTAL 0.4  --  0.4 0.3   ALKPHOS 96  --  110 91   ALT 77*  --  93* 54   AST 48*  --  81* 35   TROPI  --  <0.015  --   --

## 2018-09-14 NOTE — PROGRESS NOTES
Transfer    S- Transfer to 66 from ICU.    B- 614-2    A- Brief systems assessment: CIWA 11, nauseous. Sepsis fired, LA ordered. Hypertensive.     R- Transfer to 66 per physician orders. Continue to monitor pt and update physician as needed.     Code status: Full Code  Skin: bruising  Fall Risk: Yes- Department fall risk interventions implemented.  Isolation: None  Patient belongings: sorted. Cigarettes and lighter in Blue pod locker #3  Medication drips upon transfer: Phosphorus bag finished. Calcium replacement started

## 2018-09-14 NOTE — CONSULTS
Consult Date:  09/14/2018      REASON FOR CONSULTATION:  Alcohol dependence.      REQUESTING PHYSICIAN:  Miquel Romero MD      CHIEF COMPLAINT:  Alcohol use disorder.      HISTORY OF PRESENT ILLNESS:  Nickolas Lang is a 28-year-old male with a psychiatric history of alcohol use disorder, generalized anxiety disorder and major depressive disorder versus substance-induced depressive disorder.  He has undergone multiple inpatient alcohol treatments, most recently 07/2018.  The patient was admitted for this hospitalization for alcohol intoxication and management of withdrawal.      The patient was discharged from [facility] on 09/12/2018 after he was admitted for severe alcohol intoxication requiring sedation and intubation for behavioral dyscontrol.  He was discharged on 09/13/2018 and was found unconscious and unresponsive in a park for an unknown period of time.  He had a blood alcohol content of 0.5 with drug screen positive for amphetamines and benzodiazepines.  The amphetamines were secondary to a prior to admission Adderall prescription and benzodiazepines thought a product of treatment at [facility].  He was ultimately intubated for airway protection and admitted to ICU.      The patient is currently on the [facility] protocol for management of alcohol withdrawal.  He is cogent and interactive on my interview.  The patient concedes to struggling with alcohol dependence since the age 16 though reports significant progression over the last 3 years.  He tells me he had about 3 months of sobriety, which coincided with his inpatient alcohol treatment, though relapsed about 2 weeks ago.  He does report some external stressors to include an uncle that was diagnosed with cancer who was like a father to him.  That being said, he also agrees that he simply has a dependence upon alcohol that is difficult to navigate.  He is denying depression or psychotic symptoms, though does agree to anxiety, but most  "prominently a problem with alcohol.  We discussed where to go from here and the patient says he was not interested in inpatient or outpatient treatment and simply wanted to return to Alcoholics Anonymous.  When I asked his rationale, he stated that if you read the Big Book, it says that following the steps is sufficient.  He tells me that \"working the steps is the only thing that stuck with me\".        When I informed the patient of more details surrounding his recent hospitalizations to include the fact that he could have very easily  from this recent relapse, he was taken a bit by surprise.  I explained the importance of finding the safest disposition for him and that may include inpatient treatment, but ultimately we would wait for the chemical dependency evaluation to come to their conclusion.  By the end of my interview, he agreed to hear them out and will consider their recommendations to include inpatient treatment if necessary.      PAST PSYCHIATRIC HISTORY:  History of alcohol use disorder, depression, anxiety.  Most recently did inpatient alcohol treatment in 2018 at [facility].  He reports multiple past inpatient treatments but ultimately lists them as \"too many to count\".  He states that he participates in AA, though that has fallen by the [place] as of late.  He reports being followed as an outpatient psychiatrically by Dr. Mistry at [facility].      PAST MEDICAL HISTORY:  GERD, hepatic steatosis, hypertension, obesity, pyloric stenosis status post surgical correction as an infant.      SOCIAL HISTORY:  The patient lives with his mother but has no relationship with his father.  He attended [employer] in [place] and has a bachelor's of Science degree.  He has worked in [employer].      REVIEW OF SYSTEMS:  A 10-point review of systems completed and is negative other than noted in HPI.      ALLERGIES:  NO KNOWN DRUG ALLERGIES.      MEDICATIONS " "PRIOR TO ADMISSION:  Amlodipine, Adderall-XR 30 mg b.i.d., buspirone 20 mg t.i.d., clonidine 0.1 mg b.i.d., gabapentin 800 mg q.i.d., hydroxyzine 50 mg q.4h. p.r.n. anxiety, loperamide, multivitamin, nicotine patch, nicotine gum, nicotine lozenges, omeprazole, Zofran, sertraline 200 mg daily, thiamine 100 mg daily, trazodone 200 mg at bedtime p.r.n. sleep.      MENTAL STATUS EXAMINATION:  Age-appearing obese male dressed in hospital scrubs, sitting at the edge of his bed eating breakfast.  He was calm and cooperative with good eye contact.  He was awake, alert and globally oriented.  He was cooperative, forthcoming and a seemingly reliable historian.  Mood was described as \"getting better\".  Affect was mood congruent, stable and appropriately reactive.  Speech was fluent, spontaneous, clear, nonpressured.  Thought processes were linear, logical and goal directed.  No observation of delusional content.  No observation of response to internal stimuli.  No fluctuation in cognition appreciated.  Intelligence estimated as average by way of vocabulary and conversation understanding.  Memory is intact to distant events, though clouded with respect to circumstances of admission.  Attendance and concentration were well maintained.  Muscle strength and tone appeared normal on visual exam.  Coordination, station and gait were not assessed.  Insight and judgment were fair.  He denies suicidal or homicidal ideation, intent or plan.      VITAL SIGNS:  Temperature 98.2, heart rate 104, respirations 22, blood pressure 133/86, oxygen saturation 92% on room air.      LABORATORY DATA:  Sodium 137, potassium 3.5, creatinine 0.61, calcium 8, anion gap 12, magnesium 1.7, phosphorus 1.6, alkaline phosphatase 96, ALT 77, AST 48.  .  Troponins less than 0.015, glucose 130.  White blood cells 13.4, hemoglobin 13.9, MCV 84.  Urine drug screen positive for amphetamines and benzodiazepines.  Blood alcohol on 09/13/2018 was 0.47.    "   DIAGNOSES:   1.  Alcohol use disorder, severe.   2.  Alcohol withdrawal.   3.  History of major depression.   4.  History of generalized anxiety.      IMPRESSION:  Nickolas Lang is a 28-year-old male who was admitted to the hospital after being found unresponsive surrounded by a bottle of vodka.  He had a blood alcohol content of 0.5 with a urinalysis positive for amphetamines (for outpatient Adderall script) and benzodiazepines (presumably due to recent treatment for intoxication and withdrawal during a prior hospitalization).  He was ultimately intubated and admitted to the ICU for further management.  He has since been extubated and is currently undergoing the CIWA protocol.  This admission follows discharge just 2 days ago at [facility] for another episode of severe alcohol intoxication requiring sedation and intubation.  The patient was not interested in more structured chemical dependency treatment, either inpatient or outpatient, as he cites having done this multiple times in the past without benefit.  He would prefer to return to AA meetings and reconnection with his sponsor.  Given these 2 proximate serious episodes of intoxication that could very well have ended his life, I explained to him that I do not think that level of care is intensive enough given his difficulties.  Through our discussion, he became agreeable to speak with a chemical dependency counselor and entertained possible recommendations to include inpatient treatment.  If circumstances should change and he begins refusing this disposition, I would ask that you reconsult Psychiatry as I would be inclined to pursue commitment given the severity and life-threatening nature of his recent hospitalizations.      PLAN:   1.  Continue current psychotropic medication as ordered.  Would not restart patient on a stimulant medication.   2.  Continue management of alcohol withdrawal via CIWA protocol.   3.  CD consult pending.   4.  If  patient is not agreeable to the recommendations, would have a very low threshold for pursuing chemical dependency commitment.         JERSEY BONNER DO             D: 2018   T: 2018   MT: PITO      Name:     PARTH HARPER   MRN:      [id number]        Account:       [id number]   :      1990           Consult Date:  2018      Document: [id number]       cc: Miquel Romero MD

## 2018-09-15 LAB
ALBUMIN SERPL-MCNC: 3.2 G/DL (ref 3.4–5)
ALP SERPL-CCNC: 85 U/L (ref 40–150)
ALT SERPL W P-5'-P-CCNC: 54 U/L (ref 0–70)
ANION GAP SERPL CALCULATED.3IONS-SCNC: 7 MMOL/L (ref 3–14)
AST SERPL W P-5'-P-CCNC: 24 U/L (ref 0–45)
BILIRUB SERPL-MCNC: 0.4 MG/DL (ref 0.2–1.3)
BUN SERPL-MCNC: 10 MG/DL (ref 7–30)
CA-I BLD-MCNC: 4.3 MG/DL (ref 4.4–5.2)
CALCIUM SERPL-MCNC: 7.9 MG/DL (ref 8.5–10.1)
CHLORIDE SERPL-SCNC: 104 MMOL/L (ref 94–109)
CO2 SERPL-SCNC: 29 MMOL/L (ref 20–32)
CREAT SERPL-MCNC: 0.44 MG/DL (ref 0.66–1.25)
ERYTHROCYTE [DISTWIDTH] IN BLOOD BY AUTOMATED COUNT: 13.4 % (ref 10–15)
GFR SERPL CREATININE-BSD FRML MDRD: >90 ML/MIN/1.7M2
GLUCOSE SERPL-MCNC: 112 MG/DL (ref 70–99)
HCT VFR BLD AUTO: 39.3 % (ref 40–53)
HGB BLD-MCNC: 13.4 G/DL (ref 13.3–17.7)
MAGNESIUM SERPL-MCNC: 2.2 MG/DL (ref 1.6–2.3)
MCH RBC QN AUTO: 28.6 PG (ref 26.5–33)
MCHC RBC AUTO-ENTMCNC: 34.1 G/DL (ref 31.5–36.5)
MCV RBC AUTO: 84 FL (ref 78–100)
PHOSPHATE SERPL-MCNC: 2.8 MG/DL (ref 2.5–4.5)
PLATELET # BLD AUTO: 156 10E9/L (ref 150–450)
POTASSIUM SERPL-SCNC: 3.7 MMOL/L (ref 3.4–5.3)
PROT SERPL-MCNC: 6.6 G/DL (ref 6.8–8.8)
RBC # BLD AUTO: 4.69 10E12/L (ref 4.4–5.9)
SODIUM SERPL-SCNC: 140 MMOL/L (ref 133–144)
WBC # BLD AUTO: 6.2 10E9/L (ref 4–11)

## 2018-09-15 PROCEDURE — 99232 SBSQ HOSP IP/OBS MODERATE 35: CPT | Performed by: INTERNAL MEDICINE

## 2018-09-15 PROCEDURE — 25000132 ZZH RX MED GY IP 250 OP 250 PS 637: Performed by: NURSE PRACTITIONER

## 2018-09-15 PROCEDURE — 25000128 H RX IP 250 OP 636: Performed by: INTERNAL MEDICINE

## 2018-09-15 PROCEDURE — 40000007 ZZH STATISTIC ADULT CD FACE TO FACE-NO CHRG

## 2018-09-15 PROCEDURE — 25000132 ZZH RX MED GY IP 250 OP 250 PS 637: Performed by: INTERNAL MEDICINE

## 2018-09-15 PROCEDURE — 36415 COLL VENOUS BLD VENIPUNCTURE: CPT | Performed by: PHYSICIAN ASSISTANT

## 2018-09-15 PROCEDURE — 25000132 ZZH RX MED GY IP 250 OP 250 PS 637: Performed by: PHYSICIAN ASSISTANT

## 2018-09-15 PROCEDURE — 25000128 H RX IP 250 OP 636: Performed by: PHYSICIAN ASSISTANT

## 2018-09-15 PROCEDURE — 99207 ZZC MOONLIGHTING INDICATOR: CPT | Performed by: INTERNAL MEDICINE

## 2018-09-15 PROCEDURE — 82330 ASSAY OF CALCIUM: CPT | Performed by: PHYSICIAN ASSISTANT

## 2018-09-15 PROCEDURE — 84100 ASSAY OF PHOSPHORUS: CPT | Performed by: INTERNAL MEDICINE

## 2018-09-15 PROCEDURE — 84100 ASSAY OF PHOSPHORUS: CPT | Performed by: PHYSICIAN ASSISTANT

## 2018-09-15 PROCEDURE — 12000000 ZZH R&B MED SURG/OB

## 2018-09-15 PROCEDURE — 83735 ASSAY OF MAGNESIUM: CPT | Performed by: PHYSICIAN ASSISTANT

## 2018-09-15 PROCEDURE — 80053 COMPREHEN METABOLIC PANEL: CPT | Performed by: PHYSICIAN ASSISTANT

## 2018-09-15 PROCEDURE — 85027 COMPLETE CBC AUTOMATED: CPT | Performed by: PHYSICIAN ASSISTANT

## 2018-09-15 RX ADMIN — IBUPROFEN 400 MG: 200 TABLET, FILM COATED ORAL at 06:20

## 2018-09-15 RX ADMIN — Medication 250 MG: at 08:56

## 2018-09-15 RX ADMIN — PANTOPRAZOLE SODIUM 40 MG: 40 TABLET, DELAYED RELEASE ORAL at 06:20

## 2018-09-15 RX ADMIN — MAGNESIUM SULFATE HEPTAHYDRATE 2 G: 40 INJECTION, SOLUTION INTRAVENOUS at 03:24

## 2018-09-15 RX ADMIN — BUSPIRONE HYDROCHLORIDE 20 MG: 10 TABLET ORAL at 21:04

## 2018-09-15 RX ADMIN — CALCIUM GLUCONATE 2 G: 98 INJECTION, SOLUTION INTRAVENOUS at 12:37

## 2018-09-15 RX ADMIN — LORAZEPAM 2 MG: 2 INJECTION INTRAMUSCULAR; INTRAVENOUS at 06:20

## 2018-09-15 RX ADMIN — LORAZEPAM 1 MG: 2 INJECTION INTRAMUSCULAR; INTRAVENOUS at 17:02

## 2018-09-15 RX ADMIN — BUSPIRONE HYDROCHLORIDE 20 MG: 10 TABLET ORAL at 08:56

## 2018-09-15 RX ADMIN — CLONIDINE HYDROCHLORIDE 0.1 MG: 0.1 TABLET ORAL at 08:57

## 2018-09-15 RX ADMIN — GABAPENTIN 800 MG: 400 CAPSULE ORAL at 17:01

## 2018-09-15 RX ADMIN — TRAZODONE HYDROCHLORIDE 200 MG: 50 TABLET ORAL at 21:04

## 2018-09-15 RX ADMIN — LORAZEPAM 2 MG: 2 INJECTION INTRAMUSCULAR; INTRAVENOUS at 03:24

## 2018-09-15 RX ADMIN — HYDROXYZINE HYDROCHLORIDE 50 MG: 25 TABLET ORAL at 20:30

## 2018-09-15 RX ADMIN — SERTRALINE HYDROCHLORIDE 200 MG: 100 TABLET ORAL at 08:56

## 2018-09-15 RX ADMIN — GABAPENTIN 800 MG: 400 CAPSULE ORAL at 21:04

## 2018-09-15 RX ADMIN — GABAPENTIN 800 MG: 400 CAPSULE ORAL at 12:37

## 2018-09-15 RX ADMIN — LORAZEPAM 1 MG: 2 INJECTION INTRAMUSCULAR; INTRAVENOUS at 12:55

## 2018-09-15 RX ADMIN — MULTIPLE VITAMINS W/ MINERALS TAB 1 TABLET: TAB at 08:55

## 2018-09-15 RX ADMIN — CLONIDINE HYDROCHLORIDE 0.1 MG: 0.1 TABLET ORAL at 20:30

## 2018-09-15 RX ADMIN — AMLODIPINE BESYLATE 10 MG: 10 TABLET ORAL at 08:56

## 2018-09-15 RX ADMIN — IBUPROFEN 400 MG: 200 TABLET, FILM COATED ORAL at 21:07

## 2018-09-15 RX ADMIN — Medication 1 MG: at 23:31

## 2018-09-15 RX ADMIN — NICOTINE 1 PATCH: 21 PATCH, EXTENDED RELEASE TRANSDERMAL at 20:31

## 2018-09-15 RX ADMIN — BUSPIRONE HYDROCHLORIDE 20 MG: 10 TABLET ORAL at 17:01

## 2018-09-15 RX ADMIN — FOLIC ACID 1 MG: 1 TABLET ORAL at 08:56

## 2018-09-15 RX ADMIN — GABAPENTIN 800 MG: 400 CAPSULE ORAL at 08:55

## 2018-09-15 ASSESSMENT — ACTIVITIES OF DAILY LIVING (ADL)
ADLS_ACUITY_SCORE: 11

## 2018-09-15 NOTE — PLAN OF CARE
Problem: Patient Care Overview  Goal: Plan of Care/Patient Progress Review  Outcome: Improving  A&Ox4. Full code. VSS on RA, /93. Slept most of shift. Woke up and reported nausea, anxiety, and headache, CIWA score was 11. 2 mg IV Ativan given per order. Slept after this. Up w/ SBA. IV phosphorous replacement finished. Mg was 1.7 yesterday, IV replacement given per order. Lab rechecks ordered. Regular diet. Contact precautions discontinued, MRSA swab negative. CD and psych following. Fall precautions in place. Will cont to monitor.

## 2018-09-15 NOTE — CONSULTS
9/15/18 chem dep consult.  Met with patient, he reported to me that he has been participating in the Phase 2 outpatient program at [place] since his completion form [facility], I confirmed this in the EHR but confronted patient with the fact that he has not attended a session since 8/16/18 and that he did not stay sober after his discharge from [facility] on 9/12/18 and within 24 hours was readmitted with a very high level of intoxication.  He acknowledged all this and shared with me that he admits he has not been putting his recovery program first and that he had started working which he was making a priority.  I discussed with him that given his recent history, another residential program appears to be the appropriate step being he has demonstrated high risk for continued use. He did understand this rationale, he explained that he does not feel more treatment will be beneficial as he has had so much treatment and that in the past AA and working the steps has been the most helpful.  I affirmed with him that I agree AA can be helpful however, he has got to be able to stay sober.  At this time, we agreed that patient would contact his counselor at [facility] to discuss his further participation in their program and/or the possibility of transferring to a [facility] for a more intensive schedule.  I encouraged him to attend daily AA meetings which he agreed would be beneficial.  I left him with my business card and he is aware he can contact me for any additional concerns or services.

## 2018-09-15 NOTE — PROGRESS NOTES
[facility]  Hospitalist Progress Note for 9/15/2018:          Assessment and Plan:   Nickolas Lang is a 28 year old male who was admitted on 9/13/2018 for alcohol intoxication, found unresponsive. I was asked to see the patient to take over as primary service for transfer out of the ICU now that patient is more medically stable and extubated..     Acute Alcohol Intoxication with AMANDA 0.5, found unresponsive  Chronic Severe Alcohol Abuse, with prior intubations and alcohol withdrawal   Went through inpatient alcohol treatment end of July 2018. Patient discharged 9/12/18 from [facility] for severe alcohol intoxication secondary to agitation confusion, requiring sedation resulting in intubation.  On 9/13/18 day after discharge from [facility] he was found down in a park for unknown period of time, unresponsive the bottles of vodka. AMANDA 0.5, due to unresponsiveness and for airway protection patient was intubated and admitted to the ICU for further management. CK wnl on admission, no signs of rhabdo. +Amphetamines and Benzos - scripts for Adderall PTA and received benzos at [facility].   Extubated 0n 9/13 at 2220,  breathing RA & transferred to the medical floor.   Pt with prior withdrawals so being monitored for alcohol withdrawal symptoms, seizure precautions in place  - Pulmonary hygiene  -  Banana bag complete,off IVF as po intake adequate, continue high dose thiamine 250 mg daily and folate 1 mg daily  - continue CIWA protocol  - F/S checks BID  -- Chemical dependency consultation- recommended compliance with AA meeting & suggested daily f/u with them after d/c     Hypophosphatemia:  Hypocalcemia:  Phos 1.6, repleted in ED. Calcium 7.9 this am after receiving 1 gm IV coby gluconate  - follow - Mg++, Phos, K+, I-Ca++  - Lyte repletion protocol - to replace Mg++, Phos, and Calcium today  - Encourage good oral intake     Mild Leukocytosis:   Likely stress response. WBC 13.4. Plts holding at  200s but much decreased from last week while in 400s. Has been low in the 50s-70s in the past related to EtOH abuse. Stop lovenox given decrease in platelets and pt extubated with plans for ambulation today.  - improved     Hx Hypertension:  BPs adequate. SBP 110s-130s.  - Continue prior to admission amlodipine 10 mg daily & on [place] 0.1 mg     Anxiety Disorder  Depression  Attention deficit disorder:   Continues on several PTA psychiatric medications.  - Continue home meds- buspar 20 mg TID, clonidine 0.1 mg BID, gabapentin 800 mg QID, zoloft 200 mg   - Hydroxyzine 50 mg PRN for anxiety. Psychiatry recommended to avoid PTA Adderral  - Drugs of abuse screen- (+) amphetamines and benzos per ativan from [facility] and ADD meds  - Psychiatry consult appreciated, recommended to continue current psych medications except Adderral.  - pt feels anxious, receiving ativan prn in the hospital per RAYMOND, so reconsult Psych for medication adjustment before discharge.    Tobacco Use Disorder:   Smokes 1 ppd since 16 yo.  - Nicotine patch in place  - Encourage cessation     GERD: Takes PTA omeprazole 40 mg daily.  - Started on Protonix 40 mg daily.     Hepatic steatosis:  ALT/AST appears at baseline. T Bili and alk phos wnl.     Obesity class III:  BMI 40.5. Encourage weight loss after alcohol use disorder is addressed.     Hx Pyloric stenosis: s/p pyloromyotomy as infant     DVT Prophylaxis: PCDs and ambulation  Code Status: Full Code     Disposition: Expected discharge in 1-2 days once medically stable and no EtOH withdrawal.                 Interval History:   no new complaints              Medications:       amLODIPine  10 mg Oral Daily     busPIRone  20 mg Oral TID     calcium gluconate  2 g Intravenous Once     cloNIDine  0.1 mg Oral BID     folic acid  1 mg Oral Daily     gabapentin  800 mg Oral 4x Daily     multivitamin, therapeutic with minerals  1 tablet Oral Daily     nicotine  1 patch Transdermal Daily     nicotine    Transdermal Q8H     nicotine   Transdermal Daily     pantoprazole  40 mg Oral QAM AC     sertraline  200 mg Per Feeding Tube Daily     vitamin  B-1  250 mg Oral Daily     sodium chloride 0.9%, flumazenil, HYDROcodone-acetaminophen, hydrOXYzine, ibuprofen, loperamide, LORazepam, magnesium sulfate, magnesium sulfate, metoprolol, naloxone, ondansetron **OR** ondansetron, phenol, potassium chloride, potassium chloride with lidocaine, potassium chloride, potassium chloride, potassium chloride, sennosides, sodium phosphate, sodium phosphate, sodium phosphate, sodium phosphate, traZODone               Physical Exam:   Blood pressure (!) 137/104, pulse 94, temperature 97.3  F (36.3  C), temperature source Axillary, resp. rate 20, weight 138.6 kg (305 lb 9.6 oz), SpO2 96 %.  Wt Readings from Last 4 Encounters:   09/15/18 138.6 kg (305 lb 9.6 oz)   18 141.1 kg (311 lb)   18 141.5 kg (311 lb 15.2 oz)   18 144 kg (317 lb 8 oz)         Vital Sign Ranges  Temperature Temp  Av.8  F (36.6  C)  Min: 97.3  F (36.3  C)  Max: 98.4  F (36.9  C)   Blood pressure Systolic (24hrs), Av , Min:137 , Max:158        Diastolic (24hrs), Av, Min:93, Max:108      Pulse Pulse  Av  Min: 90  Max: 94   Respirations Resp  Av.1  Min: 16  Max: 27   Pulse oximetry SpO2  Av.3 %  Min: 93 %  Max: 98 %         Intake/Output Summary (Last 24 hours) at 09/15/18 1016  Last data filed at 09/15/18 0706   Gross per 24 hour   Intake                0 ml   Output              970 ml   Net             -970 ml       Constitutional: Awake, alert, cooperative, no apparent distress   Lungs: Clear to auscultation bilaterally, no crackles or wheezing   Cardiovascular: Regular rate and rhythm, normal S1 and S2, and no murmur noted   Abdomen: Normal bowel sounds, soft, non-distended, non-tender   Skin: No rashes, no cyanosis, no edema   Neuro:                Data:   All laboratory data reviewed

## 2018-09-15 NOTE — PLAN OF CARE
Problem: Patient Care Overview  Goal: Plan of Care/Patient Progress Review  Pt is A&Ox4, cooperative and anxious at times. VSS on RA, except  Hypertensive. C/o headache, given Ibuprofen with some result. CIWA 4, 6, 4, 7, and latest 3, managed with Ativan. Up with SBA in room. Voiding well. calls appropriately. Regular diet with good appetite.  Lactic 1.1. Phos 2.3, replacement waiting from pharmacy, redraw scheduled AM. Chem-Dep pending.  MRSA negative. Discharge pending in progress. Continue to monitor.

## 2018-09-15 NOTE — PROVIDER NOTIFICATION
MD Notification    Notified Person: MD    Notified Person Name: Mark    Notification Date/Time: 9/15/18 1003    Notification Interaction: web-paged    Purpose of Notification: Blood Calcium was replaced 9/14. Check this AM 4.3. Do you want another replacement? Thank you    Orders Received:    Comments: 1012 MD put in orders for Calcium replacement

## 2018-09-15 NOTE — PLAN OF CARE
Problem: Patient Care Overview  Goal: Plan of Care/Patient Progress Review  Outcome: Improving  A+Ox4, up with SBA in room. Calls appropriately. Tolerating regular diet. Good appetite. CIWA 3/6. Covered with Ativan. Calcium 4.3, replacement running. Voiding appropriately, uses urinal. LS clear, PRN throat spray in pt bin. Hypertensive, other VSS, on RA. Nursing will continue to monitor. Chem-Dep saw. Possible discharge tomorrow morning 9/16, pending psych consult.

## 2018-09-16 VITALS
DIASTOLIC BLOOD PRESSURE: 88 MMHG | HEART RATE: 92 BPM | WEIGHT: 305.6 LBS | BODY MASS INDEX: 40.32 KG/M2 | SYSTOLIC BLOOD PRESSURE: 124 MMHG | RESPIRATION RATE: 16 BRPM | OXYGEN SATURATION: 96 % | TEMPERATURE: 97.9 F

## 2018-09-16 LAB
ALBUMIN SERPL-MCNC: 3.2 G/DL (ref 3.4–5)
ALP SERPL-CCNC: 76 U/L (ref 40–150)
ALT SERPL W P-5'-P-CCNC: 59 U/L (ref 0–70)
ANION GAP SERPL CALCULATED.3IONS-SCNC: 6 MMOL/L (ref 3–14)
AST SERPL W P-5'-P-CCNC: 30 U/L (ref 0–45)
BILIRUB SERPL-MCNC: 0.3 MG/DL (ref 0.2–1.3)
BUN SERPL-MCNC: 16 MG/DL (ref 7–30)
CA-I BLD-MCNC: 4.6 MG/DL (ref 4.4–5.2)
CALCIUM SERPL-MCNC: 8.4 MG/DL (ref 8.5–10.1)
CHLORIDE SERPL-SCNC: 106 MMOL/L (ref 94–109)
CO2 SERPL-SCNC: 29 MMOL/L (ref 20–32)
CREAT SERPL-MCNC: 0.49 MG/DL (ref 0.66–1.25)
ERYTHROCYTE [DISTWIDTH] IN BLOOD BY AUTOMATED COUNT: 13.4 % (ref 10–15)
GFR SERPL CREATININE-BSD FRML MDRD: >90 ML/MIN/1.7M2
GLUCOSE SERPL-MCNC: 100 MG/DL (ref 70–99)
HCT VFR BLD AUTO: 38.3 % (ref 40–53)
HGB BLD-MCNC: 13 G/DL (ref 13.3–17.7)
INTERPRETATION ECG - MUSE: NORMAL
MAGNESIUM SERPL-MCNC: 2.1 MG/DL (ref 1.6–2.3)
MCH RBC QN AUTO: 28.6 PG (ref 26.5–33)
MCHC RBC AUTO-ENTMCNC: 33.9 G/DL (ref 31.5–36.5)
MCV RBC AUTO: 84 FL (ref 78–100)
PHOSPHATE SERPL-MCNC: 4.4 MG/DL (ref 2.5–4.5)
PLATELET # BLD AUTO: 130 10E9/L (ref 150–450)
POTASSIUM SERPL-SCNC: 3.7 MMOL/L (ref 3.4–5.3)
PROT SERPL-MCNC: 6.6 G/DL (ref 6.8–8.8)
RBC # BLD AUTO: 4.55 10E12/L (ref 4.4–5.9)
SODIUM SERPL-SCNC: 141 MMOL/L (ref 133–144)
WBC # BLD AUTO: 5.1 10E9/L (ref 4–11)

## 2018-09-16 PROCEDURE — 99239 HOSP IP/OBS DSCHRG MGMT >30: CPT | Performed by: INTERNAL MEDICINE

## 2018-09-16 PROCEDURE — 84100 ASSAY OF PHOSPHORUS: CPT | Performed by: PHYSICIAN ASSISTANT

## 2018-09-16 PROCEDURE — 25000132 ZZH RX MED GY IP 250 OP 250 PS 637: Performed by: PHYSICIAN ASSISTANT

## 2018-09-16 PROCEDURE — 25000132 ZZH RX MED GY IP 250 OP 250 PS 637: Performed by: NURSE PRACTITIONER

## 2018-09-16 PROCEDURE — 25000132 ZZH RX MED GY IP 250 OP 250 PS 637: Performed by: INTERNAL MEDICINE

## 2018-09-16 PROCEDURE — 82330 ASSAY OF CALCIUM: CPT | Performed by: PHYSICIAN ASSISTANT

## 2018-09-16 PROCEDURE — 85027 COMPLETE CBC AUTOMATED: CPT | Performed by: PHYSICIAN ASSISTANT

## 2018-09-16 PROCEDURE — 83735 ASSAY OF MAGNESIUM: CPT | Performed by: PHYSICIAN ASSISTANT

## 2018-09-16 PROCEDURE — 36415 COLL VENOUS BLD VENIPUNCTURE: CPT | Performed by: PHYSICIAN ASSISTANT

## 2018-09-16 PROCEDURE — 25000132 ZZH RX MED GY IP 250 OP 250 PS 637: Performed by: PSYCHIATRY & NEUROLOGY

## 2018-09-16 PROCEDURE — 99207 ZZC MOONLIGHTING INDICATOR: CPT | Performed by: INTERNAL MEDICINE

## 2018-09-16 PROCEDURE — 99231 SBSQ HOSP IP/OBS SF/LOW 25: CPT | Performed by: PSYCHIATRY & NEUROLOGY

## 2018-09-16 PROCEDURE — 80053 COMPREHEN METABOLIC PANEL: CPT | Performed by: PHYSICIAN ASSISTANT

## 2018-09-16 RX ORDER — MIRTAZAPINE 7.5 MG/1
7.5 TABLET, FILM COATED ORAL AT BEDTIME
Qty: 3 TABLET | Refills: 0 | Status: SHIPPED | OUTPATIENT
Start: 2018-09-16 | End: 2018-10-04

## 2018-09-16 RX ORDER — DISULFIRAM 250 MG/1
250 TABLET ORAL DAILY
Status: DISCONTINUED | OUTPATIENT
Start: 2018-09-16 | End: 2018-09-16 | Stop reason: HOSPADM

## 2018-09-16 RX ORDER — DISULFIRAM 250 MG/1
250 TABLET ORAL DAILY
Qty: 30 TABLET | Refills: 0 | Status: ON HOLD | OUTPATIENT
Start: 2018-09-16 | End: 2018-10-15

## 2018-09-16 RX ORDER — MIRTAZAPINE 15 MG/1
15 TABLET, FILM COATED ORAL AT BEDTIME
Qty: 30 TABLET | Refills: 0 | Status: ON HOLD | OUTPATIENT
Start: 2018-09-16 | End: 2018-10-15

## 2018-09-16 RX ORDER — DEXTROAMPHETAMINE SACCHARATE, AMPHETAMINE ASPARTATE MONOHYDRATE, DEXTROAMPHETAMINE SULFATE AND AMPHETAMINE SULFATE 3.75; 3.75; 3.75; 3.75 MG/1; MG/1; MG/1; MG/1
30 CAPSULE, EXTENDED RELEASE ORAL ONCE
Status: COMPLETED | OUTPATIENT
Start: 2018-09-16 | End: 2018-09-16

## 2018-09-16 RX ORDER — MIRTAZAPINE 15 MG/1
15 TABLET, FILM COATED ORAL AT BEDTIME
Status: DISCONTINUED | OUTPATIENT
Start: 2018-09-19 | End: 2018-09-16 | Stop reason: HOSPADM

## 2018-09-16 RX ORDER — MIRTAZAPINE 7.5 MG/1
7.5 TABLET, FILM COATED ORAL AT BEDTIME
Status: DISCONTINUED | OUTPATIENT
Start: 2018-09-16 | End: 2018-09-16 | Stop reason: HOSPADM

## 2018-09-16 RX ORDER — MIRTAZAPINE 15 MG/1
15 TABLET, FILM COATED ORAL AT BEDTIME
Status: DISCONTINUED | OUTPATIENT
Start: 2018-09-16 | End: 2018-09-16

## 2018-09-16 RX ADMIN — Medication 250 MG: at 08:23

## 2018-09-16 RX ADMIN — PANTOPRAZOLE SODIUM 40 MG: 40 TABLET, DELAYED RELEASE ORAL at 08:24

## 2018-09-16 RX ADMIN — IBUPROFEN 400 MG: 200 TABLET, FILM COATED ORAL at 12:55

## 2018-09-16 RX ADMIN — GABAPENTIN 800 MG: 400 CAPSULE ORAL at 12:14

## 2018-09-16 RX ADMIN — BUSPIRONE HYDROCHLORIDE 20 MG: 10 TABLET ORAL at 08:24

## 2018-09-16 RX ADMIN — AMLODIPINE BESYLATE 10 MG: 10 TABLET ORAL at 08:24

## 2018-09-16 RX ADMIN — DEXTROAMPHETAMINE SACCHARATE, AMPHETAMINE ASPARTATE MONOHYDRATE, DEXTROAMPHETAMINE SULFATE, AMPHETAMINE SULFATE 30 MG: 3.75; 3.75; 3.75; 3.75 CAPSULE, EXTENDED RELEASE ORAL at 12:13

## 2018-09-16 RX ADMIN — MULTIPLE VITAMINS W/ MINERALS TAB 1 TABLET: TAB at 08:24

## 2018-09-16 RX ADMIN — SERTRALINE HYDROCHLORIDE 200 MG: 100 TABLET ORAL at 08:24

## 2018-09-16 RX ADMIN — CLONIDINE HYDROCHLORIDE 0.1 MG: 0.1 TABLET ORAL at 08:24

## 2018-09-16 RX ADMIN — DISULFIRAM 250 MG: 250 TABLET ORAL at 12:14

## 2018-09-16 RX ADMIN — FOLIC ACID 1 MG: 1 TABLET ORAL at 08:24

## 2018-09-16 RX ADMIN — GABAPENTIN 800 MG: 400 CAPSULE ORAL at 08:24

## 2018-09-16 ASSESSMENT — ACTIVITIES OF DAILY LIVING (ADL)
ADLS_ACUITY_SCORE: 11
ADLS_ACUITY_SCORE: 11
ADLS_ACUITY_SCORE: 12
ADLS_ACUITY_SCORE: 12

## 2018-09-16 NOTE — PLAN OF CARE
Problem: Patient Care Overview  Goal: Plan of Care/Patient Progress Review  Outcome: Improving  A&Ox4. Full code. VSS on RA. Slept most of shift, CIWA scores were 0. Up w/ SBA, calls appropriately. Denied pain. Nicotine patch in place. Regular diet. CD and psych following. Fall precautions in place. Will cont to monitor.

## 2018-09-16 NOTE — CONSULTS
[facility] Psychiatric Consult Follow-up Note      TIME SPENT IN PSYCHIATRY CONSULT: 15 MINUTES.     Interim History   The patient's care was discussed, patient seen and chart notes were reviewed. Pt examined on 9/16/18 by  Dr. Coppola. Pt states he's doing okay today. Dr. Coppola asks pt about his current medications, pt reports of not having any side effects from his medications. Dr. Coppola informs pt about the medications Antabuse and Remeron and the many benefits of these medications, pt acknowledges and would like to try these medications. Thus, Dr. Coppoal will start pt Remeron half tablet (7.5mg) for 3 days and then increase to whole tablet (15mg).  Additionally, Dr. Coppola will start pt on Antabuse 250mg, Dr. Coppola suggests having someone watch pt take this medication and report back to hospital staff to confirm pt taking it. Pt will contact his sponsor and continue his outpatient program after being discharged.      Medications     Current Facility-Administered Medications Ordered in Epic   Medication Dose Route Frequency Last Rate Last Dose     0.9% sodium chloride BOLUS  500 mL Intravenous Once PRN         amLODIPine (NORVASC) tablet 10 mg  10 mg Oral Daily   10 mg at 09/16/18 0824     busPIRone (BUSPAR) tablet 20 mg  20 mg Oral TID   20 mg at 09/16/18 0824     cloNIDine (CATAPRES) tablet 0.1 mg  0.1 mg Oral BID   0.1 mg at 09/16/18 0824     flumazenil (ROMAZICON) injection 0.2 mg  0.2 mg Intravenous q1 min prn         folic acid (FOLVITE) tablet 1 mg  1 mg Oral Daily   1 mg at 09/16/18 0824     gabapentin (NEURONTIN) capsule 800 mg  800 mg Oral 4x Daily   800 mg at 09/16/18 0824     HYDROcodone-acetaminophen (NORCO) 5-325 MG per tablet 1-2 tablet  1-2 tablet Per G Tube Q4H PRN   1 tablet at 09/14/18 0530     hydrOXYzine (ATARAX) tablet 50 mg  50 mg Per Feeding Tube Q4H PRN   50 mg at 09/15/18 2030     ibuprofen (ADVIL/MOTRIN) tablet 200-400 mg  200-400 mg Oral Q6H PRN   400  mg at 09/15/18 2107     loperamide (IMODIUM) capsule 2 mg  2 mg Oral 4x Daily PRN         LORazepam (ATIVAN) injection 1-4 mg  1-4 mg Intravenous Q15 Min PRN   1 mg at 09/15/18 1702     magnesium sulfate 2 g in water intermittent infusion  2 g Intravenous Daily PRN 50 mL/hr at 09/15/18 0324 2 g at 09/15/18 0324     magnesium sulfate 4 g in 100 mL sterile water (premade)  4 g Intravenous Q4H PRN         melatonin tablet 1 mg  1 mg Oral At Bedtime PRN   1 mg at 09/15/18 2331     metoprolol (LOPRESSOR) injection 5 mg  5 mg Intravenous Q4H PRN         multivitamin, therapeutic with minerals (THERA-VIT-M) tablet 1 tablet  1 tablet Oral Daily   1 tablet at 09/16/18 0824     naloxone (NARCAN) injection 0.1-0.4 mg  0.1-0.4 mg Intravenous Q2 Min PRN         nicotine (NICODERM CQ) 21 MG/24HR 24 hr patch 1 patch  1 patch Transdermal Daily   1 patch at 09/15/18 2031     nicotine Patch in Place   Transdermal Q8H         nicotine patch REMOVAL   Transdermal Daily         ondansetron (ZOFRAN) injection 4 mg  4 mg Intravenous Q6H PRN        Or     ondansetron (ZOFRAN-ODT) ODT tab 4 mg  4 mg Oral Q6H PRN         pantoprazole (PROTONIX) EC tablet 40 mg  40 mg Oral QAM AC   40 mg at 09/16/18 0824     phenol (CHLORASEPTIC) 1.4 % spray 1 mL  1 spray Mouth/Throat Q1H PRN   1 mL at 09/14/18 0253     potassium chloride (KLOR-CON) Packet 20-40 mEq  20-40 mEq Oral or Feeding Tube Q2H PRN         potassium chloride 10 mEq in 100 mL intermittent infusion with 10 mg lidocaine  10 mEq Intravenous Q1H PRN         potassium chloride 10 mEq in 100 mL sterile water intermittent infusion (premix)  10 mEq Intravenous Q1H PRN         potassium chloride 20 mEq in 50 mL intermittent infusion  20 mEq Intravenous Q1H PRN         potassium chloride SA (K-DUR/KLOR-CON M) CR tablet 20-40 mEq  20-40 mEq Oral Q2H PRN         Self Administer Medications: Behavioral Services   Does not apply See Admin Instructions         sennosides (SENOKOT) tablet 1-2 tablet   1-2 tablet Oral BID PRN         sertraline (ZOLOFT) tablet 200 mg  200 mg Per Feeding Tube Daily   200 mg at 09/16/18 0824     sodium phosphate 10 mmol in D5W intermittent infusion  10 mmol Intravenous Daily PRN         sodium phosphate 15 mmol in D5W intermittent infusion  15 mmol Intravenous Daily PRN   15 mmol at 09/14/18 2305     sodium phosphate 20 mmol in D5W intermittent infusion  20 mmol Intravenous Q6H PRN         sodium phosphate 25 mmol in D5W intermittent infusion  25 mmol Intravenous Q8H PRN         thiamine tablet 250 mg  250 mg Oral Daily   250 mg at 09/16/18 0823     traZODone (DESYREL) tablet 100-200 mg  100-200 mg Oral At Bedtime PRN   200 mg at 09/15/18 2104     No current Epic-ordered outpatient prescriptions on file.         Allergies      No Known Allergies     Medical Review of Systems     /88 (BP Location: Right arm)  Pulse 92  Temp 97.9  F (36.6  C) (Oral)  Resp 16  Wt 138.6 kg (305 lb 9.6 oz)  SpO2 96%  BMI 40.32 kg/m2  Body mass index is 40.32 kg/(m^2).  A 10-point review of systems was performed by Farhan Coppola MD and is negative, no new findings.      Psychiatric Examination     Appearance Lying in bed, dressed in gown. Appears stated age.   Attitude Cooperative   Orientation Oriented to person, place, time   Eye Contact Poor   Speech Regular rate, rhythm, volume and tone   Language Normal   Psychomotor Behavior Normal   Mood Less depressed   Affect Less flat   Thought Process Goal Oriented, Intact   Associations Intact   Thought Content Patient is currently negative for suicidal ideation, negative for plan or intent, able to contract no self harm and identify barriers to suicide.  Negative for obsessions, compulsions or psychosis.     Fund of Knowledge Intact   Insight Intact   Judgement Intact   Attention Span & Concentration Intact   Recent & Remote Memory Intact   Gait Not tested   Muscle Tone Intact on visual inspection       Labs     Labs reviewed.  Recent  Results (from the past 24 hour(s))   CBC with platelets    Collection Time: 09/16/18  7:24 AM   Result Value Ref Range    WBC 5.1 4.0 - 11.0 10e9/L    RBC Count 4.55 4.4 - 5.9 10e12/L    Hemoglobin 13.0 (L) 13.3 - 17.7 g/dL    Hematocrit 38.3 (L) 40.0 - 53.0 %    MCV 84 78 - 100 fl    MCH 28.6 26.5 - 33.0 pg    MCHC 33.9 31.5 - 36.5 g/dL    RDW 13.4 10.0 - 15.0 %    Platelet Count 130 (L) 150 - 450 10e9/L   Comprehensive metabolic panel    Collection Time: 09/16/18  7:24 AM   Result Value Ref Range    Sodium 141 133 - 144 mmol/L    Potassium 3.7 3.4 - 5.3 mmol/L    Chloride 106 94 - 109 mmol/L    Carbon Dioxide 29 20 - 32 mmol/L    Anion Gap 6 3 - 14 mmol/L    Glucose 100 (H) 70 - 99 mg/dL    Urea Nitrogen 16 7 - 30 mg/dL    Creatinine 0.49 (L) 0.66 - 1.25 mg/dL    GFR Estimate >90 >60 mL/min/1.7m2    GFR Estimate If Black >90 >60 mL/min/1.7m2    Calcium 8.4 (L) 8.5 - 10.1 mg/dL    Bilirubin Total 0.3 0.2 - 1.3 mg/dL    Albumin 3.2 (L) 3.4 - 5.0 g/dL    Protein Total 6.6 (L) 6.8 - 8.8 g/dL    Alkaline Phosphatase 76 40 - 150 U/L    ALT 59 0 - 70 U/L    AST 30 0 - 45 U/L   Calcium ionized whole blood    Collection Time: 09/16/18  7:24 AM   Result Value Ref Range    Calcium Ionized Whole Blood 4.6 4.4 - 5.2 mg/dL   Magnesium    Collection Time: 09/16/18  7:24 AM   Result Value Ref Range    Magnesium 2.1 1.6 - 2.3 mg/dL   Phosphorus    Collection Time: 09/16/18  7:24 AM   Result Value Ref Range    Phosphorus 4.4 2.5 - 4.5 mg/dL        Impression   This is a 28 year old male a history of major depression, alcohol use disorder (severe), alcohol withdrawal, and generalized anxiety. The patient was admitted for this hospitalization for alcohol intoxication and management of withdrawal. In order to continue pt's progress, Dr. Coppola will start pt on Remeron half tablet (7.5mg) for 3 days and then increase to whole tablet (15mg), and start Antabuse 250mg. Dr. Coppola suggest having someone watch pt take his Antabuse  and report back to hospital staff confirming stating pt took medication. Pt will contact his AA sponsor and continue with his outpatient program after being discharged.      Diagnoses     1.  Alcohol use disorder, severe.   2.  Alcohol withdrawal.       Plan     1. Explained Side effects, benefits and complications of medications to the patient.   2. Medication changes: Start Remeron half tablet (7.5mg) for three days and then increase to whole tablet  (15mg), start Antabuse 250mg, will continue Zoloft medication but will want to eventually wean off it   3. Have someone watch pt take Antabuse and report back to staff confirming   4. Discussed treatment plan with patient and team.  5. Have pt continue with outpatient program   6. Have pt contact his sponsor     TIME SPENT IN PSYCHIATRY CONSULT: 15 MINUTES.    Attestation:   Patient has been seen and evaluated by me, Farhan Coppola MD.    Patient ID:  Name: Nickolas Lang    MRN: [id number]  Admission: 9/13/2018   YOB: 1990

## 2018-09-16 NOTE — PLAN OF CARE
Problem: Patient Care Overview  Goal: Plan of Care/Patient Progress Review  Pt is A&Ox4, calm and anxious at times. VSS on RA, denied pain/SOB. CIWA 6, 4 and 3. Managed with ativan per order. Lung sound clear to ausculation. Up with SBA in room. ambulated in the [address] with staff. Calls appropriately. Regular diet with good appetite. voiding appropriately, uses urinal. Psych consult pending. Possible discharge tomorrow 9/16. Continue to monitor.

## 2018-09-16 NOTE — PLAN OF CARE
Problem: Patient Care Overview  Goal: Plan of Care/Patient Progress Review  Outcome: Adequate for Discharge Date Met: 09/16/18  Discharge    Patient discharged to home via  Care insurance ride.  Care plan note A+Ox4. Up independently in room. Ambulated in halls. Took a shower. PIV removed. Regular diet good appetite. Discharge information reviewed and questions answered. Will  meds at OP pharmacy    Listed belongings gathered and returned to patient. Yes  Care Plan and Patient education resolved: Yes  Prescriptions if needed, hard copies sent with patient  NA  Home and hospital acquired medications returned to patient: Yes  Medication Bin checked and emptied on discharge Yes  Follow up appointment made for patient: No. Oriented, able to make own appointment

## 2018-09-16 NOTE — DISCHARGE SUMMARY
[facility]    Discharge Summary  Hospitalist    Date of Admission:  9/13/2018  Date of Discharge:  9/16/2018  Discharging Provider: Kathy Flores MD    Discharge Diagnoses   1. Acute Alcohol Intoxication with AMANDA 0.5, found unresponsive  2. Chronic Severe Alcohol Abuse, with prior intubations and alcohol withdrawal   3. S/p Intubation for airway protection due to required sedation   4. Hypophosphatemia:  5. Hypocalcemia    Other Diagnosis:  1. Hypertension  2. Anxiety Disorder  3. Depression  4. Attention deficit disorder  5. Obesity class III:  BMI 40.5  6. Tobacco Use Disorder  7. GERD  8. Hepatic steatosis    Chief Complaint: Alcohol Intoxication     History of Present Illness   Nickolas Lang is a 28 year old male with a history of alcoholism, alcohol withdrawal, substance abuse, depression, suicidal ideation and hypertension who presents to the ED via EMS for alcohol intoxication. Per EMS the patient was found unresponsive, surrounded by empty vodka bottles on a park bench. EMS was called by a local pedestrian. Upon arrival there were no evident signs of external trauma. The history is otherwise limited by the condition of the patient.     Hospital Course   Nickolas Lang was admitted on 9/13/2018.  The following problems were addressed during his hospitalization:    Acute Alcohol Intoxication with AMANDA 0.5, found unresponsive  Chronic Severe Alcohol Abuse, with prior intubations and alcohol withdrawal   Went through inpatient alcohol treatment end of July 2018. Patient discharged 9/12/18 from [facility] for severe alcohol intoxication secondary to agitation confusion, requiring sedation resulting in intubation.  On 9/13/18 day after discharge from [facility] he was found down in a park for unknown period of time, unresponsive the bottles of vodka. AMANDA 0.5, due to unresponsiveness and for airway protection patient was intubated and admitted to the ICU for  further management. CK wnl on admission, no signs of rhabdo. +Amphetamines and Benzos - scripts for Adderall PTA and received benzos at [facility].   In ER difficult to get agitation under control and when controlled patient too sedated and unable to protect airway-so electively intubated. Extubated 0n 9/13 at 2220,  breathing RA & transferred to the medical floor.   -  Banana bag complete,off IVF as po intake adequate, continue high dose thiamine 250 mg daily and folate 1 mg daily  - monitored- CIWA protocol, not scored all mornring of discahrge  -Psych & Chemical dependency consultations appreciated. Medications adjusted & possibility of transferring to a [facility] for a more intensive schedule was discussed.   - per psych pt will be starting Antabuse with family supervision.- recommended compliance with AA meeting & suggested daily f/u with them after d/c      Hypophosphatemia:  Hypocalcemia:  Phos 1.6, repleted in ED. Calcium 7.9 this am after receiving 1 gm IV coby gluconate  - follow - Mg++, Phos, K+, I-Ca++  - Lyte repletion protocol - to replace Mg++, Phos, and Calcium today  - Encourage good oral intake      Mild Leukocytosis:   Likely stress response. WBC 13.4. Plts holding at 200s but much decreased from last week while in 400s. Has been low in the 50s-70s in the past related to EtOH abuse. Stop lovenox given decrease in platelets and pt extubated with plans for ambulation today.  - improved      Hx Hypertension:  BPs adequate. SBP 110s-130s.  - Continue prior to admission amlodipine 10 mg daily & on [place] 0.1 mg      Anxiety Disorder:  Depression  Attention deficit disorder:   Continues on several PTA psychiatric medications.  - Continue home meds- buspar 20 mg TID, clonidine 0.1 mg BID, gabapentin 800 mg QID, zoloft 200 mg   - Hydroxyzine 50 mg PRN for anxiety. Psychiatry recommended to avoid PTA Adderral  - Drugs of abuse screen- (+) amphetamines and benzos per ativan from [facility] and ADD meds  -  Psychiatry consult appreciated, recommended to continue current psych medications except Adderral.  - pt feels anxious, receiving ativan prn in the hospital per CIWA, so reconsult Psych for medication adjustment before discharge.  - per psych resume Adderall , give 30 mg now & followup with PCP for alternative medication.  - psych started Remeron      Tobacco Use Disorder:   Smokes 1 ppd since 18 yo.  - Nicotine patch in place  - Encourage cessation      GERD: Takes PTA omeprazole 40 mg daily.  - Started on Protonix 40 mg daily.      Hepatic steatosis:  ALT/AST appears at baseline. T Bili and alk phos wnl.      Obesity class III:  BMI 40.5. Encourage weight loss after alcohol use disorder is addressed.      Hx Pyloric stenosis: s/p pyloromyotomy as infant      Kathy Flores MD    Significant Results and Procedures   Intubation for airway protection.    Pending Results  none  Unresulted Labs Ordered in the Past 30 Days of this Admission     No orders found from 7/15/2018 to 9/14/2018.          Code Status   Full Code       Primary Care Physician   Tima Crews    Physical Exam   Temp: 97.9  F (36.6  C) Temp src: Oral BP: 124/88 Pulse: 92 Heart Rate: 89 Resp: 16 SpO2: 96 % O2 Device: None (Room air)    Vitals:    09/13/18 1939 09/15/18 0707   Weight: 139.2 kg (306 lb 14.1 oz) 138.6 kg (305 lb 9.6 oz)     Vital Signs with Ranges  Temp:  [97.4  F (36.3  C)-98.7  F (37.1  C)] 97.9  F (36.6  C)  Pulse:  [92] 92  Heart Rate:  [65-89] 89  Resp:  [12-18] 16  BP: (124-141)/(86-97) 124/88  SpO2:  [95 %-97 %] 96 %  I/O last 3 completed shifts:  In: 240 [P.O.:240]  Out: 950 [Urine:950]    Examination:  Well-built well-nourished. In NAD  Heart: Regular rhythm. S1S2, no murmurs, rubs,gallops  Lungs: Clear to auscultation. And no rhonchi rales or wheezing heard.  Abdomen: Soft and nontender. Bowel sounds present.  Extremities: No swelling.  Neuro:A,A,Ox3, motor sensory grossly intact    Discharge Disposition    Discharged to home  Condition at discharge: Stable    Consultations This Hospital Stay   RESPIRATORY CARE IP CONSULT  CHEMICAL DEPENDENCY IP CONSULT  PSYCHIATRY IP CONSULT  PSYCHIATRY IP CONSULT    Time Spent on this Encounter   I, Kathy Flores MD, personally saw the patient today and spent greater than 30 minutes discharging this patient.    Discharge Orders     Reason for your hospital stay   ETOH intoxication     Follow-up and recommended labs and tests    Follow up with primary care provider, Tima Crews, within 7 days to evaluate medication change, to evaluate treatment change and for hospital follow- up.  No follow up labs or test are needed.     Activity   Your activity upon discharge: activity as tolerated     Full Code     Diet   Follow this diet upon discharge: Regular Diet Adult. ETOH free       Discharge Medications   Current Discharge Medication List      START taking these medications    Details   disulfiram (ANTABUSE) 250 MG tablet Take 1 tablet (250 mg) by mouth daily  Qty: 30 tablet, Refills: 0    Associated Diagnoses: Alcohol dependence with withdrawal with complication (H)      !! mirtazapine (REMERON) 15 MG tablet Take 1 tablet (15 mg) by mouth At Bedtime To start after completing 7.5 mg/day Q HS x 3 doses.  Qty: 30 tablet, Refills: 0    Associated Diagnoses: Insomnia, unspecified type      !! mirtazapine (REMERON) 7.5 MG TABS tablet Take 1 tablet (7.5 mg) by mouth At Bedtime  Qty: 3 tablet, Refills: 0    Associated Diagnoses: Alcohol-induced insomnia (H)       !! - Potential duplicate medications found. Please discuss with provider.      CONTINUE these medications which have NOT CHANGED    Details   amLODIPine (NORVASC) 10 MG tablet Take 1 tablet (10 mg) by mouth daily  Qty: 90 tablet, Refills: 1    Associated Diagnoses: Alcohol dependence, continuous drinking behavior (H)      amphetamine-dextroamphetamine (ADDERALL XR) 30 MG per 24 hr capsule Take 30 mg by mouth 2 times  daily      busPIRone (BUSPAR) 10 MG tablet Take 2 tablets (20 mg) by mouth 3 times daily  Qty: 90 tablet, Refills: 0    Associated Diagnoses: Alcohol dependence, continuous drinking behavior (H)      cloNIDine (CATAPRES) 0.1 MG tablet Take 1 tablet (0.1 mg) by mouth 2 times daily  Qty: 180 tablet, Refills: 1    Associated Diagnoses: Alcohol dependence, continuous drinking behavior (H)      gabapentin (NEURONTIN) 800 MG tablet TAKE 1 TABLET (800 MG) BY MOUTH 4 TIMES DAILY  Qty: 120 tablet, Refills: 1    Associated Diagnoses: Anxiety; Alcohol use disorder, severe, dependence (H)      hydrOXYzine (ATARAX) 50 MG tablet Take 1 tablet (50 mg) by mouth every 4 hours as needed for anxiety  Qty: 120 tablet, Refills: 0    Associated Diagnoses: Chemical dependency (H)      loperamide (IMODIUM A-D) 2 MG tablet Take 1 tablet (2 mg) by mouth 4 times daily as needed for diarrhea  Qty: 20 tablet, Refills: 0    Comments: LP  Associated Diagnoses: Diarrhea, unspecified type      multivitamin, therapeutic with minerals (THERA-VIT-M) TABS tablet Take 1 tablet by mouth daily  Qty: 30 each, Refills: 0    Associated Diagnoses: Alcohol dependence, continuous drinking behavior (H)      nicotine (NICODERM CQ) 21 MG/24HR 24 hr patch Place 1 patch onto the skin daily  Qty: 30 patch, Refills: 0    Associated Diagnoses: Alcohol dependence, continuous drinking behavior (H)      nicotine polacrilex (NICORETTE) 4 MG gum Chew one piece every hour as directed.  Qty: 100 tablet, Refills: 2    Comments: LP patient  Associated Diagnoses: Encounter for smoking cessation counseling      nicotine polacrilex 4 MG lozenge 1-2 lozenges every hour as needed, max 10 lozenges per day  Qty: 360 tablet, Refills: 1    Comments: LP patient  Associated Diagnoses: Encounter for smoking cessation counseling      omeprazole (PRILOSEC) 40 MG capsule Take 1 capsule (40 mg) by mouth daily  Qty: 30 capsule, Refills: 3    Associated Diagnoses: Alcohol dependence, continuous  drinking behavior (H)      ondansetron (ZOFRAN) 4 MG tablet Take 1 tablet (4 mg) by mouth every 8 hours as needed for nausea  Qty: 10 tablet, Refills: 0    Comments: LP  Associated Diagnoses: Nausea and vomiting, intractability of vomiting not specified, unspecified vomiting type      sertraline (ZOLOFT) 100 MG tablet Take 2 tablets (200 mg) by mouth daily  Qty: 30 tablet, Refills: 0    Associated Diagnoses: Alcohol dependence, continuous drinking behavior (H)      thiamine 100 MG tablet Take 1 tablet (100 mg) by mouth daily  Qty: 30 tablet, Refills: 1    Associated Diagnoses: Acute alcoholic intoxication in alcoholism with complication (H)      traZODone (DESYREL) 100 MG tablet Take 1-2 tablets (100-200 mg) by mouth nightly as needed for sleep  Qty: 90 tablet, Refills: 0    Associated Diagnoses: Alcohol dependence, continuous drinking behavior (H)           Allergies   No Known Allergies  Data   Most Recent 3 CBC's:  Recent Labs   Lab Test  09/16/18   0724  09/15/18   0928  09/14/18   0520   WBC  5.1  6.2  13.4*   HGB  13.0*  13.4  13.9   MCV  84  84  84   PLT  130*  156  206      Most Recent 3 BMP's:  Recent Labs   Lab Test  09/16/18   0724  09/15/18   0928  09/14/18   0520   NA  141  140  137   POTASSIUM  3.7  3.7  3.5   CHLORIDE  106  104  104   CO2  29  29  21   BUN  16  10  14   CR  0.49*  0.44*  0.61*   ANIONGAP  6  7  12   AZAM  8.4*  7.9*  8.0*   GLC  100*  112*  130*     Most Recent 2 LFT's:  Recent Labs   Lab Test  09/16/18   0724  09/15/18   0928   AST  30  24   ALT  59  54   ALKPHOS  76  85   BILITOTAL  0.3  0.4     Most Recent INR's and Anticoagulation Dosing History:  Anticoagulation Dose History     Recent Dosing and Labs Latest Ref Rng & Units 4/15/2015 5/8/2016 8/18/2016 11/20/2017 9/13/2018 9/13/2018    INR 0.86 - 1.14 1.01 1.00 1.08 0.99 1.01 1.03        Most Recent 3 Troponin's:  Recent Labs   Lab Test  09/13/18   2100  04/08/18   1310  11/20/17   1211  11/20/17   0935   TROPI  <0.015  <0.015   <0.015  <0.015   TROPONIN   --   0.00   --   0.00     Most Recent Cholesterol Panel:  Recent Labs   Lab Test  07/30/18   0823   CHOL  210*   LDL  129*   HDL  39*   TRIG  210*     Most Recent 6 Bacteria Isolates From Any Culture (See EPIC Reports for Culture Details):  Recent Labs   Lab Test  03/27/18   1155   CULT  No Beta Streptococcus isolated     Most Recent TSH, T4 and A1c Labs:  Recent Labs   Lab Test  07/30/18   0823   11/28/16   0740   TSH  2.97   < >  6.12*   T4   --    --   1.14   A1C  5.4   --    --     < > = values in this interval not displayed.     Results for orders placed or performed during the hospital encounter of 09/13/18   XR Chest Port 1 View    Narrative    XR CHEST PORT 1 VW  9/13/2018 9:08 PM     HISTORY:  Endotracheal tube positioning;     COMPARISON: Earlier film dated 9/13/2018.    FINDINGS:  ET tube is noted. The tip of the tube is about 3.3 cm above  the prince. NG tube is in place. Low lung volumes. The lungs appear  clear.      Impression    IMPRESSION: ET tube tip approximately 3.3 cm above the prince.    ROSANA DANIELSON MD   XR Abdomen 1 View    Narrative    ABDOMEN ONE VIEW   9/13/2018 9:10 PM     HISTORY: Assess OGT placement.     COMPARISON: None.      Impression    IMPRESSION: Enteric tube tip and sidehole projects over the left upper  quadrant. No dilated air-filled loops of bowel over the visualized  abdomen.    GABBY ROBLES MD

## 2018-09-20 ENCOUNTER — HOSPITAL ENCOUNTER (EMERGENCY)
Facility: CLINIC | Age: 28
Discharge: OTHER INSTITUTION | End: 2018-09-21
Attending: EMERGENCY MEDICINE | Admitting: EMERGENCY MEDICINE
Payer: COMMERCIAL

## 2018-09-20 DIAGNOSIS — F10.220 ACUTE ALCOHOLIC INTOXICATION IN ALCOHOLISM WITHOUT COMPLICATION (H): ICD-10-CM

## 2018-09-20 DIAGNOSIS — F13.10 BENZODIAZEPINE ABUSE (H): ICD-10-CM

## 2018-09-20 LAB
ALBUMIN SERPL-MCNC: 3.7 G/DL (ref 3.4–5)
ALP SERPL-CCNC: 93 U/L (ref 40–150)
ALT SERPL W P-5'-P-CCNC: 68 U/L (ref 0–70)
ANION GAP SERPL CALCULATED.3IONS-SCNC: 9 MMOL/L (ref 3–14)
APAP SERPL-MCNC: <2 MG/L (ref 10–20)
AST SERPL W P-5'-P-CCNC: 32 U/L (ref 0–45)
BASOPHILS # BLD AUTO: 0.1 10E9/L (ref 0–0.2)
BASOPHILS NFR BLD AUTO: 0.5 %
BILIRUB SERPL-MCNC: 0.2 MG/DL (ref 0.2–1.3)
BUN SERPL-MCNC: 11 MG/DL (ref 7–30)
CALCIUM SERPL-MCNC: 8.5 MG/DL (ref 8.5–10.1)
CHLORIDE SERPL-SCNC: 102 MMOL/L (ref 94–109)
CO2 SERPL-SCNC: 31 MMOL/L (ref 20–32)
CREAT SERPL-MCNC: 0.57 MG/DL (ref 0.66–1.25)
DIFFERENTIAL METHOD BLD: NORMAL
EOSINOPHIL # BLD AUTO: 0.2 10E9/L (ref 0–0.7)
EOSINOPHIL NFR BLD AUTO: 1.7 %
ERYTHROCYTE [DISTWIDTH] IN BLOOD BY AUTOMATED COUNT: 14.4 % (ref 10–15)
ETHANOL SERPL-MCNC: 0.48 G/DL
GFR SERPL CREATININE-BSD FRML MDRD: >90 ML/MIN/1.7M2
GLUCOSE SERPL-MCNC: 104 MG/DL (ref 70–99)
HCT VFR BLD AUTO: 44.9 % (ref 40–53)
HGB BLD-MCNC: 15.2 G/DL (ref 13.3–17.7)
IMM GRANULOCYTES # BLD: 0 10E9/L (ref 0–0.4)
IMM GRANULOCYTES NFR BLD: 0.4 %
LYMPHOCYTES # BLD AUTO: 4.8 10E9/L (ref 0.8–5.3)
LYMPHOCYTES NFR BLD AUTO: 51.5 %
MCH RBC QN AUTO: 29.2 PG (ref 26.5–33)
MCHC RBC AUTO-ENTMCNC: 33.9 G/DL (ref 31.5–36.5)
MCV RBC AUTO: 86 FL (ref 78–100)
MONOCYTES # BLD AUTO: 0.9 10E9/L (ref 0–1.3)
MONOCYTES NFR BLD AUTO: 9.3 %
NEUTROPHILS # BLD AUTO: 3.4 10E9/L (ref 1.6–8.3)
NEUTROPHILS NFR BLD AUTO: 36.6 %
NRBC # BLD AUTO: 0 10*3/UL
NRBC BLD AUTO-RTO: 0 /100
PLATELET # BLD AUTO: 288 10E9/L (ref 150–450)
POTASSIUM SERPL-SCNC: 3.5 MMOL/L (ref 3.4–5.3)
PROT SERPL-MCNC: 7.5 G/DL (ref 6.8–8.8)
RBC # BLD AUTO: 5.2 10E12/L (ref 4.4–5.9)
SALICYLATES SERPL-MCNC: <2 MG/DL
SODIUM SERPL-SCNC: 142 MMOL/L (ref 133–144)
WBC # BLD AUTO: 9.2 10E9/L (ref 4–11)

## 2018-09-20 PROCEDURE — 99284 EMERGENCY DEPT VISIT MOD MDM: CPT

## 2018-09-20 PROCEDURE — 85025 COMPLETE CBC W/AUTO DIFF WBC: CPT | Performed by: EMERGENCY MEDICINE

## 2018-09-20 PROCEDURE — 80329 ANALGESICS NON-OPIOID 1 OR 2: CPT | Performed by: EMERGENCY MEDICINE

## 2018-09-20 PROCEDURE — 80320 DRUG SCREEN QUANTALCOHOLS: CPT | Performed by: EMERGENCY MEDICINE

## 2018-09-20 PROCEDURE — 80053 COMPREHEN METABOLIC PANEL: CPT | Performed by: EMERGENCY MEDICINE

## 2018-09-20 PROCEDURE — 93005 ELECTROCARDIOGRAM TRACING: CPT

## 2018-09-20 PROCEDURE — 99285 EMERGENCY DEPT VISIT HI MDM: CPT

## 2018-09-20 ASSESSMENT — ENCOUNTER SYMPTOMS
NAUSEA: 0
HEADACHES: 0
VOMITING: 0
WOUND: 1
ABDOMINAL PAIN: 0

## 2018-09-21 VITALS
DIASTOLIC BLOOD PRESSURE: 84 MMHG | OXYGEN SATURATION: 100 % | SYSTOLIC BLOOD PRESSURE: 135 MMHG | RESPIRATION RATE: 16 BRPM | TEMPERATURE: 97.6 F

## 2018-09-21 LAB — INTERPRETATION ECG - MUSE: NORMAL

## 2018-09-21 RX ORDER — GABAPENTIN 800 MG/1
800 TABLET ORAL 4 TIMES DAILY
Qty: 12 TABLET | Refills: 0 | Status: SHIPPED | OUTPATIENT
Start: 2018-09-21 | End: 2018-09-22

## 2018-09-21 RX ORDER — OMEPRAZOLE 40 MG/1
40 CAPSULE, DELAYED RELEASE ORAL DAILY
Qty: 3 CAPSULE | Refills: 0 | Status: SHIPPED | OUTPATIENT
Start: 2018-09-21 | End: 2018-09-22

## 2018-09-21 RX ORDER — BUSPIRONE HYDROCHLORIDE 10 MG/1
20 TABLET ORAL 3 TIMES DAILY
Qty: 18 TABLET | Refills: 0 | Status: SHIPPED | OUTPATIENT
Start: 2018-09-21 | End: 2018-09-22

## 2018-09-21 RX ORDER — SERTRALINE HYDROCHLORIDE 100 MG/1
200 TABLET, FILM COATED ORAL DAILY
Qty: 6 TABLET | Refills: 0 | Status: SHIPPED | OUTPATIENT
Start: 2018-09-21 | End: 2018-09-22

## 2018-09-21 RX ORDER — CLONIDINE HYDROCHLORIDE 0.1 MG/1
0.1 TABLET ORAL 2 TIMES DAILY
Qty: 6 TABLET | Refills: 0 | Status: SHIPPED | OUTPATIENT
Start: 2018-09-21 | End: 2018-09-22

## 2018-09-21 RX ORDER — AMLODIPINE BESYLATE 10 MG/1
10 TABLET ORAL DAILY
Qty: 3 TABLET | Refills: 0 | Status: SHIPPED | OUTPATIENT
Start: 2018-09-21 | End: 2018-09-22

## 2018-09-21 NOTE — DISCHARGE INSTRUCTIONS
The Impact of Alcoholism    Alcoholism affects not just the person who drinks. It also affects family and friendships. It affects the workplace. And it affects the community. Read below to learn more about the effects of the disease.  Impact on the person  People with alcoholism must cope with problems caused by their drinking. These include problems with health, family, and work. Their drinking leads to fear and guilt. It causes anger and insecurity. People with alcoholism are often in denial about all of this. This means they do not admit their problems.  Impact on the workplace  People with alcoholism have more accidents while at work. Their job performance may go down. And they are often absent or late. All this costs the company in lost time and medical care. It may cost in property damage. Coworkers may need to fill in or cover up for a person with alcoholism. This can lead to anger.  Impact on the family  Alcoholism has an impact on the whole family. It can make family members feel alone. They may feel neglected or abused. They may be angry or distrustful. They may feel guilt or fear. These feelings cause a lot of stress and upset in a family.  Impact on society  People with alcoholism are more often in traffic accidents. They are more often in other types of accidents and fires. They more often abuse their children and spouses. And they are more likely to kill someone else or themselves. The human cost of these problems is huge. So is the financial cost. Alcoholism costs the [place] billions of dollars every year. These costs are in the form of medical expenses and high insurance premiums. They are also in the form of property damage.   What you can do  If you are a coworker, family member, or friend, you can help. Start by learning more about alcoholism and its effects. Learn the warning signs. And reach out for support. If a person with alcoholism will not get help, you can still join a support group and  gather resources. One of the best things you can do is to not be an enabler. That is, you cannot make excuses for people who drink. This can help force them to take responsibility for their own actions.  Date Last Reviewed: 6/1/2016 2000-2017 [employer]. [address] [place] [address], [place] [address]. All rights reserved. This information is not intended as a substitute for professional medical care. Always follow your healthcare professional's instructions.

## 2018-09-21 NOTE — ED PROVIDER NOTES
History     Chief Complaint:  Alcohol Intoxication    HPI   The patient's HPI is limited due to his alcohol intoxication.    Nickolas Lang is a 28 year old male with a history of alcoholism, alcohol withdrawal, substance abuse, depression, suicidal ideation and hypertension who presents via EMS with alcohol intoxication. Per EMS, the patient was found at a bus stop unconscious with multiple bottles of vodka around him. upon arrival to the ED for patient has bruising to his left flank and bleeding to his left great toe. He states he has consumed 2 liters of vodka in the last 6 hours as well as 3 Xanax. He denies that he has any headache, nausea, vomiting or abdominal pain. He also denies that he was trying to harm himself. Of note, the patient was brought to the ED for alcohol intoxication 1 week ago and was admitted at that time.     Allergies:  No known drug allergies    Medications:    amLODIPine (NORVASC) 10 MG tablet   amphetamine-dextroamphetamine (ADDERALL XR) 30 MG per 24 hr capsule   busPIRone (BUSPAR) 10 MG tablet   cloNIDine (CATAPRES) 0.1 MG tablet   disulfiram (ANTABUSE) 250 MG tablet   gabapentin (NEURONTIN) 800 MG tablet   hydrOXYzine (ATARAX) 50 MG tablet   loperamide (IMODIUM A-D) 2 MG tablet   mirtazapine (REMERON) 15 MG tablet   mirtazapine (REMERON) 7.5 MG TABS tablet   nicotine (NICODERM CQ) 21 MG/24HR 24 hr patch   nicotine polacrilex (NICORETTE) 4 MG gum   nicotine polacrilex 4 MG lozenge   omeprazole (PRILOSEC) 40 MG capsule   ondansetron (ZOFRAN) 4 MG tablet   sertraline (ZOLOFT) 100 MG tablet   thiamine 100 MG tablet   traZODone (DESYREL) 100 MG tablet     Past Medical History:    Attention deficit disorder  Alcohol abuse  Anxiety  Chemical dependency   Elevated liver enzymes  Gastroesophageal reflux disease  Hepatic steatosis  Hypertension  Insomnia   Obesity  Pyloric stenosis   Suicidal ideation    Past Surgical History:    Deviated septum   Pylorotomy surgery  Shoulder surgery      Family History:    Multiple sclerosis  Depression  Anxiety  Substance abuse    Social History:  Marital Status:  Single [1]  Smoking status: Current Every Day Smoker, 1 pack/day  Alcohol use: Yes    ROS Limited due to the Patient's alcohol intoxication.  Review of Systems   Gastrointestinal: Negative for abdominal pain, nausea and vomiting.   Skin: Positive for wound (abrasions to left elbow and left great toe).   Neurological: Negative for headaches.     Physical Exam     Patient Vitals for the past 24 hrs:   BP Temp Temp src Heart Rate Resp SpO2   09/21/18 0200 135/84 - - - - -   09/21/18 0145 - - - - - 100 %   09/21/18 0115 (!) 139/104 - - - - 100 %   09/21/18 0100 (!) 103/94 - - - - 95 %   09/21/18 0045 119/76 - - - - 100 %   09/21/18 0030 (!) 147/103 - - - - 100 %   09/21/18 0015 (!) 134/106 - - - - -   09/21/18 0000 (!) 143/97 - - - - 92 %   09/20/18 2345 (!) 140/92 - - - - -   09/20/18 2330 (!) 120/100 - - - - 100 %   09/20/18 2315 (!) 138/107 - - - - -   09/20/18 2300 129/80 - - - - 96 %   09/20/18 2238 - - - - - 98 %   09/20/18 2230 (!) 138/107 - - - - -   09/20/18 2215 (!) 151/114 - - - - 93 %   09/20/18 2200 (!) 140/109 - - - - 98 %   09/20/18 2145 (!) 148/108 - - - - 98 %   09/20/18 2130 (!) 143/106 - - - - -   09/20/18 2115 130/80 - - - - 96 %   09/20/18 2100 (!) 154/109 - - - - 97 %   09/20/18 2054 (!) 157/110 97.6  F (36.4  C) Oral 106 16 93 %       Physical Exam  General: Awakens to loud verbal stimuli; answers questions when awake  Neuro:  Pupils 3 mm and reactive bilaterally.  EOMI; moving all extremities  HEENT:  Atraumatic. Moist mucous membranes.  Posterior oropharynx clear, no exudates.  Conjunctiva normal. TMs clear bilaterally  CV:  RRR, no m/r/g, skin warm and well perfused  Pulm:  CTAB, no wheezes/ronchi/rales.  No acute distress, breathing comfortably  GI:  Soft, nontender, nondistended.  No rebound or guarding.  Normal bowel sounds  MSK:  Moving all extremities.  No focal areas of  edema, erythema, or tenderness  Skin:  WWP, no rashes, no lower extremity edema, skin color normal, no diaphoresis; abrasion to left toe and right elbow  Psych:  Well-appearing, normal affect, slurred speech    Emergency Department Course   ECG (21:45:45):  Rate 99 bpm. IA interval 140. QRS duration 102. QT/QTc 372/477. P-R-T axes 35 85 47. Normal sinus rhythm. Normal ECG. Interpreted at 2150 by Jaison Carver MD.    Laboratory:  CBC: WNL (WBC 9.2, HGB 15.2, )  CMP: Glucose 104, Creatinine 0.57 (L), o/w WNL  Alcohol Ethyl: 0.48 (HH)  Salicylate Level: <2  Acetaminophen Level: <2    Interventions:  Medications - No data to display    Emergency Department Course:  The patient arrived in the emergency department via EMS.    Past medical records, nursing notes, and vitals reviewed.  2120: I performed an exam of the patient and obtained history, as documented above.   IV inserted and blood samples were collected and sent for laboratory testing, findings above.    2128: I rechecked the patient. Explained findings to the patient.  EKG was obtained, results above.    0145:  Patient alert and awake and able to carry on conversation without slurring of his speech.  He states he drank 1 liter of vodka and does not remember taking Xanax. He denies any suicidal thoughts or history of alcohol withdrawal seizures.  He provided a number for his mother to pick him up.  Discussed that if we are unable to get a hold of her or if she cannot come get him, he will be discharged to [facility] Emergency Department.       Impression & Plan      Medical Decision Making:  Nickolas Lang is a 28 year old male with history significant for alcohol dependence here for evaluation of alcohol intoxication.  Patient was found unresponsive at a bus stop as noted above with multiple bottles of vodka.  Patient does arouse to loud verbal stimuli and was able to tell us that he took 3 Xanax.  He is also able to tell us that he was not trying to harm  himself.  No evidence of any head trauma and I do not feel any advanced imaging is indicated at this time.  Tox workup was otherwise unremarkable with normal Tylenol aspirin levels.  EKG normal.  He does have abrasions to his extremities as noted above but not amenable to repair.  Blood alcohol level 0.48.      Patient observed in the emergency department with appropriate clearing of his mentation.  Reassess the patient at 0145 as noted above.  Plan will be for sober drive home vs detox.     Unfortunately, no sober ride available.  Patient will be discharged to detox. No acute events occurred under my care.    Diagnosis:    ICD-10-CM    1. Acute alcoholic intoxication in alcoholism without complication (H) F10.220    2. Benzodiazepine abuse F13.10        Disposition:  In process    Mae Reilly  9/20/2018   [facility] EMERGENCY DEPARTMENT  I, Mae Reilly, am serving as a scribe at 9:20 PM on 9/20/2018 to document services personally performed by Jaison Carver MD based on my observations and the provider's statements to me.        Jaison Carver MD  09/21/18 0214       Jaison Carver MD  09/21/18 0216

## 2018-09-21 NOTE — ED NOTES
Bed: ED32  Expected date: 9/20/18  Expected time: 8:45 PM  Means of arrival: Ambulance  Comments:  BV2  27yo M  ETOH

## 2018-09-21 NOTE — ED TRIAGE NOTES
Awake, not speaking to staff, not following directions.  ABC's intact.  Pt found at a bus stop, unconscious with bottles of vodka around him.

## 2018-09-21 NOTE — ED NOTES
Called his mother to see if she would come to get him and she said she had no car. I advised her he would be going to detox. Mother name Eloisa lacey paul er is [phone]

## 2018-09-21 NOTE — ED NOTES
Pt moved to Room 8 with susie.  Report given to DORI Veronica.      Susie states that pt has been inappropriate with her, asking if he can see her large breasts.  Sitter has redirected the pt and pt has been cooperative.

## 2018-09-22 ENCOUNTER — HOSPITAL ENCOUNTER (EMERGENCY)
Facility: CLINIC | Age: 28
Discharge: HOME | End: 2018-09-22
Attending: EMERGENCY MEDICINE | Admitting: EMERGENCY MEDICINE
Payer: COMMERCIAL

## 2018-09-22 VITALS
HEART RATE: 112 BPM | RESPIRATION RATE: 14 BRPM | WEIGHT: 304 LBS | TEMPERATURE: 98 F | BODY MASS INDEX: 40.11 KG/M2 | DIASTOLIC BLOOD PRESSURE: 91 MMHG | OXYGEN SATURATION: 98 % | SYSTOLIC BLOOD PRESSURE: 139 MMHG

## 2018-09-22 DIAGNOSIS — R00.0 TACHYCARDIA: ICD-10-CM

## 2018-09-22 DIAGNOSIS — F10.930 ALCOHOL WITHDRAWAL, UNCOMPLICATED (H): ICD-10-CM

## 2018-09-22 LAB
ALCOHOL BREATH TEST: 0.12 (ref 0–0.01)
AMPHETAMINES UR QL SCN: POSITIVE
ANION GAP SERPL CALCULATED.3IONS-SCNC: 15 MMOL/L (ref 3–14)
BARBITURATES UR QL: NEGATIVE
BASOPHILS # BLD AUTO: 0 10E9/L (ref 0–0.2)
BASOPHILS NFR BLD AUTO: 0.2 %
BENZODIAZ UR QL: POSITIVE
BUN SERPL-MCNC: 12 MG/DL (ref 7–30)
CALCIUM SERPL-MCNC: 7.7 MG/DL (ref 8.5–10.1)
CANNABINOIDS UR QL SCN: NEGATIVE
CHLORIDE SERPL-SCNC: 97 MMOL/L (ref 94–109)
CO2 SERPL-SCNC: 25 MMOL/L (ref 20–32)
COCAINE UR QL: NEGATIVE
CREAT SERPL-MCNC: 0.59 MG/DL (ref 0.66–1.25)
DIFFERENTIAL METHOD BLD: NORMAL
EOSINOPHIL # BLD AUTO: 0.1 10E9/L (ref 0–0.7)
EOSINOPHIL NFR BLD AUTO: 0.7 %
ERYTHROCYTE [DISTWIDTH] IN BLOOD BY AUTOMATED COUNT: 13.9 % (ref 10–15)
ETHANOL UR QL SCN: POSITIVE
GFR SERPL CREATININE-BSD FRML MDRD: >90 ML/MIN/1.7M2
GLUCOSE SERPL-MCNC: 124 MG/DL (ref 70–99)
HCT VFR BLD AUTO: 43.2 % (ref 40–53)
HGB BLD-MCNC: 14.8 G/DL (ref 13.3–17.7)
IMM GRANULOCYTES # BLD: 0 10E9/L (ref 0–0.4)
IMM GRANULOCYTES NFR BLD: 0.3 %
LYMPHOCYTES # BLD AUTO: 3.8 10E9/L (ref 0.8–5.3)
LYMPHOCYTES NFR BLD AUTO: 42.2 %
MCH RBC QN AUTO: 29 PG (ref 26.5–33)
MCHC RBC AUTO-ENTMCNC: 34.3 G/DL (ref 31.5–36.5)
MCV RBC AUTO: 85 FL (ref 78–100)
MONOCYTES # BLD AUTO: 1.1 10E9/L (ref 0–1.3)
MONOCYTES NFR BLD AUTO: 12.1 %
NEUTROPHILS # BLD AUTO: 4 10E9/L (ref 1.6–8.3)
NEUTROPHILS NFR BLD AUTO: 44.5 %
NRBC # BLD AUTO: 0 10*3/UL
NRBC BLD AUTO-RTO: 0 /100
OPIATES UR QL SCN: NEGATIVE
PLATELET # BLD AUTO: 286 10E9/L (ref 150–450)
POTASSIUM SERPL-SCNC: 3.6 MMOL/L (ref 3.4–5.3)
RBC # BLD AUTO: 5.1 10E12/L (ref 4.4–5.9)
SODIUM SERPL-SCNC: 137 MMOL/L (ref 133–144)
WBC # BLD AUTO: 9.1 10E9/L (ref 4–11)

## 2018-09-22 PROCEDURE — 80048 BASIC METABOLIC PNL TOTAL CA: CPT | Performed by: EMERGENCY MEDICINE

## 2018-09-22 PROCEDURE — 80320 DRUG SCREEN QUANTALCOHOLS: CPT | Performed by: FAMILY MEDICINE

## 2018-09-22 PROCEDURE — 96361 HYDRATE IV INFUSION ADD-ON: CPT | Mod: 59

## 2018-09-22 PROCEDURE — 96376 TX/PRO/DX INJ SAME DRUG ADON: CPT

## 2018-09-22 PROCEDURE — 80307 DRUG TEST PRSMV CHEM ANLYZR: CPT | Performed by: FAMILY MEDICINE

## 2018-09-22 PROCEDURE — 99285 EMERGENCY DEPT VISIT HI MDM: CPT | Mod: 25

## 2018-09-22 PROCEDURE — 25000132 ZZH RX MED GY IP 250 OP 250 PS 637: Performed by: EMERGENCY MEDICINE

## 2018-09-22 PROCEDURE — 85025 COMPLETE CBC W/AUTO DIFF WBC: CPT | Performed by: EMERGENCY MEDICINE

## 2018-09-22 PROCEDURE — 82075 ASSAY OF BREATH ETHANOL: CPT

## 2018-09-22 PROCEDURE — 96375 TX/PRO/DX INJ NEW DRUG ADDON: CPT

## 2018-09-22 PROCEDURE — 96374 THER/PROPH/DIAG INJ IV PUSH: CPT

## 2018-09-22 PROCEDURE — 93005 ELECTROCARDIOGRAM TRACING: CPT

## 2018-09-22 PROCEDURE — 25000125 ZZHC RX 250: Performed by: EMERGENCY MEDICINE

## 2018-09-22 PROCEDURE — 93010 ELECTROCARDIOGRAM REPORT: CPT | Mod: Z6 | Performed by: EMERGENCY MEDICINE

## 2018-09-22 PROCEDURE — 25000128 H RX IP 250 OP 636: Performed by: EMERGENCY MEDICINE

## 2018-09-22 PROCEDURE — 99285 EMERGENCY DEPT VISIT HI MDM: CPT | Mod: 25 | Performed by: EMERGENCY MEDICINE

## 2018-09-22 RX ORDER — LORAZEPAM 2 MG/ML
1 INJECTION INTRAMUSCULAR ONCE
Status: COMPLETED | OUTPATIENT
Start: 2018-09-22 | End: 2018-09-22

## 2018-09-22 RX ORDER — DIAZEPAM 5 MG
5 TABLET ORAL ONCE
Status: COMPLETED | OUTPATIENT
Start: 2018-09-22 | End: 2018-09-22

## 2018-09-22 RX ORDER — LORAZEPAM 2 MG/ML
2 INJECTION INTRAMUSCULAR ONCE
Status: COMPLETED | OUTPATIENT
Start: 2018-09-22 | End: 2018-09-22

## 2018-09-22 RX ORDER — ONDANSETRON 2 MG/ML
4 INJECTION INTRAMUSCULAR; INTRAVENOUS ONCE
Status: COMPLETED | OUTPATIENT
Start: 2018-09-22 | End: 2018-09-22

## 2018-09-22 RX ADMIN — LORAZEPAM 2 MG: 2 INJECTION INTRAMUSCULAR; INTRAVENOUS at 16:05

## 2018-09-22 RX ADMIN — ONDANSETRON 4 MG: 2 INJECTION INTRAMUSCULAR; INTRAVENOUS at 15:32

## 2018-09-22 RX ADMIN — LORAZEPAM 1 MG: 2 INJECTION INTRAMUSCULAR; INTRAVENOUS at 15:34

## 2018-09-22 RX ADMIN — LIDOCAINE HYDROCHLORIDE 30 ML: 20 SOLUTION ORAL; TOPICAL at 17:31

## 2018-09-22 RX ADMIN — SODIUM CHLORIDE 1000 ML: 9 INJECTION, SOLUTION INTRAVENOUS at 15:32

## 2018-09-22 RX ADMIN — DIAZEPAM 5 MG: 5 TABLET ORAL at 18:16

## 2018-09-22 ASSESSMENT — ENCOUNTER SYMPTOMS
ARTHRALGIAS: 0
EYE REDNESS: 0
ENDOCRINE NEGATIVE: 1
SHORTNESS OF BREATH: 0
BLOOD IN STOOL: 0
CHEST TIGHTNESS: 1
DIAPHORESIS: 0
NECK STIFFNESS: 0
NAUSEA: 1
FACIAL SWELLING: 0
FATIGUE: 0
LIGHT-HEADEDNESS: 0
HEADACHES: 0
SORE THROAT: 0
VOMITING: 0
NECK PAIN: 0
CHILLS: 0
ABDOMINAL PAIN: 0
BACK PAIN: 0
COUGH: 0
CONFUSION: 0
DIZZINESS: 0
ABDOMINAL DISTENTION: 0
WHEEZING: 0
COLOR CHANGE: 0
DIFFICULTY URINATING: 0
FEVER: 0
DYSURIA: 0
DIARRHEA: 0

## 2018-09-22 NOTE — ED AVS SNAPSHOT
[facility], Emergency Department    [address]    [place] [address]    Phone:  [phone]    Fax:  [phone]                                       Nickolas Lang   MRN: [id number]    Department:  [facility] [place], Emergency Department   Date of Visit:  9/22/2018           Patient Information     Date Of Birth          1990        Your diagnoses for this visit were:     Alcohol withdrawal, uncomplicated (H)     Tachycardia        You were seen by Jesus Benitez MD.      Follow-up Information     Follow up with [facility], [facility] In 1 day.        Follow up with [facility], Emergency Department.    Specialty:  EMERGENCY MEDICINE    Why:  If symptoms worsen    Contact information:    [facility]  [facility] [address] [phone]    Additional information:    The [facility] is located in the [facility]. lt is easily accessible from virtually any point in the [facility] area, via [address]        Discharge Instructions       Please make an appointment to follow up with Detox [phone] in 1 days as needed.      Your next 10 appointments already scheduled     Sep 27, 2018  5:30 PM CDT   Treatment with ADULT LODGING PLUS B2   [facility] ([facility])    [address] [place] [address] [phone]            Oct 04, 2018  5:30 PM CDT   Treatment with ADULT LODGING PLUS B2   [facility] ([facility])    [address] [place] [address] [phone]            Oct 11, 2018  5:30 PM CDT   Treatment with ADULT LODGING PLUS B2   [facility] ([facility])    [address] [place] [id number] [phone]            Oct 18, 2018  5:30 PM CDT   Treatment with ADULT LODGING PLUS B2   [facility] ([facility])    [address] [address] [phone]              24 Hour Appointment Hotline       To make an appointment at any [facility] clinic, call [phone] ([phone]). If you don't have a family doctor or clinic, we will help you find one. [facility] clinics are conveniently located to serve the needs of you and your family.             Review of your medicines      Our records show that you are taking the medicines listed below. If these are incorrect, please call your family doctor or clinic.        Dose / Directions Last dose taken    amLODIPine 10 MG tablet   Commonly known as:  NORVASC   Dose:  10 mg   Quantity:  90 tablet        Take 1 tablet (10 mg) by mouth daily   Refills:  1        amphetamine-dextroamphetamine 30 MG per 24 hr capsule   Commonly known as:  ADDERALL XR   Dose:  30 mg   Indication:  Attention Deficit Hyperactivity Disorder        Take 30 mg by mouth 2 times daily   Refills:  0        busPIRone 10 MG tablet   Commonly known as:  BUSPAR   Dose:  20 mg   Quantity:  90 tablet        Take 2 tablets (20 mg) by mouth 3 times daily   Refills:  0        cloNIDine 0.1 MG tablet   Commonly known as:  CATAPRES   Dose:  0.1 mg   Quantity:  180 tablet        Take 1 tablet (0.1 mg) by mouth 2 times daily   Refills:  1        disulfiram 250 MG tablet   Commonly known as:  ANTABUSE   Dose:  250 mg   Quantity:  30 tablet        Take 1 tablet (250 mg) by mouth daily   Refills:  0        gabapentin 800 MG tablet   Commonly known as:  NEURONTIN   Quantity:  120 tablet        TAKE 1 TABLET (800 MG) BY MOUTH 4 TIMES DAILY   Refills:  1        hydrOXYzine 50 MG tablet   Commonly known as:  ATARAX   Dose:  50 mg   Quantity:  120 tablet        Take 1 tablet (50 mg) by mouth every 4 hours as needed for anxiety   Refills:  0        loperamide 2 MG tablet   Commonly known as:  IMODIUM A-D   Dose:  2 mg   Quantity:  20 tablet         Take 1 tablet (2 mg) by mouth 4 times daily as needed for diarrhea   Refills:  0        * mirtazapine 7.5 MG Tabs tablet   Commonly known as:  REMERON   Dose:  7.5 mg   Quantity:  3 tablet        Take 1 tablet (7.5 mg) by mouth At Bedtime   Refills:  0        * mirtazapine 15 MG tablet   Commonly known as:  REMERON   Dose:  15 mg   Quantity:  30 tablet        Take 1 tablet (15 mg) by mouth At Bedtime To start after completing 7.5 mg/day Q HS x 3 doses.   Refills:  0        multivitamin, therapeutic with minerals Tabs tablet   Dose:  1 tablet   Quantity:  30 each        Take 1 tablet by mouth daily   Refills:  0        nicotine 21 MG/24HR 24 hr patch   Commonly known as:  NICODERM CQ   Dose:  1 patch   Quantity:  30 patch        Place 1 patch onto the skin daily   Refills:  0        * nicotine polacrilex 4 MG lozenge   Quantity:  360 tablet        1-2 lozenges every hour as needed, max 10 lozenges per day   Refills:  1        * nicotine polacrilex 4 MG gum   Commonly known as:  NICORETTE   Quantity:  100 tablet        Chew one piece every hour as directed.   Refills:  2        omeprazole 40 MG capsule   Commonly known as:  priLOSEC   Dose:  40 mg   Quantity:  30 capsule        Take 1 capsule (40 mg) by mouth daily   Refills:  3        ondansetron 4 MG tablet   Commonly known as:  ZOFRAN   Dose:  4 mg   Quantity:  10 tablet        Take 1 tablet (4 mg) by mouth every 8 hours as needed for nausea   Refills:  0        sertraline 100 MG tablet   Commonly known as:  ZOLOFT   Dose:  200 mg   Quantity:  30 tablet        Take 2 tablets (200 mg) by mouth daily   Refills:  0        thiamine 100 MG tablet   Dose:  100 mg   Quantity:  30 tablet        Take 1 tablet (100 mg) by mouth daily   Refills:  1        traZODone 100 MG tablet   Commonly known as:  DESYREL   Dose:  100-200 mg   Quantity:  90 tablet        Take 1-2 tablets (100-200 mg) by mouth nightly as needed for sleep   Refills:  0        * Notice:  This list has 4  medication(s) that are the same as other medications prescribed for you. Read the directions carefully, and ask your doctor or other care provider to review them with you.            Procedures and tests performed during your visit     Alcohol breath test POCT    Basic metabolic panel    CBC with platelets differential    Drug abuse screen 6 urine (tox)    EKG 12 lead      Orders Needing Specimen Collection     None      Pending Results     No orders found from 9/20/2018 to 9/23/2018.            Pending Culture Results     No orders found from 9/20/2018 to 9/23/2018.            Pending Results Instructions     If you had any lab results that were not finalized at the time of your Discharge, you can call the ED Lab Result RN at [phone]. You will be contacted by this team for any positive Lab results or changes in treatment. The nurses are available 7 days a week from 10A to 6:30P.  You can leave a message 24 hours per day and they will return your call.        Thank you for choosing [facility]       Thank you for choosing [facility] for your care. Our goal is always to provide you with excellent care. Hearing back from our patients is one way we can continue to improve our services. Please take a few minutes to complete the written survey that you may receive in the mail after you visit with us. Thank you!        [employer] Information     [employer] gives you secure access to your electronic health record. If you see a primary care provider, you can also send messages to your care team and make appointments. If you have questions, please call your primary care clinic.  If you do not have a primary care provider, please call [phone] and they will assist you.        Care EveryWhere ID     This is your Care EveryWhere ID. This could be used by other organizations to access your [facility] medical records  [id number]        Equal Access to Services     [facility] KIERRA AH: cherry Lamb,  shawna cloud ah. So [facility] [phone].    ATENCIÓN: Si habla sidneyañol, tiene a miller disposición servicios gratuitos de asistencia lingüística. Llame al [phone].    We comply with applicable federal civil rights laws and [place] laws. We do not discriminate on the basis of race, color, national origin, age, disability, sex, sexual orientation, or gender identity.            After Visit Summary       This is your record. Keep this with you and show to your community pharmacist(s) and doctor(s) at your next visit.

## 2018-09-22 NOTE — ED NOTES
"Pt states that he is having chest pain and that he does get chest pain when he starts to withdraw from ETOH. \"When I withdraw it gets worse everytime\".   "

## 2018-09-22 NOTE — ED AVS SNAPSHOT
[facility], [place], Emergency Department    [address]    [facility] [address]    Phone:  [phone]    Fax:  [phone]                                       Nickolas aLng   MRN: [id number]    Department:  [facility], Emergency Department   Date of Visit:  9/22/2018           After Visit Summary Signature Page     I have received my discharge instructions, and my questions have been answered. I have discussed any challenges I see with this plan with the nurse or doctor.    ..........................................................................................................................................  Patient/Patient Representative Signature      ..........................................................................................................................................  Patient Representative Print Name and Relationship to Patient    ..................................................               ................................................  Date                                   Time    ..........................................................................................................................................  Reviewed by Signature/Title    ...................................................              ..............................................  Date                                               Time          22EPIC Rev 08/18

## 2018-09-22 NOTE — ED NOTES
Pt keeps coming out of his room about every 10 minutes to go to the bathroom. Pt keeps asking staff for more Ativan and wanting to know when he be re-evaluated for more Ativan. It was explained to the pt that he has already received 2 doses of Ativan and he needs to give it some time to work before more can be given to the pt.

## 2018-09-22 NOTE — ED PROVIDER NOTES
"  History     Chief Complaint   Patient presents with     Alcohol Problem     Pt is withdrawling from ETOH, wanting detox. Last drink was at 0500     HPI  Nickolas Lang is a 28 year old male with a history of ADD, GERD, HTN, hepatic steatosis, anxiety, and alcohol abuse who presents seeking detox from alcohol. Patient reports he has been drinking approximately 3 L of hard liquor per day. His last drink was around 3-4 AM this morning. Currently, he complains of chest tightness and generally feeling \"like shit\". He has been through detox in the past, and does report a history of hallucinations. He denies history of withdrawal seizures. He does not have any other chronic medical issues.     I have reviewed the Medications, Allergies, Past Medical and Surgical History, and Social History in the [facility] system.  Past Medical History:   Diagnosis Date     ADD (attention deficit disorder)      Agitation      Alcohol abuse      Anxiety     gabapentin helps the most      Benzodiazepine abuse      Drug abuse      GERD (gastroesophageal reflux disease)      Hepatic steatosis      Hypertension      Obesity      Pyloric stenosis     s/p pyloromyotomy as an infant       Past Surgical History:   Procedure Laterality Date     Deviated septum repair       PYLOROMYOTOMY SURGERY  as an infant      SHOULDER SURGERY Left 07/29/2016    Removal of cartilage       Family History   Problem Relation Age of Onset     Neurologic Disorder Mother      Multiple Sclerosis     Depression Mother      Anxiety Disorder Mother      Alcohol/Drug Father      Substance Abuse Father      Depression Maternal Grandmother      Anxiety Disorder Maternal Grandmother      Hypertension Maternal Grandmother      Substance Abuse Maternal Grandfather      Alcohol/Drug Paternal Grandfather        Social History   Substance Use Topics     Smoking status: Current Every Day Smoker     Packs/day: 1.00     Years: 10.00     Types: Cigarettes     Smokeless tobacco: " Current User     Alcohol use 0.0 oz/week     0 Standard drinks or equivalent per week      Comment: currently intoxicated       Current Facility-Administered Medications   Medication     0.9% sodium chloride BOLUS     Current Outpatient Prescriptions   Medication     amLODIPine (NORVASC) 10 MG tablet     amphetamine-dextroamphetamine (ADDERALL XR) 30 MG per 24 hr capsule     busPIRone (BUSPAR) 10 MG tablet     cloNIDine (CATAPRES) 0.1 MG tablet     disulfiram (ANTABUSE) 250 MG tablet     gabapentin (NEURONTIN) 800 MG tablet     hydrOXYzine (ATARAX) 50 MG tablet     loperamide (IMODIUM A-D) 2 MG tablet     multivitamin, therapeutic with minerals (THERA-VIT-M) TABS tablet     nicotine (NICODERM CQ) 21 MG/24HR 24 hr patch     nicotine polacrilex (NICORETTE) 4 MG gum     nicotine polacrilex 4 MG lozenge     omeprazole (PRILOSEC) 40 MG capsule     ondansetron (ZOFRAN) 4 MG tablet     sertraline (ZOLOFT) 100 MG tablet     traZODone (DESYREL) 100 MG tablet     mirtazapine (REMERON) 15 MG tablet     mirtazapine (REMERON) 7.5 MG TABS tablet     thiamine 100 MG tablet     [DISCONTINUED] amLODIPine (NORVASC) 10 MG tablet     [DISCONTINUED] cloNIDine (CATAPRES) 0.1 MG tablet     [DISCONTINUED] gabapentin (NEURONTIN) 800 MG tablet     [DISCONTINUED] sertraline (ZOLOFT) 100 MG tablet     Facility-Administered Medications Ordered in Other Encounters   Medication     Self Administer Medications: Behavioral Services      No Known Allergies    Review of Systems   Constitutional: Negative for chills, diaphoresis, fatigue and fever.   HENT: Negative for congestion, facial swelling and sore throat.    Eyes: Negative for redness.   Respiratory: Positive for chest tightness. Negative for cough, shortness of breath and wheezing.    Cardiovascular: Negative for chest pain.   Gastrointestinal: Positive for nausea. Negative for abdominal distention, abdominal pain, blood in stool, diarrhea and vomiting.   Endocrine: Negative.     Genitourinary: Negative for difficulty urinating and dysuria.   Musculoskeletal: Negative for arthralgias, back pain, gait problem, neck pain and neck stiffness.   Skin: Negative for color change.   Neurological: Negative for dizziness, light-headedness and headaches.   Psychiatric/Behavioral: Negative for confusion.   All other systems reviewed and are negative.      Physical Exam   BP: 141/88  Pulse: 107  Temp: 96.8  F (36  C)  Resp: 14  Weight: 137.9 kg (304 lb)  SpO2: 98 %      Physical Exam   Constitutional: He is oriented to person, place, and time. He appears well-developed and well-nourished.   HENT:   Head: Normocephalic and atraumatic.   Right Ear: External ear normal.   Left Ear: External ear normal.   + tongue fasciculations   Eyes: Conjunctivae and EOM are normal. Pupils are equal, round, and reactive to light.   Neck: Normal range of motion. Neck supple. No thyromegaly present.   Cardiovascular: Regular rhythm and normal heart sounds.    Tachycardic   Abdominal: Soft. He exhibits no distension. There is no tenderness. There is no rebound and no guarding.   Musculoskeletal: Normal range of motion. He exhibits no tenderness.   Lymphadenopathy:     He has no cervical adenopathy.   Neurological: He is alert and oriented to person, place, and time. No cranial nerve deficit.   Skin: Skin is warm and dry. No rash noted. No erythema.   Psychiatric: He has a normal mood and affect.   Vitals reviewed.      ED Course     ED Course     Procedures       3:26 PM  The patient was seen and examined by Dr. Benitez in Room 10.          EKG Interpretation:      Interpreted by Jesus Benietz  Time reviewed: 1516  Symptoms at time of EKG: CP   Rhythm: normal sinus   Rate: Tachycardic to 110  Axis: normal  Ectopy: none  Conduction: normal  ST Segments/ T Waves: No ST-T wave changes  Q Waves: none  Comparison to prior: Unchanged from 20-Sept 18    Clinical Impression: normal EKG          Critical Care time:  none              Labs Ordered and Resulted from Time of ED Arrival Up to the Time of Departure from the ED   DRUG ABUSE SCREEN 6 CHEM DEP URINE ([facility]) - Abnormal; Notable for the following:        Result Value    Amphetamine Qual Urine Positive (*)     Benzodiazepine Qual Urine Positive (*)     Ethanol Qual Urine Positive (*)     All other components within normal limits   ALCOHOL BREATH TEST POCT - Abnormal; Notable for the following:     Alcohol Breath Test 0.119 (*)     All other components within normal limits   CBC WITH PLATELETS DIFFERENTIAL   BASIC METABOLIC PANEL            Assessments & Plan (with Medical Decision Making)   Patient is a 28-year-old male with a history of heavy alcohol use who presents with concerns for alcohol withdrawal and mild chest tightness.  He states his last drink was earlier today.  Initial vitals were concerning for tachycardia to the 1 teens.  Exam as above notable for benign abdomen, positive tongue fasciculations.  Basic labs were obtained and within normal limits.  EKG obtained showed sinus tachycardia.  He was given IV fluids and IV Ativan with improved symptoms.  I called [facility], however no open beds at this time.  Patient was given additional p.o. Valium with further resolution of his symptoms.  He already takes gabapentin at home and he was strongly advised to continue taking gabapentin.  He was given detox contact information advised to call detox tomorrow to assess if there is any open beds.  He was given strict return precautions for worsening symptoms and advised to follow-up with detox.  Patient discharged home in stable condition without any overt signs of withdrawal.    I have reviewed the nursing notes.    I have reviewed the findings, diagnosis, plan and need for follow up with the patient.    New Prescriptions    No medications on file       Final diagnoses:   Alcohol withdrawal, uncomplicated (H)   Tachycardia   IVivian, am serving as a trained medical  scribe to document services personally performed by Jesus Benitez MD, based on the provider's statements to me.   I, Jesus Benitez MD, was physically present and have reviewed and verified the accuracy of this note documented by Vivian Hall.      9/22/2018   [facility], [place], EMERGENCY DEPARTMENT     Jesus Benitez MD  09/22/18 [id number]

## 2018-09-23 LAB — INTERPRETATION ECG - MUSE: NORMAL

## 2018-09-29 ENCOUNTER — APPOINTMENT (OUTPATIENT)
Dept: CT IMAGING | Facility: CLINIC | Age: 28
DRG: 896 | End: 2018-09-29
Attending: EMERGENCY MEDICINE
Payer: COMMERCIAL

## 2018-09-29 ENCOUNTER — APPOINTMENT (OUTPATIENT)
Dept: GENERAL RADIOLOGY | Facility: CLINIC | Age: 28
DRG: 896 | End: 2018-09-29
Attending: EMERGENCY MEDICINE
Payer: COMMERCIAL

## 2018-09-29 ENCOUNTER — HOSPITAL ENCOUNTER (INPATIENT)
Facility: CLINIC | Age: 28
LOS: 3 days | Discharge: HOME | DRG: 896 | End: 2018-10-02
Attending: EMERGENCY MEDICINE | Admitting: INTERNAL MEDICINE
Payer: COMMERCIAL

## 2018-09-29 DIAGNOSIS — F41.9 ANXIETY: Chronic | ICD-10-CM

## 2018-09-29 DIAGNOSIS — F10.14 ALCOHOL ABUSE WITH ALCOHOL-INDUCED MOOD DISORDER (H): Primary | ICD-10-CM

## 2018-09-29 DIAGNOSIS — F10.20 ALCOHOL USE DISORDER, SEVERE, DEPENDENCE (H): Chronic | ICD-10-CM

## 2018-09-29 DIAGNOSIS — G92.9 TOXIC ENCEPHALOPATHY: ICD-10-CM

## 2018-09-29 DIAGNOSIS — J96.00 ACUTE RESPIRATORY FAILURE, UNSPECIFIED WHETHER WITH HYPOXIA OR HYPERCAPNIA (H): ICD-10-CM

## 2018-09-29 DIAGNOSIS — R79.89 ELEVATED LACTIC ACID LEVEL: ICD-10-CM

## 2018-09-29 DIAGNOSIS — F10.229 ALCOHOL DEPENDENCE WITH INTOXICATION WITH COMPLICATION (H): ICD-10-CM

## 2018-09-29 LAB
ALBUMIN SERPL-MCNC: 3.5 G/DL (ref 3.4–5)
ALP SERPL-CCNC: 115 U/L (ref 40–150)
ALT SERPL W P-5'-P-CCNC: 73 U/L (ref 0–70)
AMPHETAMINES UR QL SCN: NEGATIVE
ANION GAP SERPL CALCULATED.3IONS-SCNC: 10 MMOL/L (ref 3–14)
APAP SERPL-MCNC: <2 MG/L (ref 10–20)
AST SERPL W P-5'-P-CCNC: 43 U/L (ref 0–45)
BARBITURATES UR QL: NEGATIVE
BASE DEFICIT BLDA-SCNC: 2 MMOL/L
BASOPHILS # BLD AUTO: 0 10E9/L (ref 0–0.2)
BASOPHILS NFR BLD AUTO: 0.6 %
BENZODIAZ UR QL: NEGATIVE
BILIRUB SERPL-MCNC: 0.2 MG/DL (ref 0.2–1.3)
BUN SERPL-MCNC: 11 MG/DL (ref 7–30)
CALCIUM SERPL-MCNC: 8.4 MG/DL (ref 8.5–10.1)
CANNABINOIDS UR QL SCN: NEGATIVE
CHLORIDE SERPL-SCNC: 101 MMOL/L (ref 94–109)
CK SERPL-CCNC: 148 U/L (ref 30–300)
CO2 SERPL-SCNC: 24 MMOL/L (ref 20–32)
COCAINE UR QL: NEGATIVE
CREAT SERPL-MCNC: 0.55 MG/DL (ref 0.66–1.25)
DIFFERENTIAL METHOD BLD: NORMAL
EOSINOPHIL # BLD AUTO: 0 10E9/L (ref 0–0.7)
EOSINOPHIL NFR BLD AUTO: 0.1 %
ERYTHROCYTE [DISTWIDTH] IN BLOOD BY AUTOMATED COUNT: 14.9 % (ref 10–15)
ETHANOL SERPL-MCNC: 0.54 G/DL
GFR SERPL CREATININE-BSD FRML MDRD: >90 ML/MIN/1.7M2
GLUCOSE BLDC GLUCOMTR-MCNC: 121 MG/DL (ref 70–99)
GLUCOSE BLDC GLUCOMTR-MCNC: 65 MG/DL (ref 70–99)
GLUCOSE BLDC GLUCOMTR-MCNC: 88 MG/DL (ref 70–99)
GLUCOSE SERPL-MCNC: 145 MG/DL (ref 70–99)
HCO3 BLD-SCNC: 24 MMOL/L (ref 21–28)
HCT VFR BLD AUTO: 48.9 % (ref 40–53)
HGB BLD-MCNC: 16.3 G/DL (ref 13.3–17.7)
IMM GRANULOCYTES # BLD: 0 10E9/L (ref 0–0.4)
IMM GRANULOCYTES NFR BLD: 0.3 %
LACTATE BLD-SCNC: 2.4 MMOL/L (ref 0.7–2)
LACTATE BLD-SCNC: 4 MMOL/L (ref 0.7–2)
LACTATE SERPL-SCNC: 3.9 MMOL/L (ref 0.4–2)
LIPASE SERPL-CCNC: 335 U/L (ref 73–393)
LYMPHOCYTES # BLD AUTO: 2 10E9/L (ref 0.8–5.3)
LYMPHOCYTES NFR BLD AUTO: 29.4 %
MAGNESIUM SERPL-MCNC: 1.8 MG/DL (ref 1.6–2.3)
MCH RBC QN AUTO: 29 PG (ref 26.5–33)
MCHC RBC AUTO-ENTMCNC: 33.3 G/DL (ref 31.5–36.5)
MCV RBC AUTO: 87 FL (ref 78–100)
MONOCYTES # BLD AUTO: 0.4 10E9/L (ref 0–1.3)
MONOCYTES NFR BLD AUTO: 5.6 %
MRSA DNA SPEC QL NAA+PROBE: ABNORMAL
NEUTROPHILS # BLD AUTO: 4.3 10E9/L (ref 1.6–8.3)
NEUTROPHILS NFR BLD AUTO: 64 %
NRBC # BLD AUTO: 0 10*3/UL
NRBC BLD AUTO-RTO: 0 /100
O2/TOTAL GAS SETTING VFR VENT: ABNORMAL %
OPIATES UR QL SCN: NEGATIVE
OXYHGB MFR BLD: 93 % (ref 92–100)
PCO2 BLD: 46 MM HG (ref 35–45)
PCP UR QL SCN: NEGATIVE
PH BLD: 7.33 PH (ref 7.35–7.45)
PLATELET # BLD AUTO: 276 10E9/L (ref 150–450)
PLATELET # BLD AUTO: 317 10E9/L (ref 150–450)
PO2 BLD: 82 MM HG (ref 80–105)
POTASSIUM SERPL-SCNC: 3.7 MMOL/L (ref 3.4–5.3)
PROT SERPL-MCNC: 7.3 G/DL (ref 6.8–8.8)
RBC # BLD AUTO: 5.62 10E12/L (ref 4.4–5.9)
SALICYLATES SERPL-MCNC: <2 MG/DL
SODIUM SERPL-SCNC: 135 MMOL/L (ref 133–144)
SPECIMEN SOURCE: ABNORMAL
WBC # BLD AUTO: 6.7 10E9/L (ref 4–11)

## 2018-09-29 PROCEDURE — 80329 ANALGESICS NON-OPIOID 1 OR 2: CPT | Performed by: EMERGENCY MEDICINE

## 2018-09-29 PROCEDURE — 25800025 ZZH RX 258: Performed by: INTERNAL MEDICINE

## 2018-09-29 PROCEDURE — 20000003 ZZH R&B ICU

## 2018-09-29 PROCEDURE — 31500 INSERT EMERGENCY AIRWAY: CPT

## 2018-09-29 PROCEDURE — 40000275 ZZH STATISTIC RCP TIME EA 10 MIN

## 2018-09-29 PROCEDURE — 74177 CT ABD & PELVIS W/CONTRAST: CPT

## 2018-09-29 PROCEDURE — 40000281 ZZH STATISTIC TRANSPORT TIME EA 15 MIN

## 2018-09-29 PROCEDURE — 94002 VENT MGMT INPAT INIT DAY: CPT

## 2018-09-29 PROCEDURE — 5A1935Z RESPIRATORY VENTILATION, LESS THAN 24 CONSECUTIVE HOURS: ICD-10-PCS | Performed by: EMERGENCY MEDICINE

## 2018-09-29 PROCEDURE — HZ2ZZZZ DETOXIFICATION SERVICES FOR SUBSTANCE ABUSE TREATMENT: ICD-10-PCS | Performed by: EMERGENCY MEDICINE

## 2018-09-29 PROCEDURE — 82550 ASSAY OF CK (CPK): CPT | Performed by: EMERGENCY MEDICINE

## 2018-09-29 PROCEDURE — 83690 ASSAY OF LIPASE: CPT | Performed by: EMERGENCY MEDICINE

## 2018-09-29 PROCEDURE — 87640 STAPH A DNA AMP PROBE: CPT | Performed by: INTERNAL MEDICINE

## 2018-09-29 PROCEDURE — 25000128 H RX IP 250 OP 636: Performed by: INTERNAL MEDICINE

## 2018-09-29 PROCEDURE — 82565 ASSAY OF CREATININE: CPT | Performed by: INTERNAL MEDICINE

## 2018-09-29 PROCEDURE — 83605 ASSAY OF LACTIC ACID: CPT | Performed by: INTERNAL MEDICINE

## 2018-09-29 PROCEDURE — 76705 ECHO EXAM OF ABDOMEN: CPT

## 2018-09-29 PROCEDURE — 80053 COMPREHEN METABOLIC PANEL: CPT | Performed by: EMERGENCY MEDICINE

## 2018-09-29 PROCEDURE — 25000128 H RX IP 250 OP 636: Performed by: EMERGENCY MEDICINE

## 2018-09-29 PROCEDURE — 87081 CULTURE SCREEN ONLY: CPT | Performed by: INTERNAL MEDICINE

## 2018-09-29 PROCEDURE — 25000125 ZZHC RX 250: Performed by: EMERGENCY MEDICINE

## 2018-09-29 PROCEDURE — 40000986 XR CHEST 1 VW

## 2018-09-29 PROCEDURE — 83605 ASSAY OF LACTIC ACID: CPT | Performed by: EMERGENCY MEDICINE

## 2018-09-29 PROCEDURE — 96375 TX/PRO/DX INJ NEW DRUG ADDON: CPT

## 2018-09-29 PROCEDURE — 99285 EMERGENCY DEPT VISIT HI MDM: CPT | Mod: 25

## 2018-09-29 PROCEDURE — 70450 CT HEAD/BRAIN W/O DYE: CPT

## 2018-09-29 PROCEDURE — C9113 INJ PANTOPRAZOLE SODIUM, VIA: HCPCS | Performed by: INTERNAL MEDICINE

## 2018-09-29 PROCEDURE — 96374 THER/PROPH/DIAG INJ IV PUSH: CPT

## 2018-09-29 PROCEDURE — 71045 X-RAY EXAM CHEST 1 VIEW: CPT

## 2018-09-29 PROCEDURE — 80307 DRUG TEST PRSMV CHEM ANLYZR: CPT | Performed by: EMERGENCY MEDICINE

## 2018-09-29 PROCEDURE — 80320 DRUG SCREEN QUANTALCOHOLS: CPT | Performed by: EMERGENCY MEDICINE

## 2018-09-29 PROCEDURE — 99223 1ST HOSP IP/OBS HIGH 75: CPT | Mod: AI | Performed by: INTERNAL MEDICINE

## 2018-09-29 PROCEDURE — 36600 WITHDRAWAL OF ARTERIAL BLOOD: CPT

## 2018-09-29 PROCEDURE — 85049 AUTOMATED PLATELET COUNT: CPT | Performed by: INTERNAL MEDICINE

## 2018-09-29 PROCEDURE — 36415 COLL VENOUS BLD VENIPUNCTURE: CPT | Performed by: EMERGENCY MEDICINE

## 2018-09-29 PROCEDURE — 83735 ASSAY OF MAGNESIUM: CPT | Performed by: EMERGENCY MEDICINE

## 2018-09-29 PROCEDURE — 94003 VENT MGMT INPAT SUBQ DAY: CPT

## 2018-09-29 PROCEDURE — 36415 COLL VENOUS BLD VENIPUNCTURE: CPT | Performed by: INTERNAL MEDICINE

## 2018-09-29 PROCEDURE — 85025 COMPLETE CBC W/AUTO DIFF WBC: CPT | Performed by: EMERGENCY MEDICINE

## 2018-09-29 PROCEDURE — 87641 MR-STAPH DNA AMP PROBE: CPT | Performed by: INTERNAL MEDICINE

## 2018-09-29 PROCEDURE — 82805 BLOOD GASES W/O2 SATURATION: CPT | Performed by: INTERNAL MEDICINE

## 2018-09-29 PROCEDURE — 25000125 ZZHC RX 250: Performed by: INTERNAL MEDICINE

## 2018-09-29 PROCEDURE — 00000146 ZZHCL STATISTIC GLUCOSE BY METER IP

## 2018-09-29 RX ORDER — DEXTROSE MONOHYDRATE 25 G/50ML
25-50 INJECTION, SOLUTION INTRAVENOUS
Status: DISCONTINUED | OUTPATIENT
Start: 2018-09-29 | End: 2018-10-02 | Stop reason: HOSPADM

## 2018-09-29 RX ORDER — NICOTINE POLACRILEX 4 MG
15-30 LOZENGE BUCCAL
Status: DISCONTINUED | OUTPATIENT
Start: 2018-09-29 | End: 2018-09-30

## 2018-09-29 RX ORDER — METOPROLOL TARTRATE 1 MG/ML
5 INJECTION, SOLUTION INTRAVENOUS EVERY 4 HOURS PRN
Status: DISCONTINUED | OUTPATIENT
Start: 2018-09-29 | End: 2018-10-02 | Stop reason: HOSPADM

## 2018-09-29 RX ORDER — ONDANSETRON 4 MG/1
4 TABLET, ORALLY DISINTEGRATING ORAL EVERY 6 HOURS PRN
Status: DISCONTINUED | OUTPATIENT
Start: 2018-09-29 | End: 2018-10-02 | Stop reason: HOSPADM

## 2018-09-29 RX ORDER — FLUMAZENIL 0.1 MG/ML
0.2 INJECTION, SOLUTION INTRAVENOUS
Status: DISCONTINUED | OUTPATIENT
Start: 2018-09-29 | End: 2018-10-02 | Stop reason: HOSPADM

## 2018-09-29 RX ORDER — POTASSIUM CHLORIDE 1500 MG/1
20-40 TABLET, EXTENDED RELEASE ORAL
Status: DISCONTINUED | OUTPATIENT
Start: 2018-09-29 | End: 2018-10-02 | Stop reason: HOSPADM

## 2018-09-29 RX ORDER — IOPAMIDOL 755 MG/ML
500 INJECTION, SOLUTION INTRAVASCULAR ONCE
Status: COMPLETED | OUTPATIENT
Start: 2018-09-29 | End: 2018-09-29

## 2018-09-29 RX ORDER — POTASSIUM CHLORIDE 29.8 MG/ML
20 INJECTION INTRAVENOUS
Status: DISCONTINUED | OUTPATIENT
Start: 2018-09-29 | End: 2018-10-02 | Stop reason: HOSPADM

## 2018-09-29 RX ORDER — BISACODYL 10 MG
10 SUPPOSITORY, RECTAL RECTAL DAILY PRN
Status: DISCONTINUED | OUTPATIENT
Start: 2018-09-29 | End: 2018-10-02 | Stop reason: HOSPADM

## 2018-09-29 RX ORDER — SODIUM CHLORIDE, SODIUM LACTATE, POTASSIUM CHLORIDE, CALCIUM CHLORIDE 600; 310; 30; 20 MG/100ML; MG/100ML; MG/100ML; MG/100ML
INJECTION, SOLUTION INTRAVENOUS CONTINUOUS
Status: DISCONTINUED | OUTPATIENT
Start: 2018-09-29 | End: 2018-09-29

## 2018-09-29 RX ORDER — NALOXONE HYDROCHLORIDE 0.4 MG/ML
.1-.4 INJECTION, SOLUTION INTRAMUSCULAR; INTRAVENOUS; SUBCUTANEOUS
Status: DISCONTINUED | OUTPATIENT
Start: 2018-09-29 | End: 2018-10-01

## 2018-09-29 RX ORDER — HALOPERIDOL 5 MG/ML
2.5-5 INJECTION INTRAMUSCULAR
Status: DISCONTINUED | OUTPATIENT
Start: 2018-09-29 | End: 2018-10-02 | Stop reason: HOSPADM

## 2018-09-29 RX ORDER — AMOXICILLIN 250 MG
2 CAPSULE ORAL 2 TIMES DAILY PRN
Status: DISCONTINUED | OUTPATIENT
Start: 2018-09-29 | End: 2018-10-02 | Stop reason: HOSPADM

## 2018-09-29 RX ORDER — NICOTINE POLACRILEX 4 MG
15-30 LOZENGE BUCCAL
Status: DISCONTINUED | OUTPATIENT
Start: 2018-09-29 | End: 2018-10-02 | Stop reason: HOSPADM

## 2018-09-29 RX ORDER — AMOXICILLIN 250 MG
1 CAPSULE ORAL 2 TIMES DAILY PRN
Status: DISCONTINUED | OUTPATIENT
Start: 2018-09-29 | End: 2018-10-02 | Stop reason: HOSPADM

## 2018-09-29 RX ORDER — NALOXONE HYDROCHLORIDE 0.4 MG/ML
.1-.4 INJECTION, SOLUTION INTRAMUSCULAR; INTRAVENOUS; SUBCUTANEOUS
Status: DISCONTINUED | OUTPATIENT
Start: 2018-09-29 | End: 2018-10-02 | Stop reason: HOSPADM

## 2018-09-29 RX ORDER — PROPOFOL 10 MG/ML
5-75 INJECTION, EMULSION INTRAVENOUS CONTINUOUS
Status: DISCONTINUED | OUTPATIENT
Start: 2018-09-29 | End: 2018-09-30

## 2018-09-29 RX ORDER — PROPOFOL 10 MG/ML
5-75 INJECTION, EMULSION INTRAVENOUS CONTINUOUS
Status: DISCONTINUED | OUTPATIENT
Start: 2018-09-29 | End: 2018-09-29

## 2018-09-29 RX ORDER — ETOMIDATE 2 MG/ML
30 INJECTION INTRAVENOUS ONCE
Status: COMPLETED | OUTPATIENT
Start: 2018-09-29 | End: 2018-09-29

## 2018-09-29 RX ORDER — HYDROMORPHONE HYDROCHLORIDE 1 MG/ML
.3-.5 INJECTION, SOLUTION INTRAMUSCULAR; INTRAVENOUS; SUBCUTANEOUS
Status: DISCONTINUED | OUTPATIENT
Start: 2018-09-29 | End: 2018-09-30

## 2018-09-29 RX ORDER — POTASSIUM CL/LIDO/0.9 % NACL 10MEQ/0.1L
10 INTRAVENOUS SOLUTION, PIGGYBACK (ML) INTRAVENOUS
Status: DISCONTINUED | OUTPATIENT
Start: 2018-09-29 | End: 2018-10-02 | Stop reason: HOSPADM

## 2018-09-29 RX ORDER — DEXTROSE MONOHYDRATE 25 G/50ML
25-50 INJECTION, SOLUTION INTRAVENOUS
Status: DISCONTINUED | OUTPATIENT
Start: 2018-09-29 | End: 2018-09-30

## 2018-09-29 RX ORDER — ONDANSETRON 2 MG/ML
4 INJECTION INTRAMUSCULAR; INTRAVENOUS EVERY 6 HOURS PRN
Status: DISCONTINUED | OUTPATIENT
Start: 2018-09-29 | End: 2018-10-02 | Stop reason: HOSPADM

## 2018-09-29 RX ORDER — PROPOFOL 10 MG/ML
10-20 INJECTION, EMULSION INTRAVENOUS EVERY 30 MIN PRN
Status: DISCONTINUED | OUTPATIENT
Start: 2018-09-29 | End: 2018-09-30

## 2018-09-29 RX ORDER — POTASSIUM CHLORIDE 1.5 G/1.58G
20-40 POWDER, FOR SOLUTION ORAL
Status: DISCONTINUED | OUTPATIENT
Start: 2018-09-29 | End: 2018-10-02 | Stop reason: HOSPADM

## 2018-09-29 RX ORDER — LORAZEPAM 2 MG/ML
1-4 INJECTION INTRAMUSCULAR
Status: DISCONTINUED | OUTPATIENT
Start: 2018-09-29 | End: 2018-10-01

## 2018-09-29 RX ORDER — MAGNESIUM SULFATE HEPTAHYDRATE 40 MG/ML
4 INJECTION, SOLUTION INTRAVENOUS EVERY 4 HOURS PRN
Status: DISCONTINUED | OUTPATIENT
Start: 2018-09-29 | End: 2018-10-02 | Stop reason: HOSPADM

## 2018-09-29 RX ORDER — POTASSIUM CHLORIDE 7.45 MG/ML
10 INJECTION INTRAVENOUS
Status: DISCONTINUED | OUTPATIENT
Start: 2018-09-29 | End: 2018-10-02 | Stop reason: HOSPADM

## 2018-09-29 RX ADMIN — SODIUM CHLORIDE 65 ML: 9 INJECTION, SOLUTION INTRAVENOUS at 19:45

## 2018-09-29 RX ADMIN — Medication 0.5 MG: at 21:51

## 2018-09-29 RX ADMIN — LORAZEPAM 4 MG: 2 INJECTION INTRAMUSCULAR; INTRAVENOUS at 21:51

## 2018-09-29 RX ADMIN — Medication 2 G: at 22:59

## 2018-09-29 RX ADMIN — IOPAMIDOL 100 ML: 755 INJECTION, SOLUTION INTRAVENOUS at 19:44

## 2018-09-29 RX ADMIN — DEXTROSE AND SODIUM CHLORIDE: 5; 900 INJECTION, SOLUTION INTRAVENOUS at 22:09

## 2018-09-29 RX ADMIN — SODIUM CHLORIDE, POTASSIUM CHLORIDE, SODIUM LACTATE AND CALCIUM CHLORIDE: 600; 310; 30; 20 INJECTION, SOLUTION INTRAVENOUS at 20:45

## 2018-09-29 RX ADMIN — PANTOPRAZOLE SODIUM 40 MG: 40 INJECTION, POWDER, FOR SOLUTION INTRAVENOUS at 20:59

## 2018-09-29 RX ADMIN — PROPOFOL 40 MCG/KG/MIN: 10 INJECTION, EMULSION INTRAVENOUS at 16:52

## 2018-09-29 RX ADMIN — PROPOFOL 70 MCG/KG/MIN: 10 INJECTION, EMULSION INTRAVENOUS at 23:20

## 2018-09-29 RX ADMIN — PROPOFOL 45 MCG/KG/MIN: 10 INJECTION, EMULSION INTRAVENOUS at 19:27

## 2018-09-29 RX ADMIN — SODIUM CHLORIDE 1000 ML: 9 INJECTION, SOLUTION INTRAVENOUS at 18:03

## 2018-09-29 RX ADMIN — PROPOFOL 75 MCG/KG/MIN: 10 INJECTION, EMULSION INTRAVENOUS at 21:51

## 2018-09-29 RX ADMIN — DEXTROSE MONOHYDRATE 50 ML: 25 INJECTION, SOLUTION INTRAVENOUS at 21:21

## 2018-09-29 RX ADMIN — ETOMIDATE 30 MG: 2 INJECTION, SOLUTION INTRAVENOUS at 16:49

## 2018-09-29 RX ADMIN — FOLIC ACID: 5 INJECTION, SOLUTION INTRAMUSCULAR; INTRAVENOUS; SUBCUTANEOUS at 21:32

## 2018-09-29 RX ADMIN — ENOXAPARIN SODIUM 40 MG: 40 INJECTION SUBCUTANEOUS at 20:59

## 2018-09-29 RX ADMIN — SODIUM CHLORIDE 1000 ML: 9 INJECTION, SOLUTION INTRAVENOUS at 18:33
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
## 2018-09-29 NOTE — IP AVS SNAPSHOT
[facility] Intensive Care Unit    [address]    [facility] [address]    Phone:  [phone]    Fax:  [phone]                                       After Visit Summary   9/29/2018    Nickolas Lang    MRN: [id number]           After Visit Summary Signature Page     I have received my discharge instructions, and my questions have been answered. I have discussed any challenges I see with this plan with the nurse or doctor.    ..........................................................................................................................................  Patient/Patient Representative Signature      ..........................................................................................................................................  Patient Representative Print Name and Relationship to Patient    ..................................................               ................................................  Date                                   Time    ..........................................................................................................................................  Reviewed by Signature/Title    ...................................................              ..............................................  Date                                               Time          22EPIC Rev 08/18
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
## 2018-09-29 NOTE — IP AVS SNAPSHOT
MRN:[id number]                      After Visit Summary   9/29/2018    Nickolas Lang    MRN: [id number]           Thank you!     Thank you for choosing [facility] for your care. Our goal is always to provide you with excellent care. Hearing back from our patients is one way we can continue to improve our services. Please take a few minutes to complete the written survey that you may receive in the mail after you visit. If you would like to speak to someone directly about your visit please contact Patient Relations at [phone]. Thank you!          Patient Information     Date Of Birth          1990        About your hospital stay     You were admitted on:  September 29, 2018 You last received care in the:  [facility]    You were discharged on:  October 2, 2018       Who to Call     For medical emergencies, please call 911.  For non-urgent questions about your medical care, please call your primary care provider or clinic, [phone]          Attending Provider     Provider Specialty    Damon Street MD Emergency Medicine    Chidi Bennett MD Emergency Medicine    Carlos Eldridge MD Internal Medicine       Primary Care Provider Office Phone # Fax #    Tima Crews [phone]      After Care Instructions     Activity       Your activity upon discharge: activity as tolerated            Diet       Follow this diet upon discharge: Regular                  Follow-up Appointments     Follow-up and recommended labs and tests        Follow up with primary care provider, Tima Crews, within 7 days.  Follow up with [place] as planned ASAP.                  Pending Results     No orders found from 9/27/2018 to 9/30/2018.            Statement of Approval     Ordered          10/02/18 1203  I have reviewed and agree with all the recommendations and orders detailed in this document.  EFFECTIVE NOW    "  Approved and electronically signed by:  Artis Parker MD             Admission Information     Date & Time Provider Department Dept. Phone    9/29/2018 Carlos Eldridge MD [facility] Intensive [facility] [phone]      Your Vitals Were     Blood Pressure Pulse Temperature Respirations Height Weight    152/95 81 97.8  F (36.6  C) (Oral) 20 1.854 m (6' 1\") 141.2 kg (311 lb 4.6 oz)    Pulse Oximetry BMI (Body Mass Index)                93% 41.07 kg/m2          MyChart Information     [employer] gives you secure access to your electronic health record. If you see a primary care provider, you can also send messages to your care team and make appointments. If you have questions, please call your primary care clinic.  If you do not have a primary care provider, please call [phone] and they will assist you.        Care EveryWhere ID     This is your Care EveryWhere ID. This could be used by other organizations to access your [place] medical records  [id number]        Equal Access to Services     ERMELINDA LOZADA : Hadii breezy gonzalez hadasho Soomaali, waaxda luqadaha, qaybta kaalmada adeegyadelfina, shawna espinoza . So [facility] [phone].    ATENCIÓN: Si habla español, tiene a miller disposición servicios gratuitos de asistencia lingüística. Llame al [phone].    We comply with applicable federal civil rights laws and [place] laws. We do not discriminate on the basis of race, color, national origin, age, disability, sex, sexual orientation, or gender identity.               Review of your medicines      START taking        Dose / Directions    LORazepam 1 MG tablet   Commonly known as:  ATIVAN   Used for:  Alcohol dependence with intoxication with complication (H), Anxiety, Alcohol use disorder, severe, dependence (H), Alcohol abuse with alcohol-induced mood disorder (H)        Dose:  1 mg   Take 1 tablet (1 mg) by mouth every 8 hours as needed for agitation or anxiety   Quantity:  10 tablet "   Refills:  0         CONTINUE these medicines which may have CHANGED, or have new prescriptions. If we are uncertain of the size of tablets/capsules you have at home, strength may be listed as something that might have changed.        Dose / Directions    traZODone 100 MG tablet   Commonly known as:  DESYREL   This may have changed:  how much to take   Used for:  Alcohol dependence, continuous drinking behavior (H)        Dose:  100-200 mg   Take 1-2 tablets (100-200 mg) by mouth nightly as needed for sleep   Quantity:  90 tablet   Refills:  0         CONTINUE these medicines which have NOT CHANGED        Dose / Directions    amLODIPine 10 MG tablet   Commonly known as:  NORVASC   Used for:  Alcohol dependence, continuous drinking behavior (H)        Dose:  10 mg   Take 1 tablet (10 mg) by mouth daily   Quantity:  90 tablet   Refills:  1       amphetamine-dextroamphetamine 30 MG per 24 hr capsule   Commonly known as:  ADDERALL XR   Indication:  Attention Deficit Hyperactivity Disorder        Dose:  30 mg   Take 30 mg by mouth 2 times daily   Refills:  0       busPIRone 10 MG tablet   Commonly known as:  BUSPAR   Used for:  Alcohol dependence, continuous drinking behavior (H)        Dose:  20 mg   Take 2 tablets (20 mg) by mouth 3 times daily   Quantity:  90 tablet   Refills:  0       cloNIDine 0.1 MG tablet   Commonly known as:  CATAPRES   Used for:  Alcohol dependence, continuous drinking behavior (H)        Dose:  0.1 mg   Take 1 tablet (0.1 mg) by mouth 2 times daily   Quantity:  180 tablet   Refills:  1       disulfiram 250 MG tablet   Commonly known as:  ANTABUSE   Used for:  Alcohol dependence with withdrawal with complication (H)        Dose:  250 mg   Take 1 tablet (250 mg) by mouth daily   Quantity:  30 tablet   Refills:  0       gabapentin 800 MG tablet   Commonly known as:  NEURONTIN   Used for:  Anxiety, Alcohol use disorder, severe, dependence (H)        TAKE 1 TABLET (800 MG) BY MOUTH 4 TIMES DAILY    Quantity:  120 tablet   Refills:  1       hydrOXYzine 50 MG tablet   Commonly known as:  ATARAX   Used for:  Chemical dependency (H)        Dose:  50 mg   Take 1 tablet (50 mg) by mouth every 4 hours as needed for anxiety   Quantity:  120 tablet   Refills:  0       loperamide 2 MG tablet   Commonly known as:  IMODIUM A-D   Used for:  Diarrhea, unspecified type        Dose:  2 mg   Take 1 tablet (2 mg) by mouth 4 times daily as needed for diarrhea   Quantity:  20 tablet   Refills:  0       * mirtazapine 7.5 MG Tabs tablet   Commonly known as:  REMERON   Used for:  Alcohol-induced insomnia (H)        Dose:  7.5 mg   Take 1 tablet (7.5 mg) by mouth At Bedtime   Quantity:  3 tablet   Refills:  0       * mirtazapine 15 MG tablet   Commonly known as:  REMERON   Used for:  Insomnia, unspecified type        Dose:  15 mg   Take 1 tablet (15 mg) by mouth At Bedtime To start after completing 7.5 mg/day Q HS x 3 doses.   Quantity:  30 tablet   Refills:  0       multivitamin, therapeutic with minerals Tabs tablet   Used for:  Alcohol dependence, continuous drinking behavior (H)        Dose:  1 tablet   Take 1 tablet by mouth daily   Quantity:  30 each   Refills:  0       nicotine 21 MG/24HR 24 hr patch   Commonly known as:  NICODERM CQ   Used for:  Alcohol dependence, continuous drinking behavior (H)        Dose:  1 patch   Place 1 patch onto the skin daily   Quantity:  30 patch   Refills:  0       * nicotine polacrilex 4 MG lozenge   Used for:  Encounter for smoking cessation counseling        1-2 lozenges every hour as needed, max 10 lozenges per day   Quantity:  360 tablet   Refills:  1       * nicotine polacrilex 4 MG gum   Commonly known as:  NICORETTE   Used for:  Encounter for smoking cessation counseling        Chew one piece every hour as directed.   Quantity:  100 tablet   Refills:  2       omeprazole 40 MG capsule   Commonly known as:  priLOSEC   Used for:  Alcohol dependence, continuous drinking behavior (H)         Dose:  40 mg   Take 1 capsule (40 mg) by mouth daily   Quantity:  30 capsule   Refills:  3       ondansetron 4 MG tablet   Commonly known as:  ZOFRAN   Used for:  Nausea and vomiting, intractability of vomiting not specified, unspecified vomiting type        Dose:  4 mg   Take 1 tablet (4 mg) by mouth every 8 hours as needed for nausea   Quantity:  10 tablet   Refills:  0       sertraline 100 MG tablet   Commonly known as:  ZOLOFT   Used for:  Alcohol dependence, continuous drinking behavior (H)        Dose:  200 mg   Take 2 tablets (200 mg) by mouth daily   Quantity:  30 tablet   Refills:  0       thiamine 100 MG tablet   Used for:  Acute alcoholic intoxication in alcoholism with complication (H)        Dose:  100 mg   Take 1 tablet (100 mg) by mouth daily   Quantity:  30 tablet   Refills:  1       * Notice:  This list has 4 medication(s) that are the same as other medications prescribed for you. Read the directions carefully, and ask your doctor or other care provider to review them with you.         Where to get your medicines      Some of these will need a paper prescription and others can be bought over the counter. Ask your nurse if you have questions.     Bring a paper prescription for each of these medications     LORazepam 1 MG tablet                Protect others around you: Learn how to safely use, store and throw away your medicines at www.disposemymeds.org.             Medication List: This is a list of all your medications and when to take them. Check marks below indicate your daily home schedule. Keep this list as a reference.      Medications           Morning Afternoon Evening Bedtime As Needed    amLODIPine 10 MG tablet   Commonly known as:  NORVASC   Take 1 tablet (10 mg) by mouth daily   Last time this was given:  10 mg on 10/2/2018  9:16 AM                                amphetamine-dextroamphetamine 30 MG per 24 hr capsule   Commonly known as:  ADDERALL XR   Take 30 mg by mouth 2 times daily                                 busPIRone 10 MG tablet   Commonly known as:  BUSPAR   Take 2 tablets (20 mg) by mouth 3 times daily   Last time this was given:  20 mg on 10/2/2018  9:17 AM                                cloNIDine 0.1 MG tablet   Commonly known as:  CATAPRES   Take 1 tablet (0.1 mg) by mouth 2 times daily                                disulfiram 250 MG tablet   Commonly known as:  ANTABUSE   Take 1 tablet (250 mg) by mouth daily                                gabapentin 800 MG tablet   Commonly known as:  NEURONTIN   TAKE 1 TABLET (800 MG) BY MOUTH 4 TIMES DAILY                                hydrOXYzine 50 MG tablet   Commonly known as:  ATARAX   Take 1 tablet (50 mg) by mouth every 4 hours as needed for anxiety                                loperamide 2 MG tablet   Commonly known as:  IMODIUM A-D   Take 1 tablet (2 mg) by mouth 4 times daily as needed for diarrhea                                LORazepam 1 MG tablet   Commonly known as:  ATIVAN   Take 1 tablet (1 mg) by mouth every 8 hours as needed for agitation or anxiety   Last time this was given:  1 mg on 10/2/2018 12:12 PM                                * mirtazapine 7.5 MG Tabs tablet   Commonly known as:  REMERON   Take 1 tablet (7.5 mg) by mouth At Bedtime   Last time this was given:  15 mg on 10/1/2018  9:23 PM                                * mirtazapine 15 MG tablet   Commonly known as:  REMERON   Take 1 tablet (15 mg) by mouth At Bedtime To start after completing 7.5 mg/day Q HS x 3 doses.   Last time this was given:  15 mg on 10/1/2018  9:23 PM                                multivitamin, therapeutic with minerals Tabs tablet   Take 1 tablet by mouth daily   Last time this was given:  1 tablet on 10/2/2018  9:16 AM                                nicotine 21 MG/24HR 24 hr patch   Commonly known as:  NICODERM CQ   Place 1 patch onto the skin daily                                * nicotine polacrilex 4 MG lozenge   1-2 lozenges every hour  as needed, max 10 lozenges per day                                * nicotine polacrilex 4 MG gum   Commonly known as:  NICORETTE   Chew one piece every hour as directed.                                omeprazole 40 MG capsule   Commonly known as:  priLOSEC   Take 1 capsule (40 mg) by mouth daily   Last time this was given:  40 mg on 10/2/2018  9:17 AM                                ondansetron 4 MG tablet   Commonly known as:  ZOFRAN   Take 1 tablet (4 mg) by mouth every 8 hours as needed for nausea                                sertraline 100 MG tablet   Commonly known as:  ZOLOFT   Take 2 tablets (200 mg) by mouth daily   Last time this was given:  200 mg on 10/2/2018  9:17 AM                                thiamine 100 MG tablet   Take 1 tablet (100 mg) by mouth daily   Last time this was given:  100 mg on 10/2/2018  9:18 AM                                traZODone 100 MG tablet   Commonly known as:  DESYREL   Take 1-2 tablets (100-200 mg) by mouth nightly as needed for sleep   Last time this was given:  200 mg on 10/1/2018  9:23 PM                                * Notice:  This list has 4 medication(s) that are the same as other medications prescribed for you. Read the directions carefully, and ask your doctor or other care provider to review them with you.

## 2018-09-29 NOTE — PROGRESS NOTES
"Pt was intubated for airway protection and placed on the ventilator, and is currently resting comfortably on it. A 7.5 ETT was placed @ 23 cm but needed to be advanced to 26 cm and was conformed via x-ray with good placement. Will continue to monitor and assess.    Vital signs:  Temp: 96.8  F (36  C) Temp src: Temporal BP: (!) 144/108   Heart Rate: 101 Resp: 13 SpO2: 100 % O2 Device: Mechanical Ventilator        Estimated body mass index is 40.11 kg/(m^2) as calculated from the following:    Height as of 6/29/18: 1.854 m (6' 1\").    Weight as of 9/22/18: 137.9 kg (304 lb).    Ventilation Mode: CMV/AC  (Continuous Mandatory Ventilation/ Assist Control)  FiO2 (%): 50 %  Rate Set (breaths/minute): 14 breaths/min  Tidal Volume Set (mL): 550 mL  PEEP (cm H2O): 5 cmH2O  Oxygen Concentration (%): 50 %  Resp: 13    PAST MEDICAL HISTORY:   Past Medical History:   Diagnosis Date     ADD (attention deficit disorder)      Agitation      Alcohol abuse      Anxiety     gabapentin helps the most      Benzodiazepine abuse      Drug abuse      GERD (gastroesophageal reflux disease)      Hepatic steatosis      Hypertension      Obesity      Pyloric stenosis     s/p pyloromyotomy as an infant       PAST SURGICAL HISTORY:   Past Surgical History:   Procedure Laterality Date     Deviated septum repair       PYLOROMYOTOMY SURGERY  as an infant      SHOULDER SURGERY Left 07/29/2016    Removal of cartilage       FAMILY HISTORY:   Family History   Problem Relation Age of Onset     Neurologic Disorder Mother      Multiple Sclerosis     Depression Mother      Anxiety Disorder Mother      Alcohol/Drug Father      Substance Abuse Father      Depression Maternal Grandmother      Anxiety Disorder Maternal Grandmother      Hypertension Maternal Grandmother      Substance Abuse Maternal Grandfather      Alcohol/Drug Paternal Grandfather        SOCIAL HISTORY:   Social History   Substance Use Topics     Smoking status: Current Every Day Smoker     " Packs/day: 1.00     Years: 10.00     Types: Cigarettes     Smokeless tobacco: Current User     Alcohol use 0.0 oz/week     0 Standard drinks or equivalent per week      Comment: currently intoxicated     Luis Alberto Coello RT  9/29/2018

## 2018-09-29 NOTE — LETTER
Transition Communication Hand-off for Care Transitions to Next Level of Care Provider    Name: Nickolas Lang  : 1990  MRN #: [id number]  Primary Care Provider: Tima Crews     Primary Clinic: [address] [address]     Reason for Hospitalization:  Toxic encephalopathy [G92]  Elevated lactic acid level [R79.89]  Acute respiratory failure, unspecified whether with hypoxia or hypercapnia (H) [J96.00]  Alcohol dependence with intoxication with complication (H) [F10.229]  Admit Date/Time: 2018  4:32 PM  Discharge Date: 10/2/18  Payor Source: Payor: [employer] / Plan: [employer] [place] / Product Type: HMO /     Readmission Assessment Measure (ANASTASIA) Risk Score/category: ELEVATED         Reason for Communication Hand-off Referral: Difficulty understanding plan of care  No support or lacking a support system  Non-adherence to plan of care related to:  Multiple chemical dependency admissions  Avoidable readmission within 30 days  Other This is his 6th ed/hospitalization in september    Discharge Plan:       Concern for non-adherence with plan of care:   YES  Discharge Needs Assessment:  Needs       Most Recent Value    Anticipated Changes Related to Illness none    Transportation Available family or friend will provide    Current Discharge Risk substance use/abuse    Other Resources Other (see comment) [PO Officer LT Schaeffer [phone]]    Chemical Dependency Services [facility], [phone]          Already enrolled in Tele-monitoring program and name of program:  na  Follow-up specialty is recommended: No    Follow-up plan:  No future appointments.    Any outstanding tests or procedures:              Key Recommendations:  Pt is admitted with ETOH W/D BAL .58.  This is pt's 6th ed/hospitalization in 2018.  Pt was found unresponsive after getting kicked out of his mother's house.   Pt did meet with  and said:  Pt stated that he has talked with staff at [facility]  and plans to start treatment next week in their step down program.  PT stated he would like to try and work and do CD treatments program at the same time. Pt stated that  He other CD programs did not work for him because while in treatment he was in a protected bubble.  That once home it was difficult to be in the real every day life and not have the correct support.  PT stated he feels if he can work and attend the step down program that he will be more successful.     SW did talk with pt's  as well.      Pt should follow up with psychiatrist Dr Melgar as well.      Lexus Littlejohn    AVS/Discharge Summary is the source of truth; this is a helpful guide for improved communication of patient story

## 2018-09-30 ENCOUNTER — APPOINTMENT (OUTPATIENT)
Dept: GENERAL RADIOLOGY | Facility: CLINIC | Age: 28
DRG: 896 | End: 2018-09-30
Attending: INTERNAL MEDICINE
Payer: COMMERCIAL

## 2018-09-30 LAB
ALBUMIN UR-MCNC: 100 MG/DL
AMORPH CRY #/AREA URNS HPF: ABNORMAL /HPF
ANION GAP SERPL CALCULATED.3IONS-SCNC: 10 MMOL/L (ref 3–14)
APPEARANCE UR: ABNORMAL
BACTERIA #/AREA URNS HPF: ABNORMAL /HPF
BILIRUB UR QL STRIP: NEGATIVE
BUN SERPL-MCNC: 10 MG/DL (ref 7–30)
CALCIUM SERPL-MCNC: 7.2 MG/DL (ref 8.5–10.1)
CHLORIDE SERPL-SCNC: 107 MMOL/L (ref 94–109)
CO2 SERPL-SCNC: 25 MMOL/L (ref 20–32)
COLOR UR AUTO: YELLOW
CREAT SERPL-MCNC: 0.53 MG/DL (ref 0.66–1.25)
CREAT SERPL-MCNC: 0.65 MG/DL (ref 0.66–1.25)
ERYTHROCYTE [DISTWIDTH] IN BLOOD BY AUTOMATED COUNT: 15.6 % (ref 10–15)
GFR SERPL CREATININE-BSD FRML MDRD: >90 ML/MIN/1.7M2
GFR SERPL CREATININE-BSD FRML MDRD: >90 ML/MIN/1.7M2
GLUCOSE BLDC GLUCOMTR-MCNC: 117 MG/DL (ref 70–99)
GLUCOSE BLDC GLUCOMTR-MCNC: 120 MG/DL (ref 70–99)
GLUCOSE BLDC GLUCOMTR-MCNC: 98 MG/DL (ref 70–99)
GLUCOSE SERPL-MCNC: 122 MG/DL (ref 70–99)
GLUCOSE UR STRIP-MCNC: NEGATIVE MG/DL
HCT VFR BLD AUTO: 41.9 % (ref 40–53)
HGB BLD-MCNC: 14.2 G/DL (ref 13.3–17.7)
HGB UR QL STRIP: ABNORMAL
HYALINE CASTS #/AREA URNS LPF: 43 /LPF (ref 0–2)
KETONES UR STRIP-MCNC: NEGATIVE MG/DL
LEUKOCYTE ESTERASE UR QL STRIP: ABNORMAL
MAGNESIUM SERPL-MCNC: 1.9 MG/DL (ref 1.6–2.3)
MCH RBC QN AUTO: 29.6 PG (ref 26.5–33)
MCHC RBC AUTO-ENTMCNC: 33.9 G/DL (ref 31.5–36.5)
MCV RBC AUTO: 88 FL (ref 78–100)
MUCOUS THREADS #/AREA URNS LPF: PRESENT /LPF
NITRATE UR QL: NEGATIVE
PH UR STRIP: 5 PH (ref 5–7)
PLATELET # BLD AUTO: 258 10E9/L (ref 150–450)
POTASSIUM SERPL-SCNC: 3.9 MMOL/L (ref 3.4–5.3)
RBC # BLD AUTO: 4.79 10E12/L (ref 4.4–5.9)
RBC #/AREA URNS AUTO: >182 /HPF (ref 0–2)
SODIUM SERPL-SCNC: 142 MMOL/L (ref 133–144)
SOURCE: ABNORMAL
SP GR UR STRIP: 1.03 (ref 1–1.03)
SQUAMOUS #/AREA URNS AUTO: 45 /HPF (ref 0–1)
URATE CRY #/AREA URNS HPF: ABNORMAL /HPF
UROBILINOGEN UR STRIP-MCNC: 0 MG/DL (ref 0–2)
WBC # BLD AUTO: 7 10E9/L (ref 4–11)
WBC #/AREA URNS AUTO: >182 /HPF (ref 0–5)

## 2018-09-30 PROCEDURE — 80048 BASIC METABOLIC PNL TOTAL CA: CPT | Performed by: INTERNAL MEDICINE

## 2018-09-30 PROCEDURE — 99232 SBSQ HOSP IP/OBS MODERATE 35: CPT | Performed by: HOSPITALIST

## 2018-09-30 PROCEDURE — 25000132 ZZH RX MED GY IP 250 OP 250 PS 637: Performed by: HOSPITALIST

## 2018-09-30 PROCEDURE — 40000275 ZZH STATISTIC RCP TIME EA 10 MIN

## 2018-09-30 PROCEDURE — 83735 ASSAY OF MAGNESIUM: CPT | Performed by: INTERNAL MEDICINE

## 2018-09-30 PROCEDURE — 25000125 ZZHC RX 250: Performed by: INTERNAL MEDICINE

## 2018-09-30 PROCEDURE — 85027 COMPLETE CBC AUTOMATED: CPT | Performed by: INTERNAL MEDICINE

## 2018-09-30 PROCEDURE — 20000003 ZZH R&B ICU

## 2018-09-30 PROCEDURE — 99232 SBSQ HOSP IP/OBS MODERATE 35: CPT | Performed by: ANESTHESIOLOGY

## 2018-09-30 PROCEDURE — 94003 VENT MGMT INPAT SUBQ DAY: CPT

## 2018-09-30 PROCEDURE — 00000146 ZZHCL STATISTIC GLUCOSE BY METER IP

## 2018-09-30 PROCEDURE — 40000007 ZZH STATISTIC ADULT CD FACE TO FACE-NO CHRG

## 2018-09-30 PROCEDURE — 36415 COLL VENOUS BLD VENIPUNCTURE: CPT | Performed by: INTERNAL MEDICINE

## 2018-09-30 PROCEDURE — 71045 X-RAY EXAM CHEST 1 VIEW: CPT

## 2018-09-30 PROCEDURE — 25000128 H RX IP 250 OP 636: Performed by: INTERNAL MEDICINE

## 2018-09-30 PROCEDURE — 25000132 ZZH RX MED GY IP 250 OP 250 PS 637: Performed by: INTERNAL MEDICINE

## 2018-09-30 PROCEDURE — 99207 ZZC CDG-MDM COMPONENT: MEETS LOW - DOWN CODED: CPT | Performed by: HOSPITALIST

## 2018-09-30 PROCEDURE — 81001 URINALYSIS AUTO W/SCOPE: CPT | Performed by: HOSPITALIST

## 2018-09-30 PROCEDURE — 25000128 H RX IP 250 OP 636: Performed by: HOSPITALIST

## 2018-09-30 PROCEDURE — 87086 URINE CULTURE/COLONY COUNT: CPT | Performed by: INTERNAL MEDICINE

## 2018-09-30 RX ORDER — DEXTROAMPHETAMINE SACCHARATE, AMPHETAMINE ASPARTATE MONOHYDRATE, DEXTROAMPHETAMINE SULFATE AND AMPHETAMINE SULFATE 3.75; 3.75; 3.75; 3.75 MG/1; MG/1; MG/1; MG/1
30 CAPSULE, EXTENDED RELEASE ORAL 2 TIMES DAILY
Status: DISCONTINUED | OUTPATIENT
Start: 2018-09-30 | End: 2018-10-02 | Stop reason: HOSPADM

## 2018-09-30 RX ORDER — LORAZEPAM 0.5 MG/1
0.5 TABLET ORAL EVERY 6 HOURS
Status: DISCONTINUED | OUTPATIENT
Start: 2018-09-30 | End: 2018-10-01

## 2018-09-30 RX ORDER — TRAZODONE HYDROCHLORIDE 100 MG/1
100-200 TABLET ORAL
Status: DISCONTINUED | OUTPATIENT
Start: 2018-09-30 | End: 2018-10-02 | Stop reason: HOSPADM

## 2018-09-30 RX ORDER — PROPOFOL 10 MG/ML
5-75 INJECTION, EMULSION INTRAVENOUS CONTINUOUS
Status: DISCONTINUED | OUTPATIENT
Start: 2018-09-30 | End: 2018-09-30

## 2018-09-30 RX ORDER — FOLIC ACID 1 MG/1
1 TABLET ORAL DAILY
Status: DISCONTINUED | OUTPATIENT
Start: 2018-09-30 | End: 2018-10-01

## 2018-09-30 RX ORDER — BUSPIRONE HYDROCHLORIDE 10 MG/1
20 TABLET ORAL 3 TIMES DAILY
Status: DISCONTINUED | OUTPATIENT
Start: 2018-09-30 | End: 2018-10-02 | Stop reason: HOSPADM

## 2018-09-30 RX ORDER — SERTRALINE HYDROCHLORIDE 100 MG/1
200 TABLET, FILM COATED ORAL DAILY
Status: DISCONTINUED | OUTPATIENT
Start: 2018-09-30 | End: 2018-10-02 | Stop reason: HOSPADM

## 2018-09-30 RX ORDER — DIAZEPAM 10 MG/2ML
5-10 INJECTION, SOLUTION INTRAMUSCULAR; INTRAVENOUS EVERY 30 MIN PRN
Status: DISCONTINUED | OUTPATIENT
Start: 2018-09-30 | End: 2018-10-01

## 2018-09-30 RX ORDER — LANOLIN ALCOHOL/MO/W.PET/CERES
100 CREAM (GRAM) TOPICAL DAILY
Status: DISCONTINUED | OUTPATIENT
Start: 2018-09-30 | End: 2018-10-01

## 2018-09-30 RX ORDER — AMLODIPINE BESYLATE 10 MG/1
10 TABLET ORAL DAILY
Status: DISCONTINUED | OUTPATIENT
Start: 2018-09-30 | End: 2018-10-02 | Stop reason: HOSPADM

## 2018-09-30 RX ORDER — MULTIVITAMIN,THERAPEUTIC
1 TABLET ORAL DAILY
Status: DISCONTINUED | OUTPATIENT
Start: 2018-09-30 | End: 2018-10-01

## 2018-09-30 RX ORDER — PROPOFOL 10 MG/ML
10-20 INJECTION, EMULSION INTRAVENOUS EVERY 30 MIN PRN
Status: DISCONTINUED | OUTPATIENT
Start: 2018-09-30 | End: 2018-09-30

## 2018-09-30 RX ORDER — CEFTRIAXONE 1 G/1
1 INJECTION, POWDER, FOR SOLUTION INTRAMUSCULAR; INTRAVENOUS EVERY 24 HOURS
Status: DISCONTINUED | OUTPATIENT
Start: 2018-09-30 | End: 2018-10-02 | Stop reason: HOSPADM

## 2018-09-30 RX ORDER — DIAZEPAM 10 MG
10 TABLET ORAL EVERY 30 MIN PRN
Status: DISCONTINUED | OUTPATIENT
Start: 2018-09-30 | End: 2018-10-01

## 2018-09-30 RX ORDER — MIRTAZAPINE 15 MG/1
15 TABLET, FILM COATED ORAL AT BEDTIME
Status: DISCONTINUED | OUTPATIENT
Start: 2018-09-30 | End: 2018-10-02 | Stop reason: HOSPADM

## 2018-09-30 RX ADMIN — LORAZEPAM 2 MG: 2 INJECTION INTRAMUSCULAR; INTRAVENOUS at 12:52

## 2018-09-30 RX ADMIN — MIDAZOLAM HYDROCHLORIDE 2 MG: 1 INJECTION, SOLUTION INTRAMUSCULAR; INTRAVENOUS at 00:49

## 2018-09-30 RX ADMIN — THERA TABS 1 TABLET: TAB at 09:40

## 2018-09-30 RX ADMIN — BUSPIRONE HYDROCHLORIDE 20 MG: 10 TABLET ORAL at 17:08

## 2018-09-30 RX ADMIN — PROPOFOL 75 MCG/KG/MIN: 10 INJECTION, EMULSION INTRAVENOUS at 05:44

## 2018-09-30 RX ADMIN — TRAZODONE HYDROCHLORIDE 200 MG: 100 TABLET ORAL at 21:16

## 2018-09-30 RX ADMIN — MIDAZOLAM HYDROCHLORIDE 4 MG: 1 INJECTION, SOLUTION INTRAMUSCULAR; INTRAVENOUS at 07:47

## 2018-09-30 RX ADMIN — METOPROLOL TARTRATE 5 MG: 5 INJECTION, SOLUTION INTRAVENOUS at 17:41

## 2018-09-30 RX ADMIN — LORAZEPAM 2 MG: 2 INJECTION INTRAMUSCULAR; INTRAVENOUS at 14:23

## 2018-09-30 RX ADMIN — AMLODIPINE BESYLATE 10 MG: 10 TABLET ORAL at 10:25

## 2018-09-30 RX ADMIN — POTASSIUM CHLORIDE 10 MEQ: 2 INJECTION, SOLUTION, CONCENTRATE INTRAVENOUS at 10:07

## 2018-09-30 RX ADMIN — LORAZEPAM 2 MG: 2 INJECTION INTRAMUSCULAR; INTRAVENOUS at 09:10

## 2018-09-30 RX ADMIN — LORAZEPAM 2 MG: 2 INJECTION INTRAMUSCULAR; INTRAVENOUS at 11:47

## 2018-09-30 RX ADMIN — PROPOFOL 75 MCG/KG/MIN: 10 INJECTION, EMULSION INTRAVENOUS at 07:22

## 2018-09-30 RX ADMIN — LORAZEPAM 2 MG: 2 INJECTION INTRAMUSCULAR; INTRAVENOUS at 10:21

## 2018-09-30 RX ADMIN — SERTRALINE HYDROCHLORIDE 200 MG: 100 TABLET ORAL at 11:47

## 2018-09-30 RX ADMIN — PROPOFOL 75 MCG/KG/MIN: 10 INJECTION, EMULSION INTRAVENOUS at 04:26

## 2018-09-30 RX ADMIN — LORAZEPAM 2 MG: 2 INJECTION INTRAMUSCULAR; INTRAVENOUS at 19:46

## 2018-09-30 RX ADMIN — MIDAZOLAM HYDROCHLORIDE 2 MG: 1 INJECTION, SOLUTION INTRAMUSCULAR; INTRAVENOUS at 05:57

## 2018-09-30 RX ADMIN — Medication 0.5 MG: at 05:56

## 2018-09-30 RX ADMIN — ENOXAPARIN SODIUM 40 MG: 40 INJECTION SUBCUTANEOUS at 21:18

## 2018-09-30 RX ADMIN — METOPROLOL TARTRATE 5 MG: 5 INJECTION, SOLUTION INTRAVENOUS at 00:43

## 2018-09-30 RX ADMIN — MIDAZOLAM HYDROCHLORIDE 2 MG: 1 INJECTION, SOLUTION INTRAMUSCULAR; INTRAVENOUS at 05:48

## 2018-09-30 RX ADMIN — Medication 100 MG: at 09:40

## 2018-09-30 RX ADMIN — Medication 0.5 MG: at 02:41

## 2018-09-30 RX ADMIN — BUSPIRONE HYDROCHLORIDE 20 MG: 10 TABLET ORAL at 21:16

## 2018-09-30 RX ADMIN — PROPOFOL 75 MCG/KG/MIN: 10 INJECTION, EMULSION INTRAVENOUS at 02:42

## 2018-09-30 RX ADMIN — BUSPIRONE HYDROCHLORIDE 20 MG: 10 TABLET ORAL at 10:26

## 2018-09-30 RX ADMIN — Medication 2 G: at 07:00

## 2018-09-30 RX ADMIN — CEFTRIAXONE SODIUM 1 G: 1 INJECTION, POWDER, FOR SOLUTION INTRAMUSCULAR; INTRAVENOUS at 14:23

## 2018-09-30 RX ADMIN — OMEPRAZOLE 40 MG: 20 CAPSULE, DELAYED RELEASE ORAL at 10:23

## 2018-09-30 RX ADMIN — DIAZEPAM 10 MG: 10 TABLET ORAL at 21:17

## 2018-09-30 RX ADMIN — FOLIC ACID 1 MG: 1 TABLET ORAL at 09:40

## 2018-09-30 RX ADMIN — LORAZEPAM 0.5 MG: 0.5 TABLET ORAL at 09:40

## 2018-09-30 RX ADMIN — MIDAZOLAM HYDROCHLORIDE 2 MG: 1 INJECTION, SOLUTION INTRAMUSCULAR; INTRAVENOUS at 02:03

## 2018-09-30 RX ADMIN — LORAZEPAM 0.5 MG: 0.5 TABLET ORAL at 17:09

## 2018-09-30 RX ADMIN — MIRTAZAPINE 15 MG: 15 TABLET, FILM COATED ORAL at 21:16

## 2018-09-30 ASSESSMENT — ACTIVITIES OF DAILY LIVING (ADL)
ADLS_ACUITY_SCORE: 10
ADLS_ACUITY_SCORE: 11
ADLS_ACUITY_SCORE: 10
ADLS_ACUITY_SCORE: 14
ADLS_ACUITY_SCORE: 11
ADLS_ACUITY_SCORE: 11

## 2018-09-30 NOTE — PROGRESS NOTES
ICU End of Shift Summary.  For vital signs and complete assessments, please see documentation flowsheets.     Pertinent assessments:   Major Shift Events:   Plan (Upcoming Events):   Discharge/Transfer Needs:     Bedside Shift Report Completed :   Bedside Safety Check Completed:      Neuro: Was sedated but is now alert and oriented, requiring Ativan per CIW  Pulmonary: Extubated to room air this am. Sats >92%  Cardio: Tachycardic, hypertensive. PRN Metoprolol  GI: stooling in BR  : Webb removed this am. Voiding  Skin: intact  Nutrition: Tolerating regular diet  Activity: Up in room with standby  Lines: 18g Right AC. Foot IV removed as patient ambulating    Plan: Monitor, RAYMOND

## 2018-09-30 NOTE — CONSULTS
The writer met with the patient introduced herself and her role. The patient said he has an intake with [facility] treatment program on Monday to start treatment. The patient reported he already had a Rule 25 assessment/CD assessment completed with [facility]. The patient reported he is planning on starting treatment with them and knows he needs to go back to treatment. The patient reported he plans to give them a call to let them know he is in the hospital and to coordinate with them for starting treatment for when he discharges. The writer gave the patient her business card and wrote [facility] number on the back for the patient to follow up with them. The patient reported he plans to give them a call today to be proactive about it. The writer told the patient to give her a call if he needed any future CD services/resources.

## 2018-09-30 NOTE — PROGRESS NOTES
TELE:  28M h/o heavy etoh use found unresponsive today.  Now intubated for airway protection satting well on minimal vent settings.  Head CT ok.  Labs with ok lytes, creatinine, lfts, cbc.  Mildly elevated lactic to 4.    Agree with intubation for airway protection.  Trend lactates.      Tele ICU remains available at all times for consultation as needed.    ADDENDUM 2200:  Noted to be agitated despite propofol.  Added prn versed.  Also giving prn dialudid.  Also hypoglycemic.  Changed fluid to contain dextrose.  Hypoglycemia protocol ordered.    ADDENDUM 2330:  Asked to check NG position.  Appears to be in pylorus on CT scan.  OK to use.

## 2018-09-30 NOTE — PROGRESS NOTES
"Note dictated using the dragon system    28-year-old male intubated in the emergency room for heavy EtOH use.  He was admitted to the ICU for ventilatory management secondary to acute alcohol intoxication.  Patient was found unresponsive with a bottle of vodka next to him.  Patient does have a history of EtOH use.  Upon my arrival to the ICU this a.m. I was called to bedside by nursing that the patient was awake alert following commands.  I confirmed this on my exam patient had a 2-second head lift showed me 2 fingers on both upper extremities.  I quickly switch the patient to CPAP 6/6.  Given the patient's improved mental status and no obvious pulmonary pathology the patient was extubated without complications in my presence.  I visited with the patient later in the morning he was sitting in a chair comfortable.  He remembers what happens and endorses drinking about 2 L of alcohol yesterday.  He said his alcohol use has been increasing over the last few days.  Otherwise he has no complaints this a.m.  Vital signs:  Temp: 99  F (37.2  C) Temp src: Bladder BP: (!) 141/96 Pulse: 110 Heart Rate: 108 Resp: 20 SpO2: 95 % O2 Device: None (Room air)   Height: 185.4 cm (6' 1\") Weight: 141.8 kg (312 lb 9.8 oz)  Estimated body mass index is 41.24 kg/(m^2) as calculated from the following:    Height as of this encounter: 1.854 m (6' 1\").    Weight as of this encounter: 141.8 kg (312 lb 9.8 oz).    Awake alert following commands  Slightly tremulous  Chest clear to auscultation bilaterally  Cardiovascular regular rate and rhythm  Abdomen soft nondistended nontender    Labs: Potassium 3.9 creatinine 0.65   Lab Results   Component Value Date    HGB 14.2 09/30/2018   Assessment and plan:  28-year-old female with history of alcohol use presented with acute alcohol intoxication he was intubated for airway protection.  This morning he was awake alert and was extubated without complications.  Given the patient's increased frequency of " EtOH H use over the last 2 weeks I would recommend the patient being placed on CIWA protocol.  Patient also endorsed to me that he was scheduled for outpatient treatment starting tomorrow.  Given the fact that he will probably have a hospitalization of at least 2 nights I would recommend the patient be seen by inpatient chemical dependence at this time.  If the patient continues to do well I would recommend transfer out of the ICU either later today or first thing in a.m.    Ezekiel Marte MD  Chief critical care anesthesiology  Department of anesthesiology  [facility]  Date of service 9/30/2018

## 2018-09-30 NOTE — PROGRESS NOTES
Ventilation Mode: CMV/AC  (Continuous Mandatory Ventilation/ Assist Control)  FiO2 (%): 30 %  Rate Set (breaths/minute): 14 breaths/min  Tidal Volume Set (mL): 550 mL  PEEP (cm H2O): 5 cmH2O  Pressure Support (cm H2O): 5 cmH2O  Oxygen Concentration (%): 30 %  Resp: 18      Patient had stable shift.  No vent changes made overnight.  BS clear.  Minimal suctioning required.

## 2018-09-30 NOTE — PLAN OF CARE
Problem: Restraint for Non-Violent/Non-Self-Destructive Behavior  Goal: Prevent/Manage Potential Problems  Maintain safety of patient and others during period of restraint.  Promote psychological and physical wellbeing.  Prevent injury to skin and involved body parts.  Promote nutrition, hydration, and elimination.   Right wrist and Left wrist restraints initiated on patient on 9/29/2018 at 8:00 PM    Clinical Justification: Pulling lines, pulling tubes, and pulling equipment  Less Restrictive Alternative: 1:1 patient care, Disguise equipment, Reorientation  Attending Physician Notified: Yes, Attending Physician's Name: Dr. Romero   Order received: Yes     Family Notification: Parent   Criteria explained to mother  Patient's Response: No evidence of learning  Restraint care Plan initiated: Yes    Jayna Whipple

## 2018-09-30 NOTE — PROGRESS NOTES
Placed pt's cell phone and wallet in security, inventoried with another staff member. Secure envelope #[id number].

## 2018-09-30 NOTE — PROGRESS NOTES
"SPIRITUAL HEALTH SERVICES Progress Note  [facility] ICU    Attempted visit with pt Nickolas who said that he \"may want to visit with a  at some point, but not today.\"    Advised that staff chaplains remain available for support when needed.    Will communicate with staff chaplains for possible follow up.      Yifan Branham  Chaplain Resident  Pager: [phone]    "

## 2018-09-30 NOTE — PLAN OF CARE
Problem: Patient Care Overview  Goal: Plan of Care/Patient Progress Review  ICU End of Shift Summary.  For vital signs and complete assessments, please see documentation flowsheets.     Pertinent assessments: Responding to painful stimuli only on admission. Now occasionally opening eyes spontaneously.  Temp on admission 95-96 requiring warming blanket to warm able to be removed a few hours later. Now temp 100.3 axillary/ 99.5 bladder.   Patient sedated on propofol with Rass scoring of -3 - 1, patient becomes anxious and agitated at times then scoring RASS of 3. Prn versed bumps and dilaudid given with good outcomes. Prn metoprolol given for HR greater than 130 (HR got up to 137) responded well. K and Mag this am needing replacement per protocol (ordered from pharmacy).  Patients mother Eloisa by bedside for admission, updated and all questions answered. Offered  support as she stated she has no support system, tearful and feeling guilty for kicking son out of house prior to admission. No BM's this shift however passing flatus. Multiple unsuccessful attempts by several different nurses to start an I.V., may need PICC if further access is needed. Currently infusing in right AC and left foot.   Major Shift Events: Relaxation promoted. Mag replaced x 1.   Plan (Upcoming Events): Vent wean, replace K and Mag. SW consult for discharge planning.  Discharge/Transfer Needs: To be determined pending progress.     Bedside Shift Report Completed : yes  Bedside Safety Check Completed: yes

## 2018-09-30 NOTE — PROGRESS NOTES
CTS identifies pt as high risk due to elevated ANASTASIA. Within the last 6 months patient has had 3 admissions and 6 ER visits mostly all related to alcohol use and intoxication. Hospital follow up appointment was not scheduled for the pt at this time. RN CTS will follow with patient once stabilizes out of the ICU.      Handoff will be given to PCP clinic at discharge.     CM will continue to follow patient for any additional discharge needs.     Yeimy Banks MA-DORI  Care Transition Services  [phone]

## 2018-09-30 NOTE — PLAN OF CARE
Problem: Restraint for Non-Violent/Non-Self-Destructive Behavior  Goal: Prevent/Manage Potential Problems  Maintain safety of patient and others during period of restraint.  Promote psychological and physical wellbeing.  Prevent injury to skin and involved body parts.  Promote nutrition, hydration, and elimination.   Right wrist and Left wrist restraints continued 9/30/2018    Clinical Justification: Pulling lines, pulling tubes, and pulling equipment  Less Restrictive Alternative: 1:1 patient care, Disguise equipment, Reorientation  Attending Physician Notified: Yes, Attending Physician's Name: Dr. Romero   New orders placed No  Length of Order: 1 Day      Jayna Whipple

## 2018-09-30 NOTE — PROGRESS NOTES
"Pt was placed on a PS trial, and after tolerating it well, he was extubated to RA. Minimal secretions were suctioned prior to extubation. Will continue to monitor and assess.    Vital signs:  Temp: 99  F (37.2  C) Temp src: Bladder BP: 131/69 Pulse: 110 Heart Rate: 111 Resp: 18 SpO2: 99 % O2 Device: None (Room air)   Height: 185.4 cm (6' 1\") Weight: 141.8 kg (312 lb 9.8 oz)  Estimated body mass index is 41.24 kg/(m^2) as calculated from the following:    Height as of this encounter: 1.854 m (6' 1\").    Weight as of this encounter: 141.8 kg (312 lb 9.8 oz).    PAST MEDICAL HISTORY:   Past Medical History:   Diagnosis Date     ADD (attention deficit disorder)      Agitation      Alcohol abuse      Anxiety     gabapentin helps the most      Benzodiazepine abuse      Drug abuse      GERD (gastroesophageal reflux disease)      Hepatic steatosis      Hypertension      Obesity      Pyloric stenosis     s/p pyloromyotomy as an infant       PAST SURGICAL HISTORY:   Past Surgical History:   Procedure Laterality Date     Deviated septum repair       PYLOROMYOTOMY SURGERY  as an infant      SHOULDER SURGERY Left 07/29/2016    Removal of cartilage       FAMILY HISTORY:   Family History   Problem Relation Age of Onset     Neurologic Disorder Mother      Multiple Sclerosis     Depression Mother      Anxiety Disorder Mother      Alcohol/Drug Father      Substance Abuse Father      Depression Maternal Grandmother      Anxiety Disorder Maternal Grandmother      Hypertension Maternal Grandmother      Substance Abuse Maternal Grandfather      Alcohol/Drug Paternal Grandfather        SOCIAL HISTORY:   Social History   Substance Use Topics     Smoking status: Current Every Day Smoker     Packs/day: 1.00     Years: 10.00     Types: Cigarettes     Smokeless tobacco: Current User     Alcohol use 0.0 oz/week     0 Standard drinks or equivalent per week      Comment: currently intoxicated     Luis Alberto Coello RT  9/30/2018     "

## 2018-09-30 NOTE — H&P
[facility]  History and Physical   Hospitalist Service    Carlos Eldridge MD    Nickolas Lang MRN# [id number]   YOB: 1990 Age: 28 year old      Date of Admission:  9/29/2018           Assessment and Plan:   Nickolas Lang is a 28-year-old male with history of alcohol dependence, multiple admissions for alcohol-related problems including withdrawal, hypertension, hepatic steatosis, GERD, benzodiazepine abuse, drug abuse, anxiety, attention deficit disorder, and pyloric stenosis treated surgically as an infant.  He presented to the emergency department by ambulance today unresponsive.  He has had multiple recent admissions related to his alcohol abuse.  He had been living with his mother.  She had become fed up with all of these issues so kicked him out of the house earlier today.  Later in the day, she found him on the steps with a nearly empty liter bottle of vodka.  He was unresponsive.  He was cold to touch.  Paramedics were called.  He had adequate blood sugar in the field.  Vital signs in the ambulance were stable.  He did require supplemental oxygen to maintain adequate oxygen saturations.  No hypotension was noted.  It is suspected that he may have had some hypoxia prior to interventions made by the paramedics- but the extent of this is unknown.  Nickolas was brought to the emergency department for evaluation and treatment.  Vital signs showed tachycardia but were otherwise unremarkable.  Physical exam showed an unresponsive male that was cool to touch.  He was intubated for respiratory failure and airway protection.  Basic metabolic panel and CBC were unremarkable.  Liver function tests were mildly abnormal with ALT of 73.  Lactic acid was elevated at 4.  Blood alcohol level was elevated at 0.54.  Lipase was normal at 335.  CK was normal at 148.  Chest x-ray was unremarkable.  CT scan of head was unremarkable.  There was some bruising on the right side of his  abdomen under his ribs so a CT scan of abdomen and pelvis has been ordered andis pending.  I was asked to admit Nickolas to the intensive care unit with acute respiratory failure due to acute alcohol intoxication.    Problem list:    1.  Acute respiratory failure due to acute alcohol intoxication.  Blood alcohol level was 0.54.  Nickolas was intubated for respiratory failure and airway protection.  He is being sedated with propofol.  Continue propofol.  Vent settings of CMV with rate of 14, tidal volume of 550, PEEP of 5, and oxygen level of 50% wean as tolerated have been ordered.  Chest x-ray has been obtained and shows adequate placement of endotracheal tube.  I will request arterial blood gas be obtained.  I will request a chest x-ray in the morning.  He can be reassessed in the morning to determine whether or not he is ready for weaning trials.    2.  Alcohol dependence with acute alcohol intoxication and obtundation.  He is at high risk for acute alcohol withdrawal.  Acute alcohol withdrawal protocol has been ordered.  IV vitamins have been ordered.  Chemical dependency consultation should be considered once patient is appropriate for consultation.  Prior to admission Antabuse is being held.    3.  Hypertension.  Hold prior to admission amlodipine.    4.  Depression and anxiety.  Hold prior to admission BuSpar, Remeron, Zoloft, and trazodone.    5.  Attention deficit disorder.  Hold prior to admission Adderall.    6.  GERD.  Prior to admission Protonix will be changed to IV and given daily.      Full code  Lovenox for DVT prophylaxis  Disposition: Admit as inpatient to the intensive care unit           Code Status:   Full Code         Primary Care Physician:   Tima Crews [phone]         Chief Complaint:   Responsiveness    History is obtained from Dr. Street and the medical record.  Patient was unable to provide any history.  There was no family present to interview or provide history.          History of Present Illness:   Nickolas Lang is a 28-year-old male with history of alcohol dependence, multiple admissions for alcohol-related problems including withdrawal, hypertension, hepatic steatosis, GERD, benzodiazepine abuse, drug abuse, anxiety, attention deficit disorder, and pyloric stenosis treated surgically as an infant.  He presented to the emergency department by ambulance today unresponsive.  He has had multiple recent admissions related to his alcohol abuse.  He had been living with his mother.  She had become fed up with all of these issues so kicked him out of the house earlier today.  Later in the day, she found him on the steps with a nearly empty liter bottle of vodka.  He was unresponsive.  He was cold to touch.  Paramedics were called.  He had adequate blood sugar in the field.  Vital signs in the ambulance were stable.  He did require supplemental oxygen to maintain adequate oxygen saturations.  No hypotension was noted.  It is suspected that he may have had some hypoxia prior to interventions made by the paramedics- but the extent of this is unknown.  Nickolas was brought to the emergency department for evaluation and treatment.  Vital signs showed tachycardia but were otherwise unremarkable.  Physical exam showed an unresponsive male that was cool to touch.  He was intubated for respiratory failure and airway protection.  Basic metabolic panel and CBC were unremarkable.  Liver function tests were mildly abnormal with ALT of 73.  Lactic acid was elevated at 4.  Blood alcohol level was elevated at 0.54.  Lipase was normal at 335.  CK was normal at 148.  Chest x-ray was unremarkable.  CT scan of head was unremarkable.  There was some bruising on the right side of his abdomen under his ribs so a CT scan of abdomen and pelvis has been ordered andis pending.  I was asked to admit Nickolas to the intensive care unit with acute respiratory failure due to acute alcohol intoxication.            Past Medical History:     Patient Active Problem List   Diagnosis     Morbid obesity (H)     Alcohol dependence with withdrawal (H)     Chemical dependency (H)     Essential hypertension     Suicidal ideation     Elevated liver enzymes     Hepatic steatosis     Obesity     Insomnia, unspecified type     Anxiety     Gastroesophageal reflux disease without esophagitis     Alcohol abuse with alcohol-induced mood disorder (H)     Attention deficit hyperactivity disorder (ADHD), unspecified ADHD type     Alcohol use disorder, severe, dependence (H)     Controlled substance agreement signed     Mild episode of recurrent major depressive disorder (H)     Alcohol withdrawal (H)     Substance abuse     Agitation requiring sedation protocol     Alcohol abuse with alcohol-induced anxiety disorder (H)     Acute respiratory failure (H)     Acute alcoholic intoxication in alcoholism with complication (H)      Past Medical History:   Diagnosis Date     ADD (attention deficit disorder)      Agitation      Alcohol abuse      Anxiety     gabapentin helps the most      Benzodiazepine abuse      Drug abuse      GERD (gastroesophageal reflux disease)      Hepatic steatosis      Hypertension      Obesity      Pyloric stenosis     s/p pyloromyotomy as an infant             Past Surgical History:     Past Surgical History:   Procedure Laterality Date     Deviated septum repair       PYLOROMYOTOMY SURGERY  as an infant      SHOULDER SURGERY Left 07/29/2016    Removal of cartilage            Home Medications:     Prior to Admission medications    Medication Sig Last Dose Taking? Auth Provider   amLODIPine (NORVASC) 10 MG tablet Take 1 tablet (10 mg) by mouth daily   Radha Botello APRN CNP   amphetamine-dextroamphetamine (ADDERALL XR) 30 MG per 24 hr capsule Take 30 mg by mouth 2 times daily   Reported, Patient   busPIRone (BUSPAR) 10 MG tablet Take 2 tablets (20 mg) by mouth 3 times daily   Tima Crews MD   cloNIDine  (CATAPRES) 0.1 MG tablet Take 1 tablet (0.1 mg) by mouth 2 times daily   Radha Botello APRN CNP   disulfiram (ANTABUSE) 250 MG tablet Take 1 tablet (250 mg) by mouth daily   Kathy Flores MD   gabapentin (NEURONTIN) 800 MG tablet TAKE 1 TABLET (800 MG) BY MOUTH 4 TIMES DAILY   Tima Crews MD   hydrOXYzine (ATARAX) 50 MG tablet Take 1 tablet (50 mg) by mouth every 4 hours as needed for anxiety   Dora Velazquez APRN CNP   loperamide (IMODIUM A-D) 2 MG tablet Take 1 tablet (2 mg) by mouth 4 times daily as needed for diarrhea   Dora Velazquez APRN CNP   mirtazapine (REMERON) 15 MG tablet Take 1 tablet (15 mg) by mouth At Bedtime To start after completing 7.5 mg/day Q HS x 3 doses.   Kathy Flores MD   mirtazapine (REMERON) 7.5 MG TABS tablet Take 1 tablet (7.5 mg) by mouth At Bedtime   Kathy Flores MD   multivitamin, therapeutic with minerals (THERA-VIT-M) TABS tablet Take 1 tablet by mouth daily   Filippo Brasher MD   nicotine (NICODERM CQ) 21 MG/24HR 24 hr patch Place 1 patch onto the skin daily   Filippo Brasher MD   nicotine polacrilex (NICORETTE) 4 MG gum Chew one piece every hour as directed.   Tima Crews MD   nicotine polacrilex 4 MG lozenge 1-2 lozenges every hour as needed, max 10 lozenges per day   Tima Crews MD   omeprazole (PRILOSEC) 40 MG capsule Take 1 capsule (40 mg) by mouth daily   Radha Botello APRN CNP   ondansetron (ZOFRAN) 4 MG tablet Take 1 tablet (4 mg) by mouth every 8 hours as needed for nausea   Dora Velazquez APRN CNP   sertraline (ZOLOFT) 100 MG tablet Take 2 tablets (200 mg) by mouth daily   Filippo Brasher MD   thiamine 100 MG tablet Take 1 tablet (100 mg) by mouth daily   Filippo Brasher MD   traZODone (DESYREL) 100 MG tablet Take 1-2 tablets (100-200 mg) by mouth nightly as needed for sleep  Patient taking differently: Take 200 mg by mouth nightly as needed for sleep     Filippo Brasher MD            Allergies:   No Known Allergies         Social History:     Social History   Substance Use Topics     Smoking status: Current Every Day Smoker     Packs/day: 1.00     Years: 10.00     Types: Cigarettes     Smokeless tobacco: Current User     Alcohol use 0.0 oz/week     0 Standard drinks or equivalent per week      Comment: currently intoxicated             Family History:     Family History   Problem Relation Age of Onset     Neurologic Disorder Mother      Multiple Sclerosis     Depression Mother      Anxiety Disorder Mother      Alcohol/Drug Father      Substance Abuse Father      Depression Maternal Grandmother      Anxiety Disorder Maternal Grandmother      Hypertension Maternal Grandmother      Substance Abuse Maternal Grandfather      Alcohol/Drug Paternal Grandfather               Review of Systems:   The 10 point Review of Systems is negative other than as noted in the HPI.           Physical Exam:   Blood pressure (!) 153/107, pulse 110, temperature 96.4  F (35.8  C), resp. rate 14, SpO2 100 %.  0 lbs 0 oz    GENERAL: Unresponsive.  Intubated and on ventilator.  EYES: Pupils equal and round. No scleral erythema or icterus.  ENT: External ears are normal without deformity. Posterior oropharynx is without erythem, swelling, or exudate.  NECK: Supple. No masses or swelling. No tenderness. Thyroid is normal without mass or tenderness.  CHEST: Clear to auscultation. Normal breath sounds. No retractions.   CV: Regular rate and rhythm. No JVD. Pulses normal.  ABDOMEN: Bowel sounds present. No obvious tenderness. No masses or hernia.  EXTREMETIES: No clubbing, cyanosis, or ischemia.  SKIN: Cool and dry to touch. No wounds or rashes.  There is some bruising under the ribs on the right side of the abdomen.  NEUROLOGIC: Unresponsive.  Currently sedated.  Deep tendon reflexes are normal.  Pupil reactions are normal.             Data:   All new lab and imaging data was reviewed.      Results for orders placed or performed during the hospital encounter of 09/29/18 (from the past 24 hour(s))   XR Chest Port 1 View    Narrative    XR CHEST PORT 1 VW 9/29/2018 5:11 PM    HISTORY: Unresponsive.     COMPARISON: September 13, 2018.       Impression    IMPRESSION: Endotracheal and nasogastric tubes in addition as before.  Lung volumes remain low, with left basilar and right midlung streaky  opacities suggestive of atelectasis. No pneumothorax. Heart size  appears stable.     PRISCILLA CHISHOLM MD   Alcohol level blood   Result Value Ref Range    Ethanol g/dL 0.54 (HH) <0.01 g/dL   CBC + differential   Result Value Ref Range    WBC 6.7 4.0 - 11.0 10e9/L    RBC Count 5.62 4.4 - 5.9 10e12/L    Hemoglobin 16.3 13.3 - 17.7 g/dL    Hematocrit 48.9 40.0 - 53.0 %    MCV 87 78 - 100 fl    MCH 29.0 26.5 - 33.0 pg    MCHC 33.3 31.5 - 36.5 g/dL    RDW 14.9 10.0 - 15.0 %    Platelet Count 317 150 - 450 10e9/L    Diff Method Automated Method     % Neutrophils 64.0 %    % Lymphocytes 29.4 %    % Monocytes 5.6 %    % Eosinophils 0.1 %    % Basophils 0.6 %    % Immature Granulocytes 0.3 %    Nucleated RBCs 0 0 /100    Absolute Neutrophil 4.3 1.6 - 8.3 10e9/L    Absolute Lymphocytes 2.0 0.8 - 5.3 10e9/L    Absolute Monocytes 0.4 0.0 - 1.3 10e9/L    Absolute Eosinophils 0.0 0.0 - 0.7 10e9/L    Absolute Basophils 0.0 0.0 - 0.2 10e9/L    Abs Immature Granulocytes 0.0 0 - 0.4 10e9/L    Absolute Nucleated RBC 0.0    CK total   Result Value Ref Range    CK Total 148 30 - 300 U/L   Comprehensive metabolic panel   Result Value Ref Range    Sodium 135 133 - 144 mmol/L    Potassium 3.7 3.4 - 5.3 mmol/L    Chloride 101 94 - 109 mmol/L    Carbon Dioxide 24 20 - 32 mmol/L    Anion Gap 10 3 - 14 mmol/L    Glucose 145 (H) 70 - 99 mg/dL    Urea Nitrogen 11 7 - 30 mg/dL    Creatinine 0.55 (L) 0.66 - 1.25 mg/dL    GFR Estimate >90 >60 mL/min/1.7m2    GFR Estimate If Black >90 >60 mL/min/1.7m2    Calcium 8.4 (L) 8.5 - 10.1 mg/dL     Bilirubin Total 0.2 0.2 - 1.3 mg/dL    Albumin 3.5 3.4 - 5.0 g/dL    Protein Total 7.3 6.8 - 8.8 g/dL    Alkaline Phosphatase 115 40 - 150 U/L    ALT 73 (H) 0 - 70 U/L    AST 43 0 - 45 U/L   Lactic acid whole blood   Result Value Ref Range    Lactic Acid 4.0 (HH) 0.7 - 2.0 mmol/L   Lipase   Result Value Ref Range    Lipase 335 73 - 393 U/L   Head CT w/o contrast    Narrative    CT SCAN OF THE HEAD WITHOUT CONTRAST   9/29/2018 5:38 PM     HISTORY: Unresponsive.     TECHNIQUE:  Axial images of the head and coronal reformations without  IV contrast material. Radiation dose for this scan was reduced using  automated exposure control, adjustment of the mA and/or kV according  to patient size, or iterative reconstruction technique.    COMPARISON: 9/9/2018.    FINDINGS:  The cerebral hemispheres, brainstem, and cerebellum  demonstrate normal morphology and attenuation. No evidence of acute  edema, ischemia, hemorrhage, mass, mass effect, or hydrocephalus. The  visualized calvarium, skull base, paraspinous sinuses, and  extracranial soft tissues are unremarkable.      Impression    IMPRESSION:  No acute intracranial abnormality.    ALEXIS HERNANDEZ MD   XR Chest 1 View    Narrative    CHEST ONE VIEW   9/29/2018 5:45 PM     HISTORY: Tube adjustment.     COMPARISON: Same day chest x-ray.      Impression    IMPRESSION: Endotracheal tube has been advanced and is now  approximately 2.5 cm from the prince. Enteric tube positioning is not  appreciably changed. Lungs remain hypoinflated with bilateral  perihilar and basilar opacities, presumably atelectasis. Heart size is  unchanged.     ALEXIS HERNANDEZ MD   Drug abuse screen 77 urine   Result Value Ref Range    Amphetamine Qual Urine Negative NEG^Negative    Barbiturates Qual Urine Negative NEG^Negative    Benzodiazepine Qual Urine Negative NEG^Negative    Cannabinoids Qual Urine Negative NEG^Negative    Cocaine Qual Urine Negative NEG^Negative    Opiates Qualitative Urine Negative  NEG^Negative    PCP Qual Urine Negative NEG^Negative   Lactic acid   Result Value Ref Range    Lactic Acid 3.9 (H) 0.4 - 2.0 mmol/L

## 2018-09-30 NOTE — PHARMACY-ADMISSION MEDICATION HISTORY
Admission medication history interview status for this patient is complete. See [facility] admission navigator for allergy information, prior to admission medications and immunization status.     Medication history interview source(s):None  Medication history resources (including written lists, pill bottles, clinic record):Used encounter 9/22/2018 to obtain details on medications at discharge  Primary pharmacy:NA    Changes made to PTA medication list:  Added: None  Deleted: None  Changed: None    Actions taken by pharmacist (provider contacted, etc):None     Additional medication history information:Unable to assess patient. Last dose information could not be obtained. Med discharge information on 9/22/2018 was used to make sure the med list is complete.    Medication reconciliation/reorder completed by provider prior to medication history? No    Do you take OTC medications (eg tylenol, ibuprofen, fish oil, eye/ear drops, etc)? Y(Y/N)    For patients on insulin therapy: N (Y/N)  Lantus/levemir/NPH/Mix 70/30 dose:   (Y/N) (see Med list for doses)   Sliding scale Novolog Y/N  If Yes, do you have a baseline novolog pre-meal dose:  units with meals  Patients eat three meals a day:   Y/N    How many episodes of hypoglycemia do you have per week: _______  How many missed doses do you have per week: ______  How many times do you check your blood glucose per day: _______  Do you have a Continuous glucose monitor (CGM)   Y/N (remind pt that not approved for hospital use)   Any Barriers to therapy - Be specific :  cost of medications, comfortable with giving injections (if applicable), comfortable and confident with current diabetes regimen: Y/N ______________      Prior to Admission medications    Medication Sig Last Dose Taking? Auth Provider   amLODIPine (NORVASC) 10 MG tablet Take 1 tablet (10 mg) by mouth daily   Radha Botello APRN CNP   amphetamine-dextroamphetamine (ADDERALL XR) 30 MG per 24 hr capsule Take 30 mg by  mouth 2 times daily   Reported, Patient   busPIRone (BUSPAR) 10 MG tablet Take 2 tablets (20 mg) by mouth 3 times daily   Tima Crews MD   cloNIDine (CATAPRES) 0.1 MG tablet Take 1 tablet (0.1 mg) by mouth 2 times daily   Radha Botello APRN CNP   disulfiram (ANTABUSE) 250 MG tablet Take 1 tablet (250 mg) by mouth daily   Kathy Flores MD   gabapentin (NEURONTIN) 800 MG tablet TAKE 1 TABLET (800 MG) BY MOUTH 4 TIMES DAILY   Tima Crews MD   hydrOXYzine (ATARAX) 50 MG tablet Take 1 tablet (50 mg) by mouth every 4 hours as needed for anxiety   Dora Velazquez APRN CNP   loperamide (IMODIUM A-D) 2 MG tablet Take 1 tablet (2 mg) by mouth 4 times daily as needed for diarrhea   Dora Velazquez APRN CNP   mirtazapine (REMERON) 15 MG tablet Take 1 tablet (15 mg) by mouth At Bedtime To start after completing 7.5 mg/day Q HS x 3 doses.   Kathy Flores MD   mirtazapine (REMERON) 7.5 MG TABS tablet Take 1 tablet (7.5 mg) by mouth At Bedtime   Kathy Flores MD   multivitamin, therapeutic with minerals (THERA-VIT-M) TABS tablet Take 1 tablet by mouth daily   Filippo Brasher MD   nicotine (NICODERM CQ) 21 MG/24HR 24 hr patch Place 1 patch onto the skin daily   Filippo Brasher MD   nicotine polacrilex (NICORETTE) 4 MG gum Chew one piece every hour as directed.   Tima Crews MD   nicotine polacrilex 4 MG lozenge 1-2 lozenges every hour as needed, max 10 lozenges per day   Tima Crews MD   omeprazole (PRILOSEC) 40 MG capsule Take 1 capsule (40 mg) by mouth daily   Radha Botello APRN CNP   ondansetron (ZOFRAN) 4 MG tablet Take 1 tablet (4 mg) by mouth every 8 hours as needed for nausea   Dora Velazquez APRN CNP   sertraline (ZOLOFT) 100 MG tablet Take 2 tablets (200 mg) by mouth daily   Filippo Brasher MD   thiamine 100 MG tablet Take 1 tablet (100 mg) by mouth daily   Filippo Brasher MD   traZODone (DESYREL)  100 MG tablet Take 1-2 tablets (100-200 mg) by mouth nightly as needed for sleep  Patient taking differently: Take 200 mg by mouth nightly as needed for sleep    Filippo Brasher MD

## 2018-09-30 NOTE — PROGRESS NOTES
[facility]    Hospitalist Progress Note  Name: Nickolas Lang    MRN: [id number]  Provider:  Damon Hernandez DO MPH  Date of Service: 09/30/2018    Summary of Stay: Nickolas Lang is a 28-year-old male with history of alcohol dependence, multiple admissions for alcohol-related problems including withdrawal, hypertension, hepatic steatosis, GERD, benzodiazepine abuse, drug abuse, anxiety, attention deficit disorder, and pyloric stenosis treated surgically as an infant.  He presented to the emergency department by ambulance yesterday unresponsive.  He has had multiple recent admissions related to his alcohol abuse.  He had been living with his mother.  She had become fed up with all of these issues so kicked him out of the house earlier yesterday.  Later in the day, she found him on the steps with a nearly empty liter bottle of vodka.  He was unresponsive.  He was cold to touch.  Paramedics were called.      Nickolas was brought to the emergency department for evaluation and treatment.  Vital signs showed tachycardia but were otherwise unremarkable.  Physical exam showed an unresponsive male that was cool to touch.  He was intubated for respiratory failure and airway protection.  This morning he was fully alert and following directions.  He was extubated without problems.  Currently showing some degree of alcohol withdrawal.  Urine is also cloudy and quite dark so we are checking for infection.    Problem List:   1. Acute respiratory failure he due to acute alcohol intoxication: Remained stable on the ventilator overnight.  Now extubated and on room air.  Fully alert and doing well.  Lungs are clear and chest x-ray was unremarkable.  Started on clear liquid diet and advance as tolerated to regular.  2. Alcohol dependence, acute alcohol intoxication, and evolving alcohol withdrawal: We will again ask for CD and social work consultations.  Evidently has a long-standing history of alcohol dependence.   Currently slightly tremulous and appears to be showing some evidence of alcohol withdrawal.  Continue thiamine, folate, and multivitamin.  Replace electrolytes as needed.  Continue to monitor on the withdrawal protocol and provide some degree of scheduled benzodiazepines to prevent seizure activity.  3. Hypertension: Resume his prior to admission amlodipine.  4. Depression and anxiety: Resume his prior to admission BuSpar, Remeron, Zoloft, and trazodone.  5. ADHD: Resume prior to admission Adderall.  6. GERD: Change Protonix to oral.  7. Cloudy urine: No urinary symptoms.  Urine is very thick and cloudy.  Check UA and UC.    DVT Prophylaxis: Enoxaparin (Lovenox) SQ  Code Status: Full Code  Disposition: Expected discharge in 2 days to home. Goals prior to discharge include monitor for evolving alcohol withdrawal.   Incidental Findings: None.  Family updated today: No     Interval History   Assumed care from previous hospitalist. The history was fully reviewed.  The patient reports doing well. No chest pain or shortness of breath. No nausea, vomiting, diarrhea, constipation. No fevers. No other specific complaints identified.     -Data reviewed today: I personally reviewed all new labs and imaging results over the last 24 hours.     Physical Exam   Temp: 99  F (37.2  C) Temp src: Bladder BP: 131/69 Pulse: 110 Heart Rate: 111 Resp: 18 SpO2: 99 % O2 Device: None (Room air)    Vitals:    09/29/18 2000 09/30/18 0600   Weight: 139.4 kg (307 lb 5.1 oz) 141.8 kg (312 lb 9.8 oz)     Vital Signs with Ranges  Temp:  [95.4  F (35.2  C)-99.5  F (37.5  C)] 99  F (37.2  C)  Pulse:  [110] 110  Heart Rate:  [] 111  Resp:  [10-26] 18  BP: (108-160)/() 131/69  FiO2 (%):  [30 %-50 %] 30 %  SpO2:  [94 %-100 %] 99 %  I/O last 3 completed shifts:  In: 2002.42 [I.V.:2002.42]  Out: 3105 [Urine:3105]    GENERAL: No apparent distress. Awake, alert, and fully oriented.  HEENT: Normocephalic, atraumatic. Extraocular movements  intact.  CARDIOVASCULAR: Regular rate and rhythm without murmurs or rubs. No S3.  PULMONARY: Clear bilaterally.  GASTROINTESTINAL: Soft, non-tender, non-distended. Bowel sounds normoactive.   EXTREMITIES: No cyanosis or clubbing. No edema.  NEUROLOGICAL: CN 2-12 grossly intact, no focal neurological deficits.  DERMATOLOGICAL: No rash, ulcer, bruising, nor jaundice.     Medications     dextrose 5% and 0.9% NaCl 75 mL/hr at 09/30/18 0404       enoxaparin  40 mg Subcutaneous Q24H     folic acid  1 mg Oral Daily     influenza vaccine adult (product based on age)  0.5 mL Intramuscular Prior to discharge     insulin aspart  1-6 Units Subcutaneous Q4H     LORazepam  0.5 mg Oral Q6H     multivitamin, therapeutic  1 tablet Oral Daily     pantoprazole (PROTONIX) IV  40 mg Intravenous Daily     vitamin  B-1  100 mg Oral Daily     Data     Laboratory:    Recent Labs  Lab 09/30/18  0541 09/29/18  2338 09/29/18  1718   WBC 7.0  --  6.7   HGB 14.2  --  16.3   HCT 41.9  --  48.9   MCV 88  --  87    276 317       Recent Labs  Lab 09/30/18  0541 09/29/18  2338 09/29/18  1718     --  135   POTASSIUM 3.9  --  3.7   CHLORIDE 107  --  101   CO2 25  --  24   ANIONGAP 10  --  10   *  --  145*   BUN 10  --  11   CR 0.65* 0.53* 0.55*   GFRESTIMATED >90 >90 >90   GFRESTBLACK >90 >90 >90   AZAM 7.2*  --  8.4*     No results for input(s): CULT in the last 168 hours.    Imaging:  Recent Results (from the past 24 hour(s))   XR Chest Port 1 View    Narrative    XR CHEST PORT 1 VW 9/29/2018 5:11 PM    HISTORY: Unresponsive.     COMPARISON: September 13, 2018.       Impression    IMPRESSION: Endotracheal and nasogastric tubes in addition as before.  Lung volumes remain low, with left basilar and right midlung streaky  opacities suggestive of atelectasis. No pneumothorax. Heart size  appears stable.     PRISCILLA CHISHOLM MD   Head CT w/o contrast    Narrative    CT SCAN OF THE HEAD WITHOUT CONTRAST   9/29/2018 5:38 PM     HISTORY:  Unresponsive.     TECHNIQUE:  Axial images of the head and coronal reformations without  IV contrast material. Radiation dose for this scan was reduced using  automated exposure control, adjustment of the mA and/or kV according  to patient size, or iterative reconstruction technique.    COMPARISON: 9/9/2018.    FINDINGS:  The cerebral hemispheres, brainstem, and cerebellum  demonstrate normal morphology and attenuation. No evidence of acute  edema, ischemia, hemorrhage, mass, mass effect, or hydrocephalus. The  visualized calvarium, skull base, paraspinous sinuses, and  extracranial soft tissues are unremarkable.      Impression    IMPRESSION:  No acute intracranial abnormality.    ALEXIS HERNANDEZ MD   XR Chest 1 View    Narrative    CHEST ONE VIEW   9/29/2018 5:45 PM     HISTORY: Tube adjustment.     COMPARISON: Same day chest x-ray.      Impression    IMPRESSION: Endotracheal tube has been advanced and is now  approximately 2.5 cm from the prince. Enteric tube positioning is not  appreciably changed. Lungs remain hypoinflated with bilateral  perihilar and basilar opacities, presumably atelectasis. Heart size is  unchanged.     ALEXIS HERNANDEZ MD   Abd/pelvis CT,  IV  contrast only TRAUMA / AAA    Narrative    CT ABDOMEN PELVIS W CONTRAST 9/29/2018 7:48 PM    HISTORY: Abdominal pain.    TECHNIQUE: CT of the abdomen and pelvis is performed with 100mL  Isovue-370 intravenously. Radiation dose for this scan was reduced  using automated exposure control, adjustment of the mA and/or kV  according to patient size, or iterative reconstruction technique.    COMPARISON: None.    FINDINGS:    Liver: There is diffuse fatty infiltration of the liver.   Gallbladder:Normal.  Pancreas:Normal.  Spleen:Normal.  Adrenals:Normal.  Ascites:None.    Kidneys:Normal.  Bladder: Decompressed with a Webb catheter in place..  Pelvic free fluid:None.    Bowels:Unremarkable.  No evidence of obstruction.  Appendix:Normal.    Abdominal or  pelvic lymphadenopathy: Mildly prominent mesenteric  midline lymph nodes appreciated.     Miscellaneous findings:None.      Impression    IMPRESSION:    1. No evidence of acute intra-abdominal traumatic injury.  2. Diffuse fatty infiltration of the liver.  3. Mildly prominent mesenteric midline lymph nodes appreciated.     MD Damon WILSON DO MPH  Formerly [facility] Hospitalist  [address] [facility] [place] [address]  Pager: [phone]  09/30/2018

## 2018-10-01 LAB
ANION GAP SERPL CALCULATED.3IONS-SCNC: 7 MMOL/L (ref 3–14)
BACTERIA SPEC CULT: NO GROWTH
BACTERIA SPEC CULT: NORMAL
BUN SERPL-MCNC: 9 MG/DL (ref 7–30)
CALCIUM SERPL-MCNC: 8.5 MG/DL (ref 8.5–10.1)
CHLORIDE SERPL-SCNC: 103 MMOL/L (ref 94–109)
CO2 SERPL-SCNC: 30 MMOL/L (ref 20–32)
CREAT SERPL-MCNC: 0.52 MG/DL (ref 0.66–1.25)
ERYTHROCYTE [DISTWIDTH] IN BLOOD BY AUTOMATED COUNT: 15.2 % (ref 10–15)
GFR SERPL CREATININE-BSD FRML MDRD: >90 ML/MIN/1.7M2
GLUCOSE SERPL-MCNC: 121 MG/DL (ref 70–99)
HCT VFR BLD AUTO: 37.7 % (ref 40–53)
HGB BLD-MCNC: 12.4 G/DL (ref 13.3–17.7)
Lab: NORMAL
MAGNESIUM SERPL-MCNC: 1.7 MG/DL (ref 1.6–2.3)
MCH RBC QN AUTO: 29.1 PG (ref 26.5–33)
MCHC RBC AUTO-ENTMCNC: 32.9 G/DL (ref 31.5–36.5)
MCV RBC AUTO: 89 FL (ref 78–100)
PLATELET # BLD AUTO: 171 10E9/L (ref 150–450)
POTASSIUM SERPL-SCNC: 3.4 MMOL/L (ref 3.4–5.3)
RBC # BLD AUTO: 4.26 10E12/L (ref 4.4–5.9)
SODIUM SERPL-SCNC: 140 MMOL/L (ref 133–144)
SPECIMEN SOURCE: NORMAL
SPECIMEN SOURCE: NORMAL
WBC # BLD AUTO: 3.6 10E9/L (ref 4–11)

## 2018-10-01 PROCEDURE — 25000128 H RX IP 250 OP 636: Performed by: HOSPITALIST

## 2018-10-01 PROCEDURE — 99232 SBSQ HOSP IP/OBS MODERATE 35: CPT | Performed by: INTERNAL MEDICINE

## 2018-10-01 PROCEDURE — 25000132 ZZH RX MED GY IP 250 OP 250 PS 637: Performed by: INTERNAL MEDICINE

## 2018-10-01 PROCEDURE — 90791 PSYCH DIAGNOSTIC EVALUATION: CPT | Performed by: PSYCHOLOGIST

## 2018-10-01 PROCEDURE — 25000128 H RX IP 250 OP 636: Performed by: INTERNAL MEDICINE

## 2018-10-01 PROCEDURE — 85027 COMPLETE CBC AUTOMATED: CPT | Performed by: INTERNAL MEDICINE

## 2018-10-01 PROCEDURE — 99207 ZZC CDG-MDM COMPONENT: MEETS LOW - DOWN CODED: CPT | Performed by: INTERNAL MEDICINE

## 2018-10-01 PROCEDURE — 36415 COLL VENOUS BLD VENIPUNCTURE: CPT | Performed by: INTERNAL MEDICINE

## 2018-10-01 PROCEDURE — 25000125 ZZHC RX 250: Performed by: INTERNAL MEDICINE

## 2018-10-01 PROCEDURE — 25000132 ZZH RX MED GY IP 250 OP 250 PS 637: Performed by: HOSPITALIST

## 2018-10-01 PROCEDURE — 80048 BASIC METABOLIC PNL TOTAL CA: CPT | Performed by: INTERNAL MEDICINE

## 2018-10-01 PROCEDURE — 25000132 ZZH RX MED GY IP 250 OP 250 PS 637: Performed by: SURGERY

## 2018-10-01 PROCEDURE — 83735 ASSAY OF MAGNESIUM: CPT | Performed by: INTERNAL MEDICINE

## 2018-10-01 PROCEDURE — 20000003 ZZH R&B ICU

## 2018-10-01 RX ORDER — LANOLIN ALCOHOL/MO/W.PET/CERES
100 CREAM (GRAM) TOPICAL DAILY
Status: DISCONTINUED | OUTPATIENT
Start: 2018-10-01 | End: 2018-10-02 | Stop reason: HOSPADM

## 2018-10-01 RX ORDER — GABAPENTIN 400 MG/1
800 CAPSULE ORAL 4 TIMES DAILY
Status: DISCONTINUED | OUTPATIENT
Start: 2018-10-01 | End: 2018-10-01

## 2018-10-01 RX ORDER — FOLIC ACID 1 MG/1
1 TABLET ORAL DAILY
Status: DISCONTINUED | OUTPATIENT
Start: 2018-10-01 | End: 2018-10-02 | Stop reason: HOSPADM

## 2018-10-01 RX ORDER — LORAZEPAM 1 MG/1
1-2 TABLET ORAL EVERY 30 MIN PRN
Status: DISCONTINUED | OUTPATIENT
Start: 2018-10-01 | End: 2018-10-02 | Stop reason: HOSPADM

## 2018-10-01 RX ORDER — LORAZEPAM 1 MG/1
1 TABLET ORAL EVERY 6 HOURS
Status: DISCONTINUED | OUTPATIENT
Start: 2018-10-01 | End: 2018-10-02 | Stop reason: HOSPADM

## 2018-10-01 RX ORDER — LORAZEPAM 2 MG/ML
1-2 INJECTION INTRAMUSCULAR EVERY 30 MIN PRN
Status: DISCONTINUED | OUTPATIENT
Start: 2018-10-01 | End: 2018-10-02 | Stop reason: HOSPADM

## 2018-10-01 RX ORDER — LORAZEPAM 1 MG/1
1 TABLET ORAL ONCE
Status: DISCONTINUED | OUTPATIENT
Start: 2018-10-01 | End: 2018-10-01

## 2018-10-01 RX ORDER — LORAZEPAM 1 MG/1
1 TABLET ORAL EVERY 6 HOURS
Status: DISCONTINUED | OUTPATIENT
Start: 2018-10-01 | End: 2018-10-01

## 2018-10-01 RX ORDER — IBUPROFEN 400 MG/1
400 TABLET, FILM COATED ORAL EVERY 6 HOURS PRN
Status: DISCONTINUED | OUTPATIENT
Start: 2018-10-01 | End: 2018-10-02 | Stop reason: HOSPADM

## 2018-10-01 RX ORDER — LORAZEPAM 2 MG/ML
INJECTION INTRAMUSCULAR
Status: DISPENSED
Start: 2018-10-01 | End: 2018-10-02

## 2018-10-01 RX ORDER — MULTIPLE VITAMINS W/ MINERALS TAB 9MG-400MCG
1 TAB ORAL DAILY
Status: DISCONTINUED | OUTPATIENT
Start: 2018-10-01 | End: 2018-10-02 | Stop reason: HOSPADM

## 2018-10-01 RX ORDER — GABAPENTIN 400 MG/1
800 CAPSULE ORAL 3 TIMES DAILY
Status: DISCONTINUED | OUTPATIENT
Start: 2018-10-01 | End: 2018-10-02 | Stop reason: HOSPADM

## 2018-10-01 RX ADMIN — LORAZEPAM 1 MG: 2 INJECTION INTRAMUSCULAR; INTRAVENOUS at 13:55

## 2018-10-01 RX ADMIN — MIRTAZAPINE 15 MG: 15 TABLET, FILM COATED ORAL at 21:23

## 2018-10-01 RX ADMIN — GABAPENTIN 800 MG: 400 CAPSULE ORAL at 15:02

## 2018-10-01 RX ADMIN — LORAZEPAM 0.5 MG: 0.5 TABLET ORAL at 12:42

## 2018-10-01 RX ADMIN — LORAZEPAM 1 MG: 2 INJECTION INTRAMUSCULAR; INTRAVENOUS at 15:09

## 2018-10-01 RX ADMIN — GABAPENTIN 800 MG: 400 CAPSULE ORAL at 21:23

## 2018-10-01 RX ADMIN — MULTIPLE VITAMINS W/ MINERALS TAB 1 TABLET: TAB at 15:02

## 2018-10-01 RX ADMIN — CEFTRIAXONE SODIUM 1 G: 1 INJECTION, POWDER, FOR SOLUTION INTRAMUSCULAR; INTRAVENOUS at 12:41

## 2018-10-01 RX ADMIN — LORAZEPAM 1 MG: 1 TABLET ORAL at 18:50

## 2018-10-01 RX ADMIN — DIAZEPAM 10 MG: 10 TABLET ORAL at 10:54

## 2018-10-01 RX ADMIN — SERTRALINE HYDROCHLORIDE 200 MG: 100 TABLET ORAL at 08:47

## 2018-10-01 RX ADMIN — LORAZEPAM 2 MG: 2 INJECTION INTRAMUSCULAR; INTRAVENOUS at 22:44

## 2018-10-01 RX ADMIN — AMLODIPINE BESYLATE 10 MG: 10 TABLET ORAL at 08:48

## 2018-10-01 RX ADMIN — TRAZODONE HYDROCHLORIDE 200 MG: 100 TABLET ORAL at 21:23

## 2018-10-01 RX ADMIN — POTASSIUM CHLORIDE 20 MEQ: 1500 TABLET, EXTENDED RELEASE ORAL at 06:28

## 2018-10-01 RX ADMIN — LORAZEPAM 0.5 MG: 0.5 TABLET ORAL at 06:28

## 2018-10-01 RX ADMIN — FOLIC ACID 1 MG: 1 TABLET ORAL at 15:02

## 2018-10-01 RX ADMIN — LORAZEPAM 1 MG: 1 TABLET ORAL at 20:30

## 2018-10-01 RX ADMIN — DIAZEPAM 10 MG: 10 TABLET ORAL at 00:02

## 2018-10-01 RX ADMIN — Medication 100 MG: at 08:47

## 2018-10-01 RX ADMIN — BUSPIRONE HYDROCHLORIDE 20 MG: 10 TABLET ORAL at 21:25

## 2018-10-01 RX ADMIN — OMEPRAZOLE 40 MG: 20 CAPSULE, DELAYED RELEASE ORAL at 08:47

## 2018-10-01 RX ADMIN — IBUPROFEN 400 MG: 400 TABLET ORAL at 20:30

## 2018-10-01 RX ADMIN — DIAZEPAM 10 MG: 10 TABLET ORAL at 03:33

## 2018-10-01 RX ADMIN — BUSPIRONE HYDROCHLORIDE 20 MG: 10 TABLET ORAL at 08:47

## 2018-10-01 RX ADMIN — FOLIC ACID 1 MG: 1 TABLET ORAL at 08:47

## 2018-10-01 RX ADMIN — THERA TABS 1 TABLET: TAB at 08:47

## 2018-10-01 RX ADMIN — LORAZEPAM 1 MG: 1 TABLET ORAL at 17:34

## 2018-10-01 RX ADMIN — BUSPIRONE HYDROCHLORIDE 20 MG: 10 TABLET ORAL at 15:01

## 2018-10-01 RX ADMIN — LORAZEPAM 0.5 MG: 0.5 TABLET ORAL at 00:02

## 2018-10-01 RX ADMIN — Medication 100 MG: at 15:02

## 2018-10-01 RX ADMIN — Medication 2 G: at 07:01

## 2018-10-01 RX ADMIN — ENOXAPARIN SODIUM 40 MG: 40 INJECTION SUBCUTANEOUS at 20:34

## 2018-10-01 ASSESSMENT — ACTIVITIES OF DAILY LIVING (ADL)
ADLS_ACUITY_SCORE: 11

## 2018-10-01 ASSESSMENT — PAIN DESCRIPTION - DESCRIPTORS
DESCRIPTORS: ACHING
DESCRIPTORS: ACHING

## 2018-10-01 NOTE — PROGRESS NOTES
ICU End of Shift Summary.  For vital signs and complete assessments, please see documentation flowsheets.     Pertinent assessments: A&O. Bed alarm for safety, pt doesn't always call when has to urinate and requires sba for stability at times.   Major Shift Events: Psych consult and evaluation today. Pt was informed of a family death, more anxious and tearful since. Remains oriented and cooperative with cares.   Plan (Upcoming Events): Continue to monitor. CIWA with prn ativan for w/d and anxiety related needs.   Discharge/Transfer Needs: TBD    Bedside Shift Report Completed :   Bedside Safety Check Completed:

## 2018-10-01 NOTE — PROGRESS NOTES
[facility]    Hospitalist Progress Note  Name: Nickolas Lang    MRN: [id number]  Provider:  Artis Parker MD  Date of Service: 10/01/2018    Summary of Stay: Nickolas Lang is a 28-year-old male with history of alcohol dependence, multiple admissions for alcohol-related problems including withdrawal, hypertension, hepatic steatosis, benzodiazepine abuse, drug abuse, anxiety, attention deficit disorder.    He came to attention on the afternoon of 9/29/2018 due to unresponsiveness.  Again, his blood alcohol level was greater than 0.5.  The patient's mother tells me that following her Al-Anon plan, she has been refusing to let the patient come in to her house with his ongoing alcohol abuse.  He apparently resorted to drinking a large amount of alcohol on her doorstep and according to EMS was found unresponsive and hypothermic.    Recent medical history is significant for hospitalization associated with intoxication along with withdrawal from September 9-12, 2018 and again from 9/13 through 9/16/2018.  In addition the patient reports to me that he follows with Dr. Melgar who is a psychiatrist.  Typically when the patient is not drinking, he is compliant with his medications but at this point he is not certain as to whether he is taking any of his medications regularly.    Nickolas was brought to the emergency department for evaluation and treatment.  Vital signs showed tachycardia and mild hypothermia but were otherwise unremarkable.  Physical exam showed an unresponsive male that was cool to touch.  He was intubated for respiratory failure, unresponsiveness and airway protection.  He was given warmed IV fluids and a Chelsi hugger was placed on him.  He was then admitted to the ICU on ventilator.    Following admission to the ICU, on 9/30/2018, the patient was extubated without problems.  His mother came in this morning also reporting that the patient's uncle (who is a father figure to him)  rather suddenly  of complications related to liver cancer treatment.  At this time the patient reports to me that he is anxious and wants to leave AGAINST MEDICAL ADVICE.  I strongly encouraged him to stay and told him at this point that I would have to place him on hold based on my assessment.  I have also asked for psychiatry to see the patient for further direction.    Problem List:   1. Acute respiratory failure he due to acute alcohol intoxication.  Successfully extubated on 2018.  2. Persistent alcohol dependence, acute alcohol intoxication, and evolving alcohol withdrawal:   -- Continue thiamine, folate, and multivitamin.  Replace electrolytes as needed.  Continue to monitor on the withdrawal protocol and provide some degree of scheduled benzodiazepines to prevent seizure activity.  -- Patient reports that he has treatment planned with [place] in the next several days.  -- I clarified benzodiazepine schedule with this patient.  He appears to have been on a combination of diazepam and lorazepam.  At this point will switch to Lorazepam alone.  3. Hypertension: Resume his prior to admission amlodipine.  4. Depression and anxiety: Resumed his prior to admission BuSpar, Remeron, Zoloft, and trazodone.  --I have asked for psychiatric consultation.  5. ADHD: Resume prior to admission Adderall.  6. GERD: Change Protonix to oral.  7. Cloudy urine: No urinary symptoms, but notably with large amounts of white and red cells.  Culture negative to date.    DVT Prophylaxis: Enoxaparin (Lovenox) SQ  Code Status: Full Code  Disposition: To be determined.  Patient clearly is quite anxious and irritable.  I do not think he is well served by early discharge.  Incidental Findings: None.  Family updated today: No     Interval History   Assumed care from previous hospitalist. The history was fully reviewed.  The patient reports doing well. No chest pain or shortness of breath. No nausea, vomiting, diarrhea,  constipation. No fevers. No other specific complaints identified.     -Data reviewed today: I personally reviewed all new labs and imaging results over the last 24 hours.     Physical Exam   Temp: 98.1  F (36.7  C) Temp src: Oral BP: (!) 145/99   Heart Rate: 91 Resp: 20 SpO2: 96 % O2 Device: None (Room air)    Vitals:    09/29/18 2000 09/30/18 0600   Weight: 139.4 kg (307 lb 5.1 oz) 141.8 kg (312 lb 9.8 oz)     Vital Signs with Ranges  Temp:  [97.8  F (36.6  C)-98.6  F (37  C)] 98.1  F (36.7  C)  Heart Rate:  [] 91  Resp:  [17-23] 20  BP: (126-169)/() 145/99  SpO2:  [93 %-100 %] 96 %  I/O last 3 completed shifts:  In: 4420 [P.O.:3520; I.V.:900]  Out: 850 [Urine:850]    GENERAL: No apparent distress. Awake, alert, and fully oriented.  Pleasant but irritable.  HEENT: Normocephalic, atraumatic. Extraocular movements intact.  CARDIOVASCULAR: Regular rate and rhythm without murmurs or rubs. No S3.  PULMONARY: Clear bilaterally.  GASTROINTESTINAL: Soft, non-tender, non-distended. Bowel sounds normoactive.   EXTREMITIES: No cyanosis or clubbing. No edema.  NEUROLOGICAL: CN 2-12 grossly intact, no focal neurological deficits.  No overt tremors.  DERMATOLOGICAL: No rash, ulcer, bruising, nor jaundice.     Medications       amLODIPine  10 mg Oral Daily     amphetamine-dextroamphetamine  30 mg Oral BID     busPIRone  20 mg Oral TID     cefTRIAXone  1 g Intravenous Q24H     enoxaparin  40 mg Subcutaneous Q24H     folic acid  1 mg Oral Daily     influenza vaccine adult (product based on age)  0.5 mL Intramuscular Prior to discharge     LORazepam  0.5 mg Oral Q6H     mirtazapine  15 mg Oral At Bedtime     multivitamin, therapeutic  1 tablet Oral Daily     omeprazole  40 mg Oral Daily     sertraline  200 mg Oral Daily     vitamin  B-1  100 mg Oral Daily     Data     Laboratory:    Recent Labs  Lab 10/01/18  0530 09/30/18  0541 09/29/18  2338 09/29/18  1718   WBC 3.6* 7.0  --  6.7   HGB 12.4* 14.2  --  16.3   HCT 37.7*  41.9  --  48.9   MCV 89 88  --  87    258 276 317       Recent Labs  Lab 10/01/18  0530 09/30/18  0541 09/29/18  2338 09/29/18  1718    142  --  135   POTASSIUM 3.4 3.9  --  3.7   CHLORIDE 103 107  --  101   CO2 30 25  --  24   ANIONGAP 7 10  --  10   * 122*  --  145*   BUN 9 10  --  11   CR 0.52* 0.65* 0.53* 0.55*   GFRESTIMATED >90 >90 >90 >90   GFRESTBLACK >90 >90 >90 >90   AZAM 8.5 7.2*  --  8.4*       Recent Labs  Lab 09/30/18  0900 09/29/18 2054   CULT PENDING Culture negative monitoring continues       Imaging:  No results found for this or any previous visit (from the past 24 hour(s)).

## 2018-10-01 NOTE — PLAN OF CARE
Problem: Patient Care Overview  Goal: Plan of Care/Patient Progress Review  ICU End of Shift Summary.  For vital signs and complete assessments, please see documentation flowsheets.     Pertinent assessments: Afebrile. Alert and oriented x 4. Ambulating to bathroom with SBA or using urinal in bed. CIWA scoring for anxiety, headache, and sweating, prn po valium given per protocol. No c/o pain other than mild headache. Prn trazodone given per patient request, slept good after.   Major Shift Events: Patient left [facility] for [facility] treatment notifying them that he was hospitalized and wouldn't be able to make orientation today but is interested still when he is discharged. K replaced, recheck scheduled for tomorrow am.   Plan (Upcoming Events): Continue CIWA protocol. Replace electrolytes per protocol. Mag to be replaced (ordered from pharm). SW and Chem dep following.   Discharge/Transfer Needs:  To be determined pending progress.    Bedside Shift Report Completed : yes  Bedside Safety Check Completed: yes

## 2018-10-01 NOTE — CONSULTS
This document was created with voice recognition software.  As result, there may be errors in grammar and syntax.  Please consider this when reading this document.    Psychiatric consult, under supervision of Dr. Gavin, ordered by Dr. Parker.  This was an initial consult, which took a total of 55 minutes, with half the time coordinating care.    Summary of stay  Nickolas Lang is a 28-year-old male with history of alcohol dependence, multiple admissions for alcohol-related problems including withdrawal, hypertension, hepatic steatosis, GERD, benzodiazepine abuse, drug abuse, anxiety, attention deficit disorder, anxiety and depression and pyloric stenosis treated surgically as an infant.  He presented to the emergency department by ambulance on the day of readmission in an unresponsive.  He has had multiple recent admissions related to his alcohol abuse.  He has been living with his mother, she became upset with his alcohol abuse so had him leave the home, and later found him on the door step with a nearly empty liter of vodka, and was unresponsive.  She called paramedics.    He was admitted for treatment of alcohol withdrawal.    Reason for consult  I was asked to see this patient to help with psychiatric/behavioral differential diagnosis, assess suicidal and behavioral risk factors,  to facilitate a medication consultation with Dr. Gavin, and to provide input into discharge planning.     Records reviewed   H&P done by Dr. Eldridge, including review of systems,  as well as chart review of Care Everywhere, previous admissions and  the pharmacy admission note.     Progress notes/consults  Nickolas has pushed for discharge since learning of his uncles death, has threatened to leave AMA,    Previous treatment records  I reviewed the most recent discharge summary from treatment in the [place] system, from [facility] dated 9-16-18.  He had been admitted with acute alcohol intoxication, with a blood  "alcohol level of 0.5.  This is the day after he been discharged from treatment here at [facility].  I also reviewed a psychiatric consultation by Dr. Holt.    Patient Report of Admission Events/Circumstances  Nickolas acknowledged that he has continued to abuse alcohol, but also reports that he had an admission set up at [facility], in [place], for today.   He has notified the program that he is here and admission must be deferred. He states that he plans to follow through.  He acknowledged that he has been considering leaving [place], but also reports that he has thought about his discussion with Dr. Parker, said, \"I trust him,\" and he is willing to stay at least through tomorrow.  He acknowledged that he is very troubled by the death of his uncle, who was a father figure as he did not have a relationship with his biological father.  The uncle  in [place], and he reports that his mother and other local family are en route to that [facility].  He would very much like to be with him, but was receptive to discussion about the need for him to focus on taking care of himself and to deal with his current medical needs.    Mental and Chemical Health Treatment History    Nickolas has never required inpatient psychiatric care.  He receives outpatient psychiatric services by Dr. Melgar at the [facility] in [place].  He sees Dr. Melgar every 2 months.  He also sees a therapist there, every 2 weeks, and finds this to be helpful.  He reports that he has had \"11 or 12\" CD treatments, with the last being in  or July.  This was at [facility].  He reports that he was sober until early September,  when he resumed abusing alcohol in response to family stresses, in particular learning that his mother's home is in [facility] and his uncle was terminally ill.  He reports that he attends AA, claims to attend \"at least 3 times per week,\" and to have a sponsor.  He acknowledged that he did not " "contact his sponsor when he lost his sobriety, but did report that he has contacted the sponsor since then and plans to follow-up with his sponsor.    Social Background  See H&P and other records    Behavioral Assessment  Nickolas was resting in his bed when I introduced myself.  He greeted me in a quiet, reserved manner.  He readily agreed to be interviewed.  He emphasized that he would prefer to be discharged immediately, but also reported that he has been thinking about his discussion with Dr. Parker and after brief discussion with me, in which I emphasized the medical necessity of staying due to his stage of withdrawal, he agreed to stay at least 1 more day.    Nickolas is seriously overweight, but his appearance is otherwise typical for his age.  He interacted with me in a open and collaborative manner.  His eye contact was normal, and no unusual behaviors were observed. Muscle strength/tone, gait and station are deferred to the hospitalist team.     He was oriented in all spheres.  His speech was fluent, logical, goal-directed and non-pressured.  Attention span and concentration were normal.  Memory functions are intact.  He made no peculiar statements.  His fund of knowledge is cautiously estimated to be average.    He described his current mood as \"very frustrated,\" and explained that this is because he wants to be discharged and to be with his family.  He does not view himself as currently depressed, but does acknowledge distress about his uncles death.  His affect was a bit constricted.  He acknowledged feeling anxious, but explains that this is because he is eager to be discharged and to be with his family.  He is not currently experiencing hallucinations.    Insight/judgement are at baseline.  He obviously has used poor judgment by resuming drinking and  continuing to abuse alcohol in spite of significant consequences, but he also has a plan for getting into the and several times during the interview " stated that he plans to follow through with this.    Risk Assessment  Nickolas has no history of suicidal behavior, and denied, with solid eye contact and convincing affect, current suicidal ideation    Impressions  Strengths: Client appear to interact with me in an open and collaborative manner.  He was scheduled to start CD treatment today, and reports that he plans to follow through when possible per  Liabilities: Nickolas has a recent history of severe alcohol abuse in spite of serious medical consequences.    Consultation with other team members  I conferred remotely with Dr. Gavin, who recommended holding Adderall, reducing gabapentin from 4 times a day to 3 times a day, and restarting his other psychiatric medications.  She recommended reducing the frequency of Atarax as needed from every 4 hours to every 6 hours.  She also recommended holding Antabuse.      If Nickolas attempts to leave AMA, Dr. Gavin recommended the following: If there are signs of cognitive confusion or his vital signs are unstable, implement a [employer] hold.  Otherwise, order a follow-up by the [facility] Psychiatric Team to further assess whether a hold is indicated  These recommendations were discussed with Dr. Parker.     Diagnoses   Alcohol dependency, severe, not in remission; alcohol withdrawal, per hospitalist team; depression, anxiety and ADHD by history; bereavement; other medical problems, per hospitalist team.    Recommendations   1.  Dr. Gavin's medication recommendations were conveyed to Dr. Parker.  2.  If Nickolas attempts to leave AMA, Dr. Gavin's recommendations are listed above  3. [facility] Team is available to follow-up, if needed; this will need to be ordered.      Pretty Terrell.MARIELY., L.P.  [phone]

## 2018-10-01 NOTE — PROGRESS NOTES
"   10/01/18 1200   Visit Information   Visit Made By Staff    Type of Visit Follow-up   Visited Patient;Family   Interventions   Plan of Care Review   With patient/family/proxy   Basic Spiritual Interventions   Assessment of spiritual needs/resources;Reflective conversation;Prayer   SPIRITUAL HEALTH SERVICES Progress Note  [facility] 369    This visit was in response to family's request for a . Kana's (patient) mother and aunt were present and just informed Kana that his uncle had suddenly . Patient was very close to this uncle and said that he was \"in shock\" at hearing this news. Patient said that he did not feel like talking very much at this time. [facility] offered support and prayer with family and patient. Family are traveling out of state to be with other family as they gather to mourn the lost of patient's uncle. Patient indicated that he appreciates spiritual health services and was open to a follow up visit. Patient also said that his sponsor is also a good resource for support.    Amanda Puentes  Staff   Pager [phone]  "

## 2018-10-01 NOTE — PROGRESS NOTES
Upon accepting patient assignment tonight patient was not in restraints, per shift report from previous RN restraints were removed when extubated earlier today.

## 2018-10-01 NOTE — PROGRESS NOTES
Solange paged per pt and mother's request after receiving news that a close family member passed away this morning. Supportive measures provided.

## 2018-10-02 VITALS
HEART RATE: 81 BPM | BODY MASS INDEX: 41.26 KG/M2 | HEIGHT: 73 IN | SYSTOLIC BLOOD PRESSURE: 158 MMHG | DIASTOLIC BLOOD PRESSURE: 124 MMHG | WEIGHT: 311.29 LBS | OXYGEN SATURATION: 93 % | RESPIRATION RATE: 20 BRPM | TEMPERATURE: 97.8 F

## 2018-10-02 LAB
ANION GAP SERPL CALCULATED.3IONS-SCNC: 8 MMOL/L (ref 3–14)
BUN SERPL-MCNC: 8 MG/DL (ref 7–30)
CALCIUM SERPL-MCNC: 8.3 MG/DL (ref 8.5–10.1)
CHLORIDE SERPL-SCNC: 106 MMOL/L (ref 94–109)
CO2 SERPL-SCNC: 26 MMOL/L (ref 20–32)
CREAT SERPL-MCNC: 0.47 MG/DL (ref 0.66–1.25)
ERYTHROCYTE [DISTWIDTH] IN BLOOD BY AUTOMATED COUNT: 14.6 % (ref 10–15)
GFR SERPL CREATININE-BSD FRML MDRD: >90 ML/MIN/1.7M2
GLUCOSE SERPL-MCNC: 99 MG/DL (ref 70–99)
HCT VFR BLD AUTO: 41.1 % (ref 40–53)
HGB BLD-MCNC: 14 G/DL (ref 13.3–17.7)
MAGNESIUM SERPL-MCNC: 1.7 MG/DL (ref 1.6–2.3)
MCH RBC QN AUTO: 29.7 PG (ref 26.5–33)
MCHC RBC AUTO-ENTMCNC: 34.1 G/DL (ref 31.5–36.5)
MCV RBC AUTO: 87 FL (ref 78–100)
PLATELET # BLD AUTO: 153 10E9/L (ref 150–450)
POTASSIUM SERPL-SCNC: 4.4 MMOL/L (ref 3.4–5.3)
RBC # BLD AUTO: 4.71 10E12/L (ref 4.4–5.9)
SODIUM SERPL-SCNC: 140 MMOL/L (ref 133–144)
WBC # BLD AUTO: 4.4 10E9/L (ref 4–11)

## 2018-10-02 PROCEDURE — 80048 BASIC METABOLIC PNL TOTAL CA: CPT | Performed by: INTERNAL MEDICINE

## 2018-10-02 PROCEDURE — 99239 HOSP IP/OBS DSCHRG MGMT >30: CPT | Performed by: INTERNAL MEDICINE

## 2018-10-02 PROCEDURE — 25000132 ZZH RX MED GY IP 250 OP 250 PS 637: Performed by: INTERNAL MEDICINE

## 2018-10-02 PROCEDURE — 85027 COMPLETE CBC AUTOMATED: CPT | Performed by: INTERNAL MEDICINE

## 2018-10-02 PROCEDURE — 36415 COLL VENOUS BLD VENIPUNCTURE: CPT | Performed by: INTERNAL MEDICINE

## 2018-10-02 PROCEDURE — 25000125 ZZHC RX 250: Performed by: INTERNAL MEDICINE

## 2018-10-02 PROCEDURE — 25000132 ZZH RX MED GY IP 250 OP 250 PS 637: Performed by: HOSPITALIST

## 2018-10-02 PROCEDURE — 83735 ASSAY OF MAGNESIUM: CPT | Performed by: INTERNAL MEDICINE

## 2018-10-02 PROCEDURE — 25000132 ZZH RX MED GY IP 250 OP 250 PS 637: Performed by: SURGERY

## 2018-10-02 RX ORDER — LORAZEPAM 1 MG/1
1 TABLET ORAL EVERY 8 HOURS PRN
Qty: 10 TABLET | Refills: 0 | Status: ON HOLD | OUTPATIENT
Start: 2018-10-02 | End: 2018-10-15

## 2018-10-02 RX ORDER — LORAZEPAM 1 MG/1
1 TABLET ORAL EVERY 8 HOURS PRN
Qty: 10 TABLET | Refills: 0 | Status: SHIPPED | OUTPATIENT
Start: 2018-10-02 | End: 2018-10-02

## 2018-10-02 RX ADMIN — LORAZEPAM 1 MG: 1 TABLET ORAL at 05:25

## 2018-10-02 RX ADMIN — GABAPENTIN 800 MG: 400 CAPSULE ORAL at 09:16

## 2018-10-02 RX ADMIN — IBUPROFEN 400 MG: 400 TABLET ORAL at 06:30

## 2018-10-02 RX ADMIN — LORAZEPAM 1 MG: 1 TABLET ORAL at 12:12

## 2018-10-02 RX ADMIN — OMEPRAZOLE 40 MG: 20 CAPSULE, DELAYED RELEASE ORAL at 09:17

## 2018-10-02 RX ADMIN — DEXTROAMPHETAMINE SACCHARATE, AMPHETAMINE ASPARTATE MONOHYDRATE, DEXTROAMPHETAMINE SULFATE, AMPHETAMINE SULFATE 30 MG: 3.75; 3.75; 3.75; 3.75 CAPSULE, EXTENDED RELEASE ORAL at 11:17

## 2018-10-02 RX ADMIN — BUSPIRONE HYDROCHLORIDE 20 MG: 10 TABLET ORAL at 09:17

## 2018-10-02 RX ADMIN — SERTRALINE HYDROCHLORIDE 200 MG: 100 TABLET ORAL at 09:17

## 2018-10-02 RX ADMIN — AMLODIPINE BESYLATE 10 MG: 10 TABLET ORAL at 09:16

## 2018-10-02 RX ADMIN — Medication 100 MG: at 09:18

## 2018-10-02 RX ADMIN — FOLIC ACID 1 MG: 1 TABLET ORAL at 09:16

## 2018-10-02 RX ADMIN — Medication 2 G: at 06:46

## 2018-10-02 RX ADMIN — MULTIPLE VITAMINS W/ MINERALS TAB 1 TABLET: TAB at 09:16

## 2018-10-02 ASSESSMENT — ACTIVITIES OF DAILY LIVING (ADL)
ADLS_ACUITY_SCORE: 11

## 2018-10-02 ASSESSMENT — PAIN DESCRIPTION - DESCRIPTORS: DESCRIPTORS: ACHING

## 2018-10-02 NOTE — PLAN OF CARE
Problem: Patient Care Overview  Goal: Plan of Care/Patient Progress Review  ICU End of Shift Summary.  For vital signs and complete assessments, please see documentation flowsheets.     Pertinent assessments: Alert and oriented throughout night. Anxious at times. Minimally scoring on CIWA except for once due to nausea with vomiting, headache and very anxious. Slept most of night, snoring throughout night. O2 sats dropping at times during sleep but popping right back up, sleep apnea? BM x 1 tonight. Voiding well. C/o generalized aching, ibuprofen order received and given with good outcomes.   Major Shift Events:   Plan (Upcoming Events): Continue CIWA protocol. Replace electrolytes per protocol.   Discharge/Transfer Needs: To be determined pending progress.    Bedside Shift Report Completed : yes  Bedside Safety Check Completed: yes

## 2018-10-02 NOTE — PROVIDER NOTIFICATION
Pt ready to discharge home per md. IV dc'd. BP elevated, MD aware. Scheduled dose of ativan given prior to discharge. Sent with script for ativan. AVS reviewed. Sent with belongings. States he is going to walk home and that he has already contacted his sponsor.

## 2018-10-02 NOTE — PROGRESS NOTES
Right wrist, Left wrist and soft restraints restraints discontinued at 0800 AM on 9/30/2018.    Restraint discontinue criteria met, patient is calm, cooperative and safe. Restraints removed.     Patient's Response: No evidence of learning  Family Notification: Parent  Attending Physician Notified: Yes, Attending Physician's Name: Dr. Heather De La Torre

## 2018-10-02 NOTE — DISCHARGE SUMMARY
[facility] Discharge Summary    Nickolas Lang MRN# [id number]   Age: 28 year old YOB: 1990     Date of Admission:  9/29/2018  Date of Discharge::  10/2/2018  Admitting Physician:  Carlos Eldridge MD  Discharge Physician:  Artis Parker MD     Home clinic: Dr. Tima Crews          Admission Diagnoses:   Toxic encephalopathy [G92]  Elevated lactic acid level [R79.89]  Acute respiratory failure, unspecified whether with hypoxia or hypercapnia (H) [J96.00]  Alcohol dependence with intoxication with complication (H) [F10.229]          Discharge Diagnosis:   Principal Problem:    Acute respiratory failure (H)  Active Problems:    Alcohol dependence with withdrawal (H)    Essential hypertension    Obesity    Anxiety    Alcohol use disorder, severe, dependence (H)    Acute alcoholic intoxication in alcoholism with complication (H)            Procedures:   Endotracheal intubation with mechanical ventilation  Chest x-ray  CT scan abdomen and pelvis  CT scan of the head without contrast          Medications Prior to Admission:     Prescriptions Prior to Admission   Medication Sig Dispense Refill Last Dose     amLODIPine (NORVASC) 10 MG tablet Take 1 tablet (10 mg) by mouth daily 90 tablet 1 9/22/2018 at Unknown time     amphetamine-dextroamphetamine (ADDERALL XR) 30 MG per 24 hr capsule Take 30 mg by mouth 2 times daily   9/21/2018 at Unknown time     busPIRone (BUSPAR) 10 MG tablet Take 2 tablets (20 mg) by mouth 3 times daily 90 tablet 0 Past Week at Unknown time     cloNIDine (CATAPRES) 0.1 MG tablet Take 1 tablet (0.1 mg) by mouth 2 times daily 180 tablet 1 Past Week at Unknown time     disulfiram (ANTABUSE) 250 MG tablet Take 1 tablet (250 mg) by mouth daily 30 tablet 0 Past Week at Unknown time     gabapentin (NEURONTIN) 800 MG tablet TAKE 1 TABLET (800 MG) BY MOUTH 4 TIMES DAILY 120 tablet 1 Past Week at Unknown time     hydrOXYzine (ATARAX) 50 MG tablet Take 1 tablet (50 mg) by mouth  every 4 hours as needed for anxiety 120 tablet 0 Past Week at Unknown time     loperamide (IMODIUM A-D) 2 MG tablet Take 1 tablet (2 mg) by mouth 4 times daily as needed for diarrhea 20 tablet 0 9/22/2018 at Unknown time     mirtazapine (REMERON) 15 MG tablet Take 1 tablet (15 mg) by mouth At Bedtime To start after completing 7.5 mg/day Q HS x 3 doses. 30 tablet 0 Unknown at Unknown time     mirtazapine (REMERON) 7.5 MG TABS tablet Take 1 tablet (7.5 mg) by mouth At Bedtime 3 tablet 0 Unknown at Unknown time     multivitamin, therapeutic with minerals (THERA-VIT-M) TABS tablet Take 1 tablet by mouth daily 30 each 0 Past Week at Unknown time     nicotine (NICODERM CQ) 21 MG/24HR 24 hr patch Place 1 patch onto the skin daily 30 patch 0 Past Week at Unknown time     nicotine polacrilex (NICORETTE) 4 MG gum Chew one piece every hour as directed. 100 tablet 2 Past Month at Unknown time     nicotine polacrilex 4 MG lozenge 1-2 lozenges every hour as needed, max 10 lozenges per day 360 tablet 1 Past Month at Unknown time     omeprazole (PRILOSEC) 40 MG capsule Take 1 capsule (40 mg) by mouth daily 30 capsule 3 9/21/2018 at Unknown time     ondansetron (ZOFRAN) 4 MG tablet Take 1 tablet (4 mg) by mouth every 8 hours as needed for nausea 10 tablet 0 9/21/2018 at Unknown time     sertraline (ZOLOFT) 100 MG tablet Take 2 tablets (200 mg) by mouth daily 30 tablet 0 Past Week at Unknown time     thiamine 100 MG tablet Take 1 tablet (100 mg) by mouth daily 30 tablet 1 Unknown at Unknown time     traZODone (DESYREL) 100 MG tablet Take 1-2 tablets (100-200 mg) by mouth nightly as needed for sleep (Patient taking differently: Take 200 mg by mouth nightly as needed for sleep ) 90 tablet 0 Past Week at Unknown time             Discharge Medications:     Current Discharge Medication List      START taking these medications    Details   LORazepam (ATIVAN) 1 MG tablet Take 1 tablet (1 mg) by mouth every 8 hours as needed for agitation or  anxiety  Qty: 10 tablet, Refills: 0    Associated Diagnoses: Alcohol dependence with intoxication with complication (H); Anxiety; Alcohol use disorder, severe, dependence (H); Alcohol abuse with alcohol-induced mood disorder (H)         CONTINUE these medications which have NOT CHANGED    Details   amLODIPine (NORVASC) 10 MG tablet Take 1 tablet (10 mg) by mouth daily  Qty: 90 tablet, Refills: 1    Associated Diagnoses: Alcohol dependence, continuous drinking behavior (H)      amphetamine-dextroamphetamine (ADDERALL XR) 30 MG per 24 hr capsule Take 30 mg by mouth 2 times daily      busPIRone (BUSPAR) 10 MG tablet Take 2 tablets (20 mg) by mouth 3 times daily  Qty: 90 tablet, Refills: 0    Associated Diagnoses: Alcohol dependence, continuous drinking behavior (H)      cloNIDine (CATAPRES) 0.1 MG tablet Take 1 tablet (0.1 mg) by mouth 2 times daily  Qty: 180 tablet, Refills: 1    Associated Diagnoses: Alcohol dependence, continuous drinking behavior (H)      disulfiram (ANTABUSE) 250 MG tablet Take 1 tablet (250 mg) by mouth daily  Qty: 30 tablet, Refills: 0    Associated Diagnoses: Alcohol dependence with withdrawal with complication (H)      gabapentin (NEURONTIN) 800 MG tablet TAKE 1 TABLET (800 MG) BY MOUTH 4 TIMES DAILY  Qty: 120 tablet, Refills: 1    Associated Diagnoses: Anxiety; Alcohol use disorder, severe, dependence (H)      hydrOXYzine (ATARAX) 50 MG tablet Take 1 tablet (50 mg) by mouth every 4 hours as needed for anxiety  Qty: 120 tablet, Refills: 0    Associated Diagnoses: Chemical dependency (H)      loperamide (IMODIUM A-D) 2 MG tablet Take 1 tablet (2 mg) by mouth 4 times daily as needed for diarrhea  Qty: 20 tablet, Refills: 0    Comments: LP  Associated Diagnoses: Diarrhea, unspecified type      !! mirtazapine (REMERON) 15 MG tablet Take 1 tablet (15 mg) by mouth At Bedtime To start after completing 7.5 mg/day Q HS x 3 doses.  Qty: 30 tablet, Refills: 0    Associated Diagnoses: Insomnia,  unspecified type      !! mirtazapine (REMERON) 7.5 MG TABS tablet Take 1 tablet (7.5 mg) by mouth At Bedtime  Qty: 3 tablet, Refills: 0    Associated Diagnoses: Alcohol-induced insomnia (H)      multivitamin, therapeutic with minerals (THERA-VIT-M) TABS tablet Take 1 tablet by mouth daily  Qty: 30 each, Refills: 0    Associated Diagnoses: Alcohol dependence, continuous drinking behavior (H)      nicotine (NICODERM CQ) 21 MG/24HR 24 hr patch Place 1 patch onto the skin daily  Qty: 30 patch, Refills: 0    Associated Diagnoses: Alcohol dependence, continuous drinking behavior (H)      nicotine polacrilex (NICORETTE) 4 MG gum Chew one piece every hour as directed.  Qty: 100 tablet, Refills: 2    Comments: LP patient  Associated Diagnoses: Encounter for smoking cessation counseling      nicotine polacrilex 4 MG lozenge 1-2 lozenges every hour as needed, max 10 lozenges per day  Qty: 360 tablet, Refills: 1    Comments: LP patient  Associated Diagnoses: Encounter for smoking cessation counseling      omeprazole (PRILOSEC) 40 MG capsule Take 1 capsule (40 mg) by mouth daily  Qty: 30 capsule, Refills: 3    Associated Diagnoses: Alcohol dependence, continuous drinking behavior (H)      ondansetron (ZOFRAN) 4 MG tablet Take 1 tablet (4 mg) by mouth every 8 hours as needed for nausea  Qty: 10 tablet, Refills: 0    Comments: LP  Associated Diagnoses: Nausea and vomiting, intractability of vomiting not specified, unspecified vomiting type      sertraline (ZOLOFT) 100 MG tablet Take 2 tablets (200 mg) by mouth daily  Qty: 30 tablet, Refills: 0    Associated Diagnoses: Alcohol dependence, continuous drinking behavior (H)      thiamine 100 MG tablet Take 1 tablet (100 mg) by mouth daily  Qty: 30 tablet, Refills: 1    Associated Diagnoses: Acute alcoholic intoxication in alcoholism with complication (H)      traZODone (DESYREL) 100 MG tablet Take 1-2 tablets (100-200 mg) by mouth nightly as needed for sleep  Qty: 90 tablet, Refills:  0    Associated Diagnoses: Alcohol dependence, continuous drinking behavior (H)       !! - Potential duplicate medications found. Please discuss with provider.                Consultations:   Consultation during this admission received from intensive care and psychiatry.          Hospital Course:   Nickolas Lang is a 28-year-old male with history of alcohol dependence, multiple admissions for alcohol-related problems including withdrawal, hypertension, hepatic steatosis, benzodiazepine abuse, drug abuse, anxiety, attention deficit disorder.    He came to attention on the afternoon of 9/29/2018 due to unresponsiveness.  Again, his blood alcohol level was greater than 0.5.  The patient's mother tells me that following her Al-Anon plan, she has been refusing to let the patient come in to her house with his ongoing alcohol abuse.  He apparently resorted to drinking a large amount of alcohol on her doorstep and according to EMS was found unresponsive and hypothermic.     Recent medical history is significant for hospitalization associated with intoxication along with withdrawal from September 9-12, 2018 and again from 9/13 through 9/16/2018.  In addition the patient reports to me that he follows with Dr. Melgar who is a psychiatrist.  Typically when the patient is not drinking, he is compliant with his medications but at this point he is not certain as to whether he is taking any of his medications regularly.     Nickolas was brought to the emergency department for evaluation and treatment.  Vital signs showed tachycardia and mild hypothermia but were otherwise unremarkable.  Physical exam showed an unresponsive male that was cool to touch.  He was intubated for respiratory failure, unresponsiveness and airway protection.  He was given warmed IV fluids and a Chelsi hugger was placed on him.  He was then admitted to the ICU on ventilator.     Following admission to the ICU, on 9/30/2018, the patient was extubated  "without problems.  His mother came in this morning also reporting that the patient's uncle (who is a father figure to him) rather suddenly  of complications related to liver cancer treatment.  At this time the patient reports to me that he is anxious and wants to leave AGAINST MEDICAL ADVICE.  I strongly encouraged him to stay and told him at that point that I would have to place him on hold based on my assessment.      I had psychiatry see the patient and he willingly stayed until the following day.  At that point he was more than 48 hours out from presentation and appeared to be requiring less and less Ativan.  We talked at some length about his need for additional support and he was convincing about wanting to leave and needing to get started with therapy.    In the hope of helping him avoid further relapses, I gave the patient Ativan 1 mg tablets (total 10) that he could use in the event of severe cravings.  Otherwise of course he is resumed on his other usual medications.    BP (!) 152/95  Pulse 81  Temp 97.8  F (36.6  C) (Oral)  Resp 20  Ht 1.854 m (6' 1\")  Wt 141.2 kg (311 lb 4.6 oz)  SpO2 93%  BMI 41.07 kg/m2  At the time of discharge, Mr. Lang is alert, coherent and in no apparent distress.  He has no evidence of tremor and is not apparently irritable or anxious.  Chest: Clear to auscultation.  Heart: Regular rate and rhythm without rubs, murmurs or gallops.  Abdomen: Soft, nontender nondistended without appreciable hepatospleno megaly.  Extremities: Trace pitting edema noted.          Discharge Instructions and Follow-Up:   Discharge diet: Regular   Discharge activity: Activity as tolerated   Discharge follow-up: Follow up with Dr. Crews in the next week.  Follow up with  [place] and [place] as planned.           Discharge Disposition:   Discharged to home      Attestation:  I have reviewed today's vital signs, notes, medications, labs and imaging.  Total time: 35 minutes    Artis GIRON" MD [place]

## 2018-10-02 NOTE — PROGRESS NOTES
"Care Transition Initial Assessment -   Reason For Consult: substance use concerns  Met with: Patient    Principal Problem:    Acute respiratory failure (H)  Active Problems:    Alcohol dependence with withdrawal (H)    Essential hypertension    Obesity    Anxiety    Alcohol use disorder, severe, dependence (H)    Acute alcoholic intoxication in alcoholism with complication (H)       DATA  Lives With: parent(s)  Living Arrangements: house  Description of Support System: Involved  Who is your support system?: Parent(s)   Not good environment for pt per Probation office as he reports that mother is also Chemically dependent   Identified issues/concerns regarding health management: H/O ETOH issues, has  due to 2nd DWI received.  PT followed by out pt MH support per Psych note         Other Resources: Other (see comment) (PO Officer LT Schaeffer [phone])  Quality Of Family Relationships: supportive  Transportation Available: family or friend will provide    ASSESSMENT  Cognitive Status:  awake, alert and oriented  Concerns to be addressed: Met with pt as he was getting ready to leave.  PT stated that he has talked with staff at [facility] and plans to start treatment next week in their step down program.  PT stated he would like to try and work and do CD treatments program at the same time. Pt stated that  He other CD programs did not work for him because while in treatment he was in a protected bubble.  That once home it was difficult to be in the real every day life and not have the correct support.  PT stated he feels if he can work and attend the step down program that he will be more successful  ELEANOR spoke to pt that his PO had called and wanted to speak with staff about \" Civil Commitment\".  Pt was surprised about, he did give permission for sw to update his PO about his plan to start treatment and will have Hyndman update him  SW did call LT Schaeffer who expressed concern about pt's ability to " remain sober. LT stated that pt is a good shelly but and he believes pt WILL Attend  the CD program but doubtful pt will be able to remain sober.    PLAN  PT stated he is able to stay at his mothers home.  He spoke with a friend who plans to check in with him tomorrow and will assist with Ride to CD program if needed. Pt has lost his licence at this time   No other needs at this time  PO office would like to have commitment considered if there is another CD related Hospital admission

## 2018-10-03 ENCOUNTER — HOSPITAL ENCOUNTER (EMERGENCY)
Facility: CLINIC | Age: 28
Discharge: HOME | End: 2018-10-03
Attending: EMERGENCY MEDICINE | Admitting: EMERGENCY MEDICINE
Payer: COMMERCIAL

## 2018-10-03 VITALS
WEIGHT: 300 LBS | HEART RATE: 89 BPM | BODY MASS INDEX: 39.58 KG/M2 | TEMPERATURE: 96.7 F | OXYGEN SATURATION: 96 % | DIASTOLIC BLOOD PRESSURE: 87 MMHG | SYSTOLIC BLOOD PRESSURE: 129 MMHG

## 2018-10-03 DIAGNOSIS — F10.920 ALCOHOL INTOXICATION, UNCOMPLICATED (H): ICD-10-CM

## 2018-10-03 LAB
ALBUMIN SERPL-MCNC: 3.6 G/DL (ref 3.4–5)
ALP SERPL-CCNC: 102 U/L (ref 40–150)
ALT SERPL W P-5'-P-CCNC: 192 U/L (ref 0–70)
ANION GAP SERPL CALCULATED.3IONS-SCNC: 9 MMOL/L (ref 3–14)
APAP SERPL-MCNC: <2 MG/L (ref 10–20)
AST SERPL W P-5'-P-CCNC: 187 U/L (ref 0–45)
BASOPHILS # BLD AUTO: 0.1 10E9/L (ref 0–0.2)
BASOPHILS NFR BLD AUTO: 1.1 %
BILIRUB SERPL-MCNC: 0.3 MG/DL (ref 0.2–1.3)
BUN SERPL-MCNC: 12 MG/DL (ref 7–30)
CALCIUM SERPL-MCNC: 7.6 MG/DL (ref 8.5–10.1)
CHLORIDE SERPL-SCNC: 108 MMOL/L (ref 94–109)
CO2 BLDCOV-SCNC: 23 MMOL/L (ref 21–28)
CO2 BLDCOV-SCNC: 27 MMOL/L (ref 21–28)
CO2 SERPL-SCNC: 24 MMOL/L (ref 20–32)
CREAT SERPL-MCNC: 0.51 MG/DL (ref 0.66–1.25)
DIFFERENTIAL METHOD BLD: ABNORMAL
EOSINOPHIL # BLD AUTO: 0.1 10E9/L (ref 0–0.7)
EOSINOPHIL NFR BLD AUTO: 1.9 %
ERYTHROCYTE [DISTWIDTH] IN BLOOD BY AUTOMATED COUNT: 15.8 % (ref 10–15)
ETHANOL SERPL-MCNC: 0.46 G/DL
GFR SERPL CREATININE-BSD FRML MDRD: >90 ML/MIN/1.7M2
GLUCOSE SERPL-MCNC: 91 MG/DL (ref 70–99)
HCT VFR BLD AUTO: 48.3 % (ref 40–53)
HGB BLD-MCNC: 16 G/DL (ref 13.3–17.7)
IMM GRANULOCYTES # BLD: 0.1 10E9/L (ref 0–0.4)
IMM GRANULOCYTES NFR BLD: 0.9 %
LACTATE BLD-SCNC: 2.6 MMOL/L (ref 0.7–2.1)
LACTATE BLD-SCNC: 3 MMOL/L (ref 0.7–2.1)
LYMPHOCYTES # BLD AUTO: 4.5 10E9/L (ref 0.8–5.3)
LYMPHOCYTES NFR BLD AUTO: 59.5 %
MCH RBC QN AUTO: 29.7 PG (ref 26.5–33)
MCHC RBC AUTO-ENTMCNC: 33.1 G/DL (ref 31.5–36.5)
MCV RBC AUTO: 90 FL (ref 78–100)
MONOCYTES # BLD AUTO: 0.5 10E9/L (ref 0–1.3)
MONOCYTES NFR BLD AUTO: 6.1 %
NEUTROPHILS # BLD AUTO: 2.3 10E9/L (ref 1.6–8.3)
NEUTROPHILS NFR BLD AUTO: 30.5 %
NRBC # BLD AUTO: 0 10*3/UL
NRBC BLD AUTO-RTO: 0 /100
PCO2 BLDV: 39 MM HG (ref 40–50)
PCO2 BLDV: 48 MM HG (ref 40–50)
PH BLDV: 7.35 PH (ref 7.32–7.43)
PH BLDV: 7.38 PH (ref 7.32–7.43)
PLATELET # BLD AUTO: 243 10E9/L (ref 150–450)
PLATELET # BLD EST: ABNORMAL 10*3/UL
PO2 BLDV: 66 MM HG (ref 25–47)
PO2 BLDV: 77 MM HG (ref 25–47)
POTASSIUM SERPL-SCNC: 4.2 MMOL/L (ref 3.4–5.3)
PROT SERPL-MCNC: 7.3 G/DL (ref 6.8–8.8)
RBC # BLD AUTO: 5.39 10E12/L (ref 4.4–5.9)
RBC MORPH BLD: ABNORMAL
SALICYLATES SERPL-MCNC: <2 MG/DL
SAO2 % BLDV FROM PO2: 92 %
SAO2 % BLDV FROM PO2: 94 %
SODIUM SERPL-SCNC: 141 MMOL/L (ref 133–144)
WBC # BLD AUTO: 7.5 10E9/L (ref 4–11)

## 2018-10-03 PROCEDURE — 96361 HYDRATE IV INFUSION ADD-ON: CPT

## 2018-10-03 PROCEDURE — 25000125 ZZHC RX 250: Performed by: EMERGENCY MEDICINE

## 2018-10-03 PROCEDURE — 83605 ASSAY OF LACTIC ACID: CPT

## 2018-10-03 PROCEDURE — 85025 COMPLETE CBC W/AUTO DIFF WBC: CPT | Performed by: EMERGENCY MEDICINE

## 2018-10-03 PROCEDURE — 80329 ANALGESICS NON-OPIOID 1 OR 2: CPT | Performed by: EMERGENCY MEDICINE

## 2018-10-03 PROCEDURE — 82803 BLOOD GASES ANY COMBINATION: CPT

## 2018-10-03 PROCEDURE — 99284 EMERGENCY DEPT VISIT MOD MDM: CPT | Mod: 25

## 2018-10-03 PROCEDURE — 80320 DRUG SCREEN QUANTALCOHOLS: CPT | Performed by: EMERGENCY MEDICINE

## 2018-10-03 PROCEDURE — 36415 COLL VENOUS BLD VENIPUNCTURE: CPT | Performed by: EMERGENCY MEDICINE

## 2018-10-03 PROCEDURE — 93005 ELECTROCARDIOGRAM TRACING: CPT

## 2018-10-03 PROCEDURE — 80053 COMPREHEN METABOLIC PANEL: CPT | Performed by: EMERGENCY MEDICINE

## 2018-10-03 PROCEDURE — 96365 THER/PROPH/DIAG IV INF INIT: CPT

## 2018-10-03 PROCEDURE — 25000128 H RX IP 250 OP 636: Performed by: EMERGENCY MEDICINE

## 2018-10-03 RX ADMIN — SODIUM CHLORIDE 1000 ML: 9 INJECTION, SOLUTION INTRAVENOUS at 21:41

## 2018-10-03 RX ADMIN — Medication 2 G: at 19:52

## 2018-10-03 RX ADMIN — SODIUM CHLORIDE 1000 ML: 9 INJECTION, SOLUTION INTRAVENOUS at 19:52

## 2018-10-03 NOTE — ED PROVIDER NOTES
History     Chief Complaint:  Alcohol Intoxication    The history is provided by the EMS personnel, the police and medical records.      Nickolas Lang is a 28 year old male, with history of alcohol abuse, benzodiazepine abuse, HTN, anxiety, GERD, ADD, and obesity who presents with alcohol intoxication. He has been seen multiple times here in the ED over the past few weeks for similar alcohol abuse. He was intubated for airway protection on 9/29 due to intoxication and unresponsiveness. Today, family found him passed out on the couch at home and called 911. He was brought to the ED with police and paramedics, sats were 80's on RA for EMS. On arrival to the ED he was on 6 liters of oxygen by oxy mask and saturations were 97%. He is sleeping on examination, and is roused by sternal rub. When asked if he had any pain, he replied no.     History limited due to alcohol intoxication    Allergies:  No Known Drug Allergies      Medications:    Amlodipine  Adderall  Buspar  Clonidine  Gabapentin  Atarax  Imodium  Ativan  Mirtazapine  Nicotine patch  Nicorette gum  Prilosec  Zofran  Sertraline  Thiamine  Trazodone     Past Medical History:    Attention deficit disorder  Alcohol abuse  Anxiety  Benzodiazepine abuse  GERD  Hepatic steatosis  Hypertension  Obesity  Pyloric stenosis    Past Surgical History:    Deviated septum repair  Pyloromyotomy surgery  Shoulder surgery     Family History:    Neurologic disorder  Depression  Anxiety disorder  Alcohol/drug  Substance abuse    Social History:  Relationship status: Single  Tobacco use: Yes 1 PPD for 10 years.   Alcohol use: Yes  The patient presents via ambulance.    Marital Status:  Single [1]     Review of Systems   Unable to perform ROS: Mental status change (Intoxicated)     Physical Exam     Patient Vitals for the past 24 hrs:   BP Temp Temp src Pulse Heart Rate SpO2 Weight   10/03/18 2300 129/87 - - - 96 96 % -   10/03/18 2245 114/86 - - - 109 - -   10/03/18 2230  110/85 - - - 100 - -   10/03/18 2215 102/58 - - - 76 100 % -   10/03/18 2200 109/81 - - - - 100 % -   10/03/18 2145 114/65 - - - 77 100 % -   10/03/18 2130 122/77 - - - 81 99 % -   10/03/18 2115 104/74 - - - 81 100 % -   10/03/18 2100 114/76 - - - 82 100 % -   10/03/18 2045 108/68 - - - - 98 % -   10/03/18 2030 107/63 - - - - 97 % -   10/03/18 2015 119/64 - - - 84 97 % -   10/03/18 2000 122/79 - - - 81 94 % -   10/03/18 1930 125/79 - - - 83 - -   10/03/18 1915 127/68 - - - 79 - -   10/03/18 1900 (!) 138/100 - - - 98 91 % -   10/03/18 1845 (!) 129/94 - - - 89 100 % -   10/03/18 1830 124/79 - - - 74 93 % -   10/03/18 1826 - 96.7  F (35.9  C) Temporal - - - -   10/03/18 1815 (!) 142/100 - - - 80 96 % -   10/03/18 1800 125/83 - - - 96 98 % -   10/03/18 1753 (!) 134/99 - - 89 89 97 % 136.1 kg (300 lb)        Physical Exam  VS: Reviewed per above  HENT: Mucous membranes moist. Smells of alcohol.  EYES: sclera anicteric  CV: Rate as noted, regular rhythm.   RESP: Effort normal. Breath sounds are normal bilaterally.  GI: no tenderness, not distended.  NEURO: asleep rouses to sternal rub, slurred speech, moving all extremities  MSK: No deformity of the extremities  SKIN: Warm and dry      Emergency Department Course     ECG:  ECG taken at 1902, ECG read at 1909  Normal sinus rhythm  Prolonged QT  No significant change compared to EKG dated 9/22/2018  Abnormal ECG  Rate 88 bpm. MT interval 150. QRS duration 96. QT/QTc 402/486. P-R-T axes 37  41  39.     Laboratory:  Laboratory findings were communicated with the patient who voiced understanding of the findings.  1853 - ISTAT Lactate: pH 7.35, pCO2 48, pO2 77(H), Bicarbonate 27, O2 Sat: 94, Lactic Acid 2.6(H)   CBC: RDW: 15.8(H) o/w WNL (WBC 7.5, HGB 16.0, )  CMP: Creatinine: 0.51(L), Calcium: 7.6(L), ALT: 192(H), AST: 187(H) o/w WNL   Acetaminophen level: <2  Salicylate level: <2  Alcohol level blood: 0.46(HH)    Interventions:  1910 Magnesium sulfate 2 g IV  1952  Normal Saline 1000 mL IV   2141 Normal Saline 1000 mL IV      Emergency Department Course:  Nursing notes and vitals reviewed.  I performed an exam of the patient as documented above.   EKG obtained in the ED, see results above.    IV was inserted and blood was drawn for laboratory testing, results above.     2118: I rechecked the patient.     I personally reviewed the laboratory results with the patient and answered all related questions prior to sign out.    Impression & Plan      Medical Decision Making:  Patient presents to the ER after being found unresponsive near empty bottles of alcohol.  On initial assessment is O2 sats are in the 80s on room air.  O2 saturations increased with a few liters of O2 per facemask.  Patient would arouse to sternal rub and was moving all extremities.  He did not have any evidence of trauma on exam or by history.  He smells strongly of alcohol.  At this point plan to observe the patient while he metabolizes the alcohol.  EKG did show slightly prolonged QT interval which has been noted in the past.  He was given 2 g of magnesium.  VBG did not show evidence of hypercarbia.  Lactic acid was 2.6.  Given 2 L of fluids.  Tylenol and salicylate levels were negative.  Blood alcohol level was 0.46.  There is no acidemia or elevated anion gap to suggest toxic alcohol ingestion.  After a period of observation the patient's mental status cleared and he was speaking coherently and ambulating with a steady gait.  His vital signs were within normal limits on room air.  He tolerated p.o.  Repeat lactate was 3 which was not markedly changed.  He is afebrile and without complaints.  I suspect lactate elevation is from recent alcohol binge.  Patient plans to go back to his home.  Close return precautions were discussed. Plan for follow-up in clinic.    Diagnosis:    ICD-10-CM    1. Alcohol intoxication, uncomplicated (H) F10.920 Lactic acid whole blood     ISTAT gases lactate amy POCT     ISTAT  gases lactate amy POCT     CANCELED: Lactic acid whole blood        Disposition:   Discharged home    CMS Diagnoses: None     Scribe Disclosure:  I, Pilar Aguiar, am serving as a scribe at 5:46 PM on 10/3/2018 to document services personally performed by Alvin Bhatt MD, based on my observations and the provider's statements to me.     Pilar Aguiar  10/3/2018   [facility] EMERGENCY DEPARTMENT       Alvin Bhatt MD  10/04/18 0034

## 2018-10-03 NOTE — PROGRESS NOTES
Transition Communication Hand-off for Care Transitions to Next Level of Care Provider    Name: Nickolas Lang  : 1990  MRN #: [id number]  Primary Care Provider: Tima Crews     Primary Clinic: [address] [address]     Reason for Hospitalization:  Toxic encephalopathy [G92]  Elevated lactic acid level [R79.89]  Acute respiratory failure, unspecified whether with hypoxia or hypercapnia (H) [J96.00]  Alcohol dependence with intoxication with complication (H) [F10.229]  Admit Date/Time: 2018  4:32 PM  Discharge Date: 10/2/18  Payor Source: Payor: [employer] / Plan: [employer] [place] / Product Type: HMO /     Readmission Assessment Measure (ANASTASIA) Risk Score/category: ELEVATED         Reason for Communication Hand-off Referral: Difficulty understanding plan of care  No support or lacking a support system  Non-adherence to plan of care related to:  Multiple chemical dependency admissions  Avoidable readmission within 30 days  Other This is his 6th ed/hospitalization in september    Discharge Plan:       Concern for non-adherence with plan of care:   YES  Discharge Needs Assessment:  Needs       Most Recent Value    Anticipated Changes Related to Illness none    Transportation Available family or friend will provide    Current Discharge Risk substance use/abuse    Other Resources Other (see comment) [PO Officer LT Schaeffer [phone]]    Chemical Dependency [facility], [phone]          Already enrolled in Tele-monitoring program and name of program:  na  Follow-up specialty is recommended: No    Follow-up plan:  No future appointments.    Any outstanding tests or procedures:              Key Recommendations:  Pt is admitted with ETOH W/D BAL .58.  This is pt's 6th ed/hospitalization in 2018.  Pt was found unresponsive after getting kicked out of his mother's house.   Pt did meet with  and said:  Pt stated that he has talked with staff at [facility]  and plans to start treatment next week in their step down program.  PT stated he would like to try and work and do CD treatments program at the same time. Pt stated that  He other CD programs did not work for him because while in treatment he was in a protected bubble.  That once home it was difficult to be in the real every day life and not have the correct support.  PT stated he feels if he can work and attend the step down program that he will be more successful.     SW did talk with pt's  as well.      Pt should follow up with psychiatrist Dr Melgar as well.      Lexus Littlejohn    AVS/Discharge Summary is the source of truth; this is a helpful guide for improved communication of patient story

## 2018-10-03 NOTE — ED TRIAGE NOTES
Pt presents to ED via EMS from home with complaints of ETOH intoxication. Pt was seen over the weekend here and intubated for unresponsiveness after ETOH and has been intubated several times this months for the same thing. Pt continues to abuse alcohol. Pt family found pt passed out on couch at home and called 911. Sats upper 80s on RA. Pt on 6L via oxymask and satting 97%. RR 12.  ABCs intact. Pt arousable and verbalizes to a sternal rub.

## 2018-10-03 NOTE — ED AVS SNAPSHOT
[facility] Emergency Department    [address]    [facility] [address]    Phone:  [phone]    Fax:  [phone]                                       Nickolas Lang   MRN: [id number]    Department:  [facility] Emergency Department   Date of Visit:  10/3/2018           Patient Information     Date Of Birth          1990        Your diagnoses for this visit were:     Alcohol intoxication, uncomplicated (H)        You were seen by Alvin Bhatt MD.      Follow-up Information     Follow up with [facility] Emergency Department.    Specialty:  EMERGENCY MEDICINE    Why:  As needed, If symptoms worsen    Contact information:    [address]  [facility] [address] [phone]        Follow up with Tima Crews MD. Call in 1 day.    Specialty:  Family Practice    Contact information:    [address]  [facility] [address] [phone]          Discharge Instructions       Discharge Instructions  Alcohol Intoxication    You have been seen today with alcohol intoxication. This means that you have enough alcohol in your system to impair your ability to mentally and physically function, perhaps to the extent that you were unable to care for yourself.    Generally, every Emergency Department visit should have a follow-up clinic visit with either a primary or a specialty clinic/provider. Please follow-up as instructed by your emergency provider today.    You may have come to the Emergency Department because of your intoxication, or for another reason, such as because of an injury. No matter what the case is, this visit is a  red flag  regarding alcohol use, and you should consider whether your drinking pattern is a problem for you.     You may be at risk for alcohol-related problems if:      Men: you drink more than 14 drinks per week, or more than 4 drinks per occasion.      Women: you drink more than 7 drinks per  week or more than 3 drinks per occasion.      You have black-outs.    You do things you regret while drinking.    You have legal problems because of drinking.    You have job problems because of drinking (you call in sick to work because of drinking).    CAGE Questions    Have you ever felt you should cut down on your drinking?    Have people annoyed you by criticizing your drinking?    Have you ever felt bad or guilty about your drinking?    Have you ever had a drink first thing in the morning to steady your nerves or get rid of a hangover (eye opener)?    If you answer yes to any of the CAGE questions, you may have a problem with alcohol.      Return to the Emergency Department if:    You become shaky or tremble when you try to stop drinking.     You have severe abdominal pain (belly pain).     You have a seizure or pass out.      You vomit (throw up) blood or have blood in your stool. This may be bright red or it may look like black coffee grounds.    You become lightheaded or faint.      For further help, contact:     Your caregiver.      Alcoholics Anonymous (AA).    o [facility]: [phone]  o [facility] Central Office: [phone]     A drug or alcohol rehabilitation program.      You can get information on alcohol resources and groups by calling the number 211 or [phone] on any phone.     Seek medical care if:    You have persistent vomiting.     You have persistent pain in any part of your body.      You do not feel better after a few days.    If you were given a prescription for medicine here today, be sure to read all of the information (including the package insert) that comes with your prescription.  This will include important information about the medicine, its side effects, and any warnings that you need to know about.  The pharmacist who fills the prescription can provide more information and answer questions you may have about the medicine.  If you  have questions or concerns that the pharmacist cannot address, please call or return to the Emergency Department.   Remember that you can always come back to the Emergency Department if you are not able to see your regular doctor in the amount of time listed above, if you get any new symptoms, or if there is anything that worries you.      24 Hour Appointment Hotline       To make an appointment at any [facility], call [phone] ([phone]). If you don't have a family doctor or clinic, we will help you find one. [facility] are conveniently located to serve the needs of you and your family.             Review of your medicines      Our records show that you are taking the medicines listed below. If these are incorrect, please call your family doctor or clinic.        Dose / Directions Last dose taken    amLODIPine 10 MG tablet   Commonly known as:  NORVASC   Dose:  10 mg   Quantity:  90 tablet        Take 1 tablet (10 mg) by mouth daily   Refills:  1        amphetamine-dextroamphetamine 30 MG per 24 hr capsule   Commonly known as:  ADDERALL XR   Dose:  30 mg   Indication:  Attention Deficit Hyperactivity Disorder        Take 30 mg by mouth 2 times daily   Refills:  0        busPIRone 10 MG tablet   Commonly known as:  BUSPAR   Dose:  20 mg   Quantity:  90 tablet        Take 2 tablets (20 mg) by mouth 3 times daily   Refills:  0        cloNIDine 0.1 MG tablet   Commonly known as:  CATAPRES   Dose:  0.1 mg   Quantity:  180 tablet        Take 1 tablet (0.1 mg) by mouth 2 times daily   Refills:  1        disulfiram 250 MG tablet   Commonly known as:  ANTABUSE   Dose:  250 mg   Quantity:  30 tablet        Take 1 tablet (250 mg) by mouth daily   Refills:  0        gabapentin 800 MG tablet   Commonly known as:  NEURONTIN   Quantity:  120 tablet        TAKE 1 TABLET (800 MG) BY MOUTH 4 TIMES DAILY   Refills:  1        hydrOXYzine 50 MG tablet   Commonly known as:  ATARAX   Dose:  50 mg   Quantity:  120  tablet        Take 1 tablet (50 mg) by mouth every 4 hours as needed for anxiety   Refills:  0        loperamide 2 MG tablet   Commonly known as:  IMODIUM A-D   Dose:  2 mg   Quantity:  20 tablet        Take 1 tablet (2 mg) by mouth 4 times daily as needed for diarrhea   Refills:  0        LORazepam 1 MG tablet   Commonly known as:  ATIVAN   Dose:  1 mg   Quantity:  10 tablet        Take 1 tablet (1 mg) by mouth every 8 hours as needed for agitation or anxiety   Refills:  0        * mirtazapine 7.5 MG Tabs tablet   Commonly known as:  REMERON   Dose:  7.5 mg   Quantity:  3 tablet        Take 1 tablet (7.5 mg) by mouth At Bedtime   Refills:  0        * mirtazapine 15 MG tablet   Commonly known as:  REMERON   Dose:  15 mg   Quantity:  30 tablet        Take 1 tablet (15 mg) by mouth At Bedtime To start after completing 7.5 mg/day Q HS x 3 doses.   Refills:  0        multivitamin, therapeutic with minerals Tabs tablet   Dose:  1 tablet   Quantity:  30 each        Take 1 tablet by mouth daily   Refills:  0        nicotine 21 MG/24HR 24 hr patch   Commonly known as:  NICODERM CQ   Dose:  1 patch   Quantity:  30 patch        Place 1 patch onto the skin daily   Refills:  0        * nicotine polacrilex 4 MG lozenge   Quantity:  360 tablet        1-2 lozenges every hour as needed, max 10 lozenges per day   Refills:  1        * nicotine polacrilex 4 MG gum   Commonly known as:  NICORETTE   Quantity:  100 tablet        Chew one piece every hour as directed.   Refills:  2        omeprazole 40 MG capsule   Commonly known as:  priLOSEC   Dose:  40 mg   Quantity:  30 capsule        Take 1 capsule (40 mg) by mouth daily   Refills:  3        ondansetron 4 MG tablet   Commonly known as:  ZOFRAN   Dose:  4 mg   Quantity:  10 tablet        Take 1 tablet (4 mg) by mouth every 8 hours as needed for nausea   Refills:  0        sertraline 100 MG tablet   Commonly known as:  ZOLOFT   Dose:  200 mg   Quantity:  30 tablet        Take 2 tablets  (200 mg) by mouth daily   Refills:  0        thiamine 100 MG tablet   Dose:  100 mg   Quantity:  30 tablet        Take 1 tablet (100 mg) by mouth daily   Refills:  1        traZODone 100 MG tablet   Commonly known as:  DESYREL   Dose:  100-200 mg   Quantity:  90 tablet        Take 1-2 tablets (100-200 mg) by mouth nightly as needed for sleep   Refills:  0        * Notice:  This list has 4 medication(s) that are the same as other medications prescribed for you. Read the directions carefully, and ask your doctor or other care provider to review them with you.            Procedures and tests performed during your visit     Procedure/Test Number of Times Performed    Acetaminophen level 1    Alcohol level blood 1    CBC with platelets differential 1    Comprehensive metabolic panel 1    EKG 12-lead, tracing only 1    ISTAT CG4 gases lactate amy nursing POCT 1    ISTAT gases lactate amy POCT 2    Salicylate level 1      Orders Needing Specimen Collection     None      Pending Results     Date and Time Order Name Status Description    10/3/2018 1849 EKG 12-lead, tracing only Preliminary             Pending Culture Results     No orders found from 10/1/2018 to 10/4/2018.            Pending Results Instructions     If you had any lab results that were not finalized at the time of your Discharge, you can call the ED Lab Result RN at [phone]. You will be contacted by this team for any positive Lab results or changes in treatment. The nurses are available 7 days a week from 10A to 6:30P.  You can leave a message 24 hours per day and they will return your call.        Test Results From Your Hospital Stay              10/3/2018  7:22 PM      Component Results     Component Value Ref Range & Units Status    Sodium 141 133 - 144 mmol/L Final    Potassium 4.2 3.4 - 5.3 mmol/L Final    Chloride 108 94 - 109 mmol/L Final    Carbon Dioxide 24 20 - 32 mmol/L Final    Anion Gap 9 3 - 14 mmol/L Final    Glucose 91 70 - 99 mg/dL  Final    Urea Nitrogen 12 7 - 30 mg/dL Final    Creatinine 0.51 (L) 0.66 - 1.25 mg/dL Final    GFR Estimate >90 >60 mL/min/1.7m2 Final    Non  GFR Calc    GFR Estimate If Black >90 >60 mL/min/1.7m2 Final    African American GFR Calc    Calcium 7.6 (L) 8.5 - 10.1 mg/dL Final    Bilirubin Total 0.3 0.2 - 1.3 mg/dL Final    Albumin 3.6 3.4 - 5.0 g/dL Final    Protein Total 7.3 6.8 - 8.8 g/dL Final    Alkaline Phosphatase 102 40 - 150 U/L Final     (H) 0 - 70 U/L Final     (H) 0 - 45 U/L Final         10/3/2018  8:06 PM      Component Results     Component Value Ref Range & Units Status    Acetaminophen Level <2 mg/L Final    Therapeutic range: 10-20 mg/L         10/3/2018  7:58 PM      Component Results     Component Value Ref Range & Units Status    Salicylate Level <2 mg/dL Final    Therapeutic:        <20  Anti inflammatory:  15-30           10/3/2018  7:34 PM      Component Results     Component Value Ref Range & Units Status    Ethanol g/dL 0.46 (HH) <0.01 g/dL Final    Critical Value called to and read back by  LIGIA LEAHY (MARQUIS) ON 10.03.2018 AT 1929, SP                 10/3/2018  6:57 PM      Component Results     Component Value Ref Range & Units Status    Ph Venous 7.35 7.32 - 7.43 pH Final    PCO2 Venous 48 40 - 50 mm Hg Final    PO2 Venous 77 (H) 25 - 47 mm Hg Final    Bicarbonate Venous 27 21 - 28 mmol/L Final    O2 Sat Venous 94 % Final    Lactic Acid 2.6 (H) 0.7 - 2.1 mmol/L Final         10/3/2018  8:41 PM      Component Results     Component Value Ref Range & Units Status    WBC 7.5 4.0 - 11.0 10e9/L Final    RBC Count 5.39 4.4 - 5.9 10e12/L Final    Hemoglobin 16.0 13.3 - 17.7 g/dL Final    Hematocrit 48.3 40.0 - 53.0 % Final    MCV 90 78 - 100 fl Final    MCH 29.7 26.5 - 33.0 pg Final    MCHC 33.1 31.5 - 36.5 g/dL Final    RDW 15.8 (H) 10.0 - 15.0 % Final    Platelet Count 243 150 - 450 10e9/L Final    Diff Method Automated Method  Final    % Neutrophils 30.5 % Final    %  Lymphocytes 59.5 % Final    % Monocytes 6.1 % Final    % Eosinophils 1.9 % Final    % Basophils 1.1 % Final    % Immature Granulocytes 0.9 % Final    Nucleated RBCs 0 0 /100 Final    Absolute Neutrophil 2.3 1.6 - 8.3 10e9/L Final    Absolute Lymphocytes 4.5 0.8 - 5.3 10e9/L Final    Absolute Monocytes 0.5 0.0 - 1.3 10e9/L Final    Absolute Eosinophils 0.1 0.0 - 0.7 10e9/L Final    Absolute Basophils 0.1 0.0 - 0.2 10e9/L Final    Abs Immature Granulocytes 0.1 0 - 0.4 10e9/L Final    Absolute Nucleated RBC 0.0  Final    RBC Morphology   Final    Consistent with reported results    Platelet Estimate   Final    Automated count confirmed.  Platelet morphology is normal.               10/3/2018 11:32 PM      Component Results     Component Value Ref Range & Units Status    Ph Venous 7.38 7.32 - 7.43 pH Final    PCO2 Venous 39 (L) 40 - 50 mm Hg Final    PO2 Venous 66 (H) 25 - 47 mm Hg Final    Bicarbonate Venous 23 21 - 28 mmol/L Final    O2 Sat Venous 92 % Final    Lactic Acid 3.0 (H) 0.7 - 2.1 mmol/L Final                Clinical Quality Measure: Blood Pressure Screening     Your blood pressure was checked while you were in the emergency department today. The last reading we obtained was  BP: 129/87 . Please read the guidelines below about what these numbers mean and what you should do about them.  If your systolic blood pressure (the top number) is less than 120 and your diastolic blood pressure (the bottom number) is less than 80, then your blood pressure is normal. There is nothing more that you need to do about it.  If your systolic blood pressure (the top number) is 120-139 or your diastolic blood pressure (the bottom number) is 80-89, your blood pressure may be higher than it should be. You should have your blood pressure rechecked within a year by a primary care provider.  If your systolic blood pressure (the top number) is 140 or greater or your diastolic blood pressure (the bottom number) is 90 or greater, you  may have high blood pressure. High blood pressure is treatable, but if left untreated over time it can put you at risk for heart attack, stroke, or kidney failure. You should have your blood pressure rechecked by a primary care provider within the next 4 weeks.  If your provider in the emergency department today gave you specific instructions to follow-up with your doctor or provider even sooner than that, you should follow that instruction and not wait for up to 4 weeks for your follow-up visit.        Thank you for choosing [facility]       Thank you for choosing [facility] for your care. Our goal is always to provide you with excellent care. Hearing back from our patients is one way we can continue to improve our services. Please take a few minutes to complete the written survey that you may receive in the mail after you visit with us. Thank you!        [employer] Information     [employer] gives you secure access to your electronic health record. If you see a primary care provider, you can also send messages to your care team and make appointments. If you have questions, please call your primary care clinic.  If you do not have a primary care provider, please call [phone] and they will assist you.        Care EveryWhere ID     This is your Care EveryWhere ID. This could be used by other organizations to access your [facility] medical records  [id number]        Equal Access to Services     ERMELINDA LOZADA : Rishabh Petersen, cherry patterson, qaosito concepcion, shawna barfield. So [facility] [phone].    ATENCIÓN: Si habla español, tiene a miller disposición servicios gratuitos de asistencia lingüística. Llame al [phone].    We comply with applicable federal civil rights laws and [place] laws. We do not discriminate on the basis of race, color, national origin, age, disability, sex, sexual orientation, or gender identity.            After Visit Summary       This is your  record. Keep this with you and show to your community pharmacist(s) and doctor(s) at your next visit.

## 2018-10-03 NOTE — ED AVS SNAPSHOT
[facility] Emergency Department    [address]    [facility] [address]    Phone:  [phone]    Fax:  [phone]                                       Nickolas Lang   MRN: [id number]    Department:  [facility] Emergency Department   Date of Visit:  10/3/2018           After Visit Summary Signature Page     I have received my discharge instructions, and my questions have been answered. I have discussed any challenges I see with this plan with the nurse or doctor.    ..........................................................................................................................................  Patient/Patient Representative Signature      ..........................................................................................................................................  Patient Representative Print Name and Relationship to Patient    ..................................................               ................................................  Date                                   Time    ..........................................................................................................................................  Reviewed by Signature/Title    ...................................................              ..............................................  Date                                               Time          22EPIC Rev 08/18

## 2018-10-04 ENCOUNTER — HOSPITAL ENCOUNTER (INPATIENT)
Facility: CLINIC | Age: 28
LOS: 12 days | Discharge: PSYCHIATRIC OR SUBSTANCE USE FACILITY | DRG: 897 | End: 2018-10-16
Attending: FAMILY MEDICINE | Admitting: PSYCHIATRY & NEUROLOGY
Payer: COMMERCIAL

## 2018-10-04 DIAGNOSIS — F10.229 ALCOHOL DEPENDENCE WITH INTOXICATION WITH COMPLICATION (H): ICD-10-CM

## 2018-10-04 DIAGNOSIS — F10.20 ALCOHOL USE DISORDER, SEVERE, DEPENDENCE (H): Chronic | ICD-10-CM

## 2018-10-04 DIAGNOSIS — F41.9 ANXIETY: Chronic | ICD-10-CM

## 2018-10-04 DIAGNOSIS — F10.20 ALCOHOL DEPENDENCE, CONTINUOUS DRINKING BEHAVIOR (H): ICD-10-CM

## 2018-10-04 DIAGNOSIS — Z71.6 ENCOUNTER FOR SMOKING CESSATION COUNSELING: ICD-10-CM

## 2018-10-04 LAB
ALCOHOL BREATH TEST: 0.29 (ref 0–0.01)
AMPHETAMINES UR QL SCN: NEGATIVE
BARBITURATES UR QL: NEGATIVE
BENZODIAZ UR QL: POSITIVE
CANNABINOIDS UR QL SCN: NEGATIVE
COCAINE UR QL: NEGATIVE
ETHANOL UR QL SCN: POSITIVE
INTERPRETATION ECG - MUSE: NORMAL
OPIATES UR QL SCN: NEGATIVE

## 2018-10-04 PROCEDURE — 80307 DRUG TEST PRSMV CHEM ANLYZR: CPT | Performed by: FAMILY MEDICINE

## 2018-10-04 PROCEDURE — 90791 PSYCH DIAGNOSTIC EVALUATION: CPT

## 2018-10-04 PROCEDURE — 82075 ASSAY OF BREATH ETHANOL: CPT | Performed by: FAMILY MEDICINE

## 2018-10-04 PROCEDURE — 80320 DRUG SCREEN QUANTALCOHOLS: CPT | Performed by: FAMILY MEDICINE

## 2018-10-04 PROCEDURE — 99285 EMERGENCY DEPT VISIT HI MDM: CPT | Mod: 25 | Performed by: FAMILY MEDICINE

## 2018-10-04 PROCEDURE — 99285 EMERGENCY DEPT VISIT HI MDM: CPT | Mod: Z6 | Performed by: FAMILY MEDICINE

## 2018-10-04 PROCEDURE — 25000132 ZZH RX MED GY IP 250 OP 250 PS 637: Performed by: FAMILY MEDICINE

## 2018-10-04 PROCEDURE — 12800012 ZZH R&B CD MH INTERMEDIATE ADULT

## 2018-10-04 PROCEDURE — HZ2ZZZZ DETOXIFICATION SERVICES FOR SUBSTANCE ABUSE TREATMENT: ICD-10-PCS | Performed by: PSYCHIATRY & NEUROLOGY

## 2018-10-04 RX ORDER — FOLIC ACID 1 MG/1
1 TABLET ORAL DAILY
Status: DISCONTINUED | OUTPATIENT
Start: 2018-10-05 | End: 2018-10-16 | Stop reason: HOSPADM

## 2018-10-04 RX ORDER — DIAZEPAM 5 MG
5 TABLET ORAL ONCE
Status: COMPLETED | OUTPATIENT
Start: 2018-10-04 | End: 2018-10-04

## 2018-10-04 RX ORDER — MULTIPLE VITAMINS W/ MINERALS TAB 9MG-400MCG
1 TAB ORAL DAILY
Status: DISCONTINUED | OUTPATIENT
Start: 2018-10-05 | End: 2018-10-16 | Stop reason: HOSPADM

## 2018-10-04 RX ORDER — AMLODIPINE BESYLATE 10 MG/1
10 TABLET ORAL DAILY
Status: DISCONTINUED | OUTPATIENT
Start: 2018-10-05 | End: 2018-10-16 | Stop reason: HOSPADM

## 2018-10-04 RX ORDER — HYDROXYZINE HYDROCHLORIDE 25 MG/1
25 TABLET, FILM COATED ORAL EVERY 4 HOURS PRN
Status: DISCONTINUED | OUTPATIENT
Start: 2018-10-04 | End: 2018-10-04

## 2018-10-04 RX ORDER — ATENOLOL 50 MG/1
50 TABLET ORAL DAILY PRN
Status: DISCONTINUED | OUTPATIENT
Start: 2018-10-04 | End: 2018-10-16 | Stop reason: HOSPADM

## 2018-10-04 RX ORDER — DIAZEPAM 5 MG
10 TABLET ORAL ONCE
Status: COMPLETED | OUTPATIENT
Start: 2018-10-04 | End: 2018-10-04

## 2018-10-04 RX ORDER — HYDROXYZINE HYDROCHLORIDE 50 MG/1
50 TABLET, FILM COATED ORAL EVERY 4 HOURS PRN
Status: DISCONTINUED | OUTPATIENT
Start: 2018-10-04 | End: 2018-10-16 | Stop reason: HOSPADM

## 2018-10-04 RX ORDER — LANOLIN ALCOHOL/MO/W.PET/CERES
100 CREAM (GRAM) TOPICAL DAILY
Status: COMPLETED | OUTPATIENT
Start: 2018-10-05 | End: 2018-10-07

## 2018-10-04 RX ORDER — DIAZEPAM 5 MG
5-20 TABLET ORAL EVERY 30 MIN PRN
Status: DISCONTINUED | OUTPATIENT
Start: 2018-10-04 | End: 2018-10-12

## 2018-10-04 RX ORDER — ALUMINA, MAGNESIA, AND SIMETHICONE 2400; 2400; 240 MG/30ML; MG/30ML; MG/30ML
30 SUSPENSION ORAL ONCE
Status: COMPLETED | OUTPATIENT
Start: 2018-10-04 | End: 2018-10-04

## 2018-10-04 RX ADMIN — ALUMINUM HYDROXIDE, MAGNESIUM HYDROXIDE, AND DIMETHICONE 30 ML: 400; 400; 40 SUSPENSION ORAL at 20:59

## 2018-10-04 RX ADMIN — DIAZEPAM 10 MG: 5 TABLET ORAL at 20:58

## 2018-10-04 RX ADMIN — DIAZEPAM 20 MG: 5 TABLET ORAL at 23:57

## 2018-10-04 RX ADMIN — DIAZEPAM 5 MG: 5 TABLET ORAL at 19:23

## 2018-10-04 RX ADMIN — HYDROXYZINE HYDROCHLORIDE 50 MG: 50 TABLET, FILM COATED ORAL at 22:55

## 2018-10-04 RX ADMIN — DIAZEPAM 5 MG: 5 TABLET ORAL at 19:59

## 2018-10-04 RX ADMIN — DIAZEPAM 10 MG: 5 TABLET ORAL at 22:43

## 2018-10-04 ASSESSMENT — ENCOUNTER SYMPTOMS
DYSPHORIC MOOD: 1
ABDOMINAL PAIN: 0
FEVER: 0
SHORTNESS OF BREATH: 0

## 2018-10-04 ASSESSMENT — ACTIVITIES OF DAILY LIVING (ADL): GROOMING: INDEPENDENT

## 2018-10-04 NOTE — ED NOTES
Patient reports his Aunt called Ambulance today after patient had drank 3/4 Liter of Vodka today and took Ativan that had been prescribed after an inpatient hospitalization. Is scheduled to start outpatient CD treatment on Monday at [place]. Has multiple bruises on bi-lat UE. Patient reports recent hospitalization and intubation as well as recent falls as cause. Patient recently experienced death of an Uncle, who was more like a Father to him, 3 days ago.

## 2018-10-04 NOTE — DISCHARGE INSTRUCTIONS

## 2018-10-04 NOTE — IP AVS SNAPSHOT
[facility]    [address] [place] [address]    Phone:  [phone]                                       After Visit Summary   10/4/2018    Nickolas Lang    MRN: [id number]           After Visit Summary Signature Page     I have received my discharge instructions, and my questions have been answered. I have discussed any challenges I see with this plan with the nurse or doctor.    ..........................................................................................................................................  Patient/Patient Representative Signature      ..........................................................................................................................................  Patient Representative Print Name and Relationship to Patient    ..................................................               ................................................  Date                                   Time    ..........................................................................................................................................  Reviewed by Signature/Title    ...................................................              ..............................................  Date                                               Time          22EPIC Rev 08/18

## 2018-10-04 NOTE — IP AVS SNAPSHOT
MRN:[id number]                      After Visit Summary   10/4/2018    Nickolas Lang    MRN: [id number]           Thank you!     Thank you for choosing [facility] for your care. Our goal is always to provide you with excellent care.        Patient Information     Date Of Birth          1990        Designated Caregiver       Most Recent Value    Caregiver    Will someone help with your care after discharge? no      About your hospital stay     You were admitted on:  October 4, 2018 You last received care in the:  [facility]    You were discharged on:  October 16, 2018       Who to Call     For medical emergencies, please call 911.  For non-urgent questions about your medical care, please call your primary care provider or clinic, [phone]          Attending Provider     Provider Specialty    Jorge Baker MD Emergency Medicine    [facility], Noam Garcia MD Psychiatry    Filippo Brasher MD Psychiatry       Primary Care Provider Office Phone # Fax #    Tima Crews [phone]      Follow-up Appointments     Follow Up ([facility])       Follow up with primary care provider, Tima Crews, within 2 weeks for hospital follow- up.  The following labs/tests are recommended: CMP. Please discuss fatty liver and importance of lifestyle management.      Appointments on [place] and/or [facility] (with [facility] or [facility] provider or service). Call [phone] if you haven't heard regarding these appointments within 7 days of discharge.                  Your next 10 appointments already scheduled     Oct 29, 2018  9:15 AM CDT   Return Visit with Tima Crews MD   [facility] ([facility])    [address]   [address]  [facility] [id number] [phone]              Further instructions from your care team       Behavioral Discharge Planning and  Instructions  THANK YOU FOR CHOOSING THE [address]  [phone]    Summary: You were admitted to [facility] on 10/4/2018 for detoxification from alcohol.  A medical exam was performed that included lab work. You have met with a  and opted to enter treatment at [facility].  Your admission is scheduled for 10/16 @ noon. You have been through the civil commitment process and this has resulted in a Stay to commit.  Please follow the guidelines of your Stay and work with your  to manage your substance use disorder and maintain sobriety.  Failure to do so could result in a vacate of the Stay and you could face being placed on a Full Commitment to the [facility].  Please take care and make your recovery a daily priority, Nickolas! It was a pleasure working with you and the entire treatment team here wishes you the very best in your recovery!     [facility]  [phone]  [address] [place] [address]     Main Diagnosis:  Per Dr. Brasher;  Alcohol Use Dx Severe    Major Treatments, Procedures and Findings:  You were treated for alcohol detoxification using valium administered based on the [facility] protocol. While here you were placed on a Court Hold that was supported by the Court. You completed a chemical dependency assessment. You had labs drawn and those results were reviewed with you. Please take a copy of your lab work with you to your next primary care physician appointment.    Symptoms to Report:  If you experience more anxiety, confusion, sleeplessness, deep sadness or thoughts of suicide, notify your treatment team or notify your primary care physician. IF ANY OF THE SYMPTOMS YOU ARE EXPERIENCING ARE A MEDICAL EMERGENCY CALL 911 IMMEDIATELY.     Lifestyle Adjustment: Adjust your lifestyle to get enough sleep, relaxation, exercise and good nutrition. Continue to develop healthy coping skills to decrease stress and promote a sober living  environment. Do not use mood altering substances including alcohol, illegal drugs or addictive medications other than what is currently prescribed.     Disposition: [facility]    Follow-up Appointment:   Please see above appointment with Dr. Crews and attend as scheduled.    Resources:   AA/NA and Sponsors are excellent resources for support and you can find one at any support group meeting.   SMART Recovery - self management for addiction recovery:  www.smartrecovery.org  http://www.aastpaul.org/?topic=8  http://a[place].org/meetings/  http://www.nam[place].org/index.php/meeting-list-pdf  Pathways ~ A Health Crisis Resource & Support Center:  272.639.4518.  http://www.harmreduction.org  [facility] 979-990-9256  Support Group:  AA/NA and Sponsor/support.  National [place] on Mental Illness (www.[place].chaya.org): 517.985.4557 or 785-533-5568.  Alcoholics Anonymous (www.alcoholics-anonymous.org): Check your phone book for your local chapter.  Suicide Awareness Voices of Education (SAVE) (www.save.org): 244-771-FTPM (4583)  National Suicide Prevention Line (www.mentalhealthmn.org): 761-946-FMGK (3691)  Mental Health Consumer/Survivor Network of [place] (www.mhcsn.net): 804.891.8349 or 019-723-1040  Mental Health Association of [place] (www.mentalhealth.org): 871.968.7937 or 425-755-2205   Substance Abuse and Mental Health Services (www.samhsa.gov)    [place] Recovery Connection ([facility])  [facility] connects people seeking recovery to resources that help foster and sustain long-term recovery.  Whether you are seeking resources for treatment, transportation, housing, job training, education, health care or other pathways to recovery, [facility] is a great place to start.  417.311.3094.  www.[facility].org    General Medication Instructions:   See your medication sheet(s) for instructions.   Take all medications as prescribed.  Make no changes unless your doctor suggests them.   Go to all your doctor visits.  Be  "sure to have all your required lab tests. This way, your medicines can be refilled on time.  Do not use any forms of alcohol.    Please Note:  If you have any questions at anytime after you are discharged please call the [facility] detox unit 3AW unit at [phone].  [facility], Behavioral Intake [phone]  Medical Records call [phone]  Outpatient Behavioral Intake call [phone]  LP+ Wait List/Bed Availability call [phone]    Please take this discharge folder with you to all your follow up appointments, it contains your lab results, diagnosis, medication list and discharge recommendations.      THANK YOU FOR CHOOSING THE [facility]       Pending Results     No orders found from 10/2/2018 to 10/5/2018.            Statement of Approval     Ordered          10/16/18 0908  I have reviewed and agree with all the recommendations and orders detailed in this document.  EFFECTIVE NOW     Approved and electronically signed by:  Filippo Brasher MD             Admission Information     Date & Time Provider Department Dept. Phone    10/4/2018 Filippo Brasher MD [facility] [phone]      Your Vitals Were     Blood Pressure Pulse Temperature Respirations Height Weight    138/104 98 97  F (36.1  C) (Oral) 16 1.854 m (6' 1\") 138.3 kg (305 lb)    Pulse Oximetry BMI (Body Mass Index)                98% 40.24 kg/m2          MyChart Information     [employer] gives you secure access to your electronic health record. If you see a primary care provider, you can also send messages to your care team and make appointments. If you have questions, please call your primary care clinic.  If you do not have a primary care provider, please call [phone] and they will assist you.        Care EveryWhere ID     This is your Care EveryWhere ID. This could be used by other organizations to access your [facility]" records  [id number]        Equal Access to Services     CATHYCATHY KIERRA : Hadii aad ku hadkostastefany Petersen, waaxda luesmeadaha, qaybta manmadelfina concepcion, shawna ospinasureshtarah barfield. So [facility] [phone].    ATENCIÓN: Si habla español, tiene a miller disposición servicios gratuitos de asistencia lingüística. Llame al [phone].    We comply with applicable federal civil rights laws and [place] laws. We do not discriminate on the basis of race, color, national origin, age, disability, sex, sexual orientation, or gender identity.               Review of your medicines      CONTINUE these medicines which may have CHANGED, or have new prescriptions. If we are uncertain of the size of tablets/capsules you have at home, strength may be listed as something that might have changed.        Dose / Directions    mirtazapine 15 MG tablet   Commonly known as:  REMERON   This may have changed:  additional instructions   Used for:  Alcohol dependence with intoxication with complication (H)        Dose:  15 mg   Take 1 tablet (15 mg) by mouth At Bedtime   Quantity:  30 tablet   Refills:  0       sertraline 50 MG tablet   Commonly known as:  ZOLOFT   This may have changed:    - medication strength  - how much to take   Used for:  Alcohol dependence with intoxication with complication (H)        Dose:  150 mg   Take 3 tablets (150 mg) by mouth daily   Quantity:  90 tablet   Refills:  0       traZODone 100 MG tablet   Commonly known as:  DESYREL   This may have changed:    - how much to take  - when to take this  - reasons to take this   Used for:  Alcohol dependence with intoxication with complication (H)        Dose:  100-200 mg   Take 1-2 tablets (100-200 mg) by mouth nightly as needed for sleep   Quantity:  90 tablet   Refills:  0         CONTINUE these medicines which have NOT CHANGED        Dose / Directions    amLODIPine 10 MG tablet   Commonly known as:  NORVASC   Used for:  Alcohol dependence with intoxication with  complication (H)        Dose:  10 mg   Take 1 tablet (10 mg) by mouth daily   Quantity:  30 tablet   Refills:  0       busPIRone 10 MG tablet   Commonly known as:  BUSPAR   Used for:  Alcohol dependence with intoxication with complication (H)        Dose:  20 mg   Take 2 tablets (20 mg) by mouth 3 times daily   Quantity:  90 tablet   Refills:  0       cloNIDine 0.1 MG tablet   Commonly known as:  CATAPRES   Used for:  Alcohol dependence with intoxication with complication (H)        Dose:  0.1 mg   Take 1 tablet (0.1 mg) by mouth 2 times daily   Quantity:  60 tablet   Refills:  0       gabapentin 800 MG tablet   Commonly known as:  NEURONTIN   Used for:  Alcohol dependence with intoxication with complication (H)        Dose:  800 mg   Take 1 tablet (800 mg) by mouth 4 times daily   Quantity:  90 tablet   Refills:  1       hydrOXYzine 50 MG tablet   Commonly known as:  ATARAX   Used for:  Alcohol dependence with intoxication with complication (H)        Dose:  50 mg   Take 1 tablet (50 mg) by mouth every 4 hours as needed for anxiety   Quantity:  120 tablet   Refills:  0       multivitamin, therapeutic with minerals Tabs tablet   Used for:  Alcohol dependence, continuous drinking behavior (H)        Dose:  1 tablet   Take 1 tablet by mouth daily   Quantity:  30 each   Refills:  0       nicotine polacrilex 4 MG lozenge   Used for:  Encounter for smoking cessation counseling        1-2 lozenges every hour as needed, max 10 lozenges per day   Quantity:  360 tablet   Refills:  1       omeprazole 40 MG capsule   Commonly known as:  priLOSEC   Used for:  Alcohol dependence, continuous drinking behavior (H)        Dose:  40 mg   Take 1 capsule (40 mg) by mouth daily   Quantity:  30 capsule   Refills:  0         STOP taking     amphetamine-dextroamphetamine 30 MG per 24 hr capsule   Commonly known as:  ADDERALL XR           disulfiram 250 MG tablet   Commonly known as:  ANTABUSE           loperamide 2 MG tablet   Commonly  known as:  IMODIUM A-D           LORazepam 1 MG tablet   Commonly known as:  ATIVAN                Where to get your medicines      These medications were sent to [facility] [place], [place] - [address]  [address] [facility] [address], [facility] [address]     Phone:  [phone]     amLODIPine 10 MG tablet    busPIRone 10 MG tablet    cloNIDine 0.1 MG tablet    gabapentin 800 MG tablet    hydrOXYzine 50 MG tablet    mirtazapine 15 MG tablet    multivitamin, therapeutic with minerals Tabs tablet    nicotine polacrilex 4 MG lozenge    omeprazole 40 MG capsule    sertraline 50 MG tablet    traZODone 100 MG tablet                Protect others around you: Learn how to safely use, store and throw away your medicines at www.disposemymeds.org.             Medication List: This is a list of all your medications and when to take them. Check marks below indicate your daily home schedule. Keep this list as a reference.      Medications           Morning Afternoon Evening Bedtime As Needed    amLODIPine 10 MG tablet   Commonly known as:  NORVASC   Take 1 tablet (10 mg) by mouth daily   Last time this was given:  10 mg on 10/16/2018  9:03 AM                                busPIRone 10 MG tablet   Commonly known as:  BUSPAR   Take 2 tablets (20 mg) by mouth 3 times daily   Last time this was given:  20 mg on 10/16/2018  9:04 AM                                cloNIDine 0.1 MG tablet   Commonly known as:  CATAPRES   Take 1 tablet (0.1 mg) by mouth 2 times daily   Last time this was given:  0.1 mg on 10/16/2018  9:03 AM                                gabapentin 800 MG tablet   Commonly known as:  NEURONTIN   Take 1 tablet (800 mg) by mouth 4 times daily   Last time this was given:  800 mg on 10/16/2018  9:03 AM                                hydrOXYzine 50 MG tablet   Commonly known as:  ATARAX   Take 1 tablet (50 mg) by mouth every 4 hours as needed for anxiety   Last time this was given:  50 mg on 10/16/2018  9:03  AM                                mirtazapine 15 MG tablet   Commonly known as:  REMERON   Take 1 tablet (15 mg) by mouth At Bedtime   Last time this was given:  15 mg on 10/15/2018  9:45 PM                                multivitamin, therapeutic with minerals Tabs tablet   Take 1 tablet by mouth daily   Last time this was given:  1 tablet on 10/16/2018  9:03 AM                                nicotine polacrilex 4 MG lozenge   1-2 lozenges every hour as needed, max 10 lozenges per day   Last time this was given:  8 mg on 10/16/2018  9:02 AM                                omeprazole 40 MG capsule   Commonly known as:  priLOSEC   Take 1 capsule (40 mg) by mouth daily   Last time this was given:  40 mg on 10/16/2018  9:03 AM                                sertraline 50 MG tablet   Commonly known as:  ZOLOFT   Take 3 tablets (150 mg) by mouth daily   Last time this was given:  150 mg on 10/16/2018  9:03 AM                                traZODone 100 MG tablet   Commonly known as:  DESYREL   Take 1-2 tablets (100-200 mg) by mouth nightly as needed for sleep   Last time this was given:  200 mg on 10/15/2018  9:45 PM                                          More Information        What is High Blood Pressure?  High blood pressure (also called hypertension) is known as the  silent killer.  This is because most of the time it doesn t cause symptoms. In fact, many people don t know they have it until other problems develop. In most cases, high blood pressure often requires lifelong treatment.  Understanding blood pressure  The circulatory system is made up of the heart and blood vessels that carry blood through the body. Your heart is the pump for this system. With each heartbeat (contraction), the heart sends blood out through large blood vessels called arteries. Blood pressure is a measure of how hard the moving blood pushes against the walls of the arteries.  High blood pressure can harm your health  In a healthy blood  vessel, the blood moves smoothly through the vessel and puts normal pressure on the vessel walls.       High blood pressure occurs when blood pushes too hard against artery walls. This causes damage to the artery walls and then the formation of scar tissue as it heals. This makes the arteries stiff and weak. Plaque sticks to the scarred tissue narrowing and hardening the arteries. High blood pressure also causes your heart to work harder to get blood out to the body. High blood pressure raises your risk of heart attack, also known as acute myocardial infarction, or AMI, heart failure, and stroke. It can also lead to kidney disease, and blindness. In general, if you have high blood pressure, keeping your blood pressure below 130/80 mmHg may help prevent these problems. Your healthcare provider may prescribe medicine to help control blood pressure if lifestyle changes are not enough.  It's important to know your blood pressure numbers. Blood pressure measurements are given as 2 numbers. Systolic blood pressure is the upper number. This is the pressure when the heart contracts. Diastolic blood pressure is the lower number. This is the pressure when the heart relaxes between beats.  Blood pressure is categorized as normal, elevated, or stage 1 or stage 2 high blood pressure:    Normal blood pressure is systolic of less than 120 and diastolic of less than 80 (120/80)    Elevated blood pressure is systolic of 120 to 129 and diastolic less than 80    Stage 1 high blood pressure is systolic is 130 to 139 or diastolic between 80 to 89    Stage 2 high blood pressure is when systolic is 140 or higher or the diastolic is 90 or higher  High blood pressure is diagnosed when multiple, separate readings show blood pressure above 130/80 mmHg. Talk with your healthcare provider if you have questions or concerns about your blood pressure readings.  Measuring blood pressure  An example of a blood pressure measurement is 120/70 (120  "over 70). The top number is the pressure of blood against the artery walls during a heartbeat (systolic). The bottom number is the pressure of blood against artery walls between heartbeats (diastolic). Talk with your healthcare provider to find out what your blood pressure goals should be.   Controlling blood pressure  If your blood pressure is too high, work with your doctor on a plan for lowering it. Below are steps you can take that will help lower your blood pressure.    Choose heart-healthy foods. Eating healthier meals helps you control your blood pressure. Ask your doctor about the DASH eating plan. This plan helps reduce blood pressure by limiting the amount of sodium (salt) you have in your diet. DASH also encourages eating plenty of fruits and vegetables, low-fat or non-fat dairy, whole-grains, and foods high in fiber, and low in fat. This also provides an enhanced amount of potassium which can also help lower blood pressure.    Reduce sodium. Reducing sodium in your diet reduces fluid retention. Fluid retention caused by too much salt increases blood volume and blood pressure. The American Heart Association s (AHA) \"ideal\" sodium intake recommendation is 1,500 milligrams per day.  However, since American's eat so much salt, the AHA says a positive change can occur by cutting back to even 2,400 milligrams of sodium a day.    Maintain a healthy weight. Being overweight makes you more likely to have high blood pressure. Losing excess weight helps lower blood pressure.    Exercise regularly. Daily exercise helps your heart and blood vessels work better and stay healthier. It can help lower your blood pressure.    Stop smoking. Smoking increases blood pressure and damages blood vessels.    Limit alcohol. Drinking too much alcohol can raise blood pressure. Men should have no more than 2 drinks a day. Women should have no more than 1. (A drink is equal to 1 beer, or a small glass of wine, or a shot of " liquor.)    Control stress. Stress makes your heart work harder and beat faster. Controlling stress helps you control your blood pressure.  Facts about high blood pressure    Feeling OK does not mean that blood pressure is under control. Likewise, feeling bad doesn t mean it s out of control. The only way to know for sure is to check your pressure regularly.    Medicine is only one part of controlling high blood pressure. You also need to manage your weight, get regular exercise, and adjust your eating habits.    High blood pressure is usually a lifelong problem. But it can be controlled with healthy lifestyle changes and medicine.    Hypertension is not the same as stress. Although stress may be a factor in high blood pressure, it s only one part of the story.    Blood pressure medicines need to be taken every day. Stopping suddenly may cause a dangerous increase in pressure.     Date Last Reviewed: 4/27/2016 2000-2017 [employer]. [address] [place] [address]. All rights reserved. This information is not intended as a substitute for professional medical care. Always follow your healthcare professional's instructions.                Eating Heart-Healthy Food: Using the DASH Plan    Eating for your heart doesn t have to be hard or boring. You just need to know how to make healthier choices. The DASH eating plan has been developed to help you do just that. DASH stands for Dietary Approaches to Stop Hypertension. It is a plan that has been proven to be healthier for your heart and to lower your risk for high blood pressure. It can also help lower your risk for cancer, heart disease, osteoporosis, and diabetes.  Choosing from each food group  Choose foods from each of the food groups below each day. Try to get the recommended number of servings for each food group. The serving numbers are based on a diet of 2,000 calories a day. Talk to your doctor if you re unsure about your calorie  needs. Along with getting the correct servings, the DASH plan also recommends a sodium intake less than 2,300 mg per day.        Grains  Servings: 6 to 8 a day  A serving is:    1 slice bread    1 ounce dry cereal    Half a cup cooked rice, pasta or cereal  Best choices: Whole grains and any grains high in fiber. Vegetables  Servings: 4 to 5 a day  A serving is:    1 cup raw leafy vegetable    Half a cup cut-up raw or cooked vegetable    Half a cup vegetable juice  Best choices: Fresh or frozen vegetables prepared without added salt or fat.   Fruits  Servings: 4 to 5 a day  A serving is:    1 medium fruit    One-quarter cup dried fruit    Half a cup fresh, frozen, or canned fruit    Half a cup of 100% fruit juices  Best choices: A variety of fresh fruits of different colors. Whole fruits are a better choice than fruit juices. Low-fat or fat-free dairy  Servings: 2 to 3 a day  A serving is:    1 cup milk    1 cup yogurt    One and a half ounces cheese  Best choices: Skim or 1% milk, low-fat or fat-free yogurt or buttermilk, and low-fat cheeses.         Lean meats, poultry, fish  Servings: 6 or fewer a day  A serving is:    1 ounce cooked meats, poultry, or fish    1 egg  Best choices: Lean poultry and fish. Trim away visible fat. Broil, grill, roast, or boil instead of frying. Remove skin from poultry before eating. Limit how much red meat you eat.  Nuts, seeds, beans  Servings: 4 to 5 a week  A serving is:    One-third cup nuts (one and a half ounces)    2 tablespoons nut butter or seeds    Half a cup cooked dry beans or legumes  Best choices: Dry roasted nuts with no salt added, lentils, kidney beans, garbanzo beans, and whole fregoso beans.   Fats and oils  Servings: 2 to 3 a day  A serving is:    1 teaspoon vegetable oil    1 teaspoon soft margarine    1 tablespoon mayonnaise    2 tablespoons salad dressing  Best choices: Nut and vegetable oils (nontropical vegetable oils), such as olive and canola oil.  Sweets  Servings: 5 a week or fewer  A serving is:    1 tablespoon sugar, maple syrup, or honey    1 tablespoon jam or jelly    1 half-ounce jelly beans (about 15)    1 cup lemonade  Best choices: Dried fruit can be a satisfying sweet. Choose low-fat sweets. And watch your serving sizes!      For more on the DASH eating plan, visit:  www.nhlbi.nih.gov/health/health-topics/topics/dash   Date Last Reviewed: 6/1/2016 2000-2017 [employer]. [address] [place] [address]. All rights reserved. This information is not intended as a substitute for professional medical care. Always follow your healthcare professional's instructions.                Patient Education    Mirtazapine Oral disintegrating tablet    Mirtazapine Oral tablet  Mirtazapine Oral tablet  What is this medicine?  MIRTAZAPINE (magaly [place] a peen) is used to treat depression.  This medicine may be used for other purposes; ask your health care provider or pharmacist if you have questions.  What should I tell my health care provider before I take this medicine?  They need to know if you have any of these conditions:    bipolar disorder    glaucoma    kidney disease    liver disease    suicidal thoughts    an unusual or allergic reaction to mirtazapine, other medicines, foods, dyes, or preservatives    pregnant or trying to get pregnant    breast-feeding  How should I use this medicine?  Take this medicine by mouth with a glass of water. Follow the directions on the prescription label. Take your medicine at regular intervals. Do not take your medicine more often than directed. Do not stop taking this medicine suddenly except upon the advice of your doctor. Stopping this medicine too quickly may cause serious side effects or your condition may worsen.  A special MedGuide will be given to you by the pharmacist with each prescription and refill. Be sure to read this information carefully each time.  Talk to your pediatrician regarding the  use of this medicine in children. Special care may be needed.  Overdosage: If you think you have taken too much of this medicine contact a poison control center or emergency room at once.  NOTE: This medicine is only for you. Do not share this medicine with others.  What if I miss a dose?  If you miss a dose, take it as soon as you can. If it is almost time for your next dose, take only that dose. Do not take double or extra doses.  What may interact with this medicine?  Do not take this medicine with any of the following medications:    linezolid    MAOIs like Carbex, Eldepryl, Marplan, Nardil, and Parnate    methylene blue (injected into a vein)  This medicine may also interact with the following medications:    alcohol    antiviral medicines for HIV or AIDS    certain medicines that treat or prevent blood clots like warfarin    certain medicines for depression, anxiety, or psychotic disturbances    certain medicines for fungal infections like ketoconazole and itraconazole    certain medicines for migraine headache like almotriptan, eletriptan, frovatriptan, naratriptan, rizatriptan, sumatriptan, zolmitriptan    certain medicines for seizures like carbamazepine or phenytoin    certain medicines for sleep    cimetidine    erythromycin    fentanyl    lithium    medicines for blood pressure    nefazodone    rasagiline    rifampin    supplements like Romina's wort, kava kava, valerian    tramadol    tryptophan  This list may not describe all possible interactions. Give your health care provider a list of all the medicines, herbs, non-prescription drugs, or dietary supplements you use. Also tell them if you smoke, drink alcohol, or use illegal drugs. Some items may interact with your medicine.  What should I watch for while using this medicine?  Tell your doctor if your symptoms do not get better or if they get worse. Visit your doctor or health care professional for regular checks on your progress. Because it may  take several weeks to see the full effects of this medicine, it is important to continue your treatment as prescribed by your doctor.  Patients and their families should watch out for new or worsening thoughts of suicide or depression. Also watch out for sudden changes in feelings such as feeling anxious, agitated, panicky, irritable, hostile, aggressive, impulsive, severely restless, overly excited and hyperactive, or not being able to sleep. If this happens, especially at the beginning of treatment or after a change in dose, call your health care professional.  You may get drowsy or dizzy. Do not drive, use machinery, or do anything that needs mental alertness until you know how this medicine affects you. Do not stand or sit up quickly, especially if you are an older patient. This reduces the risk of dizzy or fainting spells. Alcohol may interfere with the effect of this medicine. Avoid alcoholic drinks.  This medicine may cause dry eyes and blurred vision. If you wear contact lenses you may feel some discomfort. Lubricating drops may help. See your eye doctor if the problem does not go away or is severe.  Your mouth may get dry. Chewing sugarless gum or sucking hard candy, and drinking plenty of water may help. Contact your doctor if the problem does not go away or is severe.  What side effects may I notice from receiving this medicine?  Side effects that you should report to your doctor or health care professional as soon as possible:    allergic reactions like skin rash, itching or hives, swelling of the face, lips, or tongue    breathing problems    confusion    fever, sore throat, or mouth ulcers or blisters    flu like symptoms including fever, chills, cough, muscle or joint aches and pains    stomach pain with nausea and/or vomiting    suicidal thoughts or other mood changes    swelling of the hands or feet    unusual bleeding or bruising    unusually weak or tired    vomiting  Side effects that usually do  not require medical attention (report to your doctor or health care professional if they continue or are bothersome):    constipation    increased appetite    weight gain  This list may not describe all possible side effects. Call your doctor for medical advice about side effects. You may report side effects to FDA at 7-288-FDA-9974.  Where should I keep my medicine?  Keep out of the reach of children.  Store at room temperature between 15 and 30 degrees C (59 and 86 degrees F) Protect from light and moisture. Throw away any unused medicine after the expiration date.  NOTE:This sheet is a summary. It may not cover all possible information. If you have questions about this medicine, talk to your doctor, pharmacist, or health care provider. Copyright  2016 [employer]

## 2018-10-04 NOTE — ED NOTES
"Pt awake and talking. Pt saying if we send him to detox that he will \"just sign out AMA anyways and walk home.\" Pt says \"I'd much rather sober up with you guys.\"   "

## 2018-10-04 NOTE — ADDENDUM NOTE
Encounter addended by: Amish Murillo LADC on: 10/4/2018  1:55 PM<BR>     Actions taken: Sign clinical note

## 2018-10-04 NOTE — PROGRESS NOTES
Patient:  Nickolas Lang            Adult CD Progress Note and Treatment Plan Review     Attendance  Please refer to OP BEH CD Adult Attendance Record Documentation Flowsheet    Support group attended this week: yes    Reporting sobriety:  yes    Treatment Plan     Treatment Plan Review competed on:    8/9/18    Client preferred learning style: Visual; Hands on; Verbal; Demonstration     Staff Members contributing: ARLENE Green                    Received Supervision: No    Client: contributed to goals and plan.    Client received copy of plan/revised plan: Yes    Client agrees with plan/revised plan: Yes    Changes to Treatment Plan: No    New Goals added since last review: None    Goals worked on since last review: building sober support, meeting with sponsor multiple times this week, mental health management, relapse prevention, going through the [employer] [facility] and gaining insight of the program, returning to work    Strategies effective: yes    Strategies need these changes: Continue working on above goals.     ASAM Risk Rating:    Dimension 1 0 No problems. JONY: 6/29/18.    Dimension 2 0 No problems.    Dimension 3 1 Pt has a diagnosis of anxiety, depression, and ADHD and has been following recommendations of his physician and remaining medication compliant. Pt denies any suicidal ideation and his mood appears stable and hopeful.     Dimension 4 0 Pt continues to identify the negative consequences of his chemical use. Pt denies any major cravings this week.     Dimension 5 3 Pt continues to build and utilize coping skills to combat relapse triggers and cues and avoid relapse.  Pt appears to be implementing his relapse prevention plan.     Dimension 6 2 Pt reported that he attended four 12-step meetings this week and has a sponsor whom he is in regular contact with. Pt reported that he met with his sponsor three times this week and that they are continuing to go through the [facility] together. Pt returned  to living with his mother at home. Pt reported that his project at work is on hold.     Any changes in Vulnerable Adult Status?  No  If yes, add to treatment plan and individual abuse prevention plan.    Family Involvement:   Pt attended and participated in the family week program while in .     Data:   Pt stated that he is feeling grateful, optimistic, hopeful, and excited this evening. Pt shared that he returned to work this week but that the project he is working on has been put on hold. Pt reported that he is staying busy and has met with his sponsor multiple times this week to go through the [facility] and discuss.     Intervention:   Staff facilitated group and pt actively participated and offered fellow peers feedback.    Assessment:   Pt appears to be enjoying the time he has been spending with his sponsor and seems to be making a positive connection with him.    Plan:  Focus on recovery environment  Monitor emotional/physical health      ARLENE Howard

## 2018-10-04 NOTE — IP AVS SNAPSHOT
" [facility]: [phone]                                              INTERAGENCY TRANSFER FORM - LAB / IMAGING / EKG / EMG RESULTS   10/4/2018                    Hospital Admission Date: 10/4/2018  PARTH HARPER   : 1990  Sex: Male        Attending Provider: Filippo Brasher MD     Allergies:  No Known Allergies    Infection:  None   Service:  CHEMICAL DEP    Ht:  1.854 m (6' 1\")   Wt:  138.3 kg (305 lb)   Admission Wt:  138.3 kg (305 lb)    BMI:  40.24 kg/m 2   BSA:  2.67 m 2            Patient PCP Information     Provider PCP Type    Tima Crews MD General      Unresulted Labs     None      Encounter-Level Documents:     There are no encounter-level documents.      Order-Level Documents:     There are no order-level documents.      "

## 2018-10-04 NOTE — PROGRESS NOTES
Patient:  Nickolas Lang            Adult CD Progress Note and Treatment Plan Review     Attendance  Please refer to OP BEH CD Adult Attendance Record Documentation Flowsheet    Support group attended this week: yes    Reporting sobriety:  yes    Treatment Plan     Treatment Plan Review competed on:    8/16/18    Client preferred learning style: Visual; Hands on; Verbal; Demonstration     Staff Members contributing: ARLENE Green                    Received Supervision: No    Client: contributed to goals and plan.    Client received copy of plan/revised plan: Yes    Client agrees with plan/revised plan: Yes    Changes to Treatment Plan: No    New Goals added since last review: None    Goals worked on since last review: building sober support, meeting with sponsor multiple times this week, mental health management, relapse prevention, coping with not working much right now, staying busy    Strategies effective: yes    Strategies need these changes: Continue working on above goals.     ASAM Risk Rating:    Dimension 1 0 No problems. JONY: 6/29/18.    Dimension 2 0 No problems.    Dimension 3 1 Pt has a diagnosis of anxiety, depression, and ADHD and has been following recommendations of his physician and remaining medication compliant. Pt denies any suicidal ideation and his mood appears stable and hopeful.     Dimension 4 0 Pt continues to identify the negative consequences of his chemical use. Pt denies any major cravings this week. Pt appears to be increasing his internal motivation for recovery.     Dimension 5 3 Pt continues to build and utilize coping skills to combat relapse triggers and cues and avoid relapse.  Pt appears to be implementing his relapse prevention plan and to be making positive changes in his life.     Dimension 6 2 Pt reported that he attended four 12-step meetings this week and has a sponsor whom he is in regular contact with. Pt reported that he met with his sponsor multiple times  this week. Pt continues to reside with his mother. Pt reported that his project at work is on hold so he hasn't been working much.     Any changes in Vulnerable Adult Status?  No  If yes, add to treatment plan and individual abuse prevention plan.    Family Involvement:   Pt attended and participated in the family week program while in .     Data:   Pt stated that he is feeling grateful, optimistic, but a little scared because he still is not working much and is afraid because he is not making much money right now. Pt shared that he is loving the meetings that he is attending and that he found one on Monday that he especially likes.     Intervention:   Staff facilitated group and pt actively participated and offered fellow peers feedback.    Assessment:   Pt appears to be focusing on the positive and is trying to stay hopeful about the future.     Plan:  Focus on recovery environment  Monitor emotional/physical health      ARLENE Howard

## 2018-10-04 NOTE — ADDENDUM NOTE
Encounter addended by: Amish Murillo LADC on: 10/4/2018  2:01 PM<BR>     Actions taken: Sign clinical note

## 2018-10-05 LAB
ALBUMIN SERPL-MCNC: 3.7 G/DL (ref 3.4–5)
ALP SERPL-CCNC: 94 U/L (ref 40–150)
ALT SERPL W P-5'-P-CCNC: 117 U/L (ref 0–70)
ANION GAP SERPL CALCULATED.3IONS-SCNC: 7 MMOL/L (ref 3–14)
AST SERPL W P-5'-P-CCNC: 60 U/L (ref 0–45)
BILIRUB SERPL-MCNC: 1 MG/DL (ref 0.2–1.3)
BUN SERPL-MCNC: 14 MG/DL (ref 7–30)
CALCIUM SERPL-MCNC: 8.6 MG/DL (ref 8.5–10.1)
CHLORIDE SERPL-SCNC: 102 MMOL/L (ref 94–109)
CO2 SERPL-SCNC: 28 MMOL/L (ref 20–32)
CREAT SERPL-MCNC: 0.44 MG/DL (ref 0.66–1.25)
ERYTHROCYTE [DISTWIDTH] IN BLOOD BY AUTOMATED COUNT: 15.3 % (ref 10–15)
GFR SERPL CREATININE-BSD FRML MDRD: >90 ML/MIN/1.7M2
GLUCOSE SERPL-MCNC: 120 MG/DL (ref 70–99)
HCT VFR BLD AUTO: 42.8 % (ref 40–53)
HGB BLD-MCNC: 14.1 G/DL (ref 13.3–17.7)
LACTATE BLD-SCNC: 1.1 MMOL/L (ref 0.7–2)
MCH RBC QN AUTO: 28.9 PG (ref 26.5–33)
MCHC RBC AUTO-ENTMCNC: 32.9 G/DL (ref 31.5–36.5)
MCV RBC AUTO: 88 FL (ref 78–100)
PLATELET # BLD AUTO: 169 10E9/L (ref 150–450)
POTASSIUM SERPL-SCNC: 3.9 MMOL/L (ref 3.4–5.3)
PROT SERPL-MCNC: 7.2 G/DL (ref 6.8–8.8)
RBC # BLD AUTO: 4.88 10E12/L (ref 4.4–5.9)
SODIUM SERPL-SCNC: 137 MMOL/L (ref 133–144)
WBC # BLD AUTO: 4.6 10E9/L (ref 4–11)

## 2018-10-05 PROCEDURE — 83605 ASSAY OF LACTIC ACID: CPT | Performed by: NURSE PRACTITIONER

## 2018-10-05 PROCEDURE — 25000131 ZZH RX MED GY IP 250 OP 636 PS 637: Performed by: PSYCHIATRY & NEUROLOGY

## 2018-10-05 PROCEDURE — 36415 COLL VENOUS BLD VENIPUNCTURE: CPT | Performed by: NURSE PRACTITIONER

## 2018-10-05 PROCEDURE — 85027 COMPLETE CBC AUTOMATED: CPT | Performed by: NURSE PRACTITIONER

## 2018-10-05 PROCEDURE — 25000132 ZZH RX MED GY IP 250 OP 250 PS 637: Performed by: FAMILY MEDICINE

## 2018-10-05 PROCEDURE — 99222 1ST HOSP IP/OBS MODERATE 55: CPT | Performed by: PHYSICIAN ASSISTANT

## 2018-10-05 PROCEDURE — 99223 1ST HOSP IP/OBS HIGH 75: CPT | Mod: AI | Performed by: PSYCHIATRY & NEUROLOGY

## 2018-10-05 PROCEDURE — 12800012 ZZH R&B CD MH INTERMEDIATE ADULT

## 2018-10-05 PROCEDURE — 99207 ZZC CONSULT E&M CHANGED TO INITIAL LEVEL: CPT | Performed by: PHYSICIAN ASSISTANT

## 2018-10-05 PROCEDURE — 80053 COMPREHEN METABOLIC PANEL: CPT | Performed by: NURSE PRACTITIONER

## 2018-10-05 PROCEDURE — 25000132 ZZH RX MED GY IP 250 OP 250 PS 637: Performed by: PSYCHIATRY & NEUROLOGY

## 2018-10-05 RX ORDER — BUSPIRONE HYDROCHLORIDE 10 MG/1
20 TABLET ORAL 3 TIMES DAILY
Status: DISCONTINUED | OUTPATIENT
Start: 2018-10-05 | End: 2018-10-16 | Stop reason: HOSPADM

## 2018-10-05 RX ORDER — MULTIPLE VITAMINS W/ MINERALS TAB 9MG-400MCG
1 TAB ORAL DAILY
Status: DISCONTINUED | OUTPATIENT
Start: 2018-10-05 | End: 2018-10-05

## 2018-10-05 RX ORDER — TRAZODONE HYDROCHLORIDE 100 MG/1
200 TABLET ORAL AT BEDTIME
Status: DISCONTINUED | OUTPATIENT
Start: 2018-10-05 | End: 2018-10-08

## 2018-10-05 RX ORDER — MIRTAZAPINE 15 MG/1
15 TABLET, FILM COATED ORAL AT BEDTIME
Status: DISCONTINUED | OUTPATIENT
Start: 2018-10-05 | End: 2018-10-16 | Stop reason: HOSPADM

## 2018-10-05 RX ORDER — GABAPENTIN 800 MG/1
800 TABLET ORAL 4 TIMES DAILY
Status: DISCONTINUED | OUTPATIENT
Start: 2018-10-05 | End: 2018-10-16 | Stop reason: HOSPADM

## 2018-10-05 RX ORDER — DEXTROAMPHETAMINE SACCHARATE, AMPHETAMINE ASPARTATE MONOHYDRATE, DEXTROAMPHETAMINE SULFATE AND AMPHETAMINE SULFATE 7.5; 7.5; 7.5; 7.5 MG/1; MG/1; MG/1; MG/1
30 CAPSULE, EXTENDED RELEASE ORAL 2 TIMES DAILY
Status: DISCONTINUED | OUTPATIENT
Start: 2018-10-05 | End: 2018-10-05

## 2018-10-05 RX ORDER — CLONIDINE HYDROCHLORIDE 0.1 MG/1
0.1 TABLET ORAL 2 TIMES DAILY
Status: DISCONTINUED | OUTPATIENT
Start: 2018-10-05 | End: 2018-10-16 | Stop reason: HOSPADM

## 2018-10-05 RX ORDER — ONDANSETRON 4 MG/1
4-8 TABLET, ORALLY DISINTEGRATING ORAL EVERY 6 HOURS PRN
Status: DISCONTINUED | OUTPATIENT
Start: 2018-10-05 | End: 2018-10-08

## 2018-10-05 RX ORDER — LOPERAMIDE HYDROCHLORIDE 2 MG/1
2 TABLET ORAL 4 TIMES DAILY PRN
Status: DISCONTINUED | OUTPATIENT
Start: 2018-10-05 | End: 2018-10-16 | Stop reason: HOSPADM

## 2018-10-05 RX ADMIN — DIAZEPAM 10 MG: 5 TABLET ORAL at 02:57

## 2018-10-05 RX ADMIN — MIRTAZAPINE 15 MG: 15 TABLET, FILM COATED ORAL at 22:48

## 2018-10-05 RX ADMIN — ONDANSETRON 4 MG: 4 TABLET, ORALLY DISINTEGRATING ORAL at 13:46

## 2018-10-05 RX ADMIN — DIAZEPAM 10 MG: 5 TABLET ORAL at 16:14

## 2018-10-05 RX ADMIN — TRAZODONE HYDROCHLORIDE 200 MG: 100 TABLET ORAL at 22:48

## 2018-10-05 RX ADMIN — HYDROXYZINE HYDROCHLORIDE 50 MG: 50 TABLET, FILM COATED ORAL at 10:24

## 2018-10-05 RX ADMIN — OMEPRAZOLE 40 MG: 20 CAPSULE, DELAYED RELEASE ORAL at 12:54

## 2018-10-05 RX ADMIN — DIAZEPAM 10 MG: 5 TABLET ORAL at 12:54

## 2018-10-05 RX ADMIN — GABAPENTIN 800 MG: 800 TABLET, FILM COATED ORAL at 16:14

## 2018-10-05 RX ADMIN — GABAPENTIN 800 MG: 800 TABLET, FILM COATED ORAL at 12:54

## 2018-10-05 RX ADMIN — DIAZEPAM 10 MG: 5 TABLET ORAL at 00:34

## 2018-10-05 RX ADMIN — AMLODIPINE BESYLATE 10 MG: 10 TABLET ORAL at 09:22

## 2018-10-05 RX ADMIN — DIAZEPAM 10 MG: 5 TABLET ORAL at 14:32

## 2018-10-05 RX ADMIN — HYDROXYZINE HYDROCHLORIDE 50 MG: 50 TABLET, FILM COATED ORAL at 22:48

## 2018-10-05 RX ADMIN — DIAZEPAM 10 MG: 5 TABLET ORAL at 09:21

## 2018-10-05 RX ADMIN — Medication 100 MG: at 09:21

## 2018-10-05 RX ADMIN — FOLIC ACID 1 MG: 1 TABLET ORAL at 09:22

## 2018-10-05 RX ADMIN — BUSPIRONE HYDROCHLORIDE 20 MG: 10 TABLET ORAL at 13:46

## 2018-10-05 RX ADMIN — DIAZEPAM 10 MG: 5 TABLET ORAL at 05:38

## 2018-10-05 RX ADMIN — CLONIDINE HYDROCHLORIDE 0.1 MG: 0.1 TABLET ORAL at 18:17

## 2018-10-05 RX ADMIN — DIAZEPAM 10 MG: 5 TABLET ORAL at 20:30

## 2018-10-05 RX ADMIN — HYDROXYZINE HYDROCHLORIDE 50 MG: 50 TABLET, FILM COATED ORAL at 02:57

## 2018-10-05 RX ADMIN — GABAPENTIN 800 MG: 800 TABLET, FILM COATED ORAL at 20:30

## 2018-10-05 RX ADMIN — DIAZEPAM 10 MG: 5 TABLET ORAL at 18:17

## 2018-10-05 RX ADMIN — BUSPIRONE HYDROCHLORIDE 20 MG: 10 TABLET ORAL at 20:30

## 2018-10-05 RX ADMIN — MULTIPLE VITAMINS W/ MINERALS TAB 1 TABLET: TAB at 09:21

## 2018-10-05 RX ADMIN — SERTRALINE HYDROCHLORIDE 150 MG: 100 TABLET ORAL at 09:22

## 2018-10-05 RX ADMIN — DIAZEPAM 10 MG: 5 TABLET ORAL at 01:46

## 2018-10-05 ASSESSMENT — ACTIVITIES OF DAILY LIVING (ADL)
GROOMING: INDEPENDENT
ORAL_HYGIENE: INDEPENDENT
DRESS: INDEPENDENT
LAUNDRY: WITH SUPERVISION

## 2018-10-05 NOTE — ED NOTES
ED to Behavioral Floor Handoff    SITUATION  Nickolas Lang is a 28 year old male who speaks English and lives in a home with family members The patient arrived in the ED by ambulance from home with a complaint of Alcohol Intoxication (blew 0.36 ) and Suicidal (father passed away 2 days ago, drinking to cope with death.  plan to take pills.)  .The patient's current symptoms started/worsened 3 week(s) ago and during this time the symptoms have increased.   In the ED, pt was diagnosed with   Final diagnoses:   Alcohol dependence with intoxication with complication (H)        Initial vitals were: BP: (!) 132/93  Heart Rate: 106  Temp: 98.7  F (37.1  C)  Resp: 18  Weight: 138.3 kg (305 lb)  SpO2: 93 %   --------  Is the patient diabetic? No   If yes, last blood glucose? --     If yes, was this treated in the ED? --  --------  Is the patient inebriated (ETOH) No or Impaired on other substances? Yes  MSSA done? Yes  Last MSSA score: --    Were withdrawal symptoms treated? Yes  Does the patient have a seizure history? No. If yes, date of most recent seizure--  --------  Is the patient patient experiencing suicidal ideation? denies current or recent suicidal ideation     Homicidal ideation? denies current or recent homicidal ideation or behaviors.    Self-injurious behavior/urges? denies current or recent self injurious behavior or ideation.  ------  Was pt aggressive in the ED No  Was a code called No  Is the pt now cooperative? Yes  -------  Meds given in ED:   Medications   diazepam (VALIUM) tablet 5 mg (5 mg Oral Given 10/4/18 1923)   diazepam (VALIUM) tablet 5 mg (5 mg Oral Given 10/4/18 1959)      Family present during ED course? No  Family currently present? No    BACKGROUND  Does the patient have a cognitive impairment or developmental disability? No  Allergies: No Known Allergies.   Social demographics are   Social History     Social History     Marital status: Single     Spouse name: N/A     Number of  children: N/A     Years of education: N/A     Social History Main Topics     Smoking status: Current Every Day Smoker     Packs/day: 1.00     Years: 10.00     Types: Cigarettes     Smokeless tobacco: Current User     Alcohol use 0.0 oz/week     0 Standard drinks or equivalent per week      Comment: currently intoxicated     Drug use: No     Sexual activity: Not Currently     Other Topics Concern     None     Social History Narrative    Lives at home until kicked out.  Family supportive.          ASSESSMENT  Labs results   Labs Ordered and Resulted from Time of ED Arrival Up to the Time of Departure from the ED   DRUG ABUSE SCREEN 6 CHEM DEP URINE ([facility]) - Abnormal; Notable for the following:        Result Value    Benzodiazepine Qual Urine Positive (*)     Ethanol Qual Urine Positive (*)     All other components within normal limits   ALCOHOL BREATH TEST POCT - Abnormal; Notable for the following:     Alcohol Breath Test 0.287 (*)     All other components within normal limits      Imaging Studies: No results found for this or any previous visit (from the past 24 hour(s)).   Most recent vital signs BP (!) 142/94  Temp 98.7  F (37.1  C) (Oral)  Resp 18  Wt 138.3 kg (305 lb)  SpO2 93%  BMI 40.24 kg/m2   Abnormal labs/tests/findings requiring intervention:---   Pain control: pt had none  Nausea control: pt had none    RECOMMENDATION  Are any infection precautions needed (MRSA, VRE, etc.)? No If yes, what infection? --  ---  Does the patient have mobility issues? independently. If yes, what device does the pt use? ---  ---  Is patient on 72 hour hold or commitment? Yes If on 72 hour hold, have hold and rights been given to patient? Yes  Are admitting orders written if after 10 p.m. ?Yes  Tasks needing to be completed:---     Jud Chiu   ascom-- *[phone]   [phone] [facility] ED   [phone] [facility] ED

## 2018-10-05 NOTE — PROGRESS NOTES
[facility]_ [facility]--MENTAL HEALTH DIVISION  [place]    EXAMINER S STATEMENT IN SUPPORT  OF PETITION FOR COMMITMENT   In the matter of:  Nickolas Lang Court File #:     I am a licensed physician or licensed psychologist, and I am knowledgeable, trained and practicing in the diagnosis and treatment of: Mental Illness Mental Retardation XChemical Dependence  I have examined the above-named person within the last fifteen days, on _October 5, 2018, and the results of the examination are stated below:    Behavioral evidence to support commitment:  Several intoxications leading to lack of function and ICU admissions for not breathing. Cannot be safe in the outpatient setting. Unwilling to attend a locked inpatient program. Has a history of leaving programing. Family/staff/friends/clinicians highly concerned and pushing for commitment.   Diagnostic impressions and conclusions:   Alcohol use disorder severe   Recommended treatment:   Needs chemical dependency commitment for purpose stability and treatment. This will maintain safety and hopefully result in improvement in condition.  I am of the opinion that the above-named person is in need of treatment and should be committed to a treatment facility. Patient is currently NA accepting refusing Neuroleptic Medications.  (If mental illness is diagnosed) treatment with neuroleptic medication is:  NA recommended  Xnot recommended  The above-named person lacks the capacity to make decisions regarding such treatment.  Reasons for this opinion:   Date: October 5, 2018    Name: Noam Arnett MD  Signature: ____________________  Address: [address] [place] [id number]  Phone:

## 2018-10-05 NOTE — PLAN OF CARE
Behavioral Team Discussion: (10/5/2018)    Continued Stay Criteria/Rationale: Patient admitted for alcohol Use Disorder.  Plan: The following services will be provided to the patient; psychiatric assessment, medication management, therapeutic milieu, individual and group support, and skills groups.   Participants: 3A Provider: Dr. Augustin MD; 3A RN's: Radha Armando RN; 3A CM's: Sammi Benjamin .  Summary/Recommendation: Providers will assess today for treatment recommendations, discharge planning, and aftercare plans. CM will meet with pt for discharge planning.   Medical/Physical: Deferred (see medical notes).  Precautions: Pt is on a 72 hour hold and should be evaluated for petition to commit.  Behavioral Orders   Procedures     Code 1 - Restrict to Unit     Routine Programming     As clinically indicated     Status 15     Every 15 minutes.     Withdrawal precautions     Rationale for change in precautions or plan: N/A  Progress: No Change.

## 2018-10-05 NOTE — PROGRESS NOTES
Met with Pt to initiate discharge planning.  Pt maintains he plans to start OP treatment at [facility] on Monday.  Pt is currently on a 72 hour hold.  A review of the MR shows he has been admitted or seen in ED 7 times in the month of September.  He has required medical or ICU admission 3 of those times due to alcohol intoxication with a BAL of 0.5 and unresponsive and requiring intubation.  Informed Pt that due to the lethality related to his use this writer is recommending a petition to commit.

## 2018-10-05 NOTE — PROVIDER NOTIFICATION
10/05/18 1607   Vital Signs   Temp 97.7  F (36.5  C)   Temp src Tympanic   Resp 16   Pulse 83   Pulse/Heart Rate Source Monitor   BP (!) 151/106   BP Location Left arm     /106. Patient denies headache, chest pain or SOB.  Hospitalist is notified of patient status.  No new orders or changes in patients medications.  Continue to monitor.

## 2018-10-05 NOTE — PROGRESS NOTES
10/04/18 2228   Patient Belongings   Did you bring any home meds/supplements to the hospital?  No   Patient Belongings clothing   Disposition of Belongings Returned to patient;Locker;Sent to security per site process   Belongings Search Yes   Clothing Search Yes   Second Staff Marc RN     Wallet, shoes, 2 duffle bags, belt in storage    Cigs, 3 lighters    No cell    175.00 cash, 3 MC, 3 Visa, EBT, AM EX, medical, social security, [place] ID in security    A               Admission:  I am responsible for any personal items that are not sent to the safe or pharmacy.  [facility] is not responsible for loss, theft or damage of any property in my possession.    Signature:  _________________________________ Date: _______  Time: _____                                              Staff Signature:  ____________________________ Date: ________  Time: _____      2nd Staff person, if patient is unable/unwilling to sign:    Signature: ________________________________ Date: ________  Time: _____     Discharge:  [facility] has returned all of my personal belongings:    Signature: _________________________________ Date: ________  Time: _____                                          Staff Signature:  ____________________________ Date: ________  Time: _____

## 2018-10-05 NOTE — PHARMACY-ADMISSION MEDICATION HISTORY
Admission Medication History status for the 10/4/2018 admission is complete.  See EPIC admission navigator for Prior to Admission medications.    Medication history sources:  Patient, [place]      Medication history source reliability: Good - Able to list off medication doses without much prompting    Medication adherence:  Moderate - Had not taken his medications for the past three days    Changes made to PTA medication list (reason)  Added: None    Deleted:  -Mirtazapine 7.5 mg  -Nicotine patch  -Nicotine gum  -Ondansetron     Changed:   -Sertraline (150 mg from 200 mg)  -Trazodone (HS from HS PRN)    Additional medication history information (including reliability of information, actions taken by pharmacist): Patient able to fully participate in interview.      Nicotine - Not on patches, gum, lozenges as he currently is using tobacco products.  However does state that he would prefer nicotine lozenges for replacement.    Sertraline - reports that he is tapering down on his dose (prev listed as 200 mg daily, now reporting 150 mg daily) as he tries mirtazapine.  He is wondering if he should continue tapering    Time spent in this activity: 15 minutes    Medication history completed by: Orville Hazel, Pharm D 10/4/2018 8:40 PM    Prior to Admission medications    Medication Sig Last Dose Taking? Auth Provider   amLODIPine (NORVASC) 10 MG tablet Take 1 tablet (10 mg) by mouth daily Past Week at Unknown time Yes Radha Botello APRN CNP   amphetamine-dextroamphetamine (ADDERALL XR) 30 MG per 24 hr capsule Take 30 mg by mouth 2 times daily Past Week at Unknown time Yes Reported, Patient   busPIRone (BUSPAR) 10 MG tablet Take 2 tablets (20 mg) by mouth 3 times daily Past Week at Unknown time Yes Tima Crews MD   cloNIDine (CATAPRES) 0.1 MG tablet Take 1 tablet (0.1 mg) by mouth 2 times daily Past Week at Unknown time Yes Radha Botello APRN CNP   gabapentin (NEURONTIN) 800 MG tablet TAKE 1 TABLET (800 MG)  BY MOUTH 4 TIMES DAILY Past Week at Unknown time Yes Tima Crews MD   hydrOXYzine (ATARAX) 50 MG tablet Take 1 tablet (50 mg) by mouth every 4 hours as needed for anxiety PRN Yes Dora Velazquez APRN CNP   loperamide (IMODIUM A-D) 2 MG tablet Take 1 tablet (2 mg) by mouth 4 times daily as needed for diarrhea PRN Yes Dora Velazquez APRN CNP   LORazepam (ATIVAN) 1 MG tablet Take 1 tablet (1 mg) by mouth every 8 hours as needed for agitation or anxiety 10/4/2018 at Unknown time Yes Artis Parker MD   mirtazapine (REMERON) 15 MG tablet Take 1 tablet (15 mg) by mouth At Bedtime To start after completing 7.5 mg/day Q HS x 3 doses. Past Week at Unknown time Yes Kathy Flores MD   multivitamin, therapeutic with minerals (THERA-VIT-M) TABS tablet Take 1 tablet by mouth daily  Yes Filippo Brasher MD   omeprazole (PRILOSEC) 40 MG capsule Take 1 capsule (40 mg) by mouth daily Past Week at Unknown time Yes Radha Botello APRN CNP   sertraline (ZOLOFT) 100 MG tablet Take 2 tablets (200 mg) by mouth daily  Patient taking differently: Take 150 mg by mouth daily  Past Week at Unknown time Yes Filippo Brasher MD   traZODone (DESYREL) 100 MG tablet Take 1-2 tablets (100-200 mg) by mouth nightly as needed for sleep  Patient taking differently: Take 200 mg by mouth At Bedtime  Past Week at Unknown time Yes Filippo Brasher MD   disulfiram (ANTABUSE) 250 MG tablet Take 1 tablet (250 mg) by mouth daily   Kathy Flores MD   nicotine polacrilex 4 MG lozenge 1-2 lozenges every hour as needed, max 10 lozenges per day   Tima Crews MD

## 2018-10-05 NOTE — PROGRESS NOTES
Pt.was out and visible in the milieu. He appeared very restless, sweaty, and actively withdrawing. Stated he was stressed about a recent death of an uncle whom he viewed as a dad. He is stressed about a possible commitment. Denied SI/SIB. Will continue to monitor.

## 2018-10-05 NOTE — PROGRESS NOTES
Received call from Pt's P.O. in [facility].  They are requesting a petition to commit Pt as chemically dependent.  P.O. is LT Camacho ([phone]). Will ask Pt to sign ALFONSO for [facility].

## 2018-10-05 NOTE — ED PROVIDER NOTES
History     Chief Complaint   Patient presents with     Alcohol Intoxication     blew 0.36      Suicidal     father passed away 2 days ago, drinking to cope with death.  plan to take pills.     HPI  Nickolas Lang is a 28 year old male who presents the emergency room acutely intoxicated expressing some suicidal ideation although patient suicidality seems to be waxing and waning.  Patient has significant history of chronic alcohol dependence and has had multiple different emergency room visits and hospitalizations as a result of his alcohol dependence.  Patient has been admitted to the hospital at least 3 times with intubation in the last 20 days secondary to his alcoholism and is a clear significant risk secondary to his dependence.  Patient last seen in the emergency room and was given Ativan to help with withdrawal symptoms his friend has been trying to manage keeping track of the Ativan tablets while the patient is continuing to be intoxicated with alcohol.  Initially patient admitted suicidal statement to nursing staff but now is denying this.  There is evidently a family member that did pass away in the last few days which caused him to feel sadness but essentially patient is acutely intoxicated and unclear about his intention.  Patient denies any other medical concerns at this time.    I have reviewed the Medications, Allergies, Past Medical and Surgical History, and Social History in the Epic system.    PERSONAL MEDICAL HISTORY  Past Medical History:   Diagnosis Date     ADD (attention deficit disorder)      Agitation      Alcohol abuse      Anxiety     gabapentin helps the most      Benzodiazepine abuse (H)      Drug abuse (H)      GERD (gastroesophageal reflux disease)      Hepatic steatosis      Hypertension      Obesity      Pyloric stenosis     s/p pyloromyotomy as an infant     PAST SURGICAL HISTORY  Past Surgical History:   Procedure Laterality Date     Deviated septum repair        PYLOROMYOTOMY SURGERY  as an infant      SHOULDER SURGERY Left 07/29/2016    Removal of cartilage     FAMILY HISTORY  Family History   Problem Relation Age of Onset     Neurologic Disorder Mother      Multiple Sclerosis     Depression Mother      Anxiety Disorder Mother      Alcohol/Drug Father      Substance Abuse Father      Depression Maternal Grandmother      Anxiety Disorder Maternal Grandmother      Hypertension Maternal Grandmother      Substance Abuse Maternal Grandfather      Alcohol/Drug Paternal Grandfather      SOCIAL HISTORY  Social History   Substance Use Topics     Smoking status: Current Every Day Smoker     Packs/day: 1.00     Years: 10.00     Types: Cigarettes     Smokeless tobacco: Current User     Alcohol use 0.0 oz/week     0 Standard drinks or equivalent per week      Comment: currently intoxicated     MEDICATIONS  No current facility-administered medications for this encounter.      Current Outpatient Prescriptions   Medication     amLODIPine (NORVASC) 10 MG tablet     amphetamine-dextroamphetamine (ADDERALL XR) 30 MG per 24 hr capsule     busPIRone (BUSPAR) 10 MG tablet     cloNIDine (CATAPRES) 0.1 MG tablet     disulfiram (ANTABUSE) 250 MG tablet     gabapentin (NEURONTIN) 800 MG tablet     hydrOXYzine (ATARAX) 50 MG tablet     loperamide (IMODIUM A-D) 2 MG tablet     LORazepam (ATIVAN) 1 MG tablet     mirtazapine (REMERON) 15 MG tablet     mirtazapine (REMERON) 7.5 MG TABS tablet     multivitamin, therapeutic with minerals (THERA-VIT-M) TABS tablet     nicotine (NICODERM CQ) 21 MG/24HR 24 hr patch     nicotine polacrilex (NICORETTE) 4 MG gum     nicotine polacrilex 4 MG lozenge     omeprazole (PRILOSEC) 40 MG capsule     ondansetron (ZOFRAN) 4 MG tablet     sertraline (ZOLOFT) 100 MG tablet     thiamine 100 MG tablet     traZODone (DESYREL) 100 MG tablet     Facility-Administered Medications Ordered in Other Encounters   Medication     Self Administer Medications: Behavioral Services      ALLERGIES  No Known Allergies      Review of Systems   Constitutional: Negative for fever.   Respiratory: Negative for shortness of breath.    Cardiovascular: Negative for chest pain.   Gastrointestinal: Negative for abdominal pain.   Psychiatric/Behavioral: Positive for dysphoric mood.   All other systems reviewed and are negative.      Physical Exam   BP: (!) 132/93  Heart Rate: 106  Temp: 98.7  F (37.1  C)  Resp: 18  Weight: 138.3 kg (305 lb)  SpO2: 93 %      Physical Exam   Constitutional: He is oriented to person, place, and time. No distress.   HENT:   Head: Atraumatic.   Mouth/Throat: Oropharynx is clear and moist. No oropharyngeal exudate.   Eyes: Pupils are equal, round, and reactive to light. No scleral icterus.   Cardiovascular: Normal heart sounds and intact distal pulses.    Pulmonary/Chest: Breath sounds normal. No respiratory distress.   Abdominal: Soft. Bowel sounds are normal. There is no tenderness.   Musculoskeletal: He exhibits no edema or tenderness.   Neurological: He is alert and oriented to person, place, and time. No cranial nerve deficit. He exhibits normal muscle tone. Coordination normal.   Skin: Skin is warm. No rash noted. He is not diaphoretic.   Psychiatric: His speech is slurred. He expresses impulsivity. He expresses no suicidal ideation.       ED Course     ED Course     Procedures         Critical Care time:  none             Labs Ordered and Resulted from Time of ED Arrival Up to the Time of Departure from the ED   DRUG ABUSE SCREEN 6 CHEM DEP URINE ([facility]) - Abnormal; Notable for the following:        Result Value    Benzodiazepine Qual Urine Positive (*)     Ethanol Qual Urine Positive (*)     All other components within normal limits   ALCOHOL BREATH TEST POCT - Abnormal; Notable for the following:     Alcohol Breath Test 0.287 (*)     All other components within normal limits            Assessments & Plan (with Medical Decision Making)     I have reviewed the nursing  notes.    I have reviewed the findings, diagnosis, plan and need for follow up with the patient.  Patient with chronic alcohol dependence with intoxication now seeking detox and treatment he has had multiple hospitalizations secondary to severe alcohol use and requires a medically supervised detox on 3A.  I have placed the patient on 72-hour hold and will discuss the possibility of pursuing commitment with the admitting psychiatrist.  I believe that the patient is at significant risk secondary to his chronic alcohol use.    New Prescriptions    No medications on file       Final diagnoses:   Alcohol dependence with intoxication with complication (H)       10/4/2018   [facility], [place], EMERGENCY DEPARTMENT     Jorge Baker MD  10/04/18 1946

## 2018-10-05 NOTE — H&P
"[facility] [place]   Psychiatric History & Physical  Admission date: 10/4/2018  Nickolas Lang  [id number]  10/05/18    Time: 79 minutes on encounter, >50% of which was spent in counseling and/or coordination of care consisting of: communication and education with the patient/family, lab/image/study evaluation, support staff communication, and other sources pertinent to excellent patient care.            Chief Complaint:   \"I am here because of my drinking and losing my uncle\"        HPI:   Nickolas Lang with a past medical history of pyloric stenosis, hypertension, obesity, GERD, benzo use, alcohol use, agitation, ADHD, traumatic brain injury, generalized anxiety disorder, depression was admitted 10/4/2018 for being found by family down and intoxicated of the recent hospitalization with ICU admission.    Nickolas has had a difficult year he was recently intoxicated and intubated on September 29 for similar circumstances apparently his uncle passed away about 2 days ago and he has continued heavily drinking.  There was a plan for him to go to an outpatient treatment facility at [facility] on Monday.  His outpatient medications appear to be Adderall 30 mg twice a day, buspirone 20 mg 3 times a day, clonidine 0.1 mg twice a day, gabapentin 800 mg 4 times a day, hydroxyzine 50 mg as needed, mirtazapine 15 mg at night, sertraline 150 mg a day, trazodone 200 mg at night.  His family//medical staff/case management requesting commitment for safety reasons.    Upon meeting with him he says he is frustrated about current circumstances and would like to do outpatient programming.  He feels as though his medications have been highly beneficial though he has had a recent stressful events as above.  He denies any thoughts of harming himself or others.  He does not have any hopelessness or helplessness or anhedonia but does have guilty feelings as well as low energy and " attention concentration issues.  He is highly anxious about potentially being committed.  He describes a history of generalized anxiety.  No obsessive-compulsive disorder symptoms posttraumatic stress disorder symptoms social anxiety or panic.  No previous manic episodes, no hallucinations or paranoia currently.  No gambling addiction pornography addiction sexual addiction or shopping addiction.  No eating disorder symptoms.    Previously benefited from naltrexone long-acting injectable Alcoholics Anonymous and working a program with a sponsor.    Physically describing weight gain tremulousness, hallucinations prior to today of shadows.        Past Psychiatric History:     Past psychiatric history is notable for substance use starting at the age of 16.  He did attempt suicide while intoxicated by overdose at age 26 has been to multiple detoxification admissions.  He has not been on the mental health inpatient unit.  Previous traumatic brain injury from playing football and possibly other circumstances, no seizures no electroconvulsive therapy.  Previously saw substance abuse clinician however has recently stopped going over the past 2 months.  Previous medications include sertraline, gabapentin, hydroxyzine, trazodone, Adderall, clonidine, bupropion, acamprosate, naltrexone, disulfiram, aripiprazole, buspirone, dextroamphetamine, diazepam, escitalopram, lorazepam, melatonin, mirtazapine, propranolol, quetiapine, zolpidem.  No previous commitment and has been to correction for a DUI and no violence history.          Substance Use and History:     Started drinking alcohol at the age of 16 last drink yesterday had 1 previous DUI charge has been to 12 chemical dependency treatments and been on the detoxification unit more than 20 times.  Started smoking cigarettes at age 16 nightly smoking half a pack per day, cannabis use in the past.          Past Medical History:   PAST MEDICAL HISTORY:   Past Medical History:   Diagnosis  Date     ADD (attention deficit disorder)      Agitation      Alcohol abuse      Anxiety     gabapentin helps the most      Benzodiazepine abuse (H)      Drug abuse (H)      GERD (gastroesophageal reflux disease)      Hepatic steatosis      Hypertension      Obesity      Pyloric stenosis     s/p pyloromyotomy as an infant       PAST SURGICAL HISTORY:   Past Surgical History:   Procedure Laterality Date     Deviated septum repair       PYLOROMYOTOMY SURGERY  as an infant      SHOULDER SURGERY Left 07/29/2016    Removal of cartilage             Family History:   FAMILY HISTORY:   Family History   Problem Relation Age of Onset     Neurologic Disorder Mother      Multiple Sclerosis     Depression Mother      Anxiety Disorder Mother      Alcohol/Drug Father      Substance Abuse Father      Depression Maternal Grandmother      Anxiety Disorder Maternal Grandmother      Hypertension Maternal Grandmother      Substance Abuse Maternal Grandfather      Alcohol/Drug Paternal Grandfather            Social History:   SOCIAL HISTORY:   Social History     Social History     Marital status: Single     Spouse name: N/A     Number of children: N/A     Years of education: N/A     Social History Main Topics     Smoking status: Current Every Day Smoker     Packs/day: 1.00     Years: 10.00     Types: Cigarettes     Smokeless tobacco: Current User     Alcohol use 0.0 oz/week     0 Standard drinks or equivalent per week      Comment: currently intoxicated     Drug use: No     Sexual activity: Not Currently     Other Topics Concern     None     Social History Narrative    Born in [place] primarily raised by mother and uncle no abuse history.  His father was absent possibly using substances.  Finished school on time went to college and has a finance degree.  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  Never  no children no  history.  Living in a townhouse with his mother and  grandmother no access to guns or weapons.  Enjoys going to the gym and walking around.                Physical ROS:   The patient endorsed the above issues. The remainder of 10-point review of systems was negative except as noted in HPI.         PTA Medications:     Prescriptions Prior to Admission   Medication Sig Dispense Refill Last Dose     amLODIPine (NORVASC) 10 MG tablet Take 1 tablet (10 mg) by mouth daily 90 tablet 1 Past Week at Unknown time     amphetamine-dextroamphetamine (ADDERALL XR) 30 MG per 24 hr capsule Take 30 mg by mouth 2 times daily   Past Week at Unknown time     busPIRone (BUSPAR) 10 MG tablet Take 2 tablets (20 mg) by mouth 3 times daily 90 tablet 0 Past Week at Unknown time     cloNIDine (CATAPRES) 0.1 MG tablet Take 1 tablet (0.1 mg) by mouth 2 times daily 180 tablet 1 Past Week at Unknown time     gabapentin (NEURONTIN) 800 MG tablet TAKE 1 TABLET (800 MG) BY MOUTH 4 TIMES DAILY 120 tablet 1 Past Week at Unknown time     hydrOXYzine (ATARAX) 50 MG tablet Take 1 tablet (50 mg) by mouth every 4 hours as needed for anxiety 120 tablet 0 PRN     loperamide (IMODIUM A-D) 2 MG tablet Take 1 tablet (2 mg) by mouth 4 times daily as needed for diarrhea 20 tablet 0 PRN     LORazepam (ATIVAN) 1 MG tablet Take 1 tablet (1 mg) by mouth every 8 hours as needed for agitation or anxiety 10 tablet 0 10/4/2018 at Unknown time     mirtazapine (REMERON) 15 MG tablet Take 1 tablet (15 mg) by mouth At Bedtime To start after completing 7.5 mg/day Q HS x 3 doses. 30 tablet 0 Past Week at Unknown time     multivitamin, therapeutic with minerals (THERA-VIT-M) TABS tablet Take 1 tablet by mouth daily 30 each 0 Past Week at Unknown time     omeprazole (PRILOSEC) 40 MG capsule Take 1 capsule (40 mg) by mouth daily 30 capsule 3 Past Week at Unknown time     sertraline (ZOLOFT) 100 MG tablet Take 2 tablets (200 mg) by mouth daily (Patient taking differently: Take 150 mg by mouth daily ) 30 tablet 0 Past Week at Unknown  time     traZODone (DESYREL) 100 MG tablet Take 1-2 tablets (100-200 mg) by mouth nightly as needed for sleep (Patient taking differently: Take 200 mg by mouth At Bedtime ) 90 tablet 0 Past Week at Unknown time     disulfiram (ANTABUSE) 250 MG tablet Take 1 tablet (250 mg) by mouth daily 30 tablet 0 Past Week at Unknown time     nicotine polacrilex 4 MG lozenge 1-2 lozenges every hour as needed, max 10 lozenges per day 360 tablet 1 Past Month at Unknown time          Allergies:   No Known Allergies       Labs:     Recent Results (from the past 48 hour(s))   Comprehensive metabolic panel    Collection Time: 10/03/18  6:50 PM   Result Value Ref Range    Sodium 141 133 - 144 mmol/L    Potassium 4.2 3.4 - 5.3 mmol/L    Chloride 108 94 - 109 mmol/L    Carbon Dioxide 24 20 - 32 mmol/L    Anion Gap 9 3 - 14 mmol/L    Glucose 91 70 - 99 mg/dL    Urea Nitrogen 12 7 - 30 mg/dL    Creatinine 0.51 (L) 0.66 - 1.25 mg/dL    GFR Estimate >90 >60 mL/min/1.7m2    GFR Estimate If Black >90 >60 mL/min/1.7m2    Calcium 7.6 (L) 8.5 - 10.1 mg/dL    Bilirubin Total 0.3 0.2 - 1.3 mg/dL    Albumin 3.6 3.4 - 5.0 g/dL    Protein Total 7.3 6.8 - 8.8 g/dL    Alkaline Phosphatase 102 40 - 150 U/L     (H) 0 - 70 U/L     (H) 0 - 45 U/L   Acetaminophen level    Collection Time: 10/03/18  6:50 PM   Result Value Ref Range    Acetaminophen Level <2 mg/L   Salicylate level    Collection Time: 10/03/18  6:50 PM   Result Value Ref Range    Salicylate Level <2 mg/dL   Alcohol level blood    Collection Time: 10/03/18  6:50 PM   Result Value Ref Range    Ethanol g/dL 0.46 (HH) <0.01 g/dL   ISTAT gases lactate amy POCT    Collection Time: 10/03/18  6:53 PM   Result Value Ref Range    Ph Venous 7.35 7.32 - 7.43 pH    PCO2 Venous 48 40 - 50 mm Hg    PO2 Venous 77 (H) 25 - 47 mm Hg    Bicarbonate Venous 27 21 - 28 mmol/L    O2 Sat Venous 94 %    Lactic Acid 2.6 (H) 0.7 - 2.1 mmol/L   EKG 12-lead, tracing only    Collection Time: 10/03/18  7:02 PM    Result Value Ref Range    Interpretation ECG Click View Image link to view waveform and result    CBC with platelets differential    Collection Time: 10/03/18  7:44 PM   Result Value Ref Range    WBC 7.5 4.0 - 11.0 10e9/L    RBC Count 5.39 4.4 - 5.9 10e12/L    Hemoglobin 16.0 13.3 - 17.7 g/dL    Hematocrit 48.3 40.0 - 53.0 %    MCV 90 78 - 100 fl    MCH 29.7 26.5 - 33.0 pg    MCHC 33.1 31.5 - 36.5 g/dL    RDW 15.8 (H) 10.0 - 15.0 %    Platelet Count 243 150 - 450 10e9/L    Diff Method Automated Method     % Neutrophils 30.5 %    % Lymphocytes 59.5 %    % Monocytes 6.1 %    % Eosinophils 1.9 %    % Basophils 1.1 %    % Immature Granulocytes 0.9 %    Nucleated RBCs 0 0 /100    Absolute Neutrophil 2.3 1.6 - 8.3 10e9/L    Absolute Lymphocytes 4.5 0.8 - 5.3 10e9/L    Absolute Monocytes 0.5 0.0 - 1.3 10e9/L    Absolute Eosinophils 0.1 0.0 - 0.7 10e9/L    Absolute Basophils 0.1 0.0 - 0.2 10e9/L    Abs Immature Granulocytes 0.1 0 - 0.4 10e9/L    Absolute Nucleated RBC 0.0     RBC Morphology Consistent with reported results     Platelet Estimate       Automated count confirmed.  Platelet morphology is normal.   ISTAT gases lactate amy POCT    Collection Time: 10/03/18 11:27 PM   Result Value Ref Range    Ph Venous 7.38 7.32 - 7.43 pH    PCO2 Venous 39 (L) 40 - 50 mm Hg    PO2 Venous 66 (H) 25 - 47 mm Hg    Bicarbonate Venous 23 21 - 28 mmol/L    O2 Sat Venous 92 %    Lactic Acid 3.0 (H) 0.7 - 2.1 mmol/L   Drug abuse screen 6 urine (tox)    Collection Time: 10/04/18  2:23 PM   Result Value Ref Range    Amphetamine Qual Urine Negative NEG^Negative    Barbiturates Qual Urine Negative NEG^Negative    Benzodiazepine Qual Urine Positive (A) NEG^Negative    Cannabinoids Qual Urine Negative NEG^Negative    Cocaine Qual Urine Negative NEG^Negative    Ethanol Qual Urine Positive (A) NEG^Negative    Opiates Qualitative Urine Negative NEG^Negative   Alcohol breath test POCT    Collection Time: 10/04/18  2:45 PM   Result Value Ref  "Range    Alcohol Breath Test 0.287 (A) 0.00 - 0.01   Comprehensive metabolic panel    Collection Time: 10/05/18  9:54 AM   Result Value Ref Range    Sodium 137 133 - 144 mmol/L    Potassium 3.9 3.4 - 5.3 mmol/L    Chloride 102 94 - 109 mmol/L    Carbon Dioxide 28 20 - 32 mmol/L    Anion Gap 7 3 - 14 mmol/L    Glucose 120 (H) 70 - 99 mg/dL    Urea Nitrogen 14 7 - 30 mg/dL    Creatinine 0.44 (L) 0.66 - 1.25 mg/dL    GFR Estimate >90 >60 mL/min/1.7m2    GFR Estimate If Black >90 >60 mL/min/1.7m2    Calcium 8.6 8.5 - 10.1 mg/dL    Bilirubin Total 1.0 0.2 - 1.3 mg/dL    Albumin 3.7 3.4 - 5.0 g/dL    Protein Total 7.2 6.8 - 8.8 g/dL    Alkaline Phosphatase 94 40 - 150 U/L     (H) 0 - 70 U/L    AST 60 (H) 0 - 45 U/L   CBC with platelets    Collection Time: 10/05/18  9:54 AM   Result Value Ref Range    WBC 4.6 4.0 - 11.0 10e9/L    RBC Count 4.88 4.4 - 5.9 10e12/L    Hemoglobin 14.1 13.3 - 17.7 g/dL    Hematocrit 42.8 40.0 - 53.0 %    MCV 88 78 - 100 fl    MCH 28.9 26.5 - 33.0 pg    MCHC 32.9 31.5 - 36.5 g/dL    RDW 15.3 (H) 10.0 - 15.0 %    Platelet Count 169 150 - 450 10e9/L   Lactic acid whole blood    Collection Time: 10/05/18  9:54 AM   Result Value Ref Range    Lactic Acid 1.1 0.7 - 2.0 mmol/L          Physical and Psychiatric Examination:     BP (!) 146/104 (BP Location: Left arm)  Pulse 86  Temp 98  F (36.7  C) (Tympanic)  Resp 16  Ht 1.854 m (6' 1\")  Wt 138.3 kg (305 lb)  SpO2 98%  BMI 40.24 kg/m2  Weight is 305 lbs 0 oz  Body mass index is 40.24 kg/(m^2).                                           Last 4 weights:  Wt Readings from Last 4 Encounters:   10/04/18 138.3 kg (305 lb)   10/03/18 136.1 kg (300 lb)   10/02/18 141.2 kg (311 lb 4.6 oz)   09/22/18 137.9 kg (304 lb)       Physical Exam:  I have reviewed the physical exam as documented by Juan on 10/5 and agree with findings and assessment and have no additional findings to add at this time.    Mental Status Exam:  Nickolas is a 28-year-old male " that is obese with a beard.  His speech is of an appropriate rate and tone in his language is intact.  His behavior is appropriate he does not have any abnormal movements.  His affect is neutral and he smiles at times.  His mood he describes as frustrated.  His thought content consists of the above without thoughts of harming himself or others or current delusions.  His thought process is ruminative without looseness of association.  He does not have any current abnormal perceptions.  He is alert and aware of his current location and circumstances.  His attention and concentration appear adequate.  His cognition and fund of knowledge appear normal.  His long-term/short-term/remote memory appear limited.  His insight and judgment are both impaired.           Admission Diagnoses:   Alcohol use disorder severe  Tobacco use disorder  History of traumatic brain injury  History of generalized anxiety disorder  History of major depressive disorder  History of ADHD  Currently likely in bereavement         Assessment & Plan:     Assessment:  Nickolas has had some serious and concerning use of alcohol leading to multiple emergency room visits and admission onto the ICU unit recently.  He has had respiratory failure needing intubation.  Based on his continued use of alcohol and concern for multiple areas of his life he meets criteria for chemical dependency commitment.  He is not willing to go to an inpatient facility or any type of a locked facility and is not maintaining his safety in the community.  At this point in time we will file for commitment and in the future could potentially restart all of his medications and get him back on the long-acting injectable naltrexone which was previously beneficial.     Plan:  Continue 72-hour hold and filing for chemical dependency commitment  Restarting outpatient medications excluding Adderall based on recent hallucinations could potentially restart later  Would be beneficial to  reduce medications  Could potentially restart naltrexone long-acting injection in the outpatient setting             Noam Arnett  [facility]      The following document has been created with voice recognition software and may contain unintentional word substitutions.        Non clinically relevant CMS requirements:  Clinical Global Impressions  First:  Considering your total clinical experience with this particular patient population, how severe are the patient's symptoms at this time?: 6 (10/05/18 1237)  Compared to the patient's condition at the START of treatment, this patient's condition is:: 3 (10/05/18 1237)  Most recent:  Considering your total clinical experience with this particular patient population, how severe are the patient's symptoms at this time?: 6 (10/05/18 1237)  Compared to the patient's condition at the START of treatment, this patient's condition is:: 3 (10/05/18 1237)

## 2018-10-05 NOTE — ED NOTES
Patient given 72 hour hold paperwork. Patient expressed understanding and asked that paperwork be put into his belongings.

## 2018-10-05 NOTE — PROGRESS NOTES
"SPIRITUAL HEALTH SERVICES    [facility] ([facility]) [address]      REFERRAL SOURCE: [facility] consult for emotional support. Comments: \"Uncle just passed away. Pt.views uncle as a father-figure.\"    Visited with pt who goes by \"Kana.\" He shared that the death of his uncle 3 days ago has been quite difficult as his own father was distracted by his addiction, so his uncle was as safe space for him. \"I was over there all the time, and he was really good to me.\" His uncle was diagnosed with liver disease just several weeks ago, which caused Kana to relapse. He had not seen his uncle for two years prior due to his addiction. He does not feel he was able to make amends or reconcile as he would have wished, as he was in the midst of heavy and life-threatening drinking for these last few weeks (\"they had to intubate me three times. I should be dead.\")     Despite this, Kana also feels that \"maybe I'm still alive for a reason.\" He also realizes that despite how upset he was that he is being committed, \"maybe this is also my chance to really get sober and give something back.\" He feels that getting sober and living his life differently might be a way to have the reconciliation he was unable to have with his uncle. I provided some brief education about grief and coping.    We spoke briefly about coping practices during this time, and Kana named prayer (\"my al is really important to me.\") as well as keeping a daily gratitude journal that he checks each morning. \"If I'm sober and I focus on the little things I can be grateful for, well then maybe I have a fighting chance.\" He asked that I keep him in prayer. No futher requests at this time.    PLAN: [facility] remains available to Kana for the duration of hospitalization.                                                                                                                            Amarilis Ren MDiv, [facility]  Lead , Adult Behavioral Health  Pager [phone]    "

## 2018-10-05 NOTE — PLAN OF CARE
"Problem: Substance Withdrawal  Goal: Substance Withdrawal  Signs and symptoms of listed problems will be absent or manageable.   LETI Lang is a 28 year old year old male with a chief complaint of Alcohol Intoxication (blew 0.36 ) and Suicidal (father passed away 2 days ago, drinking to cope with death.  plan to take pills.)    S = Situation:   Admit  B  = Background:   Pt admitted for alcohol withdrawal.  Pt reports drinking 1.75 ml of vodka a day.  He has recently lost a loved one, but had been drinking prior to that death.  Pt had strung together 5 months of sobriety but relapsed a few months ago.  He is on a 72 hour hold due to his recent his of 3 intubations in 20 days due to alcohol toxicity.    A  =  Assessment:   Vital Signs: BP (!) 150/104  Pulse 100  Temp 99.4  F (37.4  C) (Tympanic)  Resp 16  Ht 1.854 m (6' 1\")  Wt 138.3 kg (305 lb)  SpO2 98%  BMI 40.24 kg/m2  Alert and oriented X 3, no SI, no SIB.  Pt is anxious, tremulous, tachycardic and diaphoretic.  He has a history of DTs no seizures.  Pt has requested to be \"loaded up\" with valium because of his past history with DTs.  Pt exhibits a full range affect.  He reports that his comment regarding visiting his  aunt and uncle were misinterpreted to mean he was suicidal--the patient adamantly denies this.  R =   Request or Recommendation:   Alcohol withdrawal monitoring, Dr. Vick to evaluate, case management to see--pt reports he he is signed up to do an outpatient program at [facility] on Monday        "

## 2018-10-05 NOTE — PROGRESS NOTES
Petition to commit was started with [facility].  Initiating documents faxed.  Pt remains on a 72 hour hold which expires 10/9/18 @ 7:30 pm.  Pt informed and provided pamphlet regarding the commitment process.  Recommendation is for a long term residential treatment program.

## 2018-10-05 NOTE — CONSULTS
"  Internal Medicine Initial Visit    Nickolas Lagn MRN# [id number]   Age: 28 year old YOB: 1990   Date of Admission: 10/4/2018     Reason for consult: Medical evaluation        Requesting physician Dr. Vick       SUBJECTIVE   HPI:   Nickolas Lang is a 28 year old man with history of alcohol abuse hypertension, morbid obesity, hepatic steatosis, GERD, ADHD admitted to station 3A for alcohol detox and suicidality. He is interested in rehab, hoping for outpatient rehab, however commitment if being pursued. Per chart review, patient has severe ETOH abuse with intubation and hospitalization in ICU three times in the past 20 days due to intoxication. Last hospitalization was at [facility] 18-10/2/18 for toxic encephalopathy, elevated lactate level, acute respiratory failure requiring intubation, and alcohol intoxication. During his hospitalization, patient was given ceftriaxone for suspect UTI, urine culture was negative. He denies any symptoms of dysuria, urinary frequency or urgency. Denies flank pain or hematuria. Patient reports he has been \"not doing well since his father  three days ago.\" He has no specific medical concerns at this time. Specifically denies fevers, chills, chest pain, SOB, palpitations, nausea, vomiting, abdominal pain, dysuria, weakness, numbness, tingling, headache, change in vision or hearing, dysphagia.        Past Medical History:     Past Medical History:   Diagnosis Date     ADD (attention deficit disorder)      Agitation      Alcohol abuse      Anxiety     gabapentin helps the most      Benzodiazepine abuse (H)      Drug abuse (H)      GERD (gastroesophageal reflux disease)      Hepatic steatosis      Hypertension      Obesity      Pyloric stenosis     s/p pyloromyotomy as an infant      Reviewed & updated in [facility].     Past Surgical History:      Past Surgical History:   Procedure Laterality Date     Deviated septum repair       PYLOROMYOTOMY SURGERY  " as an infant      SHOULDER SURGERY Left 2016    Removal of cartilage      Reviewed & updated in Epic.     Medications prior to admission:     Prior to Admission Medications   Prescriptions Last Dose Informant Patient Reported? Taking?   LORazepam (ATIVAN) 1 MG tablet 10/4/2018 at Unknown time  No Yes   Sig: Take 1 tablet (1 mg) by mouth every 8 hours as needed for agitation or anxiety   amLODIPine (NORVASC) 10 MG tablet Past Week at Unknown time Self No Yes   Sig: Take 1 tablet (10 mg) by mouth daily   amphetamine-dextroamphetamine (ADDERALL XR) 30 MG per 24 hr capsule Past Week at Unknown time Self Yes Yes   Sig: Take 30 mg by mouth 2 times daily   busPIRone (BUSPAR) 10 MG tablet Past Week at Unknown time Self No Yes   Sig: Take 2 tablets (20 mg) by mouth 3 times daily   cloNIDine (CATAPRES) 0.1 MG tablet Past Week at Unknown time Self No Yes   Sig: Take 1 tablet (0.1 mg) by mouth 2 times daily   disulfiram (ANTABUSE) 250 MG tablet   No No   Sig: Take 1 tablet (250 mg) by mouth daily   gabapentin (NEURONTIN) 800 MG tablet Past Week at Unknown time Self No Yes   Sig: TAKE 1 TABLET (800 MG) BY MOUTH 4 TIMES DAILY   hydrOXYzine (ATARAX) 50 MG tablet PRN Self No Yes   Sig: Take 1 tablet (50 mg) by mouth every 4 hours as needed for anxiety   loperamide (IMODIUM A-D) 2 MG tablet PRN Self No Yes   Sig: Take 1 tablet (2 mg) by mouth 4 times daily as needed for diarrhea   mirtazapine (REMERON) 15 MG tablet Past Week at Unknown time  No Yes   Sig: Take 1 tablet (15 mg) by mouth At Bedtime To start after completing 7.5 mg/day Q HS x 3 doses.   multivitamin, therapeutic with minerals (THERA-VIT-M) TABS tablet  Self No Yes   Sig: Take 1 tablet by mouth daily   nicotine polacrilex 4 MG lozenge  Self No No   Si-2 lozenges every hour as needed, max 10 lozenges per day   omeprazole (PRILOSEC) 40 MG capsule Past Week at Unknown time Self No Yes   Sig: Take 1 capsule (40 mg) by mouth daily   sertraline (ZOLOFT) 100 MG  tablet Past Week at Unknown time Self No Yes   Sig: Take 2 tablets (200 mg) by mouth daily   Patient taking differently: Take 150 mg by mouth daily    traZODone (DESYREL) 100 MG tablet Past Week at Unknown time Self No Yes   Sig: Take 1-2 tablets (100-200 mg) by mouth nightly as needed for sleep   Patient taking differently: Take 200 mg by mouth At Bedtime       Facility-Administered Medications: None         Allergies:   No Known Allergies      Social History:     Social History     Social History     Marital status: Single     Spouse name: N/A     Number of children: N/A     Years of education: N/A     Occupational History     Not on file.     Social History Main Topics     Smoking status: Current Every Day Smoker     Packs/day: 1.00     Years: 10.00     Types: Cigarettes     Smokeless tobacco: Current User     Alcohol use 0.0 oz/week     0 Standard drinks or equivalent per week      Comment: currently intoxicated     Drug use: No     Sexual activity: Not Currently     Other Topics Concern     Not on file     Social History Narrative    The patient grew up in [place] and was raised mainly by his mother.  He denied any siblings.  He graduated high school on time and has 4 years of college, majoring in finance.  He denies childhood abuse and neglect.  He has never  and has no kids.  He currently resides with his mother, lost his job 2 weeks ago due to drinking.  He is currently on probation for DUI.          Family History:     Family History   Problem Relation Age of Onset     Neurologic Disorder Mother      Multiple Sclerosis     Depression Mother      Anxiety Disorder Mother      Alcohol/Drug Father      Substance Abuse Father      Depression Maternal Grandmother      Anxiety Disorder Maternal Grandmother      Hypertension Maternal Grandmother      Substance Abuse Maternal Grandfather      Alcohol/Drug Paternal Grandfather         Reviewed & updated in Epic.     Review of Systems:   Ten point review of  "systems negative except as stated above in History of Present Illness.    OBJECTIVE   Physical Exam:   Blood pressure (!) 147/103, pulse 88, temperature 96.9  F (36.1  C), temperature source Tympanic, resp. rate 16, height 1.854 m (6' 1\"), weight 138.3 kg (305 lb), SpO2 98 %.  General: Alert, awake, and in NAD. Non-toxic appearing. Diaphoretic.   HEENT: NC/AT. Anicteric sclera. Eyes symmetric and free of discharge bilaterally. Mucous membranes moist.  Neck: Supple  Cardiovascular: RRR. S1,S2. No murmurs appreciated.  Lungs: Normal respiratory effort on RA. Lungs CTAB without wheezes, rales, or rhonchi.  Abdomen: Obese, Soft, non-tender, and nondistended with normoactive bowel sounds.  Extremities: Warm & well perfused. No peripheral edema.  Skin: No rashes, jaundice, or lesions on exposed areas of skin.  Neurologic: A&O X 3. Answers questions appropriately. Moves all extremities symmetrically.     Laboratory Data:   CMP  Recent Labs  Lab 10/05/18  0954 10/03/18  1850 10/02/18  0557 10/01/18  0530 09/30/18  0541  09/29/18  1718    141 140 140 142  --  135   POTASSIUM 3.9 4.2 4.4 3.4 3.9  --  3.7   CHLORIDE 102 108 106 103 107  --  101   CO2 28 24 26 30 25  --  24   ANIONGAP 7 9 8 7 10  --  10   * 91 99 121* 122*  --  145*   BUN 14 12 8 9 10  --  11   CR 0.44* 0.51* 0.47* 0.52* 0.65*  < > 0.55*   GFRESTIMATED >90 >90 >90 >90 >90  < > >90   GFRESTBLACK >90 >90 >90 >90 >90  < > >90   AZAM 8.6 7.6* 8.3* 8.5 7.2*  --  8.4*   MAG  --   --  1.7 1.7 1.9  --  1.8   PROTTOTAL 7.2 7.3  --   --   --   --  7.3   ALBUMIN 3.7 3.6  --   --   --   --  3.5   BILITOTAL 1.0 0.3  --   --   --   --  0.2   ALKPHOS 94 102  --   --   --   --  115   AST 60* 187*  --   --   --   --  43   * 192*  --   --   --   --  73*   < > = values in this interval not displayed.    CBC    Recent Labs  Lab 10/05/18  0954 10/03/18  1944 10/02/18  0602 10/01/18  0530   WBC 4.6 7.5 4.4 3.6*   RBC 4.88 5.39 4.71 4.26*   HGB 14.1 16.0 14.0 12.4* "   HCT 42.8 48.3 41.1 37.7*   MCV 88 90 87 89   MCH 28.9 29.7 29.7 29.1   MCHC 32.9 33.1 34.1 32.9   RDW 15.3* 15.8* 14.6 15.2*    243 153 171       TSHNo lab results found in last 7 days.       Assessment and Plan/Recommendations:   Nickolas Lang is a 28 year old man with history of alcohol abuse hypertension, morbid obesity, hepatic steatosis, GERD, ADHD admitted to station 3A for alcohol detox and suicidality. Internal medicine was consulted for general medical evaluation.     #Alcohol dependence - Severe with multiple hospitalization in the last 20 days and intubation X 3. Interested in rehab.   #ADHD  #Depression - Worsened by recent passing of a family member  -Management per primary team     #Elevated liver enzymes - , AST 60, alk phos 94. Improved from 9/3/18. Presumed related to alcohol hepatitis.   -Recheck CMP in 48 hours     #Hypertension - Blood pressures elevated 147/103. PTA on amlodipine 10 mg daily and clonidine 0.1 mg BID. Patient has not been taking his medications as he has been drinking. Patient is asymptomatic with elevated blood pressures.    -Continue amlodipine 10 mg daily   -Add clonidine 0.1 mg BID   -Notify medicine if SBP>170 or DBP>110. May need to titrate clonidine.     #Hematuria - Noted during last hospitalization. UA positive for WBC, RBC, hyaline casts, mucous, uric acid and amorphous crystals. Patient denies gross hematuria, flank pain. He has no history of kidney stones. Urine culture 9/30 with no growth.   -Encourage fluids   -Encourage cessation of alcohol use   -Will need repeat UA in one months to check for resolution of hematuria with his PCP.    #History of prolonged QT - QTc 486 10/3/18   -Repeat EKG and monitor QT periodically   -Avoid QT prolonging medications     Medicine will continue to follow peripherally. Please page the Internal Medicine job code pager for any intercurrent medical issues which arise. Thank you for the opportunity to be a part of  this patient's care.    Alexandra Martinez PA-C  Hospitalist Service  [phone]

## 2018-10-05 NOTE — DISCHARGE INSTRUCTIONS
Behavioral Discharge Planning and Instructions  THANK YOU FOR CHOOSING THE [address]  [phone]    Summary: You were admitted to Station 3A on 10/4/2018 for detoxification from alcohol.  A medical exam was performed that included lab work. You have met with a  and opted to enter treatment at [facility].  Your admission is scheduled for 10/16 @ noon. You have been through the civil commitment process and this has resulted in a Stay to commit.  Please follow the guidelines of your Stay and work with your  to manage your substance use disorder and maintain sobriety.  Failure to do so could result in a vacate of the Stay and you could face being placed on a Full Commitment to the [facility].  Please take care and make your recovery a daily priority, Nickolas! It was a pleasure working with you and the entire treatment team here wishes you the very best in your recovery!     [facility]  [phone]  [address] [place] [address]     Main Diagnosis:  Per Dr. Brasher;  Alcohol Use Dx Severe    Major Treatments, Procedures and Findings:  You were treated for alcohol detoxification using valium administered based on the [facility] protocol. While here you were placed on a Court Hold that was supported by the Court. You completed a chemical dependency assessment. You had labs drawn and those results were reviewed with you. Please take a copy of your lab work with you to your next primary care physician appointment.    Symptoms to Report:  If you experience more anxiety, confusion, sleeplessness, deep sadness or thoughts of suicide, notify your treatment team or notify your primary care physician. IF ANY OF THE SYMPTOMS YOU ARE EXPERIENCING ARE A MEDICAL EMERGENCY CALL 911 IMMEDIATELY.     Lifestyle Adjustment: Adjust your lifestyle to get enough sleep, relaxation, exercise and good nutrition. Continue to develop healthy coping skills to decrease  stress and promote a sober living environment. Do not use mood altering substances including alcohol, illegal drugs or addictive medications other than what is currently prescribed.     Disposition: [facility]    Follow-up Appointment:   Please see above appointment with Dr. Crews and attend as scheduled.    Resources:   AA/NA and Sponsors are excellent resources for support and you can find one at any support group meeting.   SMART Recovery - self management for addiction recovery:  www.smartrecovery.org  http://www.aastpaul.org/?topic=8  http://a[place].org/meetings/  http://www.nam[place].org/index.php/meeting-list-pdf  Pathways ~ A Health Crisis Resource & Support Center:  690.537.7776.  http://www.harmreduction.org  [facility] 764-741-1469  Support Group:  AA/NA and Sponsor/support.  National [place] on Mental Illness (www.[place].chaya.org): 107.862.2024 or 558-125-9680.  Alcoholics Anonymous (www.alcoholics-anonymous.org): Check your phone book for your local chapter.  Suicide Awareness Voices of Education (SAVE) (www.save.org): 496-899-MQDO (6983)  National Suicide Prevention Line (www.mentalhealthmn.org): 847-818-ICIQ (4123)  Mental Health Consumer/Survivor Network of [place] (www.mhcsn.net): 123.275.7017 or 782-130-0676  Mental Health Association of [place] (www.mentalhealth.org): 887.457.4901 or 189-378-5690   Substance Abuse and Mental Health Services (www.samhsa.gov)    [place] Recovery Connection ([facility])  [facility] connects people seeking recovery to resources that help foster and sustain long-term recovery.  Whether you are seeking resources for treatment, transportation, housing, job training, education, health care or other pathways to recovery, [facility] is a great place to start.  706.236.5262.  www.[facility].org    General Medication Instructions:   See your medication sheet(s) for instructions.   Take all medications as prescribed.  Make no changes unless your doctor suggests them.   Go  to all your doctor visits.  Be sure to have all your required lab tests. This way, your medicines can be refilled on time.  Do not use any forms of alcohol.    Please Note:  If you have any questions at anytime after you are discharged please call the [facility] detox unit 3AW unit at [phone].  [facility], Behavioral Intake [phone]  Medical Records call [phone]  Outpatient Behavioral Intake call [phone]  LP+ Wait List/Bed Availability call [phone]    Please take this discharge folder with you to all your follow up appointments, it contains your lab results, diagnosis, medication list and discharge recommendations.      THANK YOU FOR CHOOSING THE [facility]

## 2018-10-05 NOTE — PROGRESS NOTES
Pt medicated x 3  With valium for alcohol withdrawal. Out on unit. Affect flat. Pt sad regarding death of his uncle. Pt asked staff to look up to see if his uncle's obituary was in the paper. It is not. Pt accepting of spiritual care consult.

## 2018-10-06 PROCEDURE — 93010 ELECTROCARDIOGRAM REPORT: CPT | Performed by: INTERNAL MEDICINE

## 2018-10-06 PROCEDURE — 25000132 ZZH RX MED GY IP 250 OP 250 PS 637: Performed by: PSYCHIATRY & NEUROLOGY

## 2018-10-06 PROCEDURE — 25000132 ZZH RX MED GY IP 250 OP 250 PS 637: Performed by: PHYSICIAN ASSISTANT

## 2018-10-06 PROCEDURE — 25000132 ZZH RX MED GY IP 250 OP 250 PS 637: Performed by: FAMILY MEDICINE

## 2018-10-06 PROCEDURE — 12800012 ZZH R&B CD MH INTERMEDIATE ADULT

## 2018-10-06 PROCEDURE — 93005 ELECTROCARDIOGRAM TRACING: CPT

## 2018-10-06 RX ORDER — ACETAMINOPHEN 325 MG/1
325-650 TABLET ORAL EVERY 4 HOURS PRN
Status: DISCONTINUED | OUTPATIENT
Start: 2018-10-06 | End: 2018-10-09

## 2018-10-06 RX ORDER — POLYETHYLENE GLYCOL 3350 17 G
2 POWDER IN PACKET (EA) ORAL
Status: DISCONTINUED | OUTPATIENT
Start: 2018-10-06 | End: 2018-10-12

## 2018-10-06 RX ADMIN — HYDROXYZINE HYDROCHLORIDE 50 MG: 50 TABLET, FILM COATED ORAL at 22:16

## 2018-10-06 RX ADMIN — TRAZODONE HYDROCHLORIDE 200 MG: 100 TABLET ORAL at 22:16

## 2018-10-06 RX ADMIN — SERTRALINE HYDROCHLORIDE 150 MG: 100 TABLET ORAL at 09:05

## 2018-10-06 RX ADMIN — GABAPENTIN 800 MG: 800 TABLET, FILM COATED ORAL at 09:04

## 2018-10-06 RX ADMIN — NICOTINE POLACRILEX 2 MG: 2 LOZENGE ORAL at 13:39

## 2018-10-06 RX ADMIN — BUSPIRONE HYDROCHLORIDE 20 MG: 10 TABLET ORAL at 09:05

## 2018-10-06 RX ADMIN — DIAZEPAM 10 MG: 5 TABLET ORAL at 04:31

## 2018-10-06 RX ADMIN — CLONIDINE HYDROCHLORIDE 0.1 MG: 0.1 TABLET ORAL at 20:25

## 2018-10-06 RX ADMIN — FOLIC ACID 1 MG: 1 TABLET ORAL at 09:04

## 2018-10-06 RX ADMIN — GABAPENTIN 800 MG: 800 TABLET, FILM COATED ORAL at 16:28

## 2018-10-06 RX ADMIN — MIRTAZAPINE 15 MG: 15 TABLET, FILM COATED ORAL at 22:16

## 2018-10-06 RX ADMIN — HYDROXYZINE HYDROCHLORIDE 50 MG: 50 TABLET, FILM COATED ORAL at 14:58

## 2018-10-06 RX ADMIN — AMLODIPINE BESYLATE 10 MG: 10 TABLET ORAL at 09:04

## 2018-10-06 RX ADMIN — ACETAMINOPHEN 650 MG: 325 TABLET, FILM COATED ORAL at 18:14

## 2018-10-06 RX ADMIN — BUSPIRONE HYDROCHLORIDE 20 MG: 10 TABLET ORAL at 13:39

## 2018-10-06 RX ADMIN — DIAZEPAM 10 MG: 5 TABLET ORAL at 12:37

## 2018-10-06 RX ADMIN — GABAPENTIN 800 MG: 800 TABLET, FILM COATED ORAL at 20:25

## 2018-10-06 RX ADMIN — OMEPRAZOLE 40 MG: 20 CAPSULE, DELAYED RELEASE ORAL at 09:05

## 2018-10-06 RX ADMIN — LOPERAMIDE HYDROCHLORIDE 2 MG: 2 TABLET, FILM COATED ORAL at 13:39

## 2018-10-06 RX ADMIN — BUSPIRONE HYDROCHLORIDE 20 MG: 10 TABLET ORAL at 20:25

## 2018-10-06 RX ADMIN — GABAPENTIN 800 MG: 800 TABLET, FILM COATED ORAL at 12:37

## 2018-10-06 RX ADMIN — DIAZEPAM 10 MG: 5 TABLET ORAL at 00:36

## 2018-10-06 RX ADMIN — DIAZEPAM 5 MG: 5 TABLET ORAL at 16:28

## 2018-10-06 RX ADMIN — CLONIDINE HYDROCHLORIDE 0.1 MG: 0.1 TABLET ORAL at 09:37

## 2018-10-06 RX ADMIN — MULTIPLE VITAMINS W/ MINERALS TAB 1 TABLET: TAB at 09:04

## 2018-10-06 RX ADMIN — Medication 100 MG: at 09:04

## 2018-10-06 ASSESSMENT — ACTIVITIES OF DAILY LIVING (ADL)
LAUNDRY: WITH SUPERVISION
ORAL_HYGIENE: INDEPENDENT
DRESS: INDEPENDENT
GROOMING: INDEPENDENT

## 2018-10-06 NOTE — PROGRESS NOTES
"CLINICAL NUTRITION SERVICES - ASSESSMENT NOTE     Nutrition Prescription    RECOMMENDATIONS FOR MDs/PROVIDERS TO ORDER:  None today    Malnutrition Status:    Patient does not meet two of the above criteria necessary for diagnosing malnutrition    Recommendations already ordered by Registered Dietitian (RD):  -Ordered snacks tid between meals per pt request:  -10am: x2 hard boiled eggs   -2pm: x 2 cheese sticks, bagel with cream cheese  -HS: x 2 hard boiled eggs, bagel with cream cheese     Future/Additional Recommendations:  Monitor po intakes, diet tolerance and weight trends.  Follow up for snack acceptance and adjust as indicated.       REASON FOR ASSESSMENT  Nickolas Lang is a/an 28 year old male assessed by the dietitian for RN Consult - Pt having GI distress issues making eating difficult. ? foods that would be more approriate for patient    NUTRITION HISTORY  Per chart review, pt \"with history of alcohol abuse hypertension, morbid obesity, hepatic steatosis, GERD, ADHD admitted to station 3A for alcohol detox and suicidality.\"       \"Pt reports drinking 1.75 ml of vodka a day.  He has recently lost a loved one, but had been drinking prior to that death.  Pt had strung together 5 months of sobriety but relapsed a few months ago.\"    Per pt he has been hardly eating, sometimes not anything for 4-5 days over the past few weeks.  No known food allergies.    CURRENT NUTRITION ORDERS  Diet: Regular    Intake/Tolerance: Per pt appetite improving with lunch today and tolerating ok.  He is really hungry and would like to have some snacks between meals.      LABS  Labs reviewed    MEDICATIONS  Medications reviewed  MVI with minerals  Thiamine  Folic Acid  Zofran prn  Immodium qid prn    ANTHROPOMETRICS  Height: 185.4 cm (6' 1\")  Most Recent Weight: 138.3 kg (305 lb)    IBW: 184 lbs  BMI: Obesity Grade III BMI >40  Weight History:  Per hx below, weight appears decreased 19 lbs or 6% in less than 3 months.    Per " previous RD note (6/30/18) pt reported over the past 1-2 years, he has gained ~100 lbs and would like to be back down to ~230 lbs ideally.  Wt Readings from Last 10 Encounters:   10/04/18 138.3 kg (305 lb)   10/03/18 136.1 kg (300 lb)   10/02/18 141.2 kg (311 lb 4.6 oz)   09/22/18 137.9 kg (304 lb)   09/15/18 138.6 kg (305 lb 9.6 oz)   09/13/18 141.1 kg (311 lb)   09/11/18 141.5 kg (311 lb 15.2 oz)   07/30/18 144 kg (317 lb 8 oz)   07/26/18 102.5 kg (226 lb)   07/12/18 147 kg (324 lb)       Dosing Weight: 97 kg (adjusted for >120% IBW using current weight)    ASSESSED NUTRITION NEEDS  Estimated Energy Needs: 8203-8274 kcals/day (20 - 25 kcals/kg)  Justification: Obese  Estimated Protein Needs:  grams protein/day (1 - 1.2 grams of pro/kg)  Justification: Repletion  Estimated Fluid Needs: (1 mL/kcal)   Justification: Per provider pending fluid status    PHYSICAL FINDINGS  See malnutrition section below.     MALNUTRITION  % Intake: </= 50% for >/= 5 days  % Weight Loss: Weight loss does not meet criteria  Subcutaneous Fat Loss: None observed  Muscle Loss: None observed  Fluid Accumulation/Edema: None noted  Malnutrition Diagnosis: Patient does not meet two of the above criteria necessary for diagnosing malnutrition    NUTRITION DIAGNOSIS  Predicted suboptimal nutrient intakes (energy/protein) related to hx of decreased intakes and weight loss d/t etoh use as evidenced by pt report of improved intakes since admission, but RN consult for GI issues and complaints of not getting enough to eat.    INTERVENTIONS  Implementation  Nutrition Education: Discussed snacks available between meals.    Goals  Patient to consume % of nutritionally adequate meal trays TID, or the equivalent with supplements/snacks.     Monitoring/Evaluation  Progress toward goals will be monitored and evaluated per protocol.    Rosanne Galloway RD, MIKA

## 2018-10-06 NOTE — PLAN OF CARE
Problem: Substance Withdrawal  Goal: Substance Withdrawal  Signs and symptoms of listed problems will be absent or manageable.   Outcome: Improving  Pt continues to be monitored for alcohol withdrawal. Pt medicated x 1 with valium 10 mg this shift. Pt has received a total of 180 mg of valium since admission. No oversedation noted. BP slightly elevated. Pt out on unit. EKG done WNL. Pt reports nausea and GI distress.Pt reporting diarrhea and requested imodium x 1.  Accepting of ginger ale. Pt remains on a 72 hold. Affect flat. Reports feeling anxious. Order for nicotine lozenges per pt request obtained.

## 2018-10-07 PROCEDURE — 25000132 ZZH RX MED GY IP 250 OP 250 PS 637: Performed by: PSYCHIATRY & NEUROLOGY

## 2018-10-07 PROCEDURE — 12800012 ZZH R&B CD MH INTERMEDIATE ADULT

## 2018-10-07 PROCEDURE — 25000132 ZZH RX MED GY IP 250 OP 250 PS 637: Performed by: FAMILY MEDICINE

## 2018-10-07 RX ADMIN — GABAPENTIN 800 MG: 800 TABLET, FILM COATED ORAL at 09:59

## 2018-10-07 RX ADMIN — BUSPIRONE HYDROCHLORIDE 20 MG: 10 TABLET ORAL at 10:01

## 2018-10-07 RX ADMIN — NICOTINE POLACRILEX 2 MG: 2 LOZENGE ORAL at 16:27

## 2018-10-07 RX ADMIN — NICOTINE POLACRILEX 2 MG: 2 LOZENGE ORAL at 20:20

## 2018-10-07 RX ADMIN — OMEPRAZOLE 40 MG: 20 CAPSULE, DELAYED RELEASE ORAL at 10:00

## 2018-10-07 RX ADMIN — FOLIC ACID 1 MG: 1 TABLET ORAL at 10:00

## 2018-10-07 RX ADMIN — TRAZODONE HYDROCHLORIDE 200 MG: 100 TABLET ORAL at 22:21

## 2018-10-07 RX ADMIN — GABAPENTIN 800 MG: 800 TABLET, FILM COATED ORAL at 12:15

## 2018-10-07 RX ADMIN — HYDROXYZINE HYDROCHLORIDE 50 MG: 50 TABLET, FILM COATED ORAL at 14:12

## 2018-10-07 RX ADMIN — Medication 100 MG: at 10:00

## 2018-10-07 RX ADMIN — CLONIDINE HYDROCHLORIDE 0.1 MG: 0.1 TABLET ORAL at 20:19

## 2018-10-07 RX ADMIN — BUSPIRONE HYDROCHLORIDE 20 MG: 10 TABLET ORAL at 16:24

## 2018-10-07 RX ADMIN — SERTRALINE HYDROCHLORIDE 150 MG: 100 TABLET ORAL at 09:59

## 2018-10-07 RX ADMIN — DIAZEPAM 5 MG: 5 TABLET ORAL at 16:24

## 2018-10-07 RX ADMIN — BUSPIRONE HYDROCHLORIDE 20 MG: 10 TABLET ORAL at 22:21

## 2018-10-07 RX ADMIN — HYDROXYZINE HYDROCHLORIDE 50 MG: 50 TABLET, FILM COATED ORAL at 22:21

## 2018-10-07 RX ADMIN — AMLODIPINE BESYLATE 10 MG: 10 TABLET ORAL at 10:01

## 2018-10-07 RX ADMIN — MULTIPLE VITAMINS W/ MINERALS TAB 1 TABLET: TAB at 10:00

## 2018-10-07 RX ADMIN — GABAPENTIN 800 MG: 800 TABLET, FILM COATED ORAL at 16:24

## 2018-10-07 RX ADMIN — MIRTAZAPINE 15 MG: 15 TABLET, FILM COATED ORAL at 22:21

## 2018-10-07 RX ADMIN — CLONIDINE HYDROCHLORIDE 0.1 MG: 0.1 TABLET ORAL at 10:01

## 2018-10-07 RX ADMIN — GABAPENTIN 800 MG: 800 TABLET, FILM COATED ORAL at 20:19

## 2018-10-07 RX ADMIN — DIAZEPAM 10 MG: 5 TABLET ORAL at 12:15

## 2018-10-07 ASSESSMENT — ACTIVITIES OF DAILY LIVING (ADL)
GROOMING: INDEPENDENT
DRESS: INDEPENDENT
ORAL_HYGIENE: INDEPENDENT

## 2018-10-07 NOTE — PLAN OF CARE
Problem: Substance Withdrawal  Goal: Substance Withdrawal  Signs and symptoms of listed problems will be absent or manageable.   Outcome: Improving  Patient continues on a 72 hour hold. MSSA scores 5 and 8. Medicated x1 with Valium 10 mg. Patient states he was angry about the commitment @ first but he now understands that it was needed.

## 2018-10-08 LAB
ALBUMIN SERPL-MCNC: 3.2 G/DL (ref 3.4–5)
ALP SERPL-CCNC: 75 U/L (ref 40–150)
ALT SERPL W P-5'-P-CCNC: 161 U/L (ref 0–70)
ANION GAP SERPL CALCULATED.3IONS-SCNC: 8 MMOL/L (ref 3–14)
AST SERPL W P-5'-P-CCNC: 82 U/L (ref 0–45)
BILIRUB SERPL-MCNC: 0.4 MG/DL (ref 0.2–1.3)
BUN SERPL-MCNC: 12 MG/DL (ref 7–30)
CALCIUM SERPL-MCNC: 8.5 MG/DL (ref 8.5–10.1)
CHLORIDE SERPL-SCNC: 107 MMOL/L (ref 94–109)
CO2 SERPL-SCNC: 28 MMOL/L (ref 20–32)
CREAT SERPL-MCNC: 0.48 MG/DL (ref 0.66–1.25)
GFR SERPL CREATININE-BSD FRML MDRD: >90 ML/MIN/1.7M2
GLUCOSE SERPL-MCNC: 134 MG/DL (ref 70–99)
INTERPRETATION ECG - MUSE: NORMAL
POTASSIUM SERPL-SCNC: 3.7 MMOL/L (ref 3.4–5.3)
PROT SERPL-MCNC: 6.2 G/DL (ref 6.8–8.8)
SODIUM SERPL-SCNC: 143 MMOL/L (ref 133–144)

## 2018-10-08 PROCEDURE — 80053 COMPREHEN METABOLIC PANEL: CPT | Performed by: PHYSICIAN ASSISTANT

## 2018-10-08 PROCEDURE — 99232 SBSQ HOSP IP/OBS MODERATE 35: CPT | Performed by: PSYCHIATRY & NEUROLOGY

## 2018-10-08 PROCEDURE — 25000132 ZZH RX MED GY IP 250 OP 250 PS 637: Performed by: PSYCHIATRY & NEUROLOGY

## 2018-10-08 PROCEDURE — H0001 ALCOHOL AND/OR DRUG ASSESS: HCPCS

## 2018-10-08 PROCEDURE — 99207 ZZC CDG-MDM COMPONENT: MEETS MODERATE - UP CODED: CPT | Performed by: PSYCHIATRY & NEUROLOGY

## 2018-10-08 PROCEDURE — 36415 COLL VENOUS BLD VENIPUNCTURE: CPT | Performed by: PHYSICIAN ASSISTANT

## 2018-10-08 PROCEDURE — 25000132 ZZH RX MED GY IP 250 OP 250 PS 637: Performed by: FAMILY MEDICINE

## 2018-10-08 PROCEDURE — 12800012 ZZH R&B CD MH INTERMEDIATE ADULT

## 2018-10-08 RX ORDER — TRAZODONE HYDROCHLORIDE 100 MG/1
100-200 TABLET ORAL
Status: DISCONTINUED | OUTPATIENT
Start: 2018-10-08 | End: 2018-10-16 | Stop reason: HOSPADM

## 2018-10-08 RX ADMIN — BUSPIRONE HYDROCHLORIDE 20 MG: 10 TABLET ORAL at 09:04

## 2018-10-08 RX ADMIN — TRAZODONE HYDROCHLORIDE 200 MG: 100 TABLET ORAL at 22:56

## 2018-10-08 RX ADMIN — MULTIPLE VITAMINS W/ MINERALS TAB 1 TABLET: TAB at 09:04

## 2018-10-08 RX ADMIN — ACETAMINOPHEN 650 MG: 325 TABLET, FILM COATED ORAL at 17:20

## 2018-10-08 RX ADMIN — OMEPRAZOLE 40 MG: 20 CAPSULE, DELAYED RELEASE ORAL at 11:45

## 2018-10-08 RX ADMIN — BUSPIRONE HYDROCHLORIDE 20 MG: 10 TABLET ORAL at 22:28

## 2018-10-08 RX ADMIN — BUSPIRONE HYDROCHLORIDE 20 MG: 10 TABLET ORAL at 16:08

## 2018-10-08 RX ADMIN — CLONIDINE HYDROCHLORIDE 0.1 MG: 0.1 TABLET ORAL at 20:16

## 2018-10-08 RX ADMIN — GABAPENTIN 800 MG: 800 TABLET, FILM COATED ORAL at 11:45

## 2018-10-08 RX ADMIN — GABAPENTIN 800 MG: 800 TABLET, FILM COATED ORAL at 20:16

## 2018-10-08 RX ADMIN — DIAZEPAM 10 MG: 5 TABLET ORAL at 20:16

## 2018-10-08 RX ADMIN — NICOTINE POLACRILEX 2 MG: 2 LOZENGE ORAL at 13:05

## 2018-10-08 RX ADMIN — NICOTINE POLACRILEX 2 MG: 2 LOZENGE ORAL at 20:16

## 2018-10-08 RX ADMIN — HYDROXYZINE HYDROCHLORIDE 50 MG: 50 TABLET, FILM COATED ORAL at 17:20

## 2018-10-08 RX ADMIN — GABAPENTIN 800 MG: 800 TABLET, FILM COATED ORAL at 09:04

## 2018-10-08 RX ADMIN — AMLODIPINE BESYLATE 10 MG: 10 TABLET ORAL at 09:04

## 2018-10-08 RX ADMIN — NICOTINE POLACRILEX 2 MG: 2 LOZENGE ORAL at 16:08

## 2018-10-08 RX ADMIN — HYDROXYZINE HYDROCHLORIDE 50 MG: 50 TABLET, FILM COATED ORAL at 09:07

## 2018-10-08 RX ADMIN — HYDROXYZINE HYDROCHLORIDE 50 MG: 50 TABLET, FILM COATED ORAL at 13:05

## 2018-10-08 RX ADMIN — HYDROXYZINE HYDROCHLORIDE 50 MG: 50 TABLET, FILM COATED ORAL at 22:56

## 2018-10-08 RX ADMIN — FOLIC ACID 1 MG: 1 TABLET ORAL at 09:04

## 2018-10-08 RX ADMIN — SERTRALINE HYDROCHLORIDE 150 MG: 100 TABLET ORAL at 09:04

## 2018-10-08 RX ADMIN — CLONIDINE HYDROCHLORIDE 0.1 MG: 0.1 TABLET ORAL at 09:04

## 2018-10-08 RX ADMIN — DIAZEPAM 10 MG: 5 TABLET ORAL at 16:08

## 2018-10-08 RX ADMIN — GABAPENTIN 800 MG: 800 TABLET, FILM COATED ORAL at 16:08

## 2018-10-08 RX ADMIN — MIRTAZAPINE 15 MG: 15 TABLET, FILM COATED ORAL at 22:28

## 2018-10-08 ASSESSMENT — ACTIVITIES OF DAILY LIVING (ADL)
DRESS: INDEPENDENT
ORAL_HYGIENE: INDEPENDENT
GROOMING: INDEPENDENT
LAUNDRY: WITH SUPERVISION

## 2018-10-08 NOTE — PROGRESS NOTES
Pt was seen this morning by [facility] Pre-petition screening.  They will let us know decision by tomorrow afternoon.  Pt reports he is interested in [facility].  Submitted request for assessment to [facility] and will update and fax to [facility] for review when received.

## 2018-10-08 NOTE — PROGRESS NOTES
"[facility], [place]   Psychiatric Progress Note    Interim history   This is a 28 year old male with alcohol use dx severe , .Pt seen in rounds. Patient's mood is sad  Energy Level:LOW  Sleep:No concerns, sleeps well through night  Appetite:normal motivation interest low   denied any Suicidal/homicidal ideation/plan intent.  dneied any psychosis  No prior suicde attempts  No access to gun  Pt is in detox  Pt mssascore are monitered  Tolerating meds and has no side effects.              Medications:     Current Facility-Administered Medications   Medication     acetaminophen (TYLENOL) tablet 325-650 mg     amLODIPine (NORVASC) tablet 10 mg     atenolol (TENORMIN) tablet 50 mg     busPIRone (BUSPAR) tablet 20 mg     cloNIDine (CATAPRES) tablet 0.1 mg     diazepam (VALIUM) tablet 5-20 mg     folic acid (FOLVITE) tablet 1 mg     gabapentin (NEURONTIN) tablet 800 mg     hydrOXYzine (ATARAX) tablet 50 mg     loperamide (IMODIUM A-D) tablet 2 mg     mirtazapine (REMERON) tablet 15 mg     multivitamin, therapeutic with minerals (THERA-VIT-M) tablet 1 tablet     nicotine polacrilex (COMMIT) lozenge 2 mg     omeprazole (priLOSEC) CR capsule 40 mg     ondansetron (ZOFRAN-ODT) ODT tab 4-8 mg     sertraline (ZOLOFT) tablet 150 mg     traZODone (DESYREL) tablet 200 mg             Allergies:   No Known Allergies         Psychiatric Examination:   Blood pressure (!) 135/95, pulse 89, temperature 97.7  F (36.5  C), temperature source Tympanic, resp. rate 16, height 1.854 m (6' 1\"), weight 138.3 kg (305 lb), SpO2 98 %.  Weight is 305 lbs 0 oz  Body mass index is 40.24 kg/(m^2).    Appearance:  awake, alert and adequately groomed  Attitude:  cooperative  Eye Contact:  good  Mood:  sad   Affect:  appropriate and in normal range and mood congruent  Speech:  clear, coherent rate /rhythm are good  Psychomotor Behavior:  no evidence of tardive dyskinesia, dystonia, or tics and intact station, gait and muscle " tone  Throught Process:  logical  Associations:  no loose associations  Thought Content:  no evidence of suicidal ideation or homicidal ideation, no evidence of psychotic thought, no auditory hallucinations present and no visual hallucinations present  Insight:  limited  Judgement:  limited  Oriented to:  time, person, and place  Attention Span and Concentration:  intact  Recent and Remote Memory:  intact  Language fund of knowledge are adequate         Labs:     Lab Results   Component Value Date    NTBNPI <5 11/20/2017     Lab Results   Component Value Date    WBC 4.6 10/05/2018    HGB 14.1 10/05/2018    HCT 42.8 10/05/2018    MCV 88 10/05/2018     10/05/2018     Lab Results   Component Value Date    TSH 2.97 07/30/2018          Dx alcohol use dx severe  Plan  Will continue with detox on mssa on valium  Pt is on a hold , was seen by pre pettion today     Several intoxications with 0.54.047, leading to lack of function and ICU admissions for not breathing. Cannot be safe in the outpatient setting. Unwilling to attend a locked inpatient program. Has a history of leaving programing. Family/staff/friends/clinicians highly concerned and pushing for commitment.   Laboratory/Imaging: reviewed with patient   Consults: internal medicine consult reviewed  Patient will be treated in therapeutic milieu with appropriate individual and group therapies as described.      Medical diagnoses to be addressed this admission:    Plan:   Assessment and Plan/Recommendations:   Nickolas Lang is a 28 year old man with history of alcohol abuse hypertension, morbid obesity, hepatic steatosis, GERD, ADHD admitted to station 3A for alcohol detox and suicidality. Internal medicine was consulted for general medical evaluation.      #Alcohol dependence - Severe with multiple hospitalization in the last 20 days and intubation X 3. Interested in rehab.   #ADHD  #Depression - Worsened by recent passing of a family member  -Management  per primary team      #Elevated liver enzymes - , AST 60, alk phos 94. Improved from 9/3/18. Presumed related to alcohol hepatitis.   -Recheck CMP in 48 hours      #Hypertension - Blood pressures elevated 147/103. PTA on amlodipine 10 mg daily and clonidine 0.1 mg BID. Patient has not been taking his medications as he has been drinking. Patient is asymptomatic with elevated blood pressures.    -Continue amlodipine 10 mg daily   -Add clonidine 0.1 mg BID   -Notify medicine if SBP>170 or DBP>110. May need to titrate clonidine.      #Hematuria - Noted during last hospitalization. UA positive for WBC, RBC, hyaline casts, mucous, uric acid and amorphous crystals. Patient denies gross hematuria, flank pain. He has no history of kidney stones. Urine culture 9/30 with no growth.   -Encourage fluids   -Encourage cessation of alcohol use   -Will need repeat UA in one months to check for resolution of hematuria with his PCP.     #History of prolonged QT - QTc 486 10/3/18   -Repeat EKG and monitor QT periodically   -Avoid QT prolonging medications               Safety Assessment:   Checks:  15 min  Precautions: withdrawal precautions  Pt has not required locked seclusion or restraints in the past 24 hours to maintain safety, please refer to RN documentation for further details.  Discussed with patient many issues of addiction,triggers, relapse, and establishing a solid recovery program.  Able to give informed consent:  YES   Discussed Risks/Benefits/Side Effects/Alternatives: YES    After discussion of the indications, risks, benefits, alternatives and consequences of no treatment, the patient elects to complete detox and do trt.

## 2018-10-08 NOTE — PROGRESS NOTES
Brief Medicine Note    Medicine following for elevated liver enzymes.    CMP today with slightly worsened liver enzymes - AST 82 (60),  (117), alk phos 75 (84), T bili 0.4 (1.0). Suspect transaminitis from alcohol abuse (levels previously WNL 9/2018) although not in typical 2:1 AST/ALT fashion. Given values have not yet peaked, will repeat hepatic panel on 10/10. If still up-trending, would recommend further work-up including abdominal US and consideration of hepatitis studies.    Medicine will continue to follow for repeat labs on 10/10.    Pushpa Kuhn PA-C  Hospitalist Service  [phone]

## 2018-10-08 NOTE — PROGRESS NOTES
Rule 25 Chemical Health Assessment Update:   Nickolas Lang had a Rule 25 Evaluation with JACE Jay LADC at [facility] on 09/25/18 and a copy of the CD evaluation will be in the paper chart until being scanned into [facility] after the patient completes the primary portion of CD treatment. The original Rule 25 paperwork was reviewed and updated on 10/08/18 by ARLENE Alcantar.  Please obtain the paper chart to review the Rule 25 paperwork.    Pt reports his last use of alcohol was on 10/4/18. Patient was given a UA upon admit and UA was POS for ethanol and benzodiazepines. Pt was given a breathalyzer upon ER admission and pt's AMANDA was 0.36.    Updated Information:    DIMENSION I - Acute Intoxication /Withdrawal Potential   1. Chemical use most recent 12 months outside a facility and other significant use history (client self-report)              X = Primary Drug Used   Age of First Use Most Recent Pattern of Use and Duration   Need enough information to show pattern (both frequency and amounts) and to show tolerance for each chemical that has a diagnosis   Date of last use and time, if needed   Withdrawal Potential? Requiring special care Method of use  (oral, smoked, snort, IV, etc)   x   Alcohol     16 1.75 liter a day   10/04/18 in the morning yes oral      Marijuana/  Hashish   N/A           Cocaine/Crack     N/A           Meth/  Amphetamines   N/A           Heroin     N/A           Other Opiates/  Synthetics   N/A           Inhalants     N/A           Benzodiazepines     N/A           Hallucinogens     N/A           Barbiturates/  Sedatives/  Hypnotics N/A           Over-the-Counter Drugs   N/A           Other     N/A        x   Nicotine     16  1 ppd 10/4/18 in the afternoon yes smokes     ASA PLACEMENT CRITERIA:   DIMENSION 1: Intoxication/Withdrawal: Risk level 1. The patient will be medically stable at the time of discharge.  DIMENSION 2: Biomedical Conditions: Risk level 1. The patient  "continues to use despite medical problems related to his use including GERD,Hypertension, Hepatic steatosis, Obesity, and Pyloric stenosis.   DIMENSION 3: Emotional/Behavioral: Risk level 2. The patient has a diagnosis of ADD and anxiety.  He has experienced recent death of an uncle and endorses significant grief and loss.   DIMENSION 4: Treatment Readiness: Risk level 3. The patient believes he functions best in an outpatient setting despite continuous relapse with serious outcomes.  The Pt is currently going through the commitment process with a Stay of Commitment the likely outcome.  DIMENSION 5: Relapse & Continued Use Potential: Risk level 4. The patient demonstrates an inability to maintain sobriety in the community. The patient has been drinking to life threatening levels requiring intubation on 3 occassions in one month.    DIMENSION 6: Recovery Environment: Risk level 4. The patient has not been able to establish a strong sober support community.      Counselor Notes: Pt is in the process of a petition to commit with a plan to negotiate a \"Stay.\"  Pt prefers a 12 step based program with a Pent[employer]alism orientation.  Recommendation is for [facility], [facility], or [facility].    ARLENE Alcantar    "

## 2018-10-08 NOTE — PLAN OF CARE
"Problem: Substance Withdrawal  Goal: Substance Withdrawal  Signs and symptoms of listed problems will be absent or manageable.   Patient appeared calm and social in the mille. He reports high anxiety and depression at 9/10, denies any thoughts of self harm. Patient states \"I lost a father figure recently and am very sad about it\".  MSSA scores of 5  @0800 and 7@1200, pt reporting mild withdrawal symptoms.       "

## 2018-10-08 NOTE — PROGRESS NOTES
Pt signed ALFONSO's for [facility], [facility], and [facility].  Assessment was updated and faxed to programs for review.

## 2018-10-09 PROCEDURE — 25000128 H RX IP 250 OP 636: Performed by: PSYCHIATRY & NEUROLOGY

## 2018-10-09 PROCEDURE — 25000132 ZZH RX MED GY IP 250 OP 250 PS 637: Performed by: PSYCHIATRY & NEUROLOGY

## 2018-10-09 PROCEDURE — 12800012 ZZH R&B CD MH INTERMEDIATE ADULT

## 2018-10-09 PROCEDURE — 99232 SBSQ HOSP IP/OBS MODERATE 35: CPT | Performed by: PSYCHIATRY & NEUROLOGY

## 2018-10-09 PROCEDURE — 25000132 ZZH RX MED GY IP 250 OP 250 PS 637: Performed by: FAMILY MEDICINE

## 2018-10-09 PROCEDURE — 99207 ZZC CDG-MDM COMPONENT: MEETS MODERATE - UP CODED: CPT | Performed by: PSYCHIATRY & NEUROLOGY

## 2018-10-09 RX ORDER — CYANOCOBALAMIN 1000 UG/ML
1000 INJECTION, SOLUTION INTRAMUSCULAR; SUBCUTANEOUS
Status: DISCONTINUED | OUTPATIENT
Start: 2018-10-09 | End: 2018-10-16 | Stop reason: HOSPADM

## 2018-10-09 RX ORDER — ALUMINA, MAGNESIA, AND SIMETHICONE 2400; 2400; 240 MG/30ML; MG/30ML; MG/30ML
30 SUSPENSION ORAL EVERY 4 HOURS PRN
Status: DISCONTINUED | OUTPATIENT
Start: 2018-10-09 | End: 2018-10-16 | Stop reason: HOSPADM

## 2018-10-09 RX ADMIN — CYANOCOBALAMIN 1000 MCG: 1000 INJECTION, SOLUTION INTRAMUSCULAR at 13:18

## 2018-10-09 RX ADMIN — BUSPIRONE HYDROCHLORIDE 20 MG: 10 TABLET ORAL at 16:12

## 2018-10-09 RX ADMIN — HYDROXYZINE HYDROCHLORIDE 50 MG: 50 TABLET, FILM COATED ORAL at 22:04

## 2018-10-09 RX ADMIN — GABAPENTIN 800 MG: 800 TABLET, FILM COATED ORAL at 11:13

## 2018-10-09 RX ADMIN — SERTRALINE HYDROCHLORIDE 150 MG: 100 TABLET ORAL at 09:21

## 2018-10-09 RX ADMIN — OMEPRAZOLE 40 MG: 20 CAPSULE, DELAYED RELEASE ORAL at 09:20

## 2018-10-09 RX ADMIN — HYDROXYZINE HYDROCHLORIDE 50 MG: 50 TABLET, FILM COATED ORAL at 13:19

## 2018-10-09 RX ADMIN — GABAPENTIN 800 MG: 800 TABLET, FILM COATED ORAL at 16:12

## 2018-10-09 RX ADMIN — MULTIPLE VITAMINS W/ MINERALS TAB 1 TABLET: TAB at 09:22

## 2018-10-09 RX ADMIN — BUSPIRONE HYDROCHLORIDE 20 MG: 10 TABLET ORAL at 09:21

## 2018-10-09 RX ADMIN — NICOTINE POLACRILEX 2 MG: 2 LOZENGE ORAL at 20:07

## 2018-10-09 RX ADMIN — NICOTINE POLACRILEX 2 MG: 2 LOZENGE ORAL at 16:15

## 2018-10-09 RX ADMIN — CLONIDINE HYDROCHLORIDE 0.1 MG: 0.1 TABLET ORAL at 09:21

## 2018-10-09 RX ADMIN — DIAZEPAM 10 MG: 5 TABLET ORAL at 20:07

## 2018-10-09 RX ADMIN — GABAPENTIN 800 MG: 800 TABLET, FILM COATED ORAL at 20:06

## 2018-10-09 RX ADMIN — DIAZEPAM 5 MG: 5 TABLET ORAL at 00:44

## 2018-10-09 RX ADMIN — TRAZODONE HYDROCHLORIDE 200 MG: 100 TABLET ORAL at 22:04

## 2018-10-09 RX ADMIN — FOLIC ACID 1 MG: 1 TABLET ORAL at 11:13

## 2018-10-09 RX ADMIN — ALUMINUM HYDROXIDE, MAGNESIUM HYDROXIDE, AND DIMETHICONE 30 ML: 400; 400; 40 SUSPENSION ORAL at 19:28

## 2018-10-09 RX ADMIN — BUSPIRONE HYDROCHLORIDE 20 MG: 10 TABLET ORAL at 20:06

## 2018-10-09 RX ADMIN — CLONIDINE HYDROCHLORIDE 0.1 MG: 0.1 TABLET ORAL at 20:07

## 2018-10-09 RX ADMIN — GABAPENTIN 800 MG: 800 TABLET, FILM COATED ORAL at 09:21

## 2018-10-09 RX ADMIN — DIAZEPAM 10 MG: 5 TABLET ORAL at 16:12

## 2018-10-09 RX ADMIN — MIRTAZAPINE 15 MG: 15 TABLET, FILM COATED ORAL at 22:04

## 2018-10-09 RX ADMIN — AMLODIPINE BESYLATE 10 MG: 10 TABLET ORAL at 09:22

## 2018-10-09 ASSESSMENT — ACTIVITIES OF DAILY LIVING (ADL)
LAUNDRY: WITH SUPERVISION
ORAL_HYGIENE: INDEPENDENT
GROOMING: INDEPENDENT
DRESS: INDEPENDENT
DRESS: INDEPENDENT
ORAL_HYGIENE: INDEPENDENT
GROOMING: INDEPENDENT

## 2018-10-09 NOTE — PROGRESS NOTES
"[facility], [place]   Psychiatric Progress Note    Interim history   This is a 28 year old male with alcohol use dx severe , .Pt seen in rounds. Patient's mood is good  Energy Level:alright  Sleep:No concerns, sleeps well through night  Appetite:normal motivation interest moderate   denied any Suicidal/homicidal ideation/plan intent.  dneied any psychosis  No prior suicde attempts  No access to gun  Pt is in detox  Pt mssascore are monitered  Tolerating meds and has no side effects.              Medications:     Current Facility-Administered Medications   Medication     acetaminophen (TYLENOL) tablet 325-650 mg     amLODIPine (NORVASC) tablet 10 mg     atenolol (TENORMIN) tablet 50 mg     busPIRone (BUSPAR) tablet 20 mg     cloNIDine (CATAPRES) tablet 0.1 mg     diazepam (VALIUM) tablet 5-20 mg     folic acid (FOLVITE) tablet 1 mg     gabapentin (NEURONTIN) tablet 800 mg     hydrOXYzine (ATARAX) tablet 50 mg     loperamide (IMODIUM A-D) tablet 2 mg     mirtazapine (REMERON) tablet 15 mg     multivitamin, therapeutic with minerals (THERA-VIT-M) tablet 1 tablet     nicotine polacrilex (COMMIT) lozenge 2 mg     omeprazole (priLOSEC) CR capsule 40 mg     sertraline (ZOLOFT) tablet 150 mg     traZODone (DESYREL) tablet 100-200 mg             Allergies:   No Known Allergies         Psychiatric Examination:   Blood pressure 132/90, pulse 80, temperature 96  F (35.6  C), temperature source Tympanic, resp. rate 16, height 1.854 m (6' 1\"), weight 138.3 kg (305 lb), SpO2 98 %.  Weight is 305 lbs 0 oz  Body mass index is 40.24 kg/(m^2).    Appearance:  awake, alert and adequately groomed  Attitude:  cooperative  Eye Contact:  good  Mood:  good  Affect:  appropriate and in normal range and mood congruent  Speech:  clear, coherent rate /rhythm are good  Psychomotor Behavior:  no evidence of tardive dyskinesia, dystonia, or tics and intact station, gait and muscle tone  Throught Process:  " logical  Associations:  no loose associations  Thought Content:  no evidence of suicidal ideation or homicidal ideation, no evidence of psychotic thought, no auditory hallucinations present and no visual hallucinations present  Insight:  limited  Judgement:  limited  Oriented to:  time, person, and place  Attention Span and Concentration:  intact  Recent and Remote Memory:  intact  Language fund of knowledge are adequate         Labs:     Lab Results   Component Value Date    NTBNPI <5 11/20/2017     Lab Results   Component Value Date    WBC 4.6 10/05/2018    HGB 14.1 10/05/2018    HCT 42.8 10/05/2018    MCV 88 10/05/2018     10/05/2018     Lab Results   Component Value Date    TSH 2.97 07/30/2018          Dx alcohol use dx severe  Plan  Will continue with detox on mssa on valium  Pt is on a hold , was seen by pre pettion today   Will order b12     Several intoxications with 0.54.047, leading to lack of function and ICU admissions for not breathing. Cannot be safe in the outpatient setting. Unwilling to attend a locked inpatient program. Has a history of leaving programing. Family/staff/friends/clinicians highly concerned and pushing for commitment.   Laboratory/Imaging: reviewed with patient   Consults: internal medicine consult reviewed  Patient will be treated in therapeutic milieu with appropriate individual and group therapies as described.      Medical diagnoses to be addressed this admission:    Plan:   Assessment and Plan/Recommendations:   Nickolas Lang is a 28 year old man with history of alcohol abuse hypertension, morbid obesity, hepatic steatosis, GERD, ADHD admitted to station 3A for alcohol detox and suicidality. Internal medicine was consulted for general medical evaluation.      #Alcohol dependence - Severe with multiple hospitalization in the last 20 days and intubation X 3. Interested in rehab.   #ADHD  #Depression - Worsened by recent passing of a family member  -Management per  primary team      #Elevated liver enzymes - , AST 60, alk phos 94. Improved from 9/3/18. Presumed related to alcohol hepatitis.   -Recheck CMP in 48 hours      #Hypertension - Blood pressures elevated 147/103. PTA on amlodipine 10 mg daily and clonidine 0.1 mg BID. Patient has not been taking his medications as he has been drinking. Patient is asymptomatic with elevated blood pressures.    -Continue amlodipine 10 mg daily   -Add clonidine 0.1 mg BID   -Notify medicine if SBP>170 or DBP>110. May need to titrate clonidine.      #Hematuria - Noted during last hospitalization. UA positive for WBC, RBC, hyaline casts, mucous, uric acid and amorphous crystals. Patient denies gross hematuria, flank pain. He has no history of kidney stones. Urine culture 9/30 with no growth.   -Encourage fluids   -Encourage cessation of alcohol use   -Will need repeat UA in one months to check for resolution of hematuria with his PCP.     #History of prolonged QT - QTc 486 10/3/18   -Repeat EKG and monitor QT periodically   -Avoid QT prolonging medications               Safety Assessment:   Checks:  15 min  Precautions: withdrawal precautions  Pt has not required locked seclusion or restraints in the past 24 hours to maintain safety, please refer to RN documentation for further details.  Discussed with patient many issues of addiction,triggers, relapse, and establishing a solid recovery program.  Able to give informed consent:  YES   Discussed Risks/Benefits/Side Effects/Alternatives: YES    After discussion of the indications, risks, benefits, alternatives and consequences of no treatment, the patient elects to complete detox and do trt.

## 2018-10-09 NOTE — PLAN OF CARE
Problem: Substance Withdrawal  Goal: Substance Withdrawal  Signs and symptoms of listed problems will be absent or manageable.   Outcome: No Change  No medications for alcohol withdrawal this shift. Pt talked of having difficulty getting long term sobriety. States he fails when it comes time to do his 5 th step. Pt talks of have a lot of guilt and shame and difficulty forgiving himself. Pt requested and was provided a copy of his Uncles Obituary. Pt was very emotional and tearful at first. States while it is hard he is glad to finally see the obituary. Says this Uncle and a Aunt have been like parents to him. Has guilt because he did not see Uncle that much this past year. Uncle die quickly after dx with Liver CA. Pt appreciative of staff 1:1 time. Pt out on unit. Social with peers. Pt remains on a 72 hold. Court process pending. Pt requested and was given prn vistaril for anxiety. Per Dr. Brasher request discussed with medical safety of trazodone.

## 2018-10-10 LAB
ALBUMIN SERPL-MCNC: 3.3 G/DL (ref 3.4–5)
ALP SERPL-CCNC: 76 U/L (ref 40–150)
ALT SERPL W P-5'-P-CCNC: 199 U/L (ref 0–70)
AST SERPL W P-5'-P-CCNC: 98 U/L (ref 0–45)
BILIRUB DIRECT SERPL-MCNC: <0.1 MG/DL (ref 0–0.2)
BILIRUB SERPL-MCNC: 0.2 MG/DL (ref 0.2–1.3)
PROT SERPL-MCNC: 6.5 G/DL (ref 6.8–8.8)

## 2018-10-10 PROCEDURE — 25000132 ZZH RX MED GY IP 250 OP 250 PS 637: Performed by: PSYCHIATRY & NEUROLOGY

## 2018-10-10 PROCEDURE — 25000132 ZZH RX MED GY IP 250 OP 250 PS 637: Performed by: FAMILY MEDICINE

## 2018-10-10 PROCEDURE — 36415 COLL VENOUS BLD VENIPUNCTURE: CPT | Performed by: PHYSICIAN ASSISTANT

## 2018-10-10 PROCEDURE — 80076 HEPATIC FUNCTION PANEL: CPT | Performed by: PHYSICIAN ASSISTANT

## 2018-10-10 PROCEDURE — 25000132 ZZH RX MED GY IP 250 OP 250 PS 637: Performed by: PHYSICIAN ASSISTANT

## 2018-10-10 PROCEDURE — 86706 HEP B SURFACE ANTIBODY: CPT | Performed by: PHYSICIAN ASSISTANT

## 2018-10-10 PROCEDURE — 86803 HEPATITIS C AB TEST: CPT | Performed by: PHYSICIAN ASSISTANT

## 2018-10-10 PROCEDURE — 87340 HEPATITIS B SURFACE AG IA: CPT | Performed by: PHYSICIAN ASSISTANT

## 2018-10-10 PROCEDURE — 12800012 ZZH R&B CD MH INTERMEDIATE ADULT

## 2018-10-10 RX ADMIN — CLONIDINE HYDROCHLORIDE 0.1 MG: 0.1 TABLET ORAL at 20:39

## 2018-10-10 RX ADMIN — NICOTINE POLACRILEX 2 MG: 2 LOZENGE ORAL at 10:19

## 2018-10-10 RX ADMIN — FOLIC ACID 1 MG: 1 TABLET ORAL at 08:54

## 2018-10-10 RX ADMIN — NICOTINE POLACRILEX 2 MG: 2 LOZENGE ORAL at 16:40

## 2018-10-10 RX ADMIN — BUSPIRONE HYDROCHLORIDE 20 MG: 10 TABLET ORAL at 16:40

## 2018-10-10 RX ADMIN — NICOTINE POLACRILEX 2 MG: 2 LOZENGE ORAL at 22:34

## 2018-10-10 RX ADMIN — NICOTINE POLACRILEX 2 MG: 2 LOZENGE ORAL at 20:39

## 2018-10-10 RX ADMIN — GABAPENTIN 800 MG: 800 TABLET, FILM COATED ORAL at 12:22

## 2018-10-10 RX ADMIN — CLONIDINE HYDROCHLORIDE 0.1 MG: 0.1 TABLET ORAL at 08:54

## 2018-10-10 RX ADMIN — MULTIPLE VITAMINS W/ MINERALS TAB 1 TABLET: TAB at 08:54

## 2018-10-10 RX ADMIN — NICOTINE POLACRILEX 2 MG: 2 LOZENGE ORAL at 18:42

## 2018-10-10 RX ADMIN — ALUMINUM HYDROXIDE, MAGNESIUM HYDROXIDE, AND DIMETHICONE 30 ML: 400; 400; 40 SUSPENSION ORAL at 19:29

## 2018-10-10 RX ADMIN — NICOTINE POLACRILEX 2 MG: 2 LOZENGE ORAL at 09:00

## 2018-10-10 RX ADMIN — GABAPENTIN 800 MG: 800 TABLET, FILM COATED ORAL at 20:39

## 2018-10-10 RX ADMIN — GABAPENTIN 800 MG: 800 TABLET, FILM COATED ORAL at 16:40

## 2018-10-10 RX ADMIN — BUSPIRONE HYDROCHLORIDE 20 MG: 10 TABLET ORAL at 22:35

## 2018-10-10 RX ADMIN — TRAZODONE HYDROCHLORIDE 200 MG: 100 TABLET ORAL at 22:34

## 2018-10-10 RX ADMIN — DIAZEPAM 10 MG: 5 TABLET ORAL at 00:29

## 2018-10-10 RX ADMIN — HYDROXYZINE HYDROCHLORIDE 50 MG: 50 TABLET, FILM COATED ORAL at 12:22

## 2018-10-10 RX ADMIN — BUSPIRONE HYDROCHLORIDE 20 MG: 10 TABLET ORAL at 08:54

## 2018-10-10 RX ADMIN — MIRTAZAPINE 15 MG: 15 TABLET, FILM COATED ORAL at 22:35

## 2018-10-10 RX ADMIN — OMEPRAZOLE 40 MG: 20 CAPSULE, DELAYED RELEASE ORAL at 08:54

## 2018-10-10 RX ADMIN — HYDROXYZINE HYDROCHLORIDE 50 MG: 50 TABLET, FILM COATED ORAL at 22:35

## 2018-10-10 RX ADMIN — SERTRALINE HYDROCHLORIDE 150 MG: 100 TABLET ORAL at 08:54

## 2018-10-10 RX ADMIN — NICOTINE POLACRILEX 2 MG: 2 LOZENGE ORAL at 12:22

## 2018-10-10 RX ADMIN — GABAPENTIN 800 MG: 800 TABLET, FILM COATED ORAL at 08:54

## 2018-10-10 RX ADMIN — AMLODIPINE BESYLATE 10 MG: 10 TABLET ORAL at 08:54

## 2018-10-10 RX ADMIN — HYDROXYZINE HYDROCHLORIDE 50 MG: 50 TABLET, FILM COATED ORAL at 16:40

## 2018-10-10 ASSESSMENT — ACTIVITIES OF DAILY LIVING (ADL)
DRESS: STREET CLOTHES
GROOMING: SHOWER;INDEPENDENT
ORAL_HYGIENE: INDEPENDENT
LAUNDRY: WITH SUPERVISION

## 2018-10-10 NOTE — PLAN OF CARE
Problem: Substance Withdrawal  Goal: Substance Withdrawal  Signs and symptoms of listed problems will be absent or manageable.   Outcome: Improving  No medications for alcohol withdrawal this shift. Pt mood irritable in am. Pt got upset when there was another patient who needed cares before him. Pt went to room. Discussed with patient. States his is having mood swings. Pt is on a court hold and has court in am. Pt reporting HA. Unable to have tylenol related to elevated LFT. Pt is to NPO tomorrow night and abdominal US on Friday 10-12. Unable to do US tomorrow related to pt going to court. Pt has treatment plans pending court. Pt talked of a GF who ended their 2 1/2 year relationship recently related to Nickolas's drinking. Pt reports his anxiety level a 6 on a 0-10 severe scale. Pt requested and was given prn vistaril. See order for labs per medical. Early breakfast tray ordered for patient. VSS.

## 2018-10-10 NOTE — PROGRESS NOTES
Brief Medicine Note    Nickolas Lang is a 28 year old male with a history of polysubstance abuse, hepatic steatosis, obesity, HTN, GERD, ADD, and anxiety admitted to 3A for alcohol detoxification. Medicine following for transaminitis.    Transaminitis - In setting of ETOH abuse and hepatic steatosis. LFTs wnl on 9/20/18. AST/ALT up-trending since 3A admission with  --> 199 and AST 60 --> 98. Pattern not c/w alcoholic hepatitis. TBili and Alk Phos wnl. Recent CT Abd/Pelvis (9/29) with diffuse fatty infiltration of liver which is likely etiology.  - Abdominal US with Dopplers on 10/12 (unable to obtain 10/11 due to early AM court date in [place])  - Screen for Hepatitis B and C  - Avoid hepatotoxins  - Trend Hepatic Panel and obtain INR on 10/12.    Medicine will continue to follow.    Althea Chiu PA-C  Hospitalist Service  [phone]

## 2018-10-10 NOTE — PROGRESS NOTES
"Pt placed on Court Hold at 1500 on 10/9/18.  Pt provided copies of hold order and is scheduled for court on 10/11/18 @ 0900.   to transport.  Plan is to negotiate a \"Stay\".  Referrals for Pt made to [facility], [facility], and [facility].  Pt prefers a 12 step, Sabian[employer] based program.   "

## 2018-10-11 LAB
HBV SURFACE AB SERPL IA-ACNC: 29.21 M[IU]/ML
HBV SURFACE AG SERPL QL IA: NONREACTIVE
HCV AB SERPL QL IA: NONREACTIVE

## 2018-10-11 PROCEDURE — 25000132 ZZH RX MED GY IP 250 OP 250 PS 637: Performed by: PSYCHIATRY & NEUROLOGY

## 2018-10-11 PROCEDURE — 25000132 ZZH RX MED GY IP 250 OP 250 PS 637: Performed by: PHYSICIAN ASSISTANT

## 2018-10-11 PROCEDURE — 25000132 ZZH RX MED GY IP 250 OP 250 PS 637: Performed by: FAMILY MEDICINE

## 2018-10-11 PROCEDURE — 99231 SBSQ HOSP IP/OBS SF/LOW 25: CPT | Performed by: PSYCHIATRY & NEUROLOGY

## 2018-10-11 PROCEDURE — 12800012 ZZH R&B CD MH INTERMEDIATE ADULT

## 2018-10-11 RX ADMIN — GABAPENTIN 800 MG: 800 TABLET, FILM COATED ORAL at 06:43

## 2018-10-11 RX ADMIN — NICOTINE POLACRILEX 2 MG: 2 LOZENGE ORAL at 22:35

## 2018-10-11 RX ADMIN — BUSPIRONE HYDROCHLORIDE 20 MG: 10 TABLET ORAL at 06:42

## 2018-10-11 RX ADMIN — NICOTINE POLACRILEX 2 MG: 2 LOZENGE ORAL at 14:20

## 2018-10-11 RX ADMIN — OMEPRAZOLE 40 MG: 20 CAPSULE, DELAYED RELEASE ORAL at 06:41

## 2018-10-11 RX ADMIN — MULTIPLE VITAMINS W/ MINERALS TAB 1 TABLET: TAB at 06:42

## 2018-10-11 RX ADMIN — HYDROXYZINE HYDROCHLORIDE 50 MG: 50 TABLET, FILM COATED ORAL at 22:35

## 2018-10-11 RX ADMIN — CLONIDINE HYDROCHLORIDE 0.1 MG: 0.1 TABLET ORAL at 20:07

## 2018-10-11 RX ADMIN — MIRTAZAPINE 15 MG: 15 TABLET, FILM COATED ORAL at 22:35

## 2018-10-11 RX ADMIN — ALUMINUM HYDROXIDE, MAGNESIUM HYDROXIDE, AND DIMETHICONE 30 ML: 400; 400; 40 SUSPENSION ORAL at 06:41

## 2018-10-11 RX ADMIN — HYDROXYZINE HYDROCHLORIDE 50 MG: 50 TABLET, FILM COATED ORAL at 14:07

## 2018-10-11 RX ADMIN — BUSPIRONE HYDROCHLORIDE 20 MG: 10 TABLET ORAL at 16:56

## 2018-10-11 RX ADMIN — GABAPENTIN 800 MG: 800 TABLET, FILM COATED ORAL at 16:56

## 2018-10-11 RX ADMIN — TRAZODONE HYDROCHLORIDE 200 MG: 100 TABLET ORAL at 22:35

## 2018-10-11 RX ADMIN — SERTRALINE HYDROCHLORIDE 150 MG: 100 TABLET ORAL at 06:42

## 2018-10-11 RX ADMIN — CLONIDINE HYDROCHLORIDE 0.1 MG: 0.1 TABLET ORAL at 06:43

## 2018-10-11 RX ADMIN — AMLODIPINE BESYLATE 10 MG: 10 TABLET ORAL at 06:41

## 2018-10-11 RX ADMIN — GABAPENTIN 800 MG: 800 TABLET, FILM COATED ORAL at 14:05

## 2018-10-11 RX ADMIN — NICOTINE POLACRILEX 2 MG: 2 LOZENGE ORAL at 07:57

## 2018-10-11 RX ADMIN — FOLIC ACID 1 MG: 1 TABLET ORAL at 06:42

## 2018-10-11 RX ADMIN — NICOTINE POLACRILEX 2 MG: 2 LOZENGE ORAL at 20:07

## 2018-10-11 RX ADMIN — BUSPIRONE HYDROCHLORIDE 20 MG: 10 TABLET ORAL at 22:35

## 2018-10-11 RX ADMIN — GABAPENTIN 800 MG: 800 TABLET, FILM COATED ORAL at 20:07

## 2018-10-11 ASSESSMENT — ACTIVITIES OF DAILY LIVING (ADL)
DRESS: STREET CLOTHES
GROOMING: INDEPENDENT
ORAL_HYGIENE: INDEPENDENT
LAUNDRY: WITH SUPERVISION

## 2018-10-11 NOTE — PROGRESS NOTES
Received call from court.  Pt unlikely to make it back to unit in time for 1:30 interview.  Rescheduled for 10/12 @ 1100.   left with Shelly at [facility] for Pt information.  Pt likely to discharge Monday, 10/15.

## 2018-10-11 NOTE — PLAN OF CARE
Problem: Substance Withdrawal  Goal: Substance Withdrawal  Signs and symptoms of listed problems will be absent or manageable.   Outcome: Improving  No medications for alcohol withdrawal x 24 hours. Pt removed from alcohol withdrawal monitoring. Pt left early am for court. Pt returned from court and was search by unit staff. Pt states he is on a stay of commitment. Pt made a phone call this afternoon for a treatment program interview. Pt anxious to be discharged. Reported feeling anxious this afternoon. Pt requested and was given vistaril 50 mg. Pt is to be NPO this evening for a abdmonial Ultrasound in am.

## 2018-10-11 NOTE — PROGRESS NOTES
Brief Medicine Note:    IM peripherally following. HCV NR. HBV surface antibody reactive, HBV surface antigen NR indicating immunity by vaccination. Abdominal US, hepatic panel, and INR scheduled for 10/12  - Medicine will continue to peripherally follow    Anai Greenberg PA-C  Internal Medicine [facility]  [phone]

## 2018-10-11 NOTE — PROGRESS NOTES
"[facility], [place]   Psychiatric Progress Note    Interim history   This is a 28 year old male with alcohol use dx severe , .Pt seen in rounds. Patient's mood is good  Energy Level:alright  Sleep:No concerns, sleeps well through night  Appetite:normal motivation interest moderate   denied any Suicidal/homicidal ideation/plan intent.  dneied any psychosis  No prior suicde attempts  No access to gun  Pt is in detox  Pt mssascore are monitered  Tolerating meds and has no side effects.              Medications:     Current Facility-Administered Medications   Medication     alum & mag hydroxide-simethicone (MYLANTA ES/MAALOX  ES) suspension 30 mL     amLODIPine (NORVASC) tablet 10 mg     atenolol (TENORMIN) tablet 50 mg     busPIRone (BUSPAR) tablet 20 mg     cloNIDine (CATAPRES) tablet 0.1 mg     cyanocobalamin (VITAMIN B12) injection 1,000 mcg     diazepam (VALIUM) tablet 5-20 mg     folic acid (FOLVITE) tablet 1 mg     gabapentin (NEURONTIN) tablet 800 mg     hydrOXYzine (ATARAX) tablet 50 mg     loperamide (IMODIUM A-D) tablet 2 mg     mirtazapine (REMERON) tablet 15 mg     multivitamin, therapeutic with minerals (THERA-VIT-M) tablet 1 tablet     nicotine polacrilex (COMMIT) lozenge 2 mg     omeprazole (priLOSEC) CR capsule 40 mg     sertraline (ZOLOFT) tablet 150 mg     traZODone (DESYREL) tablet 100-200 mg             Allergies:   No Known Allergies         Psychiatric Examination:   Blood pressure 131/89, pulse 91, temperature 97.4  F (36.3  C), temperature source Tympanic, resp. rate 16, height 1.854 m (6' 1\"), weight 138.3 kg (305 lb), SpO2 98 %.  Weight is 305 lbs 0 oz  Body mass index is 40.24 kg/(m^2).    Appearance:  awake, alert and adequately groomed  Attitude:  cooperative  Eye Contact:  good  Mood:  good  Affect:  appropriate and in normal range and mood congruent  Speech:  clear, coherent rate /rhythm are good  Psychomotor Behavior:  no evidence of tardive dyskinesia, " dystonia, or tics and intact station, gait and muscle tone  Throught Process:  logical  Associations:  no loose associations  Thought Content:  no evidence of suicidal ideation or homicidal ideation, no evidence of psychotic thought, no auditory hallucinations present and no visual hallucinations present  Insight:  limited  Judgement:  limited  Oriented to:  time, person, and place  Attention Span and Concentration:  intact  Recent and Remote Memory:  intact  Language fund of knowledge are adequate         Labs:     Lab Results   Component Value Date    NTBNPI <5 11/20/2017     Lab Results   Component Value Date    WBC 4.6 10/05/2018    HGB 14.1 10/05/2018    HCT 42.8 10/05/2018    MCV 88 10/05/2018     10/05/2018     Lab Results   Component Value Date    TSH 2.97 07/30/2018          Dx alcohol use dx severe  Plan  Pt is out of detox  Pt is going to court today , petition supported  Will hold tylenol , ultrasound for tommorow  Pt has a interview with trt program today afternoon at 1.30      Several intoxications with 0.54.047, leading to lack of function and ICU admissions for not breathing. Cannot be safe in the outpatient setting. Unwilling to attend a locked inpatient program. Has a history of leaving programing. Family/staff/friends/clinicians highly concerned and pushing for commitment.   Laboratory/Imaging: reviewed with patient   Consults: internal medicine consult reviewed  Patient will be treated in therapeutic milieu with appropriate individual and group therapies as described.      Medical diagnoses to be addressed this admission:    Nickolas Lang is a 28 year old male with a history of polysubstance abuse, hepatic steatosis, obesity, HTN, GERD, ADD, and anxiety admitted to 3A for alcohol detoxification. Medicine following for transaminitis.     Transaminitis - In setting of ETOH abuse and hepatic steatosis. LFTs wnl on 9/20/18. AST/ALT up-trending since 3A admission with  --> 199 and  AST 60 --> 98. Pattern not c/w alcoholic hepatitis. TBili and Alk Phos wnl. Recent CT Abd/Pelvis (9/29) with diffuse fatty infiltration of liver which is likely etiology.  - Abdominal US with Dopplers on 10/12 (unable to obtain 10/11 due to early AM court date in [place])  - Screen for Hepatitis B and C  - Avoid hepatotoxins  - Trend Hepatic Panel and obtain INR on 10/12.     Medicine will continue to follow.                 Safety Assessment:   Checks:  15 min  Precautions: withdrawal precautions  Pt has not required locked seclusion or restraints in the past 24 hours to maintain safety, please refer to RN documentation for further details.  Discussed with patient many issues of addiction,triggers, relapse, and establishing a solid recovery program.  Able to give informed consent:  YES   Discussed Risks/Benefits/Side Effects/Alternatives: YES    After discussion of the indications, risks, benefits, alternatives and consequences of no treatment, the patient elects to complete detox and do trt.

## 2018-10-11 NOTE — PROGRESS NOTES
Pt gong to Court at 0900  Showered at 0700 and morning medications administered earlier.  Pleasant and cooperative.

## 2018-10-12 ENCOUNTER — APPOINTMENT (OUTPATIENT)
Dept: ULTRASOUND IMAGING | Facility: CLINIC | Age: 28
DRG: 897 | End: 2018-10-12
Attending: PHYSICIAN ASSISTANT
Payer: COMMERCIAL

## 2018-10-12 LAB
ALBUMIN SERPL-MCNC: 3.3 G/DL (ref 3.4–5)
ALP SERPL-CCNC: 77 U/L (ref 40–150)
ALT SERPL W P-5'-P-CCNC: 191 U/L (ref 0–70)
AST SERPL W P-5'-P-CCNC: 73 U/L (ref 0–45)
BILIRUB DIRECT SERPL-MCNC: <0.1 MG/DL (ref 0–0.2)
BILIRUB SERPL-MCNC: 0.2 MG/DL (ref 0.2–1.3)
INR PPP: 1 (ref 0.86–1.14)
PROT SERPL-MCNC: 6.5 G/DL (ref 6.8–8.8)

## 2018-10-12 PROCEDURE — 25000132 ZZH RX MED GY IP 250 OP 250 PS 637: Performed by: FAMILY MEDICINE

## 2018-10-12 PROCEDURE — 36415 COLL VENOUS BLD VENIPUNCTURE: CPT | Performed by: PHYSICIAN ASSISTANT

## 2018-10-12 PROCEDURE — 85610 PROTHROMBIN TIME: CPT | Performed by: PHYSICIAN ASSISTANT

## 2018-10-12 PROCEDURE — 25000132 ZZH RX MED GY IP 250 OP 250 PS 637: Performed by: PHYSICIAN ASSISTANT

## 2018-10-12 PROCEDURE — 99231 SBSQ HOSP IP/OBS SF/LOW 25: CPT | Performed by: PSYCHIATRY & NEUROLOGY

## 2018-10-12 PROCEDURE — 25000132 ZZH RX MED GY IP 250 OP 250 PS 637: Performed by: PSYCHIATRY & NEUROLOGY

## 2018-10-12 PROCEDURE — 76700 US EXAM ABDOM COMPLETE: CPT

## 2018-10-12 PROCEDURE — 12800012 ZZH R&B CD MH INTERMEDIATE ADULT

## 2018-10-12 PROCEDURE — 80076 HEPATIC FUNCTION PANEL: CPT | Performed by: PHYSICIAN ASSISTANT

## 2018-10-12 RX ORDER — POLYETHYLENE GLYCOL 3350 17 G
2-4 POWDER IN PACKET (EA) ORAL
Status: DISCONTINUED | OUTPATIENT
Start: 2018-10-12 | End: 2018-10-15

## 2018-10-12 RX ADMIN — CLONIDINE HYDROCHLORIDE 0.1 MG: 0.1 TABLET ORAL at 08:39

## 2018-10-12 RX ADMIN — NICOTINE POLACRILEX 2 MG: 2 LOZENGE ORAL at 18:46

## 2018-10-12 RX ADMIN — NICOTINE POLACRILEX 2 MG: 2 LOZENGE ORAL at 20:02

## 2018-10-12 RX ADMIN — HYDROXYZINE HYDROCHLORIDE 50 MG: 50 TABLET, FILM COATED ORAL at 21:48

## 2018-10-12 RX ADMIN — MULTIPLE VITAMINS W/ MINERALS TAB 1 TABLET: TAB at 08:39

## 2018-10-12 RX ADMIN — GABAPENTIN 800 MG: 800 TABLET, FILM COATED ORAL at 12:30

## 2018-10-12 RX ADMIN — GABAPENTIN 800 MG: 800 TABLET, FILM COATED ORAL at 16:08

## 2018-10-12 RX ADMIN — CLONIDINE HYDROCHLORIDE 0.1 MG: 0.1 TABLET ORAL at 20:02

## 2018-10-12 RX ADMIN — BUSPIRONE HYDROCHLORIDE 20 MG: 10 TABLET ORAL at 18:46

## 2018-10-12 RX ADMIN — GABAPENTIN 800 MG: 800 TABLET, FILM COATED ORAL at 08:39

## 2018-10-12 RX ADMIN — AMLODIPINE BESYLATE 10 MG: 10 TABLET ORAL at 08:39

## 2018-10-12 RX ADMIN — GABAPENTIN 800 MG: 800 TABLET, FILM COATED ORAL at 20:02

## 2018-10-12 RX ADMIN — MIRTAZAPINE 15 MG: 15 TABLET, FILM COATED ORAL at 21:48

## 2018-10-12 RX ADMIN — NICOTINE POLACRILEX 2 MG: 2 LOZENGE ORAL at 14:46

## 2018-10-12 RX ADMIN — BUSPIRONE HYDROCHLORIDE 20 MG: 10 TABLET ORAL at 14:46

## 2018-10-12 RX ADMIN — NICOTINE POLACRILEX 2 MG: 2 LOZENGE ORAL at 12:32

## 2018-10-12 RX ADMIN — NICOTINE POLACRILEX 2 MG: 2 LOZENGE ORAL at 17:32

## 2018-10-12 RX ADMIN — HYDROXYZINE HYDROCHLORIDE 50 MG: 50 TABLET, FILM COATED ORAL at 08:39

## 2018-10-12 RX ADMIN — SERTRALINE HYDROCHLORIDE 150 MG: 100 TABLET ORAL at 08:38

## 2018-10-12 RX ADMIN — NICOTINE POLACRILEX 2 MG: 2 LOZENGE ORAL at 21:01

## 2018-10-12 RX ADMIN — OMEPRAZOLE 40 MG: 20 CAPSULE, DELAYED RELEASE ORAL at 08:39

## 2018-10-12 RX ADMIN — FOLIC ACID 1 MG: 1 TABLET ORAL at 08:39

## 2018-10-12 RX ADMIN — BUSPIRONE HYDROCHLORIDE 20 MG: 10 TABLET ORAL at 08:40

## 2018-10-12 RX ADMIN — HYDROXYZINE HYDROCHLORIDE 50 MG: 50 TABLET, FILM COATED ORAL at 16:08

## 2018-10-12 RX ADMIN — TRAZODONE HYDROCHLORIDE 200 MG: 100 TABLET ORAL at 21:48

## 2018-10-12 ASSESSMENT — ACTIVITIES OF DAILY LIVING (ADL)
ORAL_HYGIENE: INDEPENDENT
DRESS: STREET CLOTHES
GROOMING: INDEPENDENT
LAUNDRY: WITH SUPERVISION

## 2018-10-12 NOTE — PLAN OF CARE
Behavioral Team Discussion: (10/12/2018)    Continued Stay Criteria/Rationale: Patient admitted for Chemical Use Issues.  Plan: The following services will be provided to the patient; psychiatric assessment, medication management, therapeutic milieu, individual and group support, and skills groups.   Participants: 3A Provider: Dr. Filippo Brasher MD; 3A RN's: Albert Valdez RN; 3A CM's: CIRILO Traore.  Summary/Recommendation: Providers will assess today for treatment recommendations, discharge planning, and aftercare plans. Pt is under a Stay of civil Commitment.  Has treatment bed available on 10/16/2018.   Medical/Physical: Deferred (see medical notes).  Precautions:   Behavioral Orders   Procedures     Code 1 - Restrict to Unit     Routine Programming     As clinically indicated     Status 15     Every 15 minutes.     Withdrawal precautions     Rationale for change in precautions or plan: N/A  Progress: Improving.

## 2018-10-12 NOTE — PROGRESS NOTES
"[facility], [place]   Psychiatric Progress Note    Interim history   This is a 28 year old male with alcohol use dx severe , .Pt seen in rounds. Patient's mood is good  Energy Level:alright  Sleep:No concerns, sleeps well through night  Appetite:normal motivation interest moderate   denied any Suicidal/homicidal ideation/plan intent.  dneied any psychosis  No prior suicde attempts  No access to gun  Pt is in detox  Pt mssascore are monitered  Tolerating meds and has no side effects.              Medications:     Current Facility-Administered Medications   Medication     alum & mag hydroxide-simethicone (MYLANTA ES/MAALOX  ES) suspension 30 mL     amLODIPine (NORVASC) tablet 10 mg     atenolol (TENORMIN) tablet 50 mg     busPIRone (BUSPAR) tablet 20 mg     cloNIDine (CATAPRES) tablet 0.1 mg     cyanocobalamin (VITAMIN B12) injection 1,000 mcg     diazepam (VALIUM) tablet 5-20 mg     folic acid (FOLVITE) tablet 1 mg     gabapentin (NEURONTIN) tablet 800 mg     hydrOXYzine (ATARAX) tablet 50 mg     loperamide (IMODIUM A-D) tablet 2 mg     mirtazapine (REMERON) tablet 15 mg     multivitamin, therapeutic with minerals (THERA-VIT-M) tablet 1 tablet     nicotine polacrilex (COMMIT) lozenge 2 mg     omeprazole (priLOSEC) CR capsule 40 mg     sertraline (ZOLOFT) tablet 150 mg     traZODone (DESYREL) tablet 100-200 mg             Allergies:   No Known Allergies         Psychiatric Examination:   Blood pressure (!) 125/95, pulse 87, temperature 97  F (36.1  C), temperature source Oral, resp. rate 16, height 1.854 m (6' 1\"), weight 138.3 kg (305 lb), SpO2 98 %.  Weight is 305 lbs 0 oz  Body mass index is 40.24 kg/(m^2).    Appearance:  awake, alert and adequately groomed  Attitude:  cooperative  Eye Contact:  good  Mood:  good  Affect:  appropriate and in normal range and mood congruent  Speech:  clear, coherent rate /rhythm are good  Psychomotor Behavior:  no evidence of tardive dyskinesia, " dystonia, or tics and intact station, gait and muscle tone  Throught Process:  logical  Associations:  no loose associations  Thought Content:  no evidence of suicidal ideation or homicidal ideation, no evidence of psychotic thought, no auditory hallucinations present and no visual hallucinations present  Insight:  limited  Judgement:  limited  Oriented to:  time, person, and place  Attention Span and Concentration:  intact  Recent and Remote Memory:  intact  Language fund of knowledge are adequate         Labs:     Lab Results   Component Value Date    NTBNPI <5 11/20/2017     Lab Results   Component Value Date    WBC 4.6 10/05/2018    HGB 14.1 10/05/2018    HCT 42.8 10/05/2018    MCV 88 10/05/2018     10/05/2018     Lab Results   Component Value Date    TSH 2.97 07/30/2018          Dx alcohol use dx severe  Plan  Pt is out of detox  Pt is on  court hold , petition supported  Will hold tylenol , ultrasound for today         Several intoxications with 0.54.047, leading to lack of function and ICU admissions for not breathing. Cannot be safe in the outpatient setting. Unwilling to attend a locked inpatient program. Has a history of leaving programing. Family/staff/friends/clinicians highly concerned and pushing for commitment.   Laboratory/Imaging: reviewed with patient   Consults: internal medicine consult reviewed  Patient will be treated in therapeutic milieu with appropriate individual and group therapies as described.      Medical diagnoses to be addressed this admission:    Nickolas Lang is a 28 year old male with a history of polysubstance abuse, hepatic steatosis, obesity, HTN, GERD, ADD, and anxiety admitted to 3A for alcohol detoxification. Medicine following for transaminitis.     Transaminitis - In setting of ETOH abuse and hepatic steatosis. LFTs wnl on 9/20/18. AST/ALT up-trending since 3A admission with  --> 199 and AST 60 --> 98. Pattern not c/w alcoholic hepatitis. TBili and Alk  Phos wnl. Recent CT Abd/Pelvis (9/29) with diffuse fatty infiltration of liver which is likely etiology.  - Abdominal US with Dopplers on 10/12 (unable to obtain 10/11 due to early AM court date in [place])  - Screen for Hepatitis B and C  - Avoid hepatotoxins  - Trend Hepatic Panel and obtain INR on 10/12.     Medicine will continue to follow.                 Safety Assessment:   Checks:  15 min  Precautions: withdrawal precautions  Pt has not required locked seclusion or restraints in the past 24 hours to maintain safety, please refer to RN documentation for further details.  Discussed with patient many issues of addiction,triggers, relapse, and establishing a solid recovery program.  Able to give informed consent:  YES   Discussed Risks/Benefits/Side Effects/Alternatives: YES    After discussion of the indications, risks, benefits, alternatives and consequences of no treatment, the patient elects to complete detox and do trt.

## 2018-10-12 NOTE — PLAN OF CARE
Problem: Patient Care Overview  Goal: Plan of Care/Patient Progress Review  Depressive Symptoms   Signs and symptoms of listed problems will be absent or manageable.   Patient will identify coping skills to handle stress.  Patient will have enough rest and sleep.  Patient will have good appetite.  Patient will participate in group activities programmed.  Patient will socialize with staff and other patients on the unit.      Pt went down for abdominal US this morning. See results. Pt got upset as he had to be NPO for the test. Pt talked of how his mood is very labile and he can easily get upset over small things. Pt states he has a bed at [facility] for next Tuesday at Noon. He also reports his [facility]  will come to see him on the unit this afternoon. Pt requested prn vistaril x 1 today for anxiety. Pt remains on a court hold. He has been out on unit attending unit programming. Pt social with peers.

## 2018-10-12 NOTE — PROGRESS NOTES
P/C from Shelly Zavala [facility] , she will be coming today around 2pm to finish her interview with ct.

## 2018-10-12 NOTE — PROGRESS NOTES
Brief Medicine Note:    Internal medicine peripherally following LFT elevation. LFTs are stable: Tbili and ALP normal,  (199), AST 73 (98). INR is 1.00. Abdominal US no acute findings, demonstration of hepatomegaly with diffuse steatosis, normal dopplers, and borderline splenomegaly. Prior CT 9/29 also noted diffuse fatty infiltration of the liver, this is the likely etiology of elevation. Medicine will sign off. Please contact with future questions or concerns  - Patient should follow up with PCP for ongoing care, weight loss strategies, etc    Anai Greenberg PA-C  Internal Medicine [facility]  [phone]

## 2018-10-12 NOTE — PROGRESS NOTES
[phone] they have a bed open for Tuesday 10/16 around 12pm.  They will need Court Order and treatment records.  [phone].

## 2018-10-13 PROCEDURE — 25000132 ZZH RX MED GY IP 250 OP 250 PS 637: Performed by: PHYSICIAN ASSISTANT

## 2018-10-13 PROCEDURE — 25000132 ZZH RX MED GY IP 250 OP 250 PS 637: Performed by: PSYCHIATRY & NEUROLOGY

## 2018-10-13 PROCEDURE — 12800012 ZZH R&B CD MH INTERMEDIATE ADULT

## 2018-10-13 PROCEDURE — 25000132 ZZH RX MED GY IP 250 OP 250 PS 637: Performed by: FAMILY MEDICINE

## 2018-10-13 RX ORDER — IBUPROFEN 600 MG/1
600 TABLET, FILM COATED ORAL EVERY 6 HOURS PRN
Status: DISCONTINUED | OUTPATIENT
Start: 2018-10-13 | End: 2018-10-16 | Stop reason: HOSPADM

## 2018-10-13 RX ADMIN — IBUPROFEN 600 MG: 600 TABLET ORAL at 18:46

## 2018-10-13 RX ADMIN — HYDROXYZINE HYDROCHLORIDE 50 MG: 50 TABLET, FILM COATED ORAL at 21:13

## 2018-10-13 RX ADMIN — NICOTINE POLACRILEX 4 MG: 2 LOZENGE ORAL at 08:44

## 2018-10-13 RX ADMIN — HYDROXYZINE HYDROCHLORIDE 50 MG: 50 TABLET, FILM COATED ORAL at 16:08

## 2018-10-13 RX ADMIN — TRAZODONE HYDROCHLORIDE 200 MG: 100 TABLET ORAL at 22:29

## 2018-10-13 RX ADMIN — BUSPIRONE HYDROCHLORIDE 20 MG: 10 TABLET ORAL at 14:17

## 2018-10-13 RX ADMIN — OMEPRAZOLE 40 MG: 20 CAPSULE, DELAYED RELEASE ORAL at 08:44

## 2018-10-13 RX ADMIN — NICOTINE POLACRILEX 4 MG: 2 LOZENGE ORAL at 20:01

## 2018-10-13 RX ADMIN — LOPERAMIDE HYDROCHLORIDE 2 MG: 2 TABLET, FILM COATED ORAL at 12:39

## 2018-10-13 RX ADMIN — GABAPENTIN 800 MG: 800 TABLET, FILM COATED ORAL at 20:01

## 2018-10-13 RX ADMIN — CLONIDINE HYDROCHLORIDE 0.1 MG: 0.1 TABLET ORAL at 20:01

## 2018-10-13 RX ADMIN — BUSPIRONE HYDROCHLORIDE 20 MG: 10 TABLET ORAL at 18:23

## 2018-10-13 RX ADMIN — GABAPENTIN 800 MG: 800 TABLET, FILM COATED ORAL at 08:44

## 2018-10-13 RX ADMIN — GABAPENTIN 800 MG: 800 TABLET, FILM COATED ORAL at 12:35

## 2018-10-13 RX ADMIN — MULTIPLE VITAMINS W/ MINERALS TAB 1 TABLET: TAB at 08:44

## 2018-10-13 RX ADMIN — FOLIC ACID 1 MG: 1 TABLET ORAL at 08:44

## 2018-10-13 RX ADMIN — GABAPENTIN 800 MG: 800 TABLET, FILM COATED ORAL at 16:08

## 2018-10-13 RX ADMIN — HYDROXYZINE HYDROCHLORIDE 50 MG: 50 TABLET, FILM COATED ORAL at 10:20

## 2018-10-13 RX ADMIN — MIRTAZAPINE 15 MG: 15 TABLET, FILM COATED ORAL at 22:29

## 2018-10-13 RX ADMIN — NICOTINE POLACRILEX 4 MG: 2 LOZENGE ORAL at 16:08

## 2018-10-13 RX ADMIN — ALUMINUM HYDROXIDE, MAGNESIUM HYDROXIDE, AND DIMETHICONE 30 ML: 400; 400; 40 SUSPENSION ORAL at 18:23

## 2018-10-13 RX ADMIN — CLONIDINE HYDROCHLORIDE 0.1 MG: 0.1 TABLET ORAL at 08:44

## 2018-10-13 RX ADMIN — AMLODIPINE BESYLATE 10 MG: 10 TABLET ORAL at 08:44

## 2018-10-13 RX ADMIN — BUSPIRONE HYDROCHLORIDE 20 MG: 10 TABLET ORAL at 08:44

## 2018-10-13 RX ADMIN — SERTRALINE HYDROCHLORIDE 150 MG: 100 TABLET ORAL at 08:43

## 2018-10-13 RX ADMIN — NICOTINE POLACRILEX 4 MG: 2 LOZENGE ORAL at 18:46

## 2018-10-14 PROCEDURE — 25000132 ZZH RX MED GY IP 250 OP 250 PS 637: Performed by: FAMILY MEDICINE

## 2018-10-14 PROCEDURE — 25000132 ZZH RX MED GY IP 250 OP 250 PS 637: Performed by: PSYCHIATRY & NEUROLOGY

## 2018-10-14 PROCEDURE — 25000132 ZZH RX MED GY IP 250 OP 250 PS 637: Performed by: PHYSICIAN ASSISTANT

## 2018-10-14 PROCEDURE — 12800012 ZZH R&B CD MH INTERMEDIATE ADULT

## 2018-10-14 RX ADMIN — BUSPIRONE HYDROCHLORIDE 20 MG: 10 TABLET ORAL at 14:14

## 2018-10-14 RX ADMIN — BUSPIRONE HYDROCHLORIDE 20 MG: 10 TABLET ORAL at 19:04

## 2018-10-14 RX ADMIN — NICOTINE POLACRILEX 4 MG: 2 LOZENGE ORAL at 19:04

## 2018-10-14 RX ADMIN — IBUPROFEN 600 MG: 600 TABLET ORAL at 17:58

## 2018-10-14 RX ADMIN — CLONIDINE HYDROCHLORIDE 0.1 MG: 0.1 TABLET ORAL at 21:00

## 2018-10-14 RX ADMIN — AMLODIPINE BESYLATE 10 MG: 10 TABLET ORAL at 08:12

## 2018-10-14 RX ADMIN — HYDROXYZINE HYDROCHLORIDE 50 MG: 50 TABLET, FILM COATED ORAL at 21:00

## 2018-10-14 RX ADMIN — HYDROXYZINE HYDROCHLORIDE 50 MG: 50 TABLET, FILM COATED ORAL at 10:47

## 2018-10-14 RX ADMIN — BUSPIRONE HYDROCHLORIDE 20 MG: 10 TABLET ORAL at 08:11

## 2018-10-14 RX ADMIN — FOLIC ACID 1 MG: 1 TABLET ORAL at 08:12

## 2018-10-14 RX ADMIN — GABAPENTIN 800 MG: 800 TABLET, FILM COATED ORAL at 08:11

## 2018-10-14 RX ADMIN — CLONIDINE HYDROCHLORIDE 0.1 MG: 0.1 TABLET ORAL at 08:11

## 2018-10-14 RX ADMIN — MULTIPLE VITAMINS W/ MINERALS TAB 1 TABLET: TAB at 08:12

## 2018-10-14 RX ADMIN — MIRTAZAPINE 15 MG: 15 TABLET, FILM COATED ORAL at 21:42

## 2018-10-14 RX ADMIN — NICOTINE POLACRILEX 4 MG: 2 LOZENGE ORAL at 08:11

## 2018-10-14 RX ADMIN — IBUPROFEN 600 MG: 600 TABLET ORAL at 09:36

## 2018-10-14 RX ADMIN — SERTRALINE HYDROCHLORIDE 150 MG: 100 TABLET ORAL at 08:11

## 2018-10-14 RX ADMIN — NICOTINE POLACRILEX 4 MG: 2 LOZENGE ORAL at 16:02

## 2018-10-14 RX ADMIN — NICOTINE POLACRILEX 4 MG: 2 LOZENGE ORAL at 17:58

## 2018-10-14 RX ADMIN — GABAPENTIN 800 MG: 800 TABLET, FILM COATED ORAL at 12:55

## 2018-10-14 RX ADMIN — OMEPRAZOLE 40 MG: 20 CAPSULE, DELAYED RELEASE ORAL at 08:11

## 2018-10-14 RX ADMIN — NICOTINE POLACRILEX 4 MG: 2 LOZENGE ORAL at 10:47

## 2018-10-14 RX ADMIN — NICOTINE POLACRILEX 4 MG: 2 LOZENGE ORAL at 21:00

## 2018-10-14 RX ADMIN — GABAPENTIN 800 MG: 800 TABLET, FILM COATED ORAL at 16:02

## 2018-10-14 RX ADMIN — TRAZODONE HYDROCHLORIDE 200 MG: 100 TABLET ORAL at 21:42

## 2018-10-14 RX ADMIN — GABAPENTIN 800 MG: 800 TABLET, FILM COATED ORAL at 21:00

## 2018-10-14 RX ADMIN — ALUMINUM HYDROXIDE, MAGNESIUM HYDROXIDE, AND DIMETHICONE 30 ML: 400; 400; 40 SUSPENSION ORAL at 16:02

## 2018-10-14 RX ADMIN — HYDROXYZINE HYDROCHLORIDE 50 MG: 50 TABLET, FILM COATED ORAL at 16:01

## 2018-10-14 ASSESSMENT — ACTIVITIES OF DAILY LIVING (ADL)
DRESS: INDEPENDENT
ORAL_HYGIENE: INDEPENDENT
GROOMING: INDEPENDENT

## 2018-10-15 PROCEDURE — 25000132 ZZH RX MED GY IP 250 OP 250 PS 637: Performed by: PSYCHIATRY & NEUROLOGY

## 2018-10-15 PROCEDURE — 12800012 ZZH R&B CD MH INTERMEDIATE ADULT

## 2018-10-15 PROCEDURE — 25000132 ZZH RX MED GY IP 250 OP 250 PS 637: Performed by: PHYSICIAN ASSISTANT

## 2018-10-15 PROCEDURE — 25000132 ZZH RX MED GY IP 250 OP 250 PS 637: Performed by: FAMILY MEDICINE

## 2018-10-15 PROCEDURE — 99231 SBSQ HOSP IP/OBS SF/LOW 25: CPT | Performed by: PSYCHIATRY & NEUROLOGY

## 2018-10-15 RX ORDER — MIRTAZAPINE 15 MG/1
15 TABLET, FILM COATED ORAL AT BEDTIME
Qty: 30 TABLET | Refills: 0 | Status: SHIPPED | OUTPATIENT
Start: 2018-10-15 | End: 2018-11-19

## 2018-10-15 RX ORDER — TRAZODONE HYDROCHLORIDE 100 MG/1
100-200 TABLET ORAL
Qty: 90 TABLET | Refills: 0 | Status: SHIPPED | OUTPATIENT
Start: 2018-10-15 | End: 2018-11-19

## 2018-10-15 RX ORDER — POLYETHYLENE GLYCOL 3350 17 G
4-8 POWDER IN PACKET (EA) ORAL
Status: DISCONTINUED | OUTPATIENT
Start: 2018-10-15 | End: 2018-10-15

## 2018-10-15 RX ORDER — CLONIDINE HYDROCHLORIDE 0.1 MG/1
0.1 TABLET ORAL 2 TIMES DAILY
Qty: 60 TABLET | Refills: 0 | Status: SHIPPED | OUTPATIENT
Start: 2018-10-15 | End: 2018-11-19

## 2018-10-15 RX ORDER — MULTIPLE VITAMINS W/ MINERALS TAB 9MG-400MCG
1 TAB ORAL DAILY
Qty: 30 EACH | Refills: 0 | Status: SHIPPED | OUTPATIENT
Start: 2018-10-15 | End: 2020-08-25

## 2018-10-15 RX ORDER — GABAPENTIN 800 MG/1
800 TABLET ORAL 4 TIMES DAILY
Qty: 90 TABLET | Refills: 1 | Status: SHIPPED | OUTPATIENT
Start: 2018-10-15 | End: 2018-11-19

## 2018-10-15 RX ORDER — HYDROXYZINE HYDROCHLORIDE 50 MG/1
50 TABLET, FILM COATED ORAL EVERY 4 HOURS PRN
Qty: 120 TABLET | Refills: 0 | Status: SHIPPED | OUTPATIENT
Start: 2018-10-15 | End: 2018-11-19

## 2018-10-15 RX ORDER — OMEPRAZOLE 40 MG/1
40 CAPSULE, DELAYED RELEASE ORAL DAILY
Qty: 30 CAPSULE | Refills: 0 | Status: SHIPPED | OUTPATIENT
Start: 2018-10-15 | End: 2018-11-19

## 2018-10-15 RX ORDER — AMLODIPINE BESYLATE 10 MG/1
10 TABLET ORAL DAILY
Qty: 30 TABLET | Refills: 0 | Status: SHIPPED | OUTPATIENT
Start: 2018-10-16 | End: 2018-11-19

## 2018-10-15 RX ORDER — BUSPIRONE HYDROCHLORIDE 10 MG/1
20 TABLET ORAL 3 TIMES DAILY
Qty: 90 TABLET | Refills: 0 | Status: SHIPPED | OUTPATIENT
Start: 2018-10-15 | End: 2018-11-19

## 2018-10-15 RX ADMIN — GABAPENTIN 800 MG: 800 TABLET, FILM COATED ORAL at 16:14

## 2018-10-15 RX ADMIN — IBUPROFEN 600 MG: 600 TABLET ORAL at 11:43

## 2018-10-15 RX ADMIN — HYDROXYZINE HYDROCHLORIDE 50 MG: 50 TABLET, FILM COATED ORAL at 17:55

## 2018-10-15 RX ADMIN — CLONIDINE HYDROCHLORIDE 0.1 MG: 0.1 TABLET ORAL at 09:06

## 2018-10-15 RX ADMIN — AMLODIPINE BESYLATE 10 MG: 10 TABLET ORAL at 09:06

## 2018-10-15 RX ADMIN — IBUPROFEN 600 MG: 600 TABLET ORAL at 17:56

## 2018-10-15 RX ADMIN — BUSPIRONE HYDROCHLORIDE 20 MG: 10 TABLET ORAL at 09:06

## 2018-10-15 RX ADMIN — HYDROXYZINE HYDROCHLORIDE 50 MG: 50 TABLET, FILM COATED ORAL at 09:06

## 2018-10-15 RX ADMIN — MIRTAZAPINE 15 MG: 15 TABLET, FILM COATED ORAL at 21:45

## 2018-10-15 RX ADMIN — NICOTINE POLACRILEX 4 MG: 2 LOZENGE ORAL at 12:39

## 2018-10-15 RX ADMIN — NICOTINE POLACRILEX 8 MG: 4 LOZENGE ORAL at 17:55

## 2018-10-15 RX ADMIN — GABAPENTIN 800 MG: 800 TABLET, FILM COATED ORAL at 09:06

## 2018-10-15 RX ADMIN — NICOTINE POLACRILEX 4 MG: 2 LOZENGE ORAL at 09:06

## 2018-10-15 RX ADMIN — NICOTINE POLACRILEX 4 MG: 4 LOZENGE ORAL at 14:21

## 2018-10-15 RX ADMIN — FOLIC ACID 1 MG: 1 TABLET ORAL at 09:06

## 2018-10-15 RX ADMIN — MULTIPLE VITAMINS W/ MINERALS TAB 1 TABLET: TAB at 09:06

## 2018-10-15 RX ADMIN — HYDROXYZINE HYDROCHLORIDE 50 MG: 50 TABLET, FILM COATED ORAL at 13:04

## 2018-10-15 RX ADMIN — CLONIDINE HYDROCHLORIDE 0.1 MG: 0.1 TABLET ORAL at 20:55

## 2018-10-15 RX ADMIN — NICOTINE POLACRILEX 4 MG: 2 LOZENGE ORAL at 11:43

## 2018-10-15 RX ADMIN — BUSPIRONE HYDROCHLORIDE 20 MG: 10 TABLET ORAL at 18:57

## 2018-10-15 RX ADMIN — BUSPIRONE HYDROCHLORIDE 20 MG: 10 TABLET ORAL at 13:04

## 2018-10-15 RX ADMIN — GABAPENTIN 800 MG: 800 TABLET, FILM COATED ORAL at 11:43

## 2018-10-15 RX ADMIN — SERTRALINE HYDROCHLORIDE 150 MG: 100 TABLET ORAL at 09:06

## 2018-10-15 RX ADMIN — LOPERAMIDE HYDROCHLORIDE 2 MG: 2 TABLET, FILM COATED ORAL at 13:11

## 2018-10-15 RX ADMIN — HYDROXYZINE HYDROCHLORIDE 50 MG: 50 TABLET, FILM COATED ORAL at 21:45

## 2018-10-15 RX ADMIN — TRAZODONE HYDROCHLORIDE 200 MG: 100 TABLET ORAL at 21:45

## 2018-10-15 RX ADMIN — GABAPENTIN 800 MG: 800 TABLET, FILM COATED ORAL at 20:55

## 2018-10-15 RX ADMIN — NICOTINE POLACRILEX 8 MG: 4 LOZENGE ORAL at 16:13

## 2018-10-15 RX ADMIN — OMEPRAZOLE 40 MG: 20 CAPSULE, DELAYED RELEASE ORAL at 09:06

## 2018-10-15 ASSESSMENT — ACTIVITIES OF DAILY LIVING (ADL)
GROOMING: INDEPENDENT
LAUNDRY: WITH SUPERVISION
DRESS: SCRUBS (BEHAVIORAL HEALTH)
ORAL_HYGIENE: INDEPENDENT

## 2018-10-15 NOTE — PROGRESS NOTES
"[facility], [place]   Psychiatric Progress Note    Interim history   This is a 28 year old male with alcohol use dx severe , .Pt seen in rounds. Patient's mood is good  Energy Level:GREAT  Sleep:No concerns, sleeps well through night  Appetite:normal motivation interest moderate   denied any Suicidal/homicidal ideation/plan intent.  dneied any psychosis  ONE  prior suicde attempt  MOM BELIEVES 2 YRS AGO TRIED TO OVERDOSE IN A BLACK OUT   No access to gun    Tolerating meds and has no side effects.              Medications:     Current Facility-Administered Medications   Medication     alum & mag hydroxide-simethicone (MYLANTA ES/MAALOX  ES) suspension 30 mL     amLODIPine (NORVASC) tablet 10 mg     atenolol (TENORMIN) tablet 50 mg     busPIRone (BUSPAR) tablet 20 mg     cloNIDine (CATAPRES) tablet 0.1 mg     cyanocobalamin (VITAMIN B12) injection 1,000 mcg     folic acid (FOLVITE) tablet 1 mg     gabapentin (NEURONTIN) tablet 800 mg     hydrOXYzine (ATARAX) tablet 50 mg     ibuprofen (ADVIL/MOTRIN) tablet 600 mg     loperamide (IMODIUM A-D) tablet 2 mg     mirtazapine (REMERON) tablet 15 mg     multivitamin, therapeutic with minerals (THERA-VIT-M) tablet 1 tablet     nicotine polacrilex (COMMIT) lozenge 2-4 mg     omeprazole (priLOSEC) CR capsule 40 mg     sertraline (ZOLOFT) tablet 150 mg     traZODone (DESYREL) tablet 100-200 mg             Allergies:   No Known Allergies         Psychiatric Examination:   Blood pressure 117/88, pulse 97, temperature 96.9  F (36.1  C), temperature source Oral, resp. rate 16, height 1.854 m (6' 1\"), weight 138.3 kg (305 lb), SpO2 98 %.  Weight is 305 lbs 0 oz  Body mass index is 40.24 kg/(m^2).    Appearance:  awake, alert and adequately groomed  Attitude:  cooperative  Eye Contact:  good  Mood:  good  Affect:  appropriate and in normal range and mood congruent  Speech:  clear, coherent rate /rhythm are good  Psychomotor Behavior:  no evidence of " "tardive dyskinesia, dystonia, or tics and intact station, gait and muscle tone  Throught Process:  logical  Associations:  no loose associations  Thought Content:  no evidence of suicidal ideation or homicidal ideation, no evidence of psychotic thought, no auditory hallucinations present and no visual hallucinations present  Insight:  limited  Judgement:  limited  Oriented to:  time, person, and place  Attention Span and Concentration:  intact  Recent and Remote Memory:  intact  Language fund of knowledge are adequate         Labs:     Lab Results   Component Value Date    NTBNPI <5 11/20/2017     Lab Results   Component Value Date    WBC 4.6 10/05/2018    HGB 14.1 10/05/2018    HCT 42.8 10/05/2018    MCV 88 10/05/2018     10/05/2018     Lab Results   Component Value Date    TSH 2.97 07/30/2018          Dx alcohol use dx severe  Plan  Pt is out of detox  Pt is on  court hold , petition supported-anticipated to go to trt tommorow  Will hold tylenol , ultrasound SHOWS  \"Impression:   1. Hepatomegaly with diffuse steatosis.  2. Normal Doppler evaluation.  3. Borderline splenomegaly.     I have personally reviewed the examination and initial interpretation  and I agree with the findings.\"        Several intoxications with 0.54.047, leading to lack of function and ICU admissions for not breathing. Cannot be safe in the outpatient setting. Unwilling to attend a locked inpatient program. Has a history of leaving programing. Family/staff/friends/clinicians highly concerned and pushing for commitment.   Laboratory/Imaging: reviewed with patient   Consults: internal medicine consult reviewed  Patient will be treated in therapeutic milieu with appropriate individual and group therapies as described.      Medical diagnoses to be addressed this admission:    Nickolas Lang is a 28 year old male with a history of polysubstance abuse, hepatic steatosis, obesity, HTN, GERD, ADD, and anxiety admitted to 3A for alcohol " detoxification. Medicine following for transaminitis.     Transaminitis - In setting of ETOH abuse and hepatic steatosis. LFTs wnl on 9/20/18. AST/ALT up-trending since 3A admission with  --> 199 and AST 60 --> 98. Pattern not c/w alcoholic hepatitis. TBili and Alk Phos wnl. Recent CT Abd/Pelvis (9/29) with diffuse fatty infiltration of liver which is likely etiology.  - Abdominal US with Dopplers on 10/12 (unable to obtain 10/11 due to early AM court date in [place])  - Screen for Hepatitis B and C  - Avoid hepatotoxins  - Trend Hepatic Panel and obtain INR on 10/12.     Medicine will continue to follow.                 Safety Assessment:   Checks:  15 min  Precautions: withdrawal precautions  Pt has not required locked seclusion or restraints in the past 24 hours to maintain safety, please refer to RN documentation for further details.  Discussed with patient many issues of addiction,triggers, relapse, and establishing a solid recovery program.  Able to give informed consent:  YES   Discussed Risks/Benefits/Side Effects/Alternatives: YES    After discussion of the indications, risks, benefits, alternatives and consequences of no treatment, the patient elects to complete detox and do trt.

## 2018-10-15 NOTE — PROGRESS NOTES
Pt requesting increase in his nicotine lozenge from 2-4 to 4-8 mg, discussed with Dr. Brasher order obtained to increase dose.

## 2018-10-16 VITALS
SYSTOLIC BLOOD PRESSURE: 138 MMHG | HEIGHT: 73 IN | OXYGEN SATURATION: 98 % | WEIGHT: 305 LBS | TEMPERATURE: 97 F | BODY MASS INDEX: 40.42 KG/M2 | DIASTOLIC BLOOD PRESSURE: 104 MMHG | HEART RATE: 98 BPM | RESPIRATION RATE: 16 BRPM

## 2018-10-16 PROCEDURE — 25000132 ZZH RX MED GY IP 250 OP 250 PS 637: Performed by: PHYSICIAN ASSISTANT

## 2018-10-16 PROCEDURE — 99239 HOSP IP/OBS DSCHRG MGMT >30: CPT | Performed by: PSYCHIATRY & NEUROLOGY

## 2018-10-16 PROCEDURE — 25000132 ZZH RX MED GY IP 250 OP 250 PS 637: Performed by: PSYCHIATRY & NEUROLOGY

## 2018-10-16 PROCEDURE — 25000132 ZZH RX MED GY IP 250 OP 250 PS 637: Performed by: FAMILY MEDICINE

## 2018-10-16 RX ADMIN — GABAPENTIN 800 MG: 800 TABLET, FILM COATED ORAL at 09:03

## 2018-10-16 RX ADMIN — HYDROXYZINE HYDROCHLORIDE 50 MG: 50 TABLET, FILM COATED ORAL at 09:03

## 2018-10-16 RX ADMIN — OMEPRAZOLE 40 MG: 20 CAPSULE, DELAYED RELEASE ORAL at 09:03

## 2018-10-16 RX ADMIN — BUSPIRONE HYDROCHLORIDE 20 MG: 10 TABLET ORAL at 09:04

## 2018-10-16 RX ADMIN — MULTIPLE VITAMINS W/ MINERALS TAB 1 TABLET: TAB at 09:03

## 2018-10-16 RX ADMIN — CLONIDINE HYDROCHLORIDE 0.1 MG: 0.1 TABLET ORAL at 09:03

## 2018-10-16 RX ADMIN — SERTRALINE HYDROCHLORIDE 150 MG: 100 TABLET ORAL at 09:03

## 2018-10-16 RX ADMIN — FOLIC ACID 1 MG: 1 TABLET ORAL at 09:03

## 2018-10-16 RX ADMIN — IBUPROFEN 600 MG: 600 TABLET ORAL at 03:39

## 2018-10-16 RX ADMIN — AMLODIPINE BESYLATE 10 MG: 10 TABLET ORAL at 09:03

## 2018-10-16 RX ADMIN — NICOTINE POLACRILEX 8 MG: 4 LOZENGE ORAL at 09:02

## 2018-10-16 NOTE — PROGRESS NOTES
Pt given copy of their discharge instructions and medication administration instructions. All discharge plans and labs were discussed with patient. Pt reports no questions at this time regarding discharge plans, labs or medications. Pt denies any suicidal ideation, plans or intent at this time. Patient discharge assisted via [address] directly to the lobby where a cab is here to take him to treatment at [facility]. Patient is discharged at this time.

## 2018-10-16 NOTE — DISCHARGE SUMMARY
Admit Date:     10/04/2018   Discharge Date:     10/16/2018      More than 35 minutes spent on discharge summary, doing the discharge disposition, medications, discharge mental status examination.       DISCHARGE DIAGNOSIS:   Axis I:  Alcohol use disorder, severe.        Please see the detailed admission note by Dr. Vick on 10/05/2018.      HOSPITAL COURSE:  During the hospitalization, the patient was detoxed off alcohol using MSSA protocol on Valium.  He had a prolonged but uneventful detox.  Due to his multiple readmissions, history of relapses, very high alcohol levels, he was placed on a 72-hour hold and a petition was committed.  He was seen by Internal Medicine and the petition was supported.  During his hospitalization, the patient was seen by Internal Medicine consult.  Please see the detailed note by Alexandra Martinez on 10/05/2018.  The patient went to court and he is going on a stay of commitment to treatment.  During the hospitalization, the patient was continued on BuSpar 20 mg 3 times a day, clonidine 0.1 mg twice a day, gabapentin 800 mg 4 times a day, Remeron 15 mg, omeprazole 40 mg, Zoloft 150 mg.  During his hospitalization, the patient's energy, motivation, sleep and interest improved.  He did not have any suicidal ideation, plan or intent.      DISCHARGE DISPOSITION:  The patient going on a stay of commitment directly to [facility].  He has an appointment with Dr. Crews.      DISCHARGE MENTAL STATUS EXAMINATION:  The patient is alert, oriented x 3.  Recent and remote memory, language, fund of knowledge adequate.  No loose associations.  The patient does not have any active suicidal ideation, plan or intent.  The patient has an appointment with Dr. Crews on 10/29/2018.  The patient denies any auditory or visual hallucinations.               DISCHARGE MENTAL STATUS EXAMINATION:  The patient is alert, oriented x3.  Good fund of knowledge.  Good use of language.  Recent and remote memory,  language, fund of knowledge are all adequate.  Euthymic mood congruent affect  Speech normal rate/rhythm linear tp no loose asso,The patient does not have any active suicidal or homicidal ideation.  Does not have any auditory or visual hallucination.  Fair insight/judgment At this time, the patient was stable to be discharged.         Educated about side effects/risk vs benefits /alternative including non treatment.Pt consented to be on medication.     .Total time spent on discharge summary more than 35 min  More than  20 min  planning, coordination of care, medication reconciliation and performance of physical exam on day of discharge.Care was coordinated with unit RN and unit therapist       Nickolas Lang   Home Medication Instructions JAN:[id number]    Printed on:10/16/18 [id number]   Medication Information                      amLODIPine (NORVASC) 10 MG tablet  Take 1 tablet (10 mg) by mouth daily             busPIRone (BUSPAR) 10 MG tablet  Take 2 tablets (20 mg) by mouth 3 times daily             cloNIDine (CATAPRES) 0.1 MG tablet  Take 1 tablet (0.1 mg) by mouth 2 times daily             gabapentin (NEURONTIN) 800 MG tablet  Take 1 tablet (800 mg) by mouth 4 times daily             hydrOXYzine (ATARAX) 50 MG tablet  Take 1 tablet (50 mg) by mouth every 4 hours as needed for anxiety             mirtazapine (REMERON) 15 MG tablet  Take 1 tablet (15 mg) by mouth At Bedtime             multivitamin, therapeutic with minerals (THERA-VIT-M) TABS tablet  Take 1 tablet by mouth daily             nicotine polacrilex 4 MG lozenge  1-2 lozenges every hour as needed, max 10 lozenges per day             omeprazole (PRILOSEC) 40 MG capsule  Take 1 capsule (40 mg) by mouth daily             sertraline (ZOLOFT) 50 MG tablet  Take 3 tablets (150 mg) by mouth daily             traZODone (DESYREL) 100 MG tablet  Take 1-2 tablets (100-200 mg) by mouth nightly as needed for sleep               HENRRY LOCO MD              D: 10/16/2018   T: 10/16/2018   MT: NALLELY      Name:     PARTH HARPER   MRN:      [id number]        Account:        [id number]   :      1990           Admit Date:     10/04/2018                                  Discharge Date: 10/16/2018      Document: [id number]

## 2018-11-09 ENCOUNTER — HOSPITAL ENCOUNTER (OUTPATIENT)
Dept: LAB | Facility: CLINIC | Age: 28
Discharge: HOME | End: 2018-11-09
Attending: PSYCHIATRY & NEUROLOGY | Admitting: PSYCHIATRY & NEUROLOGY
Payer: MEDICAID

## 2018-11-09 DIAGNOSIS — F10.20 ALCOHOL DEPENDENCE (H): ICD-10-CM

## 2018-11-09 DIAGNOSIS — F33.1 DEPRESSION, MAJOR, RECURRENT, MODERATE (H): Primary | ICD-10-CM

## 2018-11-09 DIAGNOSIS — F41.1 GENERALIZED ANXIETY DISORDER: ICD-10-CM

## 2018-11-09 LAB
ALBUMIN SERPL-MCNC: 4.3 G/DL (ref 3.4–5)
ALP SERPL-CCNC: 87 U/L (ref 40–150)
ALT SERPL W P-5'-P-CCNC: 86 U/L (ref 0–70)
AMYLASE SERPL-CCNC: 41 U/L (ref 30–110)
ANION GAP SERPL CALCULATED.3IONS-SCNC: 4 MMOL/L (ref 3–14)
AST SERPL W P-5'-P-CCNC: 32 U/L (ref 0–45)
BASOPHILS # BLD AUTO: 0.1 10E9/L (ref 0–0.2)
BASOPHILS NFR BLD AUTO: 0.5 %
BILIRUB SERPL-MCNC: 0.4 MG/DL (ref 0.2–1.3)
BUN SERPL-MCNC: 16 MG/DL (ref 7–30)
CALCIUM SERPL-MCNC: 8.7 MG/DL (ref 8.5–10.1)
CHLORIDE SERPL-SCNC: 106 MMOL/L (ref 94–109)
CO2 SERPL-SCNC: 28 MMOL/L (ref 20–32)
CREAT SERPL-MCNC: 0.76 MG/DL (ref 0.66–1.25)
DIFFERENTIAL METHOD BLD: NORMAL
EOSINOPHIL # BLD AUTO: 0.3 10E9/L (ref 0–0.7)
EOSINOPHIL NFR BLD AUTO: 2.7 %
ERYTHROCYTE [DISTWIDTH] IN BLOOD BY AUTOMATED COUNT: 14.6 % (ref 10–15)
GFR SERPL CREATININE-BSD FRML MDRD: >90 ML/MIN/1.7M2
GLUCOSE SERPL-MCNC: 96 MG/DL (ref 70–99)
HCT VFR BLD AUTO: 47.3 % (ref 40–53)
HGB BLD-MCNC: 15.3 G/DL (ref 13.3–17.7)
IMM GRANULOCYTES # BLD: 0 10E9/L (ref 0–0.4)
IMM GRANULOCYTES NFR BLD: 0.2 %
LYMPHOCYTES # BLD AUTO: 4 10E9/L (ref 0.8–5.3)
LYMPHOCYTES NFR BLD AUTO: 41.5 %
MAGNESIUM SERPL-MCNC: 2.3 MG/DL (ref 1.6–2.3)
MCH RBC QN AUTO: 29.1 PG (ref 26.5–33)
MCHC RBC AUTO-ENTMCNC: 32.3 G/DL (ref 31.5–36.5)
MCV RBC AUTO: 90 FL (ref 78–100)
MONOCYTES # BLD AUTO: 0.7 10E9/L (ref 0–1.3)
MONOCYTES NFR BLD AUTO: 7.2 %
NEUTROPHILS # BLD AUTO: 4.6 10E9/L (ref 1.6–8.3)
NEUTROPHILS NFR BLD AUTO: 47.9 %
NRBC # BLD AUTO: 0 10*3/UL
NRBC BLD AUTO-RTO: 0 /100
PLATELET # BLD AUTO: 217 10E9/L (ref 150–450)
POTASSIUM SERPL-SCNC: 4.4 MMOL/L (ref 3.4–5.3)
PROT SERPL-MCNC: 7.5 G/DL (ref 6.8–8.8)
RBC # BLD AUTO: 5.26 10E12/L (ref 4.4–5.9)
SODIUM SERPL-SCNC: 138 MMOL/L (ref 133–144)
WBC # BLD AUTO: 9.6 10E9/L (ref 4–11)

## 2018-11-09 PROCEDURE — 80053 COMPREHEN METABOLIC PANEL: CPT | Performed by: PSYCHIATRY & NEUROLOGY

## 2018-11-09 PROCEDURE — 85025 COMPLETE CBC W/AUTO DIFF WBC: CPT | Performed by: PSYCHIATRY & NEUROLOGY

## 2018-11-09 PROCEDURE — 83735 ASSAY OF MAGNESIUM: CPT | Performed by: PSYCHIATRY & NEUROLOGY

## 2018-11-09 PROCEDURE — 82150 ASSAY OF AMYLASE: CPT | Performed by: PSYCHIATRY & NEUROLOGY

## 2018-11-09 PROCEDURE — 36415 COLL VENOUS BLD VENIPUNCTURE: CPT | Performed by: PSYCHIATRY & NEUROLOGY

## 2018-11-19 ENCOUNTER — HOSPITAL ENCOUNTER (EMERGENCY)
Facility: CLINIC | Age: 28
Discharge: OTHER INSTITUTION | End: 2018-11-19
Attending: EMERGENCY MEDICINE | Admitting: EMERGENCY MEDICINE
Payer: MEDICAID

## 2018-11-19 VITALS
SYSTOLIC BLOOD PRESSURE: 129 MMHG | RESPIRATION RATE: 13 BRPM | OXYGEN SATURATION: 97 % | TEMPERATURE: 98.4 F | DIASTOLIC BLOOD PRESSURE: 104 MMHG

## 2018-11-19 DIAGNOSIS — Z71.6 ENCOUNTER FOR SMOKING CESSATION COUNSELING: ICD-10-CM

## 2018-11-19 DIAGNOSIS — F10.20 ALCOHOL DEPENDENCE, CONTINUOUS DRINKING BEHAVIOR (H): ICD-10-CM

## 2018-11-19 DIAGNOSIS — F10.229 ALCOHOL DEPENDENCE WITH INTOXICATION WITH COMPLICATION (H): ICD-10-CM

## 2018-11-19 DIAGNOSIS — F10.220 ACUTE ALCOHOLIC INTOXICATION IN ALCOHOLISM WITHOUT COMPLICATION (H): ICD-10-CM

## 2018-11-19 LAB
ALBUMIN SERPL-MCNC: 3.7 G/DL (ref 3.4–5)
ALP SERPL-CCNC: 95 U/L (ref 40–150)
ALT SERPL W P-5'-P-CCNC: 63 U/L (ref 0–70)
ANION GAP SERPL CALCULATED.3IONS-SCNC: 7 MMOL/L (ref 3–14)
AST SERPL W P-5'-P-CCNC: 41 U/L (ref 0–45)
BILIRUB SERPL-MCNC: 0.4 MG/DL (ref 0.2–1.3)
BUN SERPL-MCNC: 11 MG/DL (ref 7–30)
CALCIUM SERPL-MCNC: 8 MG/DL (ref 8.5–10.1)
CHLORIDE SERPL-SCNC: 104 MMOL/L (ref 94–109)
CO2 SERPL-SCNC: 29 MMOL/L (ref 20–32)
CREAT SERPL-MCNC: 0.59 MG/DL (ref 0.66–1.25)
ERYTHROCYTE [DISTWIDTH] IN BLOOD BY AUTOMATED COUNT: 14.6 % (ref 10–15)
ETHANOL SERPL-MCNC: 0.4 G/DL
GFR SERPL CREATININE-BSD FRML MDRD: >90 ML/MIN/1.7M2
GLUCOSE SERPL-MCNC: 104 MG/DL (ref 70–99)
HCT VFR BLD AUTO: 50.3 % (ref 40–53)
HGB BLD-MCNC: 16.7 G/DL (ref 13.3–17.7)
INR PPP: 0.98 (ref 0.86–1.14)
MCH RBC QN AUTO: 29 PG (ref 26.5–33)
MCHC RBC AUTO-ENTMCNC: 33.2 G/DL (ref 31.5–36.5)
MCV RBC AUTO: 88 FL (ref 78–100)
PLATELET # BLD AUTO: 328 10E9/L (ref 150–450)
POTASSIUM SERPL-SCNC: 4 MMOL/L (ref 3.4–5.3)
PROT SERPL-MCNC: 7.2 G/DL (ref 6.8–8.8)
RBC # BLD AUTO: 5.75 10E12/L (ref 4.4–5.9)
SODIUM SERPL-SCNC: 140 MMOL/L (ref 133–144)
WBC # BLD AUTO: 6.6 10E9/L (ref 4–11)

## 2018-11-19 PROCEDURE — 85027 COMPLETE CBC AUTOMATED: CPT | Performed by: EMERGENCY MEDICINE

## 2018-11-19 PROCEDURE — 25000128 H RX IP 250 OP 636: Performed by: EMERGENCY MEDICINE

## 2018-11-19 PROCEDURE — 25000132 ZZH RX MED GY IP 250 OP 250 PS 637: Performed by: EMERGENCY MEDICINE

## 2018-11-19 PROCEDURE — 85610 PROTHROMBIN TIME: CPT | Performed by: EMERGENCY MEDICINE

## 2018-11-19 PROCEDURE — 99285 EMERGENCY DEPT VISIT HI MDM: CPT | Mod: 25

## 2018-11-19 PROCEDURE — 96361 HYDRATE IV INFUSION ADD-ON: CPT

## 2018-11-19 PROCEDURE — 80053 COMPREHEN METABOLIC PANEL: CPT | Performed by: EMERGENCY MEDICINE

## 2018-11-19 PROCEDURE — 96374 THER/PROPH/DIAG INJ IV PUSH: CPT

## 2018-11-19 PROCEDURE — 80320 DRUG SCREEN QUANTALCOHOLS: CPT | Performed by: EMERGENCY MEDICINE

## 2018-11-19 RX ORDER — OMEPRAZOLE 40 MG/1
40 CAPSULE, DELAYED RELEASE ORAL DAILY
Qty: 3 CAPSULE | Refills: 0 | Status: SHIPPED | OUTPATIENT
Start: 2018-11-19 | End: 2018-11-22

## 2018-11-19 RX ORDER — HYDROXYZINE HYDROCHLORIDE 50 MG/1
50 TABLET, FILM COATED ORAL EVERY 4 HOURS PRN
Qty: 15 TABLET | Refills: 0 | Status: SHIPPED | OUTPATIENT
Start: 2018-11-19 | End: 2018-11-22

## 2018-11-19 RX ORDER — MIRTAZAPINE 15 MG/1
15 TABLET, FILM COATED ORAL AT BEDTIME
Qty: 3 TABLET | Refills: 0 | Status: SHIPPED | OUTPATIENT
Start: 2018-11-19 | End: 2020-02-13

## 2018-11-19 RX ORDER — LORAZEPAM 0.5 MG/1
1 TABLET ORAL EVERY 8 HOURS PRN
Qty: 9 TABLET | Refills: 0 | Status: SHIPPED | OUTPATIENT
Start: 2018-11-19 | End: 2020-02-13

## 2018-11-19 RX ORDER — AMLODIPINE BESYLATE 10 MG/1
10 TABLET ORAL DAILY
Qty: 3 TABLET | Refills: 0 | Status: SHIPPED | OUTPATIENT
Start: 2018-11-19 | End: 2019-02-12

## 2018-11-19 RX ORDER — BUSPIRONE HYDROCHLORIDE 10 MG/1
20 TABLET ORAL 3 TIMES DAILY
Qty: 18 TABLET | Refills: 0 | Status: SHIPPED | OUTPATIENT
Start: 2018-11-19 | End: 2018-11-22

## 2018-11-19 RX ORDER — TRAZODONE HYDROCHLORIDE 100 MG/1
100-200 TABLET ORAL
Qty: 6 TABLET | Refills: 0 | Status: SHIPPED | OUTPATIENT
Start: 2018-11-19 | End: 2019-06-21

## 2018-11-19 RX ORDER — LORAZEPAM 2 MG/ML
1 INJECTION INTRAMUSCULAR ONCE
Status: COMPLETED | OUTPATIENT
Start: 2018-11-19 | End: 2018-11-19

## 2018-11-19 RX ORDER — IBUPROFEN 600 MG/1
600 TABLET, FILM COATED ORAL ONCE
Status: COMPLETED | OUTPATIENT
Start: 2018-11-19 | End: 2018-11-19

## 2018-11-19 RX ORDER — GABAPENTIN 800 MG/1
800 TABLET ORAL 4 TIMES DAILY
Qty: 12 TABLET | Refills: 0 | Status: SHIPPED | OUTPATIENT
Start: 2018-11-19 | End: 2019-06-21

## 2018-11-19 RX ORDER — CLONIDINE HYDROCHLORIDE 0.1 MG/1
0.1 TABLET ORAL 2 TIMES DAILY
Qty: 6 TABLET | Refills: 0 | Status: SHIPPED | OUTPATIENT
Start: 2018-11-19 | End: 2019-02-12

## 2018-11-19 RX ADMIN — SODIUM CHLORIDE 1000 ML: 9 INJECTION, SOLUTION INTRAVENOUS at 11:37

## 2018-11-19 RX ADMIN — LORAZEPAM 1 MG: 2 INJECTION INTRAMUSCULAR; INTRAVENOUS at 18:10

## 2018-11-19 RX ADMIN — IBUPROFEN 600 MG: 600 TABLET ORAL at 16:45

## 2018-11-19 NOTE — ED NOTES
Bed: ED07  Expected date: 11/19/18  Expected time: 10:48 AM  Means of arrival:   Comments:  BV 1 28M

## 2018-11-19 NOTE — ED TRIAGE NOTES
Pt arrived via EMS. Pt found intoxicated at home by his mother. Pt's mother called EMS due to concern for pt's ability to care for himself. Pt's mother unable to help the pt into a motor vehicle by herself. Pt ambulatory at scene according to EMS. Pt recently discharged from treatment for alcohol. Pt was in treatment approximately 1 week ago. Pt's glucose via EMS is 124. Pt is alert to self and to situation. Pt's ABCs are intact.

## 2018-11-19 NOTE — PROGRESS NOTES
I was asked to speak with the patient and his mother regarding detox and treatment. The patient states he would like to go back to [facility] for inpatient treatment. I called [facility] and spoke with Jaja Rodriguez, assessment supervisor, [phone]. She was heading into a staff meeting and will call me back at 1600, but thought they would be able to take the patient back for treatment. She thought it might not be until next week, however. I also spoke with his [facility] , Shelly Baig, [phone] to update her. She asked that let her know which detox he will go to. She will be gone the rest of the week and Jerica Rodriguez, [phone] will be covering for her as of 11/20.  The patient and mother have been updated.  Patient will be going to [facility] detox. ELEANOR Waters has been notified.    1700: Jaja at [facility] called and states that the will the patient back into treatment once he is medically cleared from detox. They won't have a bed, however, until next week. I also called and left a VM for ELEANOR Rodriguez that funding needs to be in place per Jaja. I updated the patient and his mother. They have no further questions at this time.

## 2018-11-19 NOTE — ED NOTES
Report given to Ailyn at [facility]. Pt in agreement with plan of care. Mother at bedside. Tolerating regular diet. Up at bedside with stand by assist.

## 2018-11-19 NOTE — ED PROVIDER NOTES
History     Chief Complaint:  Alcohol Intoxication    HPI History limited due to intoxication  Nickolas Lang is a 28 year old male with a history of alcohol abuse, substance abuse, depression, suicidal ideation, and hypertension who presents to the ED for evaluation of alcohol intoxication. EMS reports that the patient lives at home with his parents, after recently getting out of treatment for alcohol abuse a week ago. Today, his mother noted the patient to be intoxicated and unable to care for himself, so EMS was called to bring the patient to the ED. En route, EMS notes that his blood glucose was normal. Here in the ED, the patient states that he drinks Vodka, though is too intoxicated to describe how much daily. He denies any illegal drug use today. He has had no falls or injuries today that led his mother to call EMS. He denies any homicidal ideation, suicidal ideation, or other symptoms or concerns.    Allergies:  NKDA    Medications:    Amlodipine  Buspar  Clonidine  Gabapentin  Atarax  Remeron  Nicotine patch  Prilosec  Zoloft  Trazodone    Past Medical History:    ADD  Alcohol abuse  Anxiety  Benzodiazepine abuse  GERD  Insomnia  SI  Hepatic steatosis  HTN  Obesity  Pyloric stenosis    Past Surgical History:    Deviated septum repair  Pyloromyotomy  Shoulder surgery    Family History:    MS  Depression  Anxiety  Alcohol/drug use    Social History:  Marital Status:  Single [1]  Current Smoker  Alcohol abuse     Review of Systems   Unable to perform ROS: Mental status change   Psychiatric/Behavioral: Negative for suicidal ideas.     Physical Exam     Patient Vitals for the past 24 hrs:   BP Temp Temp src Heart Rate Resp SpO2   11/19/18 1353 (!) 124/103 - - 101 13 96 %   11/19/18 1200 (!) 144/111 - - - - -   11/19/18 1107 (!) 150/106 98.4  F (36.9  C) Oral 112 18 98 %     Physical Exam    General:   Pleasant, age appropriate intoxicated  male.  HEENT:    Oropharynx is moist  Eyes:     Conjunctival erythema bilateral; PERRL     EOMs intact  Neck:    Supple, no meningismus.     CV:     Regular rate and rhythm.      No murmurs, rubs or gallops.         2+ radial pulses bilateral.   PULM:    Clear to auscultation bilateral.       No respiratory distress.      Good air exchange.  ABD:    Soft, non-tender, non-distended.       No rebound, guarding or rigidity.  MSK:     No gross deformity to all four extremities.   LYMPH:   No cervical lymphadenopathy.  NEURO:   Alert yet moderately intoxicated.      CN II-XII intact     Speech is slurred     Strength is 5/5 in all 4 extremities.        Normal muscular tone, no tremor.  Skin:    Warm, dry and intact.    Psych:    Mood is depressed, affect is appropriate and congruent.     Tearful     No homicidal/suicidal ideation      Emergency Department Course     Laboratory:  CBC: WBC: 6.6, HGB: 16.7, PLT: 328  CMP: Glucose 104 (H), Creatinine 0.59 (L), Calcium 8.0 (L), o/w WNL    INR: 0.98    Alcohol level: 0.40 (HH)    Interventions:  1137 NS 1L IV    Emergency Department Course:  Nursing notes and vitals reviewed. (1103) I performed an exam of the patient as documented above.     IV inserted. Medicine administered as documented above. Blood drawn. This was sent to the lab for further testing, results above.    (1250) I rechecked the patient and discussed the results of his workup thus far. The patient's family would like him to go into treatment again.    (1343) I consulted with the Care Coordinator regarding the patient's history and presentation here in the emergency department.    (1205) I reevaluated the patient and provided an update in regards to his ED course.      The patient will be transferred to detox. Findings and plan explained to the Patient. I personally reviewed the laboratory results with the Patient and mother and answered all related questions prior to transfer.    Impression & Plan      Medical Decision Making:  Nickolas Lang is a  28 year old male who presents to the ED with acute alcohol intoxication.  On examination his toxidrome is consistent with alcohol with no other coingestions suspected at this time.  Despite the markedly elevated alcohol level, the patient is alert and fully oriented on reexamination.  He is seeking placement back into alcohol treatment facility.  We consulted [facility] abbyll be able to take him next week but not until he undergoes detox.  Patient has been voluntarily transferred to [facility] for his chronic alcohol abuse.    Diagnosis:    ICD-10-CM    1. Acute alcoholic intoxication in alcoholism without complication (H) F10.220    2. Alcohol dependence with intoxication with complication (H) F10.229 amLODIPine (NORVASC) 10 MG tablet     busPIRone (BUSPAR) 10 MG tablet     cloNIDine (CATAPRES) 0.1 MG tablet     gabapentin (NEURONTIN) 800 MG tablet     hydrOXYzine (ATARAX) 50 MG tablet     mirtazapine (REMERON) 15 MG tablet     sertraline (ZOLOFT) 50 MG tablet     traZODone (DESYREL) 100 MG tablet   3. Encounter for smoking cessation counseling Z71.6    4. Alcohol dependence, continuous drinking behavior (H) F10.20 omeprazole (PRILOSEC) 40 MG capsule     Disposition:  Transferred to detox.    Scribe Disclosure:  I, Tasneem Fay, am serving as a scribe on 11/19/2018 at 10:59 AM to personally document services performed by Julio Cesar Carvajal MD based on my observations and the provider's statements to me.     Tasneem Fay  11/19/2018   [facility] EMERGENCY DEPARTMENT       Julio Cesar Carvajal MD  11/21/18 2028

## 2019-01-04 ENCOUNTER — HOSPITAL ENCOUNTER (OUTPATIENT)
Dept: LAB | Facility: CLINIC | Age: 29
Discharge: HOME | End: 2019-01-04
Attending: PSYCHIATRY & NEUROLOGY | Admitting: PSYCHIATRY & NEUROLOGY
Payer: COMMERCIAL

## 2019-01-04 DIAGNOSIS — F32.9 MDD (MAJOR DEPRESSIVE DISORDER): Primary | ICD-10-CM

## 2019-01-04 LAB
ALBUMIN SERPL-MCNC: 3.8 G/DL (ref 3.4–5)
ALP SERPL-CCNC: 85 U/L (ref 40–150)
ALT SERPL W P-5'-P-CCNC: 55 U/L (ref 0–70)
ANION GAP SERPL CALCULATED.3IONS-SCNC: 4 MMOL/L (ref 3–14)
AST SERPL W P-5'-P-CCNC: 23 U/L (ref 0–45)
BASOPHILS # BLD AUTO: 0.1 10E9/L (ref 0–0.2)
BASOPHILS NFR BLD AUTO: 0.7 %
BILIRUB SERPL-MCNC: 0.2 MG/DL (ref 0.2–1.3)
BUN SERPL-MCNC: 14 MG/DL (ref 7–30)
CALCIUM SERPL-MCNC: 8.5 MG/DL (ref 8.5–10.1)
CHLORIDE SERPL-SCNC: 105 MMOL/L (ref 94–109)
CO2 SERPL-SCNC: 31 MMOL/L (ref 20–32)
CREAT SERPL-MCNC: 0.65 MG/DL (ref 0.66–1.25)
DIFFERENTIAL METHOD BLD: NORMAL
EOSINOPHIL # BLD AUTO: 0.3 10E9/L (ref 0–0.7)
EOSINOPHIL NFR BLD AUTO: 3 %
ERYTHROCYTE [DISTWIDTH] IN BLOOD BY AUTOMATED COUNT: 13.6 % (ref 10–15)
GFR SERPL CREATININE-BSD FRML MDRD: >90 ML/MIN/{1.73_M2}
GLUCOSE SERPL-MCNC: 89 MG/DL (ref 70–99)
HCT VFR BLD AUTO: 50.6 % (ref 40–53)
HGB BLD-MCNC: 16.6 G/DL (ref 13.3–17.7)
IMM GRANULOCYTES # BLD: 0 10E9/L (ref 0–0.4)
IMM GRANULOCYTES NFR BLD: 0.1 %
LYMPHOCYTES # BLD AUTO: 3.5 10E9/L (ref 0.8–5.3)
LYMPHOCYTES NFR BLD AUTO: 40 %
MCH RBC QN AUTO: 29.3 PG (ref 26.5–33)
MCHC RBC AUTO-ENTMCNC: 32.8 G/DL (ref 31.5–36.5)
MCV RBC AUTO: 89 FL (ref 78–100)
MONOCYTES # BLD AUTO: 1 10E9/L (ref 0–1.3)
MONOCYTES NFR BLD AUTO: 11.3 %
NEUTROPHILS # BLD AUTO: 3.9 10E9/L (ref 1.6–8.3)
NEUTROPHILS NFR BLD AUTO: 44.9 %
NRBC # BLD AUTO: 0 10*3/UL
NRBC BLD AUTO-RTO: 0 /100
PLATELET # BLD AUTO: 226 10E9/L (ref 150–450)
POTASSIUM SERPL-SCNC: 4.3 MMOL/L (ref 3.4–5.3)
PROT SERPL-MCNC: 7.5 G/DL (ref 6.8–8.8)
RBC # BLD AUTO: 5.66 10E12/L (ref 4.4–5.9)
SODIUM SERPL-SCNC: 140 MMOL/L (ref 133–144)
TSH SERPL DL<=0.005 MIU/L-ACNC: 2.56 MU/L (ref 0.4–4)
WBC # BLD AUTO: 8.7 10E9/L (ref 4–11)

## 2019-01-04 PROCEDURE — 84443 ASSAY THYROID STIM HORMONE: CPT

## 2019-01-04 PROCEDURE — 80053 COMPREHEN METABOLIC PANEL: CPT

## 2019-01-04 PROCEDURE — 36415 COLL VENOUS BLD VENIPUNCTURE: CPT

## 2019-01-04 PROCEDURE — 85025 COMPLETE CBC W/AUTO DIFF WBC: CPT

## 2019-02-12 ENCOUNTER — OFFICE VISIT (OUTPATIENT)
Dept: FAMILY MEDICINE | Facility: CLINIC | Age: 29
End: 2019-02-12
Payer: COMMERCIAL

## 2019-02-12 VITALS
BODY MASS INDEX: 45.73 KG/M2 | TEMPERATURE: 98.4 F | HEART RATE: 111 BPM | SYSTOLIC BLOOD PRESSURE: 156 MMHG | OXYGEN SATURATION: 95 % | WEIGHT: 315 LBS | DIASTOLIC BLOOD PRESSURE: 109 MMHG

## 2019-02-12 DIAGNOSIS — E66.813 CLASS 3 SEVERE OBESITY DUE TO EXCESS CALORIES IN ADULT, UNSPECIFIED BMI, UNSPECIFIED WHETHER SERIOUS COMORBIDITY PRESENT (H): ICD-10-CM

## 2019-02-12 DIAGNOSIS — E66.01 CLASS 3 SEVERE OBESITY DUE TO EXCESS CALORIES IN ADULT, UNSPECIFIED BMI, UNSPECIFIED WHETHER SERIOUS COMORBIDITY PRESENT (H): ICD-10-CM

## 2019-02-12 DIAGNOSIS — M53.3 PAIN OF RIGHT SACROILIAC JOINT: Primary | ICD-10-CM

## 2019-02-12 DIAGNOSIS — F10.229 ALCOHOL DEPENDENCE WITH INTOXICATION WITH COMPLICATION (H): ICD-10-CM

## 2019-02-12 DIAGNOSIS — R01.2 S3 (THIRD HEART SOUND): ICD-10-CM

## 2019-02-12 DIAGNOSIS — I10 ESSENTIAL HYPERTENSION: Chronic | ICD-10-CM

## 2019-02-12 DIAGNOSIS — J01.90 ACUTE SINUSITIS TREATED WITH ANTIBIOTICS IN THE PAST 60 DAYS: ICD-10-CM

## 2019-02-12 PROCEDURE — 99214 OFFICE O/P EST MOD 30 MIN: CPT | Performed by: PHYSICIAN ASSISTANT

## 2019-02-12 RX ORDER — AMLODIPINE BESYLATE 10 MG/1
10 TABLET ORAL DAILY
Qty: 90 TABLET | Refills: 0 | Status: SHIPPED | OUTPATIENT
Start: 2019-02-12 | End: 2019-06-21

## 2019-02-12 RX ORDER — PREDNISONE 20 MG/1
TABLET ORAL
Qty: 20 TABLET | Refills: 0 | Status: SHIPPED | OUTPATIENT
Start: 2019-02-12 | End: 2019-02-24

## 2019-02-12 RX ORDER — CLONIDINE HYDROCHLORIDE 0.1 MG/1
0.1 TABLET ORAL 2 TIMES DAILY
Qty: 60 TABLET | Refills: 2 | Status: SHIPPED | OUTPATIENT
Start: 2019-02-12 | End: 2019-06-21

## 2019-02-12 NOTE — PATIENT INSTRUCTIONS
Take prednisone and augmentin as prescribed  Follow up with physical therapy  Follow up with cardiology  Follow up with weight management  Follow up here in 8 weeks  Return to clinic for any new or worsening symptoms or go to ER Urgent care in off hours

## 2019-02-12 NOTE — PROGRESS NOTES
"  SUBJECTIVE:   Nickolas Lang is a 28 year old male who presents to clinic today for the following health issues:    Back Pain       Duration: year ago        Specific cause: multiple falls from blacking out after drug use    Description:   Location of pain: low back right  Character of pain: sharp and dull ache  Pain radiation:radiates into the right buttocks, radiates into the right leg, radiates into the left buttocks and radiates into the left leg  New numbness or weakness in legs, not attributed to pain:  no     Intensity: moderate to severe    History:   Pain interferes with job: YES  History of back problems: recurrent self limited episodes of low back pain in the past  Any previous MRI or X-rays: Yes- at [facility].  Date 2 years ago  Sees a specialist for back pain:  No but does for his shoulder   Therapies tried without relief: muscle relaxants    Alleviating factors:   Improved by: walking around      Precipitating factors:  Worsened by: sitting for long periods of time      Alcohol Abus  -Patient is currently 3 months sober from alcohol  -He is in a treatment program    Hypertension  -Patient reports his blood pressure has been around 125-130/80s when measure at home  -He believes his blood pressure is much higher when measured at the clinic  -He has put on a significant amount of weight in the last few years    Sinuses  -Patient has had a constant headache, congestion, body aches  -He has a past medical history of chronic sinus infections  Treated about a month ago with a zpack which didn't help.     Back Pain  -Patient will get shooting pains  -He reports tingling and numbness in the lower right part of his back  -Patient fell during a \"blackout\", believes his pain has been worse since that time    Family Medical History  -Patient's grandfather had congenital heart disease    Problem list and histories reviewed & adjusted, as indicated.  Additional history: as documented    ROS:  CONSTITUTIONAL: " NEGATIVE for fever, chills, POSITIVE change in weight  INTEGUMENTARY/SKIN: NEGATIVE for worrisome rashes, moles or lesions  EYES: NEGATIVE for vision changes or irritation  ENT/MOUTH: NEGATIVE for ear, mouth and throat problems, see HPI above  RESP: NEGATIVE for significant cough or SOB  BREAST: NEGATIVE for masses, tenderness or discharge  CV: NEGATIVE for chest pain, peripheral edema, POSITIVE palpitations  GI: NEGATIVE for nausea, abdominal pain, heartburn, or change in bowel habits  : NEGATIVE for frequency, dysuria, or hematuria  MUSCULOSKELETAL: NEGATIVE for significant arthralgias, POSITIVE myalgiaS  NEURO: NEGATIVE for weakness, dizziness, POSITIVE paresthesias  ENDOCRINE: NEGATIVE for temperature intolerance, skin/hair changes  HEME: NEGATIVE for bleeding problems  PSYCHIATRIC: NEGATIVE for changes in mood or affect    This document serves as a record of the services and decisions personally performed and made by Ivy Rooney PA-C. It was created on her behalf by Enoc Sandoval, trained medical scribe. The creation of this document is based on the provider's statements to the medical scribe.  Enoc Sandoval 3:25 PM February 12, 2019    Patient Active Problem List   Diagnosis     Morbid obesity (H)     Alcohol dependence with withdrawal (H)     Chemical dependency (H)     Essential hypertension     Suicidal ideation     Elevated liver enzymes     Hepatic steatosis     Obesity     Insomnia, unspecified type     Anxiety     Gastroesophageal reflux disease without esophagitis     Alcohol abuse with alcohol-induced mood disorder (H)     Attention deficit hyperactivity disorder (ADHD), unspecified ADHD type     Alcohol use disorder, severe, dependence (H)     Controlled substance agreement signed     Mild episode of recurrent major depressive disorder (H)     Alcohol withdrawal (H)     Substance abuse (H)     Agitation requiring sedation protocol     Alcohol abuse with alcohol-induced anxiety disorder (H)      Acute respiratory failure (H)     Acute alcoholic intoxication in alcoholism with complication (H)     Alcohol dependence (H)     Past Surgical History:   Procedure Laterality Date     Deviated septum repair       PYLOROMYOTOMY SURGERY  as an infant      SHOULDER SURGERY Left 07/29/2016    Removal of cartilage       Social History     Tobacco Use     Smoking status: Current Every Day Smoker     Packs/day: 0.50     Years: 11.00     Pack years: 5.50     Types: Cigarettes     Smokeless tobacco: Current User   Substance Use Topics     Alcohol use: Yes     Alcohol/week: 0.0 oz     Comment: currently intoxicated     Family History   Problem Relation Age of Onset     Neurologic Disorder Mother         Multiple Sclerosis     Depression Mother      Anxiety Disorder Mother      Alcohol/Drug Father      Substance Abuse Father      Depression Maternal Grandmother      Anxiety Disorder Maternal Grandmother      Hypertension Maternal Grandmother      Substance Abuse Maternal Grandfather      Alcohol/Drug Paternal Grandfather            Labs reviewed in EPIC  Patient Active Problem List   Diagnosis     Morbid obesity (H)     Alcohol dependence with withdrawal (H)     Chemical dependency (H)     Essential hypertension     Suicidal ideation     Elevated liver enzymes     Hepatic steatosis     Obesity     Insomnia, unspecified type     Anxiety     Gastroesophageal reflux disease without esophagitis     Alcohol abuse with alcohol-induced mood disorder (H)     Attention deficit hyperactivity disorder (ADHD), unspecified ADHD type     Alcohol use disorder, severe, dependence (H)     Controlled substance agreement signed     Mild episode of recurrent major depressive disorder (H)     Alcohol withdrawal (H)     Substance abuse (H)     Agitation requiring sedation protocol     Alcohol abuse with alcohol-induced anxiety disorder (H)     Acute respiratory failure (H)     Acute alcoholic intoxication in alcoholism with complication (H)      Alcohol dependence (H)     Past Surgical History:   Procedure Laterality Date     Deviated septum repair       PYLOROMYOTOMY SURGERY  as an infant      SHOULDER SURGERY Left 07/29/2016    Removal of cartilage       Social History     Tobacco Use     Smoking status: Current Every Day Smoker     Packs/day: 0.50     Years: 11.00     Pack years: 5.50     Types: Cigarettes     Smokeless tobacco: Current User   Substance Use Topics     Alcohol use: Yes     Alcohol/week: 0.0 oz     Comment: currently intoxicated     Family History   Problem Relation Age of Onset     Neurologic Disorder Mother         Multiple Sclerosis     Depression Mother      Anxiety Disorder Mother      Alcohol/Drug Father      Substance Abuse Father      Depression Maternal Grandmother      Anxiety Disorder Maternal Grandmother      Hypertension Maternal Grandmother      Substance Abuse Maternal Grandfather      Alcohol/Drug Paternal Grandfather          Current Outpatient Medications   Medication Sig Dispense Refill     amLODIPine (NORVASC) 10 MG tablet Take 1 tablet (10 mg) by mouth daily 90 tablet 0     amoxicillin-clavulanate (AUGMENTIN) 875-125 MG tablet Take 1 tablet by mouth 2 times daily for 10 days 20 tablet 0     cloNIDine (CATAPRES) 0.1 MG tablet Take 1 tablet (0.1 mg) by mouth 2 times daily 60 tablet 2     FLUoxetine (PROZAC) 20 MG capsule Take 20 mg by mouth daily       LORazepam (ATIVAN) 0.5 MG tablet Take 2 tablets (1 mg) by mouth every 8 hours as needed for anxiety 9 tablet 0     multivitamin, therapeutic with minerals (THERA-VIT-M) TABS tablet Take 1 tablet by mouth daily 30 each 0     nicotine polacrilex 4 MG lozenge 1-2 lozenges every hour as needed, max 10 lozenges per day 360 tablet 1     predniSONE (DELTASONE) 20 MG tablet Take 60 mg by mouth daily for 3 days, THEN 40 mg daily for 3 days, THEN 20 mg daily for 3 days, THEN 10 mg daily for 3 days. 20 tablet 0     traZODone (DESYREL) 100 MG tablet Take 1-2 tablets (100-200 mg)  by mouth nightly as needed for sleep 6 tablet 0     gabapentin (NEURONTIN) 800 MG tablet Take 1 tablet (800 mg) by mouth 4 times daily for 3 days 12 tablet 0     mirtazapine (REMERON) 15 MG tablet Take 1 tablet (15 mg) by mouth At Bedtime for 3 days 3 tablet 0     sertraline (ZOLOFT) 50 MG tablet Take 3 tablets (150 mg) by mouth daily for 3 days 9 tablet 0       OBJECTIVE:                                                    BP (!) 156/109   Pulse 111   Temp 98.4  F (36.9  C) (Oral)   Wt (!) 157.2 kg (346 lb 9.6 oz)   SpO2 95%   BMI 45.73 kg/m   Body mass index is 45.73 kg/m .   GENERAL:: healthy, alert and no distress  EYES: Eyes grossly normal to inspection, extraocular movements - intact, and PERRL  HENT: ear canals- normal; TMs- bulging with no erythema; Nose- mild nasal mucosa edema; Mouth- no ulcers, no lesions  RESP: diffuse rhonchi in the lower lobes bilaterally, no rales, no wheezes  CV: regular rate and rhythm, no murmur, possible S3 heart sound auscultated over the fifth intercostal space  MS: extremities- no gross deformities noted, no edema  NEURO: strength and tone- normal, sensory exam- grossly normal, mentation- intact, speech- normal  Comprehensive back pain exam:  Tenderness of Right SI joint, Pain limits the following motions: decreased with extension and Straight leg raise negative bilaterally  BACK: no CVA tenderness, no paralumbar tenderness  PSYCH: Alert and oriented times 3; speech- coherent , normal rate and volume; able to articulate logical thoughts, able to abstract reason, no tangential thoughts, no hallucinations or delusions, affect- normal    No results found for this or any previous visit (from the past 24 hour(s)).       ASSESSMENT/PLAN:                                                        ICD-10-CM    1. Pain of right sacroiliac joint M53.3 predniSONE (DELTASONE) 20 MG tablet     [facility] HAND, AND CHIROPRACTIC REFERRAL   2. Alcohol dependence with intoxication with  "complication (H) F10.229 amLODIPine (NORVASC) 10 MG tablet     cloNIDine (CATAPRES) 0.1 MG tablet     [facility]  ADULT REFERRAL   3. Essential hypertension I10 CARDIO  ADULT REFERRAL   4. Class 3 severe obesity due to excess calories in adult, unspecified BMI, unspecified whether serious comorbidity present (H) E66.01 BARIATRIC ADULT REFERRAL   5. S3 (third heart sound) R01.2 CARDIO  ADULT REFERRAL   6. Acute sinusitis treated with antibiotics in the past 60 days J01.90 amoxicillin-clavulanate (AUGMENTIN) 875-125 MG tablet     Blood pressure controlled (normal blood pressure at home). Refills provided. See plan below. Return to clinic for any new or worsening symptoms or go to ER Urgent care in off hours    Patient Instructions   Take prednisone and augmentin as prescribed  Follow up with physical therapy  Follow up with cardiology  Follow up with weight management  Follow up here in 8 weeks  Return to clinic for any new or worsening symptoms or go to ER Urgent care in off hours       Estimated body mass index is 45.73 kg/m  as calculated from the following:    Height as of 10/4/18: 1.854 m (6' 1\").    Weight as of this encounter: 157.2 kg (346 lb 9.6 oz).     The information in this document, created by the medical scribe for me, accurately reflects the services I personally performed and the decisions made by me. I have reviewed and approved this document for accuracy prior to leaving the patient care area.  February 12, 2019 3:25 PM    Ivy Rooney  [facility] [place]    "

## 2019-04-03 ENCOUNTER — TELEPHONE (OUTPATIENT)
Dept: FAMILY MEDICINE | Facility: CLINIC | Age: 29
End: 2019-04-03

## 2019-04-03 ENCOUNTER — OFFICE VISIT (OUTPATIENT)
Dept: CARDIOLOGY | Facility: CLINIC | Age: 29
End: 2019-04-03
Attending: PHYSICIAN ASSISTANT
Payer: COMMERCIAL

## 2019-04-03 VITALS
SYSTOLIC BLOOD PRESSURE: 128 MMHG | DIASTOLIC BLOOD PRESSURE: 86 MMHG | OXYGEN SATURATION: 96 % | HEART RATE: 78 BPM | BODY MASS INDEX: 41.75 KG/M2 | WEIGHT: 315 LBS | HEIGHT: 73 IN

## 2019-04-03 DIAGNOSIS — R00.2 PALPITATIONS: ICD-10-CM

## 2019-04-03 DIAGNOSIS — I10 ESSENTIAL HYPERTENSION: Primary | Chronic | ICD-10-CM

## 2019-04-03 PROCEDURE — 99214 OFFICE O/P EST MOD 30 MIN: CPT | Performed by: INTERNAL MEDICINE

## 2019-04-03 SDOH — HEALTH STABILITY: MENTAL HEALTH: HOW OFTEN DO YOU HAVE A DRINK CONTAINING ALCOHOL?: NEVER

## 2019-04-03 ASSESSMENT — MIFFLIN-ST. JEOR: SCORE: 2619.61

## 2019-04-03 NOTE — LETTER
4/3/2019    Tima Crews MD  [address]  [facility] [address]    RE: Nickolas Lang       Dear Colleague,    I had the pleasure of seeing Nickolas Lang in the [facility].    HPI and Plan:   [id number]      CURRENT MEDICATIONS:  Current Outpatient Medications   Medication Sig Dispense Refill     amLODIPine (NORVASC) 10 MG tablet Take 1 tablet (10 mg) by mouth daily 90 tablet 0     cloNIDine (CATAPRES) 0.1 MG tablet Take 1 tablet (0.1 mg) by mouth 2 times daily 60 tablet 2     FLUoxetine (PROZAC) 20 MG capsule Take 20 mg by mouth daily       gabapentin (NEURONTIN) 800 MG tablet Take 1 tablet (800 mg) by mouth 4 times daily for 3 days 12 tablet 0     LORazepam (ATIVAN) 0.5 MG tablet Take 2 tablets (1 mg) by mouth every 8 hours as needed for anxiety 9 tablet 0     mirtazapine (REMERON) 15 MG tablet Take 1 tablet (15 mg) by mouth At Bedtime for 3 days 3 tablet 0     multivitamin, therapeutic with minerals (THERA-VIT-M) TABS tablet Take 1 tablet by mouth daily 30 each 0     nicotine polacrilex 4 MG lozenge 1-2 lozenges every hour as needed, max 10 lozenges per day 360 tablet 1     sertraline (ZOLOFT) 50 MG tablet Take 3 tablets (150 mg) by mouth daily for 3 days 9 tablet 0     traZODone (DESYREL) 100 MG tablet Take 1-2 tablets (100-200 mg) by mouth nightly as needed for sleep 6 tablet 0       ALLERGIES   No Known Allergies    PAST MEDICAL HISTORY:  Past Medical History:   Diagnosis Date     ADD (attention deficit disorder)      Agitation      Alcohol abuse      Anxiety     gabapentin helps the most      Benzodiazepine abuse (H)      Drug abuse (H)      GERD (gastroesophageal reflux disease)      Hepatic steatosis      Hypertension      Obesity      Pyloric stenosis     s/p pyloromyotomy as an infant       PAST SURGICAL HISTORY:  Past Surgical History:   Procedure Laterality Date     Deviated septum repair       PYLOROMYOTOMY SURGERY  as an infant      SHOULDER  SURGERY Left 07/29/2016    Removal of cartilage       FAMILY HISTORY:  Family History   Problem Relation Age of Onset     Neurologic Disorder Mother         Multiple Sclerosis     Depression Mother      Anxiety Disorder Mother      Alcohol/Drug Father      Substance Abuse Father      Depression Maternal Grandmother      Anxiety Disorder Maternal Grandmother      Hypertension Maternal Grandmother      Substance Abuse Maternal Grandfather      Alcohol/Drug Paternal Grandfather        SOCIAL HISTORY:  Social History     Socioeconomic History     Marital status: Single     Spouse name: Not on file     Number of children: Not on file     Years of education: Not on file     Highest education level: Not on file   Occupational History     Not on file   Social Needs     Financial resource strain: Not on file     Food insecurity:     Worry: Not on file     Inability: Not on file     Transportation needs:     Medical: Not on file     Non-medical: Not on file   Tobacco Use     Smoking status: Current Every Day Smoker     Packs/day: 0.50     Years: 11.00     Pack years: 5.50     Types: Cigarettes     Smokeless tobacco: Current User   Substance and Sexual Activity     Alcohol use: Yes     Alcohol/week: 0.0 oz     Comment: currently intoxicated     Drug use: No     Sexual activity: Not Currently   Lifestyle     Physical activity:     Days per week: Not on file     Minutes per session: Not on file     Stress: Not on file   Relationships     Social connections:     Talks on phone: Not on file     Gets together: Not on file     Attends [employer] service: Not on file     Active member of club or organization: Not on file     Attends meetings of clubs or organizations: Not on file     Relationship status: Not on file     Intimate partner violence:     Fear of current or ex partner: Not on file     Emotionally abused: Not on file     Physically abused: Not on file     Forced sexual activity: Not on file   Other Topics Concern      Parent/sibling w/ CABG, MI or angioplasty before 65F 55M? Not Asked   Social History Narrative    Born in [place] primarily raised by mother and uncle no abuse history.  His father was absent possibly using substances.  Finished school on time went to college and has a finance degree.  Previously working for [employer] businesses and has not been working in that field lately.  Never  no children no  history.  Living in a townhouse with his mother and grandmother no access to guns or weapons.  Enjoys going to the gym and walking around.       Review of Systems:  Skin:          Eyes:         ENT:         Respiratory:          Cardiovascular:         Gastroenterology:        Genitourinary:         Musculoskeletal:         Neurologic:         Psychiatric:         Heme/Lymph/Imm:         Endocrine:           Physical Exam:  Vitals: Wt (!) 160.6 kg (354 lb)   BMI 46.70 kg/m       Constitutional:  cooperative, alert and oriented, well developed, well nourished, in no acute distress        Skin:  warm and dry to the touch          Head:  normocephalic        Eyes:  sclera white        Lymph:      ENT:           Neck:  carotid pulses are full and equal bilaterally, JVP normal, no carotid bruit        Respiratory:  normal breath sounds, clear to auscultation, normal A-P diameter, normal symmetry, normal respiratory excursion, no use of accessory muscles         Cardiac: regular rhythm, normal S1/S2, no S3 or S4, apical impulse not displaced, no murmurs, gallops or rubs                                                         GI:           Extremities and Muscular Skeletal:  no deformities, clubbing, cyanosis, erythema observed;no edema              Neurological:  no gross motor deficits;affect appropriate        Psych:  Alert and Oriented x 3;affect appropriate, oriented to time, person and place          MAURIZIO Rooney PA-C  [address]  [place]  [address]                    Thank you for allowing me to participate in the care of your patient.      Sincerely,     Jojo Jones MD     [facility]    cc:   Ivy Rooney PA-C  [address] [address]

## 2019-04-03 NOTE — LETTER
[address]  [facility] [address]  Phone: [phone]  Fax: [phone]              April 3, 2019      Nickolas Lang  [address]  [facility] [address]        Dear Nickolas,    I care about your health and have reviewed your health plan. I have reviewed your medical conditions, medication list, and lab results and am making recommendations based on this review, to better manage your health.    You are in particular need of attention regarding:  -High Blood Pressure    I am recommending that you:  -schedule a FOLLOWUP OFFICE APPOINTMENT with me.        Here is a list of Health Maintenance topics that are due now or due soon:  Health Maintenance Due   Topic Date Due     Preventive Care Visit  1990     Flu Vaccine (1) 09/01/2018       Please call us at [phone] (or use [employer]) to address the above recommendations.     Thank you for trusting [facility] and we appreciate the opportunity to serve you.  We look forward to supporting your healthcare needs in the future.    Healthy Regards,    Kerry Rodriguez NP

## 2019-04-03 NOTE — TELEPHONE ENCOUNTER
Panel Management Review      Patient has the following on his problem list:     Hypertension   Last three blood pressure readings:  BP Readings from Last 3 Encounters:   04/03/19 128/86   02/12/19 (!) 156/109   11/19/18 (!) 129/104     Blood pressure: Passed    HTN Guidelines:  Age 18-59 BP range:  Less than 140/90  Age 60-85 with Diabetes:  Less than 140/90  Age 60-85 without Diabetes:  less than 150/90      Composite cancer screening  Chart review shows that this patient is due/due soon for the following None  Summary:    Patient is due/failing the following:   BP CHECK    Action needed:   Patient needs office visit for Hypertension.    Type of outreach:    Sent letter.    Questions for provider review:    None                                                                                                                                    Ligia Ayala MA

## 2019-04-03 NOTE — LETTER
4/3/2019      Tima Crews MD  [address]  [facility] [address]      RE: Nickolas Lang       Dear Colleague,    I had the pleasure of seeing Nickolas Lang in the [facility].    Service Date: 04/03/2019      PATIENT HISTORY:  Mr. Nickolas Lang was seen in consultation at [facility] in [place].  He is 28 years old and has been referred by MAL Oliveira.  The referral is for hypertension therapy.      Mr. Lang was accompanied by his mother, who served as his transportation.  He is currently in high-intensity chemical dependency treatment in [place], [place].  At this point, he is doing well and he notes that he has previously been in treatment.  He has been sober for approximately 5 weeks and has had periods of sobriety in the past.      His last hospitalization was at [facility].  That was about 2 months ago and he was admitted with acute alcohol intoxication.  He had also taken additional medications apparently as a result of some passive suicidal ideation.  Those medications included clonidine which he has taken for hypertension.  There was reportedly transient second-degree AV block.  He was seen by Cardiology, and Cardiology did not consider this an arrhythmia which required evaluation as it resolved and was thought associated with the acute episode and possibly some increased vagal tone.      He has been on clonidine and he has been prescribed amlodipine.  He is not taking any of those medications as it is his desire to take as few medications as possible.  He is very committed to being successful with his chemical dependency therapy this time.  His blood pressure in clinic today was 128/86 mmHg.      He has not had chest discomfort or headaches.  He has not had evidence of heart failure.  He has normal renal function.      He has no prior history of heart disease and occasionally notes some  "palpitations.  They are brief.  His family history is not known on his father's side.  On his mother's side, his grandmother has heart valve disease and another grandparent may also have had \"heart failure.\"  He does smoke about 1 half pack daily and has done so for 11 years with regard to tobacco use.  He does not have known diabetes mellitus.  He has gained about 100 pounds of weight over the last 2-3 years.  He states that he attributes this to being in treatment and eating with little physical activity.  When he was younger, he notes he was a \"gym rat.\"  He was used to working out.      He does not sleep well.  He does snore and he awakens frequently.  He feels fatigued during the day.  As a matter of fact, his mother notes that she has similar symptoms.      PHYSICAL EXAMINATION:   GENERAL:  This is a man in no apparent distress.  He was alert and oriented to person, place and time.   VITAL SIGNS:  Blood pressure was 128/86 mmHg, heart rate 78 beats per minute and regular, and respiratory rate 14-18 per minute.   CHEST:  Clear to auscultation.   CARDIAC On cardiac auscultation, there was an S1 and S2 without extra sounds or murmur.  The rhythm was regular.      ASSESSMENT:  Mr. Lang actually has a blood pressure which is probably at the upper limits of normal in clinic today.  His blood pressure was taken after he had been sitting for a period of time and a large cuff was utilized to evaluate the blood pressure in the left arm.  I agree that at this point I would not initiate any antihypertensive therapy and I would certainly avoid clonidine which can have central nervous system effects.      I have recommended a sleep study.  As I discussed, he has symptoms strongly suggestive of sleep apnea.  I further explained to him that since weight loss is an eventual goal, even if he has sleep apnea now, he may not always have it and may not always need to treat it.  Nonetheless, he is very concerned about his " weight and would like to lose his weight and I do not think he will be successful if he has untreated sleep apnea.  Similarly, he is likely to benefit with greater long-term success with sobriety if he is not sleep deprived.  As such, a referral has been placed to the [facility] in [place].      I have recommended a Zio Patch monitor once he leaves high-intensity chemical dependency therapy.  I have arranged for followup with myself as well in 3 months.  At that time, the blood pressure will be rechecked.  If he has sleep apnea, treating his sleep apnea will be also beneficial with regard to his hypertension.  He probably correctly observed that alcohol withdrawal results in hypertension and as we discussed, alcohol use contributes to hypertension.  As such, he is committed to avoiding those situations once again.      Finally, I observed that he has dyslipidemia.  That was true on his most recent lipids of last summer, though a prior lipid measurement suggested hypertriglyceridemia but not necessarily dyslipidemia.  At some point in the future, reassessing lipids is likely to be of value.         RAFAEL BELTRAN MD, [facility]             D: 2019   T: 2019   MT: JR      Name:     PARTH HARPER   MRN:      [id number]        Account:      [id number]   :      1990           Service Date: 2019      Document: [id number]         Outpatient Encounter Medications as of 4/3/2019   Medication Sig Dispense Refill     multivitamin, therapeutic with minerals (THERA-VIT-M) TABS tablet Take 1 tablet by mouth daily 30 each 0     PANTOPRAZOLE SODIUM PO Take by mouth daily       traZODone (DESYREL) 100 MG tablet Take 1-2 tablets (100-200 mg) by mouth nightly as needed for sleep 6 tablet 0     amLODIPine (NORVASC) 10 MG tablet Take 1 tablet (10 mg) by mouth daily (Patient not taking: Reported on 4/3/2019) 90 tablet 0     [] amoxicillin-clavulanate (AUGMENTIN) 875-125 MG tablet Take 1 tablet  by mouth 2 times daily for 10 days (Patient not taking: Reported on 4/3/2019) 20 tablet 0     [] busPIRone (BUSPAR) 10 MG tablet Take 2 tablets (20 mg) by mouth 3 times daily for 3 days (Patient not taking: Reported on 4/3/2019) 18 tablet 0     cloNIDine (CATAPRES) 0.1 MG tablet Take 1 tablet (0.1 mg) by mouth 2 times daily (Patient not taking: Reported on 4/3/2019) 60 tablet 2     FLUoxetine (PROZAC) 20 MG capsule Take 20 mg by mouth daily       gabapentin (NEURONTIN) 800 MG tablet Take 1 tablet (800 mg) by mouth 4 times daily for 3 days (Patient taking differently: Take 800 mg by mouth 3 times daily ) 12 tablet 0     [] hydrOXYzine (ATARAX) 50 MG tablet Take 1 tablet (50 mg) by mouth every 4 hours as needed for anxiety (Patient not taking: Reported on 4/3/2019) 15 tablet 0     LORazepam (ATIVAN) 0.5 MG tablet Take 2 tablets (1 mg) by mouth every 8 hours as needed for anxiety (Patient not taking: Reported on 4/3/2019) 9 tablet 0     mirtazapine (REMERON) 15 MG tablet Take 1 tablet (15 mg) by mouth At Bedtime for 3 days (Patient not taking: Reported on 4/3/2019) 3 tablet 0     nicotine polacrilex 4 MG lozenge 1-2 lozenges every hour as needed, max 10 lozenges per day (Patient not taking: Reported on 4/3/2019) 360 tablet 1     [] omeprazole (PRILOSEC) 40 MG capsule Take 1 capsule (40 mg) by mouth daily for 3 days (Patient not taking: Reported on 4/3/2019) 3 capsule 0     [] predniSONE (DELTASONE) 20 MG tablet Take 60 mg by mouth daily for 3 days, THEN 40 mg daily for 3 days, THEN 20 mg daily for 3 days, THEN 10 mg daily for 3 days. (Patient not taking: No sig reported) 20 tablet 0     sertraline (ZOLOFT) 50 MG tablet Take 3 tablets (150 mg) by mouth daily for 3 days (Patient not taking: Reported on 4/3/2019) 9 tablet 0     No facility-administered encounter medications on file as of 4/3/2019.        Again, thank you for allowing me to participate in the care of your patient.       Sincerely,    Jojo Jones MD     [facility]    cc:   Ivy Rooney PA-C  [address] [facility] [address]  [place] [address]

## 2019-04-03 NOTE — PROGRESS NOTES
HPI and Plan:   [id number]      CURRENT MEDICATIONS:  Current Outpatient Medications   Medication Sig Dispense Refill     amLODIPine (NORVASC) 10 MG tablet Take 1 tablet (10 mg) by mouth daily 90 tablet 0     cloNIDine (CATAPRES) 0.1 MG tablet Take 1 tablet (0.1 mg) by mouth 2 times daily 60 tablet 2     FLUoxetine (PROZAC) 20 MG capsule Take 20 mg by mouth daily       gabapentin (NEURONTIN) 800 MG tablet Take 1 tablet (800 mg) by mouth 4 times daily for 3 days 12 tablet 0     LORazepam (ATIVAN) 0.5 MG tablet Take 2 tablets (1 mg) by mouth every 8 hours as needed for anxiety 9 tablet 0     mirtazapine (REMERON) 15 MG tablet Take 1 tablet (15 mg) by mouth At Bedtime for 3 days 3 tablet 0     multivitamin, therapeutic with minerals (THERA-VIT-M) TABS tablet Take 1 tablet by mouth daily 30 each 0     nicotine polacrilex 4 MG lozenge 1-2 lozenges every hour as needed, max 10 lozenges per day 360 tablet 1     sertraline (ZOLOFT) 50 MG tablet Take 3 tablets (150 mg) by mouth daily for 3 days 9 tablet 0     traZODone (DESYREL) 100 MG tablet Take 1-2 tablets (100-200 mg) by mouth nightly as needed for sleep 6 tablet 0       ALLERGIES   No Known Allergies    PAST MEDICAL HISTORY:  Past Medical History:   Diagnosis Date     ADD (attention deficit disorder)      Agitation      Alcohol abuse      Anxiety     gabapentin helps the most      Benzodiazepine abuse (H)      Drug abuse (H)      GERD (gastroesophageal reflux disease)      Hepatic steatosis      Hypertension      Obesity      Pyloric stenosis     s/p pyloromyotomy as an infant       PAST SURGICAL HISTORY:  Past Surgical History:   Procedure Laterality Date     Deviated septum repair       PYLOROMYOTOMY SURGERY  as an infant      SHOULDER SURGERY Left 07/29/2016    Removal of cartilage       FAMILY HISTORY:  Family History   Problem Relation Age of Onset     Neurologic Disorder Mother         Multiple Sclerosis     Depression Mother      Anxiety Disorder Mother       Alcohol/Drug Father      Substance Abuse Father      Depression Maternal Grandmother      Anxiety Disorder Maternal Grandmother      Hypertension Maternal Grandmother      Substance Abuse Maternal Grandfather      Alcohol/Drug Paternal Grandfather        SOCIAL HISTORY:  Social History     Socioeconomic History     Marital status: Single     Spouse name: Not on file     Number of children: Not on file     Years of education: Not on file     Highest education level: Not on file   Occupational History     Not on file   Social Needs     Financial resource strain: Not on file     Food insecurity:     Worry: Not on file     Inability: Not on file     Transportation needs:     Medical: Not on file     Non-medical: Not on file   Tobacco Use     Smoking status: Current Every Day Smoker     Packs/day: 0.50     Years: 11.00     Pack years: 5.50     Types: Cigarettes     Smokeless tobacco: Current User   Substance and Sexual Activity     Alcohol use: Yes     Alcohol/week: 0.0 oz     Comment: currently intoxicated     Drug use: No     Sexual activity: Not Currently   Lifestyle     Physical activity:     Days per week: Not on file     Minutes per session: Not on file     Stress: Not on file   Relationships     Social connections:     Talks on phone: Not on file     Gets together: Not on file     Attends [employer] service: Not on file     Active member of club or organization: Not on file     Attends meetings of clubs or organizations: Not on file     Relationship status: Not on file     Intimate partner violence:     Fear of current or ex partner: Not on file     Emotionally abused: Not on file     Physically abused: Not on file     Forced sexual activity: Not on file   Other Topics Concern     Parent/sibling w/ CABG, MI or angioplasty before 65F 55M? Not Asked   Social History Narrative    Born in [place] primarily raised by mother and uncle no abuse history.  His father was absent possibly using substances.  Finished school  on time went to college and has a finance degree.  Previously working for [employer] businesses and has not been working in that field lately.  Never  no children no  history.  Living in a townhouse with his mother and grandmother no access to guns or weapons.  Enjoys going to the gym and walking around.       Review of Systems:  Skin:          Eyes:         ENT:         Respiratory:          Cardiovascular:         Gastroenterology:        Genitourinary:         Musculoskeletal:         Neurologic:         Psychiatric:         Heme/Lymph/Imm:         Endocrine:           Physical Exam:  Vitals: Wt (!) 160.6 kg (354 lb)   BMI 46.70 kg/m      Constitutional:  cooperative, alert and oriented, well developed, well nourished, in no acute distress        Skin:  warm and dry to the touch          Head:  normocephalic        Eyes:  sclera white        Lymph:      ENT:           Neck:  carotid pulses are full and equal bilaterally, JVP normal, no carotid bruit        Respiratory:  normal breath sounds, clear to auscultation, normal A-P diameter, normal symmetry, normal respiratory excursion, no use of accessory muscles         Cardiac: regular rhythm, normal S1/S2, no S3 or S4, apical impulse not displaced, no murmurs, gallops or rubs                                                         GI:           Extremities and Muscular Skeletal:  no deformities, clubbing, cyanosis, erythema observed;no edema              Neurological:  no gross motor deficits;affect appropriate        Psych:  Alert and Oriented x 3;affect appropriate, oriented to time, person and place          CC  Ivy Rooney PA-C  [address]  [place] [address]

## 2019-04-04 NOTE — PROGRESS NOTES
"Service Date: 04/03/2019      PATIENT HISTORY:  Mr. Nickolas Lang was seen in consultation at [facility] in [place].  He is 28 years old and has been referred by MAL Oliveira.  The referral is for hypertension therapy.      Mr. Lang was accompanied by his mother, who served as his transportation.  He is currently in high-intensity chemical dependency treatment in [place], [place].  At this point, he is doing well and he notes that he has previously been in treatment.  He has been sober for approximately 5 weeks and has had periods of sobriety in the past.      His last hospitalization was at [facility].  That was about 2 months ago and he was admitted with acute alcohol intoxication.  He had also taken additional medications apparently as a result of some passive suicidal ideation.  Those medications included clonidine which he has taken for hypertension.  There was reportedly transient second-degree AV block.  He was seen by Cardiology, and Cardiology did not consider this an arrhythmia which required evaluation as it resolved and was thought associated with the acute episode and possibly some increased vagal tone.      He has been on clonidine and he has been prescribed amlodipine.  He is not taking any of those medications as it is his desire to take as few medications as possible.  He is very committed to being successful with his chemical dependency therapy this time.  His blood pressure in clinic today was 128/86 mmHg.      He has not had chest discomfort or headaches.  He has not had evidence of heart failure.  He has normal renal function.      He has no prior history of heart disease and occasionally notes some palpitations.  They are brief.  His family history is not known on his father's side.  On his mother's side, his grandmother has heart valve disease and another grandparent may also have had \"heart failure.\"  He does smoke about 1 half pack daily and " "has done so for 11 years with regard to tobacco use.  He does not have known diabetes mellitus.  He has gained about 100 pounds of weight over the last 2-3 years.  He states that he attributes this to being in treatment and eating with little physical activity.  When he was younger, he notes he was a \"gym rat.\"  He was used to working out.      He does not sleep well.  He does snore and he awakens frequently.  He feels fatigued during the day.  As a matter of fact, his mother notes that she has similar symptoms.      PHYSICAL EXAMINATION:   GENERAL:  This is a man in no apparent distress.  He was alert and oriented to person, place and time.   VITAL SIGNS:  Blood pressure was 128/86 mmHg, heart rate 78 beats per minute and regular, and respiratory rate 14-18 per minute.   CHEST:  Clear to auscultation.   CARDIAC On cardiac auscultation, there was an S1 and S2 without extra sounds or murmur.  The rhythm was regular.      ASSESSMENT:  Mr. Lang actually has a blood pressure which is probably at the upper limits of normal in clinic today.  His blood pressure was taken after he had been sitting for a period of time and a large cuff was utilized to evaluate the blood pressure in the left arm.  I agree that at this point I would not initiate any antihypertensive therapy and I would certainly avoid clonidine which can have central nervous system effects.      I have recommended a sleep study.  As I discussed, he has symptoms strongly suggestive of sleep apnea.  I further explained to him that since weight loss is an eventual goal, even if he has sleep apnea now, he may not always have it and may not always need to treat it.  Nonetheless, he is very concerned about his weight and would like to lose his weight and I do not think he will be successful if he has untreated sleep apnea.  Similarly, he is likely to benefit with greater long-term success with sobriety if he is not sleep deprived.  As such, a referral has been " [facility] [place].      I have recommended a Zio Patch monitor once he leaves high-intensity chemical dependency therapy.  I have arranged for followup with myself as well in 3 months.  At that time, the blood pressure will be rechecked.  If he has sleep apnea, treating his sleep apnea will be also beneficial with regard to his hypertension.  He probably correctly observed that alcohol withdrawal results in hypertension and as we discussed, alcohol use contributes to hypertension.  As such, he is committed to avoiding those situations once again.      Finally, I observed that he has dyslipidemia.  That was true on his most recent lipids of last summer, though a prior lipid measurement suggested hypertriglyceridemia but not necessarily dyslipidemia.  At some point in the future, reassessing lipids is likely to be of value.         RAFAEL BELTRAN MD          D: 2019   T: 2019   MT: JR      Name:     PARTH HARPER   MRN:      [id number]        Account:      [id number]   :      1990           Service Date: 2019      Document: [id number]

## 2019-06-21 ENCOUNTER — HOSPITAL ENCOUNTER (EMERGENCY)
Facility: CLINIC | Age: 29
Discharge: OTHER INSTITUTION | End: 2019-06-21
Attending: EMERGENCY MEDICINE | Admitting: EMERGENCY MEDICINE
Payer: COMMERCIAL

## 2019-06-21 ENCOUNTER — APPOINTMENT (OUTPATIENT)
Dept: CT IMAGING | Facility: CLINIC | Age: 29
End: 2019-06-21
Attending: EMERGENCY MEDICINE
Payer: COMMERCIAL

## 2019-06-21 VITALS
TEMPERATURE: 98.3 F | DIASTOLIC BLOOD PRESSURE: 105 MMHG | SYSTOLIC BLOOD PRESSURE: 137 MMHG | OXYGEN SATURATION: 89 % | HEART RATE: 112 BPM

## 2019-06-21 DIAGNOSIS — F10.229 ALCOHOL DEPENDENCE WITH INTOXICATION WITH COMPLICATION (H): ICD-10-CM

## 2019-06-21 DIAGNOSIS — F10.229 ACUTE ALCOHOLIC INTOXICATION IN ALCOHOLISM WITH COMPLICATION (H): ICD-10-CM

## 2019-06-21 LAB
ALBUMIN SERPL-MCNC: 4.1 G/DL (ref 3.4–5)
ALP SERPL-CCNC: 117 U/L (ref 40–150)
ALT SERPL W P-5'-P-CCNC: 159 U/L (ref 0–70)
ANION GAP SERPL CALCULATED.3IONS-SCNC: 16 MMOL/L (ref 3–14)
AST SERPL W P-5'-P-CCNC: 98 U/L (ref 0–45)
BILIRUB SERPL-MCNC: 0.5 MG/DL (ref 0.2–1.3)
BUN SERPL-MCNC: 11 MG/DL (ref 7–30)
CALCIUM SERPL-MCNC: 7.8 MG/DL (ref 8.5–10.1)
CHLORIDE SERPL-SCNC: 102 MMOL/L (ref 94–109)
CO2 SERPL-SCNC: 22 MMOL/L (ref 20–32)
CREAT SERPL-MCNC: 0.56 MG/DL (ref 0.66–1.25)
ERYTHROCYTE [DISTWIDTH] IN BLOOD BY AUTOMATED COUNT: 15.8 % (ref 10–15)
ETHANOL SERPL-MCNC: 0.45 G/DL
GFR SERPL CREATININE-BSD FRML MDRD: >90 ML/MIN/{1.73_M2}
GLUCOSE SERPL-MCNC: 114 MG/DL (ref 70–99)
HCT VFR BLD AUTO: 52.8 % (ref 40–53)
HGB BLD-MCNC: 17.6 G/DL (ref 13.3–17.7)
MAGNESIUM SERPL-MCNC: 1.8 MG/DL (ref 1.6–2.3)
MCH RBC QN AUTO: 28.3 PG (ref 26.5–33)
MCHC RBC AUTO-ENTMCNC: 33.3 G/DL (ref 31.5–36.5)
MCV RBC AUTO: 85 FL (ref 78–100)
PLATELET # BLD AUTO: 332 10E9/L (ref 150–450)
POTASSIUM SERPL-SCNC: 3.9 MMOL/L (ref 3.4–5.3)
PROT SERPL-MCNC: 8 G/DL (ref 6.8–8.8)
RBC # BLD AUTO: 6.22 10E12/L (ref 4.4–5.9)
SODIUM SERPL-SCNC: 140 MMOL/L (ref 133–144)
WBC # BLD AUTO: 5.6 10E9/L (ref 4–11)

## 2019-06-21 PROCEDURE — 96361 HYDRATE IV INFUSION ADD-ON: CPT

## 2019-06-21 PROCEDURE — 99285 EMERGENCY DEPT VISIT HI MDM: CPT | Mod: 25

## 2019-06-21 PROCEDURE — 25000128 H RX IP 250 OP 636: Performed by: EMERGENCY MEDICINE

## 2019-06-21 PROCEDURE — 25000132 ZZH RX MED GY IP 250 OP 250 PS 637: Performed by: EMERGENCY MEDICINE

## 2019-06-21 PROCEDURE — 80053 COMPREHEN METABOLIC PANEL: CPT | Performed by: EMERGENCY MEDICINE

## 2019-06-21 PROCEDURE — 80320 DRUG SCREEN QUANTALCOHOLS: CPT | Performed by: EMERGENCY MEDICINE

## 2019-06-21 PROCEDURE — 70450 CT HEAD/BRAIN W/O DYE: CPT

## 2019-06-21 PROCEDURE — 85027 COMPLETE CBC AUTOMATED: CPT | Performed by: EMERGENCY MEDICINE

## 2019-06-21 PROCEDURE — 96360 HYDRATION IV INFUSION INIT: CPT

## 2019-06-21 PROCEDURE — 83735 ASSAY OF MAGNESIUM: CPT | Performed by: EMERGENCY MEDICINE

## 2019-06-21 RX ORDER — PANTOPRAZOLE SODIUM 40 MG/1
40 TABLET, DELAYED RELEASE ORAL DAILY
Qty: 3 TABLET | Refills: 0 | Status: SHIPPED | OUTPATIENT
Start: 2019-06-21 | End: 2019-08-02

## 2019-06-21 RX ORDER — DIAZEPAM 5 MG
10 TABLET ORAL ONCE
Status: COMPLETED | OUTPATIENT
Start: 2019-06-21 | End: 2019-06-21

## 2019-06-21 RX ORDER — GABAPENTIN 800 MG/1
800 TABLET ORAL 3 TIMES DAILY
Qty: 9 TABLET | Refills: 0 | Status: SHIPPED | OUTPATIENT
Start: 2019-06-21 | End: 2019-08-02

## 2019-06-21 RX ORDER — AMLODIPINE BESYLATE 10 MG/1
10 TABLET ORAL DAILY
Qty: 3 TABLET | Refills: 0 | Status: SHIPPED | OUTPATIENT
Start: 2019-06-21 | End: 2019-08-02

## 2019-06-21 RX ORDER — OLANZAPINE 10 MG/2ML
5 INJECTION, POWDER, FOR SOLUTION INTRAMUSCULAR DAILY PRN
Status: DISCONTINUED | OUTPATIENT
Start: 2019-06-21 | End: 2019-06-21 | Stop reason: HOSPADM

## 2019-06-21 RX ORDER — CLONIDINE HYDROCHLORIDE 0.1 MG/1
0.1 TABLET ORAL 2 TIMES DAILY
Qty: 6 TABLET | Refills: 0 | Status: SHIPPED | OUTPATIENT
Start: 2019-06-21 | End: 2019-08-02

## 2019-06-21 RX ORDER — TRAZODONE HYDROCHLORIDE 100 MG/1
200 TABLET ORAL
Qty: 6 TABLET | Refills: 0 | Status: SHIPPED | OUTPATIENT
Start: 2019-06-21 | End: 2020-08-25

## 2019-06-21 RX ADMIN — DIAZEPAM 10 MG: 5 TABLET ORAL at 16:02

## 2019-06-21 RX ADMIN — SODIUM CHLORIDE 1000 ML: 9 INJECTION, SOLUTION INTRAVENOUS at 12:00

## 2019-06-21 NOTE — ED TRIAGE NOTES
Arrives via EMS from parents home where patient resides.  Parents called 911.  Patient has been drinking for a while and wants to detox is what was reported by EMS. Patient not clarifying.  TW=169.  Breathalyzer 0.34 en route. Denies drugs or wanting to harm self.  Cooperative at this time.  Alert with eyes open, taking extra time to answer questions. ABCD's intact.

## 2019-06-21 NOTE — ED NOTES
Bed: ED08  Expected date: 6/21/19  Expected time: 10:58 AM  Means of arrival: Ambulance  Comments:  BV2

## 2019-06-21 NOTE — ED PROVIDER NOTES
"  History     Chief Complaint:  Alcohol intoxication     HPI   History provided by EMS.   Nickolas Lang is a 28 year old male with a history of alcohol abuse who presents with alcohol intoxication. The patient comes from his parents house where he lives by EMS after parents called saying the patient \"wants to detox.\" He has been drinking today and was a 0.34 by breatahlyzer on scene.  He says that he typically drinks 1.75 L of vodka over 1-2 days. He denies trying to hurt himself today and denies any pain. He can not provide additional information at this time due to intoxication.     Allergies:  No known drug allergies     Medications:    Amlodipine  Clonidine  Prozac  Gabapentin  Ativan  Remeron  Nicotine lozenge  Protonix  Sertraline  Trazodone    Past Medical History:    Alcohol abuse with alcohol-induce mood disorder  Agitation requiring sedation   Alcohol withdrawal  Mild episode of recurrent MDD  ADHD  Anxiety  GERD  Hepatic steatosis  Elevated liver enzymes  Hypertension  Suicidal ideation   Benzodiazepine abuse  Hypertension  Pyloric stenosis    Past Surgical History:    Deviated septum repair  Pyloromyotomy   Removal of cartilage of left shoulder    Family History:    MS  Depression  Anxiety  Alcohol/drug abuse  Substance abuse  Hypertension    Social History:  Smoking status: Current Every Day Smoker  Alcohol use: No  Drug use: No  Patient presents alone.  PCP: Tima Crews    Marital Status:  Single      Review of Systems   Unable to perform ROS: Mental status change   Psychiatric/Behavioral: Negative for self-injury.       Physical Exam     Patient Vitals for the past 24 hrs:   BP Temp Temp src Heart Rate SpO2   06/21/19 1116 (!) 156/101 98.3  F (36.8  C) Temporal 107 96 %      Physical Exam    General:   Moderate-severely intoxicated obese male lying quietly in the bed  HEENT:    Oropharynx is moist  Eyes:    Conjunctival erythema bilateral     PERRL  Neck:    Supple, no meningismus.   " "  CV:     Regular rate and rhythm.      No murmurs, rubs or gallops.        2+ radial pulses bilateral.   PULM:    Clear to auscultation bilateral.       No respiratory distress.      Good air exchange.  ABD:    Soft, non-tender, non-distended.       No rebound, guarding or rigidity.  MSK:     No gross deformity to all four extremities.   LYMPH:   No cervical lymphadenopathy.  NEURO:   GCS E4 V3 M6 = 13     Follows commands     Speech is 1-2 words and often inappropriate     Strength is 5/5 in all 4 extremities.  Sensation is intact.       Normal muscular tone, no tremor.  Skin:    Warm, dry and intact.    Psych:    Moderate-severe intoxication      Emergency Department Course     Laboratory:  CBC: WNL (WBC 5.6, HGB 17.6, )  CMP: Anion gap 16 (H), Glucose 114 (H), Calcium 7.8 (L),  (H), AST 98 (H), o/w WNL Creatinine 0.56 (L)  Magnesium: 1.8  EtOH: 0.45 (HH)    Interventions:  1200 - NS 1 L IV Bolus      Emergency Department Course:  The patient arrived in the emergency department via EMS.    Past medical records, nursing notes, and vitals reviewed.  1111: I performed an exam of the patient and obtained history, as documented above.    The patient was placed on an BRI hold. IV inserted and blood drawn. This was sent to laboratory for testing, findings above.  DEC will be evaluating the patient.      1431: Nursing called the patient's mother, Eloisa, to update her and gather additional history. Her number is [phone].    1432: I paged social work regarding the patient's \"stay of commitment.\"    1434: I spoke with social work about the case.     1435: Patient's mother called with more information regarding the \"stay of commitment.\" I will contact the patient's  from [employer] Valeria Lopez, at [phone] extension [phone]. The patient's mother added that she does not want the patient released and that he can not come home.     I left a message with Valeria the patient's . "     1510: I spoke with Valeria, the patient's .  She was informed of the patient's presence in the emergency department with acute alcohol intoxication.  She confirms the patient is a stay of commitment.  Mother is refusing to have the patient come back home.   states patient has to go to detox.    1525: Patient was reexamined.  He is now alert and oriented x3.  He does have mild tachycardia of 115 but no hypertension.  He is concerned he is having early alcohol withdrawal.  He has no resting tremor.  10 mg of oral Valium ordered.  I also noticed ecchymosis and edema to the left ear without other signs of trauma that was not noted on initial exam.  Patient is uncertain whether he fell or had a traumatic injury.  Head CT ordered to ensure no evidence of unrecognized intracranial injury.    The patient was signed out to the incoming \[facility]\"" partner pending transfer to Detox    Impression & Plan      Medical Decision Makin-year-old male presented to the ED with acute alcohol intoxication.  Patient was moderately to severely intoxicated upon presentation.  He was protecting his own airway.  His alcohol level is remarkably elevated at 0.45.  He had no other toxidrome noted on exam.  Basic laboratory studies are unremarkable outside elevated alcohol level and alcoholic induced hepatitis.  Patient metabolized the effects of alcohol in the ED.  On recheck he is now alert and oriented.  He is concerned he is having early alcohol withdrawal with tachycardia but no tremor or significant hypertension.  Oral Valium ordered.  On reexamination, he was noted to have left ear ecchymosis and he was uncertain whether he had a traumatic injury.  Head CT ordered and currently pending.    After discussion with mother, apparently the patient has a stay of commitment.  Mother is refusing to take the patient home.  I spoke with the patient's  who states that patient needs to go to detox.  He will be  transferred to [facility] once medically cleared.   will follow-up on Monday with plans of revocation and will follow up with further care and placement of this patient.    Diagnosis:    ICD-10-CM    1. Acute alcoholic intoxication in alcoholism with complication (H) F10.229      Disposition:  Signed out to the incoming [facility] partner.     Carlos Queen  6/21/2019   [facility] EMERGENCY DEPARTMENT    Scribe Disclosure:  I, Carlos Queen, am serving as a scribe at 11:11 AM on 6/21/2019 to document services personally performed by Julio Cesar Carvajal MD based on my observations and the provider's statements to me.        Julio Cesar Carvajal MD  06/23/19 2491

## 2019-06-21 NOTE — ED AVS SNAPSHOT
[facility] Emergency Department  [address]  [facility] [address]  Phone:  [phone]  Fax:  [phone]                                    Nickolas Lang   MRN: [id number]    Department:  [facility] Emergency Department   Date of Visit:  6/21/2019           After Visit Summary Signature Page    I have received my discharge instructions, and my questions have been answered. I have discussed any challenges I see with this plan with the nurse or doctor.    ..........................................................................................................................................  Patient/Patient Representative Signature      ..........................................................................................................................................  Patient Representative Print Name and Relationship to Patient    ..................................................               ................................................  Date                                   Time    ..........................................................................................................................................  Reviewed by Signature/Title    ...................................................              ..............................................  Date                                               Time          22EPIC Rev 08/18

## 2019-06-21 NOTE — CONSULTS
Care Transition Initial Assessment - SW     Met with: Patient and Family    Active Problems:    * No active hospital problems. *       DATA  Who is your support system?: Mother- Stacy Rickey, [employer] CM Teresa Lopez ([phone], [phone])  Identified issues/concerns regarding health management: CD issues    ASSESSMENT  Cognitive Status:  intoxicated  Concerns to be addressed: discharge needs.  SW consulted to assist with discharge needs and emotional support.  Pt is a 28 yr old single male who presented to the ED via EMS with point 34 alcohol level. Per chart review, patient had gone to [place] detox on 11/19/18 and to [facility] in [place], [place] post detox.     Per patient's mother, patient had been 90 days sober when she allowed him to return home, only for the purpose of going through his belongings and packing, as mother is moving during the month of July and patient is not allowed to move with her. Mother shared patient's years of alcoholism history and shared that she has attended [employer] meetings enough to understand that she can no longer enable patient and that 'the pieces will fall where they may'. Mother also shared that patient is on a stay of commitment currently although she was not sure exactly what that means for him.     SW spoke to patient's [employer]  Teresa Lopez ([phone], [phone]) who indicated that if patient does not willingly go to detox, the hospital has nothing to hold him for and that she will begin 'Revocation of Commitment' on Monday.    SW shared plan with patient's mother who became irate, stating, 'I cannot even believe this, that the hospital can just let him go to the streets. Someone is going to get sued if that happens and he dies on the street.'      PLAN  Patient given options and choices for discharge: Detox.  Patient/family is agreeable to the plan?  TBD  Transportation/person available to transport on day of discharge  is TBD and have  they been notified/set up TBD  Patient Goals and Preferences: TBD .  Patient anticipates discharging to:  TBD  ELEANOR requested that the ED fax discharge summary to patient's CM at: [phone]

## 2019-08-02 ENCOUNTER — OFFICE VISIT (OUTPATIENT)
Dept: FAMILY MEDICINE | Facility: CLINIC | Age: 29
End: 2019-08-02
Payer: COMMERCIAL

## 2019-08-02 DIAGNOSIS — F19.20 CHEMICAL DEPENDENCY (H): ICD-10-CM

## 2019-08-02 DIAGNOSIS — F17.200 TOBACCO DEPENDENCE SYNDROME: ICD-10-CM

## 2019-08-02 DIAGNOSIS — F10.229 ALCOHOL DEPENDENCE WITH INTOXICATION WITH COMPLICATION (H): ICD-10-CM

## 2019-08-02 DIAGNOSIS — I10 HYPERTENSION GOAL BP (BLOOD PRESSURE) < 140/90: ICD-10-CM

## 2019-08-02 DIAGNOSIS — K29.00 OTHER ACUTE GASTRITIS WITHOUT HEMORRHAGE: Primary | ICD-10-CM

## 2019-08-02 PROCEDURE — 99214 OFFICE O/P EST MOD 30 MIN: CPT | Performed by: FAMILY MEDICINE

## 2019-08-02 RX ORDER — GABAPENTIN 800 MG/1
800 TABLET ORAL 4 TIMES DAILY
Qty: 240 TABLET | Refills: 0 | Status: SHIPPED | OUTPATIENT
Start: 2019-08-02 | End: 2020-02-25

## 2019-08-02 RX ORDER — PANTOPRAZOLE SODIUM 40 MG/1
40 TABLET, DELAYED RELEASE ORAL DAILY
Qty: 90 TABLET | Refills: 0 | Status: SHIPPED | OUTPATIENT
Start: 2019-08-02 | End: 2019-11-25

## 2019-08-02 RX ORDER — CLONIDINE HYDROCHLORIDE 0.1 MG/1
0.1 TABLET ORAL 2 TIMES DAILY
Qty: 180 TABLET | Refills: 0 | Status: SHIPPED | OUTPATIENT
Start: 2019-08-02 | End: 2020-02-13

## 2019-08-02 RX ORDER — AMLODIPINE BESYLATE 10 MG/1
10 TABLET ORAL DAILY
Qty: 90 TABLET | Refills: 0 | Status: SHIPPED | OUTPATIENT
Start: 2019-08-02 | End: 2019-11-25

## 2019-08-02 NOTE — PROGRESS NOTES
Subjective     Nickolas Lang is a 28 year old male who presents to clinic today for the following health issues:    HPI   ABDOMINAL PAIN     Onset:  40 days    Description:   Character: Fullness  Location: epigastric region  Radiation: None    Intensity: severe    Progression of Symptoms:  waxing and waning    Accompanying Signs & Symptoms:  Fever/Chills?: YES  Gas/Bloating: YES  Nausea: YES  Vomitting: no  Diarrhea?: YES but rare  Constipation:NoDysuria or Hematuria: no    History:   Trauma: no   Previous similar pain: YES   Previous tests done: yes    Precipitating factors:   Does the pain change with:     Food: no      BM: no     Urination: no     Alleviating factors:  Is off PPI 1 month, taking advil daily   coffee 2-3/ day    Therapies Tried and outcome:     LMP:  not applicable     Encounter Diagnoses   Name Primary?     Other acute gastritis without hemorrhage Yes     Alcohol dependence with intoxication with complication (H)      Chemical dependency (H). Sober now 1 month, going to aftercare         Current Outpatient Medications   Medication Sig Dispense Refill     amLODIPine (NORVASC) 10 MG tablet Take 1 tablet (10 mg) by mouth daily 90 tablet 0     cloNIDine (CATAPRES) 0.1 MG tablet Take 1 tablet (0.1 mg) by mouth 2 times daily 180 tablet 0     gabapentin (NEURONTIN) 800 MG tablet Take 1 tablet (800 mg) by mouth 4 times daily 240 tablet 0     pantoprazole (PROTONIX) 40 MG EC tablet Take 1 tablet (40 mg) by mouth daily 90 tablet 0     FLUoxetine (PROZAC) 20 MG capsule Take 20 mg by mouth daily       LORazepam (ATIVAN) 0.5 MG tablet Take 2 tablets (1 mg) by mouth every 8 hours as needed for anxiety (Patient not taking: Reported on 4/3/2019) 9 tablet 0     mirtazapine (REMERON) 15 MG tablet Take 1 tablet (15 mg) by mouth At Bedtime for 3 days (Patient not taking: Reported on 4/3/2019) 3 tablet 0     multivitamin, therapeutic with minerals (THERA-VIT-M) TABS tablet Take 1 tablet by mouth daily 30 each  0     nicotine polacrilex 4 MG lozenge 1-2 lozenges every hour as needed, max 10 lozenges per day (Patient not taking: Reported on 4/3/2019) 360 tablet 1     sertraline (ZOLOFT) 50 MG tablet Take 3 tablets (150 mg) by mouth daily for 3 days 9 tablet 0     traZODone (DESYREL) 100 MG tablet Take 2 tablets (200 mg) by mouth nightly as needed for sleep 6 tablet 0         Reviewed and updated as needed this visit by Provider         Review of Systems   ROS COMP: Constitutional, HEENT, cardiovascular, pulmonary, gi and gu systems are negative, except as otherwise noted.      Objective    There were no vitals taken for this visit.  There is no height or weight on file to calculate BMI.  Physical Exam   GENERAL: alert and no distress  NECK: no adenopathy, no asymmetry, masses, or scars and thyroid normal to palpation  RESP: lungs clear to auscultation - no rales, rhonchi or wheezes  CV: regular rate and rhythm, normal S1 S2, no S3 or S4, no murmur, click or rub, no peripheral edema and peripheral pulses strong  ABDOMEN: soft, nontender, no hepatosplenomegaly, no masses and bowel sounds normal  SKIN: no suspicious lesions or rashes  NEURO: Normal strength and tone, mentation intact and speech normal    Diagnostic Test Results:  Labs reviewed in Epic        Assessment & Plan   Assessment      Plan  1. Other acute gastritis without hemorrhage  Stop advil, cut back caffeine  - pantoprazole (PROTONIX) 40 MG EC tablet; Take 1 tablet (40 mg) by mouth daily  Dispense: 90 tablet; Refill: 0  Follow up in 1 month.   2.  HTN Well controlled   - amLODIPine (NORVASC) 10 MG tablet; Take 1 tablet (10 mg) by mouth daily  Dispense: 90 tablet; Refill: 0  - cloNIDine (CATAPRES) 0.1 MG tablet; Take 1 tablet (0.1 mg) by mouth 2 times daily  Dispense: 180 tablet; Refill: 0  - gabapentin (NEURONTIN) 800 MG tablet; Take 1 tablet (800 mg) by mouth 4 times daily  Dispense: 240 tablet; Refill: 0  - Comprehensive metabolic panel (BMP + Alb, Alk Phos,  ALT, AST, Total. Bili, TP); Future  - CBC with platelets; Future    3. Chemical dependency (H)  Sober 1 month, continue after care Follow up with consultant as planned.       Tobacco Cessation:   reports that he has been smoking cigarettes.  He started smoking about 12 years ago. He has a 2.20 pack-year smoking history. He uses smokeless tobacco.  Tobacco Cessation Action Plan: Information offered: Patient not interested at this time             Regular exercise  Follow up in 1 month.   See Patient Instructions    No follow-ups on file.    Abran Yousif MD  [facility] [place]

## 2019-08-19 ENCOUNTER — TELEPHONE (OUTPATIENT)
Dept: FAMILY MEDICINE | Facility: CLINIC | Age: 29
End: 2019-08-19

## 2019-08-19 DIAGNOSIS — F17.200 NICOTINE DEPENDENCE, UNCOMPLICATED, UNSPECIFIED NICOTINE PRODUCT TYPE: Primary | ICD-10-CM

## 2019-08-19 NOTE — TELEPHONE ENCOUNTER
Nicotine prolacrilex had been ordered in 10/15/18 by another provider .   Pt had discussed smoking cessation when seen by Dr Yousif on 8/2/19    Can covering provider write rx for another nicotine product?    Pharmacy queued    Jailene Aguila, RN, BSN

## 2019-08-19 NOTE — TELEPHONE ENCOUNTER
"[facility] sent a fax regarding  nicotine polacrilex 4 MG lozenge  Stating \"Drug not covered by patient plan. The preferred alternative is Nicorelief, Nicorette, Bupropion HCL SR, Chantix, Nicotine Gum, Nicotine Lozenge.\"      "

## 2019-09-09 ENCOUNTER — TELEPHONE (OUTPATIENT)
Dept: FAMILY MEDICINE | Facility: CLINIC | Age: 29
End: 2019-09-09

## 2019-09-09 NOTE — TELEPHONE ENCOUNTER
Prior Authorization Retail Medication Request    Medication/Dose: nicotine polacrilex (NICORETTE) 4 MG gum  ICD code (if different than what is on RX):    Previously Tried and Failed:    Rationale:      Insurance Name:  [phone]  Insurance ID:  [id number]      Pharmacy Information (if different than what is on RX)  Name:  [facility]  Phone:  [phone]  Fax: [phone]

## 2019-09-13 NOTE — TELEPHONE ENCOUNTER
Prior Authorization Not Needed per Insurance    Medication: nicotine polacrilex (NICORETTE) 4 MG gum  Insurance Company: [employer] - Phone [phone] Fax [phone]  Expected CoPay:      Pharmacy Filling the Rx: [id number] [address] AT [facility] & [address]  Pharmacy Notified: Yes  Patient Notified: Yes **Instructed pharmacy to notify patient when script is ready to /ship.**

## 2019-11-21 NOTE — PROGRESS NOTES
[facility] RESPIRATORY NOTE  Date of Admission: 9/13/2018  Date of Intubation (most recent):  9/13/18  Reason for Mechanical Ventilation:  Overdose   Number of Days on Mechanical Ventilation: 1  Met Criteria for Pressure Support Trial:  No  Length of Pressure Support Trial:  Reason for Stopping Pressure Support Trial:  Reason for No Pressure Support Trial:  Per MD    Significant Events Today:  None    ABG Results:    ETT appearance on chest x-ray:    Skin Assessment:    Plan:  Pt to remain on full vent support overnight       Patient name: Giancarlo Guzmán  MRN: [id number]  ADDRESS: [address] [place] [id number]  Date: 11/21/2019    History of Present Illness  The patient came with daughter Gris White, age 74, who lives 34 minutes away from her and sees her daily. She lives with Jose Antonio's daughter.   --daughter  (age 50) Works   In   [place]    The patient is here today for a follow-up for multiple complaints.    Debility---same    The patient's main complaint is protein-calorie malnutrition. She is still losing weight; her weight has decreased to 103 lb since last visit. She is encouraged to eat more and to try Ensure between meals.     HTN---BP   162/72    Today,    Up   From    122/76---5/17/19--     Alzheimers------goes to an [facility] center-----[address]  Weakness of leg muscles in past month------so that she can no longer walk, even with a walker-----she is getting physical therapy-----which will now include Quadriceps exercises at the [facility]  She   Uses   A   Wheelchair  at   Home ----with   assistance  And   It   Is   Medically  Necessary   For   Her   To   Get   A Wheelchair ramp----but   They   Refuse  To   Build   It   In   [facility]  Hearing loss------does not want hearing aide     Review of Systems   All other systems are negative, except as stated in HPI.    Visit Vitals  /66 (BP Location: LUE - Left upper extremity, Patient Position: Sitting)   Pulse 57   Temp 95.8 °F (35.4 °C) (Tympanic)   Resp 16   Ht 5' 3\" (1.6 m)   Wt 46.8 kg (103 lb 1.6 oz)   SpO2 98%   BMI 18.26 kg/m²       Current Outpatient Medications   Medication Sig Dispense Refill   • memantine (NAMENDA) 10 MG tablet TAKE 1 TABLET BY MOUTH TWICE DAILY FOR MEMORY 180 tablet 3   • donepezil (ARICEPT) 10 MG tablet TAKE 1 TABLET BY MOUTH EVERY DAY 90 tablet 3   • amLODIPine (NORVASC) 2.5 MG tablet TAKE 1 TABLET BY MOUTH DAILY 90 tablet 3   • metoPROLOL tartrate (LOPRESSOR) 50 MG tablet TAKE 1 TABLET BY MOUTH  DAILY 90 tablet 2   • aspirin 81 MG tablet Take 81 mg by mouth daily.     • loperamide (IMODIUM A-D) 2 MG tablet Take 2 mg by mouth 4 times daily as needed for Diarrhea.     • acetaminophen (TYLENOL) 325 MG tablet Take 650 mg by mouth every 4 hours as needed for Pain.     • amLODIPine (NORVASC) 2.5 MG tablet TAKE 1 TABLET BY MOUTH DAILY AS DIRECTED     • haloperidol (HALDOL) 0.5 MG tablet TAKE 2 TABLETS EVERY DAY       No current facility-administered medications for this visit.        Patient Active Problem List   Diagnosis   • Quadriceps weakness   • Early onset Alzheimer's dementia without behavioral disturbance (CMS/HCC)   • Essential hypertension   • Protein-calorie malnutrition, moderate (CMS/HCC)       No past medical history on file.    No past surgical history on file.    No family history on file.    Social History     Socioeconomic History   • Marital status: Single     Spouse name: Not on file   • Number of children: Not on file   • Years of education: Not on file   • Highest education level: Not on file   Occupational History   • Not on file   Social Needs   • Financial resource strain: Not on file   • Food insecurity:     Worry: Not on file     Inability: Not on file   • Transportation needs:     Medical: Not on file     Non-medical: Not on file   Tobacco Use   • Smoking status: Never Smoker   • Smokeless tobacco: Never Used   Substance and Sexual Activity   • Alcohol use: No     Frequency: Never   • Drug use: Not on file   • Sexual activity: Not on file   Lifestyle   • Physical activity:     Days per week: Not on file     Minutes per session: Not on file   • Stress: Not on file   Relationships   • Social connections:     Talks on phone: Not on file     Gets together: Not on file     Attends Voodoo service: Not on file     Active member of club or organization: Not on file     Attends meetings of clubs or organizations: Not on file     Relationship status: Not on file   • Intimate partner violence:      Fear of current or ex partner: Not on file     Emotionally abused: Not on file     Physically abused: Not on file     Forced sexual activity: Not on file   Other Topics Concern   • Not on file   Social History Narrative   • Not on file     Physical Exam    Constitutional: She is debilitated and is in a wheelchair, but the daughter states that she can get up at home and get on a walker, and they hold her while she uses a walker to prevent her from falling. Alert, in no acute distress and current vital signs reviewed.  Head and Face: Atraumatic and normocephalic.  Eyes: No discharge, no eyelid swelling and the sclerae were normal.  ENT: Normal appearing outer ear. Tympanic membranes are bilaterally clear, normal appearing nose and normal lips.  Neck: Normal appearing neck and supple neck.  Pulmonary: Breath sounds clear to auscultation bilaterally, but no respiratory distress and normal respiratory rate and effort.  Cardiovascular: Normal rate, regular rhythm, normal S1, normal S2 and edema was not present in the lower extremities. No murmur. No gallops.  Abdomen: Soft and nontender.  Neurologic: Extremities- Her legs are very weak, particularly the thigh muscles, quadriceps insufficiency, and quadriceps muscle weakness.  Psychiatric:  She is mumbling most of the time and only so often does she say something that is sensible because of her Alzheimer disease.  Skin, Hair, Nails: Normal skin color and pigmentation.    Assessment/Plan  Early onset Alzheimer's dementia without behavioral disturbance (CMS/HCC)  Continue current medications.    Essential hypertension  Continue current medications.    Protein-calorie malnutrition, moderate (CMS/HCC)  Advised daughter to spoonfeed her to eat three times a day.   Advised daughter to make her eat one can of Ensure between meals, three times a day.  We will continue to monitor.  Continue present management.    Quadriceps weakness  We will continue to monitor.  Continue  present management.    Advised her to go to Day-care center every day.  Follow up with me in three months.    Medical compliance with plan discussed and risks of non-compliance reviewed.  Patient education completed on disease process, etiology & prognosis.  Patient expresses understanding of the plan.  Proper usage and side effects of medications reviewed & discussed.  Refer to orders.  Return to clinic as clinically indicated as discussed with patient who verbalized understanding of & agreement with the plan.  Instructions provided as documented in the AVS.    Entered by Robert Calle, acting as a scribe for Dr. Flavio Ayala MD

## 2019-11-26 DIAGNOSIS — F10.229 ALCOHOL DEPENDENCE WITH INTOXICATION WITH COMPLICATION (H): ICD-10-CM

## 2019-11-26 RX ORDER — CLONIDINE HYDROCHLORIDE 0.1 MG/1
TABLET ORAL
Qty: 60 TABLET | Refills: 0 | OUTPATIENT
Start: 2019-11-26

## 2019-11-26 NOTE — TELEPHONE ENCOUNTER
"Requested Prescriptions   Pending Prescriptions Disp Refills     cloNIDine (CATAPRES) 0.1 MG tablet [Pharmacy Med Name: CLONIDINE 0.1MG TABLETS] 60 tablet 0     Sig: TAKE ONE TABLET BY MOUTH TWICE DAILY  Last Written Prescription Date:  08/02/2019  Last Fill Quantity: 180,  # refills: 0   Last office visit: 8/2/2019 with prescribing provider:  08/02/2019   Future Office Visit:         Central Acting Antiadrenergic Agents Failed - 11/26/2019  6:51 AM        Failed - Blood pressure under 140/90 in past 12 months     BP Readings from Last 3 Encounters:   06/21/19 (!) 137/105   04/03/19 128/86   02/12/19 (!) 156/109                 Failed - Normal serum creatinine on file within past 12 months     Recent Labs   Lab Test 06/21/19  1201   CR 0.56*             Passed - Patient is 6 years of age or older        Passed - Recent (12 mo) or future (30 days) visit within the authorizing provider's specialty     Patient has had an office visit with the authorizing provider or a provider within the authorizing providers department within the previous 12 mos or has a future within next 30 days. See \"Patient Info\" tab in inbasket, or \"Choose Columns\" in Meds & Orders section of the refill encounter.              Passed - Medication is active on med list        "

## 2019-11-26 NOTE — TELEPHONE ENCOUNTER
Attempted to call patient, unable to leave VM. Per chart review patient needs to follow-up with provider. Sent patient MobilePro message with request to schedule follow-up visit. Medication refused. Patients needs to be seen prior to refilling.   Dorys Panda RN on 11/26/2019 at 10:08 AM

## 2019-12-20 DIAGNOSIS — K29.00 OTHER ACUTE GASTRITIS WITHOUT HEMORRHAGE: ICD-10-CM

## 2019-12-23 RX ORDER — PANTOPRAZOLE SODIUM 40 MG/1
TABLET, DELAYED RELEASE ORAL
Qty: 30 TABLET | Refills: 0 | Status: SHIPPED | OUTPATIENT
Start: 2019-12-23 | End: 2020-01-17

## 2019-12-23 NOTE — TELEPHONE ENCOUNTER
"Requested Prescriptions   Pending Prescriptions Disp Refills     pantoprazole (PROTONIX) 40 MG EC tablet [Pharmacy Med Name: PANTOPRAZOLE 40MG TABLETS] 30 tablet 0     Sig: TAKE 1 TABLET BY MOUTH EVERY DAY   Last Written Prescription Date:  11/25/19  Last Fill Quantity: 30,  # refills: 0   Last office visit: 8/2/2019 with prescribing provider   Future Office Visit:        PPI Protocol Passed - 12/20/2019  7:02 PM        Passed - Not on Clopidogrel (unless Pantoprazole ordered)        Passed - No diagnosis of osteoporosis on record        Passed - Recent (12 mo) or future (30 days) visit within the authorizing provider's specialty     Patient has had an office visit with the authorizing provider or a provider within the authorizing providers department within the previous 12 mos or has a future within next 30 days. See \"Patient Info\" tab in inbasket, or \"Choose Columns\" in Meds & Orders section of the refill encounter.              Passed - Medication is active on med list        Passed - Patient is age 18 or older        Prescription approved per [facility] Refill Protocol.  Dorys Panda RN on 12/23/2019 at 10:31 AM    "

## 2020-01-16 DIAGNOSIS — K29.00 OTHER ACUTE GASTRITIS WITHOUT HEMORRHAGE: ICD-10-CM

## 2020-01-16 RX ORDER — PANTOPRAZOLE SODIUM 40 MG/1
TABLET, DELAYED RELEASE ORAL
Qty: 30 TABLET | Refills: 0 | Status: CANCELLED | OUTPATIENT
Start: 2020-01-16

## 2020-01-17 DIAGNOSIS — F17.200 NICOTINE DEPENDENCE, UNCOMPLICATED, UNSPECIFIED NICOTINE PRODUCT TYPE: ICD-10-CM

## 2020-01-17 RX ORDER — PANTOPRAZOLE SODIUM 40 MG/1
TABLET, DELAYED RELEASE ORAL
Qty: 30 TABLET | Refills: 0 | Status: SHIPPED | OUTPATIENT
Start: 2020-01-17 | End: 2020-02-25

## 2020-01-17 NOTE — TELEPHONE ENCOUNTER
"Requested Prescriptions   Pending Prescriptions Disp Refills     nicotine polacrilex (NICORETTE) 4 MG gum [Pharmacy Med Name: NICOTINE 4MG UNCTD REG 'S]       Sig: PLACE 1 GUM INSIDE CHEEK AS NEEDED FOR SMOKING CESSATION   Last Written Prescription Date:  8/19/19  Last Fill Quantity: 120,  # refills: 3   Last office visit: 8/2/2019 with prescribing provider   Future Office Visit:        Partial Cholinergic Nicotinic Agonist Agents Failed - 1/17/2020 10:37 AM        Failed - Blood pressure under 140/90 in past 12 months     BP Readings from Last 3 Encounters:   06/21/19 (!) 137/105   04/03/19 128/86   02/12/19 (!) 156/109                 Passed - Recent (12 mo) or future (30 days) visit within the authorizing provider's specialty     Patient has had an office visit with the authorizing provider or a provider within the authorizing providers department within the previous 12 mos or has a future within next 30 days. See \"Patient Info\" tab in inbasket, or \"Choose Columns\" in Meds & Orders section of the refill encounter.              Passed - Medication is active on med list        Passed - Patient is 18 years of age or older          "

## 2020-01-17 NOTE — TELEPHONE ENCOUNTER
"Requested Prescriptions   Pending Prescriptions Disp Refills     pantoprazole (PROTONIX) 40 MG EC tablet [Pharmacy Med Name: PANTOPRAZOLE 40MG TABLETS] 30 tablet 0     Sig: TAKE 1 TABLET BY MOUTH EVERY DAY   Last Written Prescription Date:  12/23/19  Last Fill Quantity: 30,  # refills: 0   Last office visit: 8/2/2019 with prescribing provider:   Future Office Visit:        PPI Protocol Passed - 1/16/2020  5:38 PM        Passed - Not on Clopidogrel (unless Pantoprazole ordered)        Passed - No diagnosis of osteoporosis on record        Passed - Recent (12 mo) or future (30 days) visit within the authorizing provider's specialty     Patient has had an office visit with the authorizing provider or a provider within the authorizing providers department within the previous 12 mos or has a future within next 30 days. See \"Patient Info\" tab in inbasket, or \"Choose Columns\" in Meds & Orders section of the refill encounter.              Passed - Medication is active on med list        Passed - Patient is age 18 or older        Prescription approved per [facility] Refill Protocol.  Dorys Panda RN on 1/17/2020 at 8:35 AM    "

## 2020-02-13 DIAGNOSIS — F10.229 ALCOHOL DEPENDENCE WITH INTOXICATION WITH COMPLICATION (H): ICD-10-CM

## 2020-02-13 DIAGNOSIS — Z00.00 HEALTHCARE MAINTENANCE: Primary | ICD-10-CM

## 2020-02-13 DIAGNOSIS — K29.00 OTHER ACUTE GASTRITIS WITHOUT HEMORRHAGE: ICD-10-CM

## 2020-02-13 RX ORDER — CLONIDINE HYDROCHLORIDE 0.1 MG/1
TABLET ORAL
Qty: 60 TABLET | Refills: 0 | Status: SHIPPED | OUTPATIENT
Start: 2020-02-13 | End: 2020-02-25

## 2020-02-13 RX ORDER — PANTOPRAZOLE SODIUM 40 MG/1
TABLET, DELAYED RELEASE ORAL
Qty: 30 TABLET | Refills: 0 | OUTPATIENT
Start: 2020-02-13

## 2020-02-13 NOTE — TELEPHONE ENCOUNTER
"Requested Prescriptions   Pending Prescriptions Disp Refills     amLODIPine (NORVASC) 10 MG tablet [Pharmacy Med Name: AMLODIPINE BESYLATE 10MG TABLETS] 30 tablet 0     Sig: TAKE ONE TABLET BY MOUTH DAILY   Last Written Prescription Date:  11/25/2019  Last Fill Quantity: 30,  # refills: 0   Last office visit: 8/2/2019 with prescribing provider   Future Office Visit:          Calcium Channel Blockers Protocol  Failed - 2/13/2020  7:17 AM        Failed - Blood pressure under 140/90 in past 12 months     BP Readings from Last 3 Encounters:   06/21/19 (!) 137/105   04/03/19 128/86   02/12/19 (!) 156/109                 Failed - Normal serum creatinine on file in past 12 months     Recent Labs   Lab Test 06/21/19  1201   CR 0.56*             Passed - Recent (12 mo) or future (30 days) visit within the authorizing provider's specialty     Patient has had an office visit with the authorizing provider or a provider within the authorizing providers department within the previous 12 mos or has a future within next 30 days. See \"Patient Info\" tab in inbasket, or \"Choose Columns\" in Meds & Orders section of the refill encounter.              Passed - Medication is active on med list        Passed - Patient is age 18 or older          "

## 2020-02-13 NOTE — TELEPHONE ENCOUNTER
Routing refill request to provider for review/approval because:  Blood pressure out of range   LAB OUT OF RANGE, ORDER QUEUED FOR RECHECK.    Failed - Normal serum creatinine on file in past 12 months             Recent Labs   Lab Test 06/21/19  1201   CR 0.56*        Dorys Panda RN on 2/13/2020 at 8:58 AM

## 2020-02-13 NOTE — TELEPHONE ENCOUNTER
"Requested Prescriptions   Pending Prescriptions Disp Refills     pantoprazole (PROTONIX) 40 MG EC tablet [Pharmacy Med Name: PANTOPRAZOLE 40MG TABLETS] 30 tablet 0     Sig: TAKE 1 TABLET BY MOUTH EVERY DAY   Last Written Prescription Date:  1/17/2020  Last Fill Quantity: 30,  # refills: 0   Last office visit: 8/2/2019 with prescribing provider   Future Office Visit:        PPI Protocol Passed - 2/13/2020  3:39 AM        Passed - Not on Clopidogrel (unless Pantoprazole ordered)        Passed - No diagnosis of osteoporosis on record        Passed - Recent (12 mo) or future (30 days) visit within the authorizing provider's specialty     Patient has had an office visit with the authorizing provider or a provider within the authorizing providers department within the previous 12 mos or has a future within next 30 days. See \"Patient Info\" tab in inbasket, or \"Choose Columns\" in Meds & Orders section of the refill encounter.              Passed - Medication is active on med list        Passed - Patient is age 18 or older        cloNIDine (CATAPRES) 0.1 MG tablet [Pharmacy Med Name: CLONIDINE 0.1MG TABLETS] 60 tablet      Sig: TAKE ONE TABLET BY MOUTH TWICE DAILY   Last Written Prescription Date:  8/2/2019  Last Fill Quantity: 180,  # refills: 0   Last office visit: 8/2/2019 with prescribing provider   Future Office Visit:        Central Acting Antiadrenergic Agents Failed - 2/13/2020  3:39 AM        Failed - Blood pressure under 140/90 in past 12 months     BP Readings from Last 3 Encounters:   06/21/19 (!) 137/105   04/03/19 128/86   02/12/19 (!) 156/109                 Failed - Normal serum creatinine on file within past 12 months     Recent Labs   Lab Test 06/21/19  1201   CR 0.56*             Passed - Patient is 6 years of age or older        Passed - Recent (12 mo) or future (30 days) visit within the authorizing provider's specialty     Patient has had an office visit with the authorizing provider or a provider " "within the authorizing providers department within the previous 12 mos or has a future within next 30 days. See \"Patient Info\" tab in inbasket, or \"Choose Columns\" in Meds & Orders section of the refill encounter.              Passed - Medication is active on med list          "

## 2020-02-13 NOTE — TELEPHONE ENCOUNTER
Routing refill request to provider for review/approval because:  Blood pressure out of range   FAILS REFILL PROTOCOL, LAB OUT OF RANGE, order queued in another refill encounter for same lab value. Dorys Panda RN on 2/13/2020 at 9:03 AM

## 2020-02-14 RX ORDER — AMLODIPINE BESYLATE 10 MG/1
TABLET ORAL
Qty: 30 TABLET | Refills: 0 | OUTPATIENT
Start: 2020-02-14

## 2020-02-25 ENCOUNTER — MYC REFILL (OUTPATIENT)
Dept: FAMILY MEDICINE | Facility: CLINIC | Age: 30
End: 2020-02-25

## 2020-02-25 ENCOUNTER — TELEPHONE (OUTPATIENT)
Dept: FAMILY MEDICINE | Facility: CLINIC | Age: 30
End: 2020-02-25

## 2020-02-25 ENCOUNTER — E-VISIT (OUTPATIENT)
Dept: FAMILY MEDICINE | Facility: CLINIC | Age: 30
End: 2020-02-25
Payer: COMMERCIAL

## 2020-02-25 ENCOUNTER — MYC MEDICAL ADVICE (OUTPATIENT)
Dept: FAMILY MEDICINE | Facility: CLINIC | Age: 30
End: 2020-02-25

## 2020-02-25 DIAGNOSIS — F10.229 ALCOHOL DEPENDENCE WITH INTOXICATION WITH COMPLICATION (H): ICD-10-CM

## 2020-02-25 DIAGNOSIS — K29.00 OTHER ACUTE GASTRITIS WITHOUT HEMORRHAGE: ICD-10-CM

## 2020-02-25 DIAGNOSIS — F17.200 NICOTINE DEPENDENCE, UNCOMPLICATED, UNSPECIFIED NICOTINE PRODUCT TYPE: ICD-10-CM

## 2020-02-25 PROCEDURE — 99422 OL DIG E/M SVC 11-20 MIN: CPT | Performed by: FAMILY MEDICINE

## 2020-02-25 RX ORDER — PANTOPRAZOLE SODIUM 40 MG/1
40 TABLET, DELAYED RELEASE ORAL DAILY
Qty: 30 TABLET | Refills: 0 | Status: SHIPPED | OUTPATIENT
Start: 2020-02-25 | End: 2020-04-21

## 2020-02-25 RX ORDER — AMLODIPINE BESYLATE 10 MG/1
10 TABLET ORAL DAILY
Qty: 30 TABLET | Refills: 0 | Status: SHIPPED | OUTPATIENT
Start: 2020-02-25 | End: 2020-04-21

## 2020-02-25 RX ORDER — GABAPENTIN 800 MG/1
800 TABLET ORAL 4 TIMES DAILY
Qty: 240 TABLET | Refills: 0 | Status: SHIPPED | OUTPATIENT
Start: 2020-02-25 | End: 2020-04-21

## 2020-02-25 RX ORDER — CLONIDINE HYDROCHLORIDE 0.1 MG/1
0.1 TABLET ORAL 2 TIMES DAILY
Qty: 60 TABLET | Refills: 0 | Status: SHIPPED | OUTPATIENT
Start: 2020-02-25 | End: 2020-04-21

## 2020-02-25 RX ORDER — CLONIDINE HYDROCHLORIDE 0.1 MG/1
0.1 TABLET ORAL 2 TIMES DAILY
Qty: 60 TABLET | Refills: 0 | Status: SHIPPED | OUTPATIENT
Start: 2020-02-25 | End: 2020-03-16

## 2020-02-25 NOTE — TELEPHONE ENCOUNTER
Requested Prescriptions   Pending Prescriptions Disp Refills     gabapentin (NEURONTIN) 800 MG tablet 240 tablet 0     Sig: Take 1 tablet (800 mg) by mouth 4 times daily       There is no refill protocol information for this order   Neurontin 800 mg      Last Written Prescription Date:  8/2/2019  Last Fill Quantity: 240,   # refills: 0  Future Office visit:    Next 5 appointments (look out 90 days)    Mar 03, 2020  1:30 PM CST  MyChart Long with Abran Yousif MD  [address] [id number] [phone]       Routing refill request to provider for review/approval because:  Drug not on the [facility] or [facility] refill protocol or controlled substance     CHECKED  LAST FILLED on  02/03/2020, 120 tablets, 3/3 refills by Luis Alberto Welsh       amLODIPine (NORVASC) 10 MG tablet 30 tablet 0     Sig: Take 1 tablet (10 mg) by mouth daily   ,Last Written Prescription Date:  2/25/2020  Last Fill Quantity: 30,  # refills: 0   Last office visit: 8/2/2019 with prescribing provider   Future Office Visit:   Next 5 appointments (look out 90 days)    Mar 03, 2020  1:30 PM CST  MyChart Long with Abran Yousif MD  [facility] ([facility]) [address] [place] [id number] [phone]             Calcium Channel Blockers Protocol  Failed - 2/25/2020  2:27 AM        Failed - Blood pressure under 140/90 in past 12 months     BP Readings from Last 3 Encounters:   06/21/19 (!) 137/105   04/03/19 128/86   02/12/19 (!) 156/109                 Failed - Normal serum creatinine on file in past 12 months     Recent Labs   Lab Test 06/21/19  1201   CR 0.56*             Passed - Recent (12 mo) or future (30 days) visit within the authorizing provider's specialty     Patient has had an office visit with the authorizing provider or a provider within the authorizing  "providers department within the previous 12 mos or has a future within next 30 days. See \"Patient Info\" tab in inbasket, or \"Choose Columns\" in Meds & Orders section of the refill encounter.              Passed - Medication is active on med list        Passed - Patient is age 18 or older        pantoprazole (PROTONIX) 40 MG EC tablet 30 tablet 0     Sig: Take 1 tablet (40 mg) by mouth daily   Last Written Prescription Date:  2/25/2020  Last Fill Quantity: 30,  # refills: 0   Last office visit: 8/2/2019 with prescribing provider   Future Office Visit:   Next 5 appointments (look out 90 days)    Mar 03, 2020  1:30 PM CST  MyChart Long with Abran Yousif MD  [facility] ([address] [address] [phone]             PPI Protocol Passed - 2/25/2020  2:27 AM        Passed - Not on Clopidogrel (unless Pantoprazole ordered)        Passed - No diagnosis of osteoporosis on record        Passed - Recent (12 mo) or future (30 days) visit within the authorizing provider's specialty     Patient has had an office visit with the authorizing provider or a provider within the authorizing providers department within the previous 12 mos or has a future within next 30 days. See \"Patient Info\" tab in inbasket, or \"Choose Columns\" in Meds & Orders section of the refill encounter.              Passed - Medication is active on med list        Passed - Patient is age 18 or older        nicotine polacrilex (NICORETTE) 4 MG gum 110 each 1   Last Written Prescription Date:  1/17/2020  Last Fill Quantity: 110,  # refills: 1   Last office visit: 8/2/2019 with prescribing provider   Future Office Visit:   Next 5 appointments (look out 90 days)    Mar 03, 2020  1:30 PM CST  MyChart Joe with Abran Yousif MD  [facility] ([facility]) [address] [place] [id number] [phone]       " "      Partial Cholinergic Nicotinic Agonist Agents Failed - 2/25/2020  2:27 AM        Failed - Blood pressure under 140/90 in past 12 months     BP Readings from Last 3 Encounters:   06/21/19 (!) 137/105   04/03/19 128/86   02/12/19 (!) 156/109                 Passed - Recent (12 mo) or future (30 days) visit within the authorizing provider's specialty     Patient has had an office visit with the authorizing provider or a provider within the authorizing providers department within the previous 12 mos or has a future within next 30 days. See \"Patient Info\" tab in inbasket, or \"Choose Columns\" in Meds & Orders section of the refill encounter.              Passed - Medication is active on med list        Passed - Patient is 18 years of age or older        cloNIDine (CATAPRES) 0.1 MG tablet 60 tablet 0     Sig: Take 1 tablet (0.1 mg) by mouth 2 times daily   Last Written Prescription Date:  2/25/2020  Last Fill Quantity: 60,  # refills: 0   Last office visit: 8/2/2019 with prescribing provider   Future Office Visit:   Next 5 appointments (look out 90 days)    Mar 03, 2020  1:30 PM CST  MyChart Long with Abran Yousif MD  [facility] ([facility]) [address] [place] [address] [phone]             Central Acting Antiadrenergic Agents Failed - 2/25/2020  2:27 AM        Failed - Blood pressure under 140/90 in past 12 months     BP Readings from Last 3 Encounters:   06/21/19 (!) 137/105   04/03/19 128/86   02/12/19 (!) 156/109                 Failed - Normal serum creatinine on file within past 12 months     Recent Labs   Lab Test 06/21/19  1201   CR 0.56*             Passed - Patient is 6 years of age or older        Passed - Recent (12 mo) or future (30 days) visit within the authorizing provider's specialty     Patient has had an office visit with the authorizing provider or a provider within the authorizing providers department within the " "previous 12 mos or has a future within next 30 days. See \"Patient Info\" tab in inbasket, or \"Choose Columns\" in Meds & Orders section of the refill encounter.              Passed - Medication is active on med list        Dorys Panda RN on 2/25/2020 at 10:37 AM    "

## 2020-02-25 NOTE — TELEPHONE ENCOUNTER
I am not taking on new patients ( I only saw him once in an acute care setting ) so he should follow up with Radha

## 2020-04-19 ENCOUNTER — TELEPHONE (OUTPATIENT)
Dept: FAMILY MEDICINE | Facility: CLINIC | Age: 30
End: 2020-04-19

## 2020-04-19 DIAGNOSIS — F10.229 ALCOHOL DEPENDENCE WITH INTOXICATION WITH COMPLICATION (H): ICD-10-CM

## 2020-04-19 DIAGNOSIS — K29.00 OTHER ACUTE GASTRITIS WITHOUT HEMORRHAGE: ICD-10-CM

## 2020-04-20 DIAGNOSIS — F10.229 ALCOHOL DEPENDENCE WITH INTOXICATION WITH COMPLICATION (H): ICD-10-CM

## 2020-04-21 RX ORDER — GABAPENTIN 800 MG/1
TABLET ORAL
Qty: 360 TABLET | Refills: 0 | Status: SHIPPED | OUTPATIENT
Start: 2020-04-21 | End: 2020-04-24

## 2020-04-21 RX ORDER — AMLODIPINE BESYLATE 10 MG/1
TABLET ORAL
Qty: 30 TABLET | Refills: 0 | Status: SHIPPED | OUTPATIENT
Start: 2020-04-21 | End: 2020-05-18

## 2020-04-21 RX ORDER — GABAPENTIN 800 MG/1
TABLET ORAL
Qty: 240 TABLET | Refills: 0 | OUTPATIENT
Start: 2020-04-21

## 2020-04-21 RX ORDER — PANTOPRAZOLE SODIUM 40 MG/1
TABLET, DELAYED RELEASE ORAL
Qty: 30 TABLET | Refills: 0 | Status: SHIPPED | OUTPATIENT
Start: 2020-04-21 | End: 2020-05-18

## 2020-04-21 NOTE — TELEPHONE ENCOUNTER
Requested Prescriptions   Pending Prescriptions Disp Refills     gabapentin (NEURONTIN) 800 MG tablet [Pharmacy Med Name: GABAPENTIN 800MG TABLETS] 240 tablet 0     Sig: TAKE 1 TABLET(800 MG) BY MOUTH FOUR TIMES DAILY       There is no refill protocol information for this order      Last Written Prescription Date:  2/25/2020  Last Fill Quantity: 240,  # refills: 0   Last office visit: 8/2/2019 with prescribing provider:     Future Office Visit:      Duplicate refill request, this one cancelled    Vickie Samuels RN   [facility]

## 2020-04-21 NOTE — TELEPHONE ENCOUNTER
"Left message for patient to schedule a phone or video visit. Pt was due for f/u sept 2019.   See refills requests below.      Requested Prescriptions   Pending Prescriptions Disp Refills     amLODIPine (NORVASC) 10 MG tablet [Pharmacy Med Name: AMLODIPINE BESYLATE 10MG TABLETS] 30 tablet 0     Sig: TAKE 1 TABLET(10 MG) BY MOUTH DAILY   Last Written Prescription Date: 2/25/20  Last Fill Quantity: 30,  # refills: 0   Last office visit: 8/2/2019 with prescribing provider: PHIL Yousif   Future Office Visit: Pt to f/u in one month      Calcium Channel Blockers Protocol  Failed - 4/19/2020  3:40 AM        Failed - Blood pressure under 140/90 in past 12 months     BP Readings from Last 3 Encounters:   06/21/19 (!) 137/105   04/03/19 128/86   02/12/19 (!) 156/109             Failed - Normal serum creatinine on file in past 12 months     Recent Labs   Lab Test 06/21/19  1201   CR 0.56*       Ok to refill medication if creatinine is low          Passed - Recent (12 mo) or future (30 days) visit within the authorizing provider's specialty     Patient has had an office visit with the authorizing provider or a provider within the authorizing providers department within the previous 12 mos or has a future within next 30 days. See \"Patient Info\" tab in inbasket, or \"Choose Columns\" in Meds & Orders section of the refill encounter.              Passed - Medication is active on med list        Passed - Patient is age 18 or older           gabapentin (NEURONTIN) 800 MG tablet [Pharmacy Med Name: GABAPENTIN 800MG TABLETS] 240 tablet 0     Sig: TAKE 1 TABLET(800 MG) BY MOUTH FOUR TIMES DAILY   Last Written Prescription Date: 2/25/2020  Last Fill Quantity: 240,  # refills: 0   Last office visit: 8/2/2019 with prescribing provider: PHIL Yousif   Future Office Visit: Pt to f/u in one month      There is no refill protocol information for this order        pantoprazole (PROTONIX) 40 MG EC tablet [Pharmacy Med Name: PANTOPRAZOLE 40MG TABLETS] " "30 tablet 0     Sig: TAKE 1 TABLET(40 MG) BY MOUTH DAILY       PPI Protocol Passed - 4/19/2020  3:40 AM        Passed - Not on Clopidogrel (unless Pantoprazole ordered)        Passed - No diagnosis of osteoporosis on record        Passed - Recent (12 mo) or future (30 days) visit within the authorizing provider's specialty     Patient has had an office visit with the authorizing provider or a provider within the authorizing providers department within the previous 12 mos or has a future within next 30 days. See \"Patient Info\" tab in inbasket, or \"Choose Columns\" in Meds & Orders section of the refill encounter.              Passed - Medication is active on med list        Passed - Patient is age 18 or older           Routing refill request to provider for review/approval because:  Patient needs to be seen because:  F/u requested by PCP.         "

## 2020-04-21 NOTE — TELEPHONE ENCOUNTER
Please call to schedule appt. Labs have been ordered to complete before visit.   See Alec Yousif's notes below.

## 2020-04-24 ENCOUNTER — VIRTUAL VISIT (OUTPATIENT)
Dept: FAMILY MEDICINE | Facility: CLINIC | Age: 30
End: 2020-04-24
Payer: COMMERCIAL

## 2020-04-24 DIAGNOSIS — F17.200 TOBACCO DEPENDENCE SYNDROME: ICD-10-CM

## 2020-04-24 DIAGNOSIS — F10.229 ALCOHOL DEPENDENCE WITH INTOXICATION WITH COMPLICATION (H): Primary | ICD-10-CM

## 2020-04-24 PROCEDURE — 99213 OFFICE O/P EST LOW 20 MIN: CPT | Mod: 95 | Performed by: FAMILY MEDICINE

## 2020-04-24 RX ORDER — GABAPENTIN 800 MG/1
TABLET ORAL
Qty: 360 TABLET | Refills: 3 | Status: SHIPPED | OUTPATIENT
Start: 2020-04-24 | End: 2020-08-25

## 2020-04-24 NOTE — PROGRESS NOTES
"Nickolas Lang is a 29 year old male who is being evaluated via a billable telephone visit.      The patient has been notified of following:     \"This telephone visit will be conducted via a call between you and your physician/provider. We have found that certain health care needs can be provided without the need for a physical exam.  This service lets us provide the care you need with a short phone conversation.  If a prescription is necessary we can send it directly to your pharmacy.  If lab work is needed we can place an order for that and you can then stop by our lab to have the test done at a later time.    Telephone visits are billed at different rates depending on your insurance coverage. During this emergency period, for some insurers they may be billed the same as an in-person visit.  Please reach out to your insurance provider with any questions.    If during the course of the call the physician/provider feels a telephone visit is not appropriate, you will not be charged for this service.\"    Patient has given verbal consent for Telephone visit?  Yes    How would you like to obtain your AVS? Venancio    Subjective     Nickolas Lang is a 29 year old male who presents to clinic today for the following health issues:    HPI     Encounter Diagnosis   Name Primary?     Alcohol dependence with intoxication with complication (H)     has been doing better on medications ( gabapentin and clonidine) needs refill  Is still smoking           Current Outpatient Medications   Medication Sig Dispense Refill     amLODIPine (NORVASC) 10 MG tablet TAKE 1 TABLET(10 MG) BY MOUTH DAILY 30 tablet 0     cloNIDine (CATAPRES) 0.1 MG tablet TAKE 1 TABLET BY MOUTH TWICE DAILY 180 tablet 0     gabapentin (NEURONTIN) 800 MG tablet TAKE 1 TABLET(800 MG) BY MOUTH FOUR TIMES DAILY 360 tablet 3     multivitamin, therapeutic with minerals (THERA-VIT-M) TABS tablet Take 1 tablet by mouth daily 30 each 0     nicotine polacrilex " (NICORETTE) 4 MG gum Take 1 each (4 mg) by mouth as needed for smoking cessation 110 each 1     pantoprazole (PROTONIX) 40 MG EC tablet TAKE 1 TABLET(40 MG) BY MOUTH DAILY 30 tablet 0     traZODone (DESYREL) 100 MG tablet Take 2 tablets (200 mg) by mouth nightly as needed for sleep 6 tablet 0     FLUoxetine (PROZAC) 20 MG capsule Take 20 mg by mouth daily       sertraline (ZOLOFT) 50 MG tablet Take 3 tablets (150 mg) by mouth daily for 3 days 9 tablet 0       Reviewed and updated as needed this visit by Provider         Review of Systems   ROS COMP: Constitutional, HEENT, cardiovascular, pulmonary, gi and gu systems are negative, except as otherwise noted.         Assessment/Plan:  1. Alcohol dependence with intoxication with complication (H)  He states it is Well controlled   Ok refill  - gabapentin (NEURONTIN) 800 MG tablet; TAKE 1 TABLET(800 MG) BY MOUTH FOUR TIMES DAILY  Dispense: 360 tablet; Refill: 3  Follow up in 3 months.   2. Tobacco dependence syndrome  Urged cessation, he is not ready      No follow-ups on file.      Phone call duration:  14 minutes    Abran Yousif MD

## 2020-05-18 DIAGNOSIS — F10.229 ALCOHOL DEPENDENCE WITH INTOXICATION WITH COMPLICATION (H): ICD-10-CM

## 2020-05-18 DIAGNOSIS — K29.00 OTHER ACUTE GASTRITIS WITHOUT HEMORRHAGE: ICD-10-CM

## 2020-05-18 RX ORDER — AMLODIPINE BESYLATE 10 MG/1
TABLET ORAL
Qty: 30 TABLET | Refills: 0 | Status: SHIPPED | OUTPATIENT
Start: 2020-05-18 | End: 2020-06-19

## 2020-05-18 RX ORDER — PANTOPRAZOLE SODIUM 40 MG/1
TABLET, DELAYED RELEASE ORAL
Qty: 30 TABLET | Refills: 0 | Status: SHIPPED | OUTPATIENT
Start: 2020-05-18 | End: 2020-06-19

## 2020-05-18 NOTE — TELEPHONE ENCOUNTER
"Requested Prescriptions   Pending Prescriptions Disp Refills     pantoprazole (PROTONIX) 40 MG EC tablet [Pharmacy Med Name: PANTOPRAZOLE 40MG TABLETS] 30 tablet 0     Sig: TAKE 1 TABLET(40 MG) BY MOUTH DAILY   Last Written Prescription Date:  4/21/2020  Last Fill Quantity: 30,  # refills: 0   Last office visit: 8/2/2019 with prescribing provider:     Future Office Visit:        PPI Protocol Passed - 5/18/2020  3:38 AM        Passed - Not on Clopidogrel (unless Pantoprazole ordered)        Passed - No diagnosis of osteoporosis on record        Passed - Recent (12 mo) or future (30 days) visit within the authorizing provider's specialty     Patient has had an office visit with the authorizing provider or a provider within the authorizing providers department within the previous 12 mos or has a future within next 30 days. See \"Patient Info\" tab in inbasket, or \"Choose Columns\" in Meds & Orders section of the refill encounter.              Passed - Medication is active on med list        Passed - Patient is age 18 or older           amLODIPine (NORVASC) 10 MG tablet [Pharmacy Med Name: AMLODIPINE BESYLATE 10MG TABLETS] 30 tablet 0     Sig: TAKE 1 TABLET(10 MG) BY MOUTH DAILY   Last Written Prescription Date:  4/21/2020  Last Fill Quantity: 30,  # refills: 0   Last office visit: 8/2/2019 with prescribing provider:     Future Office Visit:        Calcium Channel Blockers Protocol  Failed - 5/18/2020  3:38 AM        Failed - Blood pressure under 140/90 in past 12 months     BP Readings from Last 3 Encounters:   06/21/19 (!) 137/105   04/03/19 128/86   02/12/19 (!) 156/109                 Failed - Normal serum creatinine on file in past 12 months     Recent Labs   Lab Test 06/21/19  1201   CR 0.56*       Ok to refill medication if creatinine is low          Passed - Recent (12 mo) or future (30 days) visit within the authorizing provider's specialty     Patient has had an office visit with the authorizing provider or a " "provider within the authorizing providers department within the previous 12 mos or has a future within next 30 days. See \"Patient Info\" tab in inbasket, or \"Choose Columns\" in Meds & Orders section of the refill encounter.              Passed - Medication is active on med list        Passed - Patient is age 18 or older           "

## 2020-05-18 NOTE — TELEPHONE ENCOUNTER
Dr Yousif    Pt had a virtual visit on 4/24/2020  He was to also have had some labs done from last fill, they have not been done    Left VM for pt to return call to clinic    Vickie Samuels RN   [facility]

## 2020-06-16 DIAGNOSIS — F10.229 ALCOHOL DEPENDENCE WITH INTOXICATION WITH COMPLICATION (H): ICD-10-CM

## 2020-06-16 DIAGNOSIS — I10 HYPERTENSION GOAL BP (BLOOD PRESSURE) < 140/90: Primary | ICD-10-CM

## 2020-06-16 DIAGNOSIS — K29.00 OTHER ACUTE GASTRITIS WITHOUT HEMORRHAGE: ICD-10-CM

## 2020-06-19 RX ORDER — PANTOPRAZOLE SODIUM 40 MG/1
TABLET, DELAYED RELEASE ORAL
Qty: 30 TABLET | Refills: 0 | Status: SHIPPED | OUTPATIENT
Start: 2020-06-19 | End: 2020-07-16

## 2020-06-19 RX ORDER — GABAPENTIN 800 MG/1
TABLET ORAL
Qty: 360 TABLET | Refills: 3 | OUTPATIENT
Start: 2020-06-19

## 2020-06-19 RX ORDER — AMLODIPINE BESYLATE 10 MG/1
TABLET ORAL
Qty: 30 TABLET | Refills: 0 | Status: SHIPPED | OUTPATIENT
Start: 2020-06-19 | End: 2020-07-16

## 2020-06-19 NOTE — TELEPHONE ENCOUNTER
"Requested Prescriptions   Pending Prescriptions Disp Refills     amLODIPine (NORVASC) 10 MG tablet [Pharmacy Med Name: AMLODIPINE BESYLATE 10MG TABLETS] 30 tablet 0     Sig: TAKE 1 TABLET(10 MG) BY MOUTH DAILY       Calcium Channel Blockers Protocol  Failed - 6/16/2020  3:27 PM        Failed - Blood pressure under 140/90 in past 12 months     BP Readings from Last 3 Encounters:   06/21/19 (!) 137/105   04/03/19 128/86   02/12/19 (!) 156/109                 Failed - Normal serum creatinine on file in past 12 months     Recent Labs   Lab Test 06/21/19  1201   CR 0.56*       Ok to refill medication if creatinine is low          Passed - Recent (12 mo) or future (30 days) visit within the authorizing provider's specialty     Patient has had an office visit with the authorizing provider or a provider within the authorizing providers department within the previous 12 mos or has a future within next 30 days. See \"Patient Info\" tab in inbasket, or \"Choose Columns\" in Meds & Orders section of the refill encounter.              Passed - Medication is active on med list        Passed - Patient is age 18 or older        Last Written Prescription Date:  5/18/20  Last Fill Quantity: 30,   # refills: 0  Last Office Visit: 4/24/20, virtual visit  Future Office visit:       Routing refill request to provider for review/approval because:  Patients BP do not meet RN refill protocol, patient due for labs     pantoprazole (PROTONIX) 40 MG EC tablet [Pharmacy Med Name: PANTOPRAZOLE 40MG TABLETS] 30 tablet 0     Sig: TAKE 1 TABLET(40 MG) BY MOUTH DAILY       PPI Protocol Passed - 6/16/2020  3:27 PM        Passed - Not on Clopidogrel (unless Pantoprazole ordered)        Passed - No diagnosis of osteoporosis on record        Passed - Recent (12 mo) or future (30 days) visit within the authorizing provider's specialty     Patient has had an office visit with the authorizing provider or a provider within the authorizing providers department " "within the previous 12 mos or has a future within next 30 days. See \"Patient Info\" tab in inbasket, or \"Choose Columns\" in Meds & Orders section of the refill encounter.              Passed - Medication is active on med list        Passed - Patient is age 18 or older        Prescription approved per [facility] Refill Protocol.  Katy Pablo RN   [facility]      gabapentin (NEURONTIN) 800 MG tablet [Pharmacy Med Name: GABAPENTIN 800MG TABLETS] 360 tablet 3     Sig: TAKE 1 TABLET(800 MG) BY MOUTH FOUR TIMES DAILY       There is no refill protocol information for this order        "

## 2020-07-14 DIAGNOSIS — F10.229 ALCOHOL DEPENDENCE WITH INTOXICATION WITH COMPLICATION (H): ICD-10-CM

## 2020-07-14 DIAGNOSIS — K29.00 OTHER ACUTE GASTRITIS WITHOUT HEMORRHAGE: ICD-10-CM

## 2020-07-14 RX ORDER — PANTOPRAZOLE SODIUM 40 MG/1
TABLET, DELAYED RELEASE ORAL
Qty: 30 TABLET | Refills: 0 | Status: CANCELLED | OUTPATIENT
Start: 2020-07-14

## 2020-07-15 NOTE — TELEPHONE ENCOUNTER
"Requested Prescriptions   Pending Prescriptions Disp Refills     amLODIPine (NORVASC) 10 MG tablet [Pharmacy Med Name: AMLODIPINE BESYLATE 10MG TABLETS] 30 tablet 0     Sig: TAKE 1 TABLET(10 MG) BY MOUTH DAILY         Last Written Prescription Date:  6/19/20  Last Fill Quantity: 30,   # refills: 0  Last Office Visit: 4/24/20  Future Office visit:       Routing refill request to provider for review/approval because:  BP out of range - due for labs, dx clarification - alcohol dependence      Calcium Channel Blockers Protocol  Failed - 7/14/2020  1:21 AM        Failed - Blood pressure under 140/90 in past 12 months     BP Readings from Last 3 Encounters:   06/21/19 (!) 137/105   04/03/19 128/86   02/12/19 (!) 156/109                 Failed - Normal serum creatinine on file in past 12 months     Recent Labs   Lab Test 06/21/19  1201   CR 0.56*       Ok to refill medication if creatinine is low          Passed - Recent (12 mo) or future (30 days) visit within the authorizing provider's specialty     Patient has had an office visit with the authorizing provider or a provider within the authorizing providers department within the previous 12 mos or has a future within next 30 days. See \"Patient Info\" tab in inbasket, or \"Choose Columns\" in Meds & Orders section of the refill encounter.              Passed - Medication is active on med list        Passed - Patient is age 18 or older           pantoprazole (PROTONIX) 40 MG EC tablet [Pharmacy Med Name: PANTOPRAZOLE 40MG TABLETS] 30 tablet 0     Sig: TAKE 1 TABLET(40 MG) BY MOUTH DAILY         Last Written Prescription Date:  6/19/20  Last Fill Quantity: 30,   # refills: 0      Routing refill request to provider for review/approval because:  Dx acute gastritis      PPI Protocol Passed - 7/14/2020  1:21 AM        Passed - Not on Clopidogrel (unless Pantoprazole ordered)        Passed - No diagnosis of osteoporosis on record        Passed - Recent (12 mo) or future (30 days) " "visit within the authorizing provider's specialty     Patient has had an office visit with the authorizing provider or a provider within the authorizing providers department within the previous 12 mos or has a future within next 30 days. See \"Patient Info\" tab in inbasket, or \"Choose Columns\" in Meds & Orders section of the refill encounter.              Passed - Medication is active on med list        Passed - Patient is age 18 or older             "

## 2020-07-16 ENCOUNTER — TELEPHONE (OUTPATIENT)
Dept: FAMILY MEDICINE | Facility: CLINIC | Age: 30
End: 2020-07-16

## 2020-07-16 RX ORDER — PANTOPRAZOLE SODIUM 40 MG/1
TABLET, DELAYED RELEASE ORAL
Qty: 15 TABLET | Refills: 0 | Status: SHIPPED | OUTPATIENT
Start: 2020-07-16 | End: 2020-08-18

## 2020-07-16 RX ORDER — AMLODIPINE BESYLATE 10 MG/1
TABLET ORAL
Qty: 15 TABLET | Refills: 0 | Status: SHIPPED | OUTPATIENT
Start: 2020-07-16 | End: 2020-08-18

## 2020-07-16 NOTE — TELEPHONE ENCOUNTER
RD reception,    Please call patient to schedule an appointment    Katy Pablo RN   [facility]

## 2020-07-16 NOTE — TELEPHONE ENCOUNTER
Please call him   he is way overdue for appointment/ labs   once he has set up appt I can OK 1 month of medications

## 2020-07-16 NOTE — TELEPHONE ENCOUNTER
RD reception,    Please call patient to schedule an appointment, see refill encounter notes per Dr. Yousif.    Katy Pablo RN   [facility]

## 2020-08-18 ENCOUNTER — MYC MEDICAL ADVICE (OUTPATIENT)
Dept: FAMILY MEDICINE | Facility: CLINIC | Age: 30
End: 2020-08-18

## 2020-08-18 ENCOUNTER — MYC REFILL (OUTPATIENT)
Dept: FAMILY MEDICINE | Facility: CLINIC | Age: 30
End: 2020-08-18

## 2020-08-18 DIAGNOSIS — K29.00 OTHER ACUTE GASTRITIS WITHOUT HEMORRHAGE: ICD-10-CM

## 2020-08-18 DIAGNOSIS — F10.229 ALCOHOL DEPENDENCE WITH INTOXICATION WITH COMPLICATION (H): ICD-10-CM

## 2020-08-18 DIAGNOSIS — F17.200 NICOTINE DEPENDENCE, UNCOMPLICATED, UNSPECIFIED NICOTINE PRODUCT TYPE: ICD-10-CM

## 2020-08-19 RX ORDER — AMLODIPINE BESYLATE 10 MG/1
10 TABLET ORAL DAILY
Qty: 14 TABLET | Refills: 0 | Status: SHIPPED | OUTPATIENT
Start: 2020-08-19 | End: 2020-08-25

## 2020-08-19 RX ORDER — PANTOPRAZOLE SODIUM 40 MG/1
40 TABLET, DELAYED RELEASE ORAL DAILY
Qty: 14 TABLET | Refills: 0 | Status: SHIPPED | OUTPATIENT
Start: 2020-08-19 | End: 2020-08-25

## 2020-08-19 RX ORDER — CLONIDINE HYDROCHLORIDE 0.1 MG/1
0.1 TABLET ORAL 2 TIMES DAILY
Qty: 28 TABLET | Refills: 0 | Status: SHIPPED | OUTPATIENT
Start: 2020-08-19 | End: 2020-08-25

## 2020-08-19 NOTE — TELEPHONE ENCOUNTER
Refills sent for small amount, pt has scheduled appointment for 8/25/2020 with provider  15 day supply for gerd and BP meds  Pt must make appointment for further refills    Vickie Samuels RN   [facility]

## 2020-08-25 ENCOUNTER — VIRTUAL VISIT (OUTPATIENT)
Dept: FAMILY MEDICINE | Facility: CLINIC | Age: 30
End: 2020-08-25
Payer: COMMERCIAL

## 2020-08-25 DIAGNOSIS — K29.00 OTHER ACUTE GASTRITIS WITHOUT HEMORRHAGE: ICD-10-CM

## 2020-08-25 DIAGNOSIS — F10.229 ALCOHOL DEPENDENCE WITH INTOXICATION WITH COMPLICATION (H): ICD-10-CM

## 2020-08-25 DIAGNOSIS — F10.21 HISTORY OF ALCOHOL DEPENDENCE (H): ICD-10-CM

## 2020-08-25 DIAGNOSIS — I10 HYPERTENSION GOAL BP (BLOOD PRESSURE) < 140/90: Primary | Chronic | ICD-10-CM

## 2020-08-25 PROCEDURE — 99214 OFFICE O/P EST MOD 30 MIN: CPT | Mod: 95 | Performed by: FAMILY MEDICINE

## 2020-08-25 RX ORDER — AMLODIPINE BESYLATE 10 MG/1
10 TABLET ORAL DAILY
Qty: 90 TABLET | Refills: 1 | Status: SHIPPED | OUTPATIENT
Start: 2020-08-25 | End: 2021-01-28

## 2020-08-25 RX ORDER — PANTOPRAZOLE SODIUM 40 MG/1
40 TABLET, DELAYED RELEASE ORAL DAILY
Qty: 90 TABLET | Refills: 3 | Status: SHIPPED | OUTPATIENT
Start: 2020-08-25 | End: 2021-03-30

## 2020-08-25 RX ORDER — GABAPENTIN 300 MG/1
300 CAPSULE ORAL 3 TIMES DAILY
Qty: 90 CAPSULE | Refills: 1 | Status: SHIPPED | OUTPATIENT
Start: 2020-08-25 | End: 2020-10-22

## 2020-08-25 RX ORDER — GABAPENTIN 800 MG/1
TABLET ORAL
Qty: 360 TABLET | Refills: 0 | Status: SHIPPED | OUTPATIENT
Start: 2020-08-25 | End: 2020-12-18

## 2020-08-25 RX ORDER — CLONIDINE HYDROCHLORIDE 0.1 MG/1
0.1 TABLET ORAL 2 TIMES DAILY
Qty: 180 TABLET | Refills: 1 | Status: SHIPPED | OUTPATIENT
Start: 2020-08-25 | End: 2021-01-28

## 2020-08-25 NOTE — PROGRESS NOTES
"Nickolas Lang is a 29 year old male who is being evaluated via a billable telephone visit.      The patient has been notified of following:     \"This telephone visit will be conducted via a call between you and your physician/provider. We have found that certain health care needs can be provided without the need for a physical exam.  This service lets us provide the care you need with a short phone conversation.  If a prescription is necessary we can send it directly to your pharmacy.  If lab work is needed we can place an order for that and you can then stop by our lab to have the test done at a later time.    Telephone visits are billed at different rates depending on your insurance coverage. During this emergency period, for some insurers they may be billed the same as an in-person visit.  Please reach out to your insurance provider with any questions.    If during the course of the call the physician/provider feels a telephone visit is not appropriate, you will not be charged for this service.\"    Patient has given verbal consent for Telephone visit?  Yes    What phone number would you like to be contacted at? [phone]    How would you like to obtain your AVS? Venancio    Subjective     Nickolas Lang is a 29 year old male who presents via phone visit today for the following health issues:    HPI     Will talk to provider about medication changes.     Hypertension Follow-up      Do you check your blood pressure regularly outside of the clinic? No     Are you following a low salt diet? Yes    Are your blood pressures ever more than 140 on the top number (systolic) OR more   than 90 on the bottom number (diastolic), for example 140/90? No    Depression and Anxiety Follow-Up    How are you doing with your depression since your last visit? Improved , wants to try to get off neurontin    How are you doing with your anxiety since your last visit?  Improved ,sober 1 year    Are you having other symptoms " that might be associated with depression or anxiety? Yes:  insomnia    Have you had a significant life event? No     Do you have any concerns with your use of alcohol or other drugs? No    Social History     Tobacco Use     Smoking status: Current Every Day Smoker     Packs/day: 0.20     Years: 11.00     Pack years: 2.20     Types: Cigarettes     Start date: 4/3/2007     Smokeless tobacco: Current User   Substance Use Topics     Alcohol use: No     Alcohol/week: 0.0 standard drinks     Frequency: Never     Comment: Clean 06/20/2019     Drug use: Not Currently     Types: Marijuana     PHQ 2/24/2016 10/18/2016 9/6/2017   PHQ-9 Total Score 9 12 11   Q9: Thoughts of better off dead/self-harm past 2 weeks Not at all Not at all Not at all   Some encounter information is confidential and restricted. Go to Review Flowsheets activity to see all data.     EVAN-7 SCORE 10/18/2016 12/2/2016 9/6/2017   Total Score 15 20 4   Some encounter information is confidential and restricted. Go to Review Flowsheets activity to see all data.       Encounter Diagnoses   Name Primary?     Other acute gastritis without hemorrhage, waorse off PPI, wants to resume it      Alcohol dependence with intoxication with complication (H)      Hypertension goal BP (blood pressure) < 140/90 Yes     History of alcohol dependence (H)                    Review of Systems   Constitutional, HEENT, cardiovascular, pulmonary, gi and gu systems are negative, except as otherwise noted.       Objective          Vitals:  No vitals were obtained today due to virtual visit.    alert and no distress  PSYCH: Alert and oriented times 3; coherent speech, normal   rate and volume, able to articulate logical thoughts, able   to abstract reason, no tangential thoughts, no hallucinations   or delusions  His affect is normal  RESP: No cough, no audible wheezing, able to talk in full sentences  Remainder of exam unable to be completed due to telephone visits    Needs lab in the  next monthj        Assessment/Plan:    Assessment & Plan     Other acute gastritis without hemorrhage  resume  - pantoprazole (PROTONIX) 40 MG EC tablet  Dispense: 90 tablet; Refill: 3  Stop smoking  Alcohol dependence with intoxication with complication (H)  Well controlled , will gradualluy taper backon gabapentin  - gabapentin (NEURONTIN) 800 MG tablet  Dispense: 360 tablet; Refill: 0  - gabapentin (NEURONTIN) 300 MG capsule  Dispense: 90 capsule; Refill: 1  - amLODIPine (NORVASC) 10 MG tablet  Dispense: 90 tablet; Refill: 1  - cloNIDine (CATAPRES) 0.1 MG tablet  Dispense: 180 tablet; Refill: 1    Hypertension goal BP (blood pressure) < 140/90  Well controlled per patient   Get alsb    History of alcohol dependence (H)  Sober 1 year       Tobacco Cessation:   reports that he has been smoking cigarettes. He started smoking about 13 years ago. He has a 2.20 pack-year smoking history. He uses smokeless tobacco.  Tobacco Cessation Action Plan: Information offered: Patient not interested at this time        Work on weight loss  Regular exercise    No follow-ups on file.    Abran Yousif MD  [facility]    Phone call duration:  22 minutes

## 2020-08-31 DIAGNOSIS — K29.00 OTHER ACUTE GASTRITIS WITHOUT HEMORRHAGE: ICD-10-CM

## 2020-09-01 RX ORDER — PANTOPRAZOLE SODIUM 40 MG/1
TABLET, DELAYED RELEASE ORAL
Qty: 14 TABLET | OUTPATIENT
Start: 2020-09-01

## 2020-09-22 DIAGNOSIS — F17.200 NICOTINE DEPENDENCE, UNCOMPLICATED, UNSPECIFIED NICOTINE PRODUCT TYPE: ICD-10-CM

## 2020-09-23 NOTE — TELEPHONE ENCOUNTER
"Requested Prescriptions   Pending Prescriptions Disp Refills     nicotine polacrilex (NICORETTE) 4 MG gum 110 each 1     Sig: Take 1 each (4 mg) by mouth as needed for smoking cessation       Partial Cholinergic Nicotinic Agonist Agents Failed - 9/22/2020 12:44 PM        Failed - Blood pressure under 140/90 in past 12 months     BP Readings from Last 3 Encounters:   06/21/19 (!) 137/105   04/03/19 128/86   02/12/19 (!) 156/109                 Passed - Recent (12 mo) or future (30 days) visit within the authorizing provider's specialty     Patient has had an office visit with the authorizing provider or a provider within the authorizing providers department within the previous 12 mos or has a future within next 30 days. See \"Patient Info\" tab in inbasket, or \"Choose Columns\" in Meds & Orders section of the refill encounter.              Passed - Medication is active on med list        Passed - Patient is 18 years of age or older           Routing refill request to provider for review/approval because:  bp        "

## 2020-09-26 DIAGNOSIS — F17.200 NICOTINE DEPENDENCE, UNCOMPLICATED, UNSPECIFIED NICOTINE PRODUCT TYPE: ICD-10-CM

## 2020-09-28 NOTE — TELEPHONE ENCOUNTER
Routing refill request to provider for review/approval because: BP not at goal    Last office visit: 8/2/2019 with prescribing provider:    Pt had virtual visit on 8/25/2020  Future Office Visit:      Vickie Samuels RN   [facility]

## 2020-10-20 DIAGNOSIS — F10.229 ALCOHOL DEPENDENCE WITH INTOXICATION WITH COMPLICATION (H): ICD-10-CM

## 2020-10-20 NOTE — TELEPHONE ENCOUNTER
Gabapentin 300 MG Capsules      Last Written Prescription Date:  09/21/2020  Last Fill Quantity: 90,   # refills: 1  Last Office Visit: 08/25/2020-virtual visit   Future Office visit:       Routing refill request to provider for review/approval because:  Medication is reported/historical    Ailyn Cloud MA

## 2020-10-21 NOTE — TELEPHONE ENCOUNTER
is not in primary care anymore, and patient has not seen provider.     Last prescriber and provider patient sees/communicates with repeatedly is .     Routing back to RNs for review.     Cindy Crisostomo, DORI    Nurse Liaison  [facility]    Addiction Medicine Services

## 2020-10-21 NOTE — TELEPHONE ENCOUNTER
Routing refill request to provider for review/approval because:  Drug not on the [facility] refill protocol      check done last fill 9/21//2020 for 90, Dr Yousif  Last vv 8/18/2020    Vickie Samuels RN   [facility]

## 2020-10-22 RX ORDER — GABAPENTIN 300 MG/1
300 CAPSULE ORAL 3 TIMES DAILY
Qty: 90 CAPSULE | Refills: 1 | Status: SHIPPED | OUTPATIENT
Start: 2020-10-22 | End: 2020-12-18

## 2020-11-16 ENCOUNTER — HEALTH MAINTENANCE LETTER (OUTPATIENT)
Age: 30
End: 2020-11-16

## 2020-12-16 ENCOUNTER — TELEPHONE (OUTPATIENT)
Dept: FAMILY MEDICINE | Facility: CLINIC | Age: 30
End: 2020-12-16

## 2020-12-16 NOTE — TELEPHONE ENCOUNTER
Central Prior Authorization Team   Phone: [phone]      Prior Authorization Not Needed per Insurance    12/16/2020  Medication: pantoprazole (PROTONIX) 40 MG EC tablet - NOT NEEDED  Insurance Company: [employer] - Phone [phone] Fax [phone]  Expected CoPay:      Pharmacy Filling the Rx: [id number] [place] [address] AT [facility] [address]  Pharmacy Notified: Yes  Patient Notified: Yes (**Instructed pharmacy to notify patient when script is ready to /ship.**)    Pharmacy stated a prior authorization is not needed.    They are getting a refill too soon rejection - the patient is trying to fill early due to losing his medication.    Pharmacy will call the help desk and notify the patient that they need a lost medication override.

## 2020-12-16 NOTE — TELEPHONE ENCOUNTER
Prior Authorization Retail Medication Request    Medication/Dose: pantoprazole (PROTONIX) 40 MG EC tablet  ICD code (if different than what is on RX):    Previously Tried and Failed:    Rationale:      Insurance Name:    Insurance ID:  [id number]  Phone [phone]      Pharmacy Information (if different than what is on RX)  Name: [facility]  Phone [phone]

## 2020-12-18 DIAGNOSIS — F10.229 ALCOHOL DEPENDENCE WITH INTOXICATION WITH COMPLICATION (H): ICD-10-CM

## 2020-12-18 RX ORDER — GABAPENTIN 800 MG/1
TABLET ORAL
Qty: 360 TABLET | Refills: 0 | Status: SHIPPED | OUTPATIENT
Start: 2020-12-18 | End: 2021-03-30

## 2020-12-18 RX ORDER — GABAPENTIN 300 MG/1
CAPSULE ORAL
Qty: 90 CAPSULE | Refills: 1 | Status: SHIPPED | OUTPATIENT
Start: 2020-12-18 | End: 2021-03-30

## 2021-01-05 DIAGNOSIS — F10.229 ALCOHOL DEPENDENCE WITH INTOXICATION WITH COMPLICATION (H): ICD-10-CM

## 2021-01-05 DIAGNOSIS — F17.200 NICOTINE DEPENDENCE, UNCOMPLICATED, UNSPECIFIED NICOTINE PRODUCT TYPE: ICD-10-CM

## 2021-01-07 NOTE — TELEPHONE ENCOUNTER
Routing refill request to provider for review/approval because:  BP put of range.  Please authorize if appropriate.  Thanks,  Rubina Whitehead RN

## 2021-01-14 DIAGNOSIS — F17.200 NICOTINE DEPENDENCE, UNCOMPLICATED, UNSPECIFIED NICOTINE PRODUCT TYPE: ICD-10-CM

## 2021-01-17 NOTE — TELEPHONE ENCOUNTER
"Requested Prescriptions   Pending Prescriptions Disp Refills     nicotine polacrilex (NICORETTE) 4 MG gum         Partial Cholinergic Nicotinic Agonist Agents Failed - 1/14/2021 10:03 AM        Failed - Blood pressure under 140/90 in past 12 months     BP Readings from Last 3 Encounters:   06/21/19 (!) 137/105   04/03/19 128/86   02/12/19 (!) 156/109                 Passed - Recent (12 mo) or future (30 days) visit within the authorizing provider's specialty     Patient has had an office visit with the authorizing provider or a provider within the authorizing providers department within the previous 12 mos or has a future within next 30 days. See \"Patient Info\" tab in inbasket, or \"Choose Columns\" in Meds & Orders section of the refill encounter.              Passed - Medication is active on med list        Passed - Patient is 18 years of age or older           Routing refill request to provider for review/approval because:  BP - is Amer PCP for some patients?        "

## 2021-01-26 DIAGNOSIS — F17.200 NICOTINE DEPENDENCE, UNCOMPLICATED, UNSPECIFIED NICOTINE PRODUCT TYPE: ICD-10-CM

## 2021-01-26 DIAGNOSIS — F10.229 ALCOHOL DEPENDENCE WITH INTOXICATION WITH COMPLICATION (H): ICD-10-CM

## 2021-01-27 NOTE — TELEPHONE ENCOUNTER
Routing refill request to provider for review/approval because:  Failed protocol  CR not current, BP not in goal  Last v visit 8/25/2020    Vickie Samuels RN   [facility]

## 2021-01-28 RX ORDER — CLONIDINE HYDROCHLORIDE 0.1 MG/1
0.1 TABLET ORAL 2 TIMES DAILY
Qty: 60 TABLET | Refills: 0 | Status: SHIPPED | OUTPATIENT
Start: 2021-01-28 | End: 2021-03-30

## 2021-01-28 RX ORDER — AMLODIPINE BESYLATE 10 MG/1
10 TABLET ORAL DAILY
Qty: 300 TABLET | Refills: 0 | Status: SHIPPED | OUTPATIENT
Start: 2021-01-28 | End: 2021-03-30

## 2021-01-29 NOTE — TELEPHONE ENCOUNTER
RJ Reception - Medication is being filled for 1 time refill only due to:  Patient needs to be seen because due for f/u appointment.     Signed Prescriptions:                        Disp   Refills    cloNIDine (CATAPRES) 0.1 MG tablet         60 tab*0        Sig: Take 1 tablet (0.1 mg) by mouth 2 times daily  Authorizing Provider: ZAC MARTINS    nicotine polacrilex (NICORETTE) 4 MG gum   100 ea*0        Sig: Place 1 each (4 mg) inside cheek as needed for           smoking cessation  Authorizing Provider: ZAC MARTINS    amLODIPine (NORVASC) 10 MG tablet          300 ta*0        Sig: Take 1 tablet (10 mg) by mouth daily  Authorizing Provider: ZAC MARTINS         resilient/elastic

## 2021-02-09 RX ORDER — CLONIDINE HYDROCHLORIDE 0.1 MG/1
TABLET ORAL
Qty: 180 TABLET | Refills: 1 | OUTPATIENT
Start: 2021-02-09

## 2021-02-09 RX ORDER — AMLODIPINE BESYLATE 10 MG/1
TABLET ORAL
Qty: 90 TABLET | Refills: 1 | OUTPATIENT
Start: 2021-02-09

## 2021-02-22 DIAGNOSIS — F10.229 ALCOHOL DEPENDENCE WITH INTOXICATION WITH COMPLICATION (H): ICD-10-CM

## 2021-02-23 NOTE — TELEPHONE ENCOUNTER
Routing refill request to provider for review/approval because:  Drug not on the [facility] refill protocol   Malena FOX RN

## 2021-03-17 DIAGNOSIS — F10.229 ALCOHOL DEPENDENCE WITH INTOXICATION WITH COMPLICATION (H): ICD-10-CM

## 2021-03-18 NOTE — TELEPHONE ENCOUNTER
Clonidine:    One month supply sent 1/28/21  Due for follow up.    Note from last refill:  Needs  appt/ labs before any refill    BP Readings from Last 3 Encounters:   06/21/19 (!) 137/105   04/03/19 128/86   02/12/19 (!) 156/109     Virtual visit 8/25/2020    Phytelt message sent to patient asking him to schedule appointment.  Sammi Lainez RN

## 2021-03-23 RX ORDER — CLONIDINE HYDROCHLORIDE 0.1 MG/1
TABLET ORAL
Qty: 60 TABLET | Refills: 0 | OUTPATIENT
Start: 2021-03-23

## 2021-03-23 NOTE — TELEPHONE ENCOUNTER
I called patient  Left message on answering machine for patient to call back sap as he is a=way overdue for care, once he calls we can give him a bridge of medications to that date.

## 2021-03-29 RX ORDER — GABAPENTIN 300 MG/1
CAPSULE ORAL
Qty: 90 CAPSULE | Refills: 1 | OUTPATIENT
Start: 2021-03-29

## 2021-03-30 ENCOUNTER — VIRTUAL VISIT (OUTPATIENT)
Dept: FAMILY MEDICINE | Facility: CLINIC | Age: 31
End: 2021-03-30
Payer: COMMERCIAL

## 2021-03-30 VITALS — SYSTOLIC BLOOD PRESSURE: 132 MMHG | DIASTOLIC BLOOD PRESSURE: 81 MMHG

## 2021-03-30 DIAGNOSIS — F10.21 ALCOHOL DEPENDENCE IN REMISSION (H): ICD-10-CM

## 2021-03-30 DIAGNOSIS — I10 HYPERTENSION GOAL BP (BLOOD PRESSURE) < 140/90: Primary | Chronic | ICD-10-CM

## 2021-03-30 DIAGNOSIS — F17.200 NICOTINE DEPENDENCE, UNCOMPLICATED, UNSPECIFIED NICOTINE PRODUCT TYPE: ICD-10-CM

## 2021-03-30 DIAGNOSIS — K29.00 OTHER ACUTE GASTRITIS WITHOUT HEMORRHAGE: ICD-10-CM

## 2021-03-30 DIAGNOSIS — F33.0 MILD EPISODE OF RECURRENT MAJOR DEPRESSIVE DISORDER (H): ICD-10-CM

## 2021-03-30 PROCEDURE — 99214 OFFICE O/P EST MOD 30 MIN: CPT | Mod: 95 | Performed by: FAMILY MEDICINE

## 2021-03-30 RX ORDER — AMLODIPINE BESYLATE 10 MG/1
10 TABLET ORAL DAILY
Qty: 90 TABLET | Refills: 1 | Status: SHIPPED | OUTPATIENT
Start: 2021-03-30 | End: 2021-11-02

## 2021-03-30 RX ORDER — PANTOPRAZOLE SODIUM 40 MG/1
40 TABLET, DELAYED RELEASE ORAL DAILY
Qty: 90 TABLET | Refills: 3 | Status: SHIPPED | OUTPATIENT
Start: 2021-03-30

## 2021-03-30 RX ORDER — GABAPENTIN 800 MG/1
TABLET ORAL
Qty: 360 TABLET | Refills: 1 | Status: SHIPPED | OUTPATIENT
Start: 2021-03-30 | End: 2021-11-02

## 2021-03-30 RX ORDER — CLONIDINE HYDROCHLORIDE 0.1 MG/1
0.1 TABLET ORAL 2 TIMES DAILY
Qty: 180 TABLET | Refills: 1 | Status: SHIPPED | OUTPATIENT
Start: 2021-03-30 | End: 2021-09-27

## 2021-03-30 ASSESSMENT — ANXIETY QUESTIONNAIRES
5. BEING SO RESTLESS THAT IT IS HARD TO SIT STILL: NOT AT ALL
2. NOT BEING ABLE TO STOP OR CONTROL WORRYING: SEVERAL DAYS
IF YOU CHECKED OFF ANY PROBLEMS ON THIS QUESTIONNAIRE, HOW DIFFICULT HAVE THESE PROBLEMS MADE IT FOR YOU TO DO YOUR WORK, TAKE CARE OF THINGS AT HOME, OR GET ALONG WITH OTHER PEOPLE: SOMEWHAT DIFFICULT
3. WORRYING TOO MUCH ABOUT DIFFERENT THINGS: SEVERAL DAYS
1. FEELING NERVOUS, ANXIOUS, OR ON EDGE: SEVERAL DAYS
GAD7 TOTAL SCORE: 4
6. BECOMING EASILY ANNOYED OR IRRITABLE: NOT AT ALL
7. FEELING AFRAID AS IF SOMETHING AWFUL MIGHT HAPPEN: NOT AT ALL

## 2021-03-30 ASSESSMENT — PATIENT HEALTH QUESTIONNAIRE - PHQ9
SUM OF ALL RESPONSES TO PHQ QUESTIONS 1-9: 8
5. POOR APPETITE OR OVEREATING: SEVERAL DAYS

## 2021-03-30 NOTE — PROGRESS NOTES
Kana is a 30 year old who is being evaluated via a billable telephone visit.      What phone number would you like to be contacted at? [phone]   How would you like to obtain your AVS? Vennacio    Assessment & Plan     Hypertension goal BP (blood pressure) < 140/90  Needs refill, will get labs done in 1-2 weeks    Mild episode of recurrent major depressive disorder (H)  Much better now sober almost 2 years    Alcohol dependence in remission (H)  Well controlled   Follow up with consultant as planned.                Regular exercise  Follow up in 1 month.   See Patient Instructions    No follow-ups on file.    Abran Yousif MD  [facility]    Emily Roger is a 30 year old who presents for the following health issues     HPI     Hypertension Follow-up      Do you check your blood pressure regularly outside of the clinic? Yes     Are you following a low salt diet? No    Are your blood pressures ever more than 140 on the top number (systolic) OR more   than 90 on the bottom number (diastolic), for example 140/90? No    Depression and Anxiety Follow-Up    How are you doing with your depression since your last visit? Improved     How are you doing with your anxiety since your last visit?  No change- depends on the day    Are you having other symptoms that might be associated with depression or anxiety? No    Have you had a significant life event? No     Do you have any concerns with your use of alcohol or other drugs? No-2 years sober    Social History     Tobacco Use     Smoking status: Current Every Day Smoker     Packs/day: 0.20     Years: 11.00     Pack years: 2.20     Types: Cigarettes     Start date: 4/3/2007     Smokeless tobacco: Current User   Substance Use Topics     Alcohol use: No     Alcohol/week: 0.0 standard drinks     Frequency: Never     Comment: Clean 06/20/2019     Drug use: Not Currently     Types: Marijuana     PHQ 10/18/2016 9/6/2017 3/30/2021   PHQ-9 Total  Score 12 11 8   Q9: Thoughts of better off dead/self-harm past 2 weeks Not at all Not at all Not at all   Some encounter information is confidential and restricted. Go to Review MyTrainer activity to see all data.     EVAN-7 SCORE 12/2/2016 9/6/2017 3/30/2021   Total Score 20 4 4   Some encounter information is confidential and restricted. Go to Review MyTrainer activity to see all data.     Last PHQ-9 3/30/2021   1.  Little interest or pleasure in doing things 1   2.  Feeling down, depressed, or hopeless 1   3.  Trouble falling or staying asleep, or sleeping too much 1   4.  Feeling tired or having little energy 2   5.  Poor appetite or overeating 1   6.  Feeling bad about yourself 1   7.  Trouble concentrating 1   8.  Moving slowly or restless 0   Q9: Thoughts of better off dead/self-harm past 2 weeks 0   PHQ-9 Total Score 8   Difficulty at work, home, or with people Somewhat difficult   Some encounter information is confidential and restricted. Go to Review MyTrainer activity to see all data.     EVAN-7  3/30/2021   1. Feeling nervous, anxious, or on edge 1   2. Not being able to stop or control worrying 1   3. Worrying too much about different things 1   4. Trouble relaxing 1   5. Being so restless that it is hard to sit still 0   6. Becoming easily annoyed or irritable 0   7. Feeling afraid, as if something awful might happen 0   EVAN-7 Total Score 4   If you checked any problems, how difficult have they made it for you to do your work, take care of things at home, or get along with other people? Somewhat difficult   Some encounter information is confidential and restricted. Go to Review MyTrainer activity to see all data.       Suicide Assessment Five-step Evaluation and Treatment (SAFE-T)      How many servings of fruits and vegetables do you eat daily?  0-1    On average, how many sweetened beverages do you drink each day (Examples: soda, juice, sweet tea, etc.  Do NOT count diet or artificially sweetened  beverages)?   0    How many days per week do you exercise enough to make your heart beat faster? 6    How many minutes a day do you exercise enough to make your heart beat faster? 30 - 60    How many days per week do you miss taking your medication? 0        Review of Systems   Constitutional, HEENT, cardiovascular, pulmonary, gi and gu systems are negative, except as otherwise noted.      Objective           Vitals:  No vitals were obtained today due to virtual visit.    Physical Exam   healthy, alert and no distress  PSYCH: Alert and oriented times 3; coherent speech, normal   rate and volume, able to articulate logical thoughts, able   to abstract reason, no tangential thoughts, no hallucinations   or delusions  His affect is normal  RESP: No cough, no audible wheezing, able to talk in full sentences  Remainder of exam unable to be completed due to telephone visits                Phone call duration: 17 minutes

## 2021-03-31 ASSESSMENT — ANXIETY QUESTIONNAIRES: GAD7 TOTAL SCORE: 4

## 2021-05-11 ENCOUNTER — TELEPHONE (OUTPATIENT)
Dept: FAMILY MEDICINE | Facility: CLINIC | Age: 31
End: 2021-05-11

## 2021-05-11 DIAGNOSIS — F90.9 ATTENTION DEFICIT HYPERACTIVITY DISORDER (ADHD), UNSPECIFIED ADHD TYPE: ICD-10-CM

## 2021-05-11 RX ORDER — LISDEXAMFETAMINE DIMESYLATE 70 MG/1
70 CAPSULE ORAL EVERY MORNING
Qty: 15 CAPSULE | Refills: 0 | Status: SHIPPED | OUTPATIENT
Start: 2021-05-11 | End: 2021-11-02

## 2021-05-11 NOTE — TELEPHONE ENCOUNTER
Reason for call:  Medication   If this is a refill request, has the caller requested the refill from the pharmacy already? No  Will the patient be using a [facility]? No    Name of the pharmacy and phone number for the current request:     [facility]       Name of the medication requested: Vyvanse 70 mg     Other request: per patient , he is out of medication . Patient will like for a emergency refill since he is in between physiatrist at the moment and can not get a refill. Also patient will like to talk about labs he did at [facility].     Phone number to reach patient:  Home number on file [phone] (home)    Best Time:  Anytime     Can we leave a detailed message on this number?  NO    Travel screening: Not Applicable

## 2021-05-11 NOTE — TELEPHONE ENCOUNTER
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

## 2021-09-18 ENCOUNTER — HEALTH MAINTENANCE LETTER (OUTPATIENT)
Age: 31
End: 2021-09-18

## 2021-09-25 DIAGNOSIS — F10.21 ALCOHOL DEPENDENCE IN REMISSION (H): ICD-10-CM

## 2021-09-25 DIAGNOSIS — I10 HYPERTENSION GOAL BP (BLOOD PRESSURE) < 140/90: Chronic | ICD-10-CM

## 2021-09-27 ENCOUNTER — TELEPHONE (OUTPATIENT)
Dept: FAMILY MEDICINE | Facility: CLINIC | Age: 31
End: 2021-09-27

## 2021-09-27 RX ORDER — CLONIDINE HYDROCHLORIDE 0.1 MG/1
TABLET ORAL
Qty: 180 TABLET | Refills: 0 | Status: SHIPPED | OUTPATIENT
Start: 2021-09-27 | End: 2021-11-02

## 2021-09-27 NOTE — TELEPHONE ENCOUNTER
Attempted to call patient, number on file is incorrect and does not belong to patient. Attempted to call significant other but this phone number had a busy tone and unable to leave message.     I will send patient a MyChart message regarding need to schedule visit for further refills.     Thanks,  DORI Moe  [facility]

## 2021-09-27 NOTE — TELEPHONE ENCOUNTER
"Requested Prescriptions   Pending Prescriptions Disp Refills     cloNIDine (CATAPRES) 0.1 MG tablet [Pharmacy Med Name: CLONIDINE 0.1MG TABLETS] 180 tablet 1     Sig: TAKE 1 TABLET(0.1 MG) BY MOUTH TWICE DAILY       Central Acting Antiadrenergic Agents Failed - 9/25/2021  3:47 AM        Failed - Normal serum creatinine on file within past 12 months     Recent Labs   Lab Test 06/21/19  1201   CR 0.56*       Ok to refill medication if creatinine is low          Passed - Blood pressure under 140/90 in past 12 months     BP Readings from Last 3 Encounters:   03/30/21 132/81   06/21/19 (!) 137/105   04/03/19 128/86                 Passed - Patient is 6 years of age or older        Passed - Recent (12 mo) or future (30 days) visit within the authorizing provider's specialty     Patient has had an office visit with the authorizing provider or a provider within the authorizing providers department within the previous 12 mos or has a future within next 30 days. See \"Patient Info\" tab in inSection 101et, or \"Choose Columns\" in Meds & Orders section of the refill encounter.              Passed - Medication is active on med list       Antiadrenergic Antihypertensives Failed - 9/25/2021  3:47 AM        Failed - Normal serum creatinine on file in past 12 months     Recent Labs   Lab Test 06/21/19  1201   CR 0.56*       Ok to refill medication if creatinine is low          Passed - Blood pressure less than 140/90 in past 6 months     BP Readings from Last 3 Encounters:   03/30/21 132/81   06/21/19 (!) 137/105   04/03/19 128/86                 Passed - Medication is active on med list        Passed - Patient is age 18 or older        Passed - Recent (6 mo) or future (30 days) visit within the authorizing provider's specialty     Patient had office visit in the last 6 months or has a visit in the next 30 days with authorizing provider or within the authorizing provider's specialty.  See \"Patient Info\" tab in inbasket, or \"Choose Columns\" " in Meds & Orders section of the refill encounter.               Routing refill request to provider for review/approval because:  Labs not current:  Payal Parsons RN  [facility]

## 2021-09-27 NOTE — TELEPHONE ENCOUNTER
Attempted to call patient, number on file is incorrect and does not belong to patient. Attempted to call significant other but this phone number had a busy tone and unable to leave message.     I will send patient a MyChart message regarding need to schedule visit for further refills.     Thanks,  DORI Moe  [facility]

## 2021-10-13 DIAGNOSIS — I10 HYPERTENSION GOAL BP (BLOOD PRESSURE) < 140/90: Chronic | ICD-10-CM

## 2021-10-13 DIAGNOSIS — F10.21 ALCOHOL DEPENDENCE IN REMISSION (H): ICD-10-CM

## 2021-10-14 RX ORDER — CLONIDINE HYDROCHLORIDE 0.1 MG/1
TABLET ORAL
Qty: 0.1 TABLET | Refills: 0 | OUTPATIENT
Start: 2021-10-14

## 2021-10-14 NOTE — TELEPHONE ENCOUNTER
"Requested Prescriptions   Pending Prescriptions Disp Refills     cloNIDine (CATAPRES) 0.1 MG tablet [Pharmacy Med Name: CLONIDINE 0.1MG TABLETS] 180 tablet 0     Sig: TAKE 1 TABLET(0.1 MG) BY MOUTH TWICE DAILY       Central Acting Antiadrenergic Agents Failed - 10/13/2021  4:25 PM        Failed - Normal serum creatinine on file within past 12 months     Recent Labs   Lab Test 06/21/19  1201   CR 0.56*       Ok to refill medication if creatinine is low          Passed - Blood pressure under 140/90 in past 12 months     BP Readings from Last 3 Encounters:   03/30/21 132/81   06/21/19 (!) 137/105   04/03/19 128/86                 Passed - Patient is 6 years of age or older        Passed - Recent (12 mo) or future (30 days) visit within the authorizing provider's specialty     Patient has had an office visit with the authorizing provider or a provider within the authorizing providers department within the previous 12 mos or has a future within next 30 days. See \"Patient Info\" tab in inbasket, or \"Choose Columns\" in Meds & Orders section of the refill encounter.              Passed - Medication is active on med list       Antiadrenergic Antihypertensives Failed - 10/13/2021  4:25 PM        Failed - Blood pressure less than 140/90 in past 6 months     BP Readings from Last 3 Encounters:   03/30/21 132/81   06/21/19 (!) 137/105   04/03/19 128/86                 Failed - Normal serum creatinine on file in past 12 months     Recent Labs   Lab Test 06/21/19  1201   CR 0.56*       Ok to refill medication if creatinine is low          Failed - Recent (6 mo) or future (30 days) visit within the authorizing provider's specialty     Patient had office visit in the last 6 months or has a visit in the next 30 days with authorizing provider or within the authorizing provider's specialty.  See \"Patient Info\" tab in inbasket, or \"Choose Columns\" in Meds & Orders section of the refill encounter.            Passed - Medication is active " on med list        Passed - Patient is age 18 or older             90 day supply sent 9/27/21 - may request refill when less than 30 days    Refused Prescriptions:                       Disp   Refills    cloNIDine (CATAPRES) 0.1 MG tablet [Pharma*0.1 ta*0        Sig: TAKE 1 TABLET(0.1 MG) BY MOUTH TWICE DAILY  Refused By: CARRIE MOCK  Reason for Refusal: Duplicate

## 2021-10-26 DIAGNOSIS — F90.9 ATTENTION DEFICIT HYPERACTIVITY DISORDER (ADHD), UNSPECIFIED ADHD TYPE: ICD-10-CM

## 2021-10-26 RX ORDER — LISDEXAMFETAMINE DIMESYLATE 70 MG/1
70 CAPSULE ORAL EVERY MORNING
Qty: 15 CAPSULE | Refills: 0 | Status: CANCELLED | OUTPATIENT
Start: 2021-10-26

## 2021-10-26 NOTE — TELEPHONE ENCOUNTER
"Dr. Yousif,     Per pt is working on seeing psychiatrist at [facility], however, has to wait a while until he is able to see someone. Pt scheduled appt with you for 11/1 for phsyical. Pt requesting bridge on vyvanse until able to see you and set up with psychiatry. Per pt, \"I should have my new psychiatrist set up within the next month for my first visit\".     Med cued if agree.     Thanks,  DORI Moe  [facility]       "

## 2021-10-26 NOTE — TELEPHONE ENCOUNTER
Spoke with pt, he will keep appt to discuss. Verbalized understanding that needs to be eval for controlled med.     Thanks,  DORI Moe  [facility]

## 2021-11-02 ENCOUNTER — OFFICE VISIT (OUTPATIENT)
Dept: FAMILY MEDICINE | Facility: CLINIC | Age: 31
End: 2021-11-02
Payer: COMMERCIAL

## 2021-11-02 VITALS
SYSTOLIC BLOOD PRESSURE: 126 MMHG | BODY MASS INDEX: 41.75 KG/M2 | WEIGHT: 315 LBS | TEMPERATURE: 97.3 F | DIASTOLIC BLOOD PRESSURE: 82 MMHG | HEART RATE: 50 BPM | HEIGHT: 73 IN | OXYGEN SATURATION: 97 %

## 2021-11-02 DIAGNOSIS — F10.21 ALCOHOL DEPENDENCE IN REMISSION (H): ICD-10-CM

## 2021-11-02 DIAGNOSIS — K21.9 GASTROESOPHAGEAL REFLUX DISEASE WITHOUT ESOPHAGITIS: ICD-10-CM

## 2021-11-02 DIAGNOSIS — F17.200 NICOTINE DEPENDENCE, UNCOMPLICATED, UNSPECIFIED NICOTINE PRODUCT TYPE: ICD-10-CM

## 2021-11-02 DIAGNOSIS — I10 HYPERTENSION GOAL BP (BLOOD PRESSURE) < 140/90: Chronic | ICD-10-CM

## 2021-11-02 DIAGNOSIS — F90.9 ATTENTION DEFICIT HYPERACTIVITY DISORDER (ADHD), UNSPECIFIED ADHD TYPE: ICD-10-CM

## 2021-11-02 DIAGNOSIS — Z00.00 ROUTINE GENERAL MEDICAL EXAMINATION AT A HEALTH CARE FACILITY: Primary | ICD-10-CM

## 2021-11-02 PROCEDURE — 36415 COLL VENOUS BLD VENIPUNCTURE: CPT | Performed by: FAMILY MEDICINE

## 2021-11-02 PROCEDURE — 80061 LIPID PANEL: CPT | Performed by: FAMILY MEDICINE

## 2021-11-02 PROCEDURE — 99395 PREV VISIT EST AGE 18-39: CPT | Performed by: FAMILY MEDICINE

## 2021-11-02 PROCEDURE — 99214 OFFICE O/P EST MOD 30 MIN: CPT | Mod: 25 | Performed by: FAMILY MEDICINE

## 2021-11-02 PROCEDURE — 84443 ASSAY THYROID STIM HORMONE: CPT | Performed by: FAMILY MEDICINE

## 2021-11-02 PROCEDURE — 80053 COMPREHEN METABOLIC PANEL: CPT | Performed by: FAMILY MEDICINE

## 2021-11-02 PROCEDURE — 82043 UR ALBUMIN QUANTITATIVE: CPT | Performed by: FAMILY MEDICINE

## 2021-11-02 RX ORDER — GABAPENTIN 800 MG/1
TABLET ORAL
Qty: 360 TABLET | Refills: 1 | Status: SHIPPED | OUTPATIENT
Start: 2021-11-02

## 2021-11-02 RX ORDER — CLONIDINE HYDROCHLORIDE 0.1 MG/1
TABLET ORAL
Qty: 180 TABLET | Refills: 0 | Status: SHIPPED | OUTPATIENT
Start: 2021-11-02

## 2021-11-02 RX ORDER — AMLODIPINE BESYLATE 10 MG/1
10 TABLET ORAL DAILY
Qty: 90 TABLET | Refills: 1 | Status: SHIPPED | OUTPATIENT
Start: 2021-11-02 | End: 2021-11-22

## 2021-11-02 RX ORDER — LISDEXAMFETAMINE DIMESYLATE 70 MG/1
70 CAPSULE ORAL EVERY MORNING
Qty: 30 CAPSULE | Refills: 0 | Status: SHIPPED | OUTPATIENT
Start: 2021-11-02 | End: 2021-11-18

## 2021-11-02 ASSESSMENT — PATIENT HEALTH QUESTIONNAIRE - PHQ9
SUM OF ALL RESPONSES TO PHQ QUESTIONS 1-9: 8
10. IF YOU CHECKED OFF ANY PROBLEMS, HOW DIFFICULT HAVE THESE PROBLEMS MADE IT FOR YOU TO DO YOUR WORK, TAKE CARE OF THINGS AT HOME, OR GET ALONG WITH OTHER PEOPLE: VERY DIFFICULT
SUM OF ALL RESPONSES TO PHQ QUESTIONS 1-9: 8

## 2021-11-02 ASSESSMENT — MIFFLIN-ST. JEOR: SCORE: 2454.93
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
## 2021-11-03 LAB
ALBUMIN SERPL-MCNC: 3.8 G/DL (ref 3.4–5)
ALP SERPL-CCNC: 101 U/L (ref 40–150)
ALT SERPL W P-5'-P-CCNC: 29 U/L (ref 0–70)
ANION GAP SERPL CALCULATED.3IONS-SCNC: 3 MMOL/L (ref 3–14)
AST SERPL W P-5'-P-CCNC: 16 U/L (ref 0–45)
BILIRUB SERPL-MCNC: 0.3 MG/DL (ref 0.2–1.3)
BUN SERPL-MCNC: 14 MG/DL (ref 7–30)
CALCIUM SERPL-MCNC: 8.5 MG/DL (ref 8.5–10.1)
CHLORIDE BLD-SCNC: 108 MMOL/L (ref 94–109)
CHOLEST SERPL-MCNC: 240 MG/DL
CO2 SERPL-SCNC: 29 MMOL/L (ref 20–32)
CREAT SERPL-MCNC: 0.74 MG/DL (ref 0.66–1.25)
CREAT UR-MCNC: 117 MG/DL
FASTING STATUS PATIENT QL REPORTED: NO
GFR SERPL CREATININE-BSD FRML MDRD: >90 ML/MIN/1.73M2
GLUCOSE BLD-MCNC: 97 MG/DL (ref 70–99)
HDLC SERPL-MCNC: 60 MG/DL
LDLC SERPL CALC-MCNC: 138 MG/DL
MICROALBUMIN UR-MCNC: 22 MG/L
MICROALBUMIN/CREAT UR: 18.8 MG/G CR (ref 0–17)
NONHDLC SERPL-MCNC: 180 MG/DL
POTASSIUM BLD-SCNC: 4.4 MMOL/L (ref 3.4–5.3)
PROT SERPL-MCNC: 7.3 G/DL (ref 6.8–8.8)
SODIUM SERPL-SCNC: 140 MMOL/L (ref 133–144)
TRIGL SERPL-MCNC: 211 MG/DL
TSH SERPL DL<=0.005 MIU/L-ACNC: 2.4 MU/L (ref 0.4–4)

## 2021-11-03 ASSESSMENT — PATIENT HEALTH QUESTIONNAIRE - PHQ9: SUM OF ALL RESPONSES TO PHQ QUESTIONS 1-9: 8

## 2021-11-14 DIAGNOSIS — F17.200 NICOTINE DEPENDENCE, UNCOMPLICATED, UNSPECIFIED NICOTINE PRODUCT TYPE: ICD-10-CM

## 2021-11-16 NOTE — TELEPHONE ENCOUNTER
"Requested Prescriptions   Pending Prescriptions Disp Refills     nicotine polacrilex (NICORETTE) 4 MG gum [Pharmacy Med Name: NICOTINE POLACR 4MG MINT GUM 40'S]       Sig: CHEW 1 PIECE AS NEEDED FOR SMOKING CESSATION       Partial Cholinergic Nicotinic Agonist Agents Passed - 11/14/2021  3:54 AM        Passed - Blood pressure under 140/90 in past 12 months     BP Readings from Last 3 Encounters:   11/02/21 126/82   03/30/21 132/81   06/21/19 (!) 137/105                 Passed - Recent (12 mo) or future (30 days) visit within the authorizing provider's specialty     Patient has had an office visit with the authorizing provider or a provider within the authorizing providers department within the previous 12 mos or has a future within next 30 days. See \"Patient Info\" tab in inbasket, or \"Choose Columns\" in Meds & Orders section of the refill encounter.              Passed - Medication is active on med list        Passed - Patient is 18 years of age or older           Signed Prescriptions:                        Disp   Refills    nicotine polacrilex (NICORETTE) 4 MG gum   100 ea*0        Sig: CHEW 1 PIECE AS NEEDED FOR SMOKING CESSATION  Authorizing Provider: ZAC MARTINS  Ordering User: CARRIE MOCK      "

## 2021-11-17 ENCOUNTER — MYC MEDICAL ADVICE (OUTPATIENT)
Dept: FAMILY MEDICINE | Facility: CLINIC | Age: 31
End: 2021-11-17
Payer: COMMERCIAL

## 2021-11-17 DIAGNOSIS — F90.9 ATTENTION DEFICIT HYPERACTIVITY DISORDER (ADHD), UNSPECIFIED ADHD TYPE: ICD-10-CM

## 2021-11-18 RX ORDER — LISDEXAMFETAMINE DIMESYLATE 50 MG/1
50 CAPSULE ORAL EVERY MORNING
Qty: 30 CAPSULE | Refills: 0 | Status: SHIPPED | OUTPATIENT
Start: 2021-11-18 | End: 2021-12-16

## 2021-11-18 NOTE — TELEPHONE ENCOUNTER
Dr. Yousif  Patient calling in to check status of message.    Please see [employer] message and advise.    Thank you,  Renate Willams RN  [place]

## 2021-11-21 DIAGNOSIS — I10 HYPERTENSION GOAL BP (BLOOD PRESSURE) < 140/90: Chronic | ICD-10-CM

## 2021-11-22 RX ORDER — AMLODIPINE BESYLATE 10 MG/1
TABLET ORAL
Qty: 90 TABLET | Refills: 1 | Status: SHIPPED | OUTPATIENT
Start: 2021-11-22 | End: 2023-01-13

## 2021-11-22 NOTE — TELEPHONE ENCOUNTER
"Requested Prescriptions   Signed Prescriptions Disp Refills    amLODIPine (NORVASC) 10 MG tablet 90 tablet 1     Sig: TAKE 1 TABLET(10 MG) BY MOUTH DAILY       Calcium Channel Blockers Protocol  Passed - 11/21/2021  3:58 AM        Passed - Blood pressure under 140/90 in past 12 months     BP Readings from Last 3 Encounters:   11/02/21 126/82   03/30/21 132/81   06/21/19 (!) 137/105                 Passed - Recent (12 mo) or future (30 days) visit within the authorizing provider's specialty     Patient has had an office visit with the authorizing provider or a provider within the authorizing providers department within the previous 12 mos or has a future within next 30 days. See \"Patient Info\" tab in inbasket, or \"Choose Columns\" in Meds & Orders section of the refill encounter.              Passed - Medication is active on med list        Passed - Patient is age 18 or older        Passed - Normal serum creatinine on file in past 12 months     Recent Labs   Lab Test 11/02/21  1608   CR 0.74       Ok to refill medication if creatinine is low             Payal Meadows RN  [facility]     "

## 2021-12-15 ENCOUNTER — E-VISIT (OUTPATIENT)
Dept: FAMILY MEDICINE | Facility: CLINIC | Age: 31
End: 2021-12-15
Payer: COMMERCIAL

## 2021-12-15 DIAGNOSIS — F90.9 ATTENTION DEFICIT HYPERACTIVITY DISORDER (ADHD), UNSPECIFIED ADHD TYPE: ICD-10-CM

## 2021-12-15 PROCEDURE — 99422 OL DIG E/M SVC 11-20 MIN: CPT | Performed by: FAMILY MEDICINE

## 2021-12-16 RX ORDER — LISDEXAMFETAMINE DIMESYLATE 50 MG/1
50 CAPSULE ORAL EVERY MORNING
Qty: 30 CAPSULE | Refills: 0 | Status: SHIPPED | OUTPATIENT
Start: 2021-12-16

## 2021-12-16 NOTE — TELEPHONE ENCOUNTER
I can refill the medication times 1   Orders Placed This Encounter     lisdexamfetamine (VYVANSE) 50 MG capsule     Sig: Take 1 capsule (50 mg) by mouth every morning     Dispense:  30 capsule     Refill:  0     But I can not refer him out of network   he needs to call his insurance  And see what groups are covered   if the needs a referral it means that they are usually not covered  Provider E-Visit time total (minutes): 12

## 2021-12-21 ENCOUNTER — TELEPHONE (OUTPATIENT)
Dept: FAMILY MEDICINE | Facility: CLINIC | Age: 31
End: 2021-12-21
Payer: COMMERCIAL

## 2021-12-21 NOTE — TELEPHONE ENCOUNTER
Reason for Call: Provider Communication    Return Phone Number: [phone]    Who is calling: Anaya Ortez Psychiatric Nurse Practitioner    Facility provider is associated with:  [facility]    Reason for call:  Provider addressed concerns about prescriptions for this patient. Provider stated that pt has requested medications from 5 different providers and 6 different pharmacies. Provider Concern of suspicious activity.     Prescriptions include   gabapentin   VYVANSE  Adderall    Urgency for return call:  End of day    Okay to leave detailed message?:  Yes    Call taken on 12/21/2021 at 3:53 PM by Yo Chong MA

## 2021-12-21 NOTE — TELEPHONE ENCOUNTER
reviewed and patient has received 55 controlled scripts (vyvanse, adderall xr, adderall tabs, and gabapentin with multiple different doses of many), from 5 providers and filled at 6 different pharmacies over the last year.    Concerning pattern will hold for PCP to review.    Luis Alberto Harris MD

## 2021-12-27 NOTE — TELEPHONE ENCOUNTER
I tried to call patient times 2    Left message on answering machine for patient to call back.  I reviewed the  medications, will not be able to give him any more refills   urged him to seek CD help

## 2022-03-15 NOTE — TELEPHONE ENCOUNTER
[facility] Pending    Insurance response  Prescription Drug Insurance: [facility]    Notes: Prior authorization submitted - will update provider when decision has been made by insurance. Need appt in the next month

## 2022-10-11 NOTE — TELEPHONE ENCOUNTER
REFERRAL INFORMATION:    Referring Provider:  Self Referral     Referring Clinic:  N/A    Reason for Visit/Diagnosis: New Surg, BMI 44       FUTURE VISIT INFORMATION:    Appointment Date: 12/15/2022    Appointment Time: 12 PM      NOTES RECORD STATUS  DETAILS   OFFICE NOTE from Referring Provider N/A    OFFICE NOTE from Other Specialists N/A    HOSPITAL DISCHARGE SUMMARY/ ED VISITS  N/A    OPERATIVE REPORT N/A    ENDOSCOPY (EGD)  N/A    PERTINENT LABS Internal    PATHOLOGY REPORTS (RELATED) N/A    IMAGING (CT, MRI, US, XR)  N/A

## 2022-11-20 ENCOUNTER — HEALTH MAINTENANCE LETTER (OUTPATIENT)
Age: 32
End: 2022-11-20

## 2022-12-15 ENCOUNTER — PRE VISIT (OUTPATIENT)
Dept: ENDOCRINOLOGY | Facility: CLINIC | Age: 32
End: 2022-12-15

## 2022-12-24 ENCOUNTER — HEALTH MAINTENANCE LETTER (OUTPATIENT)
Age: 32
End: 2022-12-24

## 2023-01-13 ENCOUNTER — TELEPHONE (OUTPATIENT)
Dept: ENDOCRINOLOGY | Facility: CLINIC | Age: 33
End: 2023-01-13

## 2023-01-13 ENCOUNTER — VIRTUAL VISIT (OUTPATIENT)
Dept: ENDOCRINOLOGY | Facility: CLINIC | Age: 33
End: 2023-01-13
Payer: COMMERCIAL

## 2023-01-13 VITALS — WEIGHT: 315 LBS | BODY MASS INDEX: 41.75 KG/M2 | HEIGHT: 73 IN

## 2023-01-13 DIAGNOSIS — E66.01 CLASS 3 SEVERE OBESITY WITH SERIOUS COMORBIDITY AND BODY MASS INDEX (BMI) OF 45.0 TO 49.9 IN ADULT, UNSPECIFIED OBESITY TYPE (H): Primary | ICD-10-CM

## 2023-01-13 DIAGNOSIS — E66.813 CLASS 3 SEVERE OBESITY WITH SERIOUS COMORBIDITY AND BODY MASS INDEX (BMI) OF 45.0 TO 49.9 IN ADULT, UNSPECIFIED OBESITY TYPE (H): Primary | ICD-10-CM

## 2023-01-13 PROCEDURE — 99205 OFFICE O/P NEW HI 60 MIN: CPT | Mod: 95 | Performed by: NURSE PRACTITIONER

## 2023-01-13 RX ORDER — BUPROPION HYDROCHLORIDE 150 MG/1
1 TABLET ORAL
COMMUNITY
Start: 2022-12-23

## 2023-01-13 ASSESSMENT — PAIN SCALES - GENERAL: PAINLEVEL: SEVERE PAIN (7)

## 2023-01-13 NOTE — TELEPHONE ENCOUNTER
"Prior Authorization Retail Medication Request    Medication/Dose: Saxenda  ICD code (if different than what is on RX):  Class 3 severe obesity with serious comorbidity and body mass index (BMI) of 45.0 to 49.9 in adult, unspecified obesity type (H) [E66.01, Z68.42]  - Primary     Previously Tried and Failed:  Naltrexone, history of diet and exercise  Rationale:  I had the pleasure of seeing your patient, Nickolas Lang, to evaluate his obesity and consider him for possible weight loss surgery.  As you know, Nickolas Lang is 32 year old.  He has a height of 6' .5\", a weight of 340 lbs 0 oz, and calculated Body mass index is 45.48 kg/m ..  Full intake/assessment was done to determine barriers to weight loss success and develop a treatment plan. Co-morbidities include snoring, hx of hepatic steatosis, ADD, hx of substance abuse, HTN and GERD.    Has always had difficult time managing weight. Very active playing football as an adolescent. Less active in 20s with weight regain. Has lost 50+lbs a few times over the years but never able to sustain the weight loss. Most recently, lost 50lb when starting vyvanse for ADD (noted appetite suppression) and working on diet and lifestyle changes. With start of covid had more down time at home and took medication less consistently and noticed weight regain. Now, taking meds more consistently but noticing less effectiveness.       Discussed topiramate and GLP1 for weight management. We also discussed phentermine but less likely to be beneficial for ADD - currently on vyvanse and would like to consider switching to adderall- recommend avoiding phentermine for now. Already on bupropion and had weight gain when taking naltrexone while in treatment for alcohol abuse.       Insurance Name:    Insurance ID:        Pharmacy Information (if different than what is on RX)  Name:  [id number] - [place], [place] - [address] AT [address] OF [address] [facility] " [place]  Phone:  [phone]

## 2023-01-13 NOTE — PROGRESS NOTES
"New Bariatric Surgery Consultation Note    2023    RE: Nickolas Lang  MR#: [id number]  : 1990      Referring provider: No flowsheet data found.    Chief Complaint/Reason for visit: evaluation for possible weight loss surgery    Dear Abran Yousif MD (General),    I had the pleasure of seeing your patient, Nickolas Lang, to evaluate his obesity and consider him for possible weight loss surgery.  As you know, Nickolas Lang is 32 year old.  He has a height of 6' .5\", a weight of 340 lbs 0 oz, and calculated Body mass index is 45.48 kg/m ..  Full intake/assessment was done to determine barriers to weight loss success and develop a treatment plan.    Assessment & Plan   Problem List Items Addressed This Visit        Digestive    Class 3 severe obesity with serious comorbidity and body mass index (BMI) of 45.0 to 49.9 in adult, unspecified obesity type (H) - Primary     Has always had difficult time managing weight. Very active playing football as an adolescent. Less active in 20s with weight regain. Has lost 50+lbs a few times over the years but never able to sustain the weight loss. Most recently, lost 50lb when starting vyvanse for ADD (noted appetite suppression) and working on diet and lifestyle changes. With start of covid had more down time at home and took medication less consistently and noticed weight regain. Now, taking meds more consistently but noticing less effectiveness. Wanting to know if there is something more he can do to manage weight. comorbidities include snoring, hx of hepatic steatosis, ADD, hx of substance abuse, HTN and gerd.     Patient isn't certain he is ready to pursue surgery. With hx of substance abuse I would agree that mwm may be a better fit to start. Discussed role of AOM. Discussed topiramate and GLP1 for weight management. We also discussed phentermine but less likely to be beneficial for ADD - currently on vyvanse and would like to " consider switching to adderall- recommend avoiding phentermine for now. Already on bupropion and had weight gain when taking naltrexone while in treatment for alcohol abuse.     gerd well controlled with protonix. Worse with eating and laying down or specific food triggers. No prior EGD. Hx of steatosis on Ultrasound in 2018 but has since quit drinking and has had normal liver labs since then. Will repeat labs now to re-evaluate. Consider fibroscan if needed.     Lots of fatigue. Not consistently waking up rested. +snoring and people have told him they are concerned about breathing at night. Never tested for MONTEZ. Discussed importance of sleep with weight management. Referral placed for sleep medicine.     Plan:  Start saxenda if covered by insurance  Start working with RD  Focus on protein and hydration  Start with smaller portions  Labs when able- fasting  Sleep consult ordered   Follow up 2 months          Relevant Medications    liraglutide - Weight Management (SAXENDA) 18 MG/3ML pen    insulin pen needle (31G X 5 MM) 31G X 5 MM miscellaneous    Other Relevant Orders    Adult Sleep Eval & Management  Referral    CBC with platelets    Comprehensive metabolic panel    Hemoglobin A1c    Vitamin D Deficiency    Lipid panel reflex to direct LDL Fasting          HISTORY OF PRESENT ILLNESS:  Weight Loss History Reviewed with Patient 1/11/2023   How long have you been overweight? Since early childhood   What is the most that you have ever weighed? 360   What is the most weight you have lost? 80   I have tried the following methods to lose weight Watching portions or calories, Exercise, OTC Medications   I have tried the following weight loss medications? (Check all that apply) Naltrexone   Have you ever had weight loss surgery? No     Has always been bigger. Athlete when younger. Less active starting in mid 20s.     Knows he should feel better than he does at his age. Wants to feel better over all- always tired  and not feeling well     Has been able to lose weight in the past- (50lb) when taking an ADHD med, getting sober, and monitoring intake. Lots to 280lb Didn't take med as consistently over Covid and had more down time. Saw weight regain during this time. Started taking consistently again but noticing not as effective with appetite     Low weight as edita adult was 205    CO-MORBIDITIES OF OBESITY INCLUDE:     1/11/2023   I have the following health issues associated with obesity: Sleep Apnea, GERD (Reflux)   I have the following symptoms associated with obesity: -     GERD -takes pantoprazole once daily, worse with specific food triggers, worse when laying down - well controlled. Hx of pyloric stenosis as an infant which was surgically repaired     MONTEZ - never had sleep study, all day fatigue, +snoring, has been told he stops breathing at night, not usually waking rested     Hepatic steatosis - ultrasound 10/2018, unable to calculate fib4 with recent labs, normal labs 11/2021 - quit drinking in 2019   HTN   ADD- vyvanse currently, looking at going back to adderall - managed by psychiatry     PAST MEDICAL HISTORY:  Past Medical History:   Diagnosis Date     ADD (attention deficit disorder)      Agitation      Alcohol abuse      Anxiety     gabapentin helps the most      Benzodiazepine abuse (H)      Drug abuse (H)      GERD (gastroesophageal reflux disease)      Hepatic steatosis      Hypertension      Obesity      Pyloric stenosis     s/p pyloromyotomy as an infant       PAST SURGICAL HISTORY:  Past Surgical History:   Procedure Laterality Date     Deviated septum repair       PYLOROMYOTOMY SURGERY  as an infant      SHOULDER SURGERY Left 07/29/2016    Removal of cartilage       FAMILY HISTORY:   Family History   Problem Relation Age of Onset     Neurologic Disorder Mother         Multiple Sclerosis     Depression Mother      Anxiety Disorder Mother      Alcohol/Drug Father      Substance Abuse Father      Depression  Maternal Grandmother      Anxiety Disorder Maternal Grandmother      Hypertension Maternal Grandmother      Substance Abuse Maternal Grandfather      Alcohol/Drug Paternal Grandfather        SOCIAL HISTORY:   Social History Questions Reviewed With Patient 1/11/2023   Which best describes your employment status (select all that apply) I work full-time   If you work, what is your occupation?    Which best describes your marital status: single   Do you have children? No   Who do you have in your support network that can be available to help you for the first 2 weeks after surgery? Family friends   Who can you count on for support throughout your weight loss surgery journey? Same   Can you afford 3 meals a day?  Yes   Can you afford 50-60 dollars a month for vitamins? Yes         HABITS:     1/11/2023   How often do you drink alcohol? Never   Do you currently use any of the following Nicotine products? e-cigarettes   Have you ever used any of the following nicotine products? No   Have you or are you currently using street drugs or prescription strength medication for which you do not have a prescription for? No   Do you have a history of chemical dependency (alcohol or drug abuse)? No     PMH indicates hx drug abuse and alcohol abuse. Sober since 6/2019     Occasional smoking ecigs- inconsistently, not used in days     Currently taking narcotic/opioids No    PSYCHOLOGICAL HISTORY:   Psychological History Reviewed With Patient 1/11/2023   Have you ever attempted suicide? Never.   Have you had thoughts of suicide in the past year? No   Have you ever been hospitalized for mental illness or a suicide attempt? Never.   Do you have a history of chronic pain? Yes   Have you ever been diagnosed with fibromyalgia? No   Are you currently being treated for any of the following? (select all that apply) Depression, Anxiety, I take medication or see a therapist for another mental illness   Are you currently seeing a  "psychiatrist? Yes     Hx of SAD- being proactive about getting out of the house more   Working on getting in contact with prior therapist     ROS:     1/11/2023   Skin:  Leg swelling, Varicose veins   HEENT: Headaches, Dizziness/lightheadedness   Musculoskeletal: Joint Pain, Back pain, Swelling of legs   Cardiovascular: Shortness of breath with activity   Pulmonary: Snoring, People have told me I stop breathing while asleep   Gastrointestinal: Heartburn, Reflux   Genitourinary: None of the above   Hematological: None of the above   Neurological: None of the above       EATING BEHAVIORS:     1/11/2023   Have you or anyone else thought that you had an eating disorder? Yes   If you answered yes to the previous eating disorder question, select the types that apply from this list: Binge Eating   Do you currently binge eat (eat a large amount of food in a short time)? Yes   Are you an emotional eater? Yes   Do you get up to eat after falling asleep? Yes     Used to go all day without eating or eat very lightly then \"make up for lost time\" when getting home   Now, working from home, feels food more accessible and noticing eating more during the day     Doesn't eat regular meals during the day. 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 Less than 1 time per week   What is the duration of your exercise (in minutes)? 45 Minutes   What types of exercise do you do? walking   What keeps you from being more active?  Pain, Too tired     Low  Back pain - associates with central weight gain   Does have some back pain stemming from football when younger     MEDICATIONS:  Current Outpatient Medications   Medication Sig Dispense Refill     buPROPion (WELLBUTRIN XL) 150 MG 24 hr tablet " Take 1 tablet by mouth daily at 2 pm       cloNIDine (CATAPRES) 0.1 MG tablet TAKE 1 TABLET(0.1 MG) BY MOUTH TWICE DAILY 180 tablet 0     gabapentin (NEURONTIN) 800 MG tablet TAKE 1 TABLET BY MOUTH FOUR TIMES DAILY 360 tablet 1     insulin pen needle (31G X 5 MM) 31G X 5 MM miscellaneous Use 1 pen needles daily or as directed. 100 each 1     liraglutide - Weight Management (SAXENDA) 18 MG/3ML pen Week 1: 0.6 mg subcutaneous daily, Week 2: 1.2 mg daily, Week 3: 1.8 mg daily, Week 4: 2.4 mg daily, Week 5 & on: 3 mg daily 15 mL 2     lisdexamfetamine (VYVANSE) 50 MG capsule Take 1 capsule (50 mg) by mouth every morning 30 capsule 0     nicotine polacrilex (NICORETTE) 4 MG gum CHEW 1 PIECE AS NEEDED FOR SMOKING CESSATION 100 each 0     pantoprazole (PROTONIX) 40 MG EC tablet Take 1 tablet (40 mg) by mouth daily 90 tablet 3       Is patient on biologics or immunomodulators?     ALLERGIES:  No Known Allergies    Office Visit on 11/02/2021   Component Date Value Ref Range Status     Cholesterol 11/02/2021 240 (H)  <200 mg/dL Final     Triglycerides 11/02/2021 211 (H)  <150 mg/dL Final     Direct Measure HDL 11/02/2021 60  >=40 mg/dL Final     LDL Cholesterol Calculated 11/02/2021 138 (H)  <=100 mg/dL Final     Non HDL Cholesterol 11/02/2021 180 (H)  <130 mg/dL Final     Patient Fasting > 8hrs? 11/02/2021 No   Final     Sodium 11/02/2021 140  133 - 144 mmol/L Final     Potassium 11/02/2021 4.4  3.4 - 5.3 mmol/L Final     Chloride 11/02/2021 108  94 - 109 mmol/L Final     Carbon Dioxide (CO2) 11/02/2021 29  20 - 32 mmol/L Final     Anion Gap 11/02/2021 3  3 - 14 mmol/L Final     Urea Nitrogen 11/02/2021 14  7 - 30 mg/dL Final     Creatinine 11/02/2021 0.74  0.66 - 1.25 mg/dL Final     Calcium 11/02/2021 8.5  8.5 - 10.1 mg/dL Final     Glucose 11/02/2021 97  70 - 99 mg/dL Final     Alkaline Phosphatase 11/02/2021 101  40 - 150 U/L Final     AST 11/02/2021 16  0 - 45 U/L Final     ALT 11/02/2021 29  0 - 70 U/L Final      Protein Total 11/02/2021 7.3  6.8 - 8.8 g/dL Final     Albumin 11/02/2021 3.8  3.4 - 5.0 g/dL Final     Bilirubin Total 11/02/2021 0.3  0.2 - 1.3 mg/dL Final     GFR Estimate 11/02/2021 >90  >60 mL/min/1.73m2 Final    As of July 11, 2021, eGFR is calculated by the CKD-EPI creatinine equation, without race adjustment. eGFR can be influenced by muscle mass, exercise, and diet. The reported eGFR is an estimation only and is only applicable if the renal function is stable.     TSH 11/02/2021 2.40  0.40 - 4.00 mU/L Final     Creatinine Urine mg/dL 11/02/2021 117  mg/dL Final     Albumin Urine mg/L 11/02/2021 22  mg/L Final     Albumin Urine mg/g Cr 11/02/2021 18.80 (H)  0.00 - 17.00 mg/g Cr Final         Anti-obesity medication ROS:    HEENT  Hx of glaucoma: No    Cardiovascular  CAD:No  HTN:Yes- not currently taking BP meds and last /86 8/2022     Gastrointestinal  GERD:Yes  Constipation/diarrhea/GI issues:No  Liver Dz:Yes  Computed FIB-4 Calculation unavailable. Necessary lab results were not found in the last year.    H/O Pancreatitis:No    Psychiatric  Bipolar: No  Anxiety:Yes  Depression:Yes  History of alcohol/drug abuse: Yes  Hx of eating disorder:No    Endocrine  Personal or family hx of MTC or MEN2:No  Diabetes/prediabetes: No    Neurologic:  Hx of seizures: No  Hx of migraines: No  Memory Impairment: occasional  Chronic pain/opioids use: No      History of kidney stones: No  Kidney disease: No      PHYSICAL EXAM:  Objective    Physical Exam   GENERAL: Healthy, alert and no distress  EYES: Eyes grossly normal to inspection.  No discharge or erythema, or obvious scleral/conjunctival abnormalities.  RESP: No audible wheeze, cough, or visible cyanosis.  No visible retractions or increased work of breathing.    SKIN: Visible skin clear. No significant rash, abnormal pigmentation or lesions.  NEURO: Cranial nerves grossly intact.  Mentation and speech appropriate for age.  PSYCH: Mentation appears normal,  affect normal/bright, judgement and insight intact, normal speech and appearance well-groomed.      In summary, Nickolas Lang has Class III obesity with a body mass index of Body mass index is 45.48 kg/m . kg/m2 and the comorbidities stated above.     We did not discuss surgery today. We did discuss the higher risk for addiction after bariatric surgery. We did discuss need to be tobacco free x 3 months before surgery. Will pursue mwm for now.     Sincerely,     Emiliana Hardy NP      60 minutes spent on the date of the encounter doing chart review, history and exam, documentation and further activities per the note

## 2023-01-13 NOTE — Clinical Note
Please call patient to help get RD visit in next couple weeks. Can schedule follow up with me in 2 months thanks!

## 2023-01-13 NOTE — NURSING NOTE
"(   Chief Complaint   Patient presents with     New Patient     New Bariatrics, BMI of 44    )    ( Weight: (!) 154.2 kg (340 lb) (Pt reported) )  ( Height: 184.2 cm (6' 0.5\") )  ( BMI (Calculated): 45.48 )  (   )  (   )  (   )  (   )  (   )  (   )    (   )  (   )  (   )  (   )  (   )  (   )  (   )    (   Patient Active Problem List   Diagnosis     Morbid obesity (H)     Alcohol dependence with withdrawal (H)     Chemical dependency (H)     Hypertension goal BP (blood pressure) < 140/90     Suicidal ideation     Elevated liver enzymes     Hepatic steatosis     Obesity     Insomnia, unspecified type     Anxiety     Gastroesophageal reflux disease without esophagitis     Alcohol abuse with alcohol-induced mood disorder (H)     Attention deficit hyperactivity disorder (ADHD), unspecified ADHD type     Alcohol use disorder, severe, dependence (H)     Controlled substance agreement signed     Mild episode of recurrent major depressive disorder (H)     Alcohol withdrawal (H)     Substance abuse (H)     Agitation requiring sedation protocol     Alcohol abuse with alcohol-induced anxiety disorder (H)     Acute respiratory failure (H)     Acute alcoholic intoxication in alcoholism with complication (H)     Alcohol dependence (H)     Tobacco dependence syndrome    )  (   Current Outpatient Medications   Medication Sig Dispense Refill     amLODIPine (NORVASC) 10 MG tablet TAKE 1 TABLET(10 MG) BY MOUTH DAILY 90 tablet 1     buPROPion (WELLBUTRIN XL) 150 MG 24 hr tablet Take 1 tablet by mouth daily at 2 pm       cloNIDine (CATAPRES) 0.1 MG tablet TAKE 1 TABLET(0.1 MG) BY MOUTH TWICE DAILY 180 tablet 0     gabapentin (NEURONTIN) 800 MG tablet TAKE 1 TABLET BY MOUTH FOUR TIMES DAILY 360 tablet 1     lisdexamfetamine (VYVANSE) 50 MG capsule Take 1 capsule (50 mg) by mouth every morning 30 capsule 0     nicotine polacrilex (NICORETTE) 4 MG gum CHEW 1 PIECE AS NEEDED FOR SMOKING CESSATION 100 each 0     pantoprazole (PROTONIX) 40 MG " EC tablet Take 1 tablet (40 mg) by mouth daily 90 tablet 3    )  ( Diabetes Eval:    )    ( Pain Eval:  Severe Pain (7) )    ( Wound Eval:       )    (   History   Smoking Status     Every Day     Packs/day: 0.20     Years: 11.00     Types: Cigarettes, Vaping Device     Start date: 4/3/2007   Smokeless Tobacco     Current    )    ( Signed By:  Renato Cordoba EMT; January 13, 2023; 8:14 AM )

## 2023-01-13 NOTE — PROGRESS NOTES
Nickolas Lang is a 32 year old who is being evaluated via a billable video visit.      How would you like to obtain your AVS? MyChart  If the video visit is dropped, the invitation should be resent by: Text to cell phone: [phone]  Will anyone else be joining your video visit? No  If patient encounters technical issues they should call [phone]    During this virtual visit the patient is located in [place], patient verifies this as the location during the entirety of this visit.     Video-Visit Details  Video Start Time: 0901    Type of service:  Video Visit    Video End Time:0946    Originating Location (pt. Location): Home    Distant Location (provider location):  Off-site    Platform used for Video Visit: MARRY Keenan 1/13/2023      8:06 AM

## 2023-01-13 NOTE — LETTER
"2023       RE: Nickolas Lang  [address] [place] [address]     Dear Colleague,    Thank you for referring your patient, Nickolas Lang, to the [facility]. Please see a copy of my visit note below.    New Bariatric Surgery Consultation Note    2023    RE: Nickolas Lang  MR#: [id number]  : 1990      Referring provider: No flowsheet data found.    Chief Complaint/Reason for visit: evaluation for possible weight loss surgery    Dear Abran Yousif MD (General),    I had the pleasure of seeing your patient, Nickolas Lang, to evaluate his obesity and consider him for possible weight loss surgery.  As you know, Nickolas Lang is 32 year old.  He has a height of 6' .5\", a weight of 340 lbs 0 oz, and calculated Body mass index is 45.48 kg/m ..  Full intake/assessment was done to determine barriers to weight loss success and develop a treatment plan.    Assessment & Plan   Problem List Items Addressed This Visit        Digestive    Class 3 severe obesity with serious comorbidity and body mass index (BMI) of 45.0 to 49.9 in adult, unspecified obesity type (H) - Primary     Has always had difficult time managing weight. Very active playing football as an adolescent. Less active in 20s with weight regain. Has lost 50+lbs a few times over the years but never able to sustain the weight loss. Most recently, lost 50lb when starting vyvanse for ADD (noted appetite suppression) and working on diet and lifestyle changes. With start of covid had more down time at home and took medication less consistently and noticed weight regain. Now, taking meds more consistently but noticing less effectiveness. Wanting to know if there is something more he can do to manage weight. comorbidities include snoring, hx of hepatic steatosis, ADD, hx of substance abuse, HTN and " gerd.     Patient isn't certain he is ready to pursue surgery. With hx of substance abuse I would agree that mwm may be a better fit to start. Discussed role of AOM. Discussed topiramate and GLP1 for weight management. We also discussed phentermine but less likely to be beneficial for ADD - currently on vyvanse and would like to consider switching to adderall- recommend avoiding phentermine for now. Already on bupropion and had weight gain when taking naltrexone while in treatment for alcohol abuse.     gerd well controlled with protonix. Worse with eating and laying down or specific food triggers. No prior EGD. Hx of steatosis on Ultrasound in 2018 but has since quit drinking and has had normal liver labs since then. Will repeat labs now to re-evaluate. Consider fibroscan if needed.     Lots of fatigue. Not consistently waking up rested. +snoring and people have told him they are concerned about breathing at night. Never tested for MONTEZ. Discussed importance of sleep with weight management. Referral placed for sleep medicine.     Plan:  Start saxenda if covered by insurance  Start working with RD  Focus on protein and hydration  Start with smaller portions  Labs when able- fasting  Sleep consult ordered   Follow up 2 months          Relevant Medications    liraglutide - Weight Management (SAXENDA) 18 MG/3ML pen    insulin pen needle (31G X 5 MM) 31G X 5 MM miscellaneous    Other Relevant Orders    Adult Sleep Eval & Management  Referral    CBC with platelets    Comprehensive metabolic panel    Hemoglobin A1c    Vitamin D Deficiency    Lipid panel reflex to direct LDL Fasting          HISTORY OF PRESENT ILLNESS:  Weight Loss History Reviewed with Patient 1/11/2023   How long have you been overweight? Since early childhood   What is the most that you have ever weighed? 360   What is the most weight you have lost? 80   I have tried the following methods to lose weight Watching portions or calories, Exercise,  OTC Medications   I have tried the following weight loss medications? (Check all that apply) Naltrexone   Have you ever had weight loss surgery? No     Has always been bigger. Athlete when younger. Less active starting in mid 20s.     Knows he should feel better than he does at his age. Wants to feel better over all- always tired and not feeling well     Has been able to lose weight in the past- (50lb) when taking an ADHD med, getting sober, and monitoring intake. Lots to 280lb Didn't take med as consistently over Covid and had more down time. Saw weight regain during this time. Started taking consistently again but noticing not as effective with appetite     Low weight as edita adult was 205    CO-MORBIDITIES OF OBESITY INCLUDE:     1/11/2023   I have the following health issues associated with obesity: Sleep Apnea, GERD (Reflux)   I have the following symptoms associated with obesity: -     GERD -takes pantoprazole once daily, worse with specific food triggers, worse when laying down - well controlled. Hx of pyloric stenosis as an infant which was surgically repaired     MONTEZ - never had sleep study, all day fatigue, +snoring, has been told he stops breathing at night, not usually waking rested     Hepatic steatosis - ultrasound 10/2018, unable to calculate fib4 with recent labs, normal labs 11/2021 - quit drinking in 2019   HTN   ADD- vyvanse currently, looking at going back to adderall - managed by psychiatry     PAST MEDICAL HISTORY:  Past Medical History:   Diagnosis Date     ADD (attention deficit disorder)      Agitation      Alcohol abuse      Anxiety     gabapentin helps the most      Benzodiazepine abuse (H)      Drug abuse (H)      GERD (gastroesophageal reflux disease)      Hepatic steatosis      Hypertension      Obesity      Pyloric stenosis     s/p pyloromyotomy as an infant       PAST SURGICAL HISTORY:  Past Surgical History:   Procedure Laterality Date     Deviated septum repair       PYLOROMYOTOMY  SURGERY  as an infant      SHOULDER SURGERY Left 07/29/2016    Removal of cartilage       FAMILY HISTORY:   Family History   Problem Relation Age of Onset     Neurologic Disorder Mother         Multiple Sclerosis     Depression Mother      Anxiety Disorder Mother      Alcohol/Drug Father      Substance Abuse Father      Depression Maternal Grandmother      Anxiety Disorder Maternal Grandmother      Hypertension Maternal Grandmother      Substance Abuse Maternal Grandfather      Alcohol/Drug Paternal Grandfather        SOCIAL HISTORY:   Social History Questions Reviewed With Patient 1/11/2023   Which best describes your employment status (select all that apply) I work full-time   If you work, what is your occupation?    Which best describes your marital status: single   Do you have children? No   Who do you have in your support network that can be available to help you for the first 2 weeks after surgery? Family friends   Who can you count on for support throughout your weight loss surgery journey? Same   Can you afford 3 meals a day?  Yes   Can you afford 50-60 dollars a month for vitamins? Yes         HABITS:     1/11/2023   How often do you drink alcohol? Never   Do you currently use any of the following Nicotine products? e-cigarettes   Have you ever used any of the following nicotine products? No   Have you or are you currently using street drugs or prescription strength medication for which you do not have a prescription for? No   Do you have a history of chemical dependency (alcohol or drug abuse)? No     PMH indicates hx drug abuse and alcohol abuse. Sober since 6/2019     Occasional smoking ecigs- inconsistently, not used in days     Currently taking narcotic/opioids No    PSYCHOLOGICAL HISTORY:   Psychological History Reviewed With Patient 1/11/2023   Have you ever attempted suicide? Never.   Have you had thoughts of suicide in the past year? No   Have you ever been hospitalized for mental  "illness or a suicide attempt? Never.   Do you have a history of chronic pain? Yes   Have you ever been diagnosed with fibromyalgia? No   Are you currently being treated for any of the following? (select all that apply) Depression, Anxiety, I take medication or see a therapist for another mental illness   Are you currently seeing a psychiatrist? Yes     Hx of SAD- being proactive about getting out of the house more   Working on getting in contact with prior therapist     ROS:     1/11/2023   Skin:  Leg swelling, Varicose veins   HEENT: Headaches, Dizziness/lightheadedness   Musculoskeletal: Joint Pain, Back pain, Swelling of legs   Cardiovascular: Shortness of breath with activity   Pulmonary: Snoring, People have told me I stop breathing while asleep   Gastrointestinal: Heartburn, Reflux   Genitourinary: None of the above   Hematological: None of the above   Neurological: None of the above       EATING BEHAVIORS:     1/11/2023   Have you or anyone else thought that you had an eating disorder? Yes   If you answered yes to the previous eating disorder question, select the types that apply from this list: Binge Eating   Do you currently binge eat (eat a large amount of food in a short time)? Yes   Are you an emotional eater? Yes   Do you get up to eat after falling asleep? Yes     Used to go all day without eating or eat very lightly then \"make up for lost time\" when getting home   Now, working from home, feels food more accessible and noticing eating more during the day     Doesn't eat regular meals during the day. 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 Less than 1 time per week   What is the duration of your exercise (in minutes)? 45 Minutes   What types of " exercise do you do? walking   What keeps you from being more active?  Pain, Too tired     Low  Back pain - associates with central weight gain   Does have some back pain stemming from football when younger     MEDICATIONS:  Current Outpatient Medications   Medication Sig Dispense Refill     buPROPion (WELLBUTRIN XL) 150 MG 24 hr tablet Take 1 tablet by mouth daily at 2 pm       cloNIDine (CATAPRES) 0.1 MG tablet TAKE 1 TABLET(0.1 MG) BY MOUTH TWICE DAILY 180 tablet 0     gabapentin (NEURONTIN) 800 MG tablet TAKE 1 TABLET BY MOUTH FOUR TIMES DAILY 360 tablet 1     insulin pen needle (31G X 5 MM) 31G X 5 MM miscellaneous Use 1 pen needles daily or as directed. 100 each 1     liraglutide - Weight Management (SAXENDA) 18 MG/3ML pen Week 1: 0.6 mg subcutaneous daily, Week 2: 1.2 mg daily, Week 3: 1.8 mg daily, Week 4: 2.4 mg daily, Week 5 & on: 3 mg daily 15 mL 2     lisdexamfetamine (VYVANSE) 50 MG capsule Take 1 capsule (50 mg) by mouth every morning 30 capsule 0     nicotine polacrilex (NICORETTE) 4 MG gum CHEW 1 PIECE AS NEEDED FOR SMOKING CESSATION 100 each 0     pantoprazole (PROTONIX) 40 MG EC tablet Take 1 tablet (40 mg) by mouth daily 90 tablet 3       Is patient on biologics or immunomodulators?     ALLERGIES:  No Known Allergies    Office Visit on 11/02/2021   Component Date Value Ref Range Status     Cholesterol 11/02/2021 240 (H)  <200 mg/dL Final     Triglycerides 11/02/2021 211 (H)  <150 mg/dL Final     Direct Measure HDL 11/02/2021 60  >=40 mg/dL Final     LDL Cholesterol Calculated 11/02/2021 138 (H)  <=100 mg/dL Final     Non HDL Cholesterol 11/02/2021 180 (H)  <130 mg/dL Final     Patient Fasting > 8hrs? 11/02/2021 No   Final     Sodium 11/02/2021 140  133 - 144 mmol/L Final     Potassium 11/02/2021 4.4  3.4 - 5.3 mmol/L Final     Chloride 11/02/2021 108  94 - 109 mmol/L Final     Carbon Dioxide (CO2) 11/02/2021 29  20 - 32 mmol/L Final     Anion Gap 11/02/2021 3  3 - 14 mmol/L Final     Urea Nitrogen  11/02/2021 14  7 - 30 mg/dL Final     Creatinine 11/02/2021 0.74  0.66 - 1.25 mg/dL Final     Calcium 11/02/2021 8.5  8.5 - 10.1 mg/dL Final     Glucose 11/02/2021 97  70 - 99 mg/dL Final     Alkaline Phosphatase 11/02/2021 101  40 - 150 U/L Final     AST 11/02/2021 16  0 - 45 U/L Final     ALT 11/02/2021 29  0 - 70 U/L Final     Protein Total 11/02/2021 7.3  6.8 - 8.8 g/dL Final     Albumin 11/02/2021 3.8  3.4 - 5.0 g/dL Final     Bilirubin Total 11/02/2021 0.3  0.2 - 1.3 mg/dL Final     GFR Estimate 11/02/2021 >90  >60 mL/min/1.73m2 Final    As of July 11, 2021, eGFR is calculated by the CKD-EPI creatinine equation, without race adjustment. eGFR can be influenced by muscle mass, exercise, and diet. The reported eGFR is an estimation only and is only applicable if the renal function is stable.     TSH 11/02/2021 2.40  0.40 - 4.00 mU/L Final     Creatinine Urine mg/dL 11/02/2021 117  mg/dL Final     Albumin Urine mg/L 11/02/2021 22  mg/L Final     Albumin Urine mg/g Cr 11/02/2021 18.80 (H)  0.00 - 17.00 mg/g Cr Final         Anti-obesity medication ROS:    HEENT  Hx of glaucoma: No    Cardiovascular  CAD:No  HTN:Yes- not currently taking BP meds and last /86 8/2022     Gastrointestinal  GERD:Yes  Constipation/diarrhea/GI issues:No  Liver Dz:Yes  Computed FIB-4 Calculation unavailable. Necessary lab results were not found in the last year.    H/O Pancreatitis:No    Psychiatric  Bipolar: No  Anxiety:Yes  Depression:Yes  History of alcohol/drug abuse: Yes  Hx of eating disorder:No    Endocrine  Personal or family hx of MTC or MEN2:No  Diabetes/prediabetes: No    Neurologic:  Hx of seizures: No  Hx of migraines: No  Memory Impairment: occasional  Chronic pain/opioids use: No      History of kidney stones: No  Kidney disease: No      PHYSICAL EXAM:  Objective     Physical Exam   GENERAL: Healthy, alert and no distress  EYES: Eyes grossly normal to inspection.  No discharge or erythema, or obvious  scleral/conjunctival abnormalities.  RESP: No audible wheeze, cough, or visible cyanosis.  No visible retractions or increased work of breathing.    SKIN: Visible skin clear. No significant rash, abnormal pigmentation or lesions.  NEURO: Cranial nerves grossly intact.  Mentation and speech appropriate for age.  PSYCH: Mentation appears normal, affect normal/bright, judgement and insight intact, normal speech and appearance well-groomed.      In summary, Nickolas Lang has Class III obesity with a body mass index of Body mass index is 45.48 kg/m . kg/m2 and the comorbidities stated above.     We did not discuss surgery today. We did discuss the higher risk for addiction after bariatric surgery. We did discuss need to be tobacco free x 3 months before surgery. Will pursue mwm for now.     Sincerely,     Emiliana Hardy NP      60 minutes spent on the date of the encounter doing chart review, history and exam, documentation and further activities per the note    Nickolas Lang is a 32 year old who is being evaluated via a billable video visit.      How would you like to obtain your AVS? MyChart  If the video visit is dropped, the invitation should be resent by: Text to cell phone: [phone]  Will anyone else be joining your video visit? No  If patient encounters technical issues they should call [phone]    During this virtual visit the patient is located in [place], patient verifies this as the location during the entirety of this visit.     Video-Visit Details  Video Start Time: 0901    Type of service:  Video Visit    Video End Time:0946    Originating Location (pt. Location): Home    Distant Location (provider location):  Off-site    Platform used for Video Visit: MARRY Keenan 1/13/2023      8:06 AM

## 2023-01-13 NOTE — ASSESSMENT & PLAN NOTE
Has always had difficult time managing weight. Very active playing football as an adolescent. Less active in 20s with weight regain. Has lost 50+lbs a few times over the years but never able to sustain the weight loss. Most recently, lost 50lb when starting vyvanse for ADD (noted appetite suppression) and working on diet and lifestyle changes. With start of covid had more down time at home and took medication less consistently and noticed weight regain. Now, taking meds more consistently but noticing less effectiveness. Wanting to know if there is something more he can do to manage weight. comorbidities include snoring, hx of hepatic steatosis, ADD, hx of substance abuse, HTN and gerd.     Patient isn't certain he is ready to pursue surgery. With hx of substance abuse I would agree that mwm may be a better fit to start. Discussed role of AOM. Discussed topiramate and GLP1 for weight management. We also discussed phentermine but less likely to be beneficial for ADD - currently on vyvanse and would like to consider switching to adderall- recommend avoiding phentermine for now. Already on bupropion and had weight gain when taking naltrexone while in treatment for alcohol abuse.     gerd well controlled with protonix. Worse with eating and laying down or specific food triggers. No prior EGD. Hx of steatosis on Ultrasound in 2018 but has since quit drinking and has had normal liver labs since then. Will repeat labs now to re-evaluate. Consider fibroscan if needed.     Lots of fatigue. Not consistently waking up rested. +snoring and people have told him they are concerned about breathing at night. Never tested for MONTEZ. Discussed importance of sleep with weight management. Referral placed for sleep medicine.     Plan:  Start saxenda if covered by insurance  Start working with RD  Focus on protein and hydration  Start with smaller portions  Labs when able- fasting  Sleep consult ordered   Follow up 2 months

## 2023-01-13 NOTE — TELEPHONE ENCOUNTER
PA needed for Saxenda. Please submit PA and let Liaison know when Approved / Denied    [facility]  BIN: [id number]  ID: [id number]  GROUP: [id number]  PCN: [id number]

## 2023-01-13 NOTE — PATIENT INSTRUCTIONS
"Thank you for allowing us the privilege of caring for you. We hope we provided you with the excellent service you deserve.   Please let us know if there is anything else we can do for you so that we can be sure you are completely satisfied with your care experience.    To ensure the quality of our services you may be receiving a patient satisfaction survey from an independent patient satisfaction monitoring company.    The greatest compliment you can give is a \"Likely to Recommend\"    Your visit was with Emiliana Hardy NP today.    Instructions per today's visit:     Sami Lang, it was great to visit with you today.  Here is a review of our visit.  If our clinic scheduler is not able to reach you please call [phone] to schedule your next appointments.    Plan:  Start saxenda if covered by insurance  Start working with RD  Focus on protein and hydration  Start with smaller portions  Labs when able- fasting  Sleep consult ordered   Follow up 2 months       Information about Video Visits with [facility]: video visit information  _________________________________________________________________________________________________________________________________________________________  If you are asked by your clinic team to have your blood pressure checked:  [facility] do offer several locations for blood pressure checks. Please follow the below link to schedule an appointment. Scheduling an appointment at the pharmacy for a blood pressure check is now preferred.    Appointment Plus (appointment-plus.Hunie)  _________________________________________________________________________________________________________________________________________________________  Important contact and scheduling information:  Please call our contact center at [phone] to schedule your next appointments.  To find a lab location near you, please call [phone].  For any nursing questions or concerns call Juju " Joseph BROWNINGN at [phone] or Charley Stevens RN at [phone]  Please call during clinic hours Monday through Friday 8:00a - 4:00p if you have questions or you can contact us via mNectar[employer] at anytime and we will reply during clinic hours.    Lab results will be communicated through My Chart or letter (if My Chart not used). Please call the clinic if you have not received communication after 1 week or if you have any questions.?  Clinic Fax: [phone]    _________________________________________________________________________________________________________________________________________________________  Meal Replacement Products:    Here is the link to our new e-store where you can purchase our meal replacement products    [facility] E-Store  [employer]/store    The one week starter kit is a great way to sample a variety of products and see what works for you.    If you want more information about the product go to: [employer]    If you are an employee or [facility] or  [employer] please contact your care team for a 10% estore discount    Free Shipping for orders over $75     Benefits of meal replacements products:    Portion and calorie control  Improved nutrition  Structured eating  Simplified food choices  Avoid contact with trigger foods  _________________________________________________________________________________________________________________________________________________________  Interested in working with a health ?  Health coaches work with you to improve your overall health and wellbeing.  They look at the whole person, and may involve discussion of different areas of life, including, but not limited to the four pillars of health (sleep, exercise, nutrition, and stress management). Discuss with your care team if you would like to start working a health .  Health Coaching-3 Pack: Schedule by calling [phone]     $99 for three health coaching visits    Visits may be done in person or via phone    Coaching is a partnership between the  and the client; Coaches do not prescribe or diagnose    Coaching helps inspire the client to reach his/her personal goals   _________________________________________________________________________________________________________________________________________________________  24 Week Healthy Lifestyle Plan:    Our mission in the 24-week Healthy Lifestyle Plan is to provide you with individualized care by giving you the tools, education and support you need to lose weight and maintain a healthy lifestyle. In your 24-week journey, you ll be supported by a dedicated weight loss team that includes registered dietitians, medical weight management providers, health coaches, and nurses -- all with special expertise in weight loss -- to help you every step of the way.     Monthly meetings with your registered dietician or medical weight management provider help to review your progress, update your care plan, and make any adjustments needed to ensure success. Between these visits, weekly and bi-weekly health  visits will help you focus on the four pillars of weight loss -- stress, sleep, nutrition, and exercise -- and how you can best adapt each to achieve sustainable weight loss results.    In addition, you will be given exclusive access to online wellbeing classes through [employer].  Your initial visit will be with a medical weight management provider who will help to understand your weight loss goals and ensure this program is the right fit for you. Please let our team know if you are interested in the 24 week plan by sending a message to your care team or calling [phone] to schedule.  _________________________________________________________________________________________________________________________________________________________    COMPREHENSIVE WEIGHT MANAGEMENT  "PROGRAM  VIRTUAL SUPPORT GROUPS    For Support Group Information:      We offer support groups for patients who are working on weight loss and considering, preparing for or have had weight loss surgery.   There is no cost for this opportunity.  You are invited to attend the?Virtual Support Groups?provided by any of the following locations:    [facility] via Microsoft Teams with Miriam Valenzuela RN  2.   [place] via [employer] Teams with Nj Aragon, PhD, LP  3.   [place] via [employer] with Malka Ro RN  4.   [facility] via [employer] Teams with Malka Thompson [facility]    The following Support Group information can also be found on our website:  [email or web address]    [facility] Weight Loss Surgery Support Group    [facility] Weight Loss Surgery Support Group  The support group is a patient-lead forum that meets monthly to share experiences, encouragement and education. It is open to those who have had weight loss surgery, are scheduled for surgery, and those who are considering surgery.   WHEN: This group meets on the 3rd Wednesday of each month from 5:00PM - 6:00PM virtually using Microsoft Teams.   FACILITATOR: Led by Miriam Valenzuela, DINA, LD, RN, the program's Clinical Coordinator.   TO REGISTER: Please contact the clinic via [employer] or call the nurse line directly at [phone] to inform our staff that you would like an invite sent to you and to let us know the email you would like the invite sent to. Prior to the meeting, a link with directions on how to join the meeting will be sent to you.    2022 Meetings  Leah 15: \"Let's Talk\" a time for the group to share.  July 20: \"Let's Talk\" a time for the group to share.  August 17: \"Let's Talk\" a time for the group to share.  September 21: \"Let's Talk\" a time for the group to share.  October 19: Guest Speaker: Dr James Joya MD Pulmonologist and Sleep Medicine Physician, " "\"Getting a Good Night's Sleep\".  November 16: \"Let's Talk\" a time for the group to share.  December 21: \"Let's Talk\" a time for the group to share.    [facility] Support Groups    Connections: Bariatric Care Support Group?  This is open to all [facility] (and those external to this program) pre- and post- operative bariatric surgery patients as well as their support system.   WHEN: This group meets the 2nd Tuesday of each month from 6:30 PM - 8:00 PM virtually using Microsoft Teams.   FACILITATOR: Led by Nj Aragon, Ph.D who is a Licensed Psychologist with the [facility] Comprehensive Weight Management Program.   TO REGISTER: Please send an email to Nj Aragon, Ph.D., LP at?[email or web address]?if you would like an invitation to the group and to learn about using Microsoft Teams.    2022 Meetings  June 14: Dora Gordillo, DINA, LD at [facility], \"Nutritional Labeling\"  July 12 August 2 (Please Note Date Change)  September 13 October 11 November 8 December 13    Connections: Post-Operative Bariatric Surgery Support Group  This is a support group for [facility] bariatric patients (and those external to [facility]) who have had bariatric surgery and are at least 3 months post-surgery.  WHEN: This support group meets the 4th Wednesday of the month from 11:00 AM - 12:00 PM virtually using Microsoft Teams.   FACILITATOR: Led by Certified Bariatric Nurse, Malka Ro RN.   TO REGISTER: Please send an email to Malka at [email or web address] if you would like an invitation to the group and to learn about using Microsoft Teams.    2022 Meetings June 22 July 27 August 24 September 28 October 26 November 23 December 28      [facility] Healthy Lifestyle Virtual Support Group    Healthy Lifestyle Virtual Support Group?  This is 60 minutes of small group guided discussion, support " "and resources. All are welcome who want a healthy lifestyle.  WHEN: This group meets monthly on a Friday from 12:30 PM - 1:30 PM virtually using Microsoft Teams.   FACILITATOR: Led by National Board Certified Health and , Malka Thompson [facility].   TO REGISTER: Please send an email to Malka [email or web address] to receive monthly invites to the group or if you have any questions about having a health .  Prior to the meeting, a link with directions on how to join the meeting will be sent to you.    2022 Meetings  June 24: Malka Thompson [facility], \"Setting Limits and Boundaries\".  Jul 29: Open Forum  August 26: Guest Speaker: Rekha Paez Registered Dietitian  September 30: Open Forum  October 28th: Guest Speaker: Leighann Mccauley [facility], Health , \"Gratitude Practices\".  November 18: Guest Speaker: Renate Devlin RD Registered Dietitian, \"Navigating How to Eat around the Holidays\".  December 16: Guest Speaker: Rajwinder Marrero [facility], \"Changing Your Relationship with Movement\".    ____________________________________________________________________________________________________________________________________________________________________________  [facility] Get Moving Program  Our team of physical therapists is trained to help you understand and take control of your condition. They will perform a thorough evaluation to determine your ability for activity and develop a customized plan to fit your goals and physical ability.  Scheduling: Unsure if the Get Moving program is right for you? Discuss the program with your medical provider or diabetes educator. You can also call us at [phone] to ask questions or schedule an appointment.   [facility] Get Moving Program  ____________________________________________________________________________________________________________________________________________________________________________  [facility] Diabetes Prevention " Program (DPP)  If you have prediabetes and Medicare please contact us via [employer] to learn more about the Diabetes Prevention Program (DPP)  Program Details:  [facility] offers the year-long Diabetes Prevention Program (DPP). The program helps you to make lifestyle changes that prevent or delay type 2 diabetes by supporting healthy eating, increased physical activity, stress reduction and use of coping skills.   On average, previous [facility] DPP cohorts have lost and maintained at least 5% of their starting weight throughout the program and averaged more than 150 minutes of physical activity per week.  Participants meet weekly for one-hour group sessions over sixteen weeks, every other week for the next 8 weeks, and monthly for the last six months.   A year-long maintenance program is also available for participants who complete the first year.   Location & Cost:   During the COVID-19 Public Health Emergency, the program is offered virtually. When in-person classes can resume, they will be held at [facility].  For people with Medicare, the program is covered in full. A self-pay option will also be available for those with non-Medicare insurance plans.   ____________________________________________________________________________________________________________________________________________________    To work with a Behavioral Health Psychologist:    Call to schedule:    Reed Garrido - [phone]  Adriana Wilcox - [phone]  Anitra Bocanegra - [phone]  Deedee Real - [phone]   Shelley Bar PhD (cannot accept Medicare) [phone]        Thank you,   [facility] Comprehensive Weight Management Team  MEDICATION STARTED AT THIS APPOINTMENT    We are starting a GLP-1 (Glucagon-like Peptide-1) medication called Saxenda. One of the ways it works is by slowing down the rate that food leaves your stomach. You feel irwin and will eat less. It also  helps regulate hormones that can help improve your blood sugars.    If you are a patient that checks blood sugars, continue to check as instructed by your doctor. Low blood sugars are rare but can happen if patients are on insulin or other oral agents. If you notice consistent low sugars or high sugars, your medication may need to be adjusted after your appointment. If this is the case, please call RN and provide her your blood sugar record from the last 3-4 days. The RN will get in touch with the doctor and call you back/[employer] message with recommendations. We tolerate high sugars for a bit, so if sugars are running 180-200, this is ok. As weight starts dropping the blood sugars should too. If readings are consistently over 200 for 1-2 weeks, then you should call the doctor/nurse.    Dosing for this medication:   Week 1- Inject 0.6 mg daily  Week 2- Inject 1.2 mg daily  Week 3- Inject 1.8 mg daily  Week 4- Inject 2.4 mg daily  Week 5 and thereafter- Inject 3.0 mg daily    Side effects of GLP- Medications include: The most common side effects are all GI related and consist of: nausea, constipation, diarrhea, burping, or gassiness. Patients are advised to eat slowly and less, and nausea typically passes if people can stick it out.     The risk of pancreatitis (inflammation of the pancreas) has been associated with this type of medication, but is very rare.  If you have had pancreatitis in the past, this medication may not be for you. Please let us know about any past history of pancreas problems.    Symptoms of pancreatitis include: Pain in your upper stomach area which may travel to your back and be worse after eating. Your stomach area may be tender to the touch.  You may have vomiting or nausea and/or have a fever. If you should develop any of these symptoms, stop the medication and contact your primary care doctor. They will do a blood test to check for pancreatitis.         There is a small chance you may have  some low blood sugar after taking the medication.   The signs of low blood sugar are:  Weakness  Shaky   Hungry  Sweating  Confusion      See below for ways to treat low blood sugar without adding in lots of extra calories.      Treating Low Blood Sugar    If you have symptoms of low blood sugar (sweating, shaking, dizzy, confused) eat 15 grams of carbs and wait 15 minutes:    Glucose Tabs are best for sugars under 70 -  Dex4 or BD Glucose tablets are good, you will need to take 3-4 of these to equal 15 grams.     One small box of raisins  4 oz fruit juice box or   cup fruit juice  1 small apple  1 small banana    cup canned fruit in water    English muffin or a slice of bread with jelly   1 low fat frozen waffle with sugar-free syrup    cup cottage cheese with   cup frozen or fresh blueberries  1 cup skim or low-fat milk    cup whole grain cereal  4-6 crackers such as Triscuits      This medication is usually not covered by insurance and can be quite expensive. Sometimes a prior authorization is required, which may take up to 1-2 weeks for an insurance company to make a decision if they will cover the medication. Please be patient, you will be notified after a decision has been made.    Contact the nurse via [employer] or call [phone] if you have any questions or concerns. (Do not stop taking it if you don't think it's working. For some people it works without them knowing it.)     In order to get refills of this or any medication we prescribe you must be seen in the medical weight mgmt clinic every 2-4 months.

## 2023-01-17 NOTE — TELEPHONE ENCOUNTER
Central Prior Authorization Team   Phone: [phone]    PA Initiation    Medication: Saxenda  Insurance Company: [employer] - Phone [phone] Fax [phone]  Pharmacy Filling the Rx: [id number] [place], [place] - [facility] [address] AT [address] OF [facility]  Filling Pharmacy Phone: [phone]  Filling Pharmacy Fax: [phone]  Start Date: 1/17/2023

## 2023-01-18 NOTE — TELEPHONE ENCOUNTER
Prior Authorization Approval    Authorization Effective Date: 12/18/2022  Authorization Expiration Date: 7/16/2023  Medication: Saxenda-PA APPROVED   Approved Dose/Quantity:   Reference #:     Insurance Company: [employer] - Phone [phone] Fax [phone]  Expected CoPay:       CoPay Card Available:      Foundation Assistance Needed:    Which Pharmacy is filling the prescription (Not needed for infusion/clinic administered): [id number] [address] AT [facility]  Pharmacy Notified: Yes- **Instructed pharmacy to notify patient when script is ready to /ship.**-Pharmacy stated that they do not have script on file and is requested script be sent to filling pharmacy to process medication. Pharmacy stated that they will notify the patient on medication.    Patient Notified: Yes

## 2023-01-23 ENCOUNTER — TELEPHONE (OUTPATIENT)
Dept: ENDOCRINOLOGY | Facility: CLINIC | Age: 33
End: 2023-01-23
Payer: COMMERCIAL

## 2023-01-23 NOTE — TELEPHONE ENCOUNTER
Called and spoke with patient about scheduling a follow up appt with Emiliana Hardy in 2 months (around 3/13/23). Patient wants to call back to schedule, or schedule it on [employer] when he is able to. Sent [employer] message with # to call.

## 2023-04-18 NOTE — TELEPHONE ENCOUNTER
Arterial Line:    An arterial line was placed using surface landmarks, in the OR for the following indication(s): continuous blood pressure monitoring and blood sampling needed. A 20 gauge (size), 5 cm (length), Arrow (type) catheter was placed, Seldinger technique used, into the right brachial artery and Lidocaine 1%, secured by Tegaderm. Anesthesia type: Local  Local infiltration: Injection    Events:  patient tolerated procedure well with no complications.   Anesthesiologist: Americo Leon MD  Performed: Anesthesiologist   Preanesthetic Checklist  Completed: patient identified, IV checked, site marked, risks and benefits discussed, surgical/procedural consents, equipment checked, pre-op evaluation, timeout performed, anesthesia consent given, oxygen available, monitors applied/VS acknowledged, fire risk safety assessment completed and verbalized and blood product R/B/A discussed and consented Central Venous Line:    A central venous line was placed using ultrasound guidance, in the OR for the following indication(s): central venous access and CVP monitoring. Sterility preparation included the following: hand hygiene performed prior to procedure, maximum sterile barriers used and sterile technique used to drape from head to toe. The patient was placed in supine position. The right internal jugular vein was prepped. The site was prepped with Chloraprep. A 8.5 Fr (size), 10 (length), introducer slick was placed. During the procedure, the following specific steps were taken: target vein identified, needle advanced into vein and blood aspirated and guidewire advanced into vein. Intravenous verification was obtained by ultrasound and venous blood return. Post insertion care included: all ports aspirated, all ports flushed easily, guidewire removed intact, Biopatch applied, line sutured in place and dressing applied. During the procedure the patient experienced: patient tolerated procedure well with no complications.       Outcomes: uncomplicated  Real-time US image taken/store: yes  Anesthesia type: local..No  Staffing  Performed: Anesthesiologist   Anesthesiologist: Kriss Bertrand MD  Preanesthetic Checklist  Completed: patient identified, IV checked, site marked, risks and benefits discussed, surgical/procedural consents, equipment checked, pre-op evaluation, timeout performed, anesthesia consent given, oxygen available and monitors applied/VS acknowledged The prescription have been already done   Valve: Annulus normal.  Stenosis not present. Regurgitation mild. Aorta    Ascending Aorta:  Size normal.    Aortic Arch:  Size normal.    Descending Aorta:  Size normal.        Atria    Right Atrium:  Size normal.    Left Atrium:  Size normal.  Left atrial appendage normal.      Septa    Atrial Septum:  Intra-atrial septal morphology contains patent foramen ovale. Patent foramen ovale shunts left to right. Ventricular Septum:  Intra-ventricular septum morphology normal.      Diastolic Function Measurements:  Diastolic Dysfunction Grade= indeterminate  E=  ms  A=  ms  E/A Ratio=   DT=  ms  S/D=   IVRT=    Other Findings  Pericardium:  pericardial effusion      Anesthesia Information  Performed Personally  Anesthesiologist:  Leopold Gale, MD    Post Intervention Follow-up Study  Aortic Function: unchangedMitral Function: unchangedTricuspid Function: unchangedComments: S/P CABG and PFO closure, no LV air detected, preserved RV and LV function, EF 59%, report given to Dr. Helga Sheppard intraoperatively.

## 2023-07-06 ENCOUNTER — TELEPHONE (OUTPATIENT)
Dept: ENDOCRINOLOGY | Facility: CLINIC | Age: 33
End: 2023-07-06

## 2023-07-06 NOTE — TELEPHONE ENCOUNTER
PA for Saxenda is about to . Is pt still taking medication and also if pt is need current weight to renew PA

## 2023-07-10 NOTE — TELEPHONE ENCOUNTER
Date: 7/10    Phone Number: [phone]    Comments: LVM for pt to see if pt is still taking Saxenda

## 2023-07-16 NOTE — H&P
Admitted:     03/26/2018      IDENTIFYING INFORMATION:  The patient is a 27-year-old  male who is living with his mother.  He has a girlfriend, no children.      CHIEF COMPLAINT:  Relapse.      HISTORY OF PRESENT ILLNESS:  The patient is in chronic alcoholism.  He was just discharged from [facility] and relapsed after 5 days.  He is not able to elicit what happened, but he just says he had a stressful day.  Alcohol is his drug of choice.  He is very relapse prone.  Started drinking at age of 17.  By 23, it was a problem.  He has been in numerous treatments and is very relapse prone.  He has tolerance.  He blackouts, withdrawal, including DTs, progressive use, loss of control, tried to quit unsuccessfully, used despite negative consequences, work, job, money.      The patient has depression.  He says he is very depressed.  When he gets depressed, he feels hopeless, does not care.  He isolates.  He sleeps too much, energy, motivation suffer.  He takes Zoloft, BuSpar for it.  The patient also had anxiety in the past.  He denies any PTSD, denies any eating disorder, denies any lanny.      PAST PSYCHIATRIC HISTORY:  Psychiatrically hospitalized once 1-1/2 years ago for a suicide gesture.  He overdosed on pills.  He was in 12 chemical dependency treatments, none of them were court ordered.      PAST MEDICAL HISTORY:  History of shoulder injury playing football, deviated nasal septum, pyloric stenosis, history of hypertension, obesity, and hepatic steatosis.      FAMILY HISTORY:  Father and grandfather were both alcoholic.  Depression and anxiety in mother and maternal grandmother.      SOCIAL HISTORY:  Grew up in [place], raised by his mother.  He completed high school, 4 years of college.        VITAL SIGNS:  Temperature of 97.1, pulse of 74, respiratory rate 16, blood pressure 151/95.      MENTAL STATUS EXAMINATION:  The patient is a 27-year-old obese  male who appears his stated age, has adequate  grooming, adequate hygiene, maintains good eye contact.  He is cooperative.  He has no psychomotor abnormalities.  No gait problems.  Mood is sad.  Affect is congruent.  Speech is spontaneous, normal rate.  Does not have any suicidal ideation, denied auditory hallucinations.  Alert and oriented x 3.  Recent and remote memory, language, fund of knowledge are all adequate.  No loose associations.  The patient does not have any suicidal ideation, plan or intent.  Patient is stable.      DIAGNOSES:   1.  Alcohol use disorder. severe  2.  Major depressive disorder, recurrent without psychotic features.      PLAN:  The patient will be detoxed off alcohol using MSSA protocol and Valium.  The patient will be continued back on his medications, which include Zoloft and BuSpar.  The patient at this time is not sure what he wants to do in terms of treatment.         HENRRY LOCO MD             D: 2018   T: 2018   MT: MAURIZIO      Name:     PARTH HARPER   MRN:      [id number]        Account:      [id number]   :      1990        Admitted:     2018                   Document: [id number]       Unknown

## 2023-07-21 NOTE — TELEPHONE ENCOUNTER
3rd follow up. Has clinic heard back from patient. PA is expiring for Saxenda. Is pt still taking and if so will need current weight

## 2023-10-21 NOTE — TELEPHONE ENCOUNTER
Date: 7/14     Phone Number: [phone]     Comments: LVM for pt (2nd) to see if pt is still taking Saxenda  Will complete encounter and delete reminder of no call back   This 49-year-old with complex regurgitated disorder was sent for nuclear medicine gastric emptying study solid phase 4 hour study.  It was normal.  Therefore I can not postulate that a gastric emptying disorder as the cause of his regurgitation.  At this point the options are very limited except what that manometry to point the way.  In previous notes have suggested other options for consultation.

## 2024-01-28 ENCOUNTER — HEALTH MAINTENANCE LETTER (OUTPATIENT)
Age: 34
End: 2024-01-28

## 2025-02-02 ENCOUNTER — HEALTH MAINTENANCE LETTER (OUTPATIENT)
Age: 35
End: 2025-02-02

## 2025-05-27 ENCOUNTER — TRANSFERRED RECORDS (OUTPATIENT)
Dept: HEALTH INFORMATION MANAGEMENT | Facility: CLINIC | Age: 35
End: 2025-05-27
Payer: COMMERCIAL

## 2025-06-09 ENCOUNTER — VIRTUAL VISIT (OUTPATIENT)
Dept: FAMILY MEDICINE | Facility: CLINIC | Age: 35
End: 2025-06-09
Payer: COMMERCIAL

## 2025-06-09 DIAGNOSIS — E66.813 CLASS 3 SEVERE OBESITY WITH SERIOUS COMORBIDITY AND BODY MASS INDEX (BMI) OF 45.0 TO 49.9 IN ADULT, UNSPECIFIED OBESITY TYPE (H): ICD-10-CM

## 2025-06-09 DIAGNOSIS — Z13.1 SCREENING FOR DIABETES MELLITUS: ICD-10-CM

## 2025-06-09 DIAGNOSIS — J69.0 ASPIRATION PNEUMONIA OF BOTH LUNGS DUE TO VOMIT, UNSPECIFIED PART OF LUNG (H): ICD-10-CM

## 2025-06-09 DIAGNOSIS — F10.20 ALCOHOLISM (H): Primary | ICD-10-CM

## 2025-06-09 DIAGNOSIS — F90.9 ATTENTION DEFICIT HYPERACTIVITY DISORDER (ADHD), UNSPECIFIED ADHD TYPE: ICD-10-CM

## 2025-06-09 DIAGNOSIS — F17.200 TOBACCO DEPENDENCE SYNDROME: ICD-10-CM

## 2025-06-09 PROCEDURE — 98002 SYNCH AUDIO-VIDEO NEW MOD 45: CPT | Performed by: FAMILY MEDICINE

## 2025-06-09 NOTE — PROGRESS NOTES
"Kana is a 34 year old who is being evaluated via a billable video visit.    {ROOMING STAFF complete during rooming of virtual visit (Optional):184141}  {If patient encounters technical issues they should call [phone] :548559}    {PROVIDER CHARTING PREFERENCE:345778}    Subjective   Kana is a 34 year old, presenting for the following health issues:  No chief complaint on file.         No data to display              HPI        {additonal problems for provider to add (Optional):479331}    {ROS Picklists (Optional):458916}      Objective           Vitals:  No vitals were obtained today due to virtual visit.    Physical Exam   {video visit exam brief selected:612244}    {Diagnostic Test Results (Optional):222573}      Video-Visit Details    Type of service:  Video Visit   Originating Location (pt. Location): {video visit patient location:674546::\"Home\"}  {PROVIDER LOCATION On-site should be selected for visits conducted from your clinic location or adjoining [facility] hospital, academic office, or other nearby [facility] building. Off-site should be selected for all other provider locations, including home:340009}  Distant Location (provider location):  {virtual location provider:011006}  Platform used for Video Visit: {Virtual Visit Platforms:753504::\"Atonarp\"}  Signed Electronically by: Diana Graza MD  {Email feedback regarding this note to [email or web address]   :646061}  "

## 2025-06-09 NOTE — PROGRESS NOTES
Kana is a 34 year old who is being evaluated via a billable video visit.    How would you like to obtain your AVS? MyChart  If the video visit is dropped, the invitation should be resent by: Text to cell phone: [phone]  Will anyone else be joining your video visit? No      Assessment & Plan   34 year old male, with recent hospitalization due to acute alcoholism intoxication encephalopathy, need for intubation due to above.  He also was given antibiotic Augmentin for aspiration pneumonia.  He reports great redness for alcohol cessation again, previously was sober for almost 6 years until relapsed May 20, 2025.  He reports he has plans to stay connected with a new [employer] orth[facility] with new mental health provider.  He has also found a sponsor and has plans to attend alcohol Anonymous at least twice a week.    Alcoholism (H)  Plan: As above  Office visit is provided for next week for labs to be completed.  - Comprehensive metabolic panel (BMP + Alb, Alk Phos, ALT, AST, Total. Bili, TP); Future  - GGT; Future    Aspiration pneumonia of both lungs due to vomit, unspecified part of lung (H)  Finish Augmentin  Reassuring resolution of symptoms     Class 3 severe obesity with serious comorbidity and body mass index (BMI) of 45.0 to 49.9 in adult, unspecified obesity type (H)  Will discuss GLP1- in next OV    Attention deficit hyperactivity disorder (ADHD), unspecified ADHD type  Anxiety and depression.  Plan advise continue close follow-ups and monitoring with mental health provider.  He is also in the process of finding a new therapist.    Tobacco dependence syndrome  Plan.  Will discuss complete smoke cessation when ready.  Currently in precontemplation phase.    Screening for diabetes mellitus    - Hemoglobin A1c; Future        MED REC REQUIRED  Post Medication Reconciliation Status:     Nicotine/Tobacco Cessation  He reports that he has been smoking cigarettes and vaping device. He started smoking about 18 years ago. He has  a 3.6 pack-year smoking history. He uses smokeless tobacco.  Nicotine/Tobacco Cessation Plan  Information offered: Patient not interested at this time        Follow-up       Subjective   Kana is a 34 year old, presenting for the following health issues:  Hospital F/U (Alchoholism/Amoxicillan//)      6/9/2025    10:54 AM   Additional Questions   Roomed by carlyle madrid   Accompanied by lópez         6/9/2025    10:54 AM   Patient Reported Additional Medications   Patient reports taking the following new medications n/a     [facility]        Hospital Follow-up Visit:    Hospital/Nursing Home/IP Rehab Facility: [facility]   Date of Admission: 05/27/25  Date of Discharge: 06/01/25  Reason(s) for Admission: confusion/pain  Do you have any other stressors you would like to discuss with your provider? OTHER: living with GF and can be supportive as well.  Has been on medical leave since 5/17/25 by  provider/ August Bowen at [facility] for on going treatment of  depression and anxiety-   Meds review :Sertraline.buspar/ hydroxyzine   Wellbutrin caused irritability- so that's stopped.  Gabapentin helps with anxiety   Known history of attention deficit hyperactivity disorder takes adderall        Problems taking medications regularly:  NONE  Medication changes since discharge: AMOXICILLIN  Problems adhering to non-medication therapy:  None    Summary of hospitalization:  CareEverywhere information obtained and reviewed  Diagnostic Tests/Treatments reviewed.  Follow up needed: none  Other Healthcare Providers Involved in Patient s Care:         None  Update since discharge: improved.         Plan of care communicated with patient         He reports previous history of alcoholism on and off, multiple admissions for senior care sober house especially in 2014 and 15.  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  He remain abstinent for alcoholism since June 21, 2019, until relapse May 20, 2025.   So he was almost 6 years sober and reports a bit disappointment that he relapsed after a difficult conversation with his girlfriend who overall is very supportive.      His girlfriend actually found him unconscious, which was related to a weeklong w binge of hard liquor.  He was taken by ambulance intubated for 2 days and finished detox, he was discharged on .      He is taking antibiotic for aspiration pneumonia, denies cough, since discharge he had 3 beers which is unusual because he reports he usually drinks hard liquor.  He has found a new sponsor, reports his commitment to follow through with biweekly alcohol Anonymous meeting, he is also in the process of finding mental health therapist, and continue close relationship with his mental health provider for ongoing anxiety depression and ADHD.  He is on leave of absence because of his mental health problems.      Reports in past he was seeking help from [facility] and was too much involved in that created some distance, this time while he is very spiritual and would like  provider; August Bowen   Adderall 30 mg twice daily   Gabapentin     Ongoing service and help from discharge but maintaining safe distance as well.  History of hypertension for which she was given amlodipine and clonidine but most likely does not need it anymore.  He was prescribed Saxenda in 2013 but took it inconsistently as it was hard to take daily, he is interested in starting some medication for weight management      ce wirk at [facility]    Had high Blood pressure  was taking amlodipine and clonidine for BP managment   Not any more  Thinks Blood pressure high due to alcohol abuse       Dad /alcoholism/ - was not in life  Maternal grand physical activity alcohol abuse /addiction  MOM has MH/ has been diagnosed with MS        Review of Systems  Constitutional, HEENT, cardiovascular, pulmonary, GI, , musculoskeletal, neuro, skin, endocrine and psych systems are negative, except as  otherwise noted.      Objective    Vitals - Patient Reported  Systolic (Patient Reported):  (n/a)  Diastolic (Patient Reported):  (n/a)  Weight (Patient Reported):  (n/a)  Height (Patient Reported):  (n/a)  SpO2 (Patient Reported):  (n/a)  Temperature (Patient Reported):  (n/a)  Pulse (Patient Reported):  (n/a)      Vitals:  No vitals were obtained today due to virtual visit.    Physical Exam   GENERAL: alert and no distress  EYES: Eyes grossly normal to inspection.  No discharge or erythema, or obvious scleral/conjunctival abnormalities.  RESP: No audible wheeze, cough, or visible cyanosis.    SKIN: Visible skin clear. No significant rash, abnormal pigmentation or lesions.  NEURO: Cranial nerves grossly intact.  Mentation and speech appropriate for age.  PSYCH: Appropriate affect, tone, and pace of words          Video-Visit Details    Type of service:  Video Visit   Originating Location (pt. Location): Home    Distant Location (provider location):  On-site  Platform used for Video Visit: Vishnu  Signed Electronically by: Diana Garza MD

## 2025-06-14 ENCOUNTER — TELEPHONE (OUTPATIENT)
Dept: BEHAVIORAL HEALTH | Facility: CLINIC | Age: 35
End: 2025-06-14

## 2025-06-14 ENCOUNTER — HOSPITAL ENCOUNTER (INPATIENT)
Facility: CLINIC | Age: 35
LOS: 3 days | Discharge: HOME | DRG: 897 | End: 2025-06-17
Attending: FAMILY MEDICINE | Admitting: PSYCHIATRY & NEUROLOGY
Payer: COMMERCIAL

## 2025-06-14 DIAGNOSIS — F10.239 ALCOHOL DEPENDENCE WITH WITHDRAWAL WITH COMPLICATION (H): Primary | ICD-10-CM

## 2025-06-14 DIAGNOSIS — F41.9 ANXIETY: Chronic | ICD-10-CM

## 2025-06-14 DIAGNOSIS — F10.220 ALCOHOL DEPENDENCE WITH UNCOMPLICATED INTOXICATION (H): ICD-10-CM

## 2025-06-14 LAB
ALBUMIN SERPL BCG-MCNC: 4.2 G/DL (ref 3.5–5.2)
ALP SERPL-CCNC: 80 U/L (ref 40–150)
ALT SERPL W P-5'-P-CCNC: 19 U/L (ref 0–70)
AMPHETAMINES UR QL SCN: ABNORMAL
ANION GAP SERPL CALCULATED.3IONS-SCNC: 12 MMOL/L (ref 7–15)
AST SERPL W P-5'-P-CCNC: 19 U/L (ref 0–45)
BARBITURATES UR QL SCN: ABNORMAL
BASOPHILS # BLD AUTO: 0 10E3/UL (ref 0–0.2)
BASOPHILS NFR BLD AUTO: 1 %
BENZODIAZ UR QL SCN: ABNORMAL
BILIRUB SERPL-MCNC: 0.2 MG/DL
BUN SERPL-MCNC: 7.5 MG/DL (ref 6–20)
BZE UR QL SCN: ABNORMAL
CALCIUM SERPL-MCNC: 8.5 MG/DL (ref 8.8–10.4)
CANNABINOIDS UR QL SCN: ABNORMAL
CHLORIDE SERPL-SCNC: 104 MMOL/L (ref 98–107)
CREAT SERPL-MCNC: 0.44 MG/DL (ref 0.67–1.17)
EGFRCR SERPLBLD CKD-EPI 2021: >90 ML/MIN/1.73M2
EOSINOPHIL # BLD AUTO: 0.1 10E3/UL (ref 0–0.7)
EOSINOPHIL NFR BLD AUTO: 2 %
ERYTHROCYTE [DISTWIDTH] IN BLOOD BY AUTOMATED COUNT: 21.7 % (ref 10–15)
EST. AVERAGE GLUCOSE BLD GHB EST-MCNC: 114 MG/DL
ETHANOL SERPL-MCNC: 0.28 G/DL
FENTANYL UR QL: ABNORMAL
GGT SERPL-CCNC: 34 U/L (ref 8–61)
GLUCOSE SERPL-MCNC: 122 MG/DL (ref 70–99)
HBA1C MFR BLD: 5.6 %
HCO3 SERPL-SCNC: 23 MMOL/L (ref 22–29)
HCT VFR BLD AUTO: 37.8 % (ref 40–53)
HGB BLD-MCNC: 11.5 G/DL (ref 13.3–17.7)
IMM GRANULOCYTES # BLD: 0 10E3/UL
IMM GRANULOCYTES NFR BLD: 0 %
LYMPHOCYTES # BLD AUTO: 1.9 10E3/UL (ref 0.8–5.3)
LYMPHOCYTES NFR BLD AUTO: 39 %
MAGNESIUM SERPL-MCNC: 1.9 MG/DL (ref 1.7–2.3)
MCH RBC QN AUTO: 22.9 PG (ref 26.5–33)
MCHC RBC AUTO-ENTMCNC: 30.4 G/DL (ref 31.5–36.5)
MCV RBC AUTO: 75 FL (ref 78–100)
MONOCYTES # BLD AUTO: 0.4 10E3/UL (ref 0–1.3)
MONOCYTES NFR BLD AUTO: 8 %
NEUTROPHILS # BLD AUTO: 2.5 10E3/UL (ref 1.6–8.3)
NEUTROPHILS NFR BLD AUTO: 50 %
NRBC # BLD AUTO: 0 10E3/UL
NRBC BLD AUTO-RTO: 0 /100
OPIATES UR QL SCN: ABNORMAL
PCP QUAL URINE (ROCHE): ABNORMAL
PLATELET # BLD AUTO: 393 10E3/UL (ref 150–450)
POTASSIUM SERPL-SCNC: 3.6 MMOL/L (ref 3.4–5.3)
PROT SERPL-MCNC: 6.8 G/DL (ref 6.4–8.3)
RBC # BLD AUTO: 5.03 10E6/UL (ref 4.4–5.9)
SODIUM SERPL-SCNC: 139 MMOL/L (ref 135–145)
TSH SERPL DL<=0.005 MIU/L-ACNC: 0.95 UIU/ML (ref 0.3–4.2)
WBC # BLD AUTO: 5 10E3/UL (ref 4–11)

## 2025-06-14 PROCEDURE — 250N000013 HC RX MED GY IP 250 OP 250 PS 637: Performed by: NURSE PRACTITIONER

## 2025-06-14 PROCEDURE — 258N000003 HC RX IP 258 OP 636: Performed by: FAMILY MEDICINE

## 2025-06-14 PROCEDURE — 83735 ASSAY OF MAGNESIUM: CPT | Performed by: FAMILY MEDICINE

## 2025-06-14 PROCEDURE — 80307 DRUG TEST PRSMV CHEM ANLYZR: CPT | Performed by: FAMILY MEDICINE

## 2025-06-14 PROCEDURE — 99207 PR NO CHARGE LOS: CPT | Performed by: PSYCHIATRY & NEUROLOGY

## 2025-06-14 PROCEDURE — 250N000013 HC RX MED GY IP 250 OP 250 PS 637: Performed by: PSYCHIATRY & NEUROLOGY

## 2025-06-14 PROCEDURE — 36415 COLL VENOUS BLD VENIPUNCTURE: CPT | Performed by: FAMILY MEDICINE

## 2025-06-14 PROCEDURE — 99285 EMERGENCY DEPT VISIT HI MDM: CPT | Performed by: FAMILY MEDICINE

## 2025-06-14 PROCEDURE — 82310 ASSAY OF CALCIUM: CPT | Performed by: FAMILY MEDICINE

## 2025-06-14 PROCEDURE — 96361 HYDRATE IV INFUSION ADD-ON: CPT | Performed by: FAMILY MEDICINE

## 2025-06-14 PROCEDURE — 250N000013 HC RX MED GY IP 250 OP 250 PS 637: Performed by: FAMILY MEDICINE

## 2025-06-14 PROCEDURE — 82977 ASSAY OF GGT: CPT | Performed by: PSYCHIATRY & NEUROLOGY

## 2025-06-14 PROCEDURE — 82077 ASSAY SPEC XCP UR&BREATH IA: CPT | Performed by: FAMILY MEDICINE

## 2025-06-14 PROCEDURE — 128N000004 HC R&B CD ADULT

## 2025-06-14 PROCEDURE — 83036 HEMOGLOBIN GLYCOSYLATED A1C: CPT | Performed by: PSYCHIATRY & NEUROLOGY

## 2025-06-14 PROCEDURE — 250N000011 HC RX IP 250 OP 636: Performed by: FAMILY MEDICINE

## 2025-06-14 PROCEDURE — 84443 ASSAY THYROID STIM HORMONE: CPT | Performed by: PSYCHIATRY & NEUROLOGY

## 2025-06-14 PROCEDURE — 96374 THER/PROPH/DIAG INJ IV PUSH: CPT | Performed by: FAMILY MEDICINE

## 2025-06-14 PROCEDURE — 250N000011 HC RX IP 250 OP 636: Performed by: PSYCHIATRY & NEUROLOGY

## 2025-06-14 PROCEDURE — 99285 EMERGENCY DEPT VISIT HI MDM: CPT | Mod: 25 | Performed by: FAMILY MEDICINE

## 2025-06-14 PROCEDURE — 85004 AUTOMATED DIFF WBC COUNT: CPT | Performed by: FAMILY MEDICINE

## 2025-06-14 RX ORDER — GABAPENTIN 600 MG/1
1400 TABLET ORAL AT BEDTIME
Status: ON HOLD | COMMUNITY
Start: 2025-05-20 | End: 2025-06-16

## 2025-06-14 RX ORDER — BUSPIRONE HYDROCHLORIDE 15 MG/1
15 TABLET ORAL 2 TIMES DAILY
Status: DISCONTINUED | OUTPATIENT
Start: 2025-06-14 | End: 2025-06-17 | Stop reason: HOSPADM

## 2025-06-14 RX ORDER — ONDANSETRON 4 MG/1
4 TABLET, ORALLY DISINTEGRATING ORAL EVERY 6 HOURS PRN
Status: DISCONTINUED | OUTPATIENT
Start: 2025-06-14 | End: 2025-06-17 | Stop reason: HOSPADM

## 2025-06-14 RX ORDER — SERTRALINE HYDROCHLORIDE 100 MG/1
200 TABLET, FILM COATED ORAL DAILY
Status: DISCONTINUED | OUTPATIENT
Start: 2025-06-14 | End: 2025-06-17 | Stop reason: HOSPADM

## 2025-06-14 RX ORDER — FOLIC ACID 1 MG/1
1 TABLET ORAL DAILY
Status: DISCONTINUED | OUTPATIENT
Start: 2025-06-14 | End: 2025-06-17 | Stop reason: HOSPADM

## 2025-06-14 RX ORDER — MAGNESIUM HYDROXIDE/ALUMINUM HYDROXICE/SIMETHICONE 120; 1200; 1200 MG/30ML; MG/30ML; MG/30ML
30 SUSPENSION ORAL EVERY 4 HOURS PRN
Status: DISCONTINUED | OUTPATIENT
Start: 2025-06-14 | End: 2025-06-17 | Stop reason: HOSPADM

## 2025-06-14 RX ORDER — DEXTROAMPHETAMINE SACCHARATE, AMPHETAMINE ASPARTATE MONOHYDRATE, DEXTROAMPHETAMINE SULFATE AND AMPHETAMINE SULFATE 7.5; 7.5; 7.5; 7.5 MG/1; MG/1; MG/1; MG/1
60 CAPSULE, EXTENDED RELEASE ORAL EVERY MORNING
COMMUNITY
Start: 2025-06-04

## 2025-06-14 RX ORDER — HYDROXYZINE HYDROCHLORIDE 50 MG/1
50 TABLET, FILM COATED ORAL 3 TIMES DAILY
Status: ON HOLD | COMMUNITY
End: 2025-06-16

## 2025-06-14 RX ORDER — GABAPENTIN 600 MG/1
600 TABLET ORAL 2 TIMES DAILY
Status: DISCONTINUED | OUTPATIENT
Start: 2025-06-14 | End: 2025-06-16

## 2025-06-14 RX ORDER — ACETAMINOPHEN 325 MG/1
650 TABLET ORAL EVERY 4 HOURS PRN
Status: DISCONTINUED | OUTPATIENT
Start: 2025-06-14 | End: 2025-06-17 | Stop reason: HOSPADM

## 2025-06-14 RX ORDER — LOPERAMIDE HYDROCHLORIDE 2 MG/1
2 CAPSULE ORAL 4 TIMES DAILY PRN
Status: DISCONTINUED | OUTPATIENT
Start: 2025-06-14 | End: 2025-06-17 | Stop reason: HOSPADM

## 2025-06-14 RX ORDER — AMOXICILLIN 250 MG
1 CAPSULE ORAL DAILY PRN
Status: DISCONTINUED | OUTPATIENT
Start: 2025-06-14 | End: 2025-06-17 | Stop reason: HOSPADM

## 2025-06-14 RX ORDER — ZOLPIDEM TARTRATE 10 MG/1
10 TABLET ORAL AT BEDTIME
COMMUNITY
Start: 2025-06-10

## 2025-06-14 RX ORDER — BUSPIRONE HYDROCHLORIDE 15 MG/1
15 TABLET ORAL 2 TIMES DAILY
Status: ON HOLD | COMMUNITY
Start: 2025-06-05 | End: 2025-06-16

## 2025-06-14 RX ORDER — ACETAMINOPHEN 325 MG/1
325 TABLET ORAL EVERY 4 HOURS PRN
Status: DISCONTINUED | OUTPATIENT
Start: 2025-06-14 | End: 2025-06-14

## 2025-06-14 RX ORDER — SILDENAFIL 50 MG/1
50 TABLET, FILM COATED ORAL DAILY PRN
COMMUNITY
Start: 2025-06-04

## 2025-06-14 RX ORDER — ATENOLOL 50 MG/1
50 TABLET ORAL DAILY PRN
Status: DISCONTINUED | OUTPATIENT
Start: 2025-06-14 | End: 2025-06-17 | Stop reason: HOSPADM

## 2025-06-14 RX ORDER — TRAZODONE HYDROCHLORIDE 50 MG/1
50 TABLET ORAL
Status: DISCONTINUED | OUTPATIENT
Start: 2025-06-14 | End: 2025-06-17 | Stop reason: HOSPADM

## 2025-06-14 RX ORDER — GABAPENTIN 800 MG/1
800 TABLET ORAL 2 TIMES DAILY
Status: ON HOLD | COMMUNITY
End: 2025-06-16

## 2025-06-14 RX ORDER — HYDROXYZINE HYDROCHLORIDE 50 MG/1
50 TABLET, FILM COATED ORAL 3 TIMES DAILY PRN
Status: DISCONTINUED | OUTPATIENT
Start: 2025-06-14 | End: 2025-06-17 | Stop reason: HOSPADM

## 2025-06-14 RX ORDER — SERTRALINE HYDROCHLORIDE 100 MG/1
200 TABLET, FILM COATED ORAL DAILY
Status: ON HOLD | COMMUNITY
Start: 2025-06-04 | End: 2025-06-16

## 2025-06-14 RX ORDER — DIAZEPAM 5 MG/1
5-20 TABLET ORAL EVERY 30 MIN PRN
Status: DISCONTINUED | OUTPATIENT
Start: 2025-06-14 | End: 2025-06-17

## 2025-06-14 RX ORDER — ONDANSETRON 2 MG/ML
4 INJECTION INTRAMUSCULAR; INTRAVENOUS EVERY 30 MIN PRN
Status: DISCONTINUED | OUTPATIENT
Start: 2025-06-14 | End: 2025-06-14

## 2025-06-14 RX ADMIN — SERTRALINE HYDROCHLORIDE 200 MG: 100 TABLET ORAL at 20:00

## 2025-06-14 RX ADMIN — ACETAMINOPHEN 325 MG: 325 TABLET ORAL at 19:39

## 2025-06-14 RX ADMIN — ONDANSETRON 4 MG: 2 INJECTION INTRAMUSCULAR; INTRAVENOUS at 14:21

## 2025-06-14 RX ADMIN — DIAZEPAM 10 MG: 5 TABLET ORAL at 23:53

## 2025-06-14 RX ADMIN — DIAZEPAM 10 MG: 5 TABLET ORAL at 20:09

## 2025-06-14 RX ADMIN — THIAMINE HCL TAB 100 MG 100 MG: 100 TAB at 18:13

## 2025-06-14 RX ADMIN — ALUMINUM HYDROXIDE, MAGNESIUM HYDROXIDE, AND DIMETHICONE 30 ML: 200; 20; 200 SUSPENSION ORAL at 20:01

## 2025-06-14 RX ADMIN — GABAPENTIN 600 MG: 600 TABLET, COATED ORAL at 20:00

## 2025-06-14 RX ADMIN — FOLIC ACID 1 MG: 1 TABLET ORAL at 18:13

## 2025-06-14 RX ADMIN — BUSPIRONE HYDROCHLORIDE 15 MG: 15 TABLET ORAL at 20:01

## 2025-06-14 RX ADMIN — ONDANSETRON 4 MG: 4 TABLET, ORALLY DISINTEGRATING ORAL at 19:39

## 2025-06-14 RX ADMIN — SODIUM CHLORIDE 1000 ML: 0.9 INJECTION, SOLUTION INTRAVENOUS at 14:21

## 2025-06-14 RX ADMIN — DIAZEPAM 10 MG: 5 TABLET ORAL at 18:13

## 2025-06-14 RX ADMIN — ACETAMINOPHEN 650 MG: 325 TABLET ORAL at 23:53

## 2025-06-14 RX ADMIN — Medication 10 MG: at 20:43

## 2025-06-14 ASSESSMENT — COLUMBIA-SUICIDE SEVERITY RATING SCALE - C-SSRS
6. HAVE YOU EVER DONE ANYTHING, STARTED TO DO ANYTHING, OR PREPARED TO DO ANYTHING TO END YOUR LIFE?: NO
1. IN THE PAST MONTH, HAVE YOU WISHED YOU WERE DEAD OR WISHED YOU COULD GO TO SLEEP AND NOT WAKE UP?: NO
2. HAVE YOU ACTUALLY HAD ANY THOUGHTS OF KILLING YOURSELF IN THE PAST MONTH?: NO

## 2025-06-14 ASSESSMENT — ACTIVITIES OF DAILY LIVING (ADL)
LAUNDRY: WITH SUPERVISION
ADLS_ACUITY_SCORE: 61
ADLS_ACUITY_SCORE: 51
ADLS_ACUITY_SCORE: 35
DRESS: INDEPENDENT
HYGIENE/GROOMING: INDEPENDENT
ADLS_ACUITY_SCORE: 35
ADLS_ACUITY_SCORE: 51
ADLS_ACUITY_SCORE: 35
ADLS_ACUITY_SCORE: 35
ADLS_ACUITY_SCORE: 51
ADLS_ACUITY_SCORE: 35
ORAL_HYGIENE: INDEPENDENT
ADLS_ACUITY_SCORE: 51

## 2025-06-14 NOTE — PROGRESS NOTES
06/14/25 1841   Patient Belongings   Did you bring any home meds/supplements to the hospital?  No   Patient Belongings other (see comments)   Patient Belongings Put in Hospital Secure Location (Security or Locker, etc.) other (see comments)   Belongings Search Yes   Clothing Search Yes   Second Staff Lizett RN and Eusebio RESENDEZ     Bin: Black T-shirt, red shorts with strings, shoes with laces.    Med Room: NO PHONE, NO WALLET, NO KEYS, NO JEWELRY    Security: none    Meds:  none    A               Admission:  I am responsible for any personal items that are not sent to the safe or pharmacy.  [facility] is not responsible for loss, theft or damage of any property in my possession.    Signature:  _________________________________ Date: _______  Time: _____                                              Staff Signature:  ____________________________ Date: ________  Time: _____      2nd Staff person, if patient is unable/unwilling to sign:    Signature: ________________________________ Date: ________  Time: _____     Discharge:  [facility] has returned all of my personal belongings:    Signature: _________________________________ Date: ________  Time: _____                                          Staff Signature:  ____________________________ Date: ________  Time: _____

## 2025-06-14 NOTE — CONSULTS
Brief Psychiatry Note   Chart and labs reviewed. Patient meets criteria for 3A detox admission to detox from alcohol. Orders for admission placed.     Most Recent 2 LFT's:  Recent Labs   Lab Test 06/14/25  1350 11/02/21  1608   AST 19 16   ALT 19 29   ALKPHOS 80 101   BILITOTAL 0.2 0.3       Lexus Cota MD

## 2025-06-14 NOTE — TELEPHONE ENCOUNTER
S: [facility] , Provider Carol calling at 4:20 PM with clinical on 34 year old/male presenting for alcohol detox.     B: Pt presents for ETOH detox.   Currently reports drinking 1L for at least the last 4 days. Pt relapsed 3 weeks ago after 6 years of sobriety.   Patient reports last use was just PTA.  Pt AMANDA: 0.28  Pt  endorses hx of DT  Pt  endorses hx of seizures. Last seizure: 6 years ago  Pt endorsing the following symptoms of withdrawal: None at this time d/t intoxication   MSSA Score: N/A     Pt denies acute mental health or medical concerns.   Pt denies other drug use: None Amount/frequency: N/A    Does Pt have a detox care plan in [facility]? No  Does pt present with specific needs, assistive devices, or exclusionary criteria? None  Is the patient ambulating, eating and drinking in the ED? Yes    A: Pt meets criteria to be presented for IP detox admission. Patient is voluntary    COVID Symptoms: No  If yes, COVID test required   Utox: Positive due to medication: benzodiazepines - Pt was recently in the hospital and received Benzos   Magnesium: WNL  CMP: Abnormalities: Creatinine 0.44 / Calcium 8.5 / Glucose 122  CBC: Abnormalities: Hemog 11.5 / Hemat 37.8 / MCV 75 / MCH 22.9 / MCHC 30.4 / RDW 21.7  HCG: N/A     R: Patient cleared and ready for behavioral bed placement: Yes    Pt is meeting criteria for presentation to 3A/CD    Does Patient need a Transfer Center request created? No, Pt is located within [facility] ED, [facility] ED, or [place] ED    4:25 PM - Paged Cipriano for review    Placed pt in queue    R: 3A / Zakiya / FRANCES    4:31 PM - Notified unit DORI [place]  4:34 PM - Notified ED DORI [place]

## 2025-06-14 NOTE — MEDICATION SCRIBE - ADMISSION MEDICATION HISTORY
Medication Scribe Admission Medication History    Admission medication history is complete. The information provided in this note is only as accurate as the sources available at the time of the update.    Information Source(s): Patient and CareEverywhere/SureScripts via in-person    Pertinent Information: N/A    Changes made to PTA medication list:  Added: adderall xr 30mg, gabapentin 600mg, atarax 50mg, sertraline 100mg, sildenafil 50mg, ambien 10mg  Deleted: augmentin 875-125mg, protonix 40mg, insulin pen needle  Changed: gabapentin 800 instructions changed from 800mg qid to 800mg bid and added addition 600mg dose to be 1400mg at bedtime     Allergies reviewed with patient and updates made in EHR: yes    Medication History Completed By: Valerie Anderson 6/14/2025 4:51 PM    PTA Med List   Medication Sig Last Dose/Taking    amphetamine-dextroamphetamine (ADDERALL XR) 30 MG 24 hr capsule Take 60 mg by mouth every morning. Past Week    busPIRone (BUSPAR) 15 MG tablet Take 15 mg by mouth 2 times daily. Past Week    gabapentin (NEURONTIN) 600 MG tablet Take 1,400 mg by mouth at bedtime. Past Week    gabapentin (NEURONTIN) 800 MG tablet Take 800 mg by mouth 2 times daily. Past Week    hydrOXYzine HCl (ATARAX) 50 MG tablet Take 50 mg by mouth 3 times daily. Past Week    sertraline (ZOLOFT) 100 MG tablet Take 200 mg by mouth daily. Past Week    sildenafil (VIAGRA) 50 MG tablet Take 50 mg by mouth daily as needed (FOR ERECTILE DYSFUNCTION). Past Week    zolpidem (AMBIEN) 10 MG tablet Take 10 mg by mouth at bedtime. Past Week

## 2025-06-14 NOTE — ED PROVIDER NOTES
ED Provider Note  [facility]      History     Chief Complaint   Patient presents with    Alcohol Intoxication     Drinks a litre of hard liquor daily. Last rink an hour ago. Denies hx of DTS with withdrawal     HPI  Nickolas Lang is a 34 year old male with a history of alcohol use disorder who presents to the emergency department with his girlfriend seeking detox.      Per chart review, patient was recently admitted at [facility] from 5/27/2025 - 6/1/2025 due to acute toxic encephalopathy related to alcohol consumption accompanied by acute hypoxic and hypercarbic respiratory failure.  His BAL was significantly elevated at .489 and he was intubated while in the ICU.    Here in the ED, patient's girlfriend reports that he was discharged from [facility] with plans to go to detox, but decided to go to his mother's house instead.  Patient states he had been sober, until about 3-4 days ago.  His girlfriend reports she found the first bottle of alcohol yesterday and believes he might be drinking a liter a day.  Patient endorses a history of seizures, hallucinations, and shakes with alcohol withdrawal.  Patient's girlfriend reports he has been sober until recently for the past 6 years and has not had any recent alcohol withdrawal seizures.  She is unsure when he last had a seizure prior to the 6 years ago was.  He states his last drink was earlier today. Patient's girlfriend reports she called EMS yesterday due to his alcohol intoxication.  He blew a 0.2 on the breathalyzer and EMS felt he was coherent enough to make his own decision to stay home. Patient denies nausea or vomiting.  He denies any difficulty eating or other drug use.      Past Medical History  Past Medical History:   Diagnosis Date    ADD (attention deficit disorder)     Agitation     Alcohol abuse     Anxiety     gabapentin helps the most     Benzodiazepine abuse (H)     Depressive disorder 2015    Drug abuse (H)      GERD (gastroesophageal reflux disease)     Hepatic steatosis     Hypertension     Obesity     Pyloric stenosis     s/p pyloromyotomy as an infant     Past Surgical History:   Procedure Laterality Date    Deviated septum repair      ENT SURGERY  2005    Deviated Septum    GI SURGERY  1988    Pyloric Stenosis    ORTHOPEDIC SURGERY  2016    Left Shoulder Needs Full Replacement    PYLOROMYOTOMY SURGERY  as an infant     SHOULDER SURGERY Left 07/29/2016    Removal of cartilage     amoxicillin-clavulanate (AUGMENTIN) 875-125 MG tablet  gabapentin (NEURONTIN) 800 MG tablet  insulin pen needle (31G X 5 MM) 31G X 5 MM miscellaneous  nicotine polacrilex (NICORETTE) 4 MG gum  pantoprazole (PROTONIX) 40 MG EC tablet      No Known Allergies  Family History  Family History   Problem Relation Age of Onset    Neurologic Disorder Mother         Multiple Sclerosis    Depression Mother     Anxiety Disorder Mother     Alcohol/Drug Father     Substance Abuse Father     Depression Maternal Grandmother     Anxiety Disorder Maternal Grandmother     Hypertension Maternal Grandmother     Diabetes Maternal Grandmother     Hyperlipidemia Maternal Grandmother     Substance Abuse Maternal Grandfather     Other Cancer Maternal Grandfather     Obesity Maternal Grandfather     Alcohol/Drug Paternal Grandfather     Diabetes Other     Other Cancer Other     Obesity Other      Social History   Social History     Tobacco Use    Smoking status: Every Day     Current packs/day: 0.20     Average packs/day: 0.2 packs/day for 18.2 years (3.6 ttl pk-yrs)     Types: Cigarettes, Vaping Device     Start date: 4/3/2007    Smokeless tobacco: Current   Substance Use Topics    Alcohol use: No     Alcohol/week: 0.0 standard drinks of alcohol     Comment: Clean 06/20/2019    Drug use: Not Currently     Types: Marijuana      A medically appropriate review of systems was performed with pertinent positives and negatives noted in the HPI, and all other systems  negative.    Physical Exam   BP: 119/85  Pulse: 86  Temp: 98.2  F (36.8  C)  Resp: 18  SpO2: 99 %  Physical Exam  Vitals and nursing note reviewed.   Constitutional:       General: He is not in acute distress.     Appearance: Normal appearance. He is not toxic-appearing.   HENT:      Head: Atraumatic.   Eyes:      General: No scleral icterus.     Conjunctiva/sclera: Conjunctivae normal.   Cardiovascular:      Rate and Rhythm: Normal rate.      Heart sounds: Normal heart sounds.   Pulmonary:      Effort: Pulmonary effort is normal. No respiratory distress.      Breath sounds: Normal breath sounds.   Abdominal:      Palpations: Abdomen is soft.      Tenderness: There is no abdominal tenderness.   Musculoskeletal:         General: No deformity.      Cervical back: Neck supple.   Skin:     General: Skin is warm.   Neurological:      General: No focal deficit present.      Mental Status: He is alert and oriented to person, place, and time.      Comments: Obviously intoxicated, smells of alcohol, slightly slurred speech   Psychiatric:         Attention and Perception: Attention normal.         Mood and Affect: Mood is depressed. Affect is flat.         Speech: Speech is delayed and slurred.         Behavior: Behavior is cooperative.         Thought Content: Thought content normal.           ED Course, Procedures, & Data      Procedures                Results for orders placed or performed during the hospital encounter of 06/14/25   Comprehensive metabolic panel     Status: Abnormal   Result Value Ref Range    Sodium 139 135 - 145 mmol/L    Potassium 3.6 3.4 - 5.3 mmol/L    Carbon Dioxide (CO2) 23 22 - 29 mmol/L    Anion Gap 12 7 - 15 mmol/L    Urea Nitrogen 7.5 6.0 - 20.0 mg/dL    Creatinine 0.44 (L) 0.67 - 1.17 mg/dL    GFR Estimate >90 >60 mL/min/1.73m2    Calcium 8.5 (L) 8.8 - 10.4 mg/dL    Chloride 104 98 - 107 mmol/L    Glucose 122 (H) 70 - 99 mg/dL    Alkaline Phosphatase 80 40 - 150 U/L    AST 19 0 - 45 U/L    ALT  19 0 - 70 U/L    Protein Total 6.8 6.4 - 8.3 g/dL    Albumin 4.2 3.5 - 5.2 g/dL    Bilirubin Total 0.2 <=1.2 mg/dL   Magnesium     Status: Normal   Result Value Ref Range    Magnesium 1.9 1.7 - 2.3 mg/dL   Ethanol Level Blood     Status: Abnormal   Result Value Ref Range    Ethanol Level Blood 0.28 (H) <=0.01 g/dL   CBC with platelets and differential     Status: Abnormal   Result Value Ref Range    WBC Count 5.0 4.0 - 11.0 10e3/uL    RBC Count 5.03 4.40 - 5.90 10e6/uL    Hemoglobin 11.5 (L) 13.3 - 17.7 g/dL    Hematocrit 37.8 (L) 40.0 - 53.0 %    MCV 75 (L) 78 - 100 fL    MCH 22.9 (L) 26.5 - 33.0 pg    MCHC 30.4 (L) 31.5 - 36.5 g/dL    RDW 21.7 (H) 10.0 - 15.0 %    Platelet Count 393 150 - 450 10e3/uL    % Neutrophils 50 %    % Lymphocytes 39 %    % Monocytes 8 %    % Eosinophils 2 %    % Basophils 1 %    % Immature Granulocytes 0 %    NRBCs per 100 WBC 0 <1 /100    Absolute Neutrophils 2.5 1.6 - 8.3 10e3/uL    Absolute Lymphocytes 1.9 0.8 - 5.3 10e3/uL    Absolute Monocytes 0.4 0.0 - 1.3 10e3/uL    Absolute Eosinophils 0.1 0.0 - 0.7 10e3/uL    Absolute Basophils 0.0 0.0 - 0.2 10e3/uL    Absolute Immature Granulocytes 0.0 <=0.4 10e3/uL    Absolute NRBCs 0.0 10e3/uL   Urine Drug Screen Panel     Status: Abnormal   Result Value Ref Range    Amphetamines Urine Screen Negative Screen Negative    Barbituates Urine Screen Negative Screen Negative    Benzodiazepine Urine Screen Positive (A) Screen Negative    Cannabinoids Urine Screen Negative Screen Negative    Cocaine Urine Screen Negative Screen Negative    Fentanyl Qual Urine Screen Negative Screen Negative    Opiates Urine Screen Negative Screen Negative    PCP Urine Screen Negative Screen Negative   CBC with platelets differential     Status: Abnormal    Narrative    The following orders were created for panel order CBC with platelets differential.  Procedure                               Abnormality         Status                     ---------                                -----------         ------                     CBC with platelets and ...[[id number]]  Abnormal            Final result                 Please view results for these tests on the individual orders.   Urine Drug Screen     Status: Abnormal    Narrative    The following orders were created for panel order Urine Drug Screen.  Procedure                               Abnormality         Status                     ---------                               -----------         ------                     Urine Drug Screen Panel[[id number]]     Abnormal            Final result                 Please view results for these tests on the individual orders.     Medications   ondansetron (ZOFRAN) injection 4 mg (4 mg Intravenous $Given 6/14/25 1421)   diazepam (VALIUM) tablet 5-20 mg (has no administration in time range)   sodium chloride 0.9% BOLUS 1,000 mL (1,000 mLs Intravenous $New Bag 6/14/25 1421)     Labs Ordered and Resulted from Time of ED Arrival to Time of ED Departure   COMPREHENSIVE METABOLIC PANEL - Abnormal       Result Value    Sodium 139      Potassium 3.6      Carbon Dioxide (CO2) 23      Anion Gap 12      Urea Nitrogen 7.5      Creatinine 0.44 (*)     GFR Estimate >90      Calcium 8.5 (*)     Chloride 104      Glucose 122 (*)     Alkaline Phosphatase 80      AST 19      ALT 19      Protein Total 6.8      Albumin 4.2      Bilirubin Total 0.2     ETHANOL LEVEL BLOOD - Abnormal    Ethanol Level Blood 0.28 (*)    CBC WITH PLATELETS AND DIFFERENTIAL - Abnormal    WBC Count 5.0      RBC Count 5.03      Hemoglobin 11.5 (*)     Hematocrit 37.8 (*)     MCV 75 (*)     MCH 22.9 (*)     MCHC 30.4 (*)     RDW 21.7 (*)     Platelet Count 393      % Neutrophils 50      % Lymphocytes 39      % Monocytes 8      % Eosinophils 2      % Basophils 1      % Immature Granulocytes 0      NRBCs per 100 WBC 0      Absolute Neutrophils 2.5      Absolute Lymphocytes 1.9      Absolute Monocytes 0.4      Absolute Eosinophils 0.1       Absolute Basophils 0.0      Absolute Immature Granulocytes 0.0      Absolute NRBCs 0.0     URINE DRUG SCREEN PANEL - Abnormal    Amphetamines Urine Screen Negative      Barbituates Urine Screen Negative      Benzodiazepine Urine Screen Positive (*)     Cannabinoids Urine Screen Negative      Cocaine Urine Screen Negative      Fentanyl Qual Urine Screen Negative      Opiates Urine Screen Negative      PCP Urine Screen Negative     MAGNESIUM - Normal    Magnesium 1.9     ALCOHOL BREATH TEST POCT     No orders to display          Critical care was not performed.     Medical Decision Making  The patient's presentation was of high complexity (a chronic illness severe exacerbation, progression, or side effect of treatment).    The patient's evaluation involved:  an assessment requiring an independent historian (wife providing collateral information)  review of external note(s) from 1 sources (review of documentation from recent hospitalization due to severe alcohol intoxication and altered mental status at [facility] in late May to June 2025)  review of 3+ test result(s) ordered prior to this encounter (review of labs from recent hospitalization at [facility])  ordering and/or review of 3+ test(s) in this encounter (see separate area of note for details)    The patient's management necessitated moderate risk (prescription drug management including medications given in the ED), moderate risk (limitations due to social determinants of health (substance use)), and high risk (a decision regarding hospitalization).    Assessment & Plan    34-year-old male with a history of alcohol dependence, including a remote history of DTs and seizures, and a recent history of intoxication to the point of requiring intubation for airway protection, now presenting with alcohol relapse drinking up to a liter a day for the last 4 to 5 days.  On exam his vital signs are unremarkable.  He is obviously intoxicated.  There is no sign of  trauma.  He can converse albeit with slightly slurred speech, and he can answer all questions and follow commands normally.  There is no sign of any trauma or other acute medical issue beyond alcohol intoxication.  His alcohol level is 0.28.  He does have benzodiazepines in the urine, there is no history of abuse of benzodiazepines however he was treated with benzos while in the hospital at [place] and this may be residual.  He is very intoxicated but is able to ambulate, answer questions, follow commands.  He is medically cleared for detox admission.  He is agreeable to voluntary admission.    I have reviewed the nursing notes. I have reviewed the findings, diagnosis, plan and need for follow up with the patient.    New Prescriptions    No medications on file       Final diagnoses:   Alcohol dependence with uncomplicated intoxication (H)   I, Tasneem Lee, am serving as a trained medical scribe to document services personally performed by Miquel Medina MD, based on the provider's statements to me.     IMiquel MD, was physically present and have reviewed and verified the accuracy of this note documented by Tasneem Lee.     Miquel Medina MD  [facility] EMERGENCY DEPARTMENT  6/14/2025     Miquel Medina MD  06/14/25 [id number]

## 2025-06-15 PROCEDURE — 97150 GROUP THERAPEUTIC PROCEDURES: CPT | Mod: GO

## 2025-06-15 PROCEDURE — 128N000004 HC R&B CD ADULT

## 2025-06-15 PROCEDURE — 99222 1ST HOSP IP/OBS MODERATE 55: CPT

## 2025-06-15 PROCEDURE — 99222 1ST HOSP IP/OBS MODERATE 55: CPT | Performed by: PSYCHIATRY & NEUROLOGY

## 2025-06-15 PROCEDURE — 250N000013 HC RX MED GY IP 250 OP 250 PS 637: Performed by: PSYCHIATRY & NEUROLOGY

## 2025-06-15 PROCEDURE — 250N000013 HC RX MED GY IP 250 OP 250 PS 637

## 2025-06-15 PROCEDURE — HZ2ZZZZ DETOXIFICATION SERVICES FOR SUBSTANCE ABUSE TREATMENT: ICD-10-PCS | Performed by: PSYCHIATRY & NEUROLOGY

## 2025-06-15 PROCEDURE — 250N000013 HC RX MED GY IP 250 OP 250 PS 637: Performed by: FAMILY MEDICINE

## 2025-06-15 PROCEDURE — 250N000011 HC RX IP 250 OP 636: Performed by: PSYCHIATRY & NEUROLOGY

## 2025-06-15 PROCEDURE — 250N000013 HC RX MED GY IP 250 OP 250 PS 637: Performed by: NURSE PRACTITIONER

## 2025-06-15 RX ORDER — AMLODIPINE BESYLATE 2.5 MG/1
2.5 TABLET ORAL DAILY
Status: DISCONTINUED | OUTPATIENT
Start: 2025-06-16 | End: 2025-06-15

## 2025-06-15 RX ORDER — AMLODIPINE BESYLATE 5 MG/1
5 TABLET ORAL DAILY
Status: DISCONTINUED | OUTPATIENT
Start: 2025-06-16 | End: 2025-06-17 | Stop reason: HOSPADM

## 2025-06-15 RX ORDER — PANTOPRAZOLE SODIUM 40 MG/1
40 TABLET, DELAYED RELEASE ORAL
Status: DISCONTINUED | OUTPATIENT
Start: 2025-06-15 | End: 2025-06-17 | Stop reason: HOSPADM

## 2025-06-15 RX ORDER — AMLODIPINE BESYLATE 5 MG/1
5 TABLET ORAL ONCE
Status: COMPLETED | OUTPATIENT
Start: 2025-06-15 | End: 2025-06-15

## 2025-06-15 RX ADMIN — ACETAMINOPHEN 650 MG: 325 TABLET ORAL at 09:54

## 2025-06-15 RX ADMIN — TRAZODONE HYDROCHLORIDE 50 MG: 50 TABLET ORAL at 21:15

## 2025-06-15 RX ADMIN — ACETAMINOPHEN 650 MG: 325 TABLET ORAL at 14:31

## 2025-06-15 RX ADMIN — DIAZEPAM 10 MG: 5 TABLET ORAL at 09:52

## 2025-06-15 RX ADMIN — SERTRALINE HYDROCHLORIDE 200 MG: 100 TABLET ORAL at 07:48

## 2025-06-15 RX ADMIN — ALUMINUM HYDROXIDE, MAGNESIUM HYDROXIDE, AND DIMETHICONE 30 ML: 200; 20; 200 SUSPENSION ORAL at 09:52

## 2025-06-15 RX ADMIN — GABAPENTIN 600 MG: 600 TABLET, COATED ORAL at 20:11

## 2025-06-15 RX ADMIN — ALUMINUM HYDROXIDE, MAGNESIUM HYDROXIDE, AND DIMETHICONE 30 ML: 200; 20; 200 SUSPENSION ORAL at 16:16

## 2025-06-15 RX ADMIN — Medication 10 MG: at 21:15

## 2025-06-15 RX ADMIN — GABAPENTIN 600 MG: 600 TABLET, COATED ORAL at 07:50

## 2025-06-15 RX ADMIN — ACETAMINOPHEN 650 MG: 325 TABLET ORAL at 18:56

## 2025-06-15 RX ADMIN — BUSPIRONE HYDROCHLORIDE 15 MG: 15 TABLET ORAL at 20:11

## 2025-06-15 RX ADMIN — DIAZEPAM 10 MG: 5 TABLET ORAL at 04:37

## 2025-06-15 RX ADMIN — THIAMINE HCL TAB 100 MG 100 MG: 100 TAB at 07:50

## 2025-06-15 RX ADMIN — DIAZEPAM 10 MG: 5 TABLET ORAL at 12:28

## 2025-06-15 RX ADMIN — BUSPIRONE HYDROCHLORIDE 15 MG: 15 TABLET ORAL at 07:50

## 2025-06-15 RX ADMIN — AMLODIPINE BESYLATE 5 MG: 5 TABLET ORAL at 05:10

## 2025-06-15 RX ADMIN — DIAZEPAM 10 MG: 5 TABLET ORAL at 20:12

## 2025-06-15 RX ADMIN — FOLIC ACID 1 MG: 1 TABLET ORAL at 07:50

## 2025-06-15 RX ADMIN — ACETAMINOPHEN 650 MG: 325 TABLET ORAL at 04:38

## 2025-06-15 RX ADMIN — PANTOPRAZOLE SODIUM 40 MG: 40 TABLET, DELAYED RELEASE ORAL at 12:30

## 2025-06-15 RX ADMIN — ONDANSETRON 4 MG: 4 TABLET, ORALLY DISINTEGRATING ORAL at 09:52

## 2025-06-15 RX ADMIN — DIAZEPAM 10 MG: 5 TABLET ORAL at 16:16

## 2025-06-15 RX ADMIN — HYDROXYZINE HYDROCHLORIDE 50 MG: 50 TABLET ORAL at 14:31

## 2025-06-15 RX ADMIN — DIAZEPAM 10 MG: 5 TABLET ORAL at 07:05

## 2025-06-15 ASSESSMENT — ACTIVITIES OF DAILY LIVING (ADL)
ADLS_ACUITY_SCORE: 35
DRESS: INDEPENDENT
ADLS_ACUITY_SCORE: 35
HYGIENE/GROOMING: INDEPENDENT
ADLS_ACUITY_SCORE: 35
ORAL_HYGIENE: INDEPENDENT
ADLS_ACUITY_SCORE: 35

## 2025-06-15 NOTE — CONSULTS
[facility]  Consult Note - Hospitalist Service  Date of Admission:  6/14/2025  Consult Requested by: Nicole Sigala MD   Reason for Consult: Medical Co-management     Assessment & Plan   Nickolas Lang is a 34 year old male with PMH of alcohol use disorder, HTN, GERD, MDD, EVAN, and ADHD admitted to [facility] for detoxification from alcohol on 6/14/2025.     # Alcohol use disorder  # Alcohol withdrawal  Patient reports 6 years of sobriety before he recently relapsed again on May 17th. Since then, he reports drinking 1L of vodka most days. While withdrawing from alcohol, patient has experienced sweating, chills, restlessness, and a headache. Patient denies history of withdrawal seizures. He has previously been in detox and chemical dependency treatment. He denies fevers/chills, chest pain, shortness of breath, and abdominal pain. BAL 0.28 on arrival to ED. MSSA 10 this morning.  - Management per primary team, Psychiatry     - Continue MSSA protocol with benzodiazepines as indicated  - Agree with folic acid, MVI, thiamine supplementation   - Notify Medicine for SBP >170 or DBP >105  - Patient informed to notify nursing who can notify Medicine if any new or worsening symptoms or concerns arise     # HTN  SBP range 110s-170s. Elevated blood pressure likely partly attributed to acute alcohol withdrawal / sympathetic drive related to decompensated mental health. Patient also did not receive PTA antihypertensive right away. Patient denies headache, visual changes, shortness of breath, and chest pain.   - Continue PTA amlodipine   - Always recheck BP if high or low and correlate with clinical situation  - Treat pain, anxiety, and any withdrawal s/s if present  - Notify Medicine for SBP >170 or DBP >105 after careful reassessment, treatment of pain/anxiety/withdrawal, and home PTA med administration  - If blood pressure fails to normalize prior to discharge,  "patient should follow up with PCP within one week of discharge     # GERD  Patient reports increased symptoms of reflux lately, requesting PPI, stating this normally works well for him.  - Protonix 40 mg every day     # Anemia mild  On admission, patient with mild anemia to 11.5. Likely due to bone marrow suppression in the setting of excessive alcohol use. No evidence of acute bleed or infectious process. History of mildly low hemoglobin on chart review.  - Repeat CBC on PCP follow up    # EVAN  # MDD  # ADHD  Management per primary team, Psychiatry.        The patient's care was discussed with the Patient.    Currently the patient is medically stable and Medicine will sign off at this time. Recommendations relayed to primary team via this progress note. Please notify on-call [facility] if new questions or concerns arise.      Clinically Significant Risk Factors Present on Admission           # Hypocalcemia: Lowest Ca = 8.5 mg/dL in last 2 days, will monitor and replace as appropriate         # Hypertension: Noted on problem list           # Morbid Obesity: Estimated body mass index is 42.22 kg/m  as calculated from the following:    Height as of this encounter: 1.854 m (6' 1\").    Weight as of this encounter: 145.2 kg (320 lb).              Anna Kat PA-C  Hospitalist Service  Securely message with [employer] (more info)  Text page via [facility] Paging/Directory   ______________________________________________________________________    Chief Complaint   Detox    History is obtained from the patient    History of Present Illness   Nickolas Lang is a 34 year old male with PMH of alcohol use disorder, HTN, GERD, MDD, EVAN, and ADHD admitted to [facility] for detoxification from alcohol on 6/14/2025.     Patient reports 6 years of sobriety before he recently relapsed again on May 17th. Since then, he reports drinking 1L of vodka most days. While withdrawing from alcohol, patient has experienced sweating, chills, " restlessness, and a headache. Patient denies history of withdrawal seizures. He has previously been in detox and chemical dependency treatment. He denies fevers/chills, chest pain, shortness of breath, and abdominal pain.    Past Medical History    Past Medical History:   Diagnosis Date    ADD (attention deficit disorder)     Agitation     Alcohol abuse     Anxiety     gabapentin helps the most     Benzodiazepine abuse (H)     Depressive disorder 2015    Drug abuse (H)     GERD (gastroesophageal reflux disease)     Hepatic steatosis     Hypertension     Obesity     Pyloric stenosis     s/p pyloromyotomy as an infant       Past Surgical History   Past Surgical History:   Procedure Laterality Date    Deviated septum repair      ENT SURGERY  2005    Deviated Septum    GI SURGERY  1988    Pyloric Stenosis    ORTHOPEDIC SURGERY  2016    Left Shoulder Needs Full Replacement    PYLOROMYOTOMY SURGERY  as an infant     SHOULDER SURGERY Left 07/29/2016    Removal of cartilage       Medications   Current Facility-Administered Medications   Medication Dose Route Frequency Provider Last Rate Last Admin    acetaminophen (TYLENOL) tablet 650 mg  650 mg Oral Q4H PRN Jayde Hansen APRN CNP   650 mg at 06/15/25 0954    alum & mag hydroxide-simethicone (MAALOX) suspension 30 mL  30 mL Oral Q4H PRN Jayde Hansen APRN CNP   30 mL at 06/15/25 0952    [START ON 6/16/2025] amLODIPine (NORVASC) tablet 5 mg  5 mg Oral Daily Jake Ragsdale DO        atenolol (TENORMIN) tablet 50 mg  50 mg Oral Daily PRN Lexus Cota MD        busPIRone (BUSPAR) tablet 15 mg  15 mg Oral BID Jayde Hansen APRN CNP   15 mg at 06/15/25 0750    diazepam (VALIUM) tablet 5-20 mg  5-20 mg Oral Q30 Min PRN Miquel Medina MD   10 mg at 06/15/25 0952    folic acid (FOLVITE) tablet 1 mg  1 mg Oral Daily Lexus Cota MD   1 mg at 06/15/25 0750    gabapentin (NEURONTIN) tablet 600 mg  600 mg Oral BID Jayde Hansen APRN CNP   600  mg at 06/15/25 0750    hydrOXYzine HCl (ATARAX) tablet 50 mg  50 mg Oral TID PRN Jayde Hansen APRN CNP        loperamide (IMODIUM) capsule 2 mg  2 mg Oral 4x Daily PRN Lexus Cota MD        melatonin tablet 10 mg  10 mg Oral At Bedtime PRN Jayde Hansen APRN CNP   10 mg at 06/14/25 2043    ondansetron (ZOFRAN ODT) ODT tab 4 mg  4 mg Oral Q6H PRN Lexus Cota MD   4 mg at 06/15/25 0952    pantoprazole (PROTONIX) EC tablet 40 mg  40 mg Oral QAM AC Anna Kat PA-C        senna-docusate (SENOKOT-S/PERICOLACE) 8.6-50 MG per tablet 1 tablet  1 tablet Oral Daily PRN Jayde Hansen APRN CNP        sertraline (ZOLOFT) tablet 200 mg  200 mg Oral Daily Jayde Hansen APRN CNP   200 mg at 06/15/25 0748    thiamine (B-1) tablet 100 mg  100 mg Oral Daily Lexus Cota MD   100 mg at 06/15/25 0750    traZODone (DESYREL) tablet 50 mg  50 mg Oral At Bedtime PRN Jayde Hansen APRN CNP             Physical Exam   Vital Signs: Temp: 98.6  F (37  C) Temp src: Oral BP: (!) 155/84 Pulse: 78   Resp: 16 SpO2: 96 % O2 Device: None (Room air)    Weight: 320 lbs 0 oz    GEN: NAD. Appears stated age.  HEENT: Normocephalic, without obvious abnormality, atraumatic. Conjunctivae clear without exudates or hemorrhage. EOMs intact. Anicteric sclera. MMMs.  CV: RRR. No murmurs, rubs, or gallops. S1 and S2 are of normal intensity.  PULM: Respirations even and unlabored. No signs of respiratory distress. Lung sounds clear to auscultation anteriorly.   GI: Soft, non-tender, non-distended. Bowel sounds present. No rebound or guarding.  PSYCH: Appropriate mood and affect. No visual or auditory hallucinations.   HEME/LYMPH: No jaundice or pallor noted.  DERM: Clean, dry, and intact to exposed skin surfaces.     Medical Decision Making       50 MINUTES SPENT BY ME on the date of service doing chart review, history, exam, documentation & further activities per the note.      Data     I have  personally reviewed the following data over the past 24 hrs:    5.0  \   11.5 (L)   / 393     139 104 7.5 /  122 (H)   3.6 23 0.44 (L) \     ALT: 19 AST: 19 AP: 80 TBILI: 0.2   ALB: 4.2 TOT PROTEIN: 6.8 LIPASE: N/A     TSH: 0.95 T4: N/A A1C: 5.6       Imaging results reviewed over the past 24 hrs:   No results found for this or any previous visit (from the past 24 hours).

## 2025-06-15 NOTE — PLAN OF CARE
Shift summary: Admitted for alcohol withdrawal. MSSA scores 8 and 9. Patient was given a total of 20 mg of Valium for alcohol withdrawal. Reported a headache 7/10, was given prn Tylenol 325 mg times one. Given prn Maalox 30 ml times one for heartburn, prn zofran 4 mg times one for nausea, and melatonin 10 mg times one for sleep. VSS, appetite is good, hydrating adequately. Patient reports he is not interested in CD treatment at this time. Denies any additional concerns or unmet needs.

## 2025-06-15 NOTE — H&P
[facility], [place]   Psychiatric History and Physical.    Chief complaint and reason for admission:     Alcohol withdrawal    History of present illness: per ED note:   Chief Complaint   Patient presents with    Alcohol Intoxication       Drinks a litre of hard liquor daily. Last rink an hour ago. Denies hx of DTS with withdrawal      HPI  Nickolas Lang is a 34 year old male with a history of alcohol use disorder who presents to the emergency department with his girlfriend seeking detox.       Per chart review, patient was recently admitted at [facility] from 5/27/2025 - 6/1/2025 due to acute toxic encephalopathy related to alcohol consumption accompanied by acute hypoxic and hypercarbic respiratory failure.  His BAL was significantly elevated at .489 and he was intubated while in the ICU.     Here in the ED, patient's girlfriend reports that he was discharged from [facility] with plans to go to detox, but decided to go to his mother's house instead.  Patient states he had been sober, until about 3-4 days ago.  His girlfriend reports she found the first bottle of alcohol yesterday and believes he might be drinking a liter a day.  Patient endorses a history of seizures, hallucinations, and shakes with alcohol withdrawal.  Patient's girlfriend reports he has been sober until recently for the past 6 years and has not had any recent alcohol withdrawal seizures.  She is unsure when he last had a seizure prior to the 6 years ago was.  He states his last drink was earlier today. Patient's girlfriend reports she called EMS yesterday due to his alcohol intoxication.  He blew a 0.2 on the breathalyzer and EMS felt he was coherent enough to make his own decision to stay home. Patient denies nausea or vomiting.  He denies any difficulty eating or other drug use.     During visit with this provider: patient was seen while being on call in presence of PA student. Patient walked into  the exam room voluntarily and talked to us. Maintained fair eye contact, appeared to be somewhat uncomfortable/ashamed of relapse after 6 years of sobriety. Confirmed that he managed to stay sober for such a long time by utilizing AA groups and Recovery [facility]. Blamed his relapse on stopping going to groups and abandoning his routines. Reported that he had been averaging 1L of hard liquor daily, was admitted in the beginning of June to [facility] with BAL close to 0.5, needed to be intubated, but after discharge from [facility] continued to drink. Said that he was not interested in going to CD treatment, but would return to AA and his Adventism program. Nickolas reported that he suffers from chronic anxiety and depression and has been compliant with him meds. He has psych prescriber and currently in a process of getting therapist. Said that meds works for his psych symptoms when he doesn't drink.    Past psychiatric history: hx of drinking alcohol since age 16. Drinking became problematic by the age of 23-24. Reports going through 18 CD treatment and above mentioned 6 years of sobriety. The patient has tolerance, withdrawal, progressive use, loss of control, spending more time using substances.  The patient has made attempts to quit, is experiencing cravings, reports negative consequences. Hx of DT, but no seizures. Hx of 2 DUIs, has driving license. States that his anxiety and depression have been treated with Zoloft, Gabapentin, he is also on Ambien and Adderall. Denies previous psych hospitalizations, says that might have overdosed one time while being intoxicated, but doesn't know for sure.    Past medical history:     ADD (attention deficit disorder)       Agitation      Alcohol abuse      Anxiety       gabapentin helps the most     Benzodiazepine abuse (H)      Depressive disorder 2015    Drug abuse (H)      GERD (gastroesophageal reflux disease)      Hepatic steatosis      Hypertension      Obesity      Pyloric  stenosis       s/p pyloromyotomy as an infant     Past Surgical History:   Procedure Laterality Date    Deviated septum repair        ENT SURGERY   2005     Deviated Septum    GI SURGERY   1988     Pyloric Stenosis    ORTHOPEDIC SURGERY   2016     Left Shoulder Needs Full Replacement    PYLOROMYOTOMY SURGERY   as an infant     SHOULDER SURGERY Left 07/29/2016     Removal of cartilage     Family and social history:   Family History   Problem Relation Age of Onset    Neurologic Disorder Mother           Multiple Sclerosis    Depression Mother      Anxiety Disorder Mother      Alcohol/Drug Father      Substance Abuse Father      Depression Maternal Grandmother      Anxiety Disorder Maternal Grandmother      Hypertension Maternal Grandmother      Diabetes Maternal Grandmother      Hyperlipidemia Maternal Grandmother      Substance Abuse Maternal Grandfather      Other Cancer Maternal Grandfather      Obesity Maternal Grandfather      Alcohol/Drug Paternal Grandfather      Diabetes Other      Other Cancer Other      Obesity Other      Patient lives with his 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 He is originally from [place], has support from his girlfriend, mother and grandmother.         Medications:     Current Facility-Administered Medications   Medication Dose Route Frequency Provider Last Rate Last Admin    [START ON 6/16/2025] amLODIPine (NORVASC) tablet 5 mg  5 mg Oral Daily Jake Ragsdale DO        busPIRone (BUSPAR) tablet 15 mg  15 mg Oral BID Jayde Hansen APRN CNP   15 mg at 06/15/25 0750    folic acid (FOLVITE) tablet 1 mg  1 mg Oral Daily Lexus Cota MD   1 mg at 06/15/25 0750    gabapentin (NEURONTIN) tablet 600 mg  600 mg Oral BID Jayde Hansen APRN CNP   600 mg at 06/15/25 0750    pantoprazole (PROTONIX) EC tablet 40 mg  40 mg Oral QAM Anna Harmon PA-C        sertraline (ZOLOFT) tablet 200 mg  200 mg Oral Daily Jayde Hansen APRN CNP   200 mg at  06/15/25 0748    thiamine (B-1) tablet 100 mg  100 mg Oral Daily Lexus Cota MD   100 mg at 06/15/25 0750          Allergies:   No Known Allergies       Labs:     Recent Results (from the past 24 hours)   Comprehensive metabolic panel    Collection Time: 06/14/25  1:50 PM   Result Value Ref Range    Sodium 139 135 - 145 mmol/L    Potassium 3.6 3.4 - 5.3 mmol/L    Carbon Dioxide (CO2) 23 22 - 29 mmol/L    Anion Gap 12 7 - 15 mmol/L    Urea Nitrogen 7.5 6.0 - 20.0 mg/dL    Creatinine 0.44 (L) 0.67 - 1.17 mg/dL    GFR Estimate >90 >60 mL/min/1.73m2    Calcium 8.5 (L) 8.8 - 10.4 mg/dL    Chloride 104 98 - 107 mmol/L    Glucose 122 (H) 70 - 99 mg/dL    Alkaline Phosphatase 80 40 - 150 U/L    AST 19 0 - 45 U/L    ALT 19 0 - 70 U/L    Protein Total 6.8 6.4 - 8.3 g/dL    Albumin 4.2 3.5 - 5.2 g/dL    Bilirubin Total 0.2 <=1.2 mg/dL   Magnesium    Collection Time: 06/14/25  1:50 PM   Result Value Ref Range    Magnesium 1.9 1.7 - 2.3 mg/dL   Ethanol Level Blood    Collection Time: 06/14/25  1:50 PM   Result Value Ref Range    Ethanol Level Blood 0.28 (H) <=0.01 g/dL   GGT    Collection Time: 06/14/25  1:50 PM   Result Value Ref Range    GGT 34 8 - 61 U/L   TSH with free T4 reflex    Collection Time: 06/14/25  1:50 PM   Result Value Ref Range    TSH 0.95 0.30 - 4.20 uIU/mL   CBC with platelets and differential    Collection Time: 06/14/25  2:24 PM   Result Value Ref Range    WBC Count 5.0 4.0 - 11.0 10e3/uL    RBC Count 5.03 4.40 - 5.90 10e6/uL    Hemoglobin 11.5 (L) 13.3 - 17.7 g/dL    Hematocrit 37.8 (L) 40.0 - 53.0 %    MCV 75 (L) 78 - 100 fL    MCH 22.9 (L) 26.5 - 33.0 pg    MCHC 30.4 (L) 31.5 - 36.5 g/dL    RDW 21.7 (H) 10.0 - 15.0 %    Platelet Count 393 150 - 450 10e3/uL    % Neutrophils 50 %    % Lymphocytes 39 %    % Monocytes 8 %    % Eosinophils 2 %    % Basophils 1 %    % Immature Granulocytes 0 %    NRBCs per 100 WBC 0 <1 /100    Absolute Neutrophils 2.5 1.6 - 8.3 10e3/uL    Absolute Lymphocytes 1.9 0.8 -  "5.3 10e3/uL    Absolute Monocytes 0.4 0.0 - 1.3 10e3/uL    Absolute Eosinophils 0.1 0.0 - 0.7 10e3/uL    Absolute Basophils 0.0 0.0 - 0.2 10e3/uL    Absolute Immature Granulocytes 0.0 <=0.4 10e3/uL    Absolute NRBCs 0.0 10e3/uL   Hemoglobin A1c    Collection Time: 06/14/25  2:24 PM   Result Value Ref Range    Estimated Average Glucose 114 <117 mg/dL    Hemoglobin A1C 5.6 <5.7 %   Urine Drug Screen Panel    Collection Time: 06/14/25  2:57 PM   Result Value Ref Range    Amphetamines Urine Screen Negative Screen Negative    Barbituates Urine Screen Negative Screen Negative    Benzodiazepine Urine Screen Positive (A) Screen Negative    Cannabinoids Urine Screen Negative Screen Negative    Cocaine Urine Screen Negative Screen Negative    Fentanyl Qual Urine Screen Negative Screen Negative    Opiates Urine Screen Negative Screen Negative    PCP Urine Screen Negative Screen Negative          Psychiatric Examination:     BP (!) 155/84 (BP Location: Left arm, Patient Position: Sitting, Cuff Size: Adult Large)   Pulse 78   Temp 98.6  F (37  C) (Oral)   Resp 16   Ht 1.854 m (6' 1\")   Wt (!) 145.2 kg (320 lb)   SpO2 96%   BMI 42.22 kg/m    Weight is 320 lbs 0 oz  Body mass index is 42.22 kg/m .                                             Appearance: awake, alert, dressed in hospital scrubs, and appeared as age stated  Attitude:  cooperative  Eye Contact:  fair  Mood:  sad   Affect:  constricted mobility  Speech:  decreased prosody and paucity of speech  Psychomotor Behavior:  physical retardation  Throught Process:  linear and goal oriented  Associations:  no loose associations  Thought Content:  no evidence of suicidal ideation or homicidal ideation and no evidence of psychotic thought  Insight:  fair  Judgement:  fair  Oriented to:  time, person, and place  Attention Span and Concentration:  fair  Recent and Remote Memory:  fair       Review of systems:     For physical examination and 12 point review of system please, " see note of Miquel Santo MD  from 6/14/2025.  I reviewed that note and agree with it.         DIagnoses:     Alcohol use disorder, severe with withdrawal.  Unspecified depression and anxiety.         Plan:     [facility], continue PTA meds with the exception of Adderall. Patient doesn't want CD treatment, has psych prescriber, plans to find a therapist on his own. Will see IM as per unit routine.    Total time spent was 57 minutes. Over 50% of times was spent counseling and coordination of care regarding coping skills, medication and discharge planning.

## 2025-06-15 NOTE — PLAN OF CARE
Rehab Group    Start time: 13:20  End time: 14:10  Patient time total: 45 minutes    attended partial group    #6 attended   Group Type: occupational therapy   Group Topic Covered: cognitive activities, coping skills, healthy leisure time, and social skills     Group Session Detail:  Patient engaged in group leisure activity (Family Feud) with the focus being: building interpersonal skills, encouraging team collaboration, and promoting overall prosocial engagement and interaction.      Patient Response/Contribution:  worked intermittently and engaged socially when prompted     Patient Detail:  Patient presented as a quiet and passive participant-benefited from gentle encouragement and prompting for active engagement. Patient appeared fatigued throughout group- active participation appeared inconsistent and minimal.  Patient remained socially appropriate and respectful to others during brief interactions.      [id number] OT Group (2 or more in attendance)      Patient Active Problem List   Diagnosis    Morbid obesity (H)    Alcohol dependence with withdrawal (H)    Chemical dependency (H)    Hypertension goal BP (blood pressure) < 140/90    Suicidal ideation    Elevated liver enzymes    Hepatic steatosis    Class 3 severe obesity with serious comorbidity and body mass index (BMI) of 45.0 to 49.9 in adult, unspecified obesity type (H)    Insomnia, unspecified type    Anxiety    Gastroesophageal reflux disease without esophagitis    Alcohol abuse with alcohol-induced mood disorder (H)    Attention deficit hyperactivity disorder (ADHD), unspecified ADHD type    Alcohol use disorder, severe, dependence (H)    Controlled substance agreement signed    Mild episode of recurrent major depressive disorder    Alcohol withdrawal (H)    Substance abuse (H)    Agitation requiring sedation protocol    Alcohol abuse with alcohol-induced anxiety disorder (H)    Acute respiratory failure (H)    Acute alcoholic intoxication in alcoholism  with complication (H)    Alcohol dependence (H)    Tobacco dependence syndrome    Alcohol dependence with uncomplicated intoxication (H)

## 2025-06-15 NOTE — PROGRESS NOTES
Patient is requesting that his gabapentin doses be resumed at regular times and dosages because it helps to manage his anxiety.  He was informed that the doses have been temporarily reduced because he is taking valium at this time.  Patient given hydroxyzine for anxiety.

## 2025-06-15 NOTE — PLAN OF CARE
Pt admitted for alcohol detox.  BP elevated at 172/125 on initial assessment.   Endorsed a headache, nausea and diaphoresis.   MSSA:9, valium and tylenol given. Zofran not due yet. Pt ok with waiting.    0400: still hypertensive with a headache. Administered valium for MSSA 8. And tylenol for a headache. Pt reports dx of HTN with amlodipine and clonidine prescribed in the past but has not seen his primary provider and has not taken meds for a while.   Paged [facility] provider. Amlodipine ordered and administered.   Rechecked B/P: 154/100, T: 97.8, P: 65, R: 18   Endorsing withdrawal symptoms, scored 8 and got prn valium 10mg at 0700.    Problem: Alcohol Withdrawal  Goal: Alcohol Withdrawal Symptom Control  Outcome: Progressing   Goal Outcome Evaluation:

## 2025-06-15 NOTE — DISCHARGE INSTRUCTIONS
Behavioral Discharge Planning and Instructions  THANK YOU FOR CHOOSING [facility]  3AW  [phone]    Summary: You were admitted to [facility] on 6/14/25 for detoxification from alcohol  A medical exam was performed that included lab work. You have met with a [facility] Counselor and opted to discharge home and resume working your recovery plan Please take care and make your recovery a daily priority, Kana!  It was a pleasure working with you and the entire treatment team here wishes you the very best in your recovery!     Recommendation:    Work your recovery plan  Consider working with addiction medicine    Addiction Medicine Referrals:  [facility] Addiction Medicine  Phone Number: [phone] ([facility])    2. [facility] - Addiction Recovery  Phone Number: [phone] ([facility] Addiction Recovery)    3. [facility] - Addiction Medicine  Phone Number: [phone] ([facility] Addiction Medicine)    Main Diagnoses:   Alcohol use disorder, severe, dependence with withdrawal with complication including hx of seizures   EVAN  ADHD  Insomnia, unspecified  HTN  GERD  Anemia, mild    Major Treatments, Procedures and Findings:  You were treated for alcohol detoxification using [facility] protocol. You have met with a [facility] counselor to develop a treatment plan for discharge. You had labs drawn and those results were reviewed with you. Please take a copy of your lab work with you to your next primary care provider appointment.    Symptoms to Report:  If you experience more anxiety, confusion, sleeplessness, deep sadness or thoughts of suicide, notify your treatment team or notify your primary care provider. IF ANY OF THE SYMPTOMS YOU ARE EXPERIENCING ARE A MEDICAL EMERGENCY CALL 911 IMMEDIATELY.     Lifestyle Adjustment: Adjust your lifestyle to get enough sleep, relaxation, exercise and good nutrition. Continue to develop healthy coping skills to  decrease stress and promote a sober living environment. Do not use mood altering substances including alcohol, illegal drugs or addictive medications other than what is currently prescribed.     Disposition: Home    Follow-up Appointment:   You are aware you should make a follow up appointment with your primary care doctor for medical and medication management     Recovery apps for your phone for educational purposes and to locate in person and zoom recovery meetings  Everything AA -  jerri is a great resource  12 Step Toolkit - educational purpose learning about the 12 steps to recovery  [place] [employer] - meeting jerri  AA  - meeting jerri  Meeting guide - meeting jerri  Quick NA meeting - meeting jerri  Felijanescelsa- has various apps    Patient Navigation Hub  [facility] Navigators work to be your point-of-contact for trustworthy and compassionate care from Inpatient services to [facility] Programmatic Care. We will provide resources and communication to help guide you into programmatic care. Ultimately, our goal is to be the one-stop-shop of communication, coordination, and support for your journey to programmatic care.  Phone: [phone]    Resources:  Need Support Now? If you or someone you know is struggling or in crisis, help is available. Call or text 988 or chat 98[employer]us.org    AA/NA meetings have returned to in-person or a hybrid combination of zoom/in-person therefore please check online to verify time.  AA meetings search for them at: https://aa-intergroup.org (worldwide meeting listings)  AA meetings for [place] area can be found online at: https://a[place].org (click local online meetings listings)  NA meetings for [place] area can be found online at: https://www.nam[place].org  (click find a meeting)  AA and NA Sponsors are excellent resources for support and you can find one at any support group meeting.   Alcoholics Anonymous (https://aa.org/): for information 24 hours/day  AA Intergroup  "service office in [place] (http://www.aastpaul.org/) 463.364.9390  AA Intergroup service office in [facility]: [phone]. (http://www.a[place].org/)  Narcotics Anonymous (www.nam[place].org) (871) 202-5671  https://aafairviewriverside.org/meetings  SMART Recovery - self management for addiction recovery:  www.smartrecovery.org  Pathways ~ A Health Crisis Resource & Support Center:  572.609.5619.  https://prescribetoprevent.org/patient-education/videos/  http://www.harmreduction.org  Crisis Text Line  Text 470515  You will be connected with a trained live crisis counselor to provide support. Por espanol, texto  JEROMY a 710393 o texto a 442-AYUDAME en WhatsApp  [facility] Line  1.304.SUICIDE [1314570]  [facility] [phone]  Support Group:  AA/NA and Sponsor/support.  Fast Tracker  Linking people to mental health and substance use disorder resources  Plaidn.ProjectSpeaker   [place] Mental Health Warm Line  Peer to peer support  Monday thru Saturday, 12 pm to 10 pm  390.285.1332 or 1.987.105.9431  Text \"Support\" to 88205  National [place] on Mental Illness (CELESTE)  527.739.0592 or 1.888.CELESTE.HELPS  Alcoholics Anonymous (www.alcoholics-anonymous.org): Check your phone book for your local chapter.  Suicide Awareness Voices of Education (SAVE) (www.save.org): 354-028-UOBI (7283)  National Suicide Prevention Line (www.mentalhealthmn.org): 202-074-PZMQ (9801)  Mental Health Consumer/Survivor Network of [place] (www.mhcsn.net): 828.119.8137 or 358-428-6375  Mental Health Association of [place] (www.mentalhealth.org): 291.455.3452 or 027-089-9215   Substance Abuse and Mental Health Services (www.samhsa.gov)  [place] Opioid Prevention Coalition: www.opioidcoalition.org    [place] Recovery Connection (MRC)  [facility] connects people seeking recovery to resources that help foster and sustain long-term recovery.  Whether you are seeking resources for treatment, transportation, housing, job " training, education, health care or other pathways to recovery, [facility] is a great place to start.  489.691.1490.  www.[facility] Cent[facility].org    Great Pod casts for nutrition and wellness  Listen on Apple Podcasts  Dishing Up Nutrition   [employer]   Nutrition       Understand the connection between what you eat and how you feel. Hosted by licensed nutritionists and dietitians from [employer] we share practical, real-life solutions for healthier living through nutrition.     General Medication Instructions:   See your medication sheet(s) for instructions.   Take all medications as prescribed.  Make no changes unless your primary care provider suggests them.   Go to all your primary care provider visits.  Be sure to have all your required lab tests. This way, your medicines can be refilled on time.  Do not use any forms of alcohol.    Please Note: If you have any questions at anytime after you discharged please call [facility] detox unit 3A at [phone].    Here are a list of additional numbers you can call if you are wanting to resume services through [facility]:  [facility] Assessment Intake: [phone]  [facility] Adult [facility] Intensive Outpatient  call: [phone]  [facility] Admissions [phone]    Recovery Clinic call: [phone]  [facility]: [phone]  Medical Records call: [phone]  Billing Department call: [phone]    Please remember to take all of your behavioral discharge planning and lab paperwork to any follow up appointments, it contains your lab results, diagnosis, medication list and discharge recommendations.      THANK YOU FOR CHOOSING [facility]

## 2025-06-15 NOTE — PLAN OF CARE
Triage & Transition Services, [address]     Case Management Encounter:   Writer met with patient in room. Patient stated he had been six years sober and was the manager of a sober house. Reports that he stopped working his recovery program resulting in relapse. Patient reports that he was drinking up to a liter a day.  Reports he has both psychiatry and therapy in place.    Per chart review: patient was recently admitted at [facility] from 5/27/2025 - 6/1/2025 due to acute toxic encephalopathy related to alcohol consumption accompanied by acute hypoxic and hypercarbic respiratory failure.  His BAL was significantly elevated at .489 and he was intubated while in the ICU.     Insurance:   [employer] OPEN ACCESS  Cvg status: RTE-Verified  Subscriber: PARTH HARPER  Last verified date: 6/9/2025  Subscriber ID: [id number]  Group #: [id number]    Legal Status:   Voluntary    SUDs Assessment Status:   Not needed    Safety Plan Status:  Patient advised to complete form and return to staff for EPIC entry    ROIs on file:   NA    Living Situation:   Living with significant other    Current Providers, Supports & Collateral:    PCP: Katherin - [place]  Psychiatry - August Bowen at [facility]     Current Plan/Referral Status:   Discharge back to community.    AVS Status:   Started    Kerry Mark LPCC, LADC, LPC ([place])  [address] - Adult Inpatient Addiction Psychiatry Unit

## 2025-06-15 NOTE — PLAN OF CARE
Problem: Alcohol Withdrawal  Goal: Alcohol Withdrawal Symptom Control  Outcome: Progressing   Goal Outcome Evaluation:    Plan of Care Reviewed With: patient        Patient alert and oriented X 4.  He has had a marginal appetite due to GI upset and gastric burning.  Maalox and jevon chews given this am--medicine service saw patient and ordered protonix with some relief.  Patient continues to experience tremulousness and diaphoresis, exhaustion, HTN and headache.  Pt instructed to drink more fluids.  Patient has been off his blood pressure medications for 3-4 days --resumed this am.  Tylenol given for HA.  Patient denies SI, SIB. He is anticipating a visit from his s.o. this afternoon.  Will continue to monitor and assist with discharge plan.  Patient has deferred case management and treatment at this time.

## 2025-06-15 NOTE — PLAN OF CARE
LETI Lang is a 34 year old year old male with a chief complaint of alcohol problem        S = Situation:   Patient a voluntary admission seeking alcohol detox.     B  = Background:   Patient admitted from [facility] seeking alcohol detox. Patient reports he has been drinking a liter of vodka daily for the past 4 days. Was also recently hospitalized at [facility] due to alcohol use. Patient reports he vapes nicotine, denies other substance use. Urine drug screen positive for benzodiazepines, likely from last hospitalization. Patient has a history of alcohol withdrawal seizures and delirium tremens.  Past Medical History:   Diagnosis Date    ADD (attention deficit disorder)     Agitation     Alcohol abuse     Anxiety     gabapentin helps the most     Benzodiazepine abuse (H)     Depressive disorder 2015    Drug abuse (H)     GERD (gastroesophageal reflux disease)     Hepatic steatosis     Hypertension     Obesity     Pyloric stenosis     s/p pyloromyotomy as an infant      A  =  Assessment:   Patient affect tense, mood is anxious. Denies SI, SIB, HI, or hallucinations. Patient can't identify any reason for resuming alcohol use. He had 6 years of sobriety and was a sober  in the past. Patient MSSA score an 8 during admission assessment.     R =   Request or Recommendation:   Is on MSSA monitoring for alcohol withdrawal, with Valium. Is on withdrawal and seizure precautions. To be assessed in am by psychiatry, internal medicine, and case management.

## 2025-06-16 PROBLEM — F10.239 ALCOHOL DEPENDENCE WITH WITHDRAWAL WITH COMPLICATION (H): Status: ACTIVE | Noted: 2025-06-14

## 2025-06-16 PROCEDURE — 250N000013 HC RX MED GY IP 250 OP 250 PS 637: Performed by: NURSE PRACTITIONER

## 2025-06-16 PROCEDURE — H2032 ACTIVITY THERAPY, PER 15 MIN: HCPCS

## 2025-06-16 PROCEDURE — 99233 SBSQ HOSP IP/OBS HIGH 50: CPT | Performed by: PSYCHIATRY & NEUROLOGY

## 2025-06-16 PROCEDURE — 250N000013 HC RX MED GY IP 250 OP 250 PS 637: Performed by: PSYCHIATRY & NEUROLOGY

## 2025-06-16 PROCEDURE — 128N000004 HC R&B CD ADULT

## 2025-06-16 PROCEDURE — 250N000013 HC RX MED GY IP 250 OP 250 PS 637

## 2025-06-16 RX ORDER — GABAPENTIN 800 MG/1
800 TABLET ORAL 3 TIMES DAILY
Qty: 90 TABLET | Refills: 0 | Status: SHIPPED | OUTPATIENT
Start: 2025-06-16

## 2025-06-16 RX ORDER — FOLIC ACID 1 MG/1
1 TABLET ORAL DAILY
Qty: 30 TABLET | Refills: 0 | Status: SHIPPED | OUTPATIENT
Start: 2025-06-17

## 2025-06-16 RX ORDER — LANOLIN ALCOHOL/MO/W.PET/CERES
100 CREAM (GRAM) TOPICAL DAILY
Qty: 30 TABLET | Refills: 0 | Status: SHIPPED | OUTPATIENT
Start: 2025-06-17

## 2025-06-16 RX ORDER — GABAPENTIN 800 MG/1
800 TABLET ORAL 2 TIMES DAILY
Status: DISCONTINUED | OUTPATIENT
Start: 2025-06-16 | End: 2025-06-16

## 2025-06-16 RX ORDER — SERTRALINE HYDROCHLORIDE 100 MG/1
200 TABLET, FILM COATED ORAL DAILY
Qty: 60 TABLET | Refills: 0 | Status: SHIPPED | OUTPATIENT
Start: 2025-06-16

## 2025-06-16 RX ORDER — BUSPIRONE HYDROCHLORIDE 15 MG/1
15 TABLET ORAL 2 TIMES DAILY
Qty: 60 TABLET | Refills: 0 | Status: SHIPPED | OUTPATIENT
Start: 2025-06-16

## 2025-06-16 RX ORDER — HYDROXYZINE HYDROCHLORIDE 50 MG/1
50 TABLET, FILM COATED ORAL 3 TIMES DAILY PRN
Qty: 90 TABLET | Refills: 0 | Status: SHIPPED | OUTPATIENT
Start: 2025-06-16

## 2025-06-16 RX ORDER — AMLODIPINE BESYLATE 5 MG/1
5 TABLET ORAL DAILY
Qty: 30 TABLET | Refills: 0 | Status: SHIPPED | OUTPATIENT
Start: 2025-06-17

## 2025-06-16 RX ORDER — GABAPENTIN 600 MG/1
600 TABLET ORAL AT BEDTIME
Qty: 30 TABLET | Refills: 0 | Status: SHIPPED | OUTPATIENT
Start: 2025-06-16

## 2025-06-16 RX ORDER — GABAPENTIN 800 MG/1
800 TABLET ORAL 2 TIMES DAILY
Status: DISCONTINUED | OUTPATIENT
Start: 2025-06-16 | End: 2025-06-17 | Stop reason: HOSPADM

## 2025-06-16 RX ORDER — PANTOPRAZOLE SODIUM 40 MG/1
40 TABLET, DELAYED RELEASE ORAL
Qty: 30 TABLET | Refills: 0 | Status: SHIPPED | OUTPATIENT
Start: 2025-06-17

## 2025-06-16 RX ADMIN — Medication 10 MG: at 21:28

## 2025-06-16 RX ADMIN — THIAMINE HCL TAB 100 MG 100 MG: 100 TAB at 08:33

## 2025-06-16 RX ADMIN — GABAPENTIN 800 MG: 800 TABLET, FILM COATED ORAL at 13:47

## 2025-06-16 RX ADMIN — ACETAMINOPHEN 650 MG: 325 TABLET ORAL at 08:33

## 2025-06-16 RX ADMIN — SERTRALINE HYDROCHLORIDE 200 MG: 100 TABLET ORAL at 08:33

## 2025-06-16 RX ADMIN — ALUMINUM HYDROXIDE, MAGNESIUM HYDROXIDE, AND DIMETHICONE 30 ML: 200; 20; 200 SUSPENSION ORAL at 20:31

## 2025-06-16 RX ADMIN — ACETAMINOPHEN 650 MG: 325 TABLET ORAL at 12:25

## 2025-06-16 RX ADMIN — HYDROXYZINE HYDROCHLORIDE 50 MG: 50 TABLET ORAL at 16:33

## 2025-06-16 RX ADMIN — AMLODIPINE BESYLATE 5 MG: 5 TABLET ORAL at 08:33

## 2025-06-16 RX ADMIN — GABAPENTIN 1400 MG: 800 TABLET, FILM COATED ORAL at 20:32

## 2025-06-16 RX ADMIN — TRAZODONE HYDROCHLORIDE 50 MG: 50 TABLET ORAL at 21:27

## 2025-06-16 RX ADMIN — ALUMINUM HYDROXIDE, MAGNESIUM HYDROXIDE, AND DIMETHICONE 30 ML: 200; 20; 200 SUSPENSION ORAL at 16:33

## 2025-06-16 RX ADMIN — GABAPENTIN 800 MG: 800 TABLET, FILM COATED ORAL at 08:33

## 2025-06-16 RX ADMIN — ALUMINUM HYDROXIDE, MAGNESIUM HYDROXIDE, AND DIMETHICONE 30 ML: 200; 20; 200 SUSPENSION ORAL at 12:25

## 2025-06-16 RX ADMIN — ACETAMINOPHEN 650 MG: 325 TABLET ORAL at 20:31

## 2025-06-16 RX ADMIN — HYDROXYZINE HYDROCHLORIDE 50 MG: 50 TABLET ORAL at 21:27

## 2025-06-16 RX ADMIN — FOLIC ACID 1 MG: 1 TABLET ORAL at 08:33

## 2025-06-16 RX ADMIN — PANTOPRAZOLE SODIUM 40 MG: 40 TABLET, DELAYED RELEASE ORAL at 08:33

## 2025-06-16 RX ADMIN — BUSPIRONE HYDROCHLORIDE 15 MG: 15 TABLET ORAL at 08:33

## 2025-06-16 RX ADMIN — ACETAMINOPHEN 650 MG: 325 TABLET ORAL at 16:33

## 2025-06-16 RX ADMIN — BUSPIRONE HYDROCHLORIDE 15 MG: 15 TABLET ORAL at 20:31

## 2025-06-16 ASSESSMENT — ACTIVITIES OF DAILY LIVING (ADL)
ADLS_ACUITY_SCORE: 35
LAUNDRY: WITH SUPERVISION
ADLS_ACUITY_SCORE: 35
HYGIENE/GROOMING: INDEPENDENT
DRESS: INDEPENDENT
HYGIENE/GROOMING: INDEPENDENT
ADLS_ACUITY_SCORE: 35
ORAL_HYGIENE: INDEPENDENT
ADLS_ACUITY_SCORE: 35
DRESS: INDEPENDENT
ADLS_ACUITY_SCORE: 35
ORAL_HYGIENE: INDEPENDENT
ADLS_ACUITY_SCORE: 35

## 2025-06-16 NOTE — PROGRESS NOTES
Rehab Group    Start time: 1645  End time: 1730  Patient time total: 45 minutes    attended full group    #7 attended   Group Type: recreation   Group Topic Covered: healthy leisure time, mindfulness, Physical activity, relaxation , and self-care       Group Session Detail:  Exercise and relaxation as a coping skill     Patient Response/Contribution:  cooperative with task, attentive, and actively engaged       Patient Detail:    Pt actively participated in a structured Therapeutic Recreation group with a focus on leisure participation, socializing, and exercise. Pt participated in the guided exercise for the full duration of the group. Pt followed along, engaged in the guided chair exercise routine and added to the discussion prompts throughout the routine.  Pt was encouraged to use positive imagery with the deep breathing and stretching to foster relaxation, improves focus, and reduce stress.      Activity Therapy Per 15 min ()      Patient Active Problem List   Diagnosis    Morbid obesity (H)    Alcohol dependence with withdrawal (H)    Chemical dependency (H)    Hypertension goal BP (blood pressure) < 140/90    Suicidal ideation    Elevated liver enzymes    Hepatic steatosis    Class 3 severe obesity with serious comorbidity and body mass index (BMI) of 45.0 to 49.9 in adult, unspecified obesity type (H)    Insomnia, unspecified type    Anxiety    Gastroesophageal reflux disease without esophagitis    Alcohol abuse with alcohol-induced mood disorder (H)    Attention deficit hyperactivity disorder (ADHD), unspecified ADHD type    Alcohol use disorder, severe, dependence (H)    Controlled substance agreement signed    Mild episode of recurrent major depressive disorder    Alcohol withdrawal (H)    Substance abuse (H)    Agitation requiring sedation protocol    Alcohol abuse with alcohol-induced anxiety disorder (H)    Acute respiratory failure (H)    Acute alcoholic intoxication in alcoholism with  complication (H)    Alcohol dependence (H)    Tobacco dependence syndrome    Alcohol dependence with withdrawal with complication (H)

## 2025-06-16 NOTE — PLAN OF CARE
Goal Outcome Evaluation:       Pt a wake x1 briefly otherwise appeared to be a sleep at all other safety checks.  Pt slept 6.5 hours.  Pt's MSSA scores this shift were 5 and 3.  No prn medication administered this shift.

## 2025-06-16 NOTE — PROGRESS NOTES
"[facility], [place]   Psychiatric Progress Note  Hospital Day: 2        Interim History:   The patient's care was discussed with the treatment team during the daily team meeting and/or staff's chart notes were reviewed.  Staff report patient has been visible in the milieu, attending group last night, reporting ongoing withdrawal symptoms causing him to feel unwell, headaches, sweats, anxiety, tremors, MSSA scores elevated, receiving diazepam as indicated, taking medications as prescribed, appeared to sleep throughout the night.     Upon interview, the patient was in his room resting, reports that he started to turn the corner minutes and his withdrawal symptoms, noting much improvement and nausea, able to eat more, having less sweating and shaking.  Reviewed plan to resume his full dose of gabapentin that he was taking prior to admission and he is in agreement with this plan, noted that it likely will help improve his anxiety and withdrawal even further.  He denies having any SI HI or AVH.  He is tolerating medications.  He reflected on his past ability to maintain sobriety for 6 years by working with supports in his community, he states he knows what he needs to do and just needs to get back to \"working my program\".  He declines any medications to help with his alcohol use disorder reported he took them in the past and continued to drink.  He continues to decline any referrals for CD treatment and plans to return to AA.  He is hoping that he will be ready for discharge tomorrow.  No additional concerns.    Updated later in day by RN that pt is hoping to leave at 10am tomorrow as that is when his girlfriend can come pick him up.          Medications:     Current Facility-Administered Medications   Medication Dose Route Frequency Provider Last Rate Last Admin    amLODIPine (NORVASC) tablet 5 mg  5 mg Oral Daily Jake Ragsdael DO        busPIRone (BUSPAR) tablet 15 mg  15 mg Oral BID " Jayde Hansen APRN CNP   15 mg at 06/15/25 2011    folic acid (FOLVITE) tablet 1 mg  1 mg Oral Daily Lexus Cota MD   1 mg at 06/15/25 0750    gabapentin (NEURONTIN) tablet 1,400 mg  1,400 mg Oral At Bedtime Nicole Sigala MD        gabapentin (NEURONTIN) tablet 800 mg  800 mg Oral BID Nicole Sigala MD        pantoprazole (PROTONIX) EC tablet 40 mg  40 mg Oral QAM AC [place] PA-C   40 mg at 06/15/25 1230    sertraline (ZOLOFT) tablet 200 mg  200 mg Oral Daily Jayde Hansen APRN CNP   200 mg at 06/15/25 0748    thiamine (B-1) tablet 100 mg  100 mg Oral Daily Lexus Cota MD   100 mg at 06/15/25 0750            Allergies:     No Known Allergies         Labs:     Recent Results (from the past 48 hours)   Comprehensive metabolic panel    Collection Time: 06/14/25  1:50 PM   Result Value Ref Range    Sodium 139 135 - 145 mmol/L    Potassium 3.6 3.4 - 5.3 mmol/L    Carbon Dioxide (CO2) 23 22 - 29 mmol/L    Anion Gap 12 7 - 15 mmol/L    Urea Nitrogen 7.5 6.0 - 20.0 mg/dL    Creatinine 0.44 (L) 0.67 - 1.17 mg/dL    GFR Estimate >90 >60 mL/min/1.73m2    Calcium 8.5 (L) 8.8 - 10.4 mg/dL    Chloride 104 98 - 107 mmol/L    Glucose 122 (H) 70 - 99 mg/dL    Alkaline Phosphatase 80 40 - 150 U/L    AST 19 0 - 45 U/L    ALT 19 0 - 70 U/L    Protein Total 6.8 6.4 - 8.3 g/dL    Albumin 4.2 3.5 - 5.2 g/dL    Bilirubin Total 0.2 <=1.2 mg/dL   Magnesium    Collection Time: 06/14/25  1:50 PM   Result Value Ref Range    Magnesium 1.9 1.7 - 2.3 mg/dL   Ethanol Level Blood    Collection Time: 06/14/25  1:50 PM   Result Value Ref Range    Ethanol Level Blood 0.28 (H) <=0.01 g/dL   GGT    Collection Time: 06/14/25  1:50 PM   Result Value Ref Range    GGT 34 8 - 61 U/L   TSH with free T4 reflex    Collection Time: 06/14/25  1:50 PM   Result Value Ref Range    TSH 0.95 0.30 - 4.20 uIU/mL   CBC with platelets and differential    Collection Time: 06/14/25  2:24 PM   Result Value Ref Range    WBC  "Count 5.0 4.0 - 11.0 10e3/uL    RBC Count 5.03 4.40 - 5.90 10e6/uL    Hemoglobin 11.5 (L) 13.3 - 17.7 g/dL    Hematocrit 37.8 (L) 40.0 - 53.0 %    MCV 75 (L) 78 - 100 fL    MCH 22.9 (L) 26.5 - 33.0 pg    MCHC 30.4 (L) 31.5 - 36.5 g/dL    RDW 21.7 (H) 10.0 - 15.0 %    Platelet Count 393 150 - 450 10e3/uL    % Neutrophils 50 %    % Lymphocytes 39 %    % Monocytes 8 %    % Eosinophils 2 %    % Basophils 1 %    % Immature Granulocytes 0 %    NRBCs per 100 WBC 0 <1 /100    Absolute Neutrophils 2.5 1.6 - 8.3 10e3/uL    Absolute Lymphocytes 1.9 0.8 - 5.3 10e3/uL    Absolute Monocytes 0.4 0.0 - 1.3 10e3/uL    Absolute Eosinophils 0.1 0.0 - 0.7 10e3/uL    Absolute Basophils 0.0 0.0 - 0.2 10e3/uL    Absolute Immature Granulocytes 0.0 <=0.4 10e3/uL    Absolute NRBCs 0.0 10e3/uL   Hemoglobin A1c    Collection Time: 06/14/25  2:24 PM   Result Value Ref Range    Estimated Average Glucose 114 <117 mg/dL    Hemoglobin A1C 5.6 <5.7 %   Urine Drug Screen Panel    Collection Time: 06/14/25  2:57 PM   Result Value Ref Range    Amphetamines Urine Screen Negative Screen Negative    Barbituates Urine Screen Negative Screen Negative    Benzodiazepine Urine Screen Positive (A) Screen Negative    Cannabinoids Urine Screen Negative Screen Negative    Cocaine Urine Screen Negative Screen Negative    Fentanyl Qual Urine Screen Negative Screen Negative    Opiates Urine Screen Negative Screen Negative    PCP Urine Screen Negative Screen Negative            Psychiatric Examination:       BP (!) 138/92   Pulse 63   Temp 98  F (36.7  C) (Oral)   Resp 16   Ht 1.854 m (6' 1\")   Wt (!) 145.2 kg (320 lb)   SpO2 97%   BMI 42.22 kg/m    Weight is 320 lbs 0 oz  Body mass index is 42.22 kg/m .    Orthostatic Vitals       None          Appearance: awake, alert and adequately groomed  Attitude:  cooperative  Eye Contact:  good  Mood:  anxious, improving  Affect:  appropriate and in normal range and mood congruent  Speech:  clear, coherent and normal " "prosody  Language: fluent and intact in English  Psychomotor, Gait, Musculoskeletal:  no evidence of tardive dyskinesia, dystonia, or tics  Thought Process:  logical, linear, and goal oriented  Associations:  no loose associations  Thought Content:  no evidence of suicidal ideation or homicidal ideation and no evidence of psychotic thought  Insight:  fair  Judgement:  intact  Oriented to:  time, person, and place  Attention Span and Concentration:  intact  Recent and Remote Memory:  intact  Fund of Knowledge:  appropriate           Precautions:     Behavioral Orders   Procedures    Code 1 - Restrict to Unit    Routine Programming     As clinically indicated    Seizure precautions    Status 15     Every 15 minutes.    Withdrawal precautions            Diagnoses:     Alcohol use disorder, severe, dependence with withdrawal with complication including hx of seizures   EVAN  ADHD  Insomnia, unspecified  HTN  GERD  Anemia, mild       Clinically Significant Risk Factors           # Hypocalcemia: Lowest Ca = 8.5 mg/dL in last 2 days, will monitor and replace as appropriate             # Hypertension: Noted on problem list                  # Morbid Obesity: Estimated body mass index is 42.22 kg/m  as calculated from the following:    Height as of this encounter: 1.854 m (6' 1\").    Weight as of this encounter: 145.2 kg (320 lb).  , PRESENT ON ADMISSION                       Assessment & Plan:   Assessment and hospital summary:  This patient is a 34 year old male with history of substance use and anxiety who presented to ED seeking detox from alcohol. Medically cleared in ED, admitted to 3A as voluntary patient. EtOH AMANDA 0.28(H), UDS positive for benzodiazepines. MSSA with diazepam started for alcohol withdrawal. Withdrawal and seizure precautions in place, has hx of seizures. Admission labs ordered and reviewed those resulted. IM consult placed. Reporting some anxiety, denying safety concerns including SI and HI. PTA " medications continued as appropriate, resumed full PTA dose of gabapentin 6/16 to further target mood and withdrawal. Pt reports goals for hospitalization are to complete detox and then discharge home with outpatient supports through [facility] and AA. Declined AUD MAT or referrals for CD tx.    Psychiatric treatment/inteventions:  Medications:   -continue MSSA with diazepam, thiamine, folic acid, multivitamin and PRN atenolol for alcohol withdrawal   -resume PTA gabapentin dosing of 800mg in AM and PM and 1400mg HS for AUD, mood, pain, sleep   -continue other medications and treatment plan without changes, pt declines additional AUD MAT  -pt plans to discharge home 6/17 at 10am if stabilization continues and work with sober community through [facility] and [place]      Laboratory/Imaging: reviewed since admission- EtOH AMANDA 0.28(H); UDS positive for benzodiazepine; CMP with Ca 8.5(L), glucose 122(H) otherwise WNL; Mg WNL; Hgba1c WNL; CBC with hgb 11.5(L), hematocrit 37.8(L), MCV 75(L), MCH 22.9(L), MCHC 30.4(L), RDW 21.7(H) otherwise WNL; TSH WNL; GGT WNL    Patient will be treated in therapeutic milieu with appropriate individual and group therapies as described.    Medical treatment/interventions:  Medical concerns:   1) Alcohol withdrawal, complicated by hx of seizures, ongoing  -continue MSSA as above  -PRN zofran and imodium for GI distress related to withdrawal   -withdrawal and seizure precautions    2) IM consult placed for management of other medical concerns, per consult note on 6/15/25:   Nickolas Lang is a 34 year old male with PMH of alcohol use disorder, HTN, GERD, MDD, EVAN, and ADHD admitted to [facility] for detoxification from alcohol on 6/14/2025.      # Alcohol use disorder  # Alcohol withdrawal  Patient reports 6 years of sobriety before he recently relapsed again on May 17th. Since then, he reports drinking 1L of vodka most days. While withdrawing from alcohol, patient has experienced sweating,  chills, restlessness, and a headache. Patient denies history of withdrawal seizures. He has previously been in detox and chemical dependency treatment. He denies fevers/chills, chest pain, shortness of breath, and abdominal pain. BAL 0.28 on arrival to ED. MSSA 10 this morning.  - Management per primary team, Psychiatry     - Continue MSSA protocol with benzodiazepines as indicated  - Agree with folic acid, MVI, thiamine supplementation   - Notify Medicine for SBP >170 or DBP >105  - Patient informed to notify nursing who can notify Medicine if any new or worsening symptoms or concerns arise      # HTN  SBP range 110s-170s. Elevated blood pressure likely partly attributed to acute alcohol withdrawal / sympathetic drive related to decompensated mental health. Patient also did not receive PTA antihypertensive right away. Patient denies headache, visual changes, shortness of breath, and chest pain.   - Continue PTA amlodipine   - Always recheck BP if high or low and correlate with clinical situation  - Treat pain, anxiety, and any withdrawal s/s if present  - Notify Medicine for SBP >170 or DBP >105 after careful reassessment, treatment of pain/anxiety/withdrawal, and home PTA med administration  - If blood pressure fails to normalize prior to discharge, patient should follow up with PCP within one week of discharge      # GERD  Patient reports increased symptoms of reflux lately, requesting PPI, stating this normally works well for him.  - Protonix 40 mg every day      # Anemia mild  On admission, patient with mild anemia to 11.5. Likely due to bone marrow suppression in the setting of excessive alcohol use. No evidence of acute bleed or infectious process. History of mildly low hemoglobin on chart review.  - Repeat CBC on PCP follow up     # EVAN  # MDD  # ADHD  Management per primary team, Psychiatry.           The patient's care was discussed with the Patient.     Currently the patient is medically stable and  Medicine will sign off at this time. Recommendations relayed to primary team via this progress note. Please notify on-call [facility] if new questions or concerns arise.       Anna Kat PA-C  Hospitalist Service        Disposition Plan   Reason for ongoing admission: requires detoxification from substance that poses a risk of bodily harm during withdrawal period  Discharge location: home with self-care  Discharge Medications: ordered. To home pharmacy  Follow-up Appointments: not scheduled  Legal Status: voluntary    >50 min total time that was spent in counseling and coordination of care with staff, reviewing medical record, educating patient about treatment options, side effects and benefits and alternative treatments for medications, providing supportive therapy and redirection regarding above symptoms.     This document is created with the help of Dragon dictation system.  All grammatical/typing errors or context distortion are unintentional and inherent to software.    Patient has been seen and evaluated by Nicole ferrell DO.

## 2025-06-16 NOTE — PROGRESS NOTES
[address]     Daily Encounter: Met with team, discussed patient progress, discharge plan and any impediments to discharge.    Pt continues to report his plan is to return home when discharge from the hospital. He shares he will be working on his recovery by attending 12 step meetings. Pt and writer completed Safety Plan.     Insurance: [employer] OPEN ACCESS      Legal Status:  Vol     SUDs Assessment Status: Not needed.     ROIs on file: None     Living Situation: Living with significant other        Current Providers, Supports & Collateral:  CP: Clinic - [place]  Psychiatry - August Bowen at [facility]    Current Plan/Referral Status: Discharge home     Safety Plan Status: completed with patient      ARLENE Yap  [address] - Adult Inpatient Addiction Psychiatry Unit

## 2025-06-16 NOTE — PLAN OF CARE
Problem: Alcohol Withdrawal  Goal: Alcohol Withdrawal Symptom Control  Outcome: Progressing   Goal Outcome Evaluation:    Plan of Care Reviewed With: patient        Patient has been awake and alert all day.  He denies SI, SIB.  He reports that he is thinking clearer, he slept well, and his appetite has returned to normal.  Patient is eating well and drinking adequate fluids.  His affect is brighter and his mood is calmer.  He remains hypertensive--but has resumed his blood pressure medicines that he has been off of for 3-4 days.  Pt has expressed an interest in discharge tomorrow and says that he doesn't need any more valium.  Pt says he will return to [facility], service work and attending his [facility] sober group.   Patient would like to leave tomorrow by 10:00 am--his girlfriend will pick him up.  His meds are being sent to his home pharmacy.  Will continue to monitor and assist with discharge plan.

## 2025-06-17 VITALS
RESPIRATION RATE: 16 BRPM | BODY MASS INDEX: 41.75 KG/M2 | SYSTOLIC BLOOD PRESSURE: 147 MMHG | WEIGHT: 315 LBS | OXYGEN SATURATION: 98 % | HEART RATE: 85 BPM | TEMPERATURE: 97.7 F | DIASTOLIC BLOOD PRESSURE: 100 MMHG | HEIGHT: 73 IN

## 2025-06-17 PROCEDURE — 250N000013 HC RX MED GY IP 250 OP 250 PS 637: Performed by: PSYCHIATRY & NEUROLOGY

## 2025-06-17 PROCEDURE — 250N000013 HC RX MED GY IP 250 OP 250 PS 637: Performed by: NURSE PRACTITIONER

## 2025-06-17 PROCEDURE — 250N000013 HC RX MED GY IP 250 OP 250 PS 637

## 2025-06-17 PROCEDURE — 99239 HOSP IP/OBS DSCHRG MGMT >30: CPT | Performed by: PSYCHIATRY & NEUROLOGY

## 2025-06-17 RX ADMIN — SERTRALINE HYDROCHLORIDE 200 MG: 100 TABLET ORAL at 08:37

## 2025-06-17 RX ADMIN — AMLODIPINE BESYLATE 5 MG: 5 TABLET ORAL at 08:37

## 2025-06-17 RX ADMIN — THIAMINE HCL TAB 100 MG 100 MG: 100 TAB at 08:37

## 2025-06-17 RX ADMIN — PANTOPRAZOLE SODIUM 40 MG: 40 TABLET, DELAYED RELEASE ORAL at 08:37

## 2025-06-17 RX ADMIN — FOLIC ACID 1 MG: 1 TABLET ORAL at 08:37

## 2025-06-17 RX ADMIN — ACETAMINOPHEN 650 MG: 325 TABLET ORAL at 08:38

## 2025-06-17 RX ADMIN — BUSPIRONE HYDROCHLORIDE 15 MG: 15 TABLET ORAL at 08:37

## 2025-06-17 RX ADMIN — GABAPENTIN 800 MG: 800 TABLET, FILM COATED ORAL at 08:37

## 2025-06-17 ASSESSMENT — ACTIVITIES OF DAILY LIVING (ADL)
ADLS_ACUITY_SCORE: 35

## 2025-06-17 NOTE — PLAN OF CARE
Problem: Sleep Disturbance  Goal: Adequate Sleep/Rest  Outcome: Progressing   Goal Outcome Evaluation:    The Patient has completed detox, had a restful night, and slept well. The 15-minute safety checks are currently underway and proceeding without any issues.

## 2025-06-17 NOTE — PLAN OF CARE
"Patient has not scored greater than an 8 on MSSA monitoring and has not required Valium for greater than 24 hours. Patient no longer requires acute alcohol withdrawal monitoring. Patient is \"out of detox\" from alcohol withdrawal.           "

## 2025-06-17 NOTE — PLAN OF CARE
"Patient visible in the milieu, social with peers, attended groups. Affect bright, mood is anxious. Rated anxiety 4/10. Denies SI, SIB, HI, or hallucinations.   Continued to be monitored for alcohol withdrawal. MSSA scores 5 and 4. Patient is now \"out of detox\" from alcohol withdrawal.   VSS, appetite is good, hydrating adequately. Continues to report a headache 4-5/10.   Patient is planning to discharge home at 10:00 am tomorrow.   PRNS: Tylenol 650 mg times 2, Hydroxyzine 50 mg times 2, Maalox 30 ml times 2, and Trazodone 50 mg times one.   Patient denies any additional concerns or unmet needs.  /88   Pulse 80   Temp 97.4  F (36.3  C) (Oral)   Resp 16   Ht 1.854 m (6' 1\")   Wt (!) 145.2 kg (320 lb)   SpO2 99%   BMI 42.22 kg/m         "

## 2025-06-17 NOTE — DISCHARGE SUMMARY
Psychiatric Discharge Summary    Nickolas Lang MRN# [id number]   Age: 34 year old YOB: 1990     Date of Admission:  6/14/2025  Date of Discharge:  6/17/2025  9:19 AM  Admitting Physician:  Darnell Jensen MD  Discharge Physician:  Nicole Sigala DO         Event Leading to Hospitalization:   Nickolas Lang is a 34 year old male with a history of alcohol use disorder who presents to the emergency department with his girlfriend seeking detox.       Per chart review, patient was recently admitted at [facility] from 5/27/2025 - 6/1/2025 due to acute toxic encephalopathy related to alcohol consumption accompanied by acute hypoxic and hypercarbic respiratory failure.  His BAL was significantly elevated at .489 and he was intubated while in the ICU.     Here in the ED, patient's girlfriend reports that he was discharged from [facility] with plans to go to detox, but decided to go to his mother's house instead.  Patient states he had been sober, until about 3-4 days ago.  His girlfriend reports she found the first bottle of alcohol yesterday and believes he might be drinking a liter a day.  Patient endorses a history of seizures, hallucinations, and shakes with alcohol withdrawal.  Patient's girlfriend reports he has been sober until recently for the past 6 years and has not had any recent alcohol withdrawal seizures.  She is unsure when he last had a seizure prior to the 6 years ago was.  He states his last drink was earlier today. Patient's girlfriend reports she called EMS yesterday due to his alcohol intoxication.  He blew a 0.2 on the breathalyzer and EMS felt he was coherent enough to make his own decision to stay home. Patient denies nausea or vomiting.  He denies any difficulty eating or other drug use.     During visit with this provider: patient was seen while being on call in presence of PA student. Patient walked into the exam room voluntarily and talked to us.  Maintained fair eye contact, appeared to be somewhat uncomfortable/ashamed of relapse after 6 years of sobriety. Confirmed that he managed to stay sober for such a long time by utilizing AA groups and [facility]. Blamed his relapse on stopping going to groups and abandoning his routines. Reported that he had been averaging 1L of hard liquor daily, was admitted in the beginning of June to [facility] with BAL close to 0.5, needed to be intubated, but after discharge from [facility] continued to drink. Said that he was not interested in going to CD treatment, but would return to  and his [facility] program. Nickolas reported that he suffers from chronic anxiety and depression and has been compliant with him meds. He has psych prescriber and currently in a process of getting therapist. Said that meds works for his psych symptoms when he doesn't drink.     Past psychiatric history: hx of drinking alcohol since age 16. Drinking became problematic by the age of 23-24. Reports going through 18 CD treatment and above mentioned 6 years of sobriety. The patient has tolerance, withdrawal, progressive use, loss of control, spending more time using substances.  The patient has made attempts to quit, is experiencing cravings, reports negative consequences. Hx of DT, but no seizures. Hx of 2 DUIs, has driving license. States that his anxiety and depression have been treated with Zoloft, Gabapentin, he is also on Ambien and Adderall. Denies previous psych hospitalizations, says that might have overdosed one time while being intoxicated, but doesn't know for sure.     See Admission note by Darnell Jensen MD on 6/15/25 for additional details.          DIagnoses:     Alcohol use disorder, severe, dependence with withdrawal with complication including hx of seizures   EVAN  ADHD  Insomnia, unspecified  HTN  GERD  Anemia, mild     Clinically Significant Risk Factors                   # Hypertension: Noted on problem list            #  "Morbid Obesity: Estimated body mass index is 42.22 kg/m  as calculated from the following:    Height as of this encounter: 1.854 m (6' 1\").    Weight as of this encounter: 145.2 kg (320 lb)., PRESENT ON ADMISSION                   Labs:     Recent Results (from the past week)   Comprehensive metabolic panel    Collection Time: 06/14/25  1:50 PM   Result Value Ref Range    Sodium 139 135 - 145 mmol/L    Potassium 3.6 3.4 - 5.3 mmol/L    Carbon Dioxide (CO2) 23 22 - 29 mmol/L    Anion Gap 12 7 - 15 mmol/L    Urea Nitrogen 7.5 6.0 - 20.0 mg/dL    Creatinine 0.44 (L) 0.67 - 1.17 mg/dL    GFR Estimate >90 >60 mL/min/1.73m2    Calcium 8.5 (L) 8.8 - 10.4 mg/dL    Chloride 104 98 - 107 mmol/L    Glucose 122 (H) 70 - 99 mg/dL    Alkaline Phosphatase 80 40 - 150 U/L    AST 19 0 - 45 U/L    ALT 19 0 - 70 U/L    Protein Total 6.8 6.4 - 8.3 g/dL    Albumin 4.2 3.5 - 5.2 g/dL    Bilirubin Total 0.2 <=1.2 mg/dL   Magnesium    Collection Time: 06/14/25  1:50 PM   Result Value Ref Range    Magnesium 1.9 1.7 - 2.3 mg/dL   Ethanol Level Blood    Collection Time: 06/14/25  1:50 PM   Result Value Ref Range    Ethanol Level Blood 0.28 (H) <=0.01 g/dL   GGT    Collection Time: 06/14/25  1:50 PM   Result Value Ref Range    GGT 34 8 - 61 U/L   TSH with free T4 reflex    Collection Time: 06/14/25  1:50 PM   Result Value Ref Range    TSH 0.95 0.30 - 4.20 uIU/mL   CBC with platelets and differential    Collection Time: 06/14/25  2:24 PM   Result Value Ref Range    WBC Count 5.0 4.0 - 11.0 10e3/uL    RBC Count 5.03 4.40 - 5.90 10e6/uL    Hemoglobin 11.5 (L) 13.3 - 17.7 g/dL    Hematocrit 37.8 (L) 40.0 - 53.0 %    MCV 75 (L) 78 - 100 fL    MCH 22.9 (L) 26.5 - 33.0 pg    MCHC 30.4 (L) 31.5 - 36.5 g/dL    RDW 21.7 (H) 10.0 - 15.0 %    Platelet Count 393 150 - 450 10e3/uL    % Neutrophils 50 %    % Lymphocytes 39 %    % Monocytes 8 %    % Eosinophils 2 %    % Basophils 1 %    % Immature Granulocytes 0 %    NRBCs per 100 WBC 0 <1 /100    Absolute " Neutrophils 2.5 1.6 - 8.3 10e3/uL    Absolute Lymphocytes 1.9 0.8 - 5.3 10e3/uL    Absolute Monocytes 0.4 0.0 - 1.3 10e3/uL    Absolute Eosinophils 0.1 0.0 - 0.7 10e3/uL    Absolute Basophils 0.0 0.0 - 0.2 10e3/uL    Absolute Immature Granulocytes 0.0 <=0.4 10e3/uL    Absolute NRBCs 0.0 10e3/uL   Hemoglobin A1c    Collection Time: 06/14/25  2:24 PM   Result Value Ref Range    Estimated Average Glucose 114 <117 mg/dL    Hemoglobin A1C 5.6 <5.7 %   Urine Drug Screen Panel    Collection Time: 06/14/25  2:57 PM   Result Value Ref Range    Amphetamines Urine Screen Negative Screen Negative    Barbituates Urine Screen Negative Screen Negative    Benzodiazepine Urine Screen Positive (A) Screen Negative    Cannabinoids Urine Screen Negative Screen Negative    Cocaine Urine Screen Negative Screen Negative    Fentanyl Qual Urine Screen Negative Screen Negative    Opiates Urine Screen Negative Screen Negative    PCP Urine Screen Negative Screen Negative              Consults:   IM consult placed for management of other medical concerns, per consult note on 6/15/25:   Nickolas Lang is a 34 year old male with PMH of alcohol use disorder, HTN, GERD, MDD, EVAN, and ADHD admitted to [facility] for detoxification from alcohol on 6/14/2025.      # Alcohol use disorder  # Alcohol withdrawal  Patient reports 6 years of sobriety before he recently relapsed again on May 17th. Since then, he reports drinking 1L of vodka most days. While withdrawing from alcohol, patient has experienced sweating, chills, restlessness, and a headache. Patient denies history of withdrawal seizures. He has previously been in detox and chemical dependency treatment. He denies fevers/chills, chest pain, shortness of breath, and abdominal pain. BAL 0.28 on arrival to ED. MSSA 10 this morning.  - Management per primary team, Psychiatry     - Continue MSSA protocol with benzodiazepines as indicated  - Agree with folic acid, MVI, thiamine supplementation    - Notify Medicine for SBP >170 or DBP >105  - Patient informed to notify nursing who can notify Medicine if any new or worsening symptoms or concerns arise      # HTN  SBP range 110s-170s. Elevated blood pressure likely partly attributed to acute alcohol withdrawal / sympathetic drive related to decompensated mental health. Patient also did not receive PTA antihypertensive right away. Patient denies headache, visual changes, shortness of breath, and chest pain.   - Continue PTA amlodipine   - Always recheck BP if high or low and correlate with clinical situation  - Treat pain, anxiety, and any withdrawal s/s if present  - Notify Medicine for SBP >170 or DBP >105 after careful reassessment, treatment of pain/anxiety/withdrawal, and home PTA med administration  - If blood pressure fails to normalize prior to discharge, patient should follow up with PCP within one week of discharge      # GERD  Patient reports increased symptoms of reflux lately, requesting PPI, stating this normally works well for him.  - Protonix 40 mg every day      # Anemia mild  On admission, patient with mild anemia to 11.5. Likely due to bone marrow suppression in the setting of excessive alcohol use. No evidence of acute bleed or infectious process. History of mildly low hemoglobin on chart review.  - Repeat CBC on PCP follow up     # EVAN  # MDD  # ADHD  Management per primary team, Psychiatry.           The patient's care was discussed with the Patient.     Currently the patient is medically stable and Medicine will sign off at this time. Recommendations relayed to primary team via this progress note. Please notify on-call [facility] if new questions or concerns arise.       Anna Kat PA-C  Hospitalist Service         Hospital Course:   Nickolas Lang is a 34 year old male with history of substance use and anxiety who presented to ED seeking detox from alcohol. Medically cleared in ED, admitted to 3A as voluntary patient. EtOH BAC  0.28(H), UDS positive for benzodiazepines. MSSA with diazepam started for alcohol withdrawal. Withdrawal and seizure precautions in place, has hx of seizures. Admission labs ordered and reviewed those resulted. IM consult placed. Reporting some anxiety, denying safety concerns including SI and HI. PTA medications continued as appropriate, resumed full PTA dose of gabapentin 6/16 to further target mood and withdrawal. Pt reports goals for hospitalization are to complete detox and then discharge home with outpatient supports through [facility] and AA. Declined AUD MAT or referrals for CD tx.     The patient's symptoms of alcohol withdrawal improved. He was out of detox 6/17 and requested to discharge home.     Today Nickolas Lang reports having no thoughts of harming self or others. In addition, he has notable risk factors for self-harm, including single status, anxiety, and substance abuse. However, risk is mitigated by commitment to family, absence of past attempts, ability to volunteer a safety plan, history of seeking help when needed, and pt is future oriented and denying SI. Therefore, based on all available evidence including the factors cited above, he does not appear to be at imminent risk for self-harm, does not meet criteria for a 72-hr hold, and therefore remains appropriate for ongoing outpatient level of care.     Nickolas Lang was discharged to home. At the time of discharge Nickolas Lang was determined to not be a danger to himself or others.          Discharge Medications:     Current Discharge Medication List        START taking these medications    Details   amLODIPine (NORVASC) 5 MG tablet Take 1 tablet (5 mg) by mouth daily.  Qty: 30 tablet, Refills: 0    Associated Diagnoses: Alcohol dependence with withdrawal with complication (H)      folic acid (FOLVITE) 1 MG tablet Take 1 tablet (1 mg) by mouth daily.  Qty: 30 tablet, Refills: 0    Associated Diagnoses: Alcohol dependence  with withdrawal with complication (H)      pantoprazole (PROTONIX) 40 MG EC tablet Take 1 tablet (40 mg) by mouth every morning (before breakfast).  Qty: 30 tablet, Refills: 0    Associated Diagnoses: Alcohol dependence with withdrawal with complication (H)      thiamine (B-1) 100 MG tablet Take 1 tablet (100 mg) by mouth daily.  Qty: 30 tablet, Refills: 0    Associated Diagnoses: Alcohol dependence with withdrawal with complication (H)           CONTINUE these medications which have CHANGED    Details   busPIRone (BUSPAR) 15 MG tablet Take 1 tablet (15 mg) by mouth 2 times daily.  Qty: 60 tablet, Refills: 0    Associated Diagnoses: Anxiety      !! gabapentin (NEURONTIN) 600 MG tablet Take 1 tablet (600 mg) by mouth at bedtime. In addition to 800mg dose for total bedtime dose of 1400mg  Qty: 30 tablet, Refills: 0    Associated Diagnoses: Alcohol dependence with withdrawal with complication (H)      !! gabapentin (NEURONTIN) 800 MG tablet Take 1 tablet (800 mg) by mouth 3 times daily.  Qty: 90 tablet, Refills: 0    Associated Diagnoses: Alcohol dependence with withdrawal with complication (H)      hydrOXYzine HCl (ATARAX) 50 MG tablet Take 1 tablet (50 mg) by mouth 3 times daily as needed for anxiety.  Qty: 90 tablet, Refills: 0    Associated Diagnoses: Anxiety      sertraline (ZOLOFT) 100 MG tablet Take 2 tablets (200 mg) by mouth daily.  Qty: 60 tablet, Refills: 0    Associated Diagnoses: Anxiety       !! - Potential duplicate medications found. Please discuss with provider.        CONTINUE these medications which have NOT CHANGED    Details   amphetamine-dextroamphetamine (ADDERALL XR) 30 MG 24 hr capsule Take 60 mg by mouth every morning.      sildenafil (VIAGRA) 50 MG tablet Take 50 mg by mouth daily as needed (FOR ERECTILE DYSFUNCTION).      zolpidem (AMBIEN) 10 MG tablet Take 10 mg by mouth at bedtime.                  Psychiatric Examination:   Appearance:  awake, alert and adequately groomed  Attitude:   "cooperative  Eye Contact:  good  Mood:  \"doing good\"  Affect:  appropriate and in normal range and mood congruent  Speech:  clear, coherent and normal prosody  Psychomotor Behavior:  no evidence of tardive dyskinesia, dystonia, or tics  Thought Process:  logical, linear, and goal oriented  Associations:  no loose associations  Thought Content:  no evidence of suicidal ideation or homicidal ideation and no evidence of psychotic thought  Insight:  fair  Judgment:  intact  Oriented to:  time, person, and place  Attention Span and Concentration:  intact  Recent and Remote Memory:  intact  Language: English, fluent  Fund of Knowledge: appropriate  Muscle Strength and Tone: normal  Gait and Station: Normal         Discharge Plan:   Recommendation:    Work your recovery plan  Consider working with addiction medicine     Addiction Medicine Referrals:  [facility]  Phone Number: [phone] ([facility] [place])    2. [facility] - Addiction Recovery  Phone Number: [phone] ([facility] Recovery)    3. [facility] - Addiction Medicine  Phone Number: [phone] ([facility] Medicine)    Disposition: Home     Follow-up Appointment:   You are aware you should make a follow up appointment with your primary care doctor for medical and medication management   Future Appointments 6/17/2025 - 12/14/2025        Date Visit Type Length Department Provider     7/21/2025  1:30 PM OFFICE VISIT 30 min [facility] Diana Garza MD    Location Instructions:     [facility] is in [address] of the [facility] at [address] in [place]. The building is at the intersection with [address] [facility] and along [address] [address]. This is the large, blue, glass building with a sculpture on the roof; please note there is no [place] signage on the exterior. Free lot parking is available.               "       Attestation:  Patient has been seen and evaluated by me, Nicole Sigala DO on day of discharge. 34 minutes were spent in coordination of discharge planning.

## 2025-06-17 NOTE — PLAN OF CARE
Problem: Adult Behavioral Health Plan of Care  Goal: Plan of Care Review  Outcome: Progressing   Goal Outcome Evaluation:         Pt Med compliant, tolerated meds and had discharged meds and instructions reviewed with writer. Pt is out of detox and returning home to resume AA group x3 weekly and attend Psych and PCP appointment, pt c/o headache, Tylenol given with effect. Discharge meds sent to outside pharmacy.      Belongings returned, pt escorted off the unit by DOUG Morales, here to  at 0930.

## 2025-06-18 ENCOUNTER — PATIENT OUTREACH (OUTPATIENT)
Dept: CARE COORDINATION | Facility: CLINIC | Age: 35
End: 2025-06-18
Payer: COMMERCIAL

## 2025-06-18 NOTE — PROGRESS NOTES
Clinic Care Coordination Contact  Transitions of Care Outreach  Chief Complaint   Patient presents with    Clinic Care Coordination - [facility]       Most Recent Admission Date: 6/14/2025   Most Recent Admission Diagnosis: Alcohol dependence with uncomplicated intoxication (H) - F10.220     Most Recent Discharge Date: 6/17/2025   Most Recent Discharge Diagnosis: Alcohol dependence with uncomplicated intoxication (H) - F10.220  Alcohol dependence with withdrawal with complication (H) - F10.239  Anxiety - F41.9     Transitions of Care Assessment    Discharge Assessment  How are you doing now that you are home?: Spoke with patient who shares that he is doing well and will be meeting with his sponsor for dinner tonight. He is also spending alot of time with his girlfriends family and trying to stay busy. Patient shares that he was sober for six years before recent relapse. He knows what he needs to do to stay sober. Writer praised patient for his positive insight. Patient thanks writer for listening and checking in.  How are your symptoms? (Red Flag symptoms escalate to triage hotline per guidelines): Improved  Do you know how to contact your clinic care team if you have future questions or changes to your health status? : Yes  Does the patient have their discharge instructions? : Yes  Does the patient have questions regarding their discharge instructions? : No  Were you started on any new medications or were there changes to any of your previous medications? : Yes  Does the patient have all of their medications?: Yes  Do you have questions regarding any of your medications? : No  Do you have all of your needed medical supplies or equipment (DME)?  (i.e. oxygen tank, CPAP, cane, etc.): Yes    Post-op ([facility] Only)  If the patient had a surgery or procedure, do they have any questions for a nurse?: No    Post-op (Clinicians Only)  Did the patient have surgery or a procedure: No      Follow up Plan     Discharge  Follow-Up  Discharge follow up appointment scheduled in alignment with recommended follow up timeframe or Transitions of Risk Category? (Low = within 30 days; Moderate= within 14 days; High= within 7 days): Yes  Discharge Follow Up Appointment Date: 07/21/25  Discharge Follow Up Appointment Scheduled with?: Primary Care Provider    Future Appointments   Date Time Provider Department Center   7/21/2025  1:30 PM Diana Garza MD [facility]       Outpatient Plan as outlined on AVS reviewed with patient.    For any urgent concerns, please contact our 24 hour nurse triage line: [phone] ([phone])       BRYANT Molina

## 2025-07-14 DIAGNOSIS — F10.239 ALCOHOL DEPENDENCE WITH WITHDRAWAL WITH COMPLICATION (H): ICD-10-CM

## 2025-07-15 DIAGNOSIS — F10.239 ALCOHOL DEPENDENCE WITH WITHDRAWAL WITH COMPLICATION (H): ICD-10-CM

## 2025-07-16 RX ORDER — FOLIC ACID 1 MG/1
1000 TABLET ORAL DAILY
Qty: 30 TABLET | Refills: 0 | OUTPATIENT
Start: 2025-07-16

## 2025-07-16 RX ORDER — AMLODIPINE BESYLATE 5 MG/1
5 TABLET ORAL DAILY
Qty: 90 TABLET | OUTPATIENT
Start: 2025-07-16

## 2025-07-16 RX ORDER — PANTOPRAZOLE SODIUM 40 MG/1
TABLET, DELAYED RELEASE ORAL
Qty: 90 TABLET | OUTPATIENT
Start: 2025-07-16

## 2025-07-16 NOTE — TELEPHONE ENCOUNTER
6/14/2025 - 6/17/2025 (3 days)  [facility]           Discharge Plan:   Recommendation:    Work your recovery plan  Consider working with addiction medicine     Addiction Medicine Referrals:  [facility]  Phone Number: [phone] ([facility])    2. [facility] - Addiction Recovery  Phone Number: [phone] ([facility])    3. [facility] - Addiction Medicine  Phone Number: [phone] ([facility])     Disposition: Home     Follow-up Appointment:   You are aware you should make a follow up appointment with your primary care doctor for medical and medication management   Future Appointments 6/17/2025 - 12/14/2025          Date Visit Type Length Department Provider       7/21/2025  1:30 PM OFFICE VISIT          [x]Medication unable to be refilled by RN due to criteria not met as indicated below:   Other: No longer under provider care.       Request from pharmacy:  Requested Prescriptions   Pending Prescriptions Disp Refills    amLODIPine (NORVASC) 5 MG tablet [Pharmacy Med Name: AMLODIPINE BESYLATE 5MG TABLETS] 90 tablet      Sig: TAKE 1 TABLET(5 MG) BY MOUTH DAILY       Calcium Channel Blockers Protocol  Failed - 7/16/2025 11:14 AM        Failed - Most recent blood pressure under 140/90 in past 12 months     BP Readings from Last 3 Encounters:   06/17/25 (!) 147/100   11/02/21 126/82   03/30/21 132/81       Systolic (Patient Reported): -- (n/a) (6/9/2025 10:54 AM)  Diastolic (Patient Reported): -- (n/a) (6/9/2025 10:54 AM)              Failed - Medication is indicated for associated diagnosis        Failed - Recent (12 month) or future (90 days) visit with authorizing provider's specialty (provided they have been seen in the past 15 months)     The patient must have completed an in-person or virtual visit within the past 12 months or has a  future visit scheduled within the next 90 days with the authorizing provider s specialty.  Urgent care and e-visits do not qualify as an office visit for this protocol.          Passed - Medication is active on med list and the sig matches. RN to manually verify dose and sig if red X/fail.     If the protocol passes (green check), you do not need to verify med dose and sig.    A prescription matches if they are the same clinical intention.    For Example: once daily and every morning are the same.    The protocol can not identify upper and lower case letters as matching and will fail.     For Example: Take 1 tablet (50 mg) by mouth daily     TAKE 1 TABLET (50 MG) BY MOUTH DAILY    For all fails (red x), verify dose and sig.    If the refill does match what is on file, the RN can still proceed to approve the refill request.       If they do not match, route to the appropriate provider.             Passed - Patient is age 18 or older          pantoprazole (PROTONIX) 40 MG EC tablet [Pharmacy Med Name: PANTOPRAZOLE 40MG TABLETS] 90 tablet      Sig: TAKE 1 TABLET(40 MG) BY MOUTH EVERY MORNING BEFORE BREAKFAST       PPI Protocol Failed - 7/16/2025 11:14 AM        Failed - Medication is active on med list and the sig matches. RN to manually verify dose and sig if red X/fail.     If the protocol passes (green check), you do not need to verify med dose and sig.    A prescription matches if they are the same clinical intention.    For Example: once daily and every morning are the same.    The protocol can not identify upper and lower case letters as matching and will fail.     For Example: Take 1 tablet (50 mg) by mouth daily     TAKE 1 TABLET (50 MG) BY MOUTH DAILY    For all fails (red x), verify dose and sig.    If the refill does match what is on file, the RN can still proceed to approve the refill request.       If they do not match, route to the appropriate provider.             Failed - Medication indicated for  associated diagnosis     The medication is prescribed for one or more of the following conditions:     Erosive esophagitis    Gastritis   Gastric hypersecretion   Gastric ulcer   Gastroesophageal reflux disease   Helicobacter pylori gastrointestinal tract infection   Ulcer of duodenum   Drug-induced peptic ulcer   Esophageal stricture   Gastrointestinal hemorrhage   Indigestion   Stress ulcer   Zollinger-Mason syndrome   Sanchez s esophagus   Laryngopharyngeal reflux   Epigastric Pain   Morbid Obesity   Cough   History of Peptic Ulcer   Esophageal Atresia, Stenosis and Fistula   Cystic Fibrosis  Bronchiectasis          Failed - Recent (12 month) or future (90 days) visit with authorizing provider's specialty (provided they have been seen in the past 15 months)     The patient must have completed an in-person or virtual visit within the past 12 months or has a future visit scheduled within the next 90 days with the authorizing provider s specialty.  Urgent care and e-visits do not qualify as an office visit for this protocol.          Passed - Patient is age 18 or older          folic acid (FOLVITE) 1 MG tablet [Pharmacy Med Name: FOLIC ACID 1MG TABLETS] 30 tablet 0     Sig: TAKE 1 TABLET BY MOUTH EVERY DAY       Vitamin Supplements Protocol Failed - 7/16/2025 11:14 AM        Failed - Medication indicated for associated diagnosis     The medication is prescribed for one or more of the following conditions:     Vitamin deficiency   Osteopenia   Osteoporosis   Nutrition counseling   Nutrition education   Feeding ability finding   Bone density finding   Morbid Obesity   History of bypass of stomach   Idiopathic peripheral neuropathy   General examination of patient   Vitamin D deficiency   Folate deficiency anemia due to dietary causes   Sleeve resection of stomach   Rheumatoid factor level - finding   Crohn's disease   Psoriatic arthritis   Transplant of Kidney   Arthropathy   Alcoholism   Malabsorption  syndrome   Disorder of bone and articular cartilage   Cystic Fibrosis  Bronchiectasis            Failed - Recent (12 month) or future (90 days) visit with authorizing provider's specialty (provided they have been seen in the past 15 months)     The patient must have completed an in-person or virtual visit within the past 12 months or has a future visit scheduled within the next 90 days with the authorizing provider s specialty.  Urgent care and e-visits do not qualify as an office visit for this protocol.          Passed - The patient does not have an order for high-dose vitamin D        Passed - Medication is active on med list and the sig matches. RN to manually verify dose and sig if red X/fail.     If the protocol passes (green check), you do not need to verify med dose and sig.    A prescription matches if they are the same clinical intention.    For Example: once daily and every morning are the same.    The protocol can not identify upper and lower case letters as matching and will fail.     For Example: Take 1 tablet (50 mg) by mouth daily     TAKE 1 TABLET (50 MG) BY MOUTH DAILY    For all fails (red x), verify dose and sig.    If the refill does match what is on file, the RN can still proceed to approve the refill request.       If they do not match, route to the appropriate provider.             Passed - The patient is 2 years of age or older

## 2025-07-17 RX ORDER — GABAPENTIN 600 MG/1
TABLET ORAL
Qty: 30 TABLET | Refills: 0 | OUTPATIENT
Start: 2025-07-17

## 2025-07-17 NOTE — TELEPHONE ENCOUNTER
refill denied:no longer under provider care    6-14-25 ER note        Discharge Plan:  Recommendation:    Work your recovery plan  Consider working with addiction medicine     Addiction Medicine Referrals:  [facility]  Phone Number: [phone] ([facility])    2. [facility] - Addiction [facility]  Phone Number: [phone] ([facility])    3. [facility] - Addiction Medicine  Phone Number: [phone] ([facility])

## 2025-07-26 DIAGNOSIS — F10.239 ALCOHOL DEPENDENCE WITH WITHDRAWAL WITH COMPLICATION (H): ICD-10-CM

## 2025-07-29 RX ORDER — GABAPENTIN 800 MG/1
800 TABLET ORAL 3 TIMES DAILY
Qty: 90 TABLET | Refills: 0 | OUTPATIENT
Start: 2025-07-29

## 2025-07-29 NOTE — TELEPHONE ENCOUNTER
Refill denied    Not followed by Dr Sigala  Discharge summary ..[phone] ([facility])